# Patient Record
Sex: FEMALE | Race: WHITE | NOT HISPANIC OR LATINO
[De-identification: names, ages, dates, MRNs, and addresses within clinical notes are randomized per-mention and may not be internally consistent; named-entity substitution may affect disease eponyms.]

---

## 2017-05-10 PROBLEM — Z00.00 ENCOUNTER FOR PREVENTIVE HEALTH EXAMINATION: Status: ACTIVE | Noted: 2017-05-10

## 2017-05-17 ENCOUNTER — RECORD ABSTRACTING (OUTPATIENT)
Age: 37
End: 2017-05-17

## 2017-05-17 DIAGNOSIS — Z84.0 FAMILY HISTORY OF DISEASES OF THE SKIN AND SUBCUTANEOUS TISSUE: ICD-10-CM

## 2017-05-17 DIAGNOSIS — Z78.9 OTHER SPECIFIED HEALTH STATUS: ICD-10-CM

## 2017-05-26 ENCOUNTER — APPOINTMENT (OUTPATIENT)
Dept: PLASTIC SURGERY | Facility: CLINIC | Age: 37
End: 2017-05-26

## 2017-06-16 ENCOUNTER — APPOINTMENT (OUTPATIENT)
Dept: PLASTIC SURGERY | Facility: CLINIC | Age: 37
End: 2017-06-16

## 2017-06-16 ENCOUNTER — OUTPATIENT (OUTPATIENT)
Dept: OUTPATIENT SERVICES | Facility: HOSPITAL | Age: 37
LOS: 1 days | Discharge: HOME | End: 2017-06-16

## 2017-06-28 DIAGNOSIS — D48.5 NEOPLASM OF UNCERTAIN BEHAVIOR OF SKIN: ICD-10-CM

## 2017-06-29 ENCOUNTER — APPOINTMENT (OUTPATIENT)
Dept: PLASTIC SURGERY | Facility: CLINIC | Age: 37
End: 2017-06-29

## 2017-06-30 ENCOUNTER — APPOINTMENT (OUTPATIENT)
Dept: PLASTIC SURGERY | Facility: CLINIC | Age: 37
End: 2017-06-30

## 2018-06-05 ENCOUNTER — APPOINTMENT (OUTPATIENT)
Dept: PLASTIC SURGERY | Facility: CLINIC | Age: 38
End: 2018-06-05
Payer: COMMERCIAL

## 2018-06-05 PROCEDURE — 99212 OFFICE O/P EST SF 10 MIN: CPT

## 2018-09-16 ENCOUNTER — INPATIENT (INPATIENT)
Facility: HOSPITAL | Age: 38
LOS: 0 days | Discharge: HOME | End: 2018-09-17
Attending: INTERNAL MEDICINE | Admitting: INTERNAL MEDICINE

## 2018-09-16 VITALS
DIASTOLIC BLOOD PRESSURE: 73 MMHG | TEMPERATURE: 98 F | HEART RATE: 123 BPM | SYSTOLIC BLOOD PRESSURE: 102 MMHG | RESPIRATION RATE: 18 BRPM | OXYGEN SATURATION: 98 %

## 2018-09-16 LAB
ALBUMIN SERPL ELPH-MCNC: 4.9 G/DL — SIGNIFICANT CHANGE UP (ref 3.5–5.2)
ALP SERPL-CCNC: 105 U/L — SIGNIFICANT CHANGE UP (ref 30–115)
ALT FLD-CCNC: 24 U/L — SIGNIFICANT CHANGE UP (ref 0–41)
ANION GAP SERPL CALC-SCNC: 19 MMOL/L — HIGH (ref 7–14)
AST SERPL-CCNC: 25 U/L — SIGNIFICANT CHANGE UP (ref 0–41)
BASOPHILS # BLD AUTO: 0.04 K/UL — SIGNIFICANT CHANGE UP (ref 0–0.2)
BASOPHILS NFR BLD AUTO: 0.3 % — SIGNIFICANT CHANGE UP (ref 0–1)
BILIRUB SERPL-MCNC: 0.4 MG/DL — SIGNIFICANT CHANGE UP (ref 0.2–1.2)
BUN SERPL-MCNC: 18 MG/DL — SIGNIFICANT CHANGE UP (ref 10–20)
CALCIUM SERPL-MCNC: 10.4 MG/DL — HIGH (ref 8.5–10.1)
CHLORIDE SERPL-SCNC: 93 MMOL/L — LOW (ref 98–110)
CO2 SERPL-SCNC: 25 MMOL/L — SIGNIFICANT CHANGE UP (ref 17–32)
CREAT SERPL-MCNC: 0.8 MG/DL — SIGNIFICANT CHANGE UP (ref 0.7–1.5)
D DIMER BLD IA.RAPID-MCNC: 129 NG/ML DDU — SIGNIFICANT CHANGE UP (ref 0–230)
EOSINOPHIL # BLD AUTO: 0.08 K/UL — SIGNIFICANT CHANGE UP (ref 0–0.7)
EOSINOPHIL NFR BLD AUTO: 0.6 % — SIGNIFICANT CHANGE UP (ref 0–8)
GLUCOSE SERPL-MCNC: 122 MG/DL — HIGH (ref 70–99)
HCT VFR BLD CALC: 39.4 % — SIGNIFICANT CHANGE UP (ref 37–47)
HGB BLD-MCNC: 13.1 G/DL — SIGNIFICANT CHANGE UP (ref 12–16)
IMM GRANULOCYTES NFR BLD AUTO: 0.5 % — HIGH (ref 0.1–0.3)
LYMPHOCYTES # BLD AUTO: 1.31 K/UL — SIGNIFICANT CHANGE UP (ref 1.2–3.4)
LYMPHOCYTES # BLD AUTO: 9.6 % — LOW (ref 20.5–51.1)
MAGNESIUM SERPL-MCNC: 2.1 MG/DL — SIGNIFICANT CHANGE UP (ref 1.8–2.4)
MCHC RBC-ENTMCNC: 26.5 PG — LOW (ref 27–31)
MCHC RBC-ENTMCNC: 33.2 G/DL — SIGNIFICANT CHANGE UP (ref 32–37)
MCV RBC AUTO: 79.6 FL — LOW (ref 81–99)
MONOCYTES # BLD AUTO: 0.7 K/UL — HIGH (ref 0.1–0.6)
MONOCYTES NFR BLD AUTO: 5.1 % — SIGNIFICANT CHANGE UP (ref 1.7–9.3)
NEUTROPHILS # BLD AUTO: 11.47 K/UL — HIGH (ref 1.4–6.5)
NEUTROPHILS NFR BLD AUTO: 83.9 % — HIGH (ref 42.2–75.2)
NRBC # BLD: 0 /100 WBCS — SIGNIFICANT CHANGE UP (ref 0–0)
PLATELET # BLD AUTO: 340 K/UL — SIGNIFICANT CHANGE UP (ref 130–400)
POTASSIUM SERPL-MCNC: 4.3 MMOL/L — SIGNIFICANT CHANGE UP (ref 3.5–5)
POTASSIUM SERPL-SCNC: 4.3 MMOL/L — SIGNIFICANT CHANGE UP (ref 3.5–5)
PROT SERPL-MCNC: 8.8 G/DL — HIGH (ref 6–8)
RBC # BLD: 4.95 M/UL — SIGNIFICANT CHANGE UP (ref 4.2–5.4)
RBC # FLD: 13.4 % — SIGNIFICANT CHANGE UP (ref 11.5–14.5)
SODIUM SERPL-SCNC: 137 MMOL/L — SIGNIFICANT CHANGE UP (ref 135–146)
TROPONIN T SERPL-MCNC: <0.01 NG/ML — SIGNIFICANT CHANGE UP
WBC # BLD: 13.67 K/UL — HIGH (ref 4.8–10.8)
WBC # FLD AUTO: 13.67 K/UL — HIGH (ref 4.8–10.8)

## 2018-09-16 RX ORDER — ENOXAPARIN SODIUM 100 MG/ML
40 INJECTION SUBCUTANEOUS EVERY 24 HOURS
Qty: 0 | Refills: 0 | Status: DISCONTINUED | OUTPATIENT
Start: 2018-09-16 | End: 2018-09-17

## 2018-09-16 RX ORDER — SODIUM CHLORIDE 9 MG/ML
1000 INJECTION INTRAMUSCULAR; INTRAVENOUS; SUBCUTANEOUS ONCE
Qty: 0 | Refills: 0 | Status: COMPLETED | OUTPATIENT
Start: 2018-09-16 | End: 2018-09-16

## 2018-09-16 RX ORDER — ASPIRIN/CALCIUM CARB/MAGNESIUM 324 MG
325 TABLET ORAL ONCE
Qty: 0 | Refills: 0 | Status: COMPLETED | OUTPATIENT
Start: 2018-09-16 | End: 2018-09-16

## 2018-09-16 RX ADMIN — Medication 325 MILLIGRAM(S): at 22:29

## 2018-09-16 RX ADMIN — SODIUM CHLORIDE 2000 MILLILITER(S): 9 INJECTION INTRAMUSCULAR; INTRAVENOUS; SUBCUTANEOUS at 20:00

## 2018-09-16 NOTE — ED PROVIDER NOTE - PHYSICAL EXAMINATION
CONSTITUTIONAL: Well-developed; well-nourished; in no acute distress.   SKIN: warm, dry  HEAD: Normocephalic; atraumatic.  ENT: No nasal discharge; airway clear.  NECK: Supple; non tender.  CARD: S1, S2 normal; no murmurs, gallops, or rubs. Irregularly irregular rythm  RESP: No wheezes, rales or rhonchi.  ABD: soft ntnd  EXT: Normal ROM.  No clubbing, cyanosis or edema.   LYMPH: No acute cervical adenopathy.  NEURO: Alert, oriented, grossly unremarkable  PSYCH: Cooperative, appropriate.

## 2018-09-16 NOTE — ED PROVIDER NOTE - PROGRESS NOTE DETAILS
patient with PMH HOCM, no other medical problems. new onset afib today. NAD, mentating well. cardiologist in [place]. patient CHADSVAS 1 (female gender), does not require AC. Pt reports feeling better after cardizem drip, HR 97. Will admit

## 2018-09-16 NOTE — ED PROVIDER NOTE - ATTENDING CONTRIBUTION TO CARE
37 y.o. F with PMH of HOCM presents with one day of palpitations and shortness of breath. denies cp, + palpitations, no loc, no hemoptysis, + recent travel.     CONSTITUTIONAL: Well-developed; well-nourished; in no acute distress. Sitting up and providing appropriate history and physical examination  SKIN: skin exam is warm and dry, no acute rash.  HEAD: Normocephalic; atraumatic.  EYES: PERRL, 3 mm bilateral, no nystagmus, EOM intact; conjunctiva and sclera clear.  ENT: No nasal discharge; airway clear.  NECK: Supple; non tender. + full passive ROM in all directions. No JVD  CARD: S1, S2 normal; no murmurs, gallops, or rubs. + rapid Irregular rate and rhythm. + Symmetric Strong Pulses, + AFIB  RESP: No wheezes, rales or rhonchi. Good air movement bilaterally  ABD: soft; non-distended; non-tender. No Rebound, No Guarding, No signs of peritonitis, No CVA tenderness. No pulsatile abdominal mass. + Strong and Symmetric Pulses  EXT: Normal ROM. No clubbing, cyanosis or edema. Dp and Pt Pulses intact. Cap refill less than 3 seconds  NEURO: CN 2-12 intact, normal finger to nose, normal romberg, stable gait, no sensory or motor deficits, Alert, oriented, grossly unremarkable. No Focal deficits. GCS 15. NIH 0  PSYCH: Cooperative, appropriate.

## 2018-09-16 NOTE — ED PROVIDER NOTE - NS ED ROS FT
General: No fever, No chills  Eyes:  No visual changes, eye pain or discharge.  ENMT:  No hearing changes, pain, no sore throat or runny nose, no difficulty swallowing  Cardiac:  Irregularly irregular. + SOB or edema. No chest pain with exertion.  Respiratory:  No cough or respiratory distress. No hemoptysis. No history of asthma or RAD.  GI:  No nausea, vomiting, diarrhea or abdominal pain.  MS:  No myalgia, muscle weakness, joint pain or back pain.  Neuro:  No headache or weakness.  No LOC.  Skin:  No skin rash.   Endocrine: No history of thyroid disease or diabetes.

## 2018-09-16 NOTE — H&P ADULT - ASSESSMENT
37 Yr F PMH HCM presenting with Palpitation, dizziness and diaphoresis    #) New onset Afib  - Admit to telemetry  - Controlled on Cardizem drip. Switch to PO and titrate drip  - Hold home medication Betablocker   - Pt has hx of HCM, high risk for thromboembolic event.  - Discussed anticoagulation with pt, she refused and wants second opinion  - F/U cardiology consult  - F/U repeat trop, TSH, electrolytes and echo    DVT ppx: Lovenox 40mg Sq  DASH diet  FULL code

## 2018-09-16 NOTE — ED ADULT NURSE REASSESSMENT NOTE - NS ED NURSE REASSESS COMMENT FT1
Pt assessed, pt resting comfortably, pts HR in 70's on monitor. MD Burns notified, EKG ordered. MD Lechuga ordered cardizem PO, pts HR 66 in NSR at this time, MD Lechuga stated to continue to titrate cardizem slowly and then d/c within 2hours. Cardizem drip at 2mg/hr at this time. Will continue to monitor.

## 2018-09-16 NOTE — ED ADULT TRIAGE NOTE - ACCOMPANIED BY
Patient ID: Aliza Mario is a 33year old female. HPI  Patient presents with: Anxiety    She had anxiety for 10 years but she is really wanted to avoid meds but she feels now that she really does need it. No depression. No suicidal ideations.   She s Nephrolithiasis      per NG: passed on own- 04/2010           Past Surgical History    TONSILLECTOMY      LAPAROSCOPIC CHOLECYSTECTOMY  11/2008    Comment per NG: cholelithiasis    REMOVAL OF OVARIAN CYST(S)  2009    Comment per NG: ovarian cyst; per [facility] Spouse/Significant other

## 2018-09-16 NOTE — ED PROVIDER NOTE - OBJECTIVE STATEMENT
37 y.o. F with PMH of HOCM presents with one day of palpitations and shortness of breath. She never felt this way before, she woke up and felt this all of a sudden. She denies N/V, diarrhea. She states that she recently traveled to [place] 2 weeks ago. She denies any leg pain. She is seeing Dr. Rizvi at [facility].

## 2018-09-16 NOTE — H&P ADULT - ATTENDING COMMENTS
37 Yr F PMH HCM presenting with afib with RVR- now controlled    pt seen and examined- asymptomatic at present    Chart reviewed- agree with initial plan    rate control    tele    ACT    cardio eval    Plan as per cardio

## 2018-09-16 NOTE — H&P ADULT - NSHPLABSRESULTS_GEN_ALL_CORE
13.1   13.67 )-----------( 340      ( 16 Sep 2018 20:05 )             39.4   09-16    137  |  93<L>  |  18  ----------------------------<  122<H>  4.3   |  25  |  0.8    Ca    10.4<H>      16 Sep 2018 20:05  Mg     2.1     09-16    TPro  8.8<H>  /  Alb  4.9  /  TBili  0.4  /  DBili  x   /  AST  25  /  ALT  24  /  AlkPhos  105  09-16  Troponin T, Serum (09.16.18 @ 20:05)    Troponin T, Serum: <0.01 ng/mL

## 2018-09-16 NOTE — ED ADULT NURSE NOTE - NSIMPLEMENTINTERV_GEN_ALL_ED
Implemented All Universal Safety Interventions:  [place] to call system. Call bell, personal items and telephone within reach. Instruct patient to call for assistance. Room bathroom lighting operational. Non-slip footwear when patient is off stretcher. Physically safe environment: no spills, clutter or unnecessary equipment. Stretcher in lowest position, wheels locked, appropriate side rails in place.

## 2018-09-16 NOTE — H&P ADULT - HISTORY OF PRESENT ILLNESS
37 yr F pmh include HCM presenting by EMS with 1 day hx of dizziness, palpitation and diaphoresis. Pt reports symptoms were acute onset, started at 3pm. Denies any previous similar symptoms, fever, chills, chest pain, abd pain, recent illness, nausea, vomiting, or diarrhea. Pt sees cardiologist in [place] for HCM that was diagnosed on routine Echo.  In the ED  /73 T 97.8. EKG shows AFIB with RVR @ 158. WBC 13, Trop neg x1, Mg 2.1.   Pt received 2x Cardizem push and Cardizem drip. Pt is now controlled and in sinus HR 75

## 2018-09-16 NOTE — ED PROVIDER NOTE - MEDICAL DECISION MAKING DETAILS
I personally evaluated the patient. I reviewed the Resident’s or Physician Assistant’s note (as assigned above), and agree with the findings and plan except as documented in my note. Patient rate controlled, admitted, on cardizem, Nelidvasc advises asa, no anticogulation

## 2018-09-17 ENCOUNTER — TRANSCRIPTION ENCOUNTER (OUTPATIENT)
Age: 38
End: 2018-09-17

## 2018-09-17 VITALS
OXYGEN SATURATION: 96 % | DIASTOLIC BLOOD PRESSURE: 53 MMHG | TEMPERATURE: 96 F | HEART RATE: 71 BPM | RESPIRATION RATE: 18 BRPM | SYSTOLIC BLOOD PRESSURE: 117 MMHG

## 2018-09-17 LAB
ANION GAP SERPL CALC-SCNC: 13 MMOL/L — SIGNIFICANT CHANGE UP (ref 7–14)
BUN SERPL-MCNC: 12 MG/DL — SIGNIFICANT CHANGE UP (ref 10–20)
CALCIUM SERPL-MCNC: 9.7 MG/DL — SIGNIFICANT CHANGE UP (ref 8.5–10.1)
CHLORIDE SERPL-SCNC: 103 MMOL/L — SIGNIFICANT CHANGE UP (ref 98–110)
CO2 SERPL-SCNC: 27 MMOL/L — SIGNIFICANT CHANGE UP (ref 17–32)
CREAT SERPL-MCNC: 0.8 MG/DL — SIGNIFICANT CHANGE UP (ref 0.7–1.5)
GLUCOSE SERPL-MCNC: 90 MG/DL — SIGNIFICANT CHANGE UP (ref 70–99)
HCT VFR BLD CALC: 37 % — SIGNIFICANT CHANGE UP (ref 37–47)
HGB BLD-MCNC: 12.4 G/DL — SIGNIFICANT CHANGE UP (ref 12–16)
MAGNESIUM SERPL-MCNC: 2.1 MG/DL — SIGNIFICANT CHANGE UP (ref 1.8–2.4)
MCHC RBC-ENTMCNC: 26.8 PG — LOW (ref 27–31)
MCHC RBC-ENTMCNC: 33.5 G/DL — SIGNIFICANT CHANGE UP (ref 32–37)
MCV RBC AUTO: 79.9 FL — LOW (ref 81–99)
NRBC # BLD: 0 /100 WBCS — SIGNIFICANT CHANGE UP (ref 0–0)
PLATELET # BLD AUTO: 286 K/UL — SIGNIFICANT CHANGE UP (ref 130–400)
POTASSIUM SERPL-MCNC: 4.2 MMOL/L — SIGNIFICANT CHANGE UP (ref 3.5–5)
POTASSIUM SERPL-SCNC: 4.2 MMOL/L — SIGNIFICANT CHANGE UP (ref 3.5–5)
RBC # BLD: 4.63 M/UL — SIGNIFICANT CHANGE UP (ref 4.2–5.4)
RBC # FLD: 13.3 % — SIGNIFICANT CHANGE UP (ref 11.5–14.5)
SODIUM SERPL-SCNC: 143 MMOL/L — SIGNIFICANT CHANGE UP (ref 135–146)
TROPONIN T SERPL-MCNC: 0.01 NG/ML — SIGNIFICANT CHANGE UP
WBC # BLD: 10.82 K/UL — HIGH (ref 4.8–10.8)
WBC # FLD AUTO: 10.82 K/UL — HIGH (ref 4.8–10.8)

## 2018-09-17 RX ORDER — METOPROLOL TARTRATE 50 MG
100 TABLET ORAL DAILY
Qty: 0 | Refills: 0 | Status: DISCONTINUED | OUTPATIENT
Start: 2018-09-17 | End: 2018-09-17

## 2018-09-17 RX ORDER — METOPROLOL TARTRATE 50 MG
3 TABLET ORAL
Qty: 0 | Refills: 0 | COMMUNITY

## 2018-09-17 RX ORDER — APIXABAN 2.5 MG/1
1 TABLET, FILM COATED ORAL
Qty: 60 | Refills: 0
Start: 2018-09-17 | End: 2018-10-16

## 2018-09-17 RX ORDER — RIVAROXABAN 15 MG-20MG
1 KIT ORAL
Qty: 30 | Refills: 0 | OUTPATIENT
Start: 2018-09-17 | End: 2018-10-16

## 2018-09-17 RX ADMIN — Medication 100 MILLIGRAM(S): at 14:35

## 2018-09-17 RX ADMIN — ENOXAPARIN SODIUM 40 MILLIGRAM(S): 100 INJECTION SUBCUTANEOUS at 12:59

## 2018-09-17 NOTE — ED ADULT NURSE REASSESSMENT NOTE - NS ED NURSE REASSESS COMMENT FT1
Pt reassessed with bed sign to  [facility] remain safety VS stable 1:1 sit at bed site denies no anxiety denies pain or Sob with bed sigh  to [facility] ambulance arranged waiting for tramdport.

## 2018-09-17 NOTE — DISCHARGE NOTE ADULT - CARE PLAN
Principal Discharge DX:	Afib  Goal:	control, prevent complications  Assessment and plan of treatment:	Take all medications as prescribed. Follow up with Cardiologist and primary care doctor. If any signs of uncontrollable bleeding, call 911 or come to nearest emergency room.  Secondary Diagnosis:	Hypertrophic cardiomyopathy  Goal:	control  Assessment and plan of treatment:	Take all medications as prescribed. Follow up with Cardiologist and primary care doctor.

## 2018-09-17 NOTE — CONSULT NOTE ADULT - ASSESSMENT
36 yo female patient with PMHx of HCM on toprol, presented because of acute onset of palpitations, dizziness and diaphoresis, found to have new onset AF with RVR    #) Paroxysmal AF  [id number] = 1 (female)  Resume beta-blockers for both HCM and AF, can titrate up as needed to target HR 60-70bpm  Patient needs short term anticoagulation    #) Moderate eccentric mitral regurgitation  under-estimated by Transthoracic echo  will require MINDI to better assess MR    #) HCM  continue with beta-blockers    Follow up with private cardiologist in [place] upon discharge 38 yo female patient with PMHx of HCM on toprol, presented because of acute onset of palpitations, dizziness and diaphoresis, found to have new onset AF with RVR    #) Paroxysmal AF  [id number] = 1 (female) + has structural heart disease (HCM) that increase risk of strokes  Resume beta-blockers for both HCM and AF, can titrate up as needed to target HR 60-70bpm  Patient needs short term anticoagulation (can give eliquis 5mg bid)    #) Moderate eccentric mitral regurgitation  under-estimated by Transthoracic echo  might require MINDI to better assess MR as outpatient    #) HCM  continue with beta-blockers    Follow up with private cardiologist in [place] upon discharge 36 yo female patient with PMHx of HCM on toprol, presented because of acute onset of palpitations, dizziness and diaphoresis, found to have new onset AF with RVR    #) Paroxysmal AF  [id number] = 1 (female) + has structural heart disease (HCM) that increase risk of stroke  Resume beta-blockers for both HCM and AF, can titrate up as needed to target HR 60-70bpm  Start oral anticoagulation (can give eliquis 5mg bid)    #) Moderate eccentric mitral regurgitation  may be underestimated by transthoracic echo  might require MINDI to better assess MR as outpatient    #) HCM  continue with beta-blockers    Follow up with private cardiologist in [place] upon discharge

## 2018-09-17 NOTE — CONSULT NOTE ADULT - ASSESSMENT
38 y/o female with known hypertrophic cardiomyopathy admitted with shortness of breath palpitations found to have afib- now in sinus rhythm  Also with daytime fatigue unrefreshed sleep, snoring    R/o abida  Paroxysmal Afib  Sob resolved    -no clinically significant pulmonary edema on exam  -rate control  -tele monitoring  -f/u 2 d echo  -cardiology follow up- antiplatelet vs antithrombotics  -serial ce   -outpt follow up for polysomnography  -dvt px  -d/w family at bedside

## 2018-09-17 NOTE — DISCHARGE NOTE ADULT - HOSPITAL COURSE
38 yo F with PMH of HCM presented to ED complaining of dizziness, palpitations, and SOB. In ED found to be in A-fib w/ RVR. Was given IV Cardizem. A-fib broke, rate was controlled. Echo was done showing severe asymmetric septal LV hypertrophy. Was evaluated by Cardiology. Recommended anticoagulation and outpatient follow up with primary Cardiologist.

## 2018-09-17 NOTE — PROGRESS NOTE ADULT - ASSESSMENT
36 yo F with PMH of HCM presented to ED complaining of dizziness, palpitations, and SOB. In ED found to be in A-fib w/ RVR.    #) A-fib, new onset  - [id number] =1  - rate currently controlled  - CE -ve x2  - refused AC previously, would like Cardiologist opinion  - Cardio eval - pending    #) HCM  - on Toprol 50mg TID at home, will resume at home dose  - Echo (9/17): 65-70%, G1DD, severe asymmetric septal LV hypertrophy, small PFO  - Cardio eval - pending    DVT: Lovenox subQ  Diet: DASH  Activity: AAT  Code status: FULL  Dispo: from home 38 yo F with PMH of HCM presented to ED complaining of dizziness, palpitations, and SOB. In ED found to be in A-fib w/ RVR.    #) A-fib, new onset  - [id number] =1  - rate currently controlled  - CE -ve x2  - refused AC previously, would like Cardiologist opinion  - Cardio eval - pending  - Pulm following - Rx outpatient sleep study for r/o LUZMA    #) HCM  - on Toprol 50mg TID at home, will resume at home dose  - Echo (9/17): 65-70%, G1DD, severe asymmetric septal LV hypertrophy, small PFO  - Cardio eval - pending    DVT: Lovenox subQ  Diet: DASH  Activity: AAT  Code status: FULL  Dispo: from home 38 yo F with PMH of HCM presented to ED complaining of dizziness, palpitations, and SOB. In ED found to be in A-fib w/ RVR.    #) A-fib, new onset  - [id number] =1  - rate currently controlled  - CE -ve x2  - refused AC previously, would like Cardiologist opinion  - Cardio eval - pending  - Pulm following - Rx outpatient sleep study for r/o LUZMA    #) HCM  - on Toprol 50mg TID at home. Will resume 100 XL for now  - Echo (9/17): 65-70%, G1DD, severe asymmetric septal LV hypertrophy, small PFO  - Cardio eval - pending    DVT: Lovenox subQ  Diet: DASH  Activity: AAT  Code status: FULL  Dispo: from home

## 2018-09-17 NOTE — DISCHARGE NOTE ADULT - CARE PROVIDER_API CALL
Primary care doctor,   known to patient  Phone: (   )    -  Fax: (   )    -    Cardiologist,   known to patient  Phone: (   )    -  Fax: (   )    -

## 2018-09-17 NOTE — ED ADULT NURSE REASSESSMENT NOTE - NS ED NURSE REASSESS COMMENT FT1
As per MD Ankush fournier d/c'd, pt on monitor HR 62, Cardizem PO administered, will continue to monitor.

## 2018-09-17 NOTE — ED ADULT NURSE REASSESSMENT NOTE - NS ED NURSE REASSESS COMMENT FT1
Pt alert and oriented, pt offers no complaints at this time. Pt admitted pending tele bed. Cardizem drip d/c'd pts HR 66 on monitor at this time. Will continue to monitor.

## 2018-09-17 NOTE — CONSULT NOTE ADULT - ATTENDING COMMENTS
Start Eliquis 5 mg q12  Continue Toprol  daily  Follow up already scheduled with Dr. Rizvi at [facility] in 2 weeks

## 2018-09-17 NOTE — DISCHARGE NOTE ADULT - MEDICATION SUMMARY - MEDICATIONS TO TAKE
I will START or STAY ON the medications listed below when I get home from the hospital:    apixaban 5 mg oral tablet  -- 1 tab(s) by mouth 2 times a day   -- Check with your doctor before becoming pregnant.  It is very important that you take or use this exactly as directed.  Do not skip doses or discontinue unless directed by your doctor.  Obtain medical advice before taking any non-prescription drugs as some may affect the action of this medication.    -- Indication: For A-fib    Metoprolol Succinate ER 50 mg oral tablet, extended release  -- 3 tab(s) by mouth once a day  -- Indication: For Hypertrophic cardiomyopathy

## 2018-09-17 NOTE — PROGRESS NOTE ADULT - SUBJECTIVE AND OBJECTIVE BOX
SUBJECTIVE:    Patient is a 37y old Female who presents with a chief complaint of Palpitation and dizziness (17 Sep 2018 08:45)    Doing well, no complaints. No more palpitations. Reports she has not seen Cardiologist in >1 year despite Cardio asking her to follow up every 3 months.    PAST MEDICAL & SURGICAL HISTORY  Hypertrophic cardiomyopathy  No significant past surgical history    SOCIAL HISTORY:  Negative for smoking/alcohol/drug use.     ALLERGIES:  No Known Allergies    MEDICATIONS:  STANDING MEDICATIONS  diltiazem    Tablet 30 milliGRAM(s) Oral four times a day  enoxaparin Injectable 40 milliGRAM(s) SubCutaneous every 24 hours    PRN MEDICATIONS    VITALS:   T(F): 97.2  HR: 73  BP: 109/52  RR: 18  SpO2: 98%    LABS:                        12.4   10.82 )-----------( 286      ( 17 Sep 2018 07:54 )             37.0     09-17    143  |  103  |  12  ----------------------------<  90  4.2   |  27  |  0.8    Ca    9.7      17 Sep 2018 07:54  Mg     2.1     09-17    TPro  8.8<H>  /  Alb  4.9  /  TBili  0.4  /  DBili  x   /  AST  25  /  ALT  24  /  AlkPhos  105  09-16    Troponin T, Serum: 0.01 ng/mL (09-17-18 @ 07:54)  Troponin T, Serum: <0.01 ng/mL (09-16-18 @ 20:05)    CARDIAC MARKERS ( 17 Sep 2018 07:54 )  x     / 0.01 ng/mL / x     / x     / x      CARDIAC MARKERS ( 16 Sep 2018 20:05 )  x     / <0.01 ng/mL / x     / x     / x        PHYSICAL EXAM:  GEN: NAD, comfortable  LUNGS: CTAB, no w/r/r  HEART: RRR, no m/r/g  ABD: soft, NT/ND, +BS  EXT: no edema, PP b/l  NEURO: AAOx3

## 2018-09-17 NOTE — DISCHARGE NOTE ADULT - PLAN OF CARE
control, prevent complications Take all medications as prescribed. Follow up with Cardiologist and primary care doctor. If any signs of uncontrollable bleeding, call 911 or come to nearest emergency room. control Take all medications as prescribed. Follow up with Cardiologist and primary care doctor.

## 2018-09-17 NOTE — DISCHARGE NOTE ADULT - MEDICATION SUMMARY - MEDICATIONS TO CHANGE
I will SWITCH the dose or number of times a day I take the medications listed below when I get home from the hospital:    Metoprolol Succinate ER 50 mg oral tablet, extended release  -- 3 tab(s) by mouth once a day

## 2018-09-17 NOTE — DISCHARGE NOTE ADULT - PROVIDER TOKENS
FREE:[LAST:[Primary care doctor],PHONE:[(   )    -],FAX:[(   )    -],ADDRESS:[known to patient]],FREE:[LAST:[Cardiologist],PHONE:[(   )    -],FAX:[(   )    -],ADDRESS:[known to patient]]

## 2018-09-17 NOTE — CONSULT NOTE ADULT - SUBJECTIVE AND OBJECTIVE BOX
HPI:  36 yo female patient with PMH of HCM on toprol 150mg daily, presented because of 1 day hx of acute onset of dizziness, palpitation and diaphoresis that started after dinner last night. She denies any previous similar symptoms, fever, chills, chest pain, abd pain, recent illness, nausea, vomiting, or diarrhea.  In ED patient was found to have HR of 160 bpm likely AF, received cardizem IV push, converted later on to sinus at 70 bpm.  Today patient is asymptomatic, has no complaints. feels better.    ROS:  All other systems reviewed and are negative    PAST MEDICAL & SURGICAL HISTORY  Hypertrophic cardiomyopathy    FAMILY HISTORY:  No pertinent family history in first degree relatives    SOCIAL HISTORY:  []smoker  []Alcohol  []Drug    ALLERGIES:  No Known Allergies    MEDICATIONS:  diltiazem    Tablet 30 milliGRAM(s) Oral four times a day  enoxaparin Injectable 40 milliGRAM(s) SubCutaneous every 24 hours    HOME MEDICATIONS:  Metoprolol Succinate ER 50 mg oral tablet, extended release: 3 tab(s) orally once a day (16 Sep 2018 23:38)    VITALS:   T(F): 97.2 (09-17 @ 08:45), Max: 97.8 (09-16 @ 19:11)  HR: 73 (09-17 @ 13:08) (66 - 160)  BP: 109/52 (09-17 @ 13:08) (99/51 - 140/80)  RR: 18 (09-17 @ 13:08) (18 - 20)  SpO2: 98% (09-17 @ 13:08) (97% - 100%)    PHYSICAL EXAM:  GEN: Alert and oriented X 3, Well nourished, No acute distress  NECK: Supple, no JVD  LUNGS: Clear to auscultation bilaterally  CARDIOVASCULAR: Regular S1 S2, systolic aortic and mitral murmur 3/6  ABD: Soft, non-tender  EXT: No Lower extremity edema/cyanosis  NEURO: Non focal  SKIN: Intact    LABS:                        12.4   10.82 )-----------( 286      ( 17 Sep 2018 07:54 )             37.0     143  |  103  |  12  ----------------------------<  90  4.2   |  27  |  0.8    Ca    9.7      17 Sep 2018 07:54  Mg     2.1     09-17    TPro  8.8<H>  /  Alb  4.9  /  TBili  0.4  /  DBili  x   /  AST  25  /  ALT  24  /  AlkPhos  105  09-16    Troponin T, Serum: 0.01 ng/mL (09-17-18 @ 07:54)  Troponin T, Serum: <0.01 ng/mL (09-16-18 @ 20:05)    CARDIAC MARKERS ( 17 Sep 2018 07:54 )  x     / 0.01 ng/mL / x     / x     / x      CARDIAC MARKERS ( 16 Sep 2018 20:05 )  x     / <0.01 ng/mL / x     / x     / x        RADIOLOGY:  -CXR: Mild interstitial edema, cardiomegaly  -TTE: EF 65-70%, GIDD, severe asymmetric septal LVH with systolic obliteration of the apical cavity. Moderate eccentric MR possibly underestimated, PFO    ECG:  Upon presentation: AF vs MAT with RVR. HR around 160 bpm, RBBB  Repeat: Sinus rhythm at 75 bpm, RBBB, left atrial enlargement HPI:  36 yo female patient with PMH of HCM on toprol 150mg daily, presented because of 1 day hx of acute onset of dizziness, palpitation and diaphoresis that started after dinner last night. She denies any previous similar symptoms, fever, chills, chest pain, abd pain, recent illness, nausea, vomiting, or diarrhea.  In ED patient was found to have HR of 160 bpm likely AF, received cardizem IV push, converted later on to sinus at 70 bpm.  Today patient is asymptomatic, has no complaints. feels better.    ROS:  All other systems reviewed and are negative    PAST MEDICAL & SURGICAL HISTORY  Hypertrophic cardiomyopathy    FAMILY HISTORY:  No pertinent family history in first degree relatives    SOCIAL HISTORY:  []smoker  []Alcohol  []Drug    ALLERGIES:  No Known Allergies    MEDICATIONS:  diltiazem    Tablet 30 milliGRAM(s) Oral four times a day  enoxaparin Injectable 40 milliGRAM(s) SubCutaneous every 24 hours    HOME MEDICATIONS:  Metoprolol Succinate ER 50 mg oral tablet, extended release: 3 tab(s) orally once a day (16 Sep 2018 23:38)    VITALS:   T(F): 97.2 (09-17 @ 08:45), Max: 97.8 (09-16 @ 19:11)  HR: 73 (09-17 @ 13:08) (66 - 160)  BP: 109/52 (09-17 @ 13:08) (99/51 - 140/80)  RR: 18 (09-17 @ 13:08) (18 - 20)  SpO2: 98% (09-17 @ 13:08) (97% - 100%)    PHYSICAL EXAM:  GEN: Alert and oriented X 3, Well nourished, No acute distress  NECK: Supple, no JVD  LUNGS: Clear to auscultation bilaterally  CARDIOVASCULAR: Regular S1 S2, systolic aortic and mitral murmur 3/6  ABD: Soft, non-tender  EXT: No Lower extremity edema/cyanosis  NEURO: Non focal  SKIN: Intact    LABS:                        12.4   10.82 )-----------( 286      ( 17 Sep 2018 07:54 )             37.0     143  |  103  |  12  ----------------------------<  90  4.2   |  27  |  0.8    Ca    9.7      17 Sep 2018 07:54  Mg     2.1     09-17    TPro  8.8<H>  /  Alb  4.9  /  TBili  0.4  /  DBili  x   /  AST  25  /  ALT  24  /  AlkPhos  105  09-16    Troponin T, Serum: 0.01 ng/mL (09-17-18 @ 07:54)  Troponin T, Serum: <0.01 ng/mL (09-16-18 @ 20:05)    CARDIAC MARKERS ( 17 Sep 2018 07:54 )  x     / 0.01 ng/mL / x     / x     / x      CARDIAC MARKERS ( 16 Sep 2018 20:05 )  x     / <0.01 ng/mL / x     / x     / x        RADIOLOGY:  -CXR: Mild interstitial edema, cardiomegaly  -TTE: EF 65-70%, GIDD, severe asymmetric septal LVH with systolic obliteration of the apical cavity. Moderate eccentric MR possibly underestimated, PFO    ECG:  Upon presentation: AF with RVR. HR around 160 bpm, RBBB  Repeat: Sinus rhythm at 75 bpm, RBBB, left atrial enlargement HPI:  38 yo female patient with PMH of HCM on toprol 150mg daily, presented because of 1 day hx of acute onset of dizziness, palpitation and diaphoresis that started after dinner last night. She denies any previous similar symptoms, fever, chills, chest pain, abd pain, recent illness, nausea, vomiting, or diarrhea.    In ED patient was found to have HR of 160 bpm likely AF, received cardizem IV push, converted later on to sinus at 70 bpm.    Today patient is asymptomatic, no complaints, feels better.      PAST MEDICAL & SURGICAL HISTORY  Hypertrophic cardiomyopathy    FAMILY HISTORY:  No pertinent family history in first degree relatives    SOCIAL HISTORY:  []smoker  []Alcohol  []Drug    ALLERGIES:  No Known Allergies    MEDICATIONS:  diltiazem    Tablet 30 milliGRAM(s) Oral four times a day  enoxaparin Injectable 40 milliGRAM(s) SubCutaneous every 24 hours    HOME MEDICATIONS:  Metoprolol Succinate ER 50 mg oral tablet, extended release: 3 tab(s) orally once a day (16 Sep 2018 23:38)        VITALS:   T(F): 97.2 (09-17 @ 08:45), Max: 97.8 (09-16 @ 19:11)  HR: 73 (09-17 @ 13:08) (66 - 160)  BP: 109/52 (09-17 @ 13:08) (99/51 - 140/80)  RR: 18 (09-17 @ 13:08) (18 - 20)  SpO2: 98% (09-17 @ 13:08) (97% - 100%)    PHYSICAL EXAM:  GEN: Alert and oriented X 3, Well nourished, No acute distress  NECK: Supple, no JVD  LUNGS: Clear to auscultation bilaterally  CARDIOVASCULAR: Regular S1 S2, systolic aortic and mitral murmur 3/6  ABD: Soft, non-tender  EXT: No Lower extremity edema/cyanosis  NEURO: Non focal  SKIN: Intact    LABS:                        12.4   10.82 )-----------( 286      ( 17 Sep 2018 07:54 )             37.0     143  |  103  |  12  ----------------------------<  90  4.2   |  27  |  0.8    Ca    9.7      17 Sep 2018 07:54  Mg     2.1     09-17    TPro  8.8<H>  /  Alb  4.9  /  TBili  0.4  /  DBili  x   /  AST  25  /  ALT  24  /  AlkPhos  105  09-16    Troponin T, Serum: 0.01 ng/mL (09-17-18 @ 07:54)  Troponin T, Serum: <0.01 ng/mL (09-16-18 @ 20:05)    CARDIAC MARKERS ( 17 Sep 2018 07:54 )  x     / 0.01 ng/mL / x     / x     / x      CARDIAC MARKERS ( 16 Sep 2018 20:05 )  x     / <0.01 ng/mL / x     / x     / x        RADIOLOGY:  -CXR: Mild interstitial edema, cardiomegaly  -TTE: EF 65-70%, GIDD, severe asymmetric septal LVH with systolic obliteration of the apical cavity. Moderate eccentric MR possibly underestimated, PFO    ECG:  Upon presentation: AF with RVR. HR around 160 bpm, RBBB  Repeat: Sinus rhythm at 75 bpm, RBBB, left atrial enlargement HPI:  36 yo female patient with PMH of HCM on toprol 150mg daily, presented because of 1 day hx of acute onset of dizziness, palpitation and diaphoresis that started after dinner last night. She denies any previous similar symptoms, fever, chills, chest pain, abd pain, recent illness, nausea, vomiting, or diarrhea.    In ED patient was found to have HR of 160 bpm likely AF, received cardizem IV push, converted later on to sinus at 70 bpm.    Today patient is asymptomatic, no complaints, feels better.      PAST MEDICAL & SURGICAL HISTORY  Hypertrophic cardiomyopathy    FAMILY HISTORY:  No pertinent family history of premature CAD in first degree relatives    SOCIAL HISTORY:  [-]smoker  [-]Alcohol  [-]Drug    ALLERGIES:  No Known Allergies    MEDICATIONS:  diltiazem    Tablet 30 milliGRAM(s) Oral four times a day  enoxaparin Injectable 40 milliGRAM(s) SubCutaneous every 24 hours    HOME MEDICATIONS:  Metoprolol Succinate ER 50 mg oral tablet, extended release: 3 tab(s) orally once a day (16 Sep 2018 23:38)      REVIEW OF SYSTEMS:  CONSTITUTIONAL: No fever, weight loss, fatigue  NECK: No pain or stiffness  RESPIRATORY: No cough, wheezing, shortness of breath  CARDIOVASCULAR: See HPI  GASTROINTESTINAL: No abdominal/epigastric pain, nausea, vomiting, hematemesis, diarrhea, constipation, melena or hematochezia  GENITOURINARY: No dysuria, frequency, hematuria, incontinence  NEUROLOGICAL: No headaches, memory loss, loss of strength, numbness, tremors  SKIN: No itching, burning, rashes, lesions   ENDOCRINE: No heat/cold intolerance or hair loss  MUSCULOSKELETAL: No joint pain or swelling  HEME/LYMPH: No easy bruising or bleeding gums    VITALS:   T(F): 97.2 (09-17 @ 08:45), Max: 97.8 (09-16 @ 19:11)  HR: 73 (09-17 @ 13:08) (66 - 160)  BP: 109/52 (09-17 @ 13:08) (99/51 - 140/80)  RR: 18 (09-17 @ 13:08) (18 - 20)  SpO2: 98% (09-17 @ 13:08) (97% - 100%)    General: NAD, AAOx3  HEENT: NCAT, EOMI, PERRLA  Neck: supple, no JVD  CV: RRR, S1S2 nl, 3/6 systolic, no edema  Respiratory: CTA bilaterally, no wheeze, rales or rhonchi	  Abdomen: soft, NT/ND, +BS  Extremities: ROM nl, 2+ PP bilaterally  Neuro: Nonfocal    LABS:                        12.4   10.82 )-----------( 286      ( 17 Sep 2018 07:54 )             37.0     143  |  103  |  12  ----------------------------<  90  4.2   |  27  |  0.8    Ca    9.7      17 Sep 2018 07:54  Mg     2.1     09-17    TPro  8.8<H>  /  Alb  4.9  /  TBili  0.4  /  DBili  x   /  AST  25  /  ALT  24  /  AlkPhos  105  09-16      Troponin T, Serum: 0.01 ng/mL (09-17-18 @ 07:54)  Troponin T, Serum: <0.01 ng/mL (09-16-18 @ 20:05)      RADIOLOGY:  -CXR: Mild interstitial edema, cardiomegaly  -TTE: EF 65-70%, GIDD, severe asymmetric septal LVH with systolic obliteration of the apical cavity. Moderate eccentric MR possibly underestimated, PFO    ECG:  Upon presentation: AF with RVR. HR around 160 bpm, RBBB  Repeat: Sinus rhythm at 75 bpm, RBBB, left atrial enlargement

## 2018-09-17 NOTE — CONSULT NOTE ADULT - SUBJECTIVE AND OBJECTIVE BOX
MALIHA THOMPSON  MRN-[id number]    HISTORY OF PRESENT ILLNESS:    37 yr F pmh include HCM presenting by EMS with 1 day hx of dizziness, palpitation and diaphoresis and shortness of breath. Pt reports symptoms were acute onset, started at 3pm. Denies any previous similar symptoms, fever, chills, chest pain, abd pain, recent illness, nausea, vomiting, or diarrhea. Pt sees cardiologist in [employer] for HCM that was diagnosed on routine Echo.  In the ED  /73 T 97.8. EKG shows AFIB with RVR @ 158. WBC 13, Trop neg x1, Mg 2.1.   Pt received 2x Cardizem push and Cardizem drip. Pt is now controlled and in sinus rhythm  Admits to daytime fatigue unrefreshed sleep, insomina  All other 12 point ros negative      PMH/PSH:  PAST MEDICAL & SURGICAL HISTORY:  Hypertrophic cardiomyopathy  No significant past surgical history    ALLERGIES:  Allergies    No Known Allergies    Intolerances      SOCIAL HABITS:  negative x 3    FAMILY HISTORY:   FAMILY HISTORY:  No pertinent family history in first degree relatives      REVIEW OF SYSTEM:  Elements of review of systems are negative or non-applicable except as noted above in HPI section.       HOME MEDICATIONS:  Metoprolol Succinate ER 50 mg oral tablet, extended release    MEDICATIONS:  MEDICATIONS  (STANDING):  diltiazem    Tablet 30 milliGRAM(s) Oral four times a day  enoxaparin Injectable 40 milliGRAM(s) SubCutaneous every 24 hours    MEDICATIONS  (PRN):        VITALS:   Vital Signs Last 24 Hrs  T(C): 36.2 (17 Sep 2018 08:45), Max: 36.6 (16 Sep 2018 19:11)  T(F): 97.2 (17 Sep 2018 08:45), Max: 97.8 (16 Sep 2018 19:11)  HR: 82 (17 Sep 2018 08:45) (66 - 160)  BP: 110/51 (17 Sep 2018 08:45) (99/51 - 140/80)  BP(mean): --  RR: 18 (17 Sep 2018 08:45) (18 - 20)  SpO2: 98% (17 Sep 2018 08:45) (97% - 100%)  154.94  81.6  34      PHYSICAL EXAM:    GENERAL: NAD, well-developed  HEAD:  Atraumatic, Normocephalic  NECK: Supple, No JVD  CHEST/LUNG: Clear to auscultation bilaterally  HEART: Regular rate and rhythm; No murmurs, rubs, or gallops  ABDOMEN: Soft, Nontender, Nondistended; Bowel sounds present  EXTREMITIES:  Good peripheral Pulses, No clubbing, cyanosis, or edema      LABS:                        13.1   13.67 )-----------( 340      ( 16 Sep 2018 20:05 )             39.4     09-16    137  |  93<L>  |  18  ----------------------------<  122<H>  4.3   |  25  |  0.8    Ca    10.4<H>      16 Sep 2018 20:05  Mg     2.1     09-16    TPro  8.8<H>  /  Alb  4.9  /  TBili  0.4  /  DBili  x   /  AST  25  /  ALT  24  /  AlkPhos  105  09-16    LIVER FUNCTIONS - ( 16 Sep 2018 20:05 )  Alb: 4.9 g/dL / Pro: 8.8 g/dL / ALK PHOS: 105 U/L / ALT: 24 U/L / AST: 25 U/L / GGT: x           CARDIAC MARKERS ( 16 Sep 2018 20:05 )  x     / <0.01 ng/mL / x     / x     / x                    ABG & VENT SETTINGS (when applicable)        DIAGNOSTIC STUDIES:

## 2018-09-17 NOTE — DISCHARGE NOTE ADULT - PATIENT PORTAL LINK FT
You can access the [facility] Patient Portal, offered by [facility], by registering with the following website: [email or web address]

## 2018-09-19 DIAGNOSIS — I48.0 PAROXYSMAL ATRIAL FIBRILLATION: ICD-10-CM

## 2018-09-19 DIAGNOSIS — I34.0 NONRHEUMATIC MITRAL (VALVE) INSUFFICIENCY: ICD-10-CM

## 2018-09-19 DIAGNOSIS — R00.2 PALPITATIONS: ICD-10-CM

## 2018-09-19 DIAGNOSIS — I42.2 OTHER HYPERTROPHIC CARDIOMYOPATHY: ICD-10-CM

## 2018-09-21 ENCOUNTER — APPOINTMENT (OUTPATIENT)
Dept: PLASTIC SURGERY | Facility: CLINIC | Age: 38
End: 2018-09-21

## 2019-01-20 ENCOUNTER — EMERGENCY (EMERGENCY)
Facility: HOSPITAL | Age: 39
LOS: 0 days | Discharge: HOME | End: 2019-01-20
Attending: EMERGENCY MEDICINE | Admitting: EMERGENCY MEDICINE

## 2019-01-20 VITALS
RESPIRATION RATE: 18 BRPM | OXYGEN SATURATION: 99 % | DIASTOLIC BLOOD PRESSURE: 82 MMHG | WEIGHT: 169.98 LBS | SYSTOLIC BLOOD PRESSURE: 139 MMHG | HEIGHT: 61 IN | HEART RATE: 84 BPM | TEMPERATURE: 99 F
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
## 2019-01-20 DIAGNOSIS — S93.402A SPRAIN OF UNSPECIFIED LIGAMENT OF LEFT ANKLE, INITIAL ENCOUNTER: ICD-10-CM
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
## 2019-01-20 DIAGNOSIS — Z79.899 OTHER LONG TERM (CURRENT) DRUG THERAPY: ICD-10-CM
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
## 2019-01-20 PROBLEM — I42.2 OTHER HYPERTROPHIC CARDIOMYOPATHY: Chronic | Status: ACTIVE | Noted: 2018-09-16

## 2019-01-20 RX ORDER — IBUPROFEN 200 MG
400 TABLET ORAL ONCE
Qty: 0 | Refills: 0 | Status: COMPLETED | OUTPATIENT
Start: 2019-01-20 | End: 2019-01-20

## 2019-01-20 RX ADMIN — Medication 400 MILLIGRAM(S): at 18:04

## 2019-01-20 NOTE — ED PROVIDER NOTE - NSFOLLOWUPINSTRUCTIONS_ED_ALL_ED_FT
-Keep splint on until you see orthopedics  -Schedule appt with Dr. Marquez in 1-3 days  -Take 400-800 mg of Ibuprofen every 6-8 hours as needed for pain with food; food first, then medicine  -Rest, Ice, Compression, Elevation  -Return to ED for worsening symptoms or concerns.      Sprain    A sprain is a stretch or tear in one of the tough, fiber-like tissues (ligaments) in your body. This is caused by an injury to the area such as a twisting mechanism. Symptoms include pain, swelling, or bruising. Rest that area over the next several days and slowly resume activity when tolerated. Ice can help with swelling and pain.     SEEK IMMEDIATE MEDICAL CARE IF YOU HAVE ANY OF THE FOLLOWING SYMPTOMS: worsening pain, inability to move that body part, numbness or tingling.

## 2019-01-20 NOTE — ED PROVIDER NOTE - OBJECTIVE STATEMENT
38 year old female hx HCM presenting after fall. Patient states she slipped in the mud when she was rushing out of her house. Denies chest pain, dizziness, syncope, LOC, head trauma, blood thinner use, abdominal pain before/after fall. Patient states she twisted her ankle and is having pain there, worse with ambulation, better with no weight bearing. no hx of sprains/surgeries/injury to this ankle. Denies any other injury,

## 2019-01-20 NOTE — ED PROCEDURE NOTE - ATTENDING CONTRIBUTION TO CARE
I was physically present for and directly supervised this procedure.  Procedure was done as documented without any complications.

## 2019-01-20 NOTE — ED PROVIDER NOTE - CARE PROVIDER_API CALL
Marino Marquez (MD), Orthopaedic Surgery  [facility] [place] [address]  Phone: [phone]  Fax: [phone]

## 2019-01-20 NOTE — ED PROVIDER NOTE - PHYSICAL EXAMINATION
Physical Exam    Vital Signs: I have reviewed the initial vital signs  Constitutional: well-nourished, appears stated age, no acute distress  Musculoskeletal: supple nontender neck, no midline tenderness, no joint pain       Left Ankle: No obvious deformity, ecchymosis, erythema. Lateral malleolar swelling. DP 2+, sensation intact throughout lower leg. No TTP at base of 5th metatarsal. No medial malleolar tenderness. TTP at lateral aspect of malleolus. No knee pain, ligaments intact, - Jimbo's. Dorsiflexion/plantarflexion 5/5. No laxity with ankle inverison/eversion.   (-) Deniz  Integumentary: warm, dry, no rash  Psychiatric: appropriate mood, appropriate affect

## 2019-01-20 NOTE — ED PROVIDER NOTE - MEDICAL DECISION MAKING DETAILS
+ symptomatic ankle sprain. X-rays unremarkable. n/v intact.  Splint placed. Will DC with NSAID prn and Orthopedics follow up.  Splint care discussed.    Full DC instructions discussed and patient knows when to seek immediate medical attention.  Patient has proper follow up.  All results discussed and patient aware they may require further work up.  Proper follow up ensured. Limitations of ED work up discussed.  Medications administered and prescribed/OTC home meds discussed.  All questions and concerns from patient or family addressed. Understanding of instructions verbalized.

## 2019-01-20 NOTE — ED PROVIDER NOTE - PROGRESS NOTE DETAILS
I was directly involved in the care of this patient. PA Fellow marce note/plan reviewed and agreed.Splinted follow up with ortho

## 2019-01-20 NOTE — ED PROVIDER NOTE - ATTENDING CONTRIBUTION TO CARE
I personally evaluated patient. I agree with the findings and plan with all documentation on chart except as documented  in my note.    + symptomatic ankle sprain. X-rays unremarkable. n/v intact.  Splint placed. Will DC with NSAID prn and Orthopedics follow up.  Splint care discussed.    Full DC instructions discussed and patient knows when to seek immediate medical attention.  Patient has proper follow up.  All results discussed and patient aware they may require further work up.  Proper follow up ensured. Limitations of ED work up discussed.  Medications administered and prescribed/OTC home meds discussed.  All questions and concerns from patient or family addressed. Understanding of instructions verbalized.

## 2019-01-20 NOTE — ED PROVIDER NOTE - NS ED ROS FT
Review of Systems         Constitutional: (-) fever (-) chills (-) weakness       Head: (-) trauma       Cardiovascular: (-) chest pain       Respiratory:  (-) shortness of breath       Gastrointestinal: (-) abdominal pain (-) vomiting (-) diarrhea (-) nausea       Musculoskeletal: (-) neck pain (-) back pain (+) joint pain       Integumentary: (-) rash (+) edema       Neurological: (-) paresthesias       Psych: (-) psych history

## 2019-07-30 ENCOUNTER — LABORATORY RESULT (OUTPATIENT)
Age: 39
End: 2019-07-30

## 2019-07-30 ENCOUNTER — APPOINTMENT (OUTPATIENT)
Dept: PLASTIC SURGERY | Facility: CLINIC | Age: 39
End: 2019-07-30
Payer: COMMERCIAL

## 2019-07-30 ENCOUNTER — OUTPATIENT (OUTPATIENT)
Dept: OUTPATIENT SERVICES | Facility: HOSPITAL | Age: 39
LOS: 1 days | Discharge: HOME | End: 2019-07-30

## 2019-07-30 VITALS — HEIGHT: 61 IN | WEIGHT: 200 LBS | BODY MASS INDEX: 37.76 KG/M2

## 2019-07-30 PROCEDURE — 11103 TANGNTL BX SKIN EA SEP/ADDL: CPT

## 2019-07-30 PROCEDURE — 99212 OFFICE O/P EST SF 10 MIN: CPT | Mod: 25

## 2019-07-30 PROCEDURE — 11102 TANGNTL BX SKIN SINGLE LES: CPT

## 2019-07-31 DIAGNOSIS — D48.5 NEOPLASM OF UNCERTAIN BEHAVIOR OF SKIN: ICD-10-CM

## 2019-07-31 NOTE — PROCEDURE
[FreeTextEntry6] : Patient is a 38 year old female with a right breast lesion measuring approximately 0.2 mm and a right shoulder vascular lesion measuring approximately 0.2 mm.\par \par The areas were prepped and draped in the usual fashion.  Local anesthetic was administered to each site using 1% lidocaine with epinephrine.\par \par Shave biopsy was performed on the right breast and right shoulder.  Both areas were irrigated copiously. Monsels solution applied for hemostasis.\par \par Sterile dressing applied to each site.  \par \par Patient tolerated procedure well and understands post-op instructions.\par \par Sutures Used: none\par \par Assistant:  GERMAINE Licona \par \par \par \par \par

## 2019-10-15 ENCOUNTER — APPOINTMENT (OUTPATIENT)
Dept: PLASTIC SURGERY | Facility: CLINIC | Age: 39
End: 2019-10-15

## 2020-10-13 ENCOUNTER — APPOINTMENT (OUTPATIENT)
Dept: PLASTIC SURGERY | Facility: CLINIC | Age: 40
End: 2020-10-13
Payer: COMMERCIAL

## 2020-10-13 PROCEDURE — 99212 OFFICE O/P EST SF 10 MIN: CPT

## 2020-10-13 NOTE — ASSESSMENT
[FreeTextEntry1] : 35 yo known to me\par has right posterior calf lesion present for about one year\par \par on exam lesions c/w virus--raised, minimally inflamed, <1cm in size \par \par suggest office excision\par \par Regarding the procedure, we discussed scarring, poor wound healing, bleeding, infection, need for additional surgery, and dissatisfaction with the outcome.\par \par \par as above\par since last visit she has developed/become concerned w three cutaneous lesions--two on RLE and one left medial knee\par \par all c/w verruca\par \par suggest shave of all three lesions\par \par offered for myself or PA to do it\par based on scheduling she elects to have Lyndsey do it\par plan for shave only\par \par Regarding the procedure, we discussed scarring, poor wound healing, bleeding, infection, need for additional surgery, and dissatisfaction with the outcome.  Also discussed possibility of keloid and/or hypertrophic scar formation as well as recurrence.  All questions were answered and risks understood.\par

## 2020-11-19 ENCOUNTER — LABORATORY RESULT (OUTPATIENT)
Age: 40
End: 2020-11-19

## 2020-11-19 ENCOUNTER — APPOINTMENT (OUTPATIENT)
Dept: PLASTIC SURGERY | Facility: CLINIC | Age: 40
End: 2020-11-19
Payer: COMMERCIAL

## 2020-11-19 DIAGNOSIS — D48.5 NEOPLASM OF UNCERTAIN BEHAVIOR OF SKIN: ICD-10-CM

## 2020-11-19 PROCEDURE — 11300 SHAVE SKIN LESION 0.5 CM/<: CPT

## 2020-11-19 PROCEDURE — 99072 ADDL SUPL MATRL&STAF TM PHE: CPT

## 2020-11-20 NOTE — ASSESSMENT
[FreeTextEntry1] : 38 yo F with right knee skin lesion r/o verruca, excised today. \par \par - wound care instructions discussed\par - compound W to other cutaneous lesions \par - f/u few weeks for excision if not improved

## 2020-11-20 NOTE — PROCEDURE
[___ ml Inj] : Anesthesia: [unfilled] ~Uml [1%] : 1% [With Epi] : with epinephrine [Betadine] : using betadine [FreeTextEntry1] : Right knee skin lesion r/o verruca 4-5 mm  [FreeTextEntry2] : Shave biopsy  [FreeTextEntry3] : local [FreeTextEntry4] : none [FreeTextEntry5] : none [FreeTextEntry6] : After administration of local anesthetic, the area was prepped in the usual fashion. The lesion was sharply excised with a scalpel. Monsel solution was used for hemostasis. Bacitracin and sterile dressing applied. \par \par Patient tolerated the procedure well and did not have any complications.  [___ mm Shave] : a [unfilled] ~Umm shave biopsy of the lesion was taken [FreeTextEntry8] : right knee

## 2020-11-25 ENCOUNTER — APPOINTMENT (OUTPATIENT)
Dept: PLASTIC SURGERY | Facility: CLINIC | Age: 40
End: 2020-11-25

## 2022-02-11 ENCOUNTER — INPATIENT (INPATIENT)
Facility: HOSPITAL | Age: 42
LOS: 1 days | Discharge: HOME | End: 2022-02-13
Attending: HOSPITALIST | Admitting: HOSPITALIST
Payer: COMMERCIAL

## 2022-02-11 VITALS
SYSTOLIC BLOOD PRESSURE: 146 MMHG | TEMPERATURE: 97 F | DIASTOLIC BLOOD PRESSURE: 84 MMHG | HEIGHT: 61 IN | RESPIRATION RATE: 18 BRPM | OXYGEN SATURATION: 100 % | WEIGHT: 190.04 LBS | HEART RATE: 180 BPM

## 2022-02-11 LAB
ALBUMIN SERPL ELPH-MCNC: 4.3 G/DL — SIGNIFICANT CHANGE UP (ref 3.5–5.2)
ALP SERPL-CCNC: 88 U/L — SIGNIFICANT CHANGE UP (ref 30–115)
ALT FLD-CCNC: 30 U/L — SIGNIFICANT CHANGE UP (ref 0–41)
ANION GAP SERPL CALC-SCNC: 15 MMOL/L — HIGH (ref 7–14)
APTT BLD: 39.5 SEC — HIGH (ref 27–39.2)
AST SERPL-CCNC: 30 U/L — SIGNIFICANT CHANGE UP (ref 0–41)
BASOPHILS # BLD AUTO: 0.04 K/UL — SIGNIFICANT CHANGE UP (ref 0–0.2)
BASOPHILS NFR BLD AUTO: 0.3 % — SIGNIFICANT CHANGE UP (ref 0–1)
BILIRUB SERPL-MCNC: 0.5 MG/DL — SIGNIFICANT CHANGE UP (ref 0.2–1.2)
BUN SERPL-MCNC: 16 MG/DL — SIGNIFICANT CHANGE UP (ref 10–20)
CALCIUM SERPL-MCNC: 9.8 MG/DL — SIGNIFICANT CHANGE UP (ref 8.5–10.1)
CHLORIDE SERPL-SCNC: 99 MMOL/L — SIGNIFICANT CHANGE UP (ref 98–110)
CO2 SERPL-SCNC: 22 MMOL/L — SIGNIFICANT CHANGE UP (ref 17–32)
CREAT SERPL-MCNC: 0.8 MG/DL — SIGNIFICANT CHANGE UP (ref 0.7–1.5)
EOSINOPHIL # BLD AUTO: 0.07 K/UL — SIGNIFICANT CHANGE UP (ref 0–0.7)
EOSINOPHIL NFR BLD AUTO: 0.6 % — SIGNIFICANT CHANGE UP (ref 0–8)
GLUCOSE SERPL-MCNC: 125 MG/DL — HIGH (ref 70–99)
HCG SERPL QL: NEGATIVE — SIGNIFICANT CHANGE UP
HCT VFR BLD CALC: 41.4 % — SIGNIFICANT CHANGE UP (ref 37–47)
HGB BLD-MCNC: 13.7 G/DL — SIGNIFICANT CHANGE UP (ref 12–16)
IMM GRANULOCYTES NFR BLD AUTO: 0.4 % — HIGH (ref 0.1–0.3)
INR BLD: 1.44 RATIO — HIGH (ref 0.65–1.3)
LYMPHOCYTES # BLD AUTO: 1.31 K/UL — SIGNIFICANT CHANGE UP (ref 1.2–3.4)
LYMPHOCYTES # BLD AUTO: 11 % — LOW (ref 20.5–51.1)
MAGNESIUM SERPL-MCNC: 1.9 MG/DL — SIGNIFICANT CHANGE UP (ref 1.8–2.4)
MCHC RBC-ENTMCNC: 26.6 PG — LOW (ref 27–31)
MCHC RBC-ENTMCNC: 33.1 G/DL — SIGNIFICANT CHANGE UP (ref 32–37)
MCV RBC AUTO: 80.4 FL — LOW (ref 81–99)
MONOCYTES # BLD AUTO: 0.68 K/UL — HIGH (ref 0.1–0.6)
MONOCYTES NFR BLD AUTO: 5.7 % — SIGNIFICANT CHANGE UP (ref 1.7–9.3)
NEUTROPHILS # BLD AUTO: 9.76 K/UL — HIGH (ref 1.4–6.5)
NEUTROPHILS NFR BLD AUTO: 82 % — HIGH (ref 42.2–75.2)
NRBC # BLD: 0 /100 WBCS — SIGNIFICANT CHANGE UP (ref 0–0)
NT-PROBNP SERPL-SCNC: 3022 PG/ML — HIGH (ref 0–300)
PLATELET # BLD AUTO: 352 K/UL — SIGNIFICANT CHANGE UP (ref 130–400)
POTASSIUM SERPL-MCNC: 4.6 MMOL/L — SIGNIFICANT CHANGE UP (ref 3.5–5)
POTASSIUM SERPL-SCNC: 4.6 MMOL/L — SIGNIFICANT CHANGE UP (ref 3.5–5)
PROT SERPL-MCNC: 7.5 G/DL — SIGNIFICANT CHANGE UP (ref 6–8)
PROTHROM AB SERPL-ACNC: 16.5 SEC — HIGH (ref 9.95–12.87)
RBC # BLD: 5.15 M/UL — SIGNIFICANT CHANGE UP (ref 4.2–5.4)
RBC # FLD: 13.7 % — SIGNIFICANT CHANGE UP (ref 11.5–14.5)
SARS-COV-2 RNA SPEC QL NAA+PROBE: SIGNIFICANT CHANGE UP
SODIUM SERPL-SCNC: 136 MMOL/L — SIGNIFICANT CHANGE UP (ref 135–146)
TROPONIN T SERPL-MCNC: 0.04 NG/ML — CRITICAL HIGH
TROPONIN T SERPL-MCNC: 0.07 NG/ML — CRITICAL HIGH
WBC # BLD: 11.91 K/UL — HIGH (ref 4.8–10.8)
WBC # FLD AUTO: 11.91 K/UL — HIGH (ref 4.8–10.8)

## 2022-02-11 PROCEDURE — 93010 ELECTROCARDIOGRAM REPORT: CPT | Mod: 76

## 2022-02-11 PROCEDURE — 71045 X-RAY EXAM CHEST 1 VIEW: CPT | Mod: 26

## 2022-02-11 PROCEDURE — 99222 1ST HOSP IP/OBS MODERATE 55: CPT

## 2022-02-11 PROCEDURE — 99291 CRITICAL CARE FIRST HOUR: CPT

## 2022-02-11 PROCEDURE — 99254 IP/OBS CNSLTJ NEW/EST MOD 60: CPT

## 2022-02-11 RX ORDER — PANTOPRAZOLE SODIUM 20 MG/1
40 TABLET, DELAYED RELEASE ORAL
Refills: 0 | Status: DISCONTINUED | OUTPATIENT
Start: 2022-02-11 | End: 2022-02-13

## 2022-02-11 RX ORDER — DILTIAZEM HCL 120 MG
5 CAPSULE, EXT RELEASE 24 HR ORAL
Qty: 125 | Refills: 0 | Status: DISCONTINUED | OUTPATIENT
Start: 2022-02-11 | End: 2022-02-11

## 2022-02-11 RX ORDER — DILTIAZEM HCL 120 MG
20 CAPSULE, EXT RELEASE 24 HR ORAL ONCE
Refills: 0 | Status: COMPLETED | OUTPATIENT
Start: 2022-02-11 | End: 2022-02-11

## 2022-02-11 RX ORDER — SODIUM CHLORIDE 9 MG/ML
3 INJECTION INTRAMUSCULAR; INTRAVENOUS; SUBCUTANEOUS EVERY 8 HOURS
Refills: 0 | Status: DISCONTINUED | OUTPATIENT
Start: 2022-02-11 | End: 2022-02-13

## 2022-02-11 RX ORDER — DILTIAZEM HCL 120 MG
5 CAPSULE, EXT RELEASE 24 HR ORAL
Qty: 125 | Refills: 0 | Status: DISCONTINUED | OUTPATIENT
Start: 2022-02-11 | End: 2022-02-12

## 2022-02-11 RX ORDER — APIXABAN 2.5 MG/1
5 TABLET, FILM COATED ORAL EVERY 12 HOURS
Refills: 0 | Status: DISCONTINUED | OUTPATIENT
Start: 2022-02-11 | End: 2022-02-13

## 2022-02-11 RX ORDER — CHLORHEXIDINE GLUCONATE 213 G/1000ML
1 SOLUTION TOPICAL
Refills: 0 | Status: DISCONTINUED | OUTPATIENT
Start: 2022-02-11 | End: 2022-02-13

## 2022-02-11 RX ORDER — ONDANSETRON 8 MG/1
4 TABLET, FILM COATED ORAL EVERY 8 HOURS
Refills: 0 | Status: DISCONTINUED | OUTPATIENT
Start: 2022-02-11 | End: 2022-02-13

## 2022-02-11 RX ORDER — ACETAMINOPHEN 500 MG
650 TABLET ORAL EVERY 6 HOURS
Refills: 0 | Status: DISCONTINUED | OUTPATIENT
Start: 2022-02-11 | End: 2022-02-13

## 2022-02-11 RX ADMIN — SODIUM CHLORIDE 3 MILLILITER(S): 9 INJECTION INTRAMUSCULAR; INTRAVENOUS; SUBCUTANEOUS at 21:04

## 2022-02-11 RX ADMIN — APIXABAN 5 MILLIGRAM(S): 2.5 TABLET, FILM COATED ORAL at 22:04

## 2022-02-11 RX ADMIN — Medication 5 MG/HR: at 16:16

## 2022-02-11 RX ADMIN — Medication 20 MILLIGRAM(S): at 16:16

## 2022-02-11 NOTE — ED PROVIDER NOTE - CPE EDP NEURO NORM
Alert and oriented to person, place and time, memory intact, behavior appropriate to situation, PERRL. normal...

## 2022-02-11 NOTE — ED PROVIDER NOTE - CLINICAL SUMMARY MEDICAL DECISION MAKING FREE TEXT BOX
Patient presents to the emergency department in atrial fibrillation with extreme tachycardia.  She denies chest pain or shortness of breath.  Patient's vital signs stabilized with ED treatment specifically IV diltiazem.  Note nonnegative troponin.  CXR reviewed: mild CHF. Patient will be admitted to a monitored setting.  She presented to the emergency department critically ill.

## 2022-02-11 NOTE — H&P ADULT - NSHPPHYSICALEXAM_GEN_ALL_CORE
Vital Signs Last 24 Hrs  T(C): 36.2 (11 Feb 2022 16:04), Max: 36.2 (11 Feb 2022 16:04)  T(F): 97.2 (11 Feb 2022 16:04), Max: 97.2 (11 Feb 2022 16:04)  HR: 86 (11 Feb 2022 16:50) (86 - 180)  BP: 118/61 (11 Feb 2022 16:50) (118/61 - 146/84)  RR: 16 (11 Feb 2022 16:50) (16 - 18)  SpO2: 100% (11 Feb 2022 16:50) (100% - 100%) Vital Signs Last 24 Hrs  T(C): 36.2 (11 Feb 2022 16:04), Max: 36.2 (11 Feb 2022 16:04)  T(F): 97.2 (11 Feb 2022 16:04), Max: 97.2 (11 Feb 2022 16:04)  HR: 86 (11 Feb 2022 16:50) (86 - 180)  BP: 118/61 (11 Feb 2022 16:50) (118/61 - 146/84)  RR: 16 (11 Feb 2022 16:50) (16 - 18)  SpO2: 100% (11 Feb 2022 16:50) (100% - 100%)      GENERAL:  42y/o Female NAD, resting comfortably.  HEAD:  Atraumatic, Normocephalic  EYES: EOMI, conjunctiva and sclera clear  NECK: Supple, No JVD, no cervical lymphadenopathy, non-tender  CHEST/LUNG: Clear to auscultation bilaterally; No wheeze, rhonchi, or rales  HEART: Regular rate and rhythm; S1&S2  ABDOMEN: Soft, Nontender, Nondistended x 4 quadrants; Bowel sounds present  EXTREMITIES:   Peripheral Pulses Present, No clubbing, no cyanosis, or no edema, no calf tenderness  PSYCH: AAOx3, cooperative, appropriate  NEUROLOGY: WNL  SKIN: WNL

## 2022-02-11 NOTE — H&P ADULT - NSHPLABSRESULTS_GEN_ALL_CORE
02-11    136  |  99  |  16  ----------------------------<  125<H>  4.6   |  22  |  0.8    Ca    9.8      11 Feb 2022 16:11  Mg     1.9     02-11    TPro  7.5  /  Alb  4.3  /  TBili  0.5  /  DBili  x   /  AST  30  /  ALT  30  /  AlkPhos  88  02-11                            13.7   11.91 )-----------( 352      ( 11 Feb 2022 16:11 )             41.4         CARDIAC MARKERS ( 11 Feb 2022 16:11 )  x     / 0.04 ng/mL / x     / x     / x    < from: Xray Chest 1 View- PORTABLE-Urgent (02.11.22 @ 16:17) >      Incidental note is made of an azygos fissure.    Slight prominence of the interstitial markings and mild prominence of the   upper lobe pulmonary vasculature.    Impression:    Mild pulmonary vascular congestion.    < end of copied text >    · EKG Date/Time: 11-Feb-2022 15:47  · Rate: 188   · Interpretation: a fib with ischemic changes   · Axis: Right Deviation   · EKG Date/Time: 11-Feb-2022 17:16  · Rate: 85   · Interpretation: normal sinus rhythm   · Axis: Right Deviation

## 2022-02-11 NOTE — CONSULT NOTE ADULT - ASSESSMENT
40 yo F with history of Hypertrophic Cardiomyopathy and Afib on PO Metoprolol which she is non-compliant with, was admitted for evaluation of palpitations. Was found to be in Afib with RVR in the ED and placed on Cardizem gtt      Impression  #Palpitations: Afib with RVR      Plan:  -Patient with Afib RVR upto...180's sustained.   -Patient appears rate controlled now. Wean off Cardizem gtt as tolerated  -CHADS2-VASc score: 2--C/w Eliquis.    -Has h/o Afib and is on Toprol 50mg daily at home. Change to Metoprolol 25mg PO q12 for now for better rate control. Resume Toprol when stable  -2D echo to evaluate function and to r/o thrombus. Previous Transthoracic Echocardiogram (09.17.18) >EF 65 to 70%. Severe asymmetric septal left ventricular hypertrophy with systolic   obliteration of the apical LV cavity. Mild-to-moderate mitral regurgitation  -Trop 0.04__Trend x2 more. Denies CP. Likely elevated 2/2 to myocardial oxygen demand/supply mismatch in the setting of rapid afib. Check CKMB, CK  -Reinforced Pt education on medication compliance   -Monitor lytes and replete as needed.  -Check thyroid panel   -Repeat ECG in am  -Admit to Medicine service on telemetry. House cardiology will follow    Discussed with Dr Metcalf

## 2022-02-11 NOTE — CONSULT NOTE ADULT - ATTENDING COMMENTS
Patient known hypertrophic cardiomyopathy. She is suppose be on beta and apixaban. She stopped beta . Apixaban once a day . She had rapid afib. Now NSR. Resume beta . Patient aware need take meds. Consider switch apixaban to xarelto. It is once a day. Can do as out patient. Ambulate. Out patient f/u

## 2022-02-11 NOTE — PATIENT PROFILE ADULT - FALL HARM RISK - HARM RISK INTERVENTIONS

## 2022-02-11 NOTE — ED PROVIDER NOTE - CARE PLAN
1 Principal Discharge DX:	Atrial fibrillation  Secondary Diagnosis:	Tachycardia  Secondary Diagnosis:	Elevated troponin

## 2022-02-11 NOTE — ED ADULT NURSE NOTE - OBJECTIVE STATEMENT
Pt in AFIB,  has not taken her metoprolol and eliquis in over 2-3 months.  has similar episode last week which resolved on its own. Today pt c/o palpitations followed by nausea and dizziness with onset of symptoms.

## 2022-02-11 NOTE — H&P ADULT - HISTORY OF PRESENT ILLNESS
40 yo F with history of Afib on PO metoprolol which she is non-compliant with, presents c/o palpitations. No associated CP or SOB.       in ER: EKG with Afib and rapid ventricular responses in excess of 180. IV Cardizem started with conversion to NSR. 40 yo F with history of Afib on PO metoprolol which she is non-compliant with, presents c/o palpitations. Pt known to have A.fib x about 2 years and Hypertrophic cardiomyopathy , follows with cards in [facility]. Pt stopped her metoprolol for 7 months and only takes eliquis once a day.  No associated CP, fever/ chills,  SOB, leg pain / bleeding     PMD : Dr. Vaughn , last seen over a year ago       in ER: EKG with Afib and rapid ventricular responses in excess of 180. IV Cardizem started with conversion to NSR.  now feeling much better

## 2022-02-11 NOTE — PATIENT PROFILE ADULT - REASON FOR REFUSAL (REFER PATIENT TO HEALTHCARE PROVIDER FOR FOLLOW-UP):
Assessment/Plan:    Patient Instructions   Hypertension stable on current medication    Hyperlipidemia LDL at goal on statin    Impaired fasting glucose he is in the prediabetic category discussed dietary modifications including cutting back on starches increasing exercise    Melanoma to abdomen sentinel nodes were biopsy results are pending    Weekly positive hep C antibodies check viral load and genotype    GERD discussed anti GERD diet including no caffeine prior to bed, it not lying down within 2 hours of eating  I recommend Prilosec over-the-counter daily for the next 2 weeks if not improving contact us    Health maintenance up-to-date    Follow back 6 months sooner if needed         Diagnoses and all orders for this visit:    Positive hepatitis C antibody test  -     Hepatitis C Ab W/Refl To HCV RNA, Qn, PCR; Future  -     Hepatitis C genotype; Future    GERD without esophagitis    Impaired fasting glucose  -     CBC and differential; Future  -     Comprehensive metabolic panel; Future  -     Hemoglobin A1C; Future    Benign essential hypertension    Other hyperlipidemia  -     Lipid panel; Future  -     TSH, 3rd generation; Future    Encounter for immunization  -     PREFERRED: influenza vaccine, [id number], high-dose, PF 0 5 mL, for patients 65 yr+ (FLUZONE HIGH-DOSE)    [facility]         Subjective:      Patient ID: Javier Enriquez is a 76 y o  male    Patient is here for routine follow-up  Recently has suspicious spot on his abdomen that was biopsy found to have melanoma just had a wide excision with sentinel node with Dr Ramila Holt biopsy results are still pending for this  He has been having a lot of heartburn he does admit that he eats chocolate and will have a cup of coffee in the evening  There are no alarm symptoms    He has tried Tums which has been somewhat helpful  Active but no formal exercise          Current Outpatient Prescriptions:     aspirin 81 MG tablet, Take by mouth, Disp: , Rfl:     atenolol (TENORMIN) 50 mg tablet, TAKE 1 TABLET DAILY, Disp: 30 tablet, Rfl: 4    hydrochlorothiazide (HYDRODIURIL) 25 mg tablet, TAKE 1 TABLET DAILY  , Disp: 30 tablet, Rfl: 4    lovastatin (MEVACOR) 40 MG tablet, TAKE 1 TABLET AT BEDTIME, Disp: 30 tablet, Rfl: 3    multivitamin (THERAGRAN) TABS, Take by mouth, Disp: , Rfl:     sildenafil (VIAGRA) 100 mg tablet, Take by mouth, Disp: , Rfl:     Recent Results (from the past 1008 hour(s))   LAB PATH REFERRAL    Collection Time: 11/21/18 12:00 AM   Result Value Ref Range    Case Report       Surgical Pathology Report                         Case: [id number]                                   Authorizing Provider:  Oscar Barakat MD           Collected:           11/21/2018                   Pathologist:           Ragena Bloch, MD                Received:            11/21/2018 0816              Specimen:    Skin, Other, Abdomen Biopsy (10 slides [id number] [facility],collected on                  10/16/2018)                                                                                Final Diagnosis       A  Skin, Abdomen, excisional Biopsy (10 slides [id number] [facility],collected on 10/16/2018): - Melanoma, approximately 1 1 mm   - Inked margins not involved  Note:  Slide on block A2 are not received  The diagnosis is  Based on the slides received  MELANOMA OF SKIN TUMOR STAGING SUMMARY  1  Specimen identification:     - Procedure:excisional biopsy     - Laterality:none   2  Tumor     - Tumor Site: abdomen     - Tumor Size (excision, required only if tumor grossly present):not known     - Macroscopic satellite nodule(s):not known     - Histologic type:superficial spreading     - Maximum tumor (Breslow) thickness (pT2):approximately 1 1 mm     - Anatomic (Mendel) level:IV     - Ulceration:not seen   3  Margins:     - Peripheral margins:neg     - Deep margins:neg   4   Mitotic Rate (specify number / mm2):1   5  Microsatellite(s):not seen   6  Lymphovascular invasion:not seen   7  Neurotropism:not seen   8  Tumor-infiltrating lymphocytes:non-brisk   9  Tumor regression (excision, if present < or more than 75%):  10  Growth Phase:vertical  11  Regional Lymph Nodes (pNx):  12: Ancillary Studies:         -- Best representative tumor block:A3  A4  13  Additional pathologic findings:  14  AJCC 8th Ed  Pathologic Stage Classification:  at least Stage IB - pT2a, pNx      Interpretation performed at [facility], [address] [place] [address], [address]              Additional Information       All controls performed with the immunohistochemical stains reported above reacted appropriately  These tests were developed and their performance characteristics determined by [facility] or [facility]  They may not be cleared or approved by the [place]  Food and Drug Administration  The FDA has determined that such clearance or approval is not necessary  These tests are used for clinical purposes  They should not be regarded as investigational or for research  This laboratory has been approved by [place] [id number], designated as a high-complexity laboratory and is qualified to perform these tests  Gross Description       Received for consult from [facility], MA[place] Lott [address] ([phone]) 10 slides labeled [id number] and the corresponding pathology report on patient Noah Rizo         Comprehensive metabolic panel    Collection Time: 11/30/18 11:41 AM   Result Value Ref Range    Sodium 143 136 - 145 mmol/L    Potassium 3 9 3 5 - 5 3 mmol/L    Chloride 105 100 - 108 mmol/L    CO2 30 21 - 32 mmol/L    ANION GAP 8 4 - 13 mmol/L    BUN 24 5 - 25 mg/dL    Creatinine 1 09 0 60 - 1 30 mg/dL    Glucose 88 65 - 140 mg/dL    Calcium 9 3 8 3 - 10 1 mg/dL    AST 17 5 - 45 U/L    ALT 31 12 - 78 U/L    Alkaline Phosphatase 86 46 - 116 U/L    Total Protein 7 4 6 4 - 8 2 g/dL    Albumin 3 9 3 5 - 5 0 g/dL    Total Bilirubin 0 50 0 20 - 1 00 mg/dL    eGFR 70 ml/min/1 73sq m   CBC and differential    Collection Time: 11/30/18 11:41 AM   Result Value Ref Range    WBC 7 22 4 31 - 10 16 Thousand/uL    RBC 5 39 3 88 - 5 62 Million/uL    Hemoglobin 16 3 12 0 - 17 0 g/dL    Hematocrit 48 2 36 5 - 49 3 %    MCV 89 82 - 98 fL    MCH 30 2 26 8 - 34 3 pg    MCHC 33 8 31 4 - 37 4 g/dL    RDW 12 7 11 6 - 15 1 %    MPV 10 8 8 9 - 12 7 fL    Platelets 062 792 - 318 Thousands/uL    nRBC 0 /100 WBCs    Neutrophils Relative 63 43 - 75 %    Immat GRANS % 0 0 - 2 %    Lymphocytes Relative 26 14 - 44 %    Monocytes Relative 9 4 - 12 %    Eosinophils Relative 1 0 - 6 %    Basophils Relative 1 0 - 1 %    Neutrophils Absolute 4 49 1 85 - 7 62 Thousands/µL    Immature Grans Absolute 0 03 0 00 - 0 20 Thousand/uL    Lymphocytes Absolute 1 90 0 60 - 4 47 Thousands/µL    Monocytes Absolute 0 66 0 17 - 1 22 Thousand/µL    Eosinophils Absolute 0 08 0 00 - 0 61 Thousand/µL    Basophils Absolute 0 06 0 00 - 0 10 Thousands/µL   APTT    Collection Time: 11/30/18 11:41 AM   Result Value Ref Range    PTT 30 26 - 38 seconds   Protime-INR    Collection Time: 11/30/18 11:41 AM   Result Value Ref Range    Protime 13 1 11 8 - 14 2 seconds    INR 1 00 0 86 - 1 17   HEMOGLOBIN A1C W/ EAG ESTIMATION    Collection Time: 11/30/18 11:41 AM   Result Value Ref Range    Hemoglobin A1C 5 6 4 2 - 6 3 %     mg/dl   Tissue Exam    Collection Time: 12/14/18  1:06 PM   Result Value Ref Range    Case Report       Surgical Pathology Report                         Case: [id number]                                   Authorizing Provider:  Hugh Feng MD           Collected:           12/14/2018 1306              Ordering Location:     [facility]        Received:            12/14/2018 [address] [facility] Operating Room                                                      Pathologist:           Brooks Gonzalez MD Specimens:   A) - Lymph Node, [place], left axillary lymph node #1                                              B) - Lymph Node, [place], left axillary lymph node #2                                              C) - Lymph Node, [place], right axillary lymph node #1                                             D) - Lymph Node, [place], right axillary lymph node #2                                             E) - Skin, Other, wide excision melanoma abdomen, stitch 12 oclock                         Final Diagnosis       A  [place], left axillary lymph node #1, biopsy:  - No tumor seen in one lymph node (0/1); see note  Note:  Immunostains for S100, HMB45 and Melan A do not reveal metastatic melanoma  B  [place], left axillary lymph node #2, biopsy:  - No tumor seen in one lymph node (0/1); see note  Note:  Immunostains for S100, HMB45 and Melan A do not reveal metastatic melanoma  C  [place], right axillary lymph node #1, biopsy:  - No tumor seen in one lymph node (0/1); see note  Note:  Immunostains for S100, HMB45 and Melan A do not reveal metastatic melanoma  D  [place], right axillary lymph node #2, biopsy:  - Metastatic melanoma in one of one lymph node (1/1, seen as single and small cluster of tumor cells in subcapsular mya parenchyma, two foci, larger involved area approximately 2 mm), no extranodal extension seen  Note:  The tumor cells are positive for S100 and HMB45, faintly stained for Mart-1     E  Skin, wide excision melanoma abdomen:  - Prior procedure site reaction, no residual melanoma seen  - Inked margins with no tumor seen  In conjunction with [id number], the tumor is [id number]  Interpretation performed at [facility], [address]            Additional Information       All controls performed with the immunohistochemical stains reported above reacted appropriately    These tests were developed and their performance characteristics determined by [facility] or [facility]  They may not be cleared or approved by the [place]  Food and Drug Administration  The FDA has determined that such clearance or approval is not necessary  These tests are used for clinical purposes  They should not be regarded as investigational or for research  This laboratory has been approved by [facility], designated as a high-complexity laboratory and is qualified to perform these tests  Gross Description       A  The specimen is received in formalin, labeled with the patient's name and hospital number, and is designated "left axillary sentinel lymph node # 1  The specimen consists of  1 tan pink dense nodule consistent with lymph node which measures 0 8 cm in greatest dimension  Entirely submitted  One cassette  B  The specimen is received in formalin, labeled with the patient's name and hospital number, and is designated "left axillary sentinel lymph node # 2  The specimen consists of 1 tan pink dense nodule consistent with lymph node, which measures the 0 5 cm in greatest dimension  Entirely submitted  One cassette  C  The specimen is received in formalin, labeled with the patient's name and hospital number, and is designated "right axillary sentinel lymph node # 1  The specimen consists of 1 tan pink dense nodule consistent with lymph node which measures 1 0 x 0 8 x 0 7 cm  The lymph node is bisected revealing tan-pink dense to partially fat replaced cut surfaces  Entirely submitted  One cassette  D  The specimen is received in formalin, labeled with the patient's name and hospital number, and is designated "right axillary sentinel lymph node # 2  The specimen consists of 1 tan-pink dense nodule consistent with lymph node which measures 1 7 x 1 3 x 0 7 cm  The lymph node is bisected revealing tan-pink dense to partially fat replaced cut surfaces  Entirely submitted  One cassette  E  The specimen is received in formalin, labeled with the patient's name and hospital number, and is designated "wide excision melanoma abdomen  The specimen consists previously surgically pigmented blue, non hair-bearing ellipse of skin, which measures 5 0 x 1 8 cm with attached underlying soft tissue to a depth of 1 8 cm  The surface exhibits a linear tan-pink scar which measures 1 9 cm in length and comes within 0 7 cm of nearest 11 o'clock peripheral margin  The specimen exhibits a suture which per the op notes marks the 12 o'clock margin  The specimen is inked as follows:  12-3:00 yellow, 3-6:00 green, 6-9:00 orange, 9-12:00 blue, and the 3 o'clock and 9 o'clock surface tips are inked red  The specimen is sectioned revealing white tan fibrous to tan-yellow fatty and focally hemorrhagic cut surfaces and 1 orange tan rubbery focus, possible satellite lesion measuring 0 4 cm in greatest dimension  Entirely submitted  Twelve cassettes  1:3 o'clock and 9 o'clock shaved ends of resection, en face, between sponges  2-12:  Central portion sequentially submitted in a 3 o'clock to 9 o'clock progression, with 1 piece in each cassette as follows:      6-9:  Scar      8, 9:  Orange tan focus, possible satellite lesion    Note: The estimated total formalin fixation time based upon information provided by the submitting clinician and the standard processing schedule is approximately 72 hours    [place]      Hepatitis C antibody    Collection Time: 12/20/18  8:49 AM   Result Value Ref Range    Hepatitis C Ab Low Reactive (A) Non-reactive   CBC and differential    Collection Time: 12/20/18  8:49 AM   Result Value Ref Range    WBC 8 16 4 31 - 10 16 Thousand/uL    RBC 5 54 3 88 - 5 62 Million/uL    Hemoglobin 17 0 12 0 - 17 0 g/dL    Hematocrit 50 2 (H) 36 5 - 49 3 %    MCV 91 82 - 98 fL    MCH 30 7 26 8 - 34 3 pg    MCHC 33 9 31 4 - 37 4 g/dL    RDW 12 6 11 6 - 15 1 %    MPV 10 7 8 9 - 12 7 fL    Platelets 420 827 - 137 Thousands/uL    nRBC 0 /100 WBCs    Neutrophils Relative 65 43 - 75 %    Immat GRANS % 0 0 - 2 %    Lymphocytes Relative 23 14 - 44 %    Monocytes Relative 9 4 - 12 %    Eosinophils Relative 2 0 - 6 %    Basophils Relative 1 0 - 1 %    Neutrophils Absolute 5 31 1 85 - 7 62 Thousands/µL    Immature Grans Absolute 0 03 0 00 - 0 20 Thousand/uL    Lymphocytes Absolute 1 90 0 60 - 4 47 Thousands/µL    Monocytes Absolute 0 75 0 17 - 1 22 Thousand/µL    Eosinophils Absolute 0 12 0 00 - 0 61 Thousand/µL    Basophils Absolute 0 05 0 00 - 0 10 Thousands/µL   Comprehensive metabolic panel    Collection Time: 12/20/18  8:49 AM   Result Value Ref Range    Sodium 138 136 - 145 mmol/L    Potassium 4 2 3 5 - 5 3 mmol/L    Chloride 100 100 - 108 mmol/L    CO2 32 21 - 32 mmol/L    ANION GAP 6 4 - 13 mmol/L    BUN 22 5 - 25 mg/dL    Creatinine 1 22 0 60 - 1 30 mg/dL    Glucose, Fasting 104 (H) 65 - 99 mg/dL    Calcium 9 1 8 3 - 10 1 mg/dL    AST 12 5 - 45 U/L    ALT 24 12 - 78 U/L    Alkaline Phosphatase 69 46 - 116 U/L    Total Protein 7 2 6 4 - 8 2 g/dL    Albumin 3 7 3 5 - 5 0 g/dL    Total Bilirubin 0 77 0 20 - 1 00 mg/dL    eGFR 61 ml/min/1 73sq m   Lipid panel    Collection Time: 12/20/18  8:49 AM   Result Value Ref Range    Cholesterol 156 50 - 200 mg/dL    Triglycerides 127 <=150 mg/dL    HDL, Direct 37 (L) 40 - 60 mg/dL    LDL Calculated 94 0 - 100 mg/dL    Non-HDL-Chol (CHOL-HDL) 119 mg/dl   Hemoglobin A1C    Collection Time: 12/20/18  8:49 AM   Result Value Ref Range    Hemoglobin A1C 5 9 4 2 - 6 3 %     mg/dl   TSH, 3rd generation    Collection Time: 12/20/18  8:49 AM   Result Value Ref Range    TSH 3RD GENERATON 1 210 0 358 - 3 740 uIU/mL   PSA, Total Screen    Collection Time: 12/20/18  8:49 AM   Result Value Ref Range    PSA 0 4 0 0 - 4 0 ng/mL       The following portions of the patient's history were reviewed and updated as appropriate: allergies, current medications, past family history, past medical history, past social history, past surgical history and problem list      Review of Systems   Constitutional: Negative for appetite change, chills, diaphoresis, fatigue, fever and unexpected weight change  HENT: Negative for postnasal drip and sneezing  Eyes: Negative for visual disturbance  Respiratory: Negative for chest tightness and shortness of breath  Cardiovascular: Negative for chest pain, palpitations and leg swelling  Gastrointestinal: Negative for abdominal pain and blood in stool  Endocrine: Negative for cold intolerance, heat intolerance, polydipsia, polyphagia and polyuria  Genitourinary: Negative for difficulty urinating, dysuria, frequency and urgency  Musculoskeletal: Negative for arthralgias and myalgias  Skin: Negative for rash and wound  Neurological: Negative for dizziness, weakness, light-headedness and headaches  Hematological: Negative for adenopathy  Psychiatric/Behavioral: Negative for confusion, dysphoric mood and sleep disturbance  The patient is not nervous/anxious  Objective:      /80 (BP Location: Left arm, Patient Position: Sitting, Cuff Size: Standard)   Pulse 64   Resp 18   Ht 5' 11" (1 803 m)   Wt 96 5 kg (212 lb 12 8 oz)   BMI 29 68 kg/m²        Physical Exam   Constitutional: He is oriented to person, place, and time  He appears well-developed  No distress  HENT:   Head: Normocephalic and atraumatic  Nose: Nose normal    Mouth/Throat: Oropharynx is clear and moist    Eyes: Pupils are equal, round, and reactive to light  Conjunctivae and EOM are normal    Neck: Normal range of motion  Neck supple  No JVD present  No tracheal deviation present  No thyromegaly present  Cardiovascular: Normal rate, regular rhythm, normal heart sounds and intact distal pulses  Exam reveals no gallop and no friction rub  No murmur heard    Pulmonary/Chest: Effort normal and breath sounds normal  No respiratory distress  He has no wheezes  He has no rales  Abdominal: Soft  Bowel sounds are normal  He exhibits no distension  There is no tenderness  Musculoskeletal: Normal range of motion  He exhibits no edema  Lymphadenopathy:     He has no cervical adenopathy  Neurological: He is alert and oriented to person, place, and time  No cranial nerve deficit  Skin: Skin is warm and dry  No rash noted  He is not diaphoretic  Psychiatric: He has a normal mood and affect   His behavior is normal  Judgment and thought content normal  doesn't want flu vac

## 2022-02-11 NOTE — CONSULT NOTE ADULT - SUBJECTIVE AND OBJECTIVE BOX
HPI:  42 yo F with history of Afib on PO metoprolol which she is non-compliant with, presents c/o palpitations. Pt known to have A.fib x about 2 years and Hypertrophic cardiomyopathy , follows with cards in [facility]. Pt stopped her metoprolol for 7 months and only takes eliquis once a day.  No associated CP, fever/ chills,  SOB, leg pain / bleeding     PMD : Dr. Vaughn , last seen over a year ago       in ER: EKG with Afib and rapid ventricular responses in excess of 180. IV Cardizem started with conversion to NSR.  now feeling much better  (11 Feb 2022 17:19)        HPI-Cardiology   Pt evaluated at bedside. Currently admitted for evaluation of palpitations. Radiology tests and hospital records, were reviewed, as well as previous notes on this patient. Pt states that this morning she started having palpitations and felt her heart "racing very fast". Endorses similar episodes in the past but have been intermittent and gone away on its own per Pt. Per Pt this time the palpitations were constant and didn't improve till Pt arrived in the ED and was started on Cardizem gtt. Pt states that she stopped taking her Metoprolol 7 month ago, and occasionally will take her Eliquis.  Pt states that she f/u with Dr Lr at [facility] for all her cardiac workup. Currently denies any palpitations, CP or leg swelling.       PAST MEDICAL & SURGICAL HISTORY  Hypertrophic cardiomyopathy    Atrial fibrillation    No significant past surgical history        FAMILY HISTORY:  FAMILY HISTORY:  No pertinent family history in first degree relatives        SOCIAL HISTORY:  []smoker-denies  []Alcohol-denies  []Drug-denies    ALLERGIES:  No Known Allergies      MEDICATIONS:  MEDICATIONS  (STANDING):  apixaban 5 milliGRAM(s) Oral every 12 hours  chlorhexidine 4% Liquid 1 Application(s) Topical <User Schedule>  diltiazem Infusion 5 mG/Hr (5 mL/Hr) IV Continuous <Continuous>  pantoprazole    Tablet 40 milliGRAM(s) Oral before breakfast  sodium chloride 0.9% lock flush 3 milliLiter(s) IV Push every 8 hours    MEDICATIONS  (PRN):  acetaminophen     Tablet .. 650 milliGRAM(s) Oral every 6 hours PRN Mild Pain (1 - 3)  ondansetron Injectable 4 milliGRAM(s) IV Push every 8 hours PRN Nausea and/or Vomiting      HOME MEDICATIONS:  Home Medications:  Metoprolol Succinate ER 50 mg oral tablet, extended release: 3 tab(s) orally once a day (17 Sep 2018 14:33)      VITALS:   T(F): 97.2 (02-11 @ 16:04), Max: 97.2 (02-11 @ 16:04)  HR: 73 (02-11 @ 18:15) (72 - 180)  BP: 108/63 (02-11 @ 18:15) (107/53 - 146/84)  BP(mean): --  RR: 16 (02-11 @ 18:15) (16 - 18)  SpO2: 99% (02-11 @ 18:15) (99% - 100%)    I&O's Summary      REVIEW OF SYSTEMS:  CONSTITUTIONAL: No weakness, fevers or chills  EYES: No visual changes  ENT: No vertigo or throat pain   NECK: No pain or stiffness  RESPIRATORY: No cough, wheezing, hemoptysis; No shortness of breath  CARDIOVASCULAR: C/o palpitations  GASTROINTESTINAL: No abdominal or epigastric pain. No nausea, vomiting, or hematemesis; No diarrhea or constipation. No melena or hematochezia.  GENITOURINARY: No dysuria, frequency or hematuria  NEUROLOGICAL: No numbness or weakness  SKIN: No itching, no rashes  MSK: no    PHYSICAL EXAM:  NEURO: patient is awake , alert and oriented  GEN: Not in acute distress  NECK: no thyroid enlargement, no JVD  LUNGS: Mild fine rales noted B/L at bases  CARDIOVASCULAR: S1/S2 present, Regularly irregular , no murmurs or rubs, no carotid bruits,  + PP bilaterally  ABD: Soft, non-tender, non-distended, +BS  EXT: No CHASE  SKIN: Intact    LABS:                        13.7   11.91 )-----------( 352      ( 11 Feb 2022 16:11 )             41.4     02-11    136  |  99  |  16  ----------------------------<  125<H>  4.6   |  22  |  0.8    Ca    9.8      11 Feb 2022 16:11  Mg     1.9     02-11    TPro  7.5  /  Alb  4.3  /  TBili  0.5  /  DBili  x   /  AST  30  /  ALT  30  /  AlkPhos  88  02-11    PT/INR - ( 11 Feb 2022 16:11 )   PT: 16.50 sec;   INR: 1.44 ratio         PTT - ( 11 Feb 2022 16:11 )  PTT:39.5 sec  Troponin T, Serum: 0.04 ng/mL *HH* (02-11-22 @ 16:11)    CARDIAC MARKERS ( 11 Feb 2022 16:11 )  x     / 0.04 ng/mL / x     / x     / x            Troponin trend:    Serum Pro-Brain Natriuretic Peptide: 3022 pg/mL (02-11-22 @ 16:11)          RADIOLOGY:  -CXR:  < from: Xray Chest 1 View- PORTABLE-Urgent (02.11.22 @ 16:17) >  Impression:    Mild pulmonary vascular congestion.    --- End of Report ---    < end of copied text >         HPI:  40 yo F with history of Afib on PO metoprolol which she is non-compliant with, presents c/o palpitations. Pt known to have A.fib x about 2 years and Hypertrophic cardiomyopathy , follows with cards in [facility]. Pt stopped her metoprolol for 7 months and only takes eliquis once a day.  No associated CP, fever/ chills,  SOB, leg pain / bleeding     PMD : Dr. Vaughn , last seen over a year ago       in ER: EKG with Afib and rapid ventricular responses in excess of 180. IV Cardizem started with conversion to NSR.  now feeling much better  (11 Feb 2022 17:19)        HPI-Cardiology   Pt evaluated at bedside. Currently admitted for evaluation of palpitations. Radiology tests and hospital records, were reviewed, as well as previous notes on this patient. Pt states that this morning she started having palpitations and felt her heart "racing very fast". Endorses similar episodes in the past but have been intermittent and gone away on its own. Per Pt this time the palpitations were constant and didn't improve till Pt arrived in the ED and was started on Cardizem gtt. Pt states that she stopped taking her Metoprolol 7 month ago, and occasionally will take her Eliquis.  Pt states that she f/u with Dr Lr at [facility] for all her cardiac workup. Currently denies any palpitations, CP, SOB or leg swelling.       PAST MEDICAL & SURGICAL HISTORY  Hypertrophic cardiomyopathy    Atrial fibrillation    No significant past surgical history        FAMILY HISTORY:  FAMILY HISTORY:  No pertinent family history in first degree relatives        SOCIAL HISTORY:  []smoker-denies  []Alcohol-denies  []Drug-denies    ALLERGIES:  No Known Allergies      MEDICATIONS:  MEDICATIONS  (STANDING):  apixaban 5 milliGRAM(s) Oral every 12 hours  chlorhexidine 4% Liquid 1 Application(s) Topical <User Schedule>  diltiazem Infusion 5 mG/Hr (5 mL/Hr) IV Continuous <Continuous>  pantoprazole    Tablet 40 milliGRAM(s) Oral before breakfast  sodium chloride 0.9% lock flush 3 milliLiter(s) IV Push every 8 hours    MEDICATIONS  (PRN):  acetaminophen     Tablet .. 650 milliGRAM(s) Oral every 6 hours PRN Mild Pain (1 - 3)  ondansetron Injectable 4 milliGRAM(s) IV Push every 8 hours PRN Nausea and/or Vomiting      HOME MEDICATIONS:  Home Medications:  Metoprolol Succinate ER 50 mg oral tablet, extended release: 3 tab(s) orally once a day (17 Sep 2018 14:33)      VITALS:   T(F): 97.2 (02-11 @ 16:04), Max: 97.2 (02-11 @ 16:04)  HR: 73 (02-11 @ 18:15) (72 - 180)  BP: 108/63 (02-11 @ 18:15) (107/53 - 146/84)  BP(mean): --  RR: 16 (02-11 @ 18:15) (16 - 18)  SpO2: 99% (02-11 @ 18:15) (99% - 100%)    I&O's Summary      REVIEW OF SYSTEMS:  CONSTITUTIONAL: No weakness, fevers or chills  EYES: No visual changes  ENT: No vertigo or throat pain   NECK: No pain or stiffness  RESPIRATORY: No cough, wheezing, hemoptysis; No shortness of breath  CARDIOVASCULAR: C/o palpitations  GASTROINTESTINAL: No abdominal or epigastric pain. No nausea, vomiting, or hematemesis; No diarrhea or constipation. No melena or hematochezia.  GENITOURINARY: No dysuria, frequency or hematuria  NEUROLOGICAL: No numbness or weakness  SKIN: No itching, no rashes  MSK: no    PHYSICAL EXAM:  NEURO: patient is awake , alert and oriented  GEN: Not in acute distress  NECK: no thyroid enlargement, no JVD  LUNGS: Mild fine rales noted B/L at bases  CARDIOVASCULAR: S1/S2 present, Regularly irregular , no murmurs or rubs, no carotid bruits,  + PP bilaterally  ABD: Soft, non-tender, non-distended, +BS  EXT: No CHASE  SKIN: Intact    LABS:                        13.7   11.91 )-----------( 352      ( 11 Feb 2022 16:11 )             41.4     02-11    136  |  99  |  16  ----------------------------<  125<H>  4.6   |  22  |  0.8    Ca    9.8      11 Feb 2022 16:11  Mg     1.9     02-11    TPro  7.5  /  Alb  4.3  /  TBili  0.5  /  DBili  x   /  AST  30  /  ALT  30  /  AlkPhos  88  02-11    PT/INR - ( 11 Feb 2022 16:11 )   PT: 16.50 sec;   INR: 1.44 ratio         PTT - ( 11 Feb 2022 16:11 )  PTT:39.5 sec  Troponin T, Serum: 0.04 ng/mL *HH* (02-11-22 @ 16:11)    CARDIAC MARKERS ( 11 Feb 2022 16:11 )  x     / 0.04 ng/mL / x     / x     / x            Troponin trend:    Serum Pro-Brain Natriuretic Peptide: 3022 pg/mL (02-11-22 @ 16:11)          RADIOLOGY:  -CXR:  < from: Xray Chest 1 View- PORTABLE-Urgent (02.11.22 @ 16:17) >  Impression:    Mild pulmonary vascular congestion.    --- End of Report ---    < end of copied text >

## 2022-02-11 NOTE — ED PROVIDER NOTE - PROGRESS NOTE DETAILS
ATTENDING NOTE: Pt has a history of Afib on PO metoprolol which she is non-compliant with, presents c/o palpitations. No associated CP or SOB. Vital signs noted. Chest clear, heart very tachy. ABD soft NTND. Equal pulses b/l. EKG with Afib and rapid ventricular responses in excess of 180. IV Cardizem started with conversion to NSR. Pt has a non-negative Troponin. May require admission for trending in view of ST abnormalities on original EKG. Patient looks much better, HR 85, EKG now NSR, ischemic changes better, Dr Martinez, intensivists , patient cleared for non CCU tele, Dr Hannah accepts ATTENDING NOTE: Pt has a history of Afib on PO metoprolol which she is non-compliant with, presents c/o palpitations. No associated CP or SOB. Vital signs noted. Chest clear, heart very tachy. ABD soft NTND. Equal pulses b/l. EKG with Afib and rapid ventricular responses in excess of 180. IV Cardizem started with conversion to NSR. Pt has a non-negative Troponin. Will require admission for trending in view of ST abnormalities on original EKG.

## 2022-02-11 NOTE — ED PROVIDER NOTE - OBJECTIVE STATEMENT
Patient presents in ED for feeling lightheaded, sweating, since this am no chest pain, no SOB, H/o afib, non compliant with her metoprolol. patient currently on eliquis

## 2022-02-11 NOTE — ED PROVIDER NOTE - DATE/TIME 1
11-Feb-2022 16:57 Enbrel Counseling:  I discussed with the patient the risks of etanercept including but not limited to myelosuppression, immunosuppression, autoimmune hepatitis, demyelinating diseases, lymphoma, and infections.  The patient understands that monitoring is required including a PPD at baseline and must alert us or the primary physician if symptoms of infection or other concerning signs are noted.

## 2022-02-11 NOTE — H&P ADULT - ASSESSMENT
42 yo F with history of Afib on PO metoprolol which she is non-compliant with, presents c/o palpitations.     #A.fib in RVR , now in NSR  # r/o ACS  # Hypertrophic Cardiomyopathy   - cardiology consult  - echo   - LRT   - IV cardizem , poss PO BB in am   - Eliquis BID  - monitor vitals ekg in am   - poss d/c in 24 hours     # Leukocytosis with no fever or source   - Blood cx x 2   - urine cx  - labs in am     Gi ppx: PPI     D/w Dr. Hannah

## 2022-02-12 LAB
ANION GAP SERPL CALC-SCNC: 11 MMOL/L — SIGNIFICANT CHANGE UP (ref 7–14)
BUN SERPL-MCNC: 15 MG/DL — SIGNIFICANT CHANGE UP (ref 10–20)
CALCIUM SERPL-MCNC: 9.1 MG/DL — SIGNIFICANT CHANGE UP (ref 8.5–10.1)
CHLORIDE SERPL-SCNC: 105 MMOL/L — SIGNIFICANT CHANGE UP (ref 98–110)
CO2 SERPL-SCNC: 25 MMOL/L — SIGNIFICANT CHANGE UP (ref 17–32)
CREAT SERPL-MCNC: 0.7 MG/DL — SIGNIFICANT CHANGE UP (ref 0.7–1.5)
GLUCOSE SERPL-MCNC: 95 MG/DL — SIGNIFICANT CHANGE UP (ref 70–99)
HCT VFR BLD CALC: 36.5 % — LOW (ref 37–47)
HGB BLD-MCNC: 12.2 G/DL — SIGNIFICANT CHANGE UP (ref 12–16)
MCHC RBC-ENTMCNC: 27 PG — SIGNIFICANT CHANGE UP (ref 27–31)
MCHC RBC-ENTMCNC: 33.4 G/DL — SIGNIFICANT CHANGE UP (ref 32–37)
MCV RBC AUTO: 80.8 FL — LOW (ref 81–99)
NRBC # BLD: 0 /100 WBCS — SIGNIFICANT CHANGE UP (ref 0–0)
PLATELET # BLD AUTO: 271 K/UL — SIGNIFICANT CHANGE UP (ref 130–400)
POTASSIUM SERPL-MCNC: 4.4 MMOL/L — SIGNIFICANT CHANGE UP (ref 3.5–5)
POTASSIUM SERPL-SCNC: 4.4 MMOL/L — SIGNIFICANT CHANGE UP (ref 3.5–5)
RBC # BLD: 4.52 M/UL — SIGNIFICANT CHANGE UP (ref 4.2–5.4)
RBC # FLD: 13.7 % — SIGNIFICANT CHANGE UP (ref 11.5–14.5)
SODIUM SERPL-SCNC: 141 MMOL/L — SIGNIFICANT CHANGE UP (ref 135–146)
T3 SERPL-MCNC: 92 NG/DL — SIGNIFICANT CHANGE UP (ref 80–200)
T4 AB SER-ACNC: 7.1 UG/DL — SIGNIFICANT CHANGE UP (ref 4.6–12)
TROPONIN T SERPL-MCNC: 0.04 NG/ML — CRITICAL HIGH
TROPONIN T SERPL-MCNC: 0.07 NG/ML — CRITICAL HIGH
TSH SERPL-MCNC: 1.35 UIU/ML — SIGNIFICANT CHANGE UP (ref 0.27–4.2)
WBC # BLD: 7.12 K/UL — SIGNIFICANT CHANGE UP (ref 4.8–10.8)
WBC # FLD AUTO: 7.12 K/UL — SIGNIFICANT CHANGE UP (ref 4.8–10.8)

## 2022-02-12 PROCEDURE — 93010 ELECTROCARDIOGRAM REPORT: CPT

## 2022-02-12 PROCEDURE — 99232 SBSQ HOSP IP/OBS MODERATE 35: CPT

## 2022-02-12 RX ORDER — METOPROLOL TARTRATE 50 MG
25 TABLET ORAL
Refills: 0 | Status: DISCONTINUED | OUTPATIENT
Start: 2022-02-13 | End: 2022-02-13

## 2022-02-12 RX ORDER — METOPROLOL TARTRATE 50 MG
50 TABLET ORAL DAILY
Refills: 0 | Status: DISCONTINUED | OUTPATIENT
Start: 2022-02-12 | End: 2022-02-12

## 2022-02-12 RX ADMIN — PANTOPRAZOLE SODIUM 40 MILLIGRAM(S): 20 TABLET, DELAYED RELEASE ORAL at 06:44

## 2022-02-12 RX ADMIN — SODIUM CHLORIDE 3 MILLILITER(S): 9 INJECTION INTRAMUSCULAR; INTRAVENOUS; SUBCUTANEOUS at 05:53

## 2022-02-12 RX ADMIN — Medication 50 MILLIGRAM(S): at 10:10

## 2022-02-12 RX ADMIN — SODIUM CHLORIDE 3 MILLILITER(S): 9 INJECTION INTRAMUSCULAR; INTRAVENOUS; SUBCUTANEOUS at 16:45

## 2022-02-12 RX ADMIN — APIXABAN 5 MILLIGRAM(S): 2.5 TABLET, FILM COATED ORAL at 06:44

## 2022-02-12 RX ADMIN — SODIUM CHLORIDE 3 MILLILITER(S): 9 INJECTION INTRAMUSCULAR; INTRAVENOUS; SUBCUTANEOUS at 22:08

## 2022-02-12 RX ADMIN — APIXABAN 5 MILLIGRAM(S): 2.5 TABLET, FILM COATED ORAL at 17:34

## 2022-02-12 NOTE — PROGRESS NOTE ADULT - SUBJECTIVE AND OBJECTIVE BOX
Patient is a 41y old  Female who presents with a chief complaint of palpitations (11 Feb 2022 18:06)      SUBJECTIVE / OVERNIGHT EVENTS: No current palpitations; no cp; currently rate controlled. Trop trending up due to ischemic demand.   ADDITIONAL REVIEW OF SYSTEMS:  as above  no sob      MEDICATIONS  (STANDING):  apixaban 5 milliGRAM(s) Oral every 12 hours  chlorhexidine 4% Liquid 1 Application(s) Topical <User Schedule>  diltiazem Infusion 5 mG/Hr (5 mL/Hr) IV Continuous <Continuous>  pantoprazole    Tablet 40 milliGRAM(s) Oral before breakfast  sodium chloride 0.9% lock flush 3 milliLiter(s) IV Push every 8 hours    MEDICATIONS  (PRN):  acetaminophen     Tablet .. 650 milliGRAM(s) Oral every 6 hours PRN Mild Pain (1 - 3)  ondansetron Injectable 4 milliGRAM(s) IV Push every 8 hours PRN Nausea and/or Vomiting      CAPILLARY BLOOD GLUCOSE        I&O's Summary      PHYSICAL EXAM:  Vital Signs Last 24 Hrs  T(C): 36.8 (12 Feb 2022 05:00), Max: 36.8 (12 Feb 2022 05:00)  T(F): 98.2 (12 Feb 2022 05:00), Max: 98.2 (12 Feb 2022 05:00)  HR: 76 (12 Feb 2022 05:00) (71 - 180)  BP: 98/59 (12 Feb 2022 05:00) (98/59 - 146/84)  BP(mean): --  RR: 16 (12 Feb 2022 05:00) (16 - 18)  SpO2: 99% (11 Feb 2022 21:05) (99% - 100%)  CONSTITUTIONAL: NAD, well-developed, well-groomed  NECK: Supple, no palpable masses; no thyromegaly  RESPIRATORY: Normal respiratory effort; lungs are clear to auscultation bilaterally  CARDIOVASCULAR: irregular rhythm; controlled rate  ABDOMEN: Nontender to palpation, normoactive bowel sounds, no rebound/guarding; No hepatosplenomegaly  MUSCULOSKELETAL:  Normal gait; no clubbing or cyanosis of digits; no joint swelling or tenderness to palpation  PSYCH: A+O to person, place, and time; affect appropriate    LABS:                        13.7   11.91 )-----------( 352      ( 11 Feb 2022 16:11 )             41.4     02-11    136  |  99  |  16  ----------------------------<  125<H>  4.6   |  22  |  0.8    Ca    9.8      11 Feb 2022 16:11  Mg     1.9     02-11    TPro  7.5  /  Alb  4.3  /  TBili  0.5  /  DBili  x   /  AST  30  /  ALT  30  /  AlkPhos  88  02-11    PT/INR - ( 11 Feb 2022 16:11 )   PT: 16.50 sec;   INR: 1.44 ratio         PTT - ( 11 Feb 2022 16:11 )  PTT:39.5 sec  CARDIAC MARKERS ( 11 Feb 2022 22:00 )  x     / 0.07 ng/mL / x     / x     / x      CARDIAC MARKERS ( 11 Feb 2022 16:11 )  x     / 0.04 ng/mL / x     / x     / x                RADIOLOGY & ADDITIONAL TESTS:  Results Reviewed:   Imaging Personally Reviewed:  Electrocardiogram Personally Reviewed:    COORDINATION OF CARE:  Care Discussed with Consultants/Other Providers [Y/N]:  Prior or Outpatient Records Reviewed [Y/N]:       Patient is a 41y old  Female who presents with a chief complaint of palpitations (11 Feb 2022 18:06)      SUBJECTIVE / OVERNIGHT EVENTS: No current palpitations; no cp; currently rate controlled. Trop trending up due to ischemic demand. Went over barriers for medication non-compliance. Pending TTE pt can be discharged on Toprol and eliquis at home doses.  ADDITIONAL REVIEW OF SYSTEMS:  as above  no sob      MEDICATIONS  (STANDING):  apixaban 5 milliGRAM(s) Oral every 12 hours  chlorhexidine 4% Liquid 1 Application(s) Topical <User Schedule>  diltiazem Infusion 5 mG/Hr (5 mL/Hr) IV Continuous <Continuous>  pantoprazole    Tablet 40 milliGRAM(s) Oral before breakfast  sodium chloride 0.9% lock flush 3 milliLiter(s) IV Push every 8 hours    MEDICATIONS  (PRN):  acetaminophen     Tablet .. 650 milliGRAM(s) Oral every 6 hours PRN Mild Pain (1 - 3)  ondansetron Injectable 4 milliGRAM(s) IV Push every 8 hours PRN Nausea and/or Vomiting      CAPILLARY BLOOD GLUCOSE        I&O's Summary      PHYSICAL EXAM:  Vital Signs Last 24 Hrs  T(C): 36.8 (12 Feb 2022 05:00), Max: 36.8 (12 Feb 2022 05:00)  T(F): 98.2 (12 Feb 2022 05:00), Max: 98.2 (12 Feb 2022 05:00)  HR: 76 (12 Feb 2022 05:00) (71 - 180)  BP: 98/59 (12 Feb 2022 05:00) (98/59 - 146/84)  BP(mean): --  RR: 16 (12 Feb 2022 05:00) (16 - 18)  SpO2: 99% (11 Feb 2022 21:05) (99% - 100%)  CONSTITUTIONAL: NAD, well-developed, well-groomed  NECK: Supple, no palpable masses; no thyromegaly  RESPIRATORY: Normal respiratory effort; lungs are clear to auscultation bilaterally  CARDIOVASCULAR: irregular rhythm; controlled rate  ABDOMEN: Nontender to palpation, normoactive bowel sounds, no rebound/guarding; No hepatosplenomegaly  MUSCULOSKELETAL:  Normal gait; no clubbing or cyanosis of digits; no joint swelling or tenderness to palpation  PSYCH: A+O to person, place, and time; affect appropriate    LABS:                        13.7   11.91 )-----------( 352      ( 11 Feb 2022 16:11 )             41.4     02-11    136  |  99  |  16  ----------------------------<  125<H>  4.6   |  22  |  0.8    Ca    9.8      11 Feb 2022 16:11  Mg     1.9     02-11    TPro  7.5  /  Alb  4.3  /  TBili  0.5  /  DBili  x   /  AST  30  /  ALT  30  /  AlkPhos  88  02-11    PT/INR - ( 11 Feb 2022 16:11 )   PT: 16.50 sec;   INR: 1.44 ratio         PTT - ( 11 Feb 2022 16:11 )  PTT:39.5 sec  CARDIAC MARKERS ( 11 Feb 2022 22:00 )  x     / 0.07 ng/mL / x     / x     / x      CARDIAC MARKERS ( 11 Feb 2022 16:11 )  x     / 0.04 ng/mL / x     / x     / x                RADIOLOGY & ADDITIONAL TESTS:  Results Reviewed:   Imaging Personally Reviewed:  Electrocardiogram Personally Reviewed:    COORDINATION OF CARE:  Care Discussed with Consultants/Other Providers [Y/N]:  Prior or Outpatient Records Reviewed [Y/N]:

## 2022-02-12 NOTE — PROGRESS NOTE ADULT - ASSESSMENT
40 yo F with history of Afib on PO metoprolol which she is non-compliant with, presents c/o palpitations.     #A.fib in RVR , now in NSR  # troponin leak due to ischemic demand   # Hypertrophic Cardiomyopathy   - cardiology recs noted  - echo   - LRT   - cardizem drip  - Eliquis BID      # Leukocytosis with no fever or source   - Blood cx x 2   - urine cx      Gi ppx: PPI   vte: eliquis   42 yo F with history of Afib on PO metoprolol which she is non-compliant with, presents c/o palpitations.     #A.fib in RVR , now rate controlled  # troponin leak due to ischemic demand   # Hypertrophic Cardiomyopathy   - cardiology recs noted  - TTE  - stopped cardizem drip  - already received toprol today so started on lopressor for 2/13  - Eliquis BID  - counseled on medication compliance      # Leukocytosis, resolved  - no abx indicated  - cultures sent on admission; no indication to follow    Gi ppx: PPI   vte: eliquis

## 2022-02-13 ENCOUNTER — TRANSCRIPTION ENCOUNTER (OUTPATIENT)
Age: 42
End: 2022-02-13

## 2022-02-13 VITALS
RESPIRATION RATE: 16 BRPM | HEART RATE: 80 BPM | TEMPERATURE: 98 F | SYSTOLIC BLOOD PRESSURE: 124 MMHG | DIASTOLIC BLOOD PRESSURE: 82 MMHG

## 2022-02-13 PROCEDURE — 99233 SBSQ HOSP IP/OBS HIGH 50: CPT

## 2022-02-13 PROCEDURE — 93010 ELECTROCARDIOGRAM REPORT: CPT

## 2022-02-13 RX ORDER — METOPROLOL TARTRATE 50 MG
3 TABLET ORAL
Qty: 0 | Refills: 0 | DISCHARGE

## 2022-02-13 RX ORDER — METOPROLOL TARTRATE 50 MG
1 TABLET ORAL
Qty: 60 | Refills: 0
Start: 2022-02-13

## 2022-02-13 RX ADMIN — PANTOPRAZOLE SODIUM 40 MILLIGRAM(S): 20 TABLET, DELAYED RELEASE ORAL at 05:31

## 2022-02-13 RX ADMIN — Medication 25 MILLIGRAM(S): at 05:31

## 2022-02-13 RX ADMIN — CHLORHEXIDINE GLUCONATE 1 APPLICATION(S): 213 SOLUTION TOPICAL at 05:28

## 2022-02-13 RX ADMIN — SODIUM CHLORIDE 3 MILLILITER(S): 9 INJECTION INTRAMUSCULAR; INTRAVENOUS; SUBCUTANEOUS at 13:50

## 2022-02-13 RX ADMIN — APIXABAN 5 MILLIGRAM(S): 2.5 TABLET, FILM COATED ORAL at 05:31

## 2022-02-13 RX ADMIN — SODIUM CHLORIDE 3 MILLILITER(S): 9 INJECTION INTRAMUSCULAR; INTRAVENOUS; SUBCUTANEOUS at 05:29

## 2022-02-13 NOTE — DISCHARGE NOTE PROVIDER - CARE PROVIDER_API CALL
Elliot Metcalf)  Cardiovascular Disease; Internal Medicine  [address] [place] [address]  Phone: [phone]  Fax: [phone]  Follow Up Time: 2 weeks    Padmini Vaughn  Internal Medicine  [address] [place] [address]  Phone: [phone]  Fax: ()-  Follow Up Time: Routine

## 2022-02-13 NOTE — DISCHARGE NOTE NURSING/CASE MANAGEMENT/SOCIAL WORK - PATIENT PORTAL LINK FT
You can access the FollowMyHealth Patient Portal offered by [facility] by registering at the following website: [email or web address]. By joining [employer]’s FollowMyHealth portal, you will also be able to view your health information using other applications (apps) compatible with our system.

## 2022-02-13 NOTE — DISCHARGE NOTE NURSING/CASE MANAGEMENT/SOCIAL WORK - NSDCPEFALRISK_GEN_ALL_CORE
For information on Fall & Injury Prevention, visit: [email or web address] OR  [email or web address] OR  https://www.cdc.gov/steadi/patient.html

## 2022-02-13 NOTE — PROGRESS NOTE ADULT - SUBJECTIVE AND OBJECTIVE BOX
Patient is a 41y old  Female who presents with a chief complaint of palpitations (12 Feb 2022 08:19)      T(F): 96.9 (02-13-22 @ 05:11), Max: 98.6 (02-12-22 @ 13:58)  HR: 65 (02-13-22 @ 05:11)  BP: 113/85 (02-13-22 @ 05:11)  RR: 16 (02-13-22 @ 05:11)  SpO2: --    PHYSICAL EXAM:  GENERAL: NAD, well-groomed, well-developed  HEAD:  Atraumatic, Normocephalic  EYES: EOMI, PERRLA, conjunctiva and sclera clear  ENMT: No tonsillar erythema, exudates, or enlargement; Moist mucous membranes, Good dentition, No lesions  NECK: Supple, No JVD, Normal thyroid  NERVOUS SYSTEM:  Alert & Oriented X3,  Motor Strength 5/5 B/L upper and lower extremities  CHEST/LUNG: Clear to percussion bilaterally; No rales, rhonchi, wheezing, or rubs  HEART: Regular rate and rhythm; No murmurs, rubs, or gallops  ABDOMEN: Soft, Nontender, Nondistended; Bowel sounds present  EXTREMITIES:   No clubbing, cyanosis, or edema  LYMPH: No lymphadenopathy noted  SKIN: No rashes or lesions    labs  02-12    141  |  105  |  15  ----------------------------<  95  4.4   |  25  |  0.7    Ca    9.1      12 Feb 2022 07:24  Mg     1.9     02-11    TPro  7.5  /  Alb  4.3  /  TBili  0.5  /  DBili  x   /  AST  30  /  ALT  30  /  AlkPhos  88  02-11                          12.2   7.12  )-----------( 271      ( 12 Feb 2022 07:24 )             36.5       PT/INR - ( 11 Feb 2022 16:11 )   PT: 16.50 sec;   INR: 1.44 ratio         PTT - ( 11 Feb 2022 16:11 )  PTT:39.5 sec    Troponin T, Serum: 0.04 ng/mL *HH* (02-12-22 @ 11:32)      acetaminophen     Tablet .. 650 milliGRAM(s) Oral every 6 hours PRN  apixaban 5 milliGRAM(s) Oral every 12 hours  chlorhexidine 4% Liquid 1 Application(s) Topical <User Schedule>  metoprolol tartrate 25 milliGRAM(s) Oral two times a day  ondansetron Injectable 4 milliGRAM(s) IV Push every 8 hours PRN  pantoprazole    Tablet 40 milliGRAM(s) Oral before breakfast  sodium chloride 0.9% lock flush 3 milliLiter(s) IV Push every 8 hours

## 2022-02-13 NOTE — PROGRESS NOTE ADULT - ASSESSMENT
Patient with no complaints. NSR. No further afib. Patient aware need take meds. Ambulate . Can at home resume metoprolol er 50 mg once a day. If indicated can get out patient echo . Followed by cardiology out patient . She has known hypertrophic cardiomyopathy

## 2022-02-13 NOTE — DISCHARGE NOTE PROVIDER - NSDCCPCAREPLAN_GEN_ALL_CORE_FT
PRINCIPAL DISCHARGE DIAGNOSIS  Diagnosis: Atrial fibrillation with controlled ventricular rate  Assessment and Plan of Treatment: - continue Eliquis, Resume metoprolol for HR control, follow up with Dr Metcalf as outpatient      SECONDARY DISCHARGE DIAGNOSES  Diagnosis: Elevated troponin  Assessment and Plan of Treatment: felt to be Troponin leak due to demand Ischemia

## 2022-02-13 NOTE — DISCHARGE NOTE PROVIDER - NSDCMRMEDTOKEN_GEN_ALL_CORE_FT
apixaban 5 mg oral tablet: 1 tab(s) orally 2 times a day   metoprolol tartrate 25 mg oral tablet: 1 tab(s) orally 2 times a day

## 2022-02-13 NOTE — DISCHARGE NOTE PROVIDER - PROVIDER TOKENS
PROVIDER:[TOKEN:[[id number]],FOLLOWUP:[2 weeks]],PROVIDER:[TOKEN:[[id number]],FOLLOWUP:[Routine]]

## 2022-02-13 NOTE — DISCHARGE NOTE PROVIDER - HOSPITAL COURSE
[age] yo F with history of Afib on PO metoprolol which she is non-compliant with, presents c/o palpitations.     #A.fib in RVR , now rate controlled  # troponin leak due to ischemic demand   # Hypertrophic Cardiomyopathy   - cardiology recommendations noted: will follow up with Cardiologist: Dr Metcalf as outpatient.  - stopped Cardizem drip when HR controlled  - restarted on lopressor for 2/13: 25mg PO Q12H  - Eliquis BID  - counseled on medication compliance      # Leukocytosis, resolved  - no abx indicated  - cultures sent on admission; no indication to follow

## 2022-02-14 ENCOUNTER — TRANSCRIPTION ENCOUNTER (OUTPATIENT)
Age: 42
End: 2022-02-14

## 2022-02-14 PROBLEM — I48.91 UNSPECIFIED ATRIAL FIBRILLATION: Chronic | Status: ACTIVE | Noted: 2022-02-11

## 2022-02-16 DIAGNOSIS — Z91.14 PATIENT'S OTHER NONCOMPLIANCE WITH MEDICATION REGIMEN: ICD-10-CM

## 2022-02-16 DIAGNOSIS — I42.2 OTHER HYPERTROPHIC CARDIOMYOPATHY: ICD-10-CM

## 2022-02-16 DIAGNOSIS — I48.91 UNSPECIFIED ATRIAL FIBRILLATION: ICD-10-CM

## 2022-02-16 DIAGNOSIS — I24.8 OTHER FORMS OF ACUTE ISCHEMIC HEART DISEASE: ICD-10-CM

## 2022-02-16 DIAGNOSIS — D72.829 ELEVATED WHITE BLOOD CELL COUNT, UNSPECIFIED: ICD-10-CM

## 2022-02-16 DIAGNOSIS — Z79.01 LONG TERM (CURRENT) USE OF ANTICOAGULANTS: ICD-10-CM

## 2022-02-16 DIAGNOSIS — R00.2 PALPITATIONS: ICD-10-CM

## 2022-02-16 DIAGNOSIS — R00.0 TACHYCARDIA, UNSPECIFIED: ICD-10-CM

## 2022-02-16 DIAGNOSIS — Z79.899 OTHER LONG TERM (CURRENT) DRUG THERAPY: ICD-10-CM

## 2022-02-17 LAB
CULTURE RESULTS: SIGNIFICANT CHANGE UP
CULTURE RESULTS: SIGNIFICANT CHANGE UP
SPECIMEN SOURCE: SIGNIFICANT CHANGE UP
SPECIMEN SOURCE: SIGNIFICANT CHANGE UP

## 2022-03-18 ENCOUNTER — APPOINTMENT (OUTPATIENT)
Dept: CARDIOLOGY | Facility: CLINIC | Age: 42
End: 2022-03-18
Payer: COMMERCIAL

## 2022-03-18 ENCOUNTER — NON-APPOINTMENT (OUTPATIENT)
Age: 42
End: 2022-03-18

## 2022-03-18 VITALS
BODY MASS INDEX: 36.44 KG/M2 | WEIGHT: 193 LBS | HEIGHT: 61 IN | SYSTOLIC BLOOD PRESSURE: 120 MMHG | DIASTOLIC BLOOD PRESSURE: 70 MMHG

## 2022-03-18 DIAGNOSIS — L98.9 DISORDER OF THE SKIN AND SUBCUTANEOUS TISSUE, UNSPECIFIED: ICD-10-CM

## 2022-03-18 PROCEDURE — 99214 OFFICE O/P EST MOD 30 MIN: CPT

## 2022-03-18 NOTE — REASON FOR VISIT
[Symptom and Test Evaluation] : symptom and test evaluation [Arrhythmia/ECG Abnorrmalities] : arrhythmia/ECG abnormalities [Structural Heart and Valve Disease] : structural heart and valve disease

## 2022-03-18 NOTE — HISTORY OF PRESENT ILLNESS
[FreeTextEntry1] : 40 yo female paf hypertrophic cardiomyopathy. In 2/22 in hosp rapid afib. She had stopped beta. Now NSR. She denies chest pain. No palpitations on beta . No sob . GOULD 2 flight steps mild. She had covid 12/21

## 2022-03-18 NOTE — PHYSICAL EXAM
[Well Developed] : well developed [Well Nourished] : well nourished [No Acute Distress] : no acute distress [Normal Conjunctiva] : normal conjunctiva [Normal Venous Pressure] : normal venous pressure [No Carotid Bruit] : no carotid bruit [Clear Lung Fields] : clear lung fields [Soft] : abdomen soft [Non Tender] : non-tender [Normal Gait] : normal gait [No Edema] : no edema [No Rash] : no rash [Moves all extremities] : moves all extremities [Alert and Oriented] : alert and oriented

## 2022-03-18 NOTE — DISCUSSION/SUMMARY
[FreeTextEntry1] : 42 yo female paf hypertrophic cardiomyopathy. In 2/22 in hosp rapid afib. She had stopped beta. Now NSR. She denies chest pain. No palpitations on beta . No sob . GOULD 2 flight steps mild. She had covid 12/21. Tolld take beta and ac. To get echo. Aware need weight loss. No sudden death in family. Aware no heavy exertion. Return sooner if symptoms. She was on more beta in past.

## 2022-03-18 NOTE — REVIEW OF SYSTEMS
[Fever] : no fever [Chills] : no chills [Blurry Vision] : no blurred vision [Hearing Loss] : no hearing loss [SOB] : no shortness of breath [Dyspnea on exertion] : dyspnea during exertion [Cough] : no cough [Abdominal Pain] : no abdominal pain [Dysuria] : no dysuria [Joint Pain] : no joint pain [Rash] : no rash [Dizziness] : no dizziness [Confusion] : no confusion was observed [Easy Bleeding] : no tendency for easy bleeding

## 2022-03-29 ENCOUNTER — OUTPATIENT (OUTPATIENT)
Dept: OUTPATIENT SERVICES | Facility: HOSPITAL | Age: 42
LOS: 1 days | Discharge: HOME | End: 2022-03-29
Payer: COMMERCIAL

## 2022-03-29 DIAGNOSIS — L98.9 DISORDER OF THE SKIN AND SUBCUTANEOUS TISSUE, UNSPECIFIED: ICD-10-CM

## 2022-03-29 PROCEDURE — 93306 TTE W/DOPPLER COMPLETE: CPT | Mod: 26

## 2022-06-26 ENCOUNTER — INPATIENT (INPATIENT)
Facility: HOSPITAL | Age: 42
LOS: 0 days | Discharge: HOME | End: 2022-06-26
Attending: HOSPITALIST | Admitting: HOSPITALIST
Payer: COMMERCIAL

## 2022-06-26 VITALS — HEIGHT: 61 IN | WEIGHT: 195.11 LBS | RESPIRATION RATE: 20 BRPM | HEART RATE: 142 BPM | OXYGEN SATURATION: 99 %

## 2022-06-26 VITALS
RESPIRATION RATE: 18 BRPM | OXYGEN SATURATION: 100 % | DIASTOLIC BLOOD PRESSURE: 66 MMHG | HEART RATE: 97 BPM | SYSTOLIC BLOOD PRESSURE: 107 MMHG

## 2022-06-26 LAB
ALBUMIN SERPL ELPH-MCNC: 4.7 G/DL — SIGNIFICANT CHANGE UP (ref 3.5–5.2)
ALP SERPL-CCNC: 86 U/L — SIGNIFICANT CHANGE UP (ref 30–115)
ALT FLD-CCNC: 22 U/L — SIGNIFICANT CHANGE UP (ref 0–41)
ANION GAP SERPL CALC-SCNC: 13 MMOL/L — SIGNIFICANT CHANGE UP (ref 7–14)
APTT BLD: 37.3 SEC — SIGNIFICANT CHANGE UP (ref 27–39.2)
AST SERPL-CCNC: 22 U/L — SIGNIFICANT CHANGE UP (ref 0–41)
BASOPHILS # BLD AUTO: 0.04 K/UL — SIGNIFICANT CHANGE UP (ref 0–0.2)
BASOPHILS NFR BLD AUTO: 0.3 % — SIGNIFICANT CHANGE UP (ref 0–1)
BILIRUB SERPL-MCNC: 0.4 MG/DL — SIGNIFICANT CHANGE UP (ref 0.2–1.2)
BUN SERPL-MCNC: 20 MG/DL — SIGNIFICANT CHANGE UP (ref 10–20)
CALCIUM SERPL-MCNC: 10.2 MG/DL — HIGH (ref 8.5–10.1)
CHLORIDE SERPL-SCNC: 101 MMOL/L — SIGNIFICANT CHANGE UP (ref 98–110)
CO2 SERPL-SCNC: 22 MMOL/L — SIGNIFICANT CHANGE UP (ref 17–32)
CREAT SERPL-MCNC: 0.8 MG/DL — SIGNIFICANT CHANGE UP (ref 0.7–1.5)
EGFR: 94 ML/MIN/1.73M2 — SIGNIFICANT CHANGE UP
EOSINOPHIL # BLD AUTO: 0.04 K/UL — SIGNIFICANT CHANGE UP (ref 0–0.7)
EOSINOPHIL NFR BLD AUTO: 0.3 % — SIGNIFICANT CHANGE UP (ref 0–8)
GLUCOSE SERPL-MCNC: 125 MG/DL — HIGH (ref 70–99)
HCT VFR BLD CALC: 41.1 % — SIGNIFICANT CHANGE UP (ref 37–47)
HGB BLD-MCNC: 13.5 G/DL — SIGNIFICANT CHANGE UP (ref 12–16)
IMM GRANULOCYTES NFR BLD AUTO: 0.4 % — HIGH (ref 0.1–0.3)
INR BLD: 1.56 RATIO — HIGH (ref 0.65–1.3)
LYMPHOCYTES # BLD AUTO: 1.3 K/UL — SIGNIFICANT CHANGE UP (ref 1.2–3.4)
LYMPHOCYTES # BLD AUTO: 9.3 % — LOW (ref 20.5–51.1)
MAGNESIUM SERPL-MCNC: 1.9 MG/DL — SIGNIFICANT CHANGE UP (ref 1.8–2.4)
MCHC RBC-ENTMCNC: 26.2 PG — LOW (ref 27–31)
MCHC RBC-ENTMCNC: 32.8 G/DL — SIGNIFICANT CHANGE UP (ref 32–37)
MCV RBC AUTO: 79.7 FL — LOW (ref 81–99)
MONOCYTES # BLD AUTO: 0.7 K/UL — HIGH (ref 0.1–0.6)
MONOCYTES NFR BLD AUTO: 5 % — SIGNIFICANT CHANGE UP (ref 1.7–9.3)
NEUTROPHILS # BLD AUTO: 11.83 K/UL — HIGH (ref 1.4–6.5)
NEUTROPHILS NFR BLD AUTO: 84.7 % — HIGH (ref 42.2–75.2)
NRBC # BLD: 0 /100 WBCS — SIGNIFICANT CHANGE UP (ref 0–0)
NT-PROBNP SERPL-SCNC: 5033 PG/ML — HIGH (ref 0–300)
PLATELET # BLD AUTO: 389 K/UL — SIGNIFICANT CHANGE UP (ref 130–400)
POTASSIUM SERPL-MCNC: 4.3 MMOL/L — SIGNIFICANT CHANGE UP (ref 3.5–5)
POTASSIUM SERPL-SCNC: 4.3 MMOL/L — SIGNIFICANT CHANGE UP (ref 3.5–5)
PROT SERPL-MCNC: 7.7 G/DL — SIGNIFICANT CHANGE UP (ref 6–8)
PROTHROM AB SERPL-ACNC: 17.9 SEC — HIGH (ref 9.95–12.87)
RBC # BLD: 5.16 M/UL — SIGNIFICANT CHANGE UP (ref 4.2–5.4)
RBC # FLD: 13.6 % — SIGNIFICANT CHANGE UP (ref 11.5–14.5)
SARS-COV-2 RNA SPEC QL NAA+PROBE: SIGNIFICANT CHANGE UP
SODIUM SERPL-SCNC: 136 MMOL/L — SIGNIFICANT CHANGE UP (ref 135–146)
TROPONIN T SERPL-MCNC: <0.01 NG/ML — SIGNIFICANT CHANGE UP
WBC # BLD: 13.97 K/UL — HIGH (ref 4.8–10.8)
WBC # FLD AUTO: 13.97 K/UL — HIGH (ref 4.8–10.8)

## 2022-06-26 PROCEDURE — 99285 EMERGENCY DEPT VISIT HI MDM: CPT

## 2022-06-26 PROCEDURE — 93010 ELECTROCARDIOGRAM REPORT: CPT | Mod: 76

## 2022-06-26 PROCEDURE — 71045 X-RAY EXAM CHEST 1 VIEW: CPT | Mod: 26

## 2022-06-26 RX ORDER — METOPROLOL TARTRATE 50 MG
5 TABLET ORAL ONCE
Refills: 0 | Status: COMPLETED | OUTPATIENT
Start: 2022-06-26 | End: 2022-06-26

## 2022-06-26 RX ORDER — SODIUM CHLORIDE 9 MG/ML
2000 INJECTION, SOLUTION INTRAVENOUS ONCE
Refills: 0 | Status: COMPLETED | OUTPATIENT
Start: 2022-06-26 | End: 2022-06-26

## 2022-06-26 RX ORDER — SODIUM CHLORIDE 9 MG/ML
1000 INJECTION, SOLUTION INTRAVENOUS ONCE
Refills: 0 | Status: COMPLETED | OUTPATIENT
Start: 2022-06-26 | End: 2022-06-26

## 2022-06-26 RX ORDER — SODIUM CHLORIDE 9 MG/ML
1000 INJECTION, SOLUTION INTRAVENOUS ONCE
Refills: 0 | Status: DISCONTINUED | OUTPATIENT
Start: 2022-06-26 | End: 2022-06-26

## 2022-06-26 RX ORDER — ONDANSETRON 8 MG/1
4 TABLET, FILM COATED ORAL ONCE
Refills: 0 | Status: COMPLETED | OUTPATIENT
Start: 2022-06-26 | End: 2022-06-26

## 2022-06-26 RX ADMIN — SODIUM CHLORIDE 2000 MILLILITER(S): 9 INJECTION, SOLUTION INTRAVENOUS at 14:21

## 2022-06-26 RX ADMIN — ONDANSETRON 4 MILLIGRAM(S): 8 TABLET, FILM COATED ORAL at 16:57

## 2022-06-26 RX ADMIN — Medication 5 MILLIGRAM(S): at 15:12

## 2022-06-26 RX ADMIN — SODIUM CHLORIDE 1000 MILLILITER(S): 9 INJECTION, SOLUTION INTRAVENOUS at 16:07

## 2022-06-26 RX ADMIN — Medication 5 MILLIGRAM(S): at 14:22

## 2022-06-26 NOTE — ED PROVIDER NOTE - CLINICAL SUMMARY MEDICAL DECISION MAKING FREE TEXT BOX
patient presents for evaluation of atrial fibrillation she has a history of a-fib and is on Eliquis patient was given fluid resuscitation here IV in addition given several doses of IV  Lopressor we obtained labs troponin is negative BNP elevated patient is made significant improvement at this time is asymptomatic no reported chest pain patient converted to normal sinus rhythm at a rate of 80 bpm with improvement here tolerating p.o. no shortness of breath no further lightheadedness no chest pain reported we offered admission however based on the patient's improvement including negative troponin I will be able to discharge at this time instructed to follow-up in 24 hours patients repeat ekg is not significantly changed from prior ekg in feb

## 2022-06-26 NOTE — ED PROVIDER NOTE - NS ED ATTENDING STATEMENT MOD
This was a shared visit with the MAC. I reviewed and verified the documentation and independently performed the documented:

## 2022-06-26 NOTE — ED PROVIDER NOTE - ATTENDING APP SHARED VISIT CONTRIBUTION OF CARE
Patient is a 41-year-old female past medical history of A. fib patient is taking Eliquis presenting for emergency department for evaluation of lightheadedness denies any shortness of breath abdominal pain nausea vomiting diarrhea she denies any syncope patient states she was [place] a water park 1 day prior in the heat  states that she has not been drinking or eating well over the past 24 to 48 hours she denies any exertional component there is no diaphoresis    On physical exam patient is normocephalic atraumatic pupils equal round reactive light accommodation extraocular muscles intact oropharynx clear chest clear to auscultation bilaterally patient has irregularly irregular rhythm consistent with A. fib better rate of between 140 and 160s.  Pulses 2+ radial pulses 2+ no signs of edema noted    Assessment plan patient presents for evaluation of atrial fibrillation she has a history of a-fib and is on Eliquis patient was given fluid resuscitation here IV in addition given several doses of IV  Lopressor we obtained labs troponin is negative BNP elevated patient is made significant improvement at this time is asymptomatic no reported chest pain patient converted to normal sinus rhythm at a rate of 80 bpm with improvement here tolerating p.o. no shortness of breath no further lightheadedness no chest pain reported we offered admission however based on the patient's improvement including negative troponin I will be able to discharge at this time instructed to follow-up in 24 hours

## 2022-06-26 NOTE — ED PROVIDER NOTE - CARE PROVIDER_API CALL
Elliot Metcalf)  Cardiovascular Disease; Internal Medicine  [address] [place] [address]  Phone: [phone]  Fax: [phone]  Follow Up Time: 1-3 days

## 2022-06-26 NOTE — ED PROVIDER NOTE - PHYSICAL EXAMINATION
Physical Exam    Vital Signs: I have reviewed the initial vital signs.  Constitutional: appears stated age, no acute distress  Eyes: Conjunctiva pink, Sclera clear,   Cardiovascular: S1 and S2, tachycardia, irregular rhythm, well-perfused extremities, radial pulses equal and 2+, pedal pulses 2+ and equal  Respiratory: unlabored respiratory effort, clear to auscultation bilaterally no wheezing, rales and rhonchi  Gastrointestinal: soft, non-tender abdomen, no pulsatile mass, normal bowl sounds  Musculoskeletal: supple neck, no lower extremity edema, no midline tenderness  Integumentary: warm, dry, no rash  Neurologic: awake, alert, nvi

## 2022-06-26 NOTE — ED PROVIDER NOTE - PROGRESS NOTE DETAILS
patient is now in nsr with rates of 80's otherwise asymptomatic I will continue to monitor if she remains asymptomatic I will discharge at this time

## 2022-06-26 NOTE — ED PROVIDER NOTE - NSFOLLOWUPINSTRUCTIONS_ED_ALL_ED_FT
Atrial fibrillation is a type of irregular or rapid heartbeat (arrhythmia). In atrial fibrillation, the heart quivers continuously in a chaotic pattern. This occurs when parts of the heart receive disorganized signals that make the heart unable to pump blood normally. This can increase the risk for stroke, heart failure, and other heart-related conditions. There are different types of atrial fibrillation, including:    Paroxysmal atrial fibrillation. This type starts suddenly, and it usually stops on its own shortly after it starts.  Persistent atrial fibrillation. This type often lasts longer than a week. It may stop on its own or with treatment.  Long-lasting persistent atrial fibrillation. This type lasts longer than 12 months.  Permanent atrial fibrillation. This type does not go away.    Talk with your health care provider to learn about the type of atrial fibrillation that you have.    CAUSES  This condition is caused by some heart-related conditions or procedures, including:    A heart attack.  Coronary artery disease.  Heart failure.  Heart valve conditions.  High blood pressure.  Inflammation of the sac that surrounds the heart (pericarditis).  Heart surgery.  Certain heart rhythm disorders, such as Hart-Parkinson-White syndrome.    Other causes include:    Pneumonia.  Obstructive sleep apnea.  Blockage of an artery in the lungs (pulmonary embolism, or PE).  Lung cancer.  Chronic lung disease.  Thyroid problems, especially if the thyroid is overactive (hyperthyroidism).  Caffeine.  Excessive alcohol use or illegal drug use.  Use of some medicines, including certain decongestants and diet pills.    Sometimes, the cause cannot be found.    RISK FACTORS  This condition is more likely to develop in:    People who are older in age.  People who smoke.  People who have diabetes mellitus.  People who are overweight (obese).  Athletes who exercise vigorously.    SYMPTOMS  Symptoms of this condition include:    A feeling that your heart is beating rapidly or irregularly.  A feeling of discomfort or pain in your chest.  Shortness of breath.  Sudden light-headedness or weakness.  Getting tired easily during exercise.    In some cases, there are no symptoms.    DIAGNOSIS  Your health care provider may be able to detect atrial fibrillation when taking your pulse. If detected, this condition may be diagnosed with:    An electrocardiogram (ECG).  A Holter monitor test that records your heartbeat patterns over a 24-hour period.  Transthoracic echocardiogram (TTE) to evaluate how blood flows through your heart.  Transesophageal echocardiogram (MINDI) to view more detailed images of your heart.  A stress test.  Imaging tests, such as a CT scan or chest X-ray.  Blood tests.    TREATMENT  The main goals of treatment are to prevent blood clots from forming and to keep your heart beating at a normal rate and rhythm. The type of treatment that you receive depends on many factors, such as your underlying medical conditions and how you feel when you are experiencing atrial fibrillation.    This condition may be treated with:    Medicine to slow down the heart rate, bring the heart's rhythm back to normal, or prevent clots from forming.  Electrical cardioversion. This is a procedure that resets your heart's rhythm by delivering a controlled, low-energy shock to the heart through your skin.  Different types of ablation, such as catheter ablation, catheter ablation with pacemaker, or surgical ablation. These procedures destroy the heart tissues that send abnormal signals. When the pacemaker is used, it is placed under your skin to help your heart beat in a regular rhythm.    HOME CARE INSTRUCTIONS  Take over-the counter and prescription medicines only as told by your health care provider.  If your health care provider prescribed a blood-thinning medicine (anticoagulant), take it exactly as told. Taking too much blood-thinning medicine can cause bleeding. If you do not take enough blood-thinning medicine, you will not have the protection that you need against stroke and other problems.  Do not use tobacco products, including cigarettes, chewing tobacco, and e-cigarettes. If you need help quitting, ask your health care provider.  If you have obstructive sleep apnea, manage your condition as told by your health care provider.  Do not drink alcohol.  Do not drink beverages that contain caffeine, such as coffee, soda, and tea.  Maintain a healthy weight. Do not use diet pills unless your health care provider approves. Diet pills may make heart problems worse.  Follow diet instructions as told by your health care provider.  Exercise regularly as told by your health care provider.  Keep all follow-up visits as told by your health care provider. This is important.    PREVENTION  Avoid drinking beverages that contain caffeine or alcohol.  Avoid certain medicines, especially medicines that are used for breathing problems.  Avoid certain herbs and herbal medicines, such as those that contain ephedra or ginseng.  Do not use illegal drugs, such as cocaine and amphetamines.  Do not smoke.  Manage your high blood pressure.    SEEK MEDICAL CARE IF:  You notice a change in the rate, rhythm, or strength of your heartbeat.  You are taking an anticoagulant and you notice increased bruising.  You tire more easily when you exercise or exert yourself.    SEEK IMMEDIATE MEDICAL CARE IF:  You have chest pain, abdominal pain, sweating, or weakness.  You feel nauseous.  You notice blood in your vomit, bowel movement, or urine.  You have shortness of breath.  You suddenly have swollen feet and ankles.  You feel dizzy.  You have sudden weakness or numbness of the face, arm, or leg, especially on one side of the body.  You have trouble speaking, trouble understanding, or both (aphasia).  Your face or your eyelid droops on one side.    These symptoms may represent a serious problem that is an emergency. Do not wait to see if the symptoms will go away. Get medical help right away. Call your local emergency services (911 in the [place] Do not drive yourself to the hospital.    ADDITIONAL NOTES AND INSTRUCTIONS    Please follow up with your Primary MD in 24-48 hr.  Seek immediate medical care for any new/worsening signs or symptoms.

## 2022-06-26 NOTE — ED PROVIDER NOTE - OBJECTIVE STATEMENT
40 yo female, pmh of afib on eliquis, presents to ed for cp and lightheadedness, mild, aching, no radiation. Denies fever, chills, sob, abd pain, nvd, dizziness, syncope.

## 2022-06-26 NOTE — ED ADULT NURSE NOTE - NSIMPLEMENTINTERV_GEN_ALL_ED
Implemented All Universal Safety Interventions:  [place] to call system. Call bell, personal items and telephone within reach. Instruct patient to call for assistance. Room bathroom lighting operational. Non-slip footwear when patient is off stretcher. Physically safe environment: no spills, clutter or unnecessary equipment. Stretcher in lowest position, wheels locked, appropriate side rails in place.

## 2022-06-26 NOTE — ED PROVIDER NOTE - PATIENT PORTAL LINK FT
You can access the FollowMyHealth Patient Portal offered by [facility] by registering at the following website: [email or web address]. By joining [employer]’s FollowMyHealth portal, you will also be able to view your health information using other applications (apps) compatible with our system.

## 2022-06-28 DIAGNOSIS — I48.91 UNSPECIFIED ATRIAL FIBRILLATION: ICD-10-CM

## 2022-06-28 DIAGNOSIS — Z79.899 OTHER LONG TERM (CURRENT) DRUG THERAPY: ICD-10-CM

## 2022-06-28 DIAGNOSIS — I42.2 OTHER HYPERTROPHIC CARDIOMYOPATHY: ICD-10-CM

## 2022-06-28 DIAGNOSIS — Z79.01 LONG TERM (CURRENT) USE OF ANTICOAGULANTS: ICD-10-CM

## 2022-06-28 DIAGNOSIS — Z20.822 CONTACT WITH AND (SUSPECTED) EXPOSURE TO COVID-19: ICD-10-CM

## 2022-07-14 NOTE — DISCHARGE NOTE PROVIDER - EXTENDED VTE YES NO FOR MLM ENOXAPARIN
, PAST MEDICAL HISTORY:  Anemia     Aortic regurgitation     Arthritis     COPD, mild     HTN (hypertension)

## 2022-07-17 ENCOUNTER — EMERGENCY (EMERGENCY)
Facility: HOSPITAL | Age: 42
LOS: 0 days | Discharge: HOME | End: 2022-07-17
Attending: EMERGENCY MEDICINE | Admitting: EMERGENCY MEDICINE

## 2022-07-17 VITALS
OXYGEN SATURATION: 98 % | SYSTOLIC BLOOD PRESSURE: 134 MMHG | HEIGHT: 61 IN | RESPIRATION RATE: 20 BRPM | WEIGHT: 190.04 LBS | HEART RATE: 81 BPM | TEMPERATURE: 98 F | DIASTOLIC BLOOD PRESSURE: 65 MMHG

## 2022-07-17 LAB
ALBUMIN SERPL ELPH-MCNC: 4.6 G/DL — SIGNIFICANT CHANGE UP (ref 3.5–5.2)
ALP SERPL-CCNC: 79 U/L — SIGNIFICANT CHANGE UP (ref 30–115)
ALT FLD-CCNC: 27 U/L — SIGNIFICANT CHANGE UP (ref 0–41)
ANION GAP SERPL CALC-SCNC: 11 MMOL/L — SIGNIFICANT CHANGE UP (ref 7–14)
AST SERPL-CCNC: 27 U/L — SIGNIFICANT CHANGE UP (ref 0–41)
BASOPHILS # BLD AUTO: 0.03 K/UL — SIGNIFICANT CHANGE UP (ref 0–0.2)
BASOPHILS NFR BLD AUTO: 0.3 % — SIGNIFICANT CHANGE UP (ref 0–1)
BILIRUB SERPL-MCNC: <0.2 MG/DL — SIGNIFICANT CHANGE UP (ref 0.2–1.2)
BUN SERPL-MCNC: 22 MG/DL — HIGH (ref 10–20)
CALCIUM SERPL-MCNC: 9.5 MG/DL — SIGNIFICANT CHANGE UP (ref 8.5–10.1)
CHLORIDE SERPL-SCNC: 102 MMOL/L — SIGNIFICANT CHANGE UP (ref 98–110)
CO2 SERPL-SCNC: 26 MMOL/L — SIGNIFICANT CHANGE UP (ref 17–32)
CREAT SERPL-MCNC: 0.7 MG/DL — SIGNIFICANT CHANGE UP (ref 0.7–1.5)
EGFR: 111 ML/MIN/1.73M2 — SIGNIFICANT CHANGE UP
EOSINOPHIL # BLD AUTO: 0.23 K/UL — SIGNIFICANT CHANGE UP (ref 0–0.7)
EOSINOPHIL NFR BLD AUTO: 2.4 % — SIGNIFICANT CHANGE UP (ref 0–8)
GLUCOSE SERPL-MCNC: 93 MG/DL — SIGNIFICANT CHANGE UP (ref 70–99)
HCT VFR BLD CALC: 37.1 % — SIGNIFICANT CHANGE UP (ref 37–47)
HGB BLD-MCNC: 12.1 G/DL — SIGNIFICANT CHANGE UP (ref 12–16)
IMM GRANULOCYTES NFR BLD AUTO: 0.3 % — SIGNIFICANT CHANGE UP (ref 0.1–0.3)
LYMPHOCYTES # BLD AUTO: 1.22 K/UL — SIGNIFICANT CHANGE UP (ref 1.2–3.4)
LYMPHOCYTES # BLD AUTO: 12.6 % — LOW (ref 20.5–51.1)
MAGNESIUM SERPL-MCNC: 2 MG/DL — SIGNIFICANT CHANGE UP (ref 1.8–2.4)
MCHC RBC-ENTMCNC: 26.1 PG — LOW (ref 27–31)
MCHC RBC-ENTMCNC: 32.6 G/DL — SIGNIFICANT CHANGE UP (ref 32–37)
MCV RBC AUTO: 80 FL — LOW (ref 81–99)
MONOCYTES # BLD AUTO: 0.83 K/UL — HIGH (ref 0.1–0.6)
MONOCYTES NFR BLD AUTO: 8.6 % — SIGNIFICANT CHANGE UP (ref 1.7–9.3)
NEUTROPHILS # BLD AUTO: 7.36 K/UL — HIGH (ref 1.4–6.5)
NEUTROPHILS NFR BLD AUTO: 75.8 % — HIGH (ref 42.2–75.2)
NRBC # BLD: 0 /100 WBCS — SIGNIFICANT CHANGE UP (ref 0–0)
NT-PROBNP SERPL-SCNC: 1257 PG/ML — HIGH (ref 0–300)
PLATELET # BLD AUTO: 257 K/UL — SIGNIFICANT CHANGE UP (ref 130–400)
POTASSIUM SERPL-MCNC: 3.9 MMOL/L — SIGNIFICANT CHANGE UP (ref 3.5–5)
POTASSIUM SERPL-SCNC: 3.9 MMOL/L — SIGNIFICANT CHANGE UP (ref 3.5–5)
PROT SERPL-MCNC: 7.3 G/DL — SIGNIFICANT CHANGE UP (ref 6–8)
RBC # BLD: 4.64 M/UL — SIGNIFICANT CHANGE UP (ref 4.2–5.4)
RBC # FLD: 14 % — SIGNIFICANT CHANGE UP (ref 11.5–14.5)
SODIUM SERPL-SCNC: 139 MMOL/L — SIGNIFICANT CHANGE UP (ref 135–146)
TROPONIN T SERPL-MCNC: <0.01 NG/ML — SIGNIFICANT CHANGE UP
WBC # BLD: 9.7 K/UL — SIGNIFICANT CHANGE UP (ref 4.8–10.8)
WBC # FLD AUTO: 9.7 K/UL — SIGNIFICANT CHANGE UP (ref 4.8–10.8)

## 2022-07-17 PROCEDURE — 71045 X-RAY EXAM CHEST 1 VIEW: CPT | Mod: 26

## 2022-07-17 PROCEDURE — 99285 EMERGENCY DEPT VISIT HI MDM: CPT

## 2022-07-17 PROCEDURE — 93010 ELECTROCARDIOGRAM REPORT: CPT

## 2022-07-17 RX ORDER — SODIUM CHLORIDE 9 MG/ML
1000 INJECTION, SOLUTION INTRAVENOUS ONCE
Refills: 0 | Status: COMPLETED | OUTPATIENT
Start: 2022-07-17 | End: 2022-07-17

## 2022-07-17 RX ADMIN — SODIUM CHLORIDE 1000 MILLILITER(S): 9 INJECTION, SOLUTION INTRAVENOUS at 22:23

## 2022-07-17 NOTE — ED PROVIDER NOTE - ATTENDING APP SHARED VISIT CONTRIBUTION OF CARE
I personally evaluated the patient. I reviewed the Resident´s or Physician Assistant´s note (as assigned above), and agree with the findings and plan except as documented in my note.  41-year-old female, history of hypertrophic cardiomyopathy, A. fib, did not drink much today and felt lightheaded.  Was found to be in rapid A. fib by EMS, given IV fluids with symptomatic improvement and conversion to sinus rhythm.  Exam shows alert patient in no distress, HEENT NCAT PERRL, neck supple, lungs clear, RR S1S2, abdomen soft NT +BS, no CCE, neuro A&OX3 GCS 15 no deficits.

## 2022-07-17 NOTE — ED PROVIDER NOTE - CLINICAL SUMMARY MEDICAL DECISION MAKING FREE TEXT BOX
Labs unremarkable.  EKG normal sinus rhythm no acute changes.  Given IVF and remained in sinus rhythm in the ED.  Will DC to follow-up with cardiology.

## 2022-07-17 NOTE — ED PROVIDER NOTE - DOMESTIC TRAVEL HIGH RISK QUESTION
"Coming in to get a medication refill    Chief Complaint   Patient presents with     Recheck Medication     refill       Initial /72   Pulse 70   Temp 97.9  F (36.6  C)   Resp 14   Wt 99.4 kg (219 lb 3.2 oz)   SpO2 96%   BMI 31.45 kg/m   Estimated body mass index is 31.45 kg/m  as calculated from the following:    Height as of 4/5/19: 1.778 m (5' 10\").    Weight as of this encounter: 99.4 kg (219 lb 3.2 oz).  Medication Reconciliation: complete    Ariana Jules LPN  "
No

## 2022-07-17 NOTE — ED PROVIDER NOTE - OBJECTIVE STATEMENT
40 yo female, pmh of afib on eliquis, presents to ed for eval. pt fel lightheaded at home, was in afib rvr, converted while in ems trspt here. now feels well, without pain or radiation. Denies fever, chills, sob, abd pain, nvd, dizziness, syncope, cp.

## 2022-07-17 NOTE — ED PROVIDER NOTE - PATIENT PORTAL LINK FT
You can access the FollowMyHealth Patient Portal offered by [facility] by registering at the following website: [email or web address]. By joining [employer]’s FollowMyHealth portal, you will also be able to view your health information using other applications (apps) compatible with our system.

## 2022-07-18 DIAGNOSIS — I48.91 UNSPECIFIED ATRIAL FIBRILLATION: ICD-10-CM

## 2022-07-18 DIAGNOSIS — Z79.01 LONG TERM (CURRENT) USE OF ANTICOAGULANTS: ICD-10-CM

## 2022-07-18 DIAGNOSIS — I42.2 OTHER HYPERTROPHIC CARDIOMYOPATHY: ICD-10-CM

## 2022-07-18 DIAGNOSIS — R42 DIZZINESS AND GIDDINESS: ICD-10-CM

## 2022-07-19 ENCOUNTER — APPOINTMENT (OUTPATIENT)
Dept: CARDIOLOGY | Facility: CLINIC | Age: 42
End: 2022-07-19

## 2022-07-19 VITALS
HEIGHT: 61 IN | DIASTOLIC BLOOD PRESSURE: 60 MMHG | BODY MASS INDEX: 35.87 KG/M2 | WEIGHT: 190 LBS | SYSTOLIC BLOOD PRESSURE: 112 MMHG

## 2022-07-19 PROCEDURE — 99214 OFFICE O/P EST MOD 30 MIN: CPT

## 2022-07-19 NOTE — DISCUSSION/SUMMARY
[FreeTextEntry1] : 42 yo female paf hypertrophic cardiomyopathy. In 2/22 in hosp rapid afib. She had stopped beta. Now NSR. She denies chest pain.  . GOULD 2 flight steps mild. She had covid 12/21. Tolld take beta and ac. Told  get echo. Aware need weight loss. No sudden death in family. Aware no heavy exertion. Return sooner if symptoms. She was on more beta in past. In heat  not drink. She got palpitations. Told stay hydrated. Explained hypertrophic cardiomyopathy. If symptoms return sooner

## 2022-07-19 NOTE — HISTORY OF PRESENT ILLNESS
[FreeTextEntry1] : 40 yo female paf hypertrophic cardiomyopathy. In 2/22 in hosp rapid afib. She had stopped beta. Now NSR. She denies chest pain. No palpitations on beta . No sob . GOULD 2 flight steps mild. She had covid 12/21.\par At [employer] hot not drink. She felt palpitations 7/22

## 2022-09-24 ENCOUNTER — LABORATORY RESULT (OUTPATIENT)
Age: 42
End: 2022-09-24

## 2022-09-27 ENCOUNTER — OUTPATIENT (OUTPATIENT)
Dept: OUTPATIENT SERVICES | Facility: HOSPITAL | Age: 42
LOS: 1 days | Discharge: HOME | End: 2022-09-27

## 2022-09-27 DIAGNOSIS — L98.9 DISORDER OF THE SKIN AND SUBCUTANEOUS TISSUE, UNSPECIFIED: ICD-10-CM

## 2022-09-27 PROCEDURE — 93306 TTE W/DOPPLER COMPLETE: CPT | Mod: 26

## 2022-11-22 ENCOUNTER — APPOINTMENT (OUTPATIENT)
Dept: CARDIOLOGY | Facility: CLINIC | Age: 42
End: 2022-11-22

## 2022-11-22 VITALS
HEIGHT: 61 IN | BODY MASS INDEX: 35.5 KG/M2 | WEIGHT: 188 LBS | DIASTOLIC BLOOD PRESSURE: 72 MMHG | SYSTOLIC BLOOD PRESSURE: 110 MMHG

## 2022-11-22 PROCEDURE — 99214 OFFICE O/P EST MOD 30 MIN: CPT

## 2022-11-22 NOTE — DISCUSSION/SUMMARY
[FreeTextEntry1] : 40 yo female paf hypertrophic cardiomyopathy. In 2/22 in hosp rapid afib. She had stopped beta. Now NSR. She denies chest pain.  . GOULD 2 flight steps mild. No change. She had covid 12/21. Tolld take beta and ac. . Aware need weight loss. No sudden death in family. Aware no heavy exertion. Return sooner if symptoms. She was on more beta in past.  Explained hypertrophic cardiomyopathy. If symptoms return sooner. Echo 9/22 severe MR .LVOT Valsalva about 70 mm HG.She was followed in [place]. Aware need weight loss.

## 2022-11-22 NOTE — HISTORY OF PRESENT ILLNESS
[FreeTextEntry1] : 42 yo female paf hypertrophic cardiomyopathy. In 2/22 in hosp rapid afib. She had stopped beta. Now NSR. She denies chest pain. No palpitations on beta . No sob . GOULD 2 flight steps mild. Stable. She had covid 12/21.\par No further palpitations.

## 2023-01-26 ENCOUNTER — RX RENEWAL (OUTPATIENT)
Age: 43
End: 2023-01-26

## 2023-02-07 ENCOUNTER — APPOINTMENT (OUTPATIENT)
Dept: ELECTROPHYSIOLOGY | Facility: CLINIC | Age: 43
End: 2023-02-07
Payer: COMMERCIAL

## 2023-02-07 VITALS
DIASTOLIC BLOOD PRESSURE: 60 MMHG | OXYGEN SATURATION: 97 % | HEIGHT: 61 IN | HEART RATE: 71 BPM | SYSTOLIC BLOOD PRESSURE: 112 MMHG | BODY MASS INDEX: 35.5 KG/M2 | WEIGHT: 188 LBS

## 2023-02-07 PROCEDURE — 93000 ELECTROCARDIOGRAM COMPLETE: CPT

## 2023-02-07 PROCEDURE — 99205 OFFICE O/P NEW HI 60 MIN: CPT | Mod: 25

## 2023-02-07 RX ORDER — APIXABAN 5 MG/1
5 TABLET, FILM COATED ORAL TWICE DAILY
Qty: 60 | Refills: 1 | Status: DISCONTINUED | COMMUNITY

## 2023-02-07 NOTE — HISTORY OF PRESENT ILLNESS
[FreeTextEntry1] : Ms. THOMPSON is a 42 year-year old female with history of HOCM with DEN, paroxysmal atrial fibrillation is here for management of AF.\par \par No Palpitations \par Was followed at HOCM clinic at [facility]- not seen for past 2 years\par Had MCOT in past\par Last known AF >1 year ago.\par \par +GOULD\par \par Denies chest pain, shortness of breath, palpitation, dizziness or LOC except noted above. \par \par EKG (2/7/23): SR 68, LAE, LVH\par Cardio: Dr. Metcalf

## 2023-02-07 NOTE — ASSESSMENT
[FreeTextEntry1] : ## paroxysmal atrial fibrillation \par ## Hypertrophic Cardiomyopathy\par \par - - On Eliquis. Compliant. No bleeding issues. Patient to contact us if there is any bleeding issues, interruption or any issues with OAC. Patient to go to ER/call 911 if any major bleeding. \par - Discussed that patient with HOCM at higher risk of thromboembolic events and should continue OAC.\par - Like to change Eliquis to Xarelto as she thinks it is affecting her menstrual cycle irregularity and cost.\par - Xarelto Rx sent\par - MCOT for 2 weeks to assess NSVT burden\par - Echo with Dr. Metcalf in 6 month to assess IVS thickness\par - Management of obstructive HOCM as per Cardio\par - ? MRI with LGE to assess HOCM, mitral valve- cardio to decide\par - Return in 3 months

## 2023-02-07 NOTE — PHYSICAL EXAM
[Normal] : normal S1, S2, no murmur, no rub, no gallop [Murmur] : murmur [de-identified] : 3/6 SM

## 2023-02-16 ENCOUNTER — NON-APPOINTMENT (OUTPATIENT)
Age: 43
End: 2023-02-16

## 2023-02-22 ENCOUNTER — APPOINTMENT (OUTPATIENT)
Dept: ELECTROPHYSIOLOGY | Facility: CLINIC | Age: 43
End: 2023-02-22

## 2023-02-28 ENCOUNTER — APPOINTMENT (OUTPATIENT)
Dept: CARDIOLOGY | Facility: CLINIC | Age: 43
End: 2023-02-28

## 2023-03-21 ENCOUNTER — APPOINTMENT (OUTPATIENT)
Dept: CARDIOLOGY | Facility: CLINIC | Age: 43
End: 2023-03-21

## 2023-03-24 ENCOUNTER — APPOINTMENT (OUTPATIENT)
Dept: CARDIOLOGY | Facility: CLINIC | Age: 43
End: 2023-03-24
Payer: COMMERCIAL

## 2023-03-24 VITALS
BODY MASS INDEX: 34.93 KG/M2 | SYSTOLIC BLOOD PRESSURE: 118 MMHG | DIASTOLIC BLOOD PRESSURE: 76 MMHG | WEIGHT: 185 LBS | HEIGHT: 61 IN | HEART RATE: 100 BPM | OXYGEN SATURATION: 98 %

## 2023-03-24 PROCEDURE — 99214 OFFICE O/P EST MOD 30 MIN: CPT | Mod: 25

## 2023-03-24 PROCEDURE — 93000 ELECTROCARDIOGRAM COMPLETE: CPT

## 2023-03-24 RX ORDER — RIVAROXABAN 20 MG/1
20 TABLET, FILM COATED ORAL
Qty: 30 | Refills: 3 | Status: DISCONTINUED | COMMUNITY
Start: 2023-02-07 | End: 2023-03-24

## 2023-03-24 NOTE — DISCUSSION/SUMMARY
[FreeTextEntry1] : 41 yo female paf hypertrophic cardiomyopathy. In 2/22 in hosp rapid afib. She had stopped beta. Went back into nsr. . She denies chest pain.  . GOULD 2 flight steps mild. No change. She had covid 12/21.  Aware need weight loss. No sudden death in family. Aware no heavy exertion. Return sooner if symptoms. She was on more beta in past.  Explained hypertrophic cardiomyopathy. IEcho 9/22 severe MR .LVOT Valsalva about 70 mm HG.She was followed in [place]. Aware need weight loss. \par She had possible noro virus stopped beta. Resumed today. Told she needs stay hydrated. Now again in afib.Told if symptoms go to ER. Told take beta. She will see np next week. If still afib send ep. Will need echo soon

## 2023-03-24 NOTE — HISTORY OF PRESENT ILLNESS
[FreeTextEntry1] : 41 yo female paf hypertrophic cardiomyopathy. In 2/22 in hosp rapid afib. She had stopped beta. Now NSR. She denies chest pain. No palpitations on beta . No sob . GOULD 2 flight steps mild. Stable. She had covid 12/21.\par No further palpitations. Recent nv diarrhea. now better. Told be careful re dehydration

## 2023-03-24 NOTE — REASON FOR VISIT
Yes [Symptom and Test Evaluation] : symptom and test evaluation [Arrhythmia/ECG Abnorrmalities] : arrhythmia/ECG abnormalities [Structural Heart and Valve Disease] : structural heart and valve disease

## 2023-03-27 PROBLEM — R00.2 PALPITATIONS: Status: ACTIVE | Noted: 2022-03-18

## 2023-03-29 ENCOUNTER — APPOINTMENT (OUTPATIENT)
Dept: CARDIOLOGY | Facility: CLINIC | Age: 43
End: 2023-03-29
Payer: COMMERCIAL

## 2023-03-29 VITALS
BODY MASS INDEX: 35.68 KG/M2 | OXYGEN SATURATION: 97 % | HEIGHT: 61 IN | WEIGHT: 189 LBS | HEART RATE: 114 BPM | DIASTOLIC BLOOD PRESSURE: 80 MMHG | SYSTOLIC BLOOD PRESSURE: 128 MMHG

## 2023-03-29 DIAGNOSIS — R00.2 PALPITATIONS: ICD-10-CM

## 2023-03-29 PROCEDURE — 93000 ELECTROCARDIOGRAM COMPLETE: CPT

## 2023-03-29 PROCEDURE — 99214 OFFICE O/P EST MOD 30 MIN: CPT | Mod: 25

## 2023-03-29 RX ORDER — METOPROLOL TARTRATE 25 MG/1
25 TABLET, FILM COATED ORAL
Qty: 180 | Refills: 3 | Status: DISCONTINUED | COMMUNITY
Start: 2023-01-26 | End: 2023-03-29

## 2023-03-29 NOTE — REVIEW OF SYSTEMS
[Dyspnea on exertion] : dyspnea during exertion [Fever] : no fever [Chills] : no chills [Blurry Vision] : no blurred vision [Hearing Loss] : no hearing loss [SOB] : no shortness of breath [Cough] : no cough [Abdominal Pain] : no abdominal pain [Dysuria] : no dysuria [Joint Pain] : no joint pain [Rash] : no rash [Dizziness] : no dizziness [Confusion] : no confusion was observed [Easy Bleeding] : no tendency for easy bleeding [FreeTextEntry5] : see HPI

## 2023-03-29 NOTE — DISCUSSION/SUMMARY
[FreeTextEntry1] : She had covid 12/21.  Aware need weight loss. No sudden death in family. Aware no heavy exertion. Return sooner if symptoms. She was on more beta in past.  Explained hypertrophic cardiomyopathy. IEcho 9/22 severe MR .LVOT Valsalva about 70 mm HG.She was followed in [place]. Aware need weight \par 43 yo female PAFon AC,,hypertrophic cardiomyopathy. Pt denies any FH of HOCM or SCD. In 2/22 in hosp rapid afib converted with IV Cardizem. Pt had stopped her beta..Recently had possible Norovirus and stopped beta again. She was in A fib last visit. Pt was to resume beta and return for rhythm check. Pt has seen EP in past. Dr WELLER has reviewed HOCM dx w pt and need for weight loss and no heavy exertion or lifting.9/22 Echo 9/22  EF 62% severe concentric LVH w septal predominance suggestive of HOCM peak gradient at rest 39mmHG and 72 mmHG w Valsalva, Grade 2 DD.  MCOT 2/16/23  AF w  peak. 2% AF burden.Last TFT normal 2/22., Pt returns 3/29 in A flutter variant block VR is 132. Pt without chest pain or syncope. Case d/w and reviewed by Dr WELLER. Will increase metoprolol to 75mg daily. Pt sleeps poorly does not dream  will refer for sleep eval. Pt  f/u 1 wk Dr WELLER will consider Norpace if still in A flutter. Also consider f/u again w EP. Re enforced need to be compliant w BB  and AC therapy Mechanism of AF discussed at length. All questions answered

## 2023-03-29 NOTE — PHYSICAL EXAM
[Well Developed] : well developed [Well Nourished] : well nourished [No Acute Distress] : no acute distress [Normal Conjunctiva] : normal conjunctiva [Normal Venous Pressure] : normal venous pressure [No Carotid Bruit] : no carotid bruit [Clear Lung Fields] : clear lung fields [Soft] : abdomen soft [Non Tender] : non-tender [Normal Gait] : normal gait [No Edema] : no edema [No Rash] : no rash [Moves all extremities] : moves all extremities [Alert and Oriented] : alert and oriented [Normal] : normal [Tachycardia] : tachycardic [Normal S1] : normal S1 [Normal S2] : normal S2 [No Pitting Edema] : no pitting edema present

## 2023-03-29 NOTE — HISTORY OF PRESENT ILLNESS
[FreeTextEntry1] : 41 yo female PAF on AC,,hypertrophic cardiomyopathy. In 2/22 in hosp rapid afib converted with IV Cardizem. Pt had stopped her beta..Recently had possible Norovirus and stopped beta again. She was in A fib last visit. Pt was to resume beta and return for rhythm check. \par Pt feels slight SOB worse GOULD needs to stop after 2-3 block. She denies chest pain palpitations syncope or near syncope. EKG today with Rapid A flutter w variant block. rate is 132 BPM.  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

## 2023-04-02 NOTE — PHYSICAL EXAM
[Well Developed] : well developed [Well Nourished] : well nourished [No Acute Distress] : no acute distress [Normal Conjunctiva] : normal conjunctiva [Normal Venous Pressure] : normal venous pressure [No Carotid Bruit] : no carotid bruit [Normal] : normal [Tachycardia] : tachycardic [Normal S1] : normal S1 [Normal S2] : normal S2 [No Pitting Edema] : no pitting edema present [Soft] : abdomen soft [Clear Lung Fields] : clear lung fields [Non Tender] : non-tender [Normal Gait] : normal gait [No Edema] : no edema [No Rash] : no rash [Alert and Oriented] : alert and oriented [Moves all extremities] : moves all extremities

## 2023-04-05 ENCOUNTER — NON-APPOINTMENT (OUTPATIENT)
Age: 43
End: 2023-04-05

## 2023-04-05 ENCOUNTER — APPOINTMENT (OUTPATIENT)
Dept: CARDIOLOGY | Facility: CLINIC | Age: 43
End: 2023-04-05
Payer: COMMERCIAL

## 2023-04-05 VITALS
HEART RATE: 124 BPM | WEIGHT: 189 LBS | BODY MASS INDEX: 35.68 KG/M2 | HEIGHT: 61 IN | SYSTOLIC BLOOD PRESSURE: 124 MMHG | DIASTOLIC BLOOD PRESSURE: 80 MMHG | OXYGEN SATURATION: 98 %

## 2023-04-05 PROCEDURE — 99214 OFFICE O/P EST MOD 30 MIN: CPT | Mod: 25

## 2023-04-05 PROCEDURE — 93000 ELECTROCARDIOGRAM COMPLETE: CPT

## 2023-04-05 NOTE — DISCUSSION/SUMMARY
[FreeTextEntry1] : She had covid 12/21.  Aware need weight loss. No sudden death in family. Aware no heavy exertion. Return sooner if symptoms. She was on more beta in past.  Explained hypertrophic cardiomyopathy. IEcho 9/22 severe MR .LVOT Valsalva about 70 mm HG.She was followed in [place]. Aware need weight \par 41 yo female PAFon AC,,hypertrophic cardiomyopathy. Pt denies any FH of HOCM or SCD. In 2/22 in hosp rapid afib converted with IV Cardizem. Pt had stopped her beta..Recently had possible Norovirus and stopped beta again. She was in A fib last visit. Pt was to resume beta and return for rhythm check. Pt has seen EP in past. Dr WELLER has reviewed HOCM dx w pt and need for weight loss and no heavy exertion or lifting.9/22 Echo 9/22  EF 62% severe concentric LVH w septal predominance suggestive of HOCM peak gradient at rest 39mmHG and 72 mmHG w Valsalva, Grade 2 DD.  MCOT 2/16/23  AF w  peak. 2% AF burden.Last TFT normal 2/22., Pt returns 3/29 in A flutter variant block VR is 132. Pt without chest pain or syncope. Case d/w and reviewed by Dr WELLER. Will increase metoprolol to 75mg daily. Pt sleeps poorly does not dream  will refer for sleep eval. Pt  f/u 1 wk Dr WELLER will consider Norpace if still in A flutter. Also consider f/u again w EP. Re enforced need to be compliant w BB  and AC therapy Mechanism of AF discussed at length. All questions answered\par Pt returned to office 4/5/23 EKG shows BONNIE 126 BPM on metoprolol 75mg daily.  Pt is asymptomatic  without palpitations. GOULD slightly improved with higher beta blocker. Arranged for her to see Dr Carpenter tomorrow for further eval. Instructed her to take Metoprolol 100mg daily until further instructions from EP. Re enforced need for weight loss and sleep eval ASAP

## 2023-04-06 ENCOUNTER — APPOINTMENT (OUTPATIENT)
Dept: ELECTROPHYSIOLOGY | Facility: CLINIC | Age: 43
End: 2023-04-06
Payer: COMMERCIAL

## 2023-04-06 VITALS
BODY MASS INDEX: 35.87 KG/M2 | HEIGHT: 61 IN | SYSTOLIC BLOOD PRESSURE: 102 MMHG | TEMPERATURE: 97.1 F | RESPIRATION RATE: 14 BRPM | HEART RATE: 116 BPM | DIASTOLIC BLOOD PRESSURE: 74 MMHG | WEIGHT: 190 LBS

## 2023-04-06 LAB
ALBUMIN SERPL ELPH-MCNC: 4.6 G/DL
ALP BLD-CCNC: 101 U/L
ALT SERPL-CCNC: 40 U/L
ANION GAP SERPL CALC-SCNC: 11 MMOL/L
AST SERPL-CCNC: 35 U/L
BASOPHILS # BLD AUTO: 0.04 K/UL
BASOPHILS NFR BLD AUTO: 0.5 %
BILIRUB SERPL-MCNC: 0.6 MG/DL
BUN SERPL-MCNC: 16 MG/DL
CALCIUM SERPL-MCNC: 10 MG/DL
CHLORIDE SERPL-SCNC: 103 MMOL/L
CO2 SERPL-SCNC: 26 MMOL/L
CREAT SERPL-MCNC: 0.7 MG/DL
EGFR: 110 ML/MIN/1.73M2
EOSINOPHIL # BLD AUTO: 0.23 K/UL
EOSINOPHIL NFR BLD AUTO: 2.6 %
GLUCOSE SERPL-MCNC: 97 MG/DL
HCT VFR BLD CALC: 41.5 %
HGB BLD-MCNC: 13.1 G/DL
IMM GRANULOCYTES NFR BLD AUTO: 0.3 %
LYMPHOCYTES # BLD AUTO: 1.32 K/UL
LYMPHOCYTES NFR BLD AUTO: 15.2 %
MAGNESIUM SERPL-MCNC: 2 MG/DL
MAN DIFF?: NORMAL
MCHC RBC-ENTMCNC: 26.7 PG
MCHC RBC-ENTMCNC: 31.6 G/DL
MCV RBC AUTO: 84.5 FL
MONOCYTES # BLD AUTO: 0.74 K/UL
MONOCYTES NFR BLD AUTO: 8.5 %
NEUTROPHILS # BLD AUTO: 6.35 K/UL
NEUTROPHILS NFR BLD AUTO: 72.9 %
PLATELET # BLD AUTO: 333 K/UL
POTASSIUM SERPL-SCNC: 4.2 MMOL/L
PROT SERPL-MCNC: 7.5 G/DL
RBC # BLD: 4.91 M/UL
RBC # FLD: 14.1 %
SODIUM SERPL-SCNC: 140 MMOL/L
TSH SERPL-ACNC: 2.21 UIU/ML
WBC # FLD AUTO: 8.71 K/UL

## 2023-04-06 PROCEDURE — 93000 ELECTROCARDIOGRAM COMPLETE: CPT

## 2023-04-06 PROCEDURE — 99215 OFFICE O/P EST HI 40 MIN: CPT | Mod: 25

## 2023-04-10 NOTE — ASSESSMENT
[FreeTextEntry1] : ## Persistent atrial fibrillation \par ## Hypertrophic Cardiomyopathy\par \par - On Eliquis. Compliant. No bleeding issues. Patient to contact us if there is any bleeding issues, interruption or any issues with OAC. Patient to go to ER/call 911 if any major bleeding. \par - Discussed that patient with HOCM at higher risk of thromboembolic events and should continue OAC.\par - Like to change Eliquis to Xarelto as she thinks it is affecting her menstrual cycle irregularity and cost.\par - Xarelto Rx sent\par - MCOT reviewed. No significant NSVT. However, AF RVR recorded. Symptomatic.\par - MINDI cardioversion to be scheduled. \par - I discussed the risks, benefits and alternatives for a cardioversion. I reported a risk of major complications at 1% and minor complications at 4%. The details of the procedure and risks associated with undergoing the procedure were discussed in detail including but not limited to, death, myocardial ischemia, stroke, esophageal or stomach perforation, injury to teeth/pharynx, bleeding, infection, deep vein thrombosis, and worsening arrhythmias. All questions were answered to satisfaction and the patient expressed verbal understanding of the procedure, the benefits, and risks, and agreed to proceed.\par - MRI to assess LGE for HOCM, mitral valve. \par - Detailed discussion about etiology and pathology related to hypertrophic cardiomyopathy and atrial fibrillation. Surprisingly, patient was not aware of all these things despite being followed at [facility]. \par - Echo with Dr. Metcalf in 6 month to assess IVS thickness\par - Management of obstructive HOCM as per Cardio\par - Return in 2 months

## 2023-04-10 NOTE — PHYSICAL EXAM
[Normal] : normal S1, S2, no murmur, no rub, no gallop [Murmur] : murmur [de-identified] : 3/6 SM

## 2023-04-10 NOTE — HISTORY OF PRESENT ILLNESS
[FreeTextEntry1] : Ms. THOMPSON is a 42 year-year old female with history of HOCM with DEN, paroxysmal atrial fibrillation is here for management of AF.\par \par No Palpitations \par Was followed at HOCM clinic at [facility]- not seen for past 2 years\par Had MCOT in past\par Last known AF >1 year ago.\par \par +GOULD\par \par 04/06/2023: Accompanied by . More GOULD. Discussed MCOT results with her. \par \par Denies chest pain, shortness of breath, palpitation, dizziness or LOC except noted above. \par \par EKG (04/06/2023): AF \par EKG (2/7/23): SR 68, LAE, LVH\par Cardio: Dr. Metcalf

## 2023-04-10 NOTE — ADDENDUM
[FreeTextEntry1] : Bree MERRILL assisted in documentation on 04/10/2023 acting as a scribe for Dr. Brennan Tyler.\par

## 2023-04-18 ENCOUNTER — OUTPATIENT (OUTPATIENT)
Dept: OUTPATIENT SERVICES | Facility: HOSPITAL | Age: 43
LOS: 1 days | End: 2023-04-18
Payer: COMMERCIAL

## 2023-04-18 VITALS
TEMPERATURE: 98 F | WEIGHT: 191.8 LBS | SYSTOLIC BLOOD PRESSURE: 130 MMHG | RESPIRATION RATE: 18 BRPM | HEIGHT: 61 IN | OXYGEN SATURATION: 100 % | DIASTOLIC BLOOD PRESSURE: 70 MMHG | HEART RATE: 100 BPM

## 2023-04-18 DIAGNOSIS — I48.19 OTHER PERSISTENT ATRIAL FIBRILLATION: ICD-10-CM

## 2023-04-18 DIAGNOSIS — Z01.818 ENCOUNTER FOR OTHER PREPROCEDURAL EXAMINATION: ICD-10-CM

## 2023-04-18 LAB
ALBUMIN SERPL ELPH-MCNC: 4.5 G/DL — SIGNIFICANT CHANGE UP (ref 3.5–5.2)
ALP SERPL-CCNC: 89 U/L — SIGNIFICANT CHANGE UP (ref 30–115)
ALT FLD-CCNC: 35 U/L — SIGNIFICANT CHANGE UP (ref 0–41)
ANION GAP SERPL CALC-SCNC: 11 MMOL/L — SIGNIFICANT CHANGE UP (ref 7–14)
APTT BLD: 32.8 SEC — SIGNIFICANT CHANGE UP (ref 27–39.2)
AST SERPL-CCNC: 47 U/L — HIGH (ref 0–41)
BASOPHILS # BLD AUTO: 0.03 K/UL — SIGNIFICANT CHANGE UP (ref 0–0.2)
BASOPHILS NFR BLD AUTO: 0.4 % — SIGNIFICANT CHANGE UP (ref 0–1)
BILIRUB SERPL-MCNC: 0.5 MG/DL — SIGNIFICANT CHANGE UP (ref 0.2–1.2)
BUN SERPL-MCNC: 17 MG/DL — SIGNIFICANT CHANGE UP (ref 10–20)
CALCIUM SERPL-MCNC: 9.6 MG/DL — SIGNIFICANT CHANGE UP (ref 8.4–10.5)
CHLORIDE SERPL-SCNC: 104 MMOL/L — SIGNIFICANT CHANGE UP (ref 98–110)
CO2 SERPL-SCNC: 27 MMOL/L — SIGNIFICANT CHANGE UP (ref 17–32)
CREAT SERPL-MCNC: 0.9 MG/DL — SIGNIFICANT CHANGE UP (ref 0.7–1.5)
EGFR: 81 ML/MIN/1.73M2 — SIGNIFICANT CHANGE UP
EOSINOPHIL # BLD AUTO: 0.24 K/UL — SIGNIFICANT CHANGE UP (ref 0–0.7)
EOSINOPHIL NFR BLD AUTO: 3 % — SIGNIFICANT CHANGE UP (ref 0–8)
GLUCOSE SERPL-MCNC: 74 MG/DL — SIGNIFICANT CHANGE UP (ref 70–99)
HCT VFR BLD CALC: 40.1 % — SIGNIFICANT CHANGE UP (ref 37–47)
HGB BLD-MCNC: 12.7 G/DL — SIGNIFICANT CHANGE UP (ref 12–16)
IMM GRANULOCYTES NFR BLD AUTO: 0.5 % — HIGH (ref 0.1–0.3)
INR BLD: 1.52 RATIO — HIGH (ref 0.65–1.3)
LYMPHOCYTES # BLD AUTO: 1.25 K/UL — SIGNIFICANT CHANGE UP (ref 1.2–3.4)
LYMPHOCYTES # BLD AUTO: 15.7 % — LOW (ref 20.5–51.1)
MCHC RBC-ENTMCNC: 26 PG — LOW (ref 27–31)
MCHC RBC-ENTMCNC: 31.7 G/DL — LOW (ref 32–37)
MCV RBC AUTO: 82 FL — SIGNIFICANT CHANGE UP (ref 81–99)
MONOCYTES # BLD AUTO: 0.67 K/UL — HIGH (ref 0.1–0.6)
MONOCYTES NFR BLD AUTO: 8.4 % — SIGNIFICANT CHANGE UP (ref 1.7–9.3)
NEUTROPHILS # BLD AUTO: 5.72 K/UL — SIGNIFICANT CHANGE UP (ref 1.4–6.5)
NEUTROPHILS NFR BLD AUTO: 72 % — SIGNIFICANT CHANGE UP (ref 42.2–75.2)
NRBC # BLD: 0 /100 WBCS — SIGNIFICANT CHANGE UP (ref 0–0)
PLATELET # BLD AUTO: 269 K/UL — SIGNIFICANT CHANGE UP (ref 130–400)
PMV BLD: 10.5 FL — HIGH (ref 7.4–10.4)
POTASSIUM SERPL-MCNC: 4.1 MMOL/L — SIGNIFICANT CHANGE UP (ref 3.5–5)
POTASSIUM SERPL-SCNC: 4.1 MMOL/L — SIGNIFICANT CHANGE UP (ref 3.5–5)
PROT SERPL-MCNC: 7.2 G/DL — SIGNIFICANT CHANGE UP (ref 6–8)
PROTHROM AB SERPL-ACNC: 17.6 SEC — HIGH (ref 9.95–12.87)
RBC # BLD: 4.89 M/UL — SIGNIFICANT CHANGE UP (ref 4.2–5.4)
RBC # FLD: 14 % — SIGNIFICANT CHANGE UP (ref 11.5–14.5)
SODIUM SERPL-SCNC: 142 MMOL/L — SIGNIFICANT CHANGE UP (ref 135–146)
WBC # BLD: 7.95 K/UL — SIGNIFICANT CHANGE UP (ref 4.8–10.8)
WBC # FLD AUTO: 7.95 K/UL — SIGNIFICANT CHANGE UP (ref 4.8–10.8)

## 2023-04-18 PROCEDURE — 80053 COMPREHEN METABOLIC PANEL: CPT

## 2023-04-18 PROCEDURE — 85730 THROMBOPLASTIN TIME PARTIAL: CPT

## 2023-04-18 PROCEDURE — 85025 COMPLETE CBC W/AUTO DIFF WBC: CPT

## 2023-04-18 PROCEDURE — 36415 COLL VENOUS BLD VENIPUNCTURE: CPT

## 2023-04-18 PROCEDURE — 85610 PROTHROMBIN TIME: CPT

## 2023-04-18 PROCEDURE — 93005 ELECTROCARDIOGRAM TRACING: CPT

## 2023-04-18 PROCEDURE — 99214 OFFICE O/P EST MOD 30 MIN: CPT | Mod: 25

## 2023-04-18 PROCEDURE — 93010 ELECTROCARDIOGRAM REPORT: CPT

## 2023-04-19 DIAGNOSIS — I48.19 OTHER PERSISTENT ATRIAL FIBRILLATION: ICD-10-CM

## 2023-04-19 DIAGNOSIS — Z01.818 ENCOUNTER FOR OTHER PREPROCEDURAL EXAMINATION: ICD-10-CM

## 2023-04-21 NOTE — ED ADULT TRIAGE NOTE - TEMPERATURE IN FAHRENHEIT (DEGREES F)
97.2
X Size Of Lesion In Cm (Optional): 0
Anatomic Location From Referring Provider: right lower lip
Detail Level: Simple

## 2023-04-24 ENCOUNTER — LABORATORY RESULT (OUTPATIENT)
Age: 43
End: 2023-04-24

## 2023-04-25 ENCOUNTER — OUTPATIENT (OUTPATIENT)
Dept: OUTPATIENT SERVICES | Facility: HOSPITAL | Age: 43
LOS: 1 days | End: 2023-04-25
Payer: COMMERCIAL

## 2023-04-25 PROCEDURE — 93325 DOPPLER ECHO COLOR FLOW MAPG: CPT

## 2023-04-25 PROCEDURE — 93320 DOPPLER ECHO COMPLETE: CPT

## 2023-04-25 PROCEDURE — 81025 URINE PREGNANCY TEST: CPT

## 2023-04-25 PROCEDURE — 93312 ECHO TRANSESOPHAGEAL: CPT

## 2023-04-26 ENCOUNTER — APPOINTMENT (OUTPATIENT)
Dept: CARDIOLOGY | Facility: CLINIC | Age: 43
End: 2023-04-26
Payer: COMMERCIAL

## 2023-04-26 VITALS
DIASTOLIC BLOOD PRESSURE: 80 MMHG | SYSTOLIC BLOOD PRESSURE: 130 MMHG | WEIGHT: 190 LBS | OXYGEN SATURATION: 98 % | HEIGHT: 61 IN | BODY MASS INDEX: 35.87 KG/M2 | HEART RATE: 116 BPM

## 2023-04-26 PROCEDURE — 99214 OFFICE O/P EST MOD 30 MIN: CPT

## 2023-04-26 NOTE — REVIEW OF SYSTEMS
[Dyspnea on exertion] : dyspnea during exertion [Fever] : no fever [Chills] : no chills [Blurry Vision] : no blurred vision [Hearing Loss] : no hearing loss [SOB] : no shortness of breath [Cough] : no cough [Abdominal Pain] : no abdominal pain [Dysuria] : no dysuria [Joint Pain] : no joint pain [Rash] : no rash [Dizziness] : no dizziness [Confusion] : no confusion was observed [Easy Bleeding] : no tendency for easy bleeding

## 2023-04-26 NOTE — HISTORY OF PRESENT ILLNESS
[FreeTextEntry1] : 41 yo female paf hypertrophic cardiomyopathy. In 2/22 in hosp rapid afib. She had stopped beta. She denies chest pain. No palpitations on beta . No sob . GOULD 2 flight steps better. . Stable. 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 Long discussion need AVR. Patient and mother agree. Aware risk benefit. Will schedule CTA. Reviewed MINDI with patient and family.

## 2023-04-26 NOTE — DISCUSSION/SUMMARY
[FreeTextEntry1] : 41 yo female paf hypertrophic cardiomyopathy. In 2/22 in hosp rapid afib. She had stopped beta. Went back into nsr. . She denies chest pain.  . GOULD 2 flight steps mild. No change. She had covid 12/21.  Aware need weight loss. No sudden death in family. Aware no heavy exertion. Return sooner if symptoms. She was on more beta in past.  Explained hypertrophic cardiomyopathy. IEcho 9/22 severe MR .LVOT Valsalva about 70 mm HG.She was followed in [place]. Aware need weight loss. \par She had possible noro virus stopped beta. Resumed today. Told she needs stay hydrated. Now again in afib.Told if symptoms go to ER. Told take beta. She will see np next week. If still afib send ep. Will need echo soon

## 2023-05-02 ENCOUNTER — APPOINTMENT (OUTPATIENT)
Dept: ELECTROPHYSIOLOGY | Facility: CLINIC | Age: 43
End: 2023-05-02
Payer: COMMERCIAL

## 2023-05-02 VITALS
HEIGHT: 61 IN | DIASTOLIC BLOOD PRESSURE: 80 MMHG | OXYGEN SATURATION: 97 % | BODY MASS INDEX: 35.87 KG/M2 | SYSTOLIC BLOOD PRESSURE: 124 MMHG | HEART RATE: 104 BPM | WEIGHT: 190 LBS

## 2023-05-02 PROCEDURE — 93228 REMOTE 30 DAY ECG REV/REPORT: CPT

## 2023-05-02 PROCEDURE — 99215 OFFICE O/P EST HI 40 MIN: CPT | Mod: 25

## 2023-05-02 PROCEDURE — 93000 ELECTROCARDIOGRAM COMPLETE: CPT | Mod: 59

## 2023-05-06 RX ORDER — AMIODARONE HYDROCHLORIDE 200 MG/1
200 TABLET ORAL
Qty: 30 | Refills: 3 | Status: DISCONTINUED | COMMUNITY
Start: 2023-04-06 | End: 2023-05-06

## 2023-05-06 NOTE — PHYSICAL EXAM
[Normal] : normal S1, S2, no murmur, no rub, no gallop [Murmur] : murmur [de-identified] : 3/6 SM

## 2023-05-06 NOTE — ADDENDUM
[FreeTextEntry1] : IPalak assisted in documentation on 05/04/2023 acting as a scribe for Dr. Brennan Tyler.\par

## 2023-05-06 NOTE — ASSESSMENT
[FreeTextEntry1] : ## paroxysmal atrial fibrillation \par ## Hypertrophic Cardiomyopathy\par \par - - On Eliquis. Compliant. No bleeding issues. Patient to contact us if there is any bleeding issues, interruption or any issues with OAC. Patient to go to ER/call 911 if any major bleeding. \par - MCOT showed  + AF, Few NSVT\par - Echo with Dr. Metcalf in 6 month to assess IVS thickness\par - Management of obstructive HOCM as per Cardio\par - MRI with LGE to assess HOCM, mitral valve- cardio to decide\par - We will hold off on MINDI cardioversion and ablation for now. Reassessment after CT surgery workup.\par - Return in 3 months

## 2023-05-06 NOTE — HISTORY OF PRESENT ILLNESS
[FreeTextEntry1] : Ms. THOMPSON is a 42 year-year old female with history of HOCM with DEN, paroxysmal atrial fibrillation is here for management of AF.\par \par No Palpitations \par Was followed at HOCM clinic at [facility]- not seen for past 2 years\par Had MCOT in past\par Last known AF >1 year ago.\par \par +GOULD\par \par 5/2/23: had it a MINDI. 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 Seeing CTS tomorrow. Plan to have possible AVR, left atrial appendage clip and possibly maze procedure.\par \par Denies chest pain, shortness of breath, palpitation, dizziness or LOC except noted above. \par \par EKG (5/2/23) Afib at 91 \par EKG (2/7/23): SR 68, LAE, LVH\par Cardio: Dr. Metcalf

## 2023-05-08 ENCOUNTER — NON-APPOINTMENT (OUTPATIENT)
Age: 43
End: 2023-05-08

## 2023-05-09 ENCOUNTER — OUTPATIENT (OUTPATIENT)
Dept: OUTPATIENT SERVICES | Facility: HOSPITAL | Age: 43
LOS: 1 days | End: 2023-05-09
Payer: COMMERCIAL

## 2023-05-09 ENCOUNTER — APPOINTMENT (OUTPATIENT)
Dept: CT IMAGING | Facility: HOSPITAL | Age: 43
End: 2023-05-09

## 2023-05-09 LAB — POCT ISTAT CREATININE: 0.8 MG/DL — SIGNIFICANT CHANGE UP (ref 0.5–1.3)

## 2023-05-09 PROCEDURE — 75574 CT ANGIO HRT W/3D IMAGE: CPT

## 2023-05-09 PROCEDURE — 75574 CT ANGIO HRT W/3D IMAGE: CPT | Mod: 26

## 2023-05-09 PROCEDURE — 82565 ASSAY OF CREATININE: CPT

## 2023-05-10 ENCOUNTER — APPOINTMENT (OUTPATIENT)
Dept: CARDIOTHORACIC SURGERY | Facility: CLINIC | Age: 43
End: 2023-05-10
Payer: COMMERCIAL

## 2023-05-10 VITALS
OXYGEN SATURATION: 98 % | HEIGHT: 61 IN | WEIGHT: 190 LBS | BODY MASS INDEX: 35.87 KG/M2 | TEMPERATURE: 98.1 F | RESPIRATION RATE: 14 BRPM | HEART RATE: 96 BPM

## 2023-05-10 VITALS — HEIGHT: 61 IN

## 2023-05-10 PROCEDURE — 99204 OFFICE O/P NEW MOD 45 MIN: CPT

## 2023-05-11 ENCOUNTER — APPOINTMENT (OUTPATIENT)
Dept: ELECTROPHYSIOLOGY | Facility: CLINIC | Age: 43
End: 2023-05-11

## 2023-05-15 NOTE — HISTORY OF PRESENT ILLNESS
[FreeTextEntry1] : Ms. MALIHA THOMPSON is a 42 year F that arrives today to our office for a consultation. PMH includes Aflutter, cardiomyopathy, DLD, HTN. Ms. THOMPSON was worked up for complaints of HOCM, and SOB. 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 Patient arrives today for a surgical consultation with Dr. Zepeda. Symptoms today are SOB on exertion. NYHA class III. Patient was examined.\par \par \par 5 M Walk\par \par Family History of CAD (who and Age)\par \par CCS Class:\par \par 1 - No limitations in physical activity; no angina symptoms\par \par 2 - Physical activity slightly limited; ambulates >2 blocks or >1 flight of stairs\par \par 3 - Physical activity markedly limited; ambulation limited to <2 blocks or <1 flight of stairs\par \par 4 - Severe physical limitations; symptoms at rest\par

## 2023-05-15 NOTE — ASSESSMENT
[FreeTextEntry1] : Ms. MALIHA THOMPSON is a 42 year F that arrives today to our office for a consultation. PMH includes Aflutter, cardiomyopathy, DLD, HTN. Ms. THOMPSON was worked up for complaints of HOCM, and SOB. 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 Patient arrives today for a surgical consultation with Dr. Zepeda. Symptoms today are SOB on exertion. NYHA class III. Patient was examined.Shared Decision making was done with the patient using the STS risk score which was calculated and discussed with the patient. Surgery was recommended and all risk and alternatives to surgery were discussed with he patient. \par \par \par Plan:\par #Aortic Valve Mass\par Will need MRI\par Will need PFT\par Plan for AVR/ MAZE, JASON clip possible HOCM ablation\par \par I Omer Kelly [facility] am acting as the scribe for Dr. Zepeda\par  \par I explained the procedure in detail, including risks, benefits and alternatives, and the patient agreed to proceed with surgery by signing informed consent forms.\par Surgery will be scheduled and will proceed as soon as presurgical testing is completed.\par \par -FMR\par \par I personally performed the services described in the documentation, reviewed the documentation recorded by the scribe in my presence and it accurately and completely records my words and actions.\par \par \par

## 2023-05-23 ENCOUNTER — NON-APPOINTMENT (OUTPATIENT)
Age: 43
End: 2023-05-23

## 2023-06-02 ENCOUNTER — OUTPATIENT (OUTPATIENT)
Dept: OUTPATIENT SERVICES | Facility: HOSPITAL | Age: 43
LOS: 1 days | End: 2023-06-02
Payer: COMMERCIAL

## 2023-06-02 ENCOUNTER — APPOINTMENT (OUTPATIENT)
Dept: PULMONOLOGY | Facility: HOSPITAL | Age: 43
End: 2023-06-02
Payer: COMMERCIAL

## 2023-06-02 ENCOUNTER — RESULT REVIEW (OUTPATIENT)
Age: 43
End: 2023-06-02

## 2023-06-02 ENCOUNTER — APPOINTMENT (OUTPATIENT)
Dept: ULTRASOUND IMAGING | Facility: HOSPITAL | Age: 43
End: 2023-06-02
Payer: COMMERCIAL

## 2023-06-02 DIAGNOSIS — D15.1 BENIGN NEOPLASM OF HEART: ICD-10-CM

## 2023-06-02 DIAGNOSIS — Z00.8 ENCOUNTER FOR OTHER GENERAL EXAMINATION: ICD-10-CM

## 2023-06-02 PROCEDURE — 94010 BREATHING CAPACITY TEST: CPT

## 2023-06-02 PROCEDURE — 93880 EXTRACRANIAL BILAT STUDY: CPT | Mod: 26

## 2023-06-02 PROCEDURE — 93880 EXTRACRANIAL BILAT STUDY: CPT

## 2023-06-03 ENCOUNTER — INPATIENT (INPATIENT)
Facility: HOSPITAL | Age: 43
LOS: 0 days | Discharge: HOME | DRG: 310 | End: 2023-06-04
Attending: INTERNAL MEDICINE | Admitting: INTERNAL MEDICINE
Payer: COMMERCIAL

## 2023-06-03 VITALS
WEIGHT: 175.05 LBS | DIASTOLIC BLOOD PRESSURE: 72 MMHG | HEIGHT: 61 IN | SYSTOLIC BLOOD PRESSURE: 173 MMHG | RESPIRATION RATE: 18 BRPM | OXYGEN SATURATION: 96 % | TEMPERATURE: 97 F | HEART RATE: 143 BPM

## 2023-06-03 DIAGNOSIS — D15.1 BENIGN NEOPLASM OF HEART: ICD-10-CM

## 2023-06-03 DIAGNOSIS — Z98.890 OTHER SPECIFIED POSTPROCEDURAL STATES: Chronic | ICD-10-CM

## 2023-06-03 DIAGNOSIS — I48.91 UNSPECIFIED ATRIAL FIBRILLATION: ICD-10-CM

## 2023-06-03 LAB
ALBUMIN SERPL ELPH-MCNC: 3.9 G/DL — SIGNIFICANT CHANGE UP (ref 3.5–5.2)
ALP SERPL-CCNC: 83 U/L — SIGNIFICANT CHANGE UP (ref 30–115)
ALT FLD-CCNC: 28 U/L — SIGNIFICANT CHANGE UP (ref 0–41)
ANION GAP SERPL CALC-SCNC: 12 MMOL/L — SIGNIFICANT CHANGE UP (ref 7–14)
AST SERPL-CCNC: 36 U/L — SIGNIFICANT CHANGE UP (ref 0–41)
BASOPHILS # BLD AUTO: 0.04 K/UL — SIGNIFICANT CHANGE UP (ref 0–0.2)
BASOPHILS NFR BLD AUTO: 0.4 % — SIGNIFICANT CHANGE UP (ref 0–1)
BILIRUB SERPL-MCNC: 1.1 MG/DL — SIGNIFICANT CHANGE UP (ref 0.2–1.2)
BUN SERPL-MCNC: 25 MG/DL — HIGH (ref 10–20)
CALCIUM SERPL-MCNC: 9.1 MG/DL — SIGNIFICANT CHANGE UP (ref 8.4–10.5)
CHLORIDE SERPL-SCNC: 105 MMOL/L — SIGNIFICANT CHANGE UP (ref 98–110)
CK MB CFR SERPL CALC: 10.9 NG/ML — HIGH (ref 0.6–6.3)
CK MB CFR SERPL CALC: 9.9 NG/ML — HIGH (ref 0.6–6.3)
CK SERPL-CCNC: 183 U/L — SIGNIFICANT CHANGE UP (ref 0–225)
CK SERPL-CCNC: 190 U/L — SIGNIFICANT CHANGE UP (ref 0–225)
CO2 SERPL-SCNC: 20 MMOL/L — SIGNIFICANT CHANGE UP (ref 17–32)
CREAT SERPL-MCNC: 0.9 MG/DL — SIGNIFICANT CHANGE UP (ref 0.7–1.5)
EGFR: 81 ML/MIN/1.73M2 — SIGNIFICANT CHANGE UP
EOSINOPHIL # BLD AUTO: 0.14 K/UL — SIGNIFICANT CHANGE UP (ref 0–0.7)
EOSINOPHIL NFR BLD AUTO: 1.3 % — SIGNIFICANT CHANGE UP (ref 0–8)
GLUCOSE SERPL-MCNC: 129 MG/DL — HIGH (ref 70–99)
HCG SERPL QL: NEGATIVE — SIGNIFICANT CHANGE UP
HCT VFR BLD CALC: 40.2 % — SIGNIFICANT CHANGE UP (ref 37–47)
HGB BLD-MCNC: 13.1 G/DL — SIGNIFICANT CHANGE UP (ref 12–16)
IMM GRANULOCYTES NFR BLD AUTO: 0.4 % — HIGH (ref 0.1–0.3)
LYMPHOCYTES # BLD AUTO: 1.17 K/UL — LOW (ref 1.2–3.4)
LYMPHOCYTES # BLD AUTO: 11.3 % — LOW (ref 20.5–51.1)
MAGNESIUM SERPL-MCNC: 1.8 MG/DL — SIGNIFICANT CHANGE UP (ref 1.8–2.4)
MCHC RBC-ENTMCNC: 26.5 PG — LOW (ref 27–31)
MCHC RBC-ENTMCNC: 32.6 G/DL — SIGNIFICANT CHANGE UP (ref 32–37)
MCV RBC AUTO: 81.4 FL — SIGNIFICANT CHANGE UP (ref 81–99)
MONOCYTES # BLD AUTO: 0.58 K/UL — SIGNIFICANT CHANGE UP (ref 0.1–0.6)
MONOCYTES NFR BLD AUTO: 5.6 % — SIGNIFICANT CHANGE UP (ref 1.7–9.3)
NEUTROPHILS # BLD AUTO: 8.41 K/UL — HIGH (ref 1.4–6.5)
NEUTROPHILS NFR BLD AUTO: 81 % — HIGH (ref 42.2–75.2)
NRBC # BLD: 0 /100 WBCS — SIGNIFICANT CHANGE UP (ref 0–0)
NT-PROBNP SERPL-SCNC: 3022 PG/ML — HIGH (ref 0–300)
PLATELET # BLD AUTO: 364 K/UL — SIGNIFICANT CHANGE UP (ref 130–400)
PMV BLD: 10.4 FL — SIGNIFICANT CHANGE UP (ref 7.4–10.4)
POTASSIUM SERPL-MCNC: 4.7 MMOL/L — SIGNIFICANT CHANGE UP (ref 3.5–5)
POTASSIUM SERPL-SCNC: 4.7 MMOL/L — SIGNIFICANT CHANGE UP (ref 3.5–5)
PROT SERPL-MCNC: 6.6 G/DL — SIGNIFICANT CHANGE UP (ref 6–8)
RBC # BLD: 4.94 M/UL — SIGNIFICANT CHANGE UP (ref 4.2–5.4)
RBC # FLD: 15.4 % — HIGH (ref 11.5–14.5)
SODIUM SERPL-SCNC: 137 MMOL/L — SIGNIFICANT CHANGE UP (ref 135–146)
TROPONIN T SERPL-MCNC: <0.01 NG/ML — SIGNIFICANT CHANGE UP
WBC # BLD: 10.38 K/UL — SIGNIFICANT CHANGE UP (ref 4.8–10.8)
WBC # FLD AUTO: 10.38 K/UL — SIGNIFICANT CHANGE UP (ref 4.8–10.8)

## 2023-06-03 PROCEDURE — 84484 ASSAY OF TROPONIN QUANT: CPT

## 2023-06-03 PROCEDURE — 99285 EMERGENCY DEPT VISIT HI MDM: CPT

## 2023-06-03 PROCEDURE — 36415 COLL VENOUS BLD VENIPUNCTURE: CPT

## 2023-06-03 PROCEDURE — 82550 ASSAY OF CK (CPK): CPT

## 2023-06-03 PROCEDURE — 84443 ASSAY THYROID STIM HORMONE: CPT

## 2023-06-03 PROCEDURE — 84439 ASSAY OF FREE THYROXINE: CPT

## 2023-06-03 PROCEDURE — 80048 BASIC METABOLIC PNL TOTAL CA: CPT

## 2023-06-03 PROCEDURE — 99223 1ST HOSP IP/OBS HIGH 75: CPT

## 2023-06-03 PROCEDURE — 82553 CREATINE MB FRACTION: CPT

## 2023-06-03 PROCEDURE — 85025 COMPLETE CBC W/AUTO DIFF WBC: CPT

## 2023-06-03 PROCEDURE — 71045 X-RAY EXAM CHEST 1 VIEW: CPT | Mod: 26

## 2023-06-03 RX ORDER — HYDROXYZINE HCL 10 MG
25 TABLET ORAL EVERY 8 HOURS
Refills: 0 | Status: DISCONTINUED | OUTPATIENT
Start: 2023-06-03 | End: 2023-06-04

## 2023-06-03 RX ORDER — FUROSEMIDE 40 MG
40 TABLET ORAL ONCE
Refills: 0 | Status: COMPLETED | OUTPATIENT
Start: 2023-06-03 | End: 2023-06-03

## 2023-06-03 RX ORDER — METOPROLOL TARTRATE 50 MG
100 TABLET ORAL DAILY
Refills: 0 | Status: DISCONTINUED | OUTPATIENT
Start: 2023-06-03 | End: 2023-06-04

## 2023-06-03 RX ORDER — AMIODARONE HYDROCHLORIDE 400 MG/1
150 TABLET ORAL ONCE
Refills: 0 | Status: COMPLETED | OUTPATIENT
Start: 2023-06-03 | End: 2023-06-03

## 2023-06-03 RX ORDER — APIXABAN 2.5 MG/1
5 TABLET, FILM COATED ORAL EVERY 12 HOURS
Refills: 0 | Status: DISCONTINUED | OUTPATIENT
Start: 2023-06-03 | End: 2023-06-04

## 2023-06-03 RX ORDER — SODIUM CHLORIDE 9 MG/ML
1000 INJECTION, SOLUTION INTRAVENOUS ONCE
Refills: 0 | Status: COMPLETED | OUTPATIENT
Start: 2023-06-03 | End: 2023-06-03

## 2023-06-03 RX ORDER — CHLORHEXIDINE GLUCONATE 213 G/1000ML
1 SOLUTION TOPICAL
Refills: 0 | Status: DISCONTINUED | OUTPATIENT
Start: 2023-06-03 | End: 2023-06-04

## 2023-06-03 RX ORDER — DILTIAZEM HCL 120 MG
30 CAPSULE, EXT RELEASE 24 HR ORAL THREE TIMES A DAY
Refills: 0 | Status: DISCONTINUED | OUTPATIENT
Start: 2023-06-03 | End: 2023-06-04

## 2023-06-03 RX ORDER — AMIODARONE HYDROCHLORIDE 400 MG/1
1 TABLET ORAL
Refills: 0 | DISCHARGE

## 2023-06-03 RX ORDER — DILTIAZEM HCL 120 MG
20 CAPSULE, EXT RELEASE 24 HR ORAL ONCE
Refills: 0 | Status: COMPLETED | OUTPATIENT
Start: 2023-06-03 | End: 2023-06-03

## 2023-06-03 RX ORDER — AMIODARONE HYDROCHLORIDE 400 MG/1
1 TABLET ORAL
Qty: 450 | Refills: 0 | Status: COMPLETED | OUTPATIENT
Start: 2023-06-03 | End: 2023-06-03

## 2023-06-03 RX ADMIN — Medication 20 MILLIGRAM(S): at 08:21

## 2023-06-03 RX ADMIN — Medication 100 MILLIGRAM(S): at 17:28

## 2023-06-03 RX ADMIN — SODIUM CHLORIDE 1000 MILLILITER(S): 9 INJECTION, SOLUTION INTRAVENOUS at 08:20

## 2023-06-03 RX ADMIN — Medication 40 MILLIGRAM(S): at 17:21

## 2023-06-03 RX ADMIN — AMIODARONE HYDROCHLORIDE 600 MILLIGRAM(S): 400 TABLET ORAL at 11:50

## 2023-06-03 RX ADMIN — AMIODARONE HYDROCHLORIDE 33.3 MG/MIN: 400 TABLET ORAL at 18:05

## 2023-06-03 RX ADMIN — Medication 30 MILLIGRAM(S): at 17:56

## 2023-06-03 RX ADMIN — APIXABAN 5 MILLIGRAM(S): 2.5 TABLET, FILM COATED ORAL at 17:55

## 2023-06-03 RX ADMIN — AMIODARONE HYDROCHLORIDE 33.3 MG/MIN: 400 TABLET ORAL at 11:57

## 2023-06-03 RX ADMIN — Medication 30 MILLIGRAM(S): at 21:30

## 2023-06-03 RX ADMIN — Medication 100 MILLIGRAM(S): at 09:27

## 2023-06-03 NOTE — ED ADULT NURSE NOTE - NS ED NOTE ABUSE SUSPICION NEGLECT YN
Follow-up and Dispositions   Return in about 1 year (around 3/29/2019).   Check-out Note: Bilateral mammograms in [place] over the next few weeks   Return in 1 year with bilateral mammograms     Patient would like to schedule this   Patient transferred to [facility] scheduled 8/26     No

## 2023-06-03 NOTE — H&P ADULT - NSICDXPASTSURGICALHX_GEN_ALL_CORE_FT
PAST SURGICAL HISTORY:  No significant past surgical history      PAST SURGICAL HISTORY:  S/P transesophageal echocardiogram (MINDI)

## 2023-06-03 NOTE — PATIENT PROFILE ADULT - FUNCTIONAL ASSESSMENT - BASIC MOBILITY 5.
Size Of Lesion In Cm: 0.6 Dressing: bandage Silver Nitrate Text: The wound bed was treated with silver nitrate after the biopsy was performed. Curettage Text: The wound bed was treated with curettage after the biopsy was performed. Render Post-Care Instructions In Note?: no Biopsy Method: Dermablade Type Of Destruction Used: Curettage Anesthesia Volume In Cc (Will Not Render If 0): 0.5 Billing Type: Third-Party Bill Post-Care Instructions: I reviewed with the patient in detail post-care instructions. Patient is to keep the biopsy site dry overnight, and then apply bacitracin twice daily until healed. Patient may apply hydrogen peroxide soaks to remove any crusting. Hemostasis: Drysol Wound Care: Petrolatum Anesthesia Type: 1% lidocaine with epinephrine Electrodesiccation Text: The wound bed was treated with electrodesiccation after the biopsy was performed. Information: Selecting Yes will display possible errors in your note based on the variables you have selected. This validation is only offered as a suggestion for you. PLEASE NOTE THAT THE VALIDATION TEXT WILL BE REMOVED WHEN YOU FINALIZE YOUR NOTE. IF YOU WANT TO FAX A PRELIMINARY NOTE YOU WILL NEED TO TOGGLE THIS TO 'NO' IF YOU DO NOT WANT IT IN YOUR FAXED NOTE. Biopsy Type: H and E Consent: Written consent was obtained and risks were reviewed including but not limited to scarring, infection, bleeding, scabbing, incomplete removal, nerve damage and allergy to anesthesia. Cryotherapy Text: The wound bed was treated with cryotherapy after the biopsy was performed. Additional Anesthesia Volume In Cc (Will Not Render If 0): 0 Electrodesiccation And Curettage Text: The wound bed was treated with electrodesiccation and curettage after the biopsy was performed. Notification Instructions: Patient will be notified of biopsy results. However, patient instructed to call the office if not contacted within 2 weeks. Detail Level: Detailed Depth Of Biopsy: dermis Was A Bandage Applied: Yes Path Notes (To The Dermatopathologist): 16 mm\\Janna 4 = No assist / stand by assistance

## 2023-06-03 NOTE — ED PROVIDER NOTE - ATTENDING APP SHARED VISIT CONTRIBUTION OF CARE
I have personally performed a history and physical exam on this patient and personally directed the management of the patient. Patient is a 42-year-old female past medical history of hokum past medical history of valvular disorder presents for evaluation of shortness of breath and cough over 1 month productive of deep cough with 1-2 episodes of intermittent vomiting over that time.  She denies any chest pain diaphoresis denies any palpitations she denies any abdominal pain back pain nausea vomiting or diarrhea here in the emergency department she has no fevers or chills patient states that she is scheduled for cardiac procedure by Dr. Cassidy later this month unsure of exactly what procedure    On physical exam patient is normocephalic atraumatic pupils equal round reactive light accommodation extraocular muscles intact oropharynx clear chest clear to auscultation bilaterally patient has irregularly irregular rhythm consistent with atrial fibrillation radial pulses 2+ pedal pulse 2+ abdomen soft nontender nondistended bowel sounds positive no guarding no rebound pedal pulse 2+ no focal deficits    Assessment plan patient's history is concerning although she has no chest pain complains of shortness of breath and cough we obtain EKG initial EKG consistent with possible STEMI STEMI code activated at 7:27 AM discussed case with cardiology upon call the STEMI cardiac fellow discussed case with attending Dr. Trinidad at 7:37 AM advised to cancel STEMI code EKG not consistent with STEMI more consistent with a flutter pattern advised to admit at [facility] I will continue to monitor patient we obtain labs including troponin chest x-ray reveals cardiomegaly per my independent evaluation patient will need admission family updated agree with plan of care I have personally performed a history and physical exam on this patient and personally directed the management of the patient. Patient is a 42-year-old female past medical history of hokum past medical history of valvular disorder presents for evaluation of shortness of breath and cough over 1 month productive of deep cough with 1-2 episodes of intermittent vomiting over that time.  She denies any chest pain diaphoresis denies any palpitations she denies any abdominal pain back pain nausea vomiting or diarrhea here in the emergency department she has no fevers or chills patient states that she is scheduled for cardiac procedure by Dr. Cassidy later this month unsure of exactly what procedure    On physical exam patient is normocephalic atraumatic pupils equal round reactive light accommodation extraocular muscles intact oropharynx clear chest clear to auscultation bilaterally patient has irregularly irregular rhythm consistent with atrial fibrillation radial pulses 2+ pedal pulse 2+ abdomen soft nontender nondistended bowel sounds positive no guarding no rebound pedal pulse 2+ no focal deficits    Assessment plan patient's history is concerning although she has no chest pain complains of shortness of breath and cough we obtain EKG initial EKG consistent with possible STEMI STEMI code activated at 7:27 AM discussed case with cardiology upon call the STEMI cardiac fellow discussed case with attending Dr. Trinidad at 7:37 AM advised to cancel STEMI code EKG not consistent with STEMI more consistent with a flutter pattern advised to admit at [facility] I will continue to monitor patient we obtain labs including troponin chest x-ray reveals cardiomegaly per my independent evaluation patient will need admission family updated agree with plan of care.

## 2023-06-03 NOTE — H&P ADULT - NSICDXPASTMEDICALHX_GEN_ALL_CORE_FT
PAST MEDICAL HISTORY:  Atrial fibrillation     Hypertrophic cardiomyopathy      PAST MEDICAL HISTORY:  Aortic valve disease     Atrial fibrillation     Hypertrophic cardiomyopathy

## 2023-06-03 NOTE — PATIENT PROFILE ADULT - FALL HARM RISK - HARM RISK INTERVENTIONS

## 2023-06-03 NOTE — H&P ADULT - PROBLEM SELECTOR PLAN 1
Admit to low risk telemetry for HR control  Amiodarone drip  continue home Beta blocker  continue Apixiban  Troponin  x 2 more sets Admit to low risk telemetry for HR control  Amiodarone drip  continue home Beta blocker  continue Apixiban  Troponin  x 2 more sets  Hydroxyzine 25mg Q8h/PRN for anxiety/nervousness/sleep

## 2023-06-03 NOTE — ED PROVIDER NOTE - IV ALTEPLASE DOOR HIDDEN
You can access the FollowMyHealth Patient Portal offered by [facility] by registering at the following website: [email or web address]. By joining [facility]’s FollowMyHealth portal, you will also be able to view your health information using other applications (apps) compatible with our system. show

## 2023-06-03 NOTE — CONSULT NOTE ADULT - NS ATTEND AMEND GEN_ALL_CORE FT
43 y/o female very well known to me,  PMH:  Atrial Fibrillation: onset about 5 weeks ago, Hypertrophic cardiomyopathy: genetically related. Patient with afib. Awaiting Ct surgery for fibroelastoma. She is anxious re this. Presented afib rate increased . Continue current meds. Will give xanax. Ambulate. MB increased troponin low. CTA 5/23 no coronary disease calcium score 0. Out patient F/U

## 2023-06-03 NOTE — ED PROVIDER NOTE - PHYSICAL EXAMINATION
VITAL SIGNS: I have reviewed nursing notes and confirm.  CONSTITUTIONAL: Well-developed; well-nourished; in no acute distress.   SKIN: skin exam is warm and dry, no acute rash.    HEAD: Normocephalic; atraumatic.  EYES:  conjunctiva and sclera clear.  ENT: No nasal discharge; airway clear.  NECK: Supple; non tender.  CARD: S1, S2 normal; no murmurs, gallops, or rubs. tachycardic irregularly irregular  RESP: No wheezes, rales or rhonchi.  ABD: Normal bowel sounds; soft; non-distended; non-tender  EXT: Normal ROM.  No clubbing, cyanosis or edema.   NEURO: Alert, oriented, grossly unremarkable

## 2023-06-03 NOTE — ED PROVIDER NOTE - PROGRESS NOTE DETAILS
Patient initially controlled with push of Cardizem but now tacky in the 120s, cardiology consulted and recommends amnio bolus and drip and will admit to low risk telemetry.  Hospitalist made aware

## 2023-06-03 NOTE — ED ADULT NURSE NOTE - NSFALLUNIVINTERV_ED_ALL_ED
Bed/Stretcher in lowest position, wheels locked, appropriate side rails in place/Call bell, personal items and telephone in reach/Instruct patient to call for assistance before getting out of bed/chair/stretcher/Non-slip footwear applied when patient is off stretcher/[place] to call system/Physically safe environment - no spills, clutter or unnecessary equipment/Purposeful proactive rounding/Room/bathroom lighting operational, light cord in reach

## 2023-06-03 NOTE — H&P ADULT - NSHPPHYSICALEXAM_GEN_ALL_CORE
Vital Signs Last 24 Hrs    T(F): 96.8 (06-03-23 @ 07:15), Max: 96.8 (06-03-23 @ 07:15)  HR: 97 (06-03-23 @ 12:05) (72 - 143)  BP: 111/62 (06-03-23 @ 12:05)  RR: 18 (06-03-23 @ 12:05) (18 - 18)  SpO2: 99% (06-03-23 @ 12:05) (96% - 99%)    PHYSICAL EXAM:      Constitutional: NAD, A&O x3    Eyes: PERRLA    Respiratory: +air entry, no rales, no rhonchi, no wheezes    Cardiovascular: +S1 and S2, **Irregular rate and rhythm, no audible murmur    Gastrointestinal: +BS, soft, non-tender, not distended    Extremities:  no edema, no calf tenderness    Vascular: +dorsal pedis and radial pulses, no extremity cyanosis    Neurological: sensation intact, ROM equal B/L, CN II-XII intact    Skin: no rashes, normal turgor    Lymph Nodes: no lymphadenopathy Vital Signs Last 24 Hrs    T(F): 96.8 (06-03-23 @ 07:15), Max: 96.8 (06-03-23 @ 07:15)  HR: 97 (06-03-23 @ 12:05) (72 - 143)  BP: 111/62 (06-03-23 @ 12:05)  RR: 18 (06-03-23 @ 12:05) (18 - 18)  SpO2: 99% (06-03-23 @ 12:05) (96% - 99%)    PHYSICAL EXAM:      Constitutional: NAD, A&O x3    Eyes: PERRLA  Neck: unable to assess JVD.   Respiratory: bibasilar rales,  no rhonchi, no wheezes    Cardiovascular: +S1 and S2, **Irregular rate and rhythm, no audible murmur    Gastrointestinal: +BS, soft, non-tender, not distended    Extremities: +1 BLE edema, no calf tenderness    Vascular: +dorsal pedis and radial pulses, no extremity cyanosis    Neurological: sensation intact, ROM equal B/L, CN II-XII intact    Skin: no rashes, normal turgor    Lymph Nodes: No lymphadenopathy

## 2023-06-03 NOTE — ED ADULT NURSE NOTE - NSHOSCREENINGQ1_ED_ALL_ED
No As certified below, I, or a nurse practitioner or physician assistant working with me, had a face-to-face encounter that meets the physician face-to-face encounter requirements.

## 2023-06-03 NOTE — CONSULT NOTE ADULT - SUBJECTIVE AND OBJECTIVE BOX
HPI:          41 y/o female PMH:  Atrial Fibrillation: onset about 5 weeks ago, Hypertrophic cardiomyopathy: genetically related.  -  patient reports not sleeping well, very anxious and nervous due to Cardiac Sx related to her Atrial Fibrillation scheduled for June 20/2023 with Dr Zepeda at the [facility].  -  states dyspnea lately while walking and also last night  -  she also reports cough x 1 week possibly related to pollen in the air.  - EKG done in ER showed Atrial Fibrillation with RVR: she was treated with Cardizem 10mg IVP for HR control then started on Amiodarone bolus  with drip to follow.    Cardiologist Dr Corbett   (03 Jun 2023 12:05)        HPI-Cardiology   Pt with the above Hx, evaluated at bedside. Pt presented for eval of dyspnea and palpitations. Pt states that she hasn't been sleeping well and has been very anxious and nervous due to upcoming cardio procedure scheduled on 6/20 with Dr Zepeda. Pt was found to be in AFib with RVR in ED and was given Cardizem IVP for rate control. Pt denies any CP, dizziness/lightheadedness, diaphoresis or nausea/vomiting. Radiology tests and hospital records, were reviewed, as well as previous notes on this patient.      PAST MEDICAL & SURGICAL HISTORY  Hypertrophic cardiomyopathy    Atrial fibrillation    No significant past surgical history        FAMILY HISTORY:  FAMILY HISTORY:  Known health problems: none (Father, Mother)          ALLERGIES:  No Known Allergies      MEDICATIONS:  MEDICATIONS  (STANDING):  aMIOdarone Infusion 1 mG/Min (33.3 mL/Hr) IV Continuous <Continuous>  apixaban 5 milliGRAM(s) Oral every 12 hours  chlorhexidine 4% Liquid 1 Application(s) Topical <User Schedule>  metoprolol succinate  milliGRAM(s) Oral daily    MEDICATIONS  (PRN):  hydrOXYzine hydrochloride 25 milliGRAM(s) Oral every 8 hours PRN Anxiety      HOME MEDICATIONS:  Home Medications:  Metoprolol Succinate  mg oral tablet, extended release: 1 orally (03 Jun 2023 11:41)      VITALS:   T(F): 96.8 (06-03 @ 07:15), Max: 96.8 (06-03 @ 07:15)  HR: 97 (06-03 @ 12:05) (72 - 143)  BP: 111/62 (06-03 @ 12:05) (108/59 - 173/72)  BP(mean): --  RR: 18 (06-03 @ 12:05) (18 - 18)  SpO2: 99% (06-03 @ 12:05) (96% - 99%)    I&O's Summary      REVIEW OF SYSTEMS:  See HPI      PHYSICAL EXAM:  NEURO: patient is awake , alert and oriented  GEN: Not in acute distress  NECK: no thyroid enlargement, no JVD  LUNGS: Clear to auscultation bilaterally   CARDIOVASCULAR: S1/S2 present, Irregular rate and rhythm , no murmurs or rubs, no carotid bruits,  + PP bilaterally  ABD: Soft, non-tender, non-distended, +BS  EXT: No CHASE  SKIN: Intact        LABS:                        13.1   10.38 )-----------( 364      ( 03 Jun 2023 07:40 )             40.2     06-03    137  |  105  |  25<H>  ----------------------------<  129<H>  4.7   |  20  |  0.9    Ca    9.1      03 Jun 2023 07:40  Mg     1.8     06-03    TPro  6.6  /  Alb  3.9  /  TBili  1.1  /  DBili  x   /  AST  36  /  ALT  28  /  AlkPhos  83  06-03      Troponin T, Serum: <0.01 ng/mL (06-03-23 @ 07:40)    CARDIAC MARKERS ( 03 Jun 2023 07:40 )  x     / <0.01 ng/mL / x     / x     / x           RADIOLOGY:  -CXR:  < from: Xray Chest 1 View- PORTABLE-Urgent (06.03.23 @ 07:58) >  Impression:    Low lung volume.    Cardiomegaly, unchanged.    Azygous fissure. No acute infiltrates.    --- End of Report ---    < end of copied text >              < from: VA Duplex Carotid, Bilat (06.02.23 @ 10:35) >  IMPRESSION: Ewsx02-68% stenosis bilateral internal carotid.    Measurement of carotid stenosis is based on velocity parameters that   correlate the residual internal carotid diameter with that of the more   distal vessel in accordance with a method such as the NorthAmerican   Symptomatic Carotid Endarterectomy Trial (NASCET).    --- End of Report ---    < end of copied text >                < from: Transesophageal Echocardiogram (04.25.23 @ 08:34) >      Summary:   1. Left ventricular ejection fraction, by visual estimation, is 50 to   55%.   2. Moderately increased LV wall thickness.   3. Accelerated LVOT flow with no definite evidence of DEN or LVOT   obstruction.   4. Mildly increased RV wall thickness.   5. Echo dense structure noted on the Aortic valve (including aortic and   ventricular aspect of the valve leaflets) measuring 1.3 x 1.5 cm.   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   6. Mild to moderate mitral annular calcification.   7. Mild thickening of the anterior and posterior mitral valve leaflets.   8. Mild mitral valve regurgitation.   9. Color flow doppler and intravenous injection of agitated saline   demonstrates the presence of an intact intra atrial septum.  10. No left atrial appendage thrombus.  11. Trivial pericardial effusion.  12. Findings discussed with referring cardiologist and CTS team.    < end of copied text >          ECG:  < from: 12 Lead ECG (06.03.23 @ 09:34) >   Atrial fibrillation  Incomplete right bundle branch block  Left anterior fascicular block  Septal infarct , age undetermined      Confirmed by ARELI CORBETT MD ([id number]) on 6/3/2023 9:53:32 AM    < end of copied text >

## 2023-06-03 NOTE — ED PROVIDER NOTE - OBJECTIVE STATEMENT
Patient is a 42-year-old female past medical history of hokum past medical history of valvular disorder presents for evaluation of shortness of breath and cough over 1 month productive of deep cough with 1-2 episodes of intermittent vomiting over that time.  She denies any chest pain diaphoresis denies any palpitations she denies any abdominal pain back pain nausea vomiting or diarrhea here in the emergency department she has no fevers or chills patient states that she is scheduled for cardiac procedure by Dr. Cassidy later this month unsure of exactly what procedure

## 2023-06-03 NOTE — H&P ADULT - ASSESSMENT
43 y/o female admitted with Atrial Fibrillation with RVR                                                                               41 y/o female admitted with Atrial Fibrillation with RVR    Case discussed with Dr Vasquez

## 2023-06-03 NOTE — PATIENT PROFILE ADULT - HISTORY OF COVID-19 VACCINATION
I will SWITCH the dose or number of times a day I take the medications listed below when I get home from the hospital:  None
Vaccine status unknown

## 2023-06-03 NOTE — H&P ADULT - HISTORY OF PRESENT ILLNESS
41 y/o female PMH:  Atrial Fibrillation: onset about 5 weeks ago, Hypertrophic cardiomyopathy: genetically related.  -  patient reports not sleeping well, very anxious and nervous due to Cardiac Sx related to her Atrial Fibrillation scheduled for June 20/2023 with Dr Zepeda at the [facility].  -  states dyspnea lately while walking and also last night  -  she also reports cough x 1 week possibly related to pollen in the air.  - EKG done in ER showed Atrial Fibrillation with RVR: she was treated with Cardizem 10mg IVP for HR control then started on Amiodarone bolus  with drip to follow.    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  -  states dyspnea lately while walking and also last night, with orthopnea.   -  she also reports cough x 1 week possibly related to pollen in the air.  For about 1 month her HR in 100s, called Dr. Tyler who suggsted to take toprol xl 150 mg daily since the last week.   - EKG done in ER showed Atrial Fibrillation with RVR: she was treated with Cardizem 10mg IVP for HR control then started on Amiodarone bolus  with drip to follow.    Cardiologist Dr Metcalf

## 2023-06-03 NOTE — H&P ADULT - NSHPLABSRESULTS_GEN_ALL_CORE
13.1   10.38 )-----------( 364      ( 03 Jun 2023 07:40 )             40.2       06-03    137  |  105  |  25<H>  ----------------------------<  129<H>  4.7   |  20  |  0.9    Ca    9.1      03 Jun 2023 07:40  Mg     1.8     06-03    TPro  6.6  /  Alb  3.9  /  TBili  1.1  /  DBili  x   /  AST  36  /  ALT  28  /  AlkPhos  83  06-03        CARDIAC MARKERS ( 03 Jun 2023 07:40 )  x     / <0.01 ng/mL      BNP  3022    VA Duplex Carotid, Bilat (06.02.23 @ 10:35) >      IMPRESSION: Ldcb97-60% stenosis bilateral internal carotid.    CT Angio Cardiac w/ IV Cont (05.09.23 @ 15:42) >      IMPRESSION:    Cardiac:  1.  The calcium score is 0 Agatston units.  2.  OM2 is suboptimally visualized, but appears non obstructive.  3.  Distal LAD not well visualized.  4.  Remaining segments appear angiographically normal.  5.  Severe left atrial dilatation.  6.  Left ventricular septal hypertrophy    20 mm.  7.  There is a 6 x 5 m  hypoattenuating mass attached to the ventricular   aspect of the left coronary cusp of the aortic valve..    Non-cardiac:  Small pericardial effusion. 13.1   10.38 )-----------( 364      ( 03 Jun 2023 07:40 )             40.2       06-03    137  |  105  |  25<H>  ----------------------------<  129<H>  4.7   |  20  |  0.9    Ca    9.1      03 Jun 2023 07:40  Mg     1.8     06-03    TPro  6.6  /  Alb  3.9  /  TBili  1.1  /  DBili  x   /  AST  36  /  ALT  28  /  AlkPhos  83  06-03        CARDIAC MARKERS ( 03 Jun 2023 07:40 )  x     / <0.01 ng/mL      BNP  3022    VA Duplex Carotid, Bilat (06.02.23 @ 10:35) >      IMPRESSION: Mawe79-84% stenosis bilateral internal carotid.    CT Angio Cardiac w/ IV Cont (05.09.23 @ 15:42) >    IMPRESSION:    Cardiac:  1.  The calcium score is 0 Agatston units.  2.  OM2 is suboptimally visualized, but appears non obstructive.  3.  Distal LAD not well visualized.  4.  Remaining segments appear angiographically normal.  5.  Severe left atrial dilatation.  6.  Left ventricular septal hypertrophy    20 mm.  7.  There is a 6 x 5 m  hypoattenuating mass attached to the ventricular   aspect of the left coronary cusp of the aortic valve..    Non-cardiac:  Small pericardial effusion.

## 2023-06-03 NOTE — CONSULT NOTE ADULT - ASSESSMENT
43 y/o female with a pmh of AFib, hypertrophic cardiomyopathy, presented for eval of SOB and palpitations. Was found to be in AFib w/RVR on workup       Impression:  #Chronic AFib: Afib w/RVR   #HCOM      Pt currently asymptomatic and denies any CP, SOB, or palpitations  ECG: AFib, Incomplete RBB  On tele appears AFib with HR sustained 120-130's  Cxr: Cardiomegaly, unchanged. Azygous fissure. 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

## 2023-06-03 NOTE — H&P ADULT - NS ATTEND AMEND GEN_ALL_CORE FT
Pls see changes made above    Chronic Atrial fibrillation with RVR.   - s/p amidarone gtt, start cardizem 30 mg TID, cont toprol XL, MINDI 4/2023 EF 55-60%, cont eliquis.   - cardiology consulted  - pending TSH   - serial troponin, trop x 2 negative  - reports for the last month HR > 100   - recent CT coronaries negative .     Fluid overload r/w Atrial fibrillation   - lasix 40 mg iv x 1   - proBNP elevated  - Chest X-Ray: pulmonary vascular congestion     Anxiety   - prn hydroxyzine     Obesity BMI 39   - wt loss counseling per PCP    Hx/o aortic valve fibroelastoma or lesion   - seen by CT surgery Dr. Jarett Zepeda  - plan for cardiac MRI at [facility] on Wednesday.     DVT/GI px  - eliquis/on a diet     Full code Pls see changes made above    Chronic Atrial fibrillation with RVR.   - s/p amidarone gtt, start cardizem 30 mg TID, cont toprol XL, MINDI 4/2023 EF 55-60%, cont eliquis.   - cardiology consulted  - pending TSH   - serial troponin, trop x 2 negative  - reports for the last month HR > 100   - recent CT coronaries negative .     Fluid overload r/w Atrial fibrillation   - lasix 40 mg iv x 1   - proBNP elevated  - Chest X-Ray: pulmonary vascular congestion     Anxiety   - prn hydroxyzine     Obesity BMI 39   - wt loss counseling per PCP     aortic valve disease   - MINDI 4/2023  fibroelastoma or lesion   - seen by CT surgery Dr. Jarett Zepeda plan for surgery  - plan for cardiac MRI at [facility] on Wednesday.     DVT/GI px  - eliquis/on a diet     Full code

## 2023-06-03 NOTE — ED PROVIDER NOTE - CLINICAL SUMMARY MEDICAL DECISION MAKING FREE TEXT BOX
patient's history is concerning although she has no chest pain complains of shortness of breath and cough we obtain EKG initial EKG consistent with possible STEMI STEMI code activated at 7:27 AM discussed case with cardiology upon call the STEMI cardiac fellow discussed case with attending Dr. Trinidad at 7:37 AM advised to cancel STEMI code EKG not consistent with STEMI more consistent with a flutter pattern advised to admit at [facility] I will continue to monitor patient we obtain labs including troponin chest x-ray reveals cardiomegaly per my independent evaluation patient will need admission family updated agree with plan of care

## 2023-06-03 NOTE — ED ADULT NURSE NOTE - HOW OFTEN DO YOU HAVE A DRINK CONTAINING ALCOHOL?
Chief Complaint   Patient presents with    Annual Exam     History of presenting illness:  Merle Barraza is a50 y.o. female who presents today for follow up on her chronic medical conditions as noted below. 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    Vitamin D deficiency 09/01/2020    IFG (impaired fasting glucose) 09/01/2020     Past Medical History:   Diagnosis Date    Hashimoto's thyroiditis       Past Surgical History:   Procedure Laterality Date    COLONOSCOPY  2012    dr Genoveva Wilson, TOTAL ABDOMINAL (CERVIX REMOVED)       Current Outpatient Medications   Medication Sig Dispense Refill    BREO ELLIPTA 200-25 MCG/ACT AEPB inhaler INHALE 2 PUFFS BY MOUTH INTO THE LUNGS DAILY 180 each 2    levothyroxine (SYNTHROID) 100 MCG tablet TAKE 1 TABLET BY MOUTH EVERY DAY IN THE MORNING 30 tablet 5    QUEtiapine (SEROQUEL) 25 MG tablet Take 1 tablet by mouth nightly as needed (sleep) 90 tablet 0    omalizumab (XOLAIR) 150 MG/ML SOSY injection Inject 300 mg into the skin every 28 days      montelukast (SINGULAIR) 10 MG tablet TAKE 1 TABLET BY MOUTH EVERY DAY 90 tablet 3    Ibuprofen-Acetaminophen (ADVIL DUAL ACTION) 125-250 MG TABS Take by mouth in the morning and at bedtime      Naltrexone HCl POWD TAKE 1 CAPSULE AT BEDTIME 90 g 3    fluticasone (VERAMYST) 27.5 MCG/SPRAY nasal spray 2 sprays by Each Nostril route daily      Vitamin D3 572303 UNIT/GM POWD, Food Color Green POWD, Cellulose POWD, Empty Capsule Size 3 Pnk/Clear CAPS TAKE 1 CAPSULE BY MOUTH ONCE WEEKLY. (Patient not taking: Reported on 3/6/2023) 4 each 3    cetirizine (ZYRTEC) 10 MG tablet Take 20 mg by mouth 2 times daily (Patient not taking: Reported on 3/6/2023)       No current facility-administered medications for this visit.      No Known Allergies  Social History     Tobacco Use    Smoking status: Never    Smokeless tobacco: Never   Substance Use Topics    Alcohol use: Never      Family History   Problem Relation Age of Onset    Thyroid Disease Mother     High Blood Pressure Father     Anxiety Disorder Father     Arthritis Father     Cancer Maternal Grandmother     Diabetes Paternal Grandmother     Cancer Paternal Grandmother        Review of Systems   Constitutional:  Negative for chills, fatigue and fever. HENT:  Negative for congestion, ear pain, nosebleeds, postnasal drip and sore throat. Respiratory:  Negative for cough, chest tightness and wheezing. Cardiovascular:  Negative for chest pain, palpitations and leg swelling. Gastrointestinal:  Negative for abdominal pain and constipation. Genitourinary:  Negative for dysuria and urgency. Musculoskeletal: Negative. Negative for arthralgias. Skin:  Negative for rash. Neurological:  Negative for dizziness and headaches. Psychiatric/Behavioral: Negative. Vitals:    03/06/23 1046   BP: 118/80   Site: Left Upper Arm   Position: Sitting   Cuff Size: Large Adult   Pulse: 76   Resp: 18   SpO2: 98%   Weight: 165 lb (74.8 kg)   Height: 5' 6\" (1.676 m)     Body mass index is 26.63 kg/m². Physical Exam  Constitutional:       Appearance: She is well-developed. HENT:      Right Ear: External ear normal.      Left Ear: External ear normal.      Mouth/Throat:      Pharynx: No oropharyngeal exudate. Eyes:      Conjunctiva/sclera: Conjunctivae normal.      Pupils: Pupils are equal, round, and reactive to light. Neck:      Thyroid: No thyromegaly. Vascular: No JVD. Cardiovascular:      Rate and Rhythm: Normal rate. Heart sounds: Normal heart sounds. No murmur heard. Pulmonary:      Effort: No respiratory distress. Breath sounds: Normal breath sounds. No wheezing or rales. Chest:      Chest wall: No tenderness. Abdominal:      General: Bowel sounds are normal.      Palpations: Abdomen is soft.    Musculoskeletal:      Cervical back: Neck supple. Lymphadenopathy:      Cervical: No cervical adenopathy. Skin:     General: Skin is warm. Findings: No rash. Neurological:      Mental Status: She is oriented to person, place, and time.    Breast exam  Bilateral breast exam- symmetric, no nodules, no lymphadenopathy, no nipple discharge          Lab Review   Orders Only on 03/01/2023   Component Date Value    T4 Free 03/01/2023 1.47     TSH 03/01/2023 2.020     Color, UA 03/01/2023 YELLOW     Clarity, UA 03/01/2023 CLOUDY (A)     Glucose, Ur 03/01/2023 Negative     Bilirubin Urine 03/01/2023 Negative     Ketones, Urine 03/01/2023 Negative     Specific Gravity, UA 03/01/2023 1.021     Blood, Urine 03/01/2023 Negative     pH, UA 03/01/2023 5.5     Protein, UA 03/01/2023 Negative     Urobilinogen, Urine 03/01/2023 0.2     Nitrite, Urine 03/01/2023 Negative     Leukocyte Esterase, Urine 03/01/2023 SMALL (A)     Cholesterol, Total 03/01/2023 204 (A)     Triglycerides 03/01/2023 107     HDL 03/01/2023 61 (A)     LDL Calculated 03/01/2023 122     WBC 03/01/2023 3.6 (A)     RBC 03/01/2023 4.30     Hemoglobin 03/01/2023 13.9     Hematocrit 03/01/2023 41.3     MCV 03/01/2023 96.0     MCH 03/01/2023 32.3 (A)     MCHC 03/01/2023 33.7     RDW 03/01/2023 12.2     Platelets 20/30/7255 247     MPV 03/01/2023 9.5     Neutrophils % 03/01/2023 52.2     Lymphocytes % 03/01/2023 38.4     Monocytes % 03/01/2023 9.4     Eosinophils % 03/01/2023 0.0     Basophils % 03/01/2023 0.0     Neutrophils Absolute 03/01/2023 1.9     Immature Granulocytes # 03/01/2023 0.0     Lymphocytes Absolute 03/01/2023 1.4     Monocytes Absolute 03/01/2023 0.30     Eosinophils Absolute 03/01/2023 0.00     Basophils Absolute 03/01/2023 0.00     Sodium 03/01/2023 143     Potassium 03/01/2023 4.4     Chloride 03/01/2023 106     CO2 03/01/2023 29     Anion Gap 03/01/2023 8     Glucose 03/01/2023 92     BUN 03/01/2023 19     Creatinine 03/01/2023 0.9     Est, Glom Filt Rate 03/01/2023 >60 Calcium 03/01/2023 9.3     Total Protein 03/01/2023 6.5 (A)     Albumin 03/01/2023 4.4     Total Bilirubin 03/01/2023 0.6     Alkaline Phosphatase 03/01/2023 76     ALT 03/01/2023 20     AST 03/01/2023 22     Urine Culture, Routine 03/01/2023 <50,000 CFU/ml mixed skin/urogenital felix. No further workup     Bacteria, UA 03/01/2023 NEGATIVE (A)     Crystals, UA 03/01/2023 NEG (A)     Hyaline Casts, UA 03/01/2023 5     WBC, UA 03/01/2023 3     RBC, UA 03/01/2023 2     Epithelial Cells, UA 03/01/2023 4            ASSESSMENT/PLAN:    Annual physical     * mammo order placed  * pap NA   (HO endometriosis  Post total hysterectomy  Developed bleeding post hysterctomy- per gyn had endometriosis implant left- since then no issues  Does not need further pap's)  Colonoscopy post due       Hyperlipidemia  *   Healthy, mostly fiber rich nonstarchy plant-based diet recommended  Recommend to decrease intake of processed foods, simple carbohydrates and animal-based products that high in saturated fats     Hypothyroidism  Post total thyroidectomy 8/2021  TFT's good  Synthroid dose 100 daily        Sleeping issues on and off  She takes seroquel for sleep     Issues with allergies she stays on Flonase no spray, Allegra and Singulair. Currently her symptoms mostly have been controlled     Has issues with knee pains  Has been to PT  Some better     Vitamin D deficiency  Level 44  Take vit d every other week summers/ every week winter     Urinary frequency  obtain ua + cx    Xolair injections for chronic hives     Past du cscope  Seen dr Aaron Sever in past  Dx with cystic colitis profunda  States will call dr Aaron Sever for appt  Cscope [place] 2012 path results  Final Diagnosis   1. Colon mass at 20 cm, biopsy:     Fragment of benign hyperplastic polyp. 2.     Colon mass at 10 cm, biopsy:            A. Benign hyperplastic mucosal changes with cystic dilatation of   subsurface glands.         B.     No adenomatous or malignant changes. See comment. 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 Clinical correlation will be required. Orders Placed This Encounter   Procedures    [facility] DIGITAL SCREEN W OR WO CAD BILATERAL    CBC with Auto Differential    Comprehensive Metabolic Panel    Lipid Panel    Hemoglobin A1C    TSH    T4, Free    Urinalysis    Vitamin D 25 Hydroxy     New Prescriptions    No medications on file         Return in about 1 year (around 3/6/2024) for Annual Physical.   There are no Patient Instructions on file for this visit. EMR Dragon/transcription disclaimer:Significant part of this  encounter note is electronic transcription/translationof spoken language to printed text. The electronic translation of spoken language may be erroneous, or at times, nonsensical words or phrases may be inadvertently transcribed.  Although I have reviewed the note for sucherrors, some may still exist. Never

## 2023-06-04 ENCOUNTER — TRANSCRIPTION ENCOUNTER (OUTPATIENT)
Age: 43
End: 2023-06-04

## 2023-06-04 VITALS
RESPIRATION RATE: 16 BRPM | TEMPERATURE: 97 F | SYSTOLIC BLOOD PRESSURE: 123 MMHG | HEART RATE: 70 BPM | DIASTOLIC BLOOD PRESSURE: 76 MMHG | OXYGEN SATURATION: 95 %

## 2023-06-04 LAB
ANION GAP SERPL CALC-SCNC: 13 MMOL/L — SIGNIFICANT CHANGE UP (ref 7–14)
BASOPHILS # BLD AUTO: 0.05 K/UL — SIGNIFICANT CHANGE UP (ref 0–0.2)
BASOPHILS NFR BLD AUTO: 0.5 % — SIGNIFICANT CHANGE UP (ref 0–1)
BUN SERPL-MCNC: 18 MG/DL — SIGNIFICANT CHANGE UP (ref 10–20)
CALCIUM SERPL-MCNC: 9 MG/DL — SIGNIFICANT CHANGE UP (ref 8.4–10.5)
CHLORIDE SERPL-SCNC: 99 MMOL/L — SIGNIFICANT CHANGE UP (ref 98–110)
CO2 SERPL-SCNC: 26 MMOL/L — SIGNIFICANT CHANGE UP (ref 17–32)
CREAT SERPL-MCNC: 0.9 MG/DL — SIGNIFICANT CHANGE UP (ref 0.7–1.5)
EGFR: 81 ML/MIN/1.73M2 — SIGNIFICANT CHANGE UP
EOSINOPHIL # BLD AUTO: 0.14 K/UL — SIGNIFICANT CHANGE UP (ref 0–0.7)
EOSINOPHIL NFR BLD AUTO: 1.4 % — SIGNIFICANT CHANGE UP (ref 0–8)
GLUCOSE SERPL-MCNC: 96 MG/DL — SIGNIFICANT CHANGE UP (ref 70–99)
HCT VFR BLD CALC: 41.7 % — SIGNIFICANT CHANGE UP (ref 37–47)
HGB BLD-MCNC: 13.2 G/DL — SIGNIFICANT CHANGE UP (ref 12–16)
IMM GRANULOCYTES NFR BLD AUTO: 0.3 % — SIGNIFICANT CHANGE UP (ref 0.1–0.3)
LYMPHOCYTES # BLD AUTO: 1.09 K/UL — LOW (ref 1.2–3.4)
LYMPHOCYTES # BLD AUTO: 10.9 % — LOW (ref 20.5–51.1)
MCHC RBC-ENTMCNC: 25.7 PG — LOW (ref 27–31)
MCHC RBC-ENTMCNC: 31.7 G/DL — LOW (ref 32–37)
MCV RBC AUTO: 81.1 FL — SIGNIFICANT CHANGE UP (ref 81–99)
MONOCYTES # BLD AUTO: 0.72 K/UL — HIGH (ref 0.1–0.6)
MONOCYTES NFR BLD AUTO: 7.2 % — SIGNIFICANT CHANGE UP (ref 1.7–9.3)
NEUTROPHILS # BLD AUTO: 7.94 K/UL — HIGH (ref 1.4–6.5)
NEUTROPHILS NFR BLD AUTO: 79.7 % — HIGH (ref 42.2–75.2)
NRBC # BLD: 0 /100 WBCS — SIGNIFICANT CHANGE UP (ref 0–0)
PLATELET # BLD AUTO: 352 K/UL — SIGNIFICANT CHANGE UP (ref 130–400)
PMV BLD: 10.6 FL — HIGH (ref 7.4–10.4)
POTASSIUM SERPL-MCNC: 4.3 MMOL/L — SIGNIFICANT CHANGE UP (ref 3.5–5)
POTASSIUM SERPL-SCNC: 4.3 MMOL/L — SIGNIFICANT CHANGE UP (ref 3.5–5)
RBC # BLD: 5.14 M/UL — SIGNIFICANT CHANGE UP (ref 4.2–5.4)
RBC # FLD: 15.4 % — HIGH (ref 11.5–14.5)
SODIUM SERPL-SCNC: 138 MMOL/L — SIGNIFICANT CHANGE UP (ref 135–146)
WBC # BLD: 9.97 K/UL — SIGNIFICANT CHANGE UP (ref 4.8–10.8)
WBC # FLD AUTO: 9.97 K/UL — SIGNIFICANT CHANGE UP (ref 4.8–10.8)

## 2023-06-04 PROCEDURE — 99223 1ST HOSP IP/OBS HIGH 75: CPT

## 2023-06-04 RX ORDER — ALBUTEROL 90 UG/1
2 AEROSOL, METERED ORAL EVERY 6 HOURS
Refills: 0 | Status: DISCONTINUED | OUTPATIENT
Start: 2023-06-04 | End: 2023-06-04

## 2023-06-04 RX ORDER — AMIODARONE HYDROCHLORIDE 400 MG/1
200 TABLET ORAL DAILY
Refills: 0 | Status: DISCONTINUED | OUTPATIENT
Start: 2023-06-04 | End: 2023-06-04

## 2023-06-04 RX ORDER — ALPRAZOLAM 0.25 MG
0.25 TABLET ORAL THREE TIMES A DAY
Refills: 0 | Status: DISCONTINUED | OUTPATIENT
Start: 2023-06-04 | End: 2023-06-04

## 2023-06-04 RX ORDER — TIOTROPIUM BROMIDE 18 UG/1
2 CAPSULE ORAL; RESPIRATORY (INHALATION) DAILY
Refills: 0 | Status: DISCONTINUED | OUTPATIENT
Start: 2023-06-04 | End: 2023-06-04

## 2023-06-04 RX ORDER — DILTIAZEM HCL 120 MG
1 CAPSULE, EXT RELEASE 24 HR ORAL
Qty: 90 | Refills: 0
Start: 2023-06-04 | End: 2023-07-03

## 2023-06-04 RX ORDER — AMIODARONE HYDROCHLORIDE 400 MG/1
1 TABLET ORAL
Qty: 30 | Refills: 0
Start: 2023-06-04 | End: 2023-07-03

## 2023-06-04 RX ADMIN — Medication 30 MILLIGRAM(S): at 14:22

## 2023-06-04 RX ADMIN — APIXABAN 5 MILLIGRAM(S): 2.5 TABLET, FILM COATED ORAL at 05:16

## 2023-06-04 RX ADMIN — Medication 30 MILLIGRAM(S): at 05:14

## 2023-06-04 RX ADMIN — ALBUTEROL 2 PUFF(S): 90 AEROSOL, METERED ORAL at 14:46

## 2023-06-04 RX ADMIN — Medication 0.25 MILLIGRAM(S): at 11:33

## 2023-06-04 RX ADMIN — Medication 100 MILLIGRAM(S): at 05:14

## 2023-06-04 RX ADMIN — AMIODARONE HYDROCHLORIDE 200 MILLIGRAM(S): 400 TABLET ORAL at 11:32

## 2023-06-04 RX ADMIN — APIXABAN 5 MILLIGRAM(S): 2.5 TABLET, FILM COATED ORAL at 17:36

## 2023-06-04 NOTE — DISCHARGE NOTE NURSING/CASE MANAGEMENT/SOCIAL WORK - PATIENT PORTAL LINK FT
You can access the FollowMyHealth Patient Portal offered by [facility] by registering at the following website: [email or web address]. By joining [facility]’s FollowMyHealth portal, you will also be able to view your health information using other applications (apps) compatible with our system.

## 2023-06-04 NOTE — DISCHARGE NOTE PROVIDER - PROVIDER TOKENS
PROVIDER:[TOKEN:[[id number]],FOLLOWUP:[1 week]] PROVIDER:[TOKEN:[[id number]],FOLLOWUP:[1 week]],PROVIDER:[TOKEN:[[id number]],FOLLOWUP:[1 week]]

## 2023-06-04 NOTE — CHART NOTE - NSCHARTNOTEFT_GEN_A_CORE
Pt resting comfortably, offers no new complaints at this time  Afib on tele hr steady 90-100s, hd stable  will continue on current Afib rate control regimen  Toprol-100/Amiodarone-200/Cardizem-30 TID  Eliquis-5  told to follow up with Dr Metcalf    Discussed w/ Medicine PA
Code STEMI was activated for this patient to which I responded immediately. Collateral hx obtained from ER physician at [facility] and charts, EKG was reviewed. 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 No chest pain, SOB or GOULD. EKG reviewed showed AFL/A Fib with RVR no ST elevations. Case discussed with interventional cardiologist on call and code STEMI cancelled.

## 2023-06-04 NOTE — DISCHARGE NOTE PROVIDER - NSDCMRMEDTOKEN_GEN_ALL_CORE_FT
apixaban 5 mg oral tablet: 1 tab(s) orally 2 times a day   Metoprolol Succinate  mg oral tablet, extended release: 1 orally   amiodarone 200 mg oral tablet: 1 tab(s) orally once a day  apixaban 5 mg oral tablet: 1 tab(s) orally 2 times a day   Cardizem 30 mg oral tablet: 1 tab(s) orally 3 times a day Hold for HR below 60  Metoprolol Succinate  mg oral tablet, extended release: 1 tablet, extended release orally once a day

## 2023-06-04 NOTE — DISCHARGE NOTE PROVIDER - NSDCCPGOAL_GEN_ALL_CORE_FT
Letter in [address] [place] [address]. What is stated in the letter is all that I will write. To get better and follow your care plan as instructed.

## 2023-06-04 NOTE — DISCHARGE NOTE NURSING/CASE MANAGEMENT/SOCIAL WORK - NSDCPEFALRISK_GEN_ALL_CORE
For information on Fall & Injury Prevention, visit: [email or web address] OR  [email or web address] OR  https://www.cdc.gov/steadi/patient.html

## 2023-06-04 NOTE — DISCHARGE NOTE PROVIDER - HOSPITAL COURSE
43 y/o female with PMH of Atrial Fibrillation, Hypertrophic cardiomyopathy, Cardiac Sx related to her Atrial Fibrillation scheduled for June 20/2023 with Dr Zepeda at the [facility] presented with Atrial Fibrillation with RVR. She was treated with Cardizem 10mg IVP for HR control then started on Amiodarone bolus  with drip to follow. Cardiology consulted and   pt was transitioned to Amio PO 200mg daily. Toprol 100mg and Eliquis continued.    Today she is doing better. She is medically stable for dc home on Amio 200 po qd and outpatient follow up with cardiology. 41 y/o female with PMH of Atrial Fibrillation, Hypertrophic cardiomyopathy, Cardiac Sx related to her Atrial Fibrillation scheduled for June 20/2023 with Dr Zepeda at the [facility] presented with Atrial Fibrillation with RVR. She was treated with Cardizem 10mg IVP for HR control then started on Amiodarone bolus  with drip to follow. Cardiology consulted and   pt was transitioned to Amio PO 200mg daily and Cardizem PO.   Toprol 100mg and Eliquis continued.    Today she is doing better. She is medically stable for dc home on Toprol-100/Amiodarone-200/Cardizem-30 TID/Eliquis 5 BID and outpatient follow up with cardiology and PCP.

## 2023-06-04 NOTE — DISCHARGE NOTE PROVIDER - NSDCFUSCHEDAPPT_GEN_ALL_CORE_FT
Doctor, Unknown  [facility] PreAdmits  Scheduled Appointment: 06/07/2023    [facility]  MRI  [address]  Scheduled Appointment: 06/07/2023    Kishore Perez  [facility] PreAdmits  Scheduled Appointment: 06/14/2023    Jarett Zepeda  [facility] PreAdmits  Scheduled Appointment: 06/20/2023    Jarett Zepeda  [facility]  CTSURG JAMES [address]  Scheduled Appointment: 06/20/2023    Elliot Metcalf  [facility]  CARDIOLOGY [address]  Scheduled Appointment: 06/23/2023    Elliot Metcalf  [facility]  CARDIOLOGY [address]  Scheduled Appointment: 06/30/2023    Unknown, Doctor  [facility] PreAdmits  Scheduled Appointment: 07/11/2023    [facility]  MRI [address]  Scheduled Appointment: 07/11/2023    Brennan Tyler  [facility]  ELECTROPH [address]  Scheduled Appointment: 08/01/2023

## 2023-06-04 NOTE — DISCHARGE NOTE PROVIDER - CARE PROVIDER_API CALL
Padmini Vaughn  Internal Medicine  [address] [place] [address]  Phone: [phone]  Fax: ()-  Follow Up Time: 1 week   Padmini Vaughn  Internal Medicine  [address] [place] [address]  Phone: [phone]  Fax: ()-  Follow Up Time: 1 week    Elliot Metcalf  Cardiovascular Disease  [address] [place] [address]  Phone: [phone]  Fax: [phone]  Follow Up Time: 1 week

## 2023-06-04 NOTE — DISCHARGE NOTE PROVIDER - NSDCCPCAREPLAN_GEN_ALL_CORE_FT
PRINCIPAL DISCHARGE DIAGNOSIS  Diagnosis: Atrial fibrillation with RVR  Assessment and Plan of Treatment: You were found to have an irregularly irregular rhythm called atrial fibrillation.   Please continue medications for rate control.   You were also on a medication for stroke prevention, called Eliquis . Continue medication for stroke prevention as advised.   Please follow up with your cardiologist within 1 week of discharge.

## 2023-06-05 LAB
T4 FREE SERPL-MCNC: 1.8 NG/DL — SIGNIFICANT CHANGE UP (ref 0.9–1.8)
TSH SERPL-MCNC: 4.94 UIU/ML — HIGH (ref 0.27–4.2)

## 2023-06-07 ENCOUNTER — OUTPATIENT (OUTPATIENT)
Dept: OUTPATIENT SERVICES | Facility: HOSPITAL | Age: 43
LOS: 1 days | End: 2023-06-07
Payer: COMMERCIAL

## 2023-06-07 ENCOUNTER — APPOINTMENT (OUTPATIENT)
Dept: MRI IMAGING | Facility: HOSPITAL | Age: 43
End: 2023-06-07

## 2023-06-07 DIAGNOSIS — Z98.890 OTHER SPECIFIED POSTPROCEDURAL STATES: Chronic | ICD-10-CM

## 2023-06-07 PROCEDURE — 75561 CARDIAC MRI FOR MORPH W/DYE: CPT

## 2023-06-07 PROCEDURE — 75561 CARDIAC MRI FOR MORPH W/DYE: CPT | Mod: 26

## 2023-06-07 PROCEDURE — A9577: CPT

## 2023-06-09 DIAGNOSIS — F41.9 ANXIETY DISORDER, UNSPECIFIED: ICD-10-CM

## 2023-06-09 DIAGNOSIS — I42.2 OTHER HYPERTROPHIC CARDIOMYOPATHY: ICD-10-CM

## 2023-06-09 DIAGNOSIS — I35.9 NONRHEUMATIC AORTIC VALVE DISORDER, UNSPECIFIED: ICD-10-CM

## 2023-06-09 DIAGNOSIS — I48.20 CHRONIC ATRIAL FIBRILLATION, UNSPECIFIED: ICD-10-CM

## 2023-06-09 DIAGNOSIS — E66.9 OBESITY, UNSPECIFIED: ICD-10-CM

## 2023-06-13 ENCOUNTER — NON-APPOINTMENT (OUTPATIENT)
Age: 43
End: 2023-06-13

## 2023-06-14 ENCOUNTER — OUTPATIENT (OUTPATIENT)
Dept: OUTPATIENT SERVICES | Facility: HOSPITAL | Age: 43
LOS: 1 days | End: 2023-06-14
Payer: COMMERCIAL

## 2023-06-14 VITALS
OXYGEN SATURATION: 97 % | SYSTOLIC BLOOD PRESSURE: 126 MMHG | HEART RATE: 115 BPM | DIASTOLIC BLOOD PRESSURE: 85 MMHG | TEMPERATURE: 98 F | RESPIRATION RATE: 18 BRPM | HEIGHT: 61 IN | WEIGHT: 190.04 LBS

## 2023-06-14 DIAGNOSIS — Z98.890 OTHER SPECIFIED POSTPROCEDURAL STATES: Chronic | ICD-10-CM

## 2023-06-14 DIAGNOSIS — I48.91 UNSPECIFIED ATRIAL FIBRILLATION: ICD-10-CM

## 2023-06-14 DIAGNOSIS — Z01.818 ENCOUNTER FOR OTHER PREPROCEDURAL EXAMINATION: ICD-10-CM

## 2023-06-14 DIAGNOSIS — C08.1 MALIGNANT NEOPLASM OF SUBLINGUAL GLAND: ICD-10-CM

## 2023-06-14 LAB
A1C WITH ESTIMATED AVERAGE GLUCOSE RESULT: 6 % — HIGH (ref 4–5.6)
ALBUMIN SERPL ELPH-MCNC: 4.3 G/DL — SIGNIFICANT CHANGE UP (ref 3.5–5.2)
ALP SERPL-CCNC: 104 U/L — SIGNIFICANT CHANGE UP (ref 30–115)
ALT FLD-CCNC: 30 U/L — SIGNIFICANT CHANGE UP (ref 0–41)
ANION GAP SERPL CALC-SCNC: 11 MMOL/L — SIGNIFICANT CHANGE UP (ref 7–14)
APPEARANCE UR: CLEAR — SIGNIFICANT CHANGE UP
APTT BLD: 31.3 SEC — SIGNIFICANT CHANGE UP (ref 27–39.2)
AST SERPL-CCNC: 33 U/L — SIGNIFICANT CHANGE UP (ref 0–41)
BACTERIA # UR AUTO: NEGATIVE — SIGNIFICANT CHANGE UP
BASOPHILS # BLD AUTO: 0.04 K/UL — SIGNIFICANT CHANGE UP (ref 0–0.2)
BASOPHILS NFR BLD AUTO: 0.5 % — SIGNIFICANT CHANGE UP (ref 0–1)
BILIRUB SERPL-MCNC: 0.6 MG/DL — SIGNIFICANT CHANGE UP (ref 0.2–1.2)
BILIRUB UR-MCNC: NEGATIVE — SIGNIFICANT CHANGE UP
BLD GP AB SCN SERPL QL: SIGNIFICANT CHANGE UP
BUN SERPL-MCNC: 20 MG/DL — SIGNIFICANT CHANGE UP (ref 10–20)
CALCIUM SERPL-MCNC: 9.4 MG/DL — SIGNIFICANT CHANGE UP (ref 8.4–10.5)
CHLORIDE SERPL-SCNC: 102 MMOL/L — SIGNIFICANT CHANGE UP (ref 98–110)
CO2 SERPL-SCNC: 24 MMOL/L — SIGNIFICANT CHANGE UP (ref 17–32)
COLOR SPEC: YELLOW — SIGNIFICANT CHANGE UP
CREAT SERPL-MCNC: 0.7 MG/DL — SIGNIFICANT CHANGE UP (ref 0.7–1.5)
DIFF PNL FLD: ABNORMAL
EGFR: 110 ML/MIN/1.73M2 — SIGNIFICANT CHANGE UP
EOSINOPHIL # BLD AUTO: 0.19 K/UL — SIGNIFICANT CHANGE UP (ref 0–0.7)
EOSINOPHIL NFR BLD AUTO: 2.6 % — SIGNIFICANT CHANGE UP (ref 0–8)
EPI CELLS # UR: 1 /HPF — SIGNIFICANT CHANGE UP (ref 0–5)
ESTIMATED AVERAGE GLUCOSE: 126 MG/DL — HIGH (ref 68–114)
GLUCOSE SERPL-MCNC: 105 MG/DL — HIGH (ref 70–99)
GLUCOSE UR QL: NEGATIVE — SIGNIFICANT CHANGE UP
HCT VFR BLD CALC: 38.2 % — SIGNIFICANT CHANGE UP (ref 37–47)
HGB BLD-MCNC: 12.3 G/DL — SIGNIFICANT CHANGE UP (ref 12–16)
HYALINE CASTS # UR AUTO: 0 /LPF — SIGNIFICANT CHANGE UP (ref 0–7)
IMM GRANULOCYTES NFR BLD AUTO: 0.5 % — HIGH (ref 0.1–0.3)
INR BLD: 1.41 RATIO — HIGH (ref 0.65–1.3)
KETONES UR-MCNC: NEGATIVE — SIGNIFICANT CHANGE UP
LEUKOCYTE ESTERASE UR-ACNC: NEGATIVE — SIGNIFICANT CHANGE UP
LYMPHOCYTES # BLD AUTO: 1.12 K/UL — LOW (ref 1.2–3.4)
LYMPHOCYTES # BLD AUTO: 15.2 % — LOW (ref 20.5–51.1)
MCHC RBC-ENTMCNC: 25.9 PG — LOW (ref 27–31)
MCHC RBC-ENTMCNC: 32.2 G/DL — SIGNIFICANT CHANGE UP (ref 32–37)
MCV RBC AUTO: 80.6 FL — LOW (ref 81–99)
MONOCYTES # BLD AUTO: 0.69 K/UL — HIGH (ref 0.1–0.6)
MONOCYTES NFR BLD AUTO: 9.4 % — HIGH (ref 1.7–9.3)
MRSA PCR RESULT.: NEGATIVE — SIGNIFICANT CHANGE UP
NEUTROPHILS # BLD AUTO: 5.28 K/UL — SIGNIFICANT CHANGE UP (ref 1.4–6.5)
NEUTROPHILS NFR BLD AUTO: 71.8 % — SIGNIFICANT CHANGE UP (ref 42.2–75.2)
NITRITE UR-MCNC: NEGATIVE — SIGNIFICANT CHANGE UP
NRBC # BLD: 0 /100 WBCS — SIGNIFICANT CHANGE UP (ref 0–0)
NT-PROBNP SERPL-SCNC: 1381 PG/ML — HIGH (ref 0–300)
PH UR: 6.5 — SIGNIFICANT CHANGE UP (ref 5–8)
PLATELET # BLD AUTO: 272 K/UL — SIGNIFICANT CHANGE UP (ref 130–400)
PMV BLD: 10.8 FL — HIGH (ref 7.4–10.4)
POTASSIUM SERPL-MCNC: 4.1 MMOL/L — SIGNIFICANT CHANGE UP (ref 3.5–5)
POTASSIUM SERPL-SCNC: 4.1 MMOL/L — SIGNIFICANT CHANGE UP (ref 3.5–5)
PROT SERPL-MCNC: 7.3 G/DL — SIGNIFICANT CHANGE UP (ref 6–8)
PROT UR-MCNC: ABNORMAL
PROTHROM AB SERPL-ACNC: 16.3 SEC — HIGH (ref 9.95–12.87)
RBC # BLD: 4.74 M/UL — SIGNIFICANT CHANGE UP (ref 4.2–5.4)
RBC # FLD: 15.6 % — HIGH (ref 11.5–14.5)
RBC CASTS # UR COMP ASSIST: 9 /HPF — HIGH (ref 0–4)
SODIUM SERPL-SCNC: 137 MMOL/L — SIGNIFICANT CHANGE UP (ref 135–146)
SP GR SPEC: 1.02 — SIGNIFICANT CHANGE UP (ref 1.01–1.03)
UROBILINOGEN FLD QL: ABNORMAL
WBC # BLD: 7.36 K/UL — SIGNIFICANT CHANGE UP (ref 4.8–10.8)
WBC # FLD AUTO: 7.36 K/UL — SIGNIFICANT CHANGE UP (ref 4.8–10.8)
WBC UR QL: 2 /HPF — SIGNIFICANT CHANGE UP (ref 0–5)

## 2023-06-14 PROCEDURE — 80053 COMPREHEN METABOLIC PANEL: CPT

## 2023-06-14 PROCEDURE — 86901 BLOOD TYPING SEROLOGIC RH(D): CPT

## 2023-06-14 PROCEDURE — 86900 BLOOD TYPING SEROLOGIC ABO: CPT

## 2023-06-14 PROCEDURE — 71046 X-RAY EXAM CHEST 2 VIEWS: CPT

## 2023-06-14 PROCEDURE — 87640 STAPH A DNA AMP PROBE: CPT

## 2023-06-14 PROCEDURE — 83036 HEMOGLOBIN GLYCOSYLATED A1C: CPT

## 2023-06-14 PROCEDURE — 36415 COLL VENOUS BLD VENIPUNCTURE: CPT

## 2023-06-14 PROCEDURE — 87641 MR-STAPH DNA AMP PROBE: CPT

## 2023-06-14 PROCEDURE — 86850 RBC ANTIBODY SCREEN: CPT

## 2023-06-14 PROCEDURE — 85025 COMPLETE CBC W/AUTO DIFF WBC: CPT

## 2023-06-14 PROCEDURE — 81001 URINALYSIS AUTO W/SCOPE: CPT

## 2023-06-14 PROCEDURE — 93005 ELECTROCARDIOGRAM TRACING: CPT

## 2023-06-14 PROCEDURE — 93010 ELECTROCARDIOGRAM REPORT: CPT

## 2023-06-14 PROCEDURE — 85730 THROMBOPLASTIN TIME PARTIAL: CPT

## 2023-06-14 PROCEDURE — 83880 ASSAY OF NATRIURETIC PEPTIDE: CPT

## 2023-06-14 PROCEDURE — 85610 PROTHROMBIN TIME: CPT

## 2023-06-14 PROCEDURE — 99214 OFFICE O/P EST MOD 30 MIN: CPT | Mod: 25

## 2023-06-14 NOTE — H&P PST ADULT - HISTORY OF PRESENT ILLNESS
44 yo female presents for PAST in preparation for   Pt complains of I am in a FIB, HAD cardiovbersion which was not successful. Currently on Metroprolol Eliquis , Placed on  Cardizem and Amiodarone this weekend while visiting  emergency room. Pt has a history of HCM ( diagnosed 10 years ago) and Afib about 6 weeks ago. ALso reports a cough which is new for her, Chest Xray done in ED last week which showed "little fluids" . CURRENTLY  denies any chest pain, difficulty breathing, SOB, palpitations, dysuria, URI, or any other infections in the last 2 weeks/1 month. Denies any recent travel, contact, or exposure to any persons with known or suspected COVID-19. Pt also denies COVID testing within the last 2 weeks. Pt advised to self quarantine until day of procedure. Exercise tolerance of 2-3 flights of stairs without dyspnea. LUZMA reviewed with patient.    Anesthesia Alert  NO--Difficult Airway, class IV  NO--History of neck surgery or radiation  NO--Limited ROM of neck  NO--History of Malignant hyperthermia  NO--Personal or family history of Pseudocholinesterase deficiency.  NO--Prior Anesthesia Complication  NO--Latex Allergy  NO--Loose teeth  NO--History of Rheumatoid Arthritis  NO--LUZMA  NO--Bleeding risk, Eliquis   NO--Other_____   written and verbal instructions with teach back on chlorhexidine shampoo provided,  pt verbalized understanding with returned demonstration  Patient verbalized understanding of instructions and was given the opportunity to ask questions and have them answered.

## 2023-06-14 NOTE — H&P PST ADULT - REASON FOR ADMISSION
Case Type: OP Block TimeSuite: OR HeartProceduralist: Jarett Zepeda  Confirmed Surgery DateTime: 06- - 0:00PAST DateTime: 06- - 8:15Procedure: AORTIC VALVE PROCEDURE - POSS REPLACEMENT ENCOMPASS ABLATION PROCEDURE, LEFT ATRIAL APPENDAGE CLOSURE  ERP?: UnavailableLaterality: N/ALength of Procedure: 360 Minutes  Anesthesia Type: General

## 2023-06-14 NOTE — H&P PST ADULT - NSICDXPASTMEDICALHX_GEN_ALL_CORE_FT
PAST MEDICAL HISTORY:  Aortic valve disease     Atrial fibrillation     Hypertrophic cardiomyopathy     Obese

## 2023-06-14 NOTE — H&P PST ADULT - NSANTHOSAYNRD_GEN_A_CORE
No. LUZMA screening performed.  STOP BANG Legend: 0-2 = LOW Risk; 3-4 = INTERMEDIATE Risk; 5-8 = HIGH Risk

## 2023-06-15 ENCOUNTER — RESULT REVIEW (OUTPATIENT)
Age: 43
End: 2023-06-15

## 2023-06-15 DIAGNOSIS — Z01.818 ENCOUNTER FOR OTHER PREPROCEDURAL EXAMINATION: ICD-10-CM

## 2023-06-15 DIAGNOSIS — C08.1 MALIGNANT NEOPLASM OF SUBLINGUAL GLAND: ICD-10-CM

## 2023-06-16 ENCOUNTER — RESULT REVIEW (OUTPATIENT)
Age: 43
End: 2023-06-16

## 2023-06-16 ENCOUNTER — OUTPATIENT (OUTPATIENT)
Dept: OUTPATIENT SERVICES | Facility: HOSPITAL | Age: 43
LOS: 1 days | End: 2023-06-16
Payer: COMMERCIAL

## 2023-06-16 DIAGNOSIS — Z98.890 OTHER SPECIFIED POSTPROCEDURAL STATES: Chronic | ICD-10-CM

## 2023-06-16 PROCEDURE — 71046 X-RAY EXAM CHEST 2 VIEWS: CPT | Mod: 26,77

## 2023-06-16 PROCEDURE — 71046 X-RAY EXAM CHEST 2 VIEWS: CPT

## 2023-06-16 PROCEDURE — 71046 X-RAY EXAM CHEST 2 VIEWS: CPT | Mod: 26

## 2023-06-23 ENCOUNTER — APPOINTMENT (OUTPATIENT)
Dept: CARDIOLOGY | Facility: CLINIC | Age: 43
End: 2023-06-23

## 2023-06-23 ENCOUNTER — NON-APPOINTMENT (OUTPATIENT)
Age: 43
End: 2023-06-23

## 2023-06-23 PROBLEM — I35.9 NONRHEUMATIC AORTIC VALVE DISORDER, UNSPECIFIED: Chronic | Status: ACTIVE | Noted: 2023-06-03

## 2023-06-23 PROBLEM — E66.9 OBESITY, UNSPECIFIED: Chronic | Status: ACTIVE | Noted: 2023-06-14

## 2023-06-25 ENCOUNTER — EMERGENCY (EMERGENCY)
Facility: HOSPITAL | Age: 43
LOS: 0 days | Discharge: HOME | End: 2023-06-25
Attending: EMERGENCY MEDICINE
Payer: COMMERCIAL

## 2023-06-25 VITALS
RESPIRATION RATE: 20 BRPM | TEMPERATURE: 97 F | HEART RATE: 94 BPM | DIASTOLIC BLOOD PRESSURE: 83 MMHG | OXYGEN SATURATION: 98 % | SYSTOLIC BLOOD PRESSURE: 131 MMHG

## 2023-06-25 VITALS
OXYGEN SATURATION: 97 % | TEMPERATURE: 97 F | HEART RATE: 122 BPM | SYSTOLIC BLOOD PRESSURE: 146 MMHG | DIASTOLIC BLOOD PRESSURE: 83 MMHG | RESPIRATION RATE: 20 BRPM | WEIGHT: 190.04 LBS | HEIGHT: 61 IN

## 2023-06-25 DIAGNOSIS — I42.2 OTHER HYPERTROPHIC CARDIOMYOPATHY: ICD-10-CM

## 2023-06-25 DIAGNOSIS — I48.91 UNSPECIFIED ATRIAL FIBRILLATION: ICD-10-CM

## 2023-06-25 DIAGNOSIS — Z98.890 OTHER SPECIFIED POSTPROCEDURAL STATES: Chronic | ICD-10-CM

## 2023-06-25 DIAGNOSIS — R00.2 PALPITATIONS: ICD-10-CM

## 2023-06-25 DIAGNOSIS — Z79.01 LONG TERM (CURRENT) USE OF ANTICOAGULANTS: ICD-10-CM

## 2023-06-25 LAB
ALBUMIN SERPL ELPH-MCNC: 4.1 G/DL — SIGNIFICANT CHANGE UP (ref 3.5–5.2)
ALP SERPL-CCNC: 89 U/L — SIGNIFICANT CHANGE UP (ref 30–115)
ALT FLD-CCNC: 23 U/L — SIGNIFICANT CHANGE UP (ref 0–41)
ANION GAP SERPL CALC-SCNC: 12 MMOL/L — SIGNIFICANT CHANGE UP (ref 7–14)
AST SERPL-CCNC: 33 U/L — SIGNIFICANT CHANGE UP (ref 0–41)
BASOPHILS # BLD AUTO: 0.04 K/UL — SIGNIFICANT CHANGE UP (ref 0–0.2)
BASOPHILS NFR BLD AUTO: 0.4 % — SIGNIFICANT CHANGE UP (ref 0–1)
BILIRUB SERPL-MCNC: 1 MG/DL — SIGNIFICANT CHANGE UP (ref 0.2–1.2)
BUN SERPL-MCNC: 24 MG/DL — HIGH (ref 10–20)
CALCIUM SERPL-MCNC: 9.2 MG/DL — SIGNIFICANT CHANGE UP (ref 8.4–10.5)
CHLORIDE SERPL-SCNC: 103 MMOL/L — SIGNIFICANT CHANGE UP (ref 98–110)
CO2 SERPL-SCNC: 21 MMOL/L — SIGNIFICANT CHANGE UP (ref 17–32)
CREAT SERPL-MCNC: 0.8 MG/DL — SIGNIFICANT CHANGE UP (ref 0.7–1.5)
EGFR: 94 ML/MIN/1.73M2 — SIGNIFICANT CHANGE UP
EOSINOPHIL # BLD AUTO: 0.21 K/UL — SIGNIFICANT CHANGE UP (ref 0–0.7)
EOSINOPHIL NFR BLD AUTO: 2.3 % — SIGNIFICANT CHANGE UP (ref 0–8)
GLUCOSE SERPL-MCNC: 109 MG/DL — HIGH (ref 70–99)
HCT VFR BLD CALC: 36.9 % — LOW (ref 37–47)
HGB BLD-MCNC: 12.1 G/DL — SIGNIFICANT CHANGE UP (ref 12–16)
IMM GRANULOCYTES NFR BLD AUTO: 0.5 % — HIGH (ref 0.1–0.3)
LYMPHOCYTES # BLD AUTO: 1.17 K/UL — LOW (ref 1.2–3.4)
LYMPHOCYTES # BLD AUTO: 12.7 % — LOW (ref 20.5–51.1)
MCHC RBC-ENTMCNC: 26.2 PG — LOW (ref 27–31)
MCHC RBC-ENTMCNC: 32.8 G/DL — SIGNIFICANT CHANGE UP (ref 32–37)
MCV RBC AUTO: 80 FL — LOW (ref 81–99)
MONOCYTES # BLD AUTO: 0.61 K/UL — HIGH (ref 0.1–0.6)
MONOCYTES NFR BLD AUTO: 6.6 % — SIGNIFICANT CHANGE UP (ref 1.7–9.3)
NEUTROPHILS # BLD AUTO: 7.1 K/UL — HIGH (ref 1.4–6.5)
NEUTROPHILS NFR BLD AUTO: 77.5 % — HIGH (ref 42.2–75.2)
NRBC # BLD: 0 /100 WBCS — SIGNIFICANT CHANGE UP (ref 0–0)
PLATELET # BLD AUTO: 301 K/UL — SIGNIFICANT CHANGE UP (ref 130–400)
PMV BLD: 10.6 FL — HIGH (ref 7.4–10.4)
POTASSIUM SERPL-MCNC: 4.6 MMOL/L — SIGNIFICANT CHANGE UP (ref 3.5–5)
POTASSIUM SERPL-SCNC: 4.6 MMOL/L — SIGNIFICANT CHANGE UP (ref 3.5–5)
PROT SERPL-MCNC: 7 G/DL — SIGNIFICANT CHANGE UP (ref 6–8)
RBC # BLD: 4.61 M/UL — SIGNIFICANT CHANGE UP (ref 4.2–5.4)
RBC # FLD: 16 % — HIGH (ref 11.5–14.5)
SODIUM SERPL-SCNC: 136 MMOL/L — SIGNIFICANT CHANGE UP (ref 135–146)
TROPONIN T SERPL-MCNC: <0.01 NG/ML — SIGNIFICANT CHANGE UP
TROPONIN T SERPL-MCNC: <0.01 NG/ML — SIGNIFICANT CHANGE UP
WBC # BLD: 9.18 K/UL — SIGNIFICANT CHANGE UP (ref 4.8–10.8)
WBC # FLD AUTO: 9.18 K/UL — SIGNIFICANT CHANGE UP (ref 4.8–10.8)

## 2023-06-25 PROCEDURE — 99285 EMERGENCY DEPT VISIT HI MDM: CPT

## 2023-06-25 PROCEDURE — 85025 COMPLETE CBC W/AUTO DIFF WBC: CPT

## 2023-06-25 PROCEDURE — 93005 ELECTROCARDIOGRAM TRACING: CPT

## 2023-06-25 PROCEDURE — 80053 COMPREHEN METABOLIC PANEL: CPT

## 2023-06-25 PROCEDURE — 71045 X-RAY EXAM CHEST 1 VIEW: CPT | Mod: 26

## 2023-06-25 PROCEDURE — 96374 THER/PROPH/DIAG INJ IV PUSH: CPT

## 2023-06-25 PROCEDURE — 71045 X-RAY EXAM CHEST 1 VIEW: CPT

## 2023-06-25 PROCEDURE — 36415 COLL VENOUS BLD VENIPUNCTURE: CPT

## 2023-06-25 PROCEDURE — 84484 ASSAY OF TROPONIN QUANT: CPT

## 2023-06-25 PROCEDURE — 99285 EMERGENCY DEPT VISIT HI MDM: CPT | Mod: 25

## 2023-06-25 PROCEDURE — 96361 HYDRATE IV INFUSION ADD-ON: CPT

## 2023-06-25 PROCEDURE — 93010 ELECTROCARDIOGRAM REPORT: CPT

## 2023-06-25 RX ORDER — ALPRAZOLAM 0.25 MG
0.25 TABLET ORAL ONCE
Refills: 0 | Status: DISCONTINUED | OUTPATIENT
Start: 2023-06-25 | End: 2023-06-25

## 2023-06-25 RX ORDER — SODIUM CHLORIDE 9 MG/ML
1000 INJECTION INTRAMUSCULAR; INTRAVENOUS; SUBCUTANEOUS ONCE
Refills: 0 | Status: COMPLETED | OUTPATIENT
Start: 2023-06-25 | End: 2023-06-25

## 2023-06-25 RX ORDER — ALPRAZOLAM 0.25 MG
0.5 TABLET ORAL ONCE
Refills: 0 | Status: DISCONTINUED | OUTPATIENT
Start: 2023-06-25 | End: 2023-06-25

## 2023-06-25 RX ORDER — DILTIAZEM HCL 120 MG
10 CAPSULE, EXT RELEASE 24 HR ORAL ONCE
Refills: 0 | Status: COMPLETED | OUTPATIENT
Start: 2023-06-25 | End: 2023-06-25

## 2023-06-25 RX ADMIN — Medication 10 MILLIGRAM(S): at 06:57

## 2023-06-25 RX ADMIN — Medication 0.25 MILLIGRAM(S): at 06:58

## 2023-06-25 RX ADMIN — SODIUM CHLORIDE 1000 MILLILITER(S): 9 INJECTION INTRAMUSCULAR; INTRAVENOUS; SUBCUTANEOUS at 06:57

## 2023-06-25 RX ADMIN — SODIUM CHLORIDE 1000 MILLILITER(S): 9 INJECTION INTRAMUSCULAR; INTRAVENOUS; SUBCUTANEOUS at 11:08

## 2023-06-25 NOTE — ED PROVIDER NOTE - CLINICAL SUMMARY MEDICAL DECISION MAKING FREE TEXT BOX
Patient signed to me reevaluate reassess.  41 y/o Atrial Fibrillation on eliquis followed by cardio Dr. Metcalf and EP Dr. Tyler, Hypertrophic cardiomyopathy, Cardiac Sx related to her Atrial Fibrillation scheduled for June 29, 2023 with Dr Zepeda at the [place] site presents to the ED for inability to sleep due to palpitations that began today.  Here initial patient A-fib with RVR given Cardizem as well  Xanax at the same time with improvement heart rate.  Patient is no longer tachycardic now calm and improved.  Troponin negative times 2 repeat heart rate now in the 90s.  Discussed with patient's cardiologist Dr. Metcalf and the patient can call tomorrow to be possible put on Xanax as well as patient also can increase the Cardizem from 30 mg 3 times a day to 4 times a day.  Patient stable for discharge outpatient follow-up.

## 2023-06-25 NOTE — ED ADULT NURSE REASSESSMENT NOTE - NS ED NURSE REASSESS COMMENT FT1
Received report from Bri Lakhani RN, opportunity was provided to answer all questions. Patient is A&Ox4, respirations east and unlabored, A-fib on cardiac monitor, NAD noted at this time. Patient aware she is waiting re-evaluation by provider.

## 2023-06-25 NOTE — ED PROVIDER NOTE - OBJECTIVE STATEMENT
41 y/o Atrial Fibrillation on eliquis followed by cardio Dr. Metcalf and EP Dr. Tyler, Hypertrophic cardiomyopathy, Cardiac Sx related to her Atrial Fibrillation scheduled for June 29, 2023 with Dr Zepeda at the [place] site presents to the ED for inability to sleep due to palpitations that began today.  Patient reports has been in A-fib for about 2 months by a cyst was found on one of her valves so for now she has only been medically managed.  Patient is on amiodarone and diltiazem.  Patient denies fever, chills, chest pain, back pain, headache, dizziness, neck pain, arm pain, jaw pain, abdominal pain, nausea, vomiting, diarrhea, recent sick contacts, leg pain, illicit drug use, cigarette smoking, excessive use of alcohol, or weakness

## 2023-06-25 NOTE — ED ADULT NURSE NOTE - NSFALLUNIVINTERV_ED_ALL_ED
English Bed/Stretcher in lowest position, wheels locked, appropriate side rails in place/Call bell, personal items and telephone in reach/Instruct patient to call for assistance before getting out of bed/chair/stretcher/Non-slip footwear applied when patient is off stretcher/[place] to call system/Physically safe environment - no spills, clutter or unnecessary equipment/Purposeful proactive rounding/Room/bathroom lighting operational, light cord in reach

## 2023-06-25 NOTE — ED ADULT NURSE REASSESSMENT NOTE - NSFALLUNIVINTERV_ED_ALL_ED
Bed/Stretcher in lowest position, wheels locked, appropriate side rails in place/Call bell, personal items and telephone in reach/Instruct patient to call for assistance before getting out of bed/chair/stretcher/Non-slip footwear applied when patient is off stretcher/[place] to call system/Physically safe environment - no spills, clutter or unnecessary equipment/Purposeful proactive rounding/Room/bathroom lighting operational, light cord in reach

## 2023-06-25 NOTE — ED PROVIDER NOTE - CONSIDERATION OF ADMISSION OBSERVATION
consider telemetry admission for monitoring of A-fib with RVR. Consideration of Admission/Observation

## 2023-06-25 NOTE — ED PROVIDER NOTE - PHYSICAL EXAMINATION
Physical Exam    Vital Signs: I have reviewed the initial vital signs.  Constitutional: well-nourished, appears stated age, no acute distress  Eyes: Conjunctiva pink, Sclera clear  Cardiovascular: (+) irregular rate, irregular rhythm, well-perfused extremities, radial pulses equal and 2+ b/l.   Respiratory: unlabored respiratory effort, clear to auscultation bilaterally no wheezing, rales and rhonchi. pt is speaking full sentences. no accessory muscle use.   Gastrointestinal: soft, non-tender, nondistended abdomen, no pulsatile mass, no rebound, no guarding  Musculoskeletal: supple neck, (+) b/l pedal edema, no calf tenderness, no midline tenderness, no palpable spinal step offs  Integumentary: warm, dry, no rash  Neurologic: awake, alert  Psychiatric: appropriate mood, appropriate affect

## 2023-06-25 NOTE — ED PROVIDER NOTE - PATIENT PORTAL LINK FT
You can access the FollowMyHealth Patient Portal offered by [facility] by registering at the following website: [email or web address]. By joining [email or web address]’s FollowMyHealth portal, you will also be able to view your health information using other applications (apps) compatible with our system.

## 2023-06-25 NOTE — ED PROVIDER NOTE - ATTENDING APP SHARED VISIT CONTRIBUTION OF CARE
42-year-old female, history of A-fib, hypertrophic cardiomyopathy, awaiting open heart surgery, here in ED for shortness of breath and inability to sleep.  Denies chest pain.  Exam shows alert patient in no distress, HEENT NCAT PERRL, neck supple, lungs clear, irregular rhythm, S1S2, abdomen soft NT +BS, no CCE, skin no rash, neuro A&OX3 GCS 15 no deficits.

## 2023-06-25 NOTE — ED ADULT TRIAGE NOTE - CHIEF COMPLAINT QUOTE
Pt c/o SOB and "I think I'm in A-fib"; HX A-fib; going for valve surgery this week d/t cyst on valve.

## 2023-06-28 ENCOUNTER — APPOINTMENT (OUTPATIENT)
Dept: CARDIOLOGY | Facility: CLINIC | Age: 43
End: 2023-06-28
Payer: COMMERCIAL

## 2023-06-28 VITALS
SYSTOLIC BLOOD PRESSURE: 134 MMHG | BODY MASS INDEX: 38.51 KG/M2 | DIASTOLIC BLOOD PRESSURE: 80 MMHG | OXYGEN SATURATION: 98 % | HEIGHT: 61 IN | WEIGHT: 204 LBS | HEART RATE: 76 BPM

## 2023-06-28 DIAGNOSIS — Z02.9 ENCOUNTER FOR ADMINISTRATIVE EXAMINATIONS, UNSPECIFIED: ICD-10-CM

## 2023-06-28 DIAGNOSIS — R60.0 LOCALIZED EDEMA: ICD-10-CM

## 2023-06-28 PROCEDURE — 99214 OFFICE O/P EST MOD 30 MIN: CPT

## 2023-06-28 NOTE — HISTORY OF PRESENT ILLNESS
[FreeTextEntry1] : 43 yo female atrial flutter  on AC,,hypertrophic cardiomyopathy,HTN,HDL,. 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 She is NYHA class III  with SOB. Dr Zepeda CT Surgery will perform AVR/MAZE JASON clip possible HOCM ablation tomorrow .3 weeks ago pt went to ER for palpitations and rapid rate and placed on cardizem and amiodarone. She required dental eval  for pre op clearance and needed root canal and AB therapy. She contacted me last week c/o nausea with the AB but completed the therapy. She went back to the ER on 6/25 with SOB . EKG showed A fib flutter rate 114, Increased right basilar opacity /effusion with normal WBC count. She returns today for evaluation. She has pedal edema and mild SOB but overall feels better. She had a cough over last few weeks she feels is improving.\par \par \par

## 2023-06-28 NOTE — PHYSICAL EXAM
[Well Developed] : well developed [Well Nourished] : well nourished [No Acute Distress] : no acute distress [Normal Conjunctiva] : normal conjunctiva [Normal Venous Pressure] : normal venous pressure [No Carotid Bruit] : no carotid bruit [Normal] : normal [Tachycardia] : tachycardic [Normal S1] : normal S1 [Normal S2] : normal S2 [No Pitting Edema] : no pitting edema present [Soft] : abdomen soft [Non Tender] : non-tender [Normal Gait] : normal gait [No Edema] : no edema [No Rash] : no rash [Moves all extremities] : moves all extremities [Alert and Oriented] : alert and oriented [Clear Lung Fields] : clear lung fields

## 2023-06-28 NOTE — PRE-ANESTHESIA EVALUATION ADULT - NSRADCARDRESULTSFT_GEN_ALL_CORE
MINDI Summary 4/2023:   1. Left ventricular ejection fraction, by visual estimation, is 50 to 55%.   2. Moderately increased LV wall thickness.   3. Accelerated LVOT flow with no definite evidence of DEN or LVOT obstruction.   4. Mildly increased RV wall thickness.   5. Echo dense structure noted on the Aortic valve (including aortic and ventricular aspect of the valve leaflets) measuring 1.3 x 1.5 cm. 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   6. Mild to moderate mitral annular calcification.   7. Mild thickening of the anterior and posterior mitral valve leaflets.   8. Mild mitral valve regurgitation.   9. Color flow doppler and intravenous injection of agitated saline demonstrates the presence of an intact intra atrial septum.  10. No left atrial appendage thrombus.  11. Trivial pericardial effusion.  12. Findings discussed with referring cardiologist and CTS team.

## 2023-06-28 NOTE — REVIEW OF SYSTEMS
[Dyspnea on exertion] : dyspnea during exertion [Fever] : no fever [Weight Gain (___ Lbs)] : [unfilled] ~Ulb weight gain [Chills] : no chills [Weight Loss (___ Lbs)] : no recent weight loss [Blurry Vision] : no blurred vision [Hearing Loss] : no hearing loss [SOB] : no shortness of breath [Cough] : no cough [Abdominal Pain] : no abdominal pain [Dysuria] : no dysuria [Joint Pain] : no joint pain [Rash] : no rash [Dizziness] : no dizziness [Confusion] : no confusion was observed [Easy Bleeding] : no tendency for easy bleeding [FreeTextEntry5] : see HPI

## 2023-06-28 NOTE — DISCUSSION/SUMMARY
[FreeTextEntry1] : She had covid 12/21.  Aware need weight loss. No sudden death in family. Aware no heavy exertion. Return sooner if symptoms. She was on more beta in past.  Explained hypertrophic cardiomyopathy. IEcho 9/22 severe MR .LVOT Valsalva about 70 mm HG.She was followed in [place]. Aware need weight \par 41 yo female PAFon AC,,hypertrophic cardiomyopathy. Pt denies any FH of HOCM or SCD. In 2/22 in hosp rapid afib converted with IV Cardizem. Pt had stopped her beta..Recently had possible Norovirus and stopped beta again. She was in A fib last visit. Pt was to resume beta and return for rhythm check. Pt has seen EP in past. Dr WELLER has reviewed HOCM dx w pt and need for weight loss and no heavy exertion or lifting.9/22 Echo 9/22  EF 62% severe concentric LVH w septal predominance suggestive of HOCM peak gradient at rest 39mmHG and 72 mmHG w Valsalva, Grade 2 DD.  MCOT 2/16/23  AF w  peak. 2% AF burden.Last TFT normal 2/22., Pt returns 3/29 in A flutter variant block VR is 132. Pt without chest pain or syncope. Case d/w and reviewed by Dr WELLER. Will increase metoprolol to 75mg daily. Pt sleeps poorly does not dream  will refer for sleep eval. Pt  f/u 1 wk Dr WELLER will consider Norpace if still in A flutter. Also consider f/u again w EP. Re enforced need to be compliant w BB  and AC therapy Mechanism of AF discussed at length. All questions answered\par Pt returned to office 4/5/23 EKG shows BONNIE 126 BPM on metoprolol 75mg daily.  Pt is asymptomatic  without palpitations. GOULD slightly improved with higher beta blocker. Arranged for her to see Dr Carpenter tomorrow for further eval. Instructed her to take Metoprolol 100mg daily until further instructions from EP. Re enforced need for weight loss and sleep eval ASAP\par 41 yo female atrial flutter  on AC,,hypertrophic cardiomyopathy,HTN,HDL,. 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 She is NYHA class III  with SOB. Dr Zepeda CT Surgery will perform AVR/MAZE JASON clip possible HOCM ablation tomorrow .3 weeks ago pt went to ER for palpitations and rapid rate and placed on cardizem and amiodarone. She required dental eval  for pre op clearance and needed root canal and AB therapy. She contacted me last week c/o nausea with the AB but completed the therapy. She went back to the ER on 6/25 with SOB . EKG showed A fib flutter rate 114, Increased right basilar opacity /effusion with normal WBC count. She returns today for evaluation. She has pedal edema and mild SOB but overall feels better. She had a cough over last few weeks she feels is improving. No clinical signs of volume overload. Pedal edema may be r/t CCB. She has gained 10 lbs in 6 wks. Pt is eating more she said.  Pt was given xanax for her pre op anxiety and was encouraged to take it. Answered all questions

## 2023-06-29 ENCOUNTER — APPOINTMENT (OUTPATIENT)
Dept: CARDIOTHORACIC SURGERY | Facility: HOSPITAL | Age: 43
End: 2023-06-29

## 2023-06-29 ENCOUNTER — TRANSCRIPTION ENCOUNTER (OUTPATIENT)
Age: 43
End: 2023-06-29

## 2023-06-29 ENCOUNTER — INPATIENT (INPATIENT)
Facility: HOSPITAL | Age: 43
LOS: 12 days | Discharge: HOME HEALTH | DRG: 220 | End: 2023-07-12
Attending: THORACIC SURGERY (CARDIOTHORACIC VASCULAR SURGERY) | Admitting: THORACIC SURGERY (CARDIOTHORACIC VASCULAR SURGERY)
Payer: COMMERCIAL

## 2023-06-29 ENCOUNTER — RESULT REVIEW (OUTPATIENT)
Age: 43
End: 2023-06-29

## 2023-06-29 VITALS — WEIGHT: 189.6 LBS

## 2023-06-29 DIAGNOSIS — Z02.9 ENCOUNTER FOR ADMINISTRATIVE EXAMINATIONS, UNSPECIFIED: ICD-10-CM

## 2023-06-29 DIAGNOSIS — I48.91 UNSPECIFIED ATRIAL FIBRILLATION: ICD-10-CM

## 2023-06-29 DIAGNOSIS — C08.1 MALIGNANT NEOPLASM OF SUBLINGUAL GLAND: ICD-10-CM

## 2023-06-29 DIAGNOSIS — Z98.890 OTHER SPECIFIED POSTPROCEDURAL STATES: Chronic | ICD-10-CM

## 2023-06-29 LAB
ABO RH CONFIRMATION: SIGNIFICANT CHANGE UP
ALBUMIN SERPL ELPH-MCNC: 2.7 G/DL — LOW (ref 3.5–5.2)
ALP SERPL-CCNC: 66 U/L — SIGNIFICANT CHANGE UP (ref 30–115)
ALT FLD-CCNC: 19 U/L — SIGNIFICANT CHANGE UP (ref 0–41)
ANION GAP SERPL CALC-SCNC: 11 MMOL/L — SIGNIFICANT CHANGE UP (ref 7–14)
APTT BLD: 26.1 SEC — LOW (ref 27–39.2)
AST SERPL-CCNC: 48 U/L — HIGH (ref 0–41)
BILIRUB SERPL-MCNC: 1.2 MG/DL — SIGNIFICANT CHANGE UP (ref 0.2–1.2)
BUN SERPL-MCNC: 16 MG/DL — SIGNIFICANT CHANGE UP (ref 10–20)
CALCIUM SERPL-MCNC: 7.9 MG/DL — LOW (ref 8.4–10.4)
CHLORIDE SERPL-SCNC: 108 MMOL/L — SIGNIFICANT CHANGE UP (ref 98–110)
CO2 SERPL-SCNC: 23 MMOL/L — SIGNIFICANT CHANGE UP (ref 17–32)
CREAT SERPL-MCNC: 0.7 MG/DL — SIGNIFICANT CHANGE UP (ref 0.7–1.5)
EGFR: 110 ML/MIN/1.73M2 — SIGNIFICANT CHANGE UP
GAS PNL BLDA: SIGNIFICANT CHANGE UP
GLUCOSE BLDC GLUCOMTR-MCNC: 120 MG/DL — HIGH (ref 70–99)
GLUCOSE BLDC GLUCOMTR-MCNC: 125 MG/DL — HIGH (ref 70–99)
GLUCOSE BLDC GLUCOMTR-MCNC: 131 MG/DL — HIGH (ref 70–99)
GLUCOSE BLDC GLUCOMTR-MCNC: 133 MG/DL — HIGH (ref 70–99)
GLUCOSE BLDC GLUCOMTR-MCNC: 93 MG/DL — SIGNIFICANT CHANGE UP (ref 70–99)
GLUCOSE SERPL-MCNC: 96 MG/DL — SIGNIFICANT CHANGE UP (ref 70–99)
HCT VFR BLD CALC: 26.9 % — LOW (ref 37–47)
HCT VFR BLD CALC: 35.6 % — LOW (ref 37–47)
HGB BLD-MCNC: 11.5 G/DL — LOW (ref 12–16)
HGB BLD-MCNC: 8.6 G/DL — LOW (ref 12–16)
INR BLD: 1.52 RATIO — HIGH (ref 0.65–1.3)
MAGNESIUM SERPL-MCNC: 2.3 MG/DL — SIGNIFICANT CHANGE UP (ref 1.8–2.4)
MCHC RBC-ENTMCNC: 26.2 PG — LOW (ref 27–31)
MCHC RBC-ENTMCNC: 26.4 PG — LOW (ref 27–31)
MCHC RBC-ENTMCNC: 32 G/DL — SIGNIFICANT CHANGE UP (ref 32–37)
MCHC RBC-ENTMCNC: 32.3 G/DL — SIGNIFICANT CHANGE UP (ref 32–37)
MCV RBC AUTO: 81.7 FL — SIGNIFICANT CHANGE UP (ref 81–99)
MCV RBC AUTO: 82 FL — SIGNIFICANT CHANGE UP (ref 81–99)
NRBC # BLD: 0 /100 WBCS — SIGNIFICANT CHANGE UP (ref 0–0)
NRBC # BLD: 0 /100 WBCS — SIGNIFICANT CHANGE UP (ref 0–0)
PLATELET # BLD AUTO: 203 K/UL — SIGNIFICANT CHANGE UP (ref 130–400)
PLATELET # BLD AUTO: 205 K/UL — SIGNIFICANT CHANGE UP (ref 130–400)
PMV BLD: 10.8 FL — HIGH (ref 7.4–10.4)
PMV BLD: 10.9 FL — HIGH (ref 7.4–10.4)
POTASSIUM SERPL-MCNC: 4 MMOL/L — SIGNIFICANT CHANGE UP (ref 3.5–5)
POTASSIUM SERPL-SCNC: 4 MMOL/L — SIGNIFICANT CHANGE UP (ref 3.5–5)
PROT SERPL-MCNC: 5 G/DL — LOW (ref 6–8)
PROTHROM AB SERPL-ACNC: 17.5 SEC — HIGH (ref 9.95–12.87)
RBC # BLD: 3.28 M/UL — LOW (ref 4.2–5.4)
RBC # BLD: 4.36 M/UL — SIGNIFICANT CHANGE UP (ref 4.2–5.4)
RBC # FLD: 16.4 % — HIGH (ref 11.5–14.5)
RBC # FLD: 16.4 % — HIGH (ref 11.5–14.5)
SODIUM SERPL-SCNC: 142 MMOL/L — SIGNIFICANT CHANGE UP (ref 135–146)
WBC # BLD: 15.37 K/UL — HIGH (ref 4.8–10.8)
WBC # BLD: 19.29 K/UL — HIGH (ref 4.8–10.8)
WBC # FLD AUTO: 15.37 K/UL — HIGH (ref 4.8–10.8)
WBC # FLD AUTO: 19.29 K/UL — HIGH (ref 4.8–10.8)

## 2023-06-29 PROCEDURE — 94760 N-INVAS EAR/PLS OXIMETRY 1: CPT

## 2023-06-29 PROCEDURE — 88305 TISSUE EXAM BY PATHOLOGIST: CPT

## 2023-06-29 PROCEDURE — 71045 X-RAY EXAM CHEST 1 VIEW: CPT

## 2023-06-29 PROCEDURE — 80048 BASIC METABOLIC PNL TOTAL CA: CPT

## 2023-06-29 PROCEDURE — 71046 X-RAY EXAM CHEST 2 VIEWS: CPT

## 2023-06-29 PROCEDURE — C1729: CPT

## 2023-06-29 PROCEDURE — 93318 ECHO TRANSESOPHAGEAL INTRAOP: CPT

## 2023-06-29 PROCEDURE — 83735 ASSAY OF MAGNESIUM: CPT

## 2023-06-29 PROCEDURE — 83605 ASSAY OF LACTIC ACID: CPT

## 2023-06-29 PROCEDURE — 85018 HEMOGLOBIN: CPT

## 2023-06-29 PROCEDURE — 85025 COMPLETE CBC W/AUTO DIFF WBC: CPT

## 2023-06-29 PROCEDURE — 36415 COLL VENOUS BLD VENIPUNCTURE: CPT

## 2023-06-29 PROCEDURE — 85730 THROMBOPLASTIN TIME PARTIAL: CPT

## 2023-06-29 PROCEDURE — 93005 ELECTROCARDIOGRAM TRACING: CPT

## 2023-06-29 PROCEDURE — 85027 COMPLETE CBC AUTOMATED: CPT

## 2023-06-29 PROCEDURE — C1751: CPT

## 2023-06-29 PROCEDURE — P9045: CPT

## 2023-06-29 PROCEDURE — 88305 TISSUE EXAM BY PATHOLOGIST: CPT | Mod: 26

## 2023-06-29 PROCEDURE — 93306 TTE W/DOPPLER COMPLETE: CPT

## 2023-06-29 PROCEDURE — 71045 X-RAY EXAM CHEST 1 VIEW: CPT | Mod: 26

## 2023-06-29 PROCEDURE — 84132 ASSAY OF SERUM POTASSIUM: CPT

## 2023-06-29 PROCEDURE — 80053 COMPREHEN METABOLIC PANEL: CPT

## 2023-06-29 PROCEDURE — 82803 BLOOD GASES ANY COMBINATION: CPT

## 2023-06-29 PROCEDURE — 33405 REPLACEMENT AORTIC VALVE OPN: CPT

## 2023-06-29 PROCEDURE — 85610 PROTHROMBIN TIME: CPT

## 2023-06-29 PROCEDURE — 84295 ASSAY OF SERUM SODIUM: CPT

## 2023-06-29 PROCEDURE — 33268 EXCL LAA OPN OTH PX ANY METH: CPT | Mod: AS

## 2023-06-29 PROCEDURE — C1889: CPT

## 2023-06-29 PROCEDURE — 82330 ASSAY OF CALCIUM: CPT

## 2023-06-29 PROCEDURE — 33405 REPLACEMENT AORTIC VALVE OPN: CPT | Mod: AS

## 2023-06-29 PROCEDURE — 86891 AUTOLOGOUS BLOOD OP SALVAGE: CPT

## 2023-06-29 PROCEDURE — 82962 GLUCOSE BLOOD TEST: CPT

## 2023-06-29 PROCEDURE — 71045 X-RAY EXAM CHEST 1 VIEW: CPT | Mod: 26,77

## 2023-06-29 PROCEDURE — 87040 BLOOD CULTURE FOR BACTERIA: CPT

## 2023-06-29 PROCEDURE — 81001 URINALYSIS AUTO W/SCOPE: CPT

## 2023-06-29 PROCEDURE — 33268 EXCL LAA OPN OTH PX ANY METH: CPT

## 2023-06-29 PROCEDURE — 86923 COMPATIBILITY TEST ELECTRIC: CPT

## 2023-06-29 PROCEDURE — 80202 ASSAY OF VANCOMYCIN: CPT

## 2023-06-29 PROCEDURE — 97110 THERAPEUTIC EXERCISES: CPT | Mod: GP

## 2023-06-29 PROCEDURE — 97116 GAIT TRAINING THERAPY: CPT | Mod: GP

## 2023-06-29 PROCEDURE — 85014 HEMATOCRIT: CPT

## 2023-06-29 PROCEDURE — 97161 PT EVAL LOW COMPLEX 20 MIN: CPT | Mod: GP

## 2023-06-29 PROCEDURE — C1769: CPT

## 2023-06-29 PROCEDURE — 97530 THERAPEUTIC ACTIVITIES: CPT | Mod: GP

## 2023-06-29 RX ORDER — CEFAZOLIN SODIUM 1 G
2000 VIAL (EA) INJECTION EVERY 8 HOURS
Refills: 0 | Status: COMPLETED | OUTPATIENT
Start: 2023-06-29 | End: 2023-06-30

## 2023-06-29 RX ORDER — DEXTROSE 50 % IN WATER 50 %
25 SYRINGE (ML) INTRAVENOUS
Refills: 0 | Status: DISCONTINUED | OUTPATIENT
Start: 2023-06-29 | End: 2023-07-06

## 2023-06-29 RX ORDER — AMIODARONE HYDROCHLORIDE 400 MG/1
150 TABLET ORAL ONCE
Refills: 0 | Status: COMPLETED | OUTPATIENT
Start: 2023-06-29 | End: 2023-06-29

## 2023-06-29 RX ORDER — VASOPRESSIN 20 [USP'U]/ML
0.04 INJECTION INTRAVENOUS
Qty: 40 | Refills: 0 | Status: DISCONTINUED | OUTPATIENT
Start: 2023-06-29 | End: 2023-06-30

## 2023-06-29 RX ORDER — NOREPINEPHRINE BITARTRATE/D5W 8 MG/250ML
0.05 PLASTIC BAG, INJECTION (ML) INTRAVENOUS
Qty: 8 | Refills: 0 | Status: DISCONTINUED | OUTPATIENT
Start: 2023-06-29 | End: 2023-06-30

## 2023-06-29 RX ORDER — AMIODARONE HYDROCHLORIDE 400 MG/1
1 TABLET ORAL
Qty: 450 | Refills: 0 | Status: DISCONTINUED | OUTPATIENT
Start: 2023-06-29 | End: 2023-06-30

## 2023-06-29 RX ORDER — POLYETHYLENE GLYCOL 3350 17 G/17G
17 POWDER, FOR SOLUTION ORAL DAILY
Refills: 0 | Status: DISCONTINUED | OUTPATIENT
Start: 2023-06-30 | End: 2023-07-12

## 2023-06-29 RX ORDER — ACETAMINOPHEN 500 MG
1000 TABLET ORAL ONCE
Refills: 0 | Status: COMPLETED | OUTPATIENT
Start: 2023-06-29 | End: 2023-06-29

## 2023-06-29 RX ORDER — DEXTROSE 50 % IN WATER 50 %
50 SYRINGE (ML) INTRAVENOUS
Refills: 0 | Status: DISCONTINUED | OUTPATIENT
Start: 2023-06-29 | End: 2023-07-06

## 2023-06-29 RX ORDER — SENNA PLUS 8.6 MG/1
2 TABLET ORAL AT BEDTIME
Refills: 0 | Status: DISCONTINUED | OUTPATIENT
Start: 2023-06-30 | End: 2023-07-05

## 2023-06-29 RX ORDER — ONDANSETRON 8 MG/1
4 TABLET, FILM COATED ORAL ONCE
Refills: 0 | Status: COMPLETED | OUTPATIENT
Start: 2023-06-29 | End: 2023-06-29

## 2023-06-29 RX ORDER — DEXMEDETOMIDINE HYDROCHLORIDE IN 0.9% SODIUM CHLORIDE 4 UG/ML
1 INJECTION INTRAVENOUS
Qty: 200 | Refills: 0 | Status: DISCONTINUED | OUTPATIENT
Start: 2023-06-29 | End: 2023-06-30

## 2023-06-29 RX ORDER — OXYCODONE HYDROCHLORIDE 5 MG/1
5 TABLET ORAL EVERY 4 HOURS
Refills: 0 | Status: DISCONTINUED | OUTPATIENT
Start: 2023-06-29 | End: 2023-07-03

## 2023-06-29 RX ORDER — FAMOTIDINE 10 MG/ML
20 INJECTION INTRAVENOUS EVERY 12 HOURS
Refills: 0 | Status: DISCONTINUED | OUTPATIENT
Start: 2023-06-29 | End: 2023-06-30

## 2023-06-29 RX ORDER — KETOROLAC TROMETHAMINE 30 MG/ML
15 SYRINGE (ML) INJECTION ONCE
Refills: 0 | Status: DISCONTINUED | OUTPATIENT
Start: 2023-06-29 | End: 2023-06-29

## 2023-06-29 RX ORDER — ACETAMINOPHEN 500 MG
650 TABLET ORAL EVERY 6 HOURS
Refills: 0 | Status: COMPLETED | OUTPATIENT
Start: 2023-06-29 | End: 2023-07-02

## 2023-06-29 RX ORDER — ALBUMIN HUMAN 25 %
500 VIAL (ML) INTRAVENOUS ONCE
Refills: 0 | Status: COMPLETED | OUTPATIENT
Start: 2023-06-29 | End: 2023-06-29

## 2023-06-29 RX ORDER — CHLORHEXIDINE GLUCONATE 213 G/1000ML
15 SOLUTION TOPICAL EVERY 12 HOURS
Refills: 0 | Status: DISCONTINUED | OUTPATIENT
Start: 2023-06-29 | End: 2023-06-30

## 2023-06-29 RX ORDER — NITROGLYCERIN 6.5 MG
15 CAPSULE, EXTENDED RELEASE ORAL
Qty: 50 | Refills: 0 | Status: DISCONTINUED | OUTPATIENT
Start: 2023-06-29 | End: 2023-07-01

## 2023-06-29 RX ORDER — PROPOFOL 10 MG/ML
15 INJECTION, EMULSION INTRAVENOUS
Qty: 1000 | Refills: 0 | Status: DISCONTINUED | OUTPATIENT
Start: 2023-06-29 | End: 2023-06-30

## 2023-06-29 RX ORDER — ACETAMINOPHEN 500 MG
650 TABLET ORAL EVERY 6 HOURS
Refills: 0 | Status: DISCONTINUED | OUTPATIENT
Start: 2023-07-02 | End: 2023-07-12

## 2023-06-29 RX ORDER — SODIUM CHLORIDE 9 MG/ML
1000 INJECTION INTRAMUSCULAR; INTRAVENOUS; SUBCUTANEOUS
Refills: 0 | Status: DISCONTINUED | OUTPATIENT
Start: 2023-06-29 | End: 2023-07-01

## 2023-06-29 RX ORDER — NICARDIPINE HYDROCHLORIDE 30 MG/1
5 CAPSULE, EXTENDED RELEASE ORAL
Qty: 40 | Refills: 0 | Status: DISCONTINUED | OUTPATIENT
Start: 2023-06-29 | End: 2023-06-30

## 2023-06-29 RX ORDER — MEPERIDINE HYDROCHLORIDE 50 MG/ML
25 INJECTION INTRAMUSCULAR; INTRAVENOUS; SUBCUTANEOUS ONCE
Refills: 0 | Status: DISCONTINUED | OUTPATIENT
Start: 2023-06-29 | End: 2023-07-06

## 2023-06-29 RX ORDER — INSULIN HUMAN 100 [IU]/ML
5 INJECTION, SOLUTION SUBCUTANEOUS
Qty: 100 | Refills: 0 | Status: DISCONTINUED | OUTPATIENT
Start: 2023-06-29 | End: 2023-06-30

## 2023-06-29 RX ORDER — ALBUMIN HUMAN 25 %
500 VIAL (ML) INTRAVENOUS ONCE
Refills: 0 | Status: DISCONTINUED | OUTPATIENT
Start: 2023-06-29 | End: 2023-07-10

## 2023-06-29 RX ORDER — OXYCODONE HYDROCHLORIDE 5 MG/1
10 TABLET ORAL EVERY 4 HOURS
Refills: 0 | Status: DISCONTINUED | OUTPATIENT
Start: 2023-06-29 | End: 2023-06-29

## 2023-06-29 RX ORDER — HYDROMORPHONE HYDROCHLORIDE 2 MG/ML
0.5 INJECTION INTRAMUSCULAR; INTRAVENOUS; SUBCUTANEOUS EVERY 6 HOURS
Refills: 0 | Status: DISCONTINUED | OUTPATIENT
Start: 2023-06-29 | End: 2023-06-29

## 2023-06-29 RX ORDER — CHLORHEXIDINE GLUCONATE 213 G/1000ML
1 SOLUTION TOPICAL DAILY
Refills: 0 | Status: DISCONTINUED | OUTPATIENT
Start: 2023-06-29 | End: 2023-07-12

## 2023-06-29 RX ORDER — CHLORHEXIDINE GLUCONATE 213 G/1000ML
5 SOLUTION TOPICAL
Refills: 0 | Status: DISCONTINUED | OUTPATIENT
Start: 2023-06-29 | End: 2023-06-30

## 2023-06-29 RX ORDER — PANTOPRAZOLE SODIUM 20 MG/1
40 TABLET, DELAYED RELEASE ORAL DAILY
Refills: 0 | Status: DISCONTINUED | OUTPATIENT
Start: 2023-06-30 | End: 2023-07-12

## 2023-06-29 RX ORDER — ASPIRIN/CALCIUM CARB/MAGNESIUM 324 MG
81 TABLET ORAL DAILY
Refills: 0 | Status: DISCONTINUED | OUTPATIENT
Start: 2023-06-29 | End: 2023-06-30

## 2023-06-29 RX ADMIN — FAMOTIDINE 20 MILLIGRAM(S): 10 INJECTION INTRAVENOUS at 17:34

## 2023-06-29 RX ADMIN — CHLORHEXIDINE GLUCONATE 15 MILLILITER(S): 213 SOLUTION TOPICAL at 17:34

## 2023-06-29 RX ADMIN — VASOPRESSIN 6 UNIT(S)/MIN: 20 INJECTION INTRAVENOUS at 20:48

## 2023-06-29 RX ADMIN — AMIODARONE HYDROCHLORIDE 600 MILLIGRAM(S): 400 TABLET ORAL at 20:05

## 2023-06-29 RX ADMIN — NICARDIPINE HYDROCHLORIDE 25 MG/HR: 30 CAPSULE, EXTENDED RELEASE ORAL at 20:47

## 2023-06-29 RX ADMIN — Medication 1000 MILLIGRAM(S): at 18:14

## 2023-06-29 RX ADMIN — SODIUM CHLORIDE 20 MILLILITER(S): 9 INJECTION INTRAMUSCULAR; INTRAVENOUS; SUBCUTANEOUS at 20:47

## 2023-06-29 RX ADMIN — Medication 250 MILLILITER(S): at 16:19

## 2023-06-29 RX ADMIN — ONDANSETRON 4 MILLIGRAM(S): 8 TABLET, FILM COATED ORAL at 18:00

## 2023-06-29 RX ADMIN — Medication 8.06 MICROGRAM(S)/KG/MIN: at 13:02

## 2023-06-29 RX ADMIN — PROPOFOL 7.74 MICROGRAM(S)/KG/MIN: 10 INJECTION, EMULSION INTRAVENOUS at 13:02

## 2023-06-29 RX ADMIN — Medication 100 MILLIGRAM(S): at 19:06

## 2023-06-29 RX ADMIN — Medication 400 MILLIGRAM(S): at 17:44

## 2023-06-29 RX ADMIN — SODIUM CHLORIDE 20 MILLILITER(S): 9 INJECTION INTRAMUSCULAR; INTRAVENOUS; SUBCUTANEOUS at 13:01

## 2023-06-29 RX ADMIN — Medication 15 MILLIGRAM(S): at 22:13

## 2023-06-29 RX ADMIN — AMIODARONE HYDROCHLORIDE 33.3 MG/MIN: 400 TABLET ORAL at 20:08

## 2023-06-29 RX ADMIN — Medication 15 MILLIGRAM(S): at 21:00

## 2023-06-29 RX ADMIN — VASOPRESSIN 6 UNIT(S)/MIN: 20 INJECTION INTRAVENOUS at 13:02

## 2023-06-29 NOTE — BRIEF OPERATIVE NOTE - NSICDXBRIEFPROCEDURE_GEN_ALL_CORE_FT
PROCEDURES:  Excision, fibroelastoma, aortic valve 29-Jun-2023 12:48:24  Jarett Zepeda  Aortic valve replacement, mechanical 29-Jun-2023 12:51:24  Jarett Zepeda  Clipping of left atrial appendage 29-Jun-2023 12:53:54  Jarett Zepeda

## 2023-06-29 NOTE — PRE-ANESTHESIA EVALUATION ADULT - NSANTHOSAYNRD_GEN_A_CORE
No. LUZMA screening performed.  STOP BANG Legend: 0-2 = LOW Risk; 3-4 = INTERMEDIATE Risk; 5-8 = HIGH Risk
No. LUZMA screening performed.  STOP BANG Legend: 0-2 = LOW Risk; 3-4 = INTERMEDIATE Risk; 5-8 = HIGH Risk

## 2023-06-29 NOTE — DISCHARGE NOTE PROVIDER - NSDCFUSCHEDAPPT_GEN_ALL_CORE_FT
Unknown, Doctor  [facility] PreAdmits  Scheduled Appointment: 07/11/2023    [facility]  MRI [address]  Scheduled Appointment: 07/11/2023    Elliot Metcalf  [facility]  CARDIOLOGY [address]  Scheduled Appointment: 07/13/2023    Brennan Tyler  [facility]  ELECTROPH [address]  Scheduled Appointment: 08/01/2023     Francisca Martinez  [facility] PreAdmits  Scheduled Appointment: 07/13/2023    Francisca Martinez  [facility] Physician [facility]  MEDMGMT SI [address]  Scheduled Appointment: 07/13/2023    Jarett Zepeda  [facility] Physician [facility]  CTSURG [address]  Scheduled Appointment: 07/19/2023    Brennan Tyler  [facility] Physician [facility]  ELECTROPH [address]  Scheduled Appointment: 08/01/2023

## 2023-06-29 NOTE — DISCHARGE NOTE PROVIDER - PROVIDER TOKENS
PROVIDER:[TOKEN:[[id number]]],PROVIDER:[TOKEN:[[id number]]],PROVIDER:[TOKEN:[[id number]]] PROVIDER:[TOKEN:[[id number]],SCHEDULEDAPPT:[07/19/2023],SCHEDULEDAPPTTIME:[02:30 PM]],PROVIDER:[TOKEN:[[id number]]],PROVIDER:[TOKEN:[[id number]]] PROVIDER:[TOKEN:[[id number]],SCHEDULEDAPPT:[07/19/2023],SCHEDULEDAPPTTIME:[02:30 PM]],PROVIDER:[TOKEN:[[id number]]],PROVIDER:[TOKEN:[[id number]]],FREE:[LAST:[[facility]],PHONE:[phone]],FAX:[(   [phone]    -],ADDRESS:[[address] [place] [address]],SCHEDULEDAPPT:[07/13/2023],SCHEDULEDAPPTTIME:[01:30 PM]]

## 2023-06-29 NOTE — DISCHARGE NOTE PROVIDER - CARE PROVIDER_API CALL
Jarett Zepeda  Thoracic and Cardiac Surgery  [address] [place] [address], [address]  [place] [address]  Phone: [phone]  Fax: [phone]  Follow Up Time:     Elliot Metcalf  Cardiovascular Disease  [address] [place] [address]  Phone: [phone]  Fax: [phone]  Follow Up Time:     Padmini Vaughn  Internal Medicine  [address] [place] [address]  Phone: [phone]  Fax: ()-  Follow Up Time:    Jarett Zepeda  Thoracic and Cardiac Surgery  [address] [facility], [address]  [place] [address]  Phone: [phone]  Fax: [phone]  Scheduled Appointment: 07/19/2023 02:30 PM    Elliot Metcalf  Cardiovascular Disease  [address] [place] [address]  Phone: [phone]  Fax: [phone]  Follow Up Time:     Padmini Vaughn  Internal Medicine  [address] [place] [address]  Phone: [phone]  Fax: ()-  Follow Up Time:    Jarett Zepeda  Thoracic and Cardiac Surgery  [address] [facility], [address]  [place] [address]  Phone: [phone]  Fax: [phone]  Scheduled Appointment: 07/19/2023 02:30 PM    Elliot Metcalf  Cardiovascular Disease  [address] [place] [address]  Phone: [phone]  Fax: [phone]  Follow Up Time:     Padmini Vaughn  Internal Medicine  [address] [place] [address]  Phone: [phone]  Fax: ()-  Follow Up Time:     [facility],   [address] [place] [address]  Phone: [phone]  Fax: (   )    -  Scheduled Appointment: 07/13/2023 01:30 PM

## 2023-06-29 NOTE — BRIEF OPERATIVE NOTE - NSICDXBRIEFPOSTOP_GEN_ALL_CORE_FT
POST-OP DIAGNOSIS:  Aortic valve mass 29-Jun-2023 12:54:55  Jarett Zepeda  Longstanding persistent atrial fibrillation 29-Jun-2023 12:55:13  Jarett Zepeda  Hypertrophic obstructive cardiomyopathy (HOCM) 29-Jun-2023 12:55:24  Jarett Zepeda

## 2023-06-29 NOTE — DISCHARGE NOTE PROVIDER - HOSPITAL COURSE
Ms. MALIHA THOMPSON is a 42 year F with PMH of Aflutter, cardiomyopathy, DLD, HTN. Ms. THOMPSON was worked up for complaints of HOCM, and SOB. 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 Symptoms today are SOB on exertion. NYHA class III. On 06/29/23, she underwent resection of aortic valve mass and left atrial appendage clipping. Post-operatively,        Ms. MALIHA THOMPSON is a 42 year F with PMH of Aflutter, cardiomyopathy, DLD, HTN. Ms. THOMPSON was worked up for complaints of HOCM, and SOB. 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 Symptoms today are SOB on exertion. NYHA class III. On 06/29/23, she underwent resection of aortic valve mass and left atrial appendage clipping. Post-operatively,       Patient has a follow-up appointment with Dr. Zepeda on Wednesday 7/19 @ 2:30pm. Ms. MALIHA THOMPSON is a 42 year F with PMH of Aflutter, cardiomyopathy, DLD, HTN. Ms. THOMPSON was worked up for complaints of HOCM, and SOB. 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 Symptoms today are SOB on exertion. NYHA class III. On 06/29/23, she underwent resection of aortic valve mass and left atrial appendage clipping. Post-operatively, the hospitalization course was prolonged due to atrial fibrillation with RVR and the patient's anticoagulation levels. The patient was started on Coumadin due to her mechanical valve and is now in the therapeutic range, INR 3.03. The patient has a history        Patient has a follow-up appointment with Dr. Zepeda on Wednesday 7/19 @ 2:30pm. Ms. MALIHA THOMPSON is a 42 year F with PMH of Aflutter, cardiomyopathy, DLD, HTN. Ms. THOMPSON was worked up for complaints of HOCM, and SOB. 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 Symptoms today are SOB on exertion. NYHA class III. On 06/29/23, she underwent resection of aortic valve mass and left atrial appendage clipping. Post-operatively, the hospitalization course was prolonged due to atrial fibrillation with RVR, patient's anticoagulation levels, and leukocytosis. The patient was started on Coumadin due to her mechanical valve and is now in the therapeutic range, INR 3.03. Electrophysiology was consulted on the patient for atrial fibrillation. Dr. Tyler saw the patient and recommended the patient continue therapeutic anticoagulation, heart rate control, and to follow-up with the office in one month. The patient was also seen by Infectious disease due to leukocytosis. The patient was afebrile with her white blood cell count trending up. Blood cultures went sent and was negative. IV antibiotics were held and patient is started on PO Keflex 500mg q8h x 7 days. Patient is medically optimized and is being discharged home today with home care services. Patient has a follow-up appointment with Dr. Zepeda on Wednesday 7/19 @ 2:30pm and coumadin clinic appointment tomorrow 7/13/23 at 1:30pm.

## 2023-06-29 NOTE — DISCHARGE NOTE PROVIDER - NSDCFUADDINST_GEN_ALL_CORE_FT
Activities/Restrictions  1.	Do not – drive, lift, pull or push anything over 10 pounds for 8 weeks.  2.	Shower every night and carefully wash wound, pat dry.  Cover wound with dry sterile dressing if some minimal drainage exists. No baths or swimming for two months.   3.	Apply support stockings /ace wraps to legs as soon as you get out of bed in the morning, remove in evening.   4.	Do progressive walking exercises every day, gradually increasing to 30 to 40 minutes/day, five days a week and incentive spirometer 10 times every hour while awake  5.	DO NOT DRIVE OR CONSUME ALCOHOL WHILE TAKING PAIN MEDICATION.  Contact your Physician promptly if:  1.	Signs of wound infection, such as increasing redness, swelling, pain or drainage from incision.  2.	Progressive shortness of breath or increasing difficulty with breathing when lying down.  3.	Excessive nausea, vomiting, diarrhea or coughing.  4.	Increase swelling of legs that does not resolve with leg elevation.  5.	Chest pain that spreads to arms, jaw or back or sudden development of numbness, weakness, difficulty speaking or facial droop – Call 911.  6.	Diabetics who are unable to keep finger stick glucose under 150 for three consecutive readings.  Instructions:  1.	 Keep a daily log for Temperature, pulse, blood pressure, and weight twice a day and Glucose if diabetic with each meal. Call office for Temp > 101, pulse greater than 110 or less than 55, BP first # greater than 160 or less than 100, 3 pound weight gain in 1 day or 5 pounds in 3 days  2.	Hold pillow to chest and grab elbows when you need to cough.  3.                A discussion was conducted with patient/family regarding importance of joining a cardiac rehabilitation program.     Follow-up with Coumadin Clinic tomorrow 7/13 @ 1:30pm.   - Please bring your discharge instructions and insurance card. Please try and arrive 15 minutes before your scheduled appointment to fill out paperwork.

## 2023-06-29 NOTE — BRIEF OPERATIVE NOTE - COMMENTS
I, Bulmaro Bedoya,  first assisted in all major parts of the operation in the absence of a qualified surgeon, fellow, or resident physician. Including chest opening and closure, cannulation and decannulation and insertion of valve.

## 2023-06-29 NOTE — DISCHARGE NOTE PROVIDER - NSDCMRMEDTOKEN_GEN_ALL_CORE_FT
amiodarone 200 mg oral tablet: 1 tab(s) orally once a day  apixaban 5 mg oral tablet: 1 tab(s) orally 2 times a day   Cardizem 30 mg oral tablet: 1 tab(s) orally 3 times a day Hold for HR below 60  Metoprolol Succinate  mg oral tablet, extended release: 1 tablet, extended release orally once a day   Cardizem 30 mg oral tablet: 1 tab(s) orally 3 times a day Hold for HR below 60  Metoprolol Succinate  mg oral tablet, extended release: 1 tablet, extended release orally once a day   acetaminophen 325 mg oral tablet: 2 tab(s) orally every 6 hours  amiodarone 200 mg oral tablet: 1 tab(s) orally once a day  cephalexin 500 mg oral tablet: 1 tab(s) orally every 8 hours  dilTIAZem 240 mg/24 hours oral capsule, extended release: 1 cap(s) orally once a day  furosemide 20 mg oral tablet: 1 tab(s) orally once a day  metoprolol tartrate 50 mg oral tablet: 1 tab(s) orally every 12 hours  potassium chloride 20 mEq oral tablet, extended release: 1 tab(s) orally once a day  warfarin 1 mg oral tablet: 1 tab(s) orally once a day (at bedtime) follow up with coumadin clinic to monitor INR levels closely and maintain a therapeutic range of 2.5-3.5

## 2023-06-29 NOTE — BRIEF OPERATIVE NOTE - NSICDXBRIEFPREOP_GEN_ALL_CORE_FT
PRE-OP DIAGNOSIS:  Aortic valve mass 29-Jun-2023 12:54:14  Jarett Zepeda  Longstanding persistent atrial fibrillation 29-Jun-2023 12:54:29  Jarett Zepeda  Hypertrophic obstructive cardiomyopathy (HOCM) 29-Jun-2023 12:54:46  Jarett Zepeda

## 2023-06-29 NOTE — DISCHARGE NOTE PROVIDER - NSDCCPTREATMENT_GEN_ALL_CORE_FT
PRINCIPAL PROCEDURE  Procedure: Resection of aortic valve mass  Findings and Treatment: Activities/Restrictions  1.	Do not – drive, lift, pull or push anything over 10 pounds for 8 weeks.  2.	Shower every night and carefully wash wound, pat dry.  Cover is wound is draining with dry sterile dressing. No baths or swimming for two months.   3.	Apply support stockings /ace wraps to legs as soon as you get out of bed in the morning, remove in evening.   4.	Do progressive walking exercises every day, gradually increasing to 30 to 40 minutes/day, five days a week and incentive spirometer 10 times every hour while awake  5.	DO NOT DRIVE OR CONSUME ALCOHOL WHILE TAKING PAIN MEDICATION.  Contact your Physician promptly if:  1.	 Signs of wound infection, such as increasing redness, swelling, pain or drainage from incision.  2.	Progressive shortness of breath or increasing difficulty with breathing when lying down.  3.	Excessive nausea, vomiting, diarrhea or coughing.  4.	Increase swelling of legs that does not resolve with leg elevation.  5.	Chest pain that spreads to arms, jaw or back or sudden development of numbness, weakness, difficulty speaking or facial droop – Call 911.  6.	Diabetics who are unable to keep finger stick glucose under 150 for three consecutive readings.  Instructions:  1.	 Keep a daily log for Temperature, pulse, blood pressure, and weight twice a day and Glucose if diabetic with each meal. Call office for Temp > 101, pulse greater than 110 or less than 55, BP first # greater than 160 or less than 100, 3 pound weight gain in 1 day or 5 pounds in 3 days  2.	Hold pillow to chest and grab elbows when you need to cough.

## 2023-06-29 NOTE — BRIEF OPERATIVE NOTE - OPERATION/FINDINGS
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 VALVE WAS NOT COMPETENT AFTER RESECTION OF ALL LESIONS AND VALVE REPLACEMENT WAS NECESSARY; #25 MM ST TYRONE MEDICAL BILEAFLET MECHANICAL PROSTHESIS UTILIZED; GOOD VALVE FUNCTION, GOOD LVOT FLOW AND MINIMAL OBSTRUCTIV RESIDUA NOTED ON MINDI.  SUCCESSFUL LAAO WITH 50-MM V-CLIP, GUIDED BY IMNDI.

## 2023-06-29 NOTE — PRE-ANESTHESIA EVALUATION ADULT - NSANTHPROCED_GEN_ALL_CORE
Arterial Catheter/Central Venous Catheter/Pulmonary Artery Catheter/Transesophageal Echocardiogram
Arterial Catheter/Central Venous Catheter/Pulmonary Artery Catheter/Transesophageal Echocardiogram

## 2023-06-30 LAB
ALBUMIN SERPL ELPH-MCNC: 3.5 G/DL — SIGNIFICANT CHANGE UP (ref 3.5–5.2)
ALP SERPL-CCNC: 57 U/L — SIGNIFICANT CHANGE UP (ref 30–115)
ALT FLD-CCNC: 27 U/L — SIGNIFICANT CHANGE UP (ref 0–41)
ANION GAP SERPL CALC-SCNC: 15 MMOL/L — HIGH (ref 7–14)
APTT BLD: 26.5 SEC — LOW (ref 27–39.2)
AST SERPL-CCNC: 86 U/L — HIGH (ref 0–41)
BASOPHILS # BLD AUTO: 0.02 K/UL — SIGNIFICANT CHANGE UP (ref 0–0.2)
BASOPHILS NFR BLD AUTO: 0.2 % — SIGNIFICANT CHANGE UP (ref 0–1)
BILIRUB SERPL-MCNC: 1 MG/DL — SIGNIFICANT CHANGE UP (ref 0.2–1.2)
BUN SERPL-MCNC: 19 MG/DL — SIGNIFICANT CHANGE UP (ref 10–20)
CALCIUM SERPL-MCNC: 8 MG/DL — LOW (ref 8.4–10.5)
CHLORIDE SERPL-SCNC: 105 MMOL/L — SIGNIFICANT CHANGE UP (ref 98–110)
CO2 SERPL-SCNC: 21 MMOL/L — SIGNIFICANT CHANGE UP (ref 17–32)
CREAT SERPL-MCNC: 0.9 MG/DL — SIGNIFICANT CHANGE UP (ref 0.7–1.5)
EGFR: 81 ML/MIN/1.73M2 — SIGNIFICANT CHANGE UP
EOSINOPHIL # BLD AUTO: 0 K/UL — SIGNIFICANT CHANGE UP (ref 0–0.7)
EOSINOPHIL NFR BLD AUTO: 0 % — SIGNIFICANT CHANGE UP (ref 0–8)
GAS PNL BLDA: SIGNIFICANT CHANGE UP
GAS PNL BLDV: SIGNIFICANT CHANGE UP
GLUCOSE BLDC GLUCOMTR-MCNC: 123 MG/DL — HIGH (ref 70–99)
GLUCOSE BLDC GLUCOMTR-MCNC: 127 MG/DL — HIGH (ref 70–99)
GLUCOSE BLDC GLUCOMTR-MCNC: 129 MG/DL — HIGH (ref 70–99)
GLUCOSE BLDC GLUCOMTR-MCNC: 133 MG/DL — HIGH (ref 70–99)
GLUCOSE BLDC GLUCOMTR-MCNC: 140 MG/DL — HIGH (ref 70–99)
GLUCOSE BLDC GLUCOMTR-MCNC: 156 MG/DL — HIGH (ref 70–99)
GLUCOSE SERPL-MCNC: 130 MG/DL — HIGH (ref 70–99)
HCT VFR BLD CALC: 34.3 % — LOW (ref 37–47)
HGB BLD-MCNC: 11.2 G/DL — LOW (ref 12–16)
IMM GRANULOCYTES NFR BLD AUTO: 0.7 % — HIGH (ref 0.1–0.3)
INR BLD: 1.65 RATIO — HIGH (ref 0.65–1.3)
LYMPHOCYTES # BLD AUTO: 0.54 K/UL — LOW (ref 1.2–3.4)
LYMPHOCYTES # BLD AUTO: 4.6 % — LOW (ref 20.5–51.1)
MAGNESIUM SERPL-MCNC: 2.1 MG/DL — SIGNIFICANT CHANGE UP (ref 1.8–2.4)
MCHC RBC-ENTMCNC: 26.5 PG — LOW (ref 27–31)
MCHC RBC-ENTMCNC: 32.7 G/DL — SIGNIFICANT CHANGE UP (ref 32–37)
MCV RBC AUTO: 81.1 FL — SIGNIFICANT CHANGE UP (ref 81–99)
MONOCYTES # BLD AUTO: 0.92 K/UL — HIGH (ref 0.1–0.6)
MONOCYTES NFR BLD AUTO: 7.9 % — SIGNIFICANT CHANGE UP (ref 1.7–9.3)
NEUTROPHILS # BLD AUTO: 10.1 K/UL — HIGH (ref 1.4–6.5)
NEUTROPHILS NFR BLD AUTO: 86.6 % — HIGH (ref 42.2–75.2)
NRBC # BLD: 0 /100 WBCS — SIGNIFICANT CHANGE UP (ref 0–0)
PLATELET # BLD AUTO: 193 K/UL — SIGNIFICANT CHANGE UP (ref 130–400)
PMV BLD: 10.9 FL — HIGH (ref 7.4–10.4)
POTASSIUM SERPL-MCNC: 4.3 MMOL/L — SIGNIFICANT CHANGE UP (ref 3.5–5)
POTASSIUM SERPL-SCNC: 4.3 MMOL/L — SIGNIFICANT CHANGE UP (ref 3.5–5)
PROT SERPL-MCNC: 5.6 G/DL — LOW (ref 6–8)
PROTHROM AB SERPL-ACNC: 19.1 SEC — HIGH (ref 9.95–12.87)
RBC # BLD: 4.23 M/UL — SIGNIFICANT CHANGE UP (ref 4.2–5.4)
RBC # FLD: 16.8 % — HIGH (ref 11.5–14.5)
SODIUM SERPL-SCNC: 141 MMOL/L — SIGNIFICANT CHANGE UP (ref 135–146)
WBC # BLD: 11.66 K/UL — HIGH (ref 4.8–10.8)
WBC # FLD AUTO: 11.66 K/UL — HIGH (ref 4.8–10.8)

## 2023-06-30 PROCEDURE — 71045 X-RAY EXAM CHEST 1 VIEW: CPT | Mod: 26

## 2023-06-30 RX ORDER — WARFARIN SODIUM 2.5 MG/1
5 TABLET ORAL ONCE
Refills: 0 | Status: COMPLETED | OUTPATIENT
Start: 2023-06-30 | End: 2023-06-30

## 2023-06-30 RX ORDER — ALBUMIN HUMAN 25 %
500 VIAL (ML) INTRAVENOUS ONCE
Refills: 0 | Status: COMPLETED | OUTPATIENT
Start: 2023-06-30 | End: 2023-06-30

## 2023-06-30 RX ORDER — GLUCAGON INJECTION, SOLUTION 0.5 MG/.1ML
1 INJECTION, SOLUTION SUBCUTANEOUS ONCE
Refills: 0 | Status: DISCONTINUED | OUTPATIENT
Start: 2023-06-30 | End: 2023-07-06

## 2023-06-30 RX ORDER — DIGOXIN 250 MCG
500 TABLET ORAL ONCE
Refills: 0 | Status: DISCONTINUED | OUTPATIENT
Start: 2023-06-30 | End: 2023-06-30

## 2023-06-30 RX ORDER — METOPROLOL TARTRATE 50 MG
12.5 TABLET ORAL EVERY 12 HOURS
Refills: 0 | Status: DISCONTINUED | OUTPATIENT
Start: 2023-06-30 | End: 2023-06-30

## 2023-06-30 RX ORDER — KETOROLAC TROMETHAMINE 30 MG/ML
15 SYRINGE (ML) INJECTION EVERY 6 HOURS
Refills: 0 | Status: DISCONTINUED | OUTPATIENT
Start: 2023-06-30 | End: 2023-07-03

## 2023-06-30 RX ORDER — DEXTROSE 50 % IN WATER 50 %
15 SYRINGE (ML) INTRAVENOUS ONCE
Refills: 0 | Status: DISCONTINUED | OUTPATIENT
Start: 2023-06-30 | End: 2023-07-06

## 2023-06-30 RX ORDER — HEPARIN SODIUM 5000 [USP'U]/ML
5000 INJECTION INTRAVENOUS; SUBCUTANEOUS EVERY 8 HOURS
Refills: 0 | Status: DISCONTINUED | OUTPATIENT
Start: 2023-06-30 | End: 2023-07-02

## 2023-06-30 RX ORDER — SODIUM CHLORIDE 9 MG/ML
1000 INJECTION, SOLUTION INTRAVENOUS
Refills: 0 | Status: DISCONTINUED | OUTPATIENT
Start: 2023-06-30 | End: 2023-07-06

## 2023-06-30 RX ORDER — DIGOXIN 250 MCG
500 TABLET ORAL ONCE
Refills: 0 | Status: COMPLETED | OUTPATIENT
Start: 2023-06-30 | End: 2023-06-30

## 2023-06-30 RX ORDER — ACETAMINOPHEN 500 MG
1000 TABLET ORAL ONCE
Refills: 0 | Status: COMPLETED | OUTPATIENT
Start: 2023-06-30 | End: 2023-06-30

## 2023-06-30 RX ORDER — INSULIN LISPRO 100/ML
VIAL (ML) SUBCUTANEOUS
Refills: 0 | Status: DISCONTINUED | OUTPATIENT
Start: 2023-06-30 | End: 2023-07-06

## 2023-06-30 RX ORDER — METOPROLOL TARTRATE 50 MG
25 TABLET ORAL
Refills: 0 | Status: DISCONTINUED | OUTPATIENT
Start: 2023-06-30 | End: 2023-07-01

## 2023-06-30 RX ORDER — KETOROLAC TROMETHAMINE 30 MG/ML
15 SYRINGE (ML) INJECTION ONCE
Refills: 0 | Status: DISCONTINUED | OUTPATIENT
Start: 2023-06-30 | End: 2023-06-30

## 2023-06-30 RX ORDER — AMIODARONE HYDROCHLORIDE 400 MG/1
0.5 TABLET ORAL
Qty: 450 | Refills: 0 | Status: DISCONTINUED | OUTPATIENT
Start: 2023-06-30 | End: 2023-07-01

## 2023-06-30 RX ADMIN — Medication 250 MILLILITER(S): at 06:31

## 2023-06-30 RX ADMIN — SENNA PLUS 2 TABLET(S): 8.6 TABLET ORAL at 22:30

## 2023-06-30 RX ADMIN — Medication 15 MILLIGRAM(S): at 03:40

## 2023-06-30 RX ADMIN — Medication 1000 MILLIGRAM(S): at 00:52

## 2023-06-30 RX ADMIN — Medication 500 MICROGRAM(S): at 14:00

## 2023-06-30 RX ADMIN — Medication 15 MILLIGRAM(S): at 18:15

## 2023-06-30 RX ADMIN — Medication 15 MILLIGRAM(S): at 18:00

## 2023-06-30 RX ADMIN — Medication 100 MILLIGRAM(S): at 11:36

## 2023-06-30 RX ADMIN — HEPARIN SODIUM 5000 UNIT(S): 5000 INJECTION INTRAVENOUS; SUBCUTANEOUS at 14:54

## 2023-06-30 RX ADMIN — Medication 100 MILLIGRAM(S): at 03:00

## 2023-06-30 RX ADMIN — Medication 1000 MILLIGRAM(S): at 06:59

## 2023-06-30 RX ADMIN — Medication 650 MILLIGRAM(S): at 23:30

## 2023-06-30 RX ADMIN — PANTOPRAZOLE SODIUM 40 MILLIGRAM(S): 20 TABLET, DELAYED RELEASE ORAL at 11:29

## 2023-06-30 RX ADMIN — WARFARIN SODIUM 5 MILLIGRAM(S): 2.5 TABLET ORAL at 22:30

## 2023-06-30 RX ADMIN — FAMOTIDINE 20 MILLIGRAM(S): 10 INJECTION INTRAVENOUS at 06:31

## 2023-06-30 RX ADMIN — Medication 650 MILLIGRAM(S): at 23:00

## 2023-06-30 RX ADMIN — Medication 650 MILLIGRAM(S): at 11:58

## 2023-06-30 RX ADMIN — Medication 15 MILLIGRAM(S): at 03:55

## 2023-06-30 RX ADMIN — HEPARIN SODIUM 5000 UNIT(S): 5000 INJECTION INTRAVENOUS; SUBCUTANEOUS at 22:30

## 2023-06-30 RX ADMIN — CHLORHEXIDINE GLUCONATE 5 MILLILITER(S): 213 SOLUTION TOPICAL at 06:30

## 2023-06-30 RX ADMIN — Medication 25 MILLIGRAM(S): at 18:05

## 2023-06-30 RX ADMIN — Medication 650 MILLIGRAM(S): at 18:05

## 2023-06-30 RX ADMIN — Medication 650 MILLIGRAM(S): at 11:28

## 2023-06-30 RX ADMIN — Medication 250 MILLILITER(S): at 00:50

## 2023-06-30 RX ADMIN — POLYETHYLENE GLYCOL 3350 17 GRAM(S): 17 POWDER, FOR SOLUTION ORAL at 11:36

## 2023-06-30 RX ADMIN — Medication 400 MILLIGRAM(S): at 00:00

## 2023-06-30 RX ADMIN — Medication 650 MILLIGRAM(S): at 18:35

## 2023-06-30 RX ADMIN — Medication 400 MILLIGRAM(S): at 06:00

## 2023-06-30 RX ADMIN — Medication 12.5 MILLIGRAM(S): at 11:29

## 2023-06-30 RX ADMIN — Medication 15 MILLIGRAM(S): at 08:30

## 2023-06-30 RX ADMIN — CHLORHEXIDINE GLUCONATE 15 MILLILITER(S): 213 SOLUTION TOPICAL at 06:30

## 2023-06-30 RX ADMIN — Medication 15 MILLIGRAM(S): at 08:45

## 2023-06-30 NOTE — PROGRESS NOTE ADULT - SUBJECTIVE AND OBJECTIVE BOX
OPERATIVE PROCEDURE(s):   avr (m) excision of fibroelastoma/ ligation of left atrial appendage             POD #1    SURGEON(s): radha    SUBJECTIVE ASSESSMENT: pt is without complaints    Vital Signs Last 24 Hrs  T(C): 37 (30 Jun 2023 08:00), Max: 37.8 (29 Jun 2023 18:00)  T(F): 98.6 (30 Jun 2023 08:00), Max: 100 (29 Jun 2023 18:00)  HR: 72 (30 Jun 2023 11:00) (62 - 79)  BP: 98/51 (30 Jun 2023 05:00) (87/56 - 109/50)  BP(mean): 73 (30 Jun 2023 05:00) (60 - 76)  RR: 27 (30 Jun 2023 11:00) (6 - 39)  SpO2: 97% (30 Jun 2023 11:00) (91% - 100%)    Parameters below as of 30 Jun 2023 11:00  Patient On (Oxygen Delivery Method): nasal cannula w/ humidification  O2 Flow (L/min): 4    06-29-23 @ 07:01  -  06-30-23 @ 07:00  --------------------------------------------------------  IN: 3038.7 mL / OUT: 1540 mL / NET: 1498.7 mL    06-30-23 @ 07:01  -  06-30-23 @ 15:45  --------------------------------------------------------  IN: 357.7 mL / OUT: 73 mL / NET: 284.7 mL    Physical Exam  General: alert and oriented x 3  Chest: cta bl  CVS: s1s2  Abd:  pos bs soft nt  GI/ :  Ext: trace edema;  incisions- dressed   sternum- intact    LABS:                        11.2   11.66 )-----------( 193      ( 30 Jun 2023 01:35 )             34.3     COUMADIN:   [x ] YES [ ] NO    PT/INR - ( 30 Jun 2023 01:35 )   PT: 19.10 sec;   INR: 1.65 ratio         PTT - ( 30 Jun 2023 01:35 )  PTT:26.5 sec  06-30    141  |  105  |  19  ----------------------------<  130<H>  4.3   |  21  |  0.9    Ca    8.0<L>      30 Jun 2023 01:35  Mg     2.1     06-30    TPro  5.6<L>  /  Alb  3.5  /  TBili  1.0  /  DBili  x   /  AST  86<H>  /  ALT  27  /  AlkPhos  57  06-30    Urinalysis Basic - ( 30 Jun 2023 01:35 )    Color: x / Appearance: x / SG: x / pH: x  Gluc: 130 mg/dL / Ketone: x  / Bili: x / Urobili: x   Blood: x / Protein: x / Nitrite: x   Leuk Esterase: x / RBC: x / WBC x   Sq Epi: x / Non Sq Epi: x / Bacteria: x    MEDICATIONS  (STANDING):  acetaminophen     Tablet .. 650 milliGRAM(s) Oral every 6 hours  albumin human  5% IVPB 500 milliLiter(s) IV Intermittent once  aMIOdarone Infusion 0.5 mG/Min (16.7 mL/Hr) IV Continuous <Continuous>  chlorhexidine 2% Cloths 1 Application(s) Topical daily  dextrose 5%. 1000 milliLiter(s) (50 mL/Hr) IV Continuous <Continuous>  dextrose 5%. 1000 milliLiter(s) (100 mL/Hr) IV Continuous <Continuous>  dextrose 50% Injectable 50 milliLiter(s) IV Push every 15 minutes  dextrose 50% Injectable 25 milliLiter(s) IV Push every 15 minutes  digoxin  IVPB 500 MICROGram(s) IV Intermittent once  glucagon  Injectable 1 milliGRAM(s) IntraMuscular once  heparin   Injectable 5000 Unit(s) SubCutaneous every 8 hours  insulin lispro (ADMELOG) corrective regimen sliding scale   SubCutaneous three times a day before meals  meperidine     Injectable 25 milliGRAM(s) IV Push once  metoprolol tartrate 12.5 milliGRAM(s) Oral every 12 hours  nitroglycerin  Infusion 15 MICROgram(s)/Min (4.5 mL/Hr) IV Continuous <Continuous>  pantoprazole    Tablet 40 milliGRAM(s) Oral daily  polyethylene glycol 3350 17 Gram(s) Oral daily  senna 2 Tablet(s) Oral at bedtime  sodium chloride 0.9%. 1000 milliLiter(s) (20 mL/Hr) IV Continuous <Continuous>  warfarin 5 milliGRAM(s) Oral once    MEDICATIONS  (PRN):  dextrose Oral Gel 15 Gram(s) Oral once PRN Blood Glucose LESS THAN 70 milliGRAM(s)/deciliter  HYDROmorphone  Injectable 0.5 milliGRAM(s) IV Push every 6 hours PRN Breakthrough Pain  oxyCODONE    IR 5 milliGRAM(s) Oral every 4 hours PRN Moderate Pain (4 - 6)  oxyCODONE    IR 10 milliGRAM(s) Oral every 4 hours PRN Severe Pain (7 - 10)      Allergies    No Known Allergies    Intolerances        Ambulation/Activity Status:  amb oob to chair       RADIOLOGY & ADDITIONAL TESTS:  Diet, DASH/TLC:   Sodium & Cholesterol Restricted  Consistent Carbohydrate Evening Snack (06-30-23 @ 09:36)    ACC: [id number] EXAM:  XR CHEST PORTABLE ROUTINE 1V   ORDERED BY: RENNY ANDERSON     PROCEDURE DATE:  06/30/2023          INTERPRETATION:  Clinical History/Reason for Exam:  Post-cardiac surgery    Technique:  Frontal view the chest.    Comparison: Chest x-ray 6/29/2023..    Findings:    Support devices:  ET tube, nasogastric tube removed. [place]-Ajit catheter   tip stable position. Stable right chest tube. Mediastinal drain    Cardiac/mediastinum/hilum: Stable cardiomegaly. aortic valve. Surgical   clip.      Lung parenchyma/ Pleura: Stable opacities. No pneumothorax. Azygos   fissure.    Skeleton/soft tissues: Stable    Impression:    Stable opacities. No pneumothorax.    --- End of Report ---    Assessment/Plan: Patient is a 43 yo female s/p avr (m)/excision of fibro-elastoma, ligation of left atrial appendage pod # 1  cont present tx as per ct surgeon  s/p avr (m)- start coumadin  hx of chronic  a. fib- cont amio drip and anticoagulation with coumadin; rate control with lopressor  dvt/gi ppx  d/c femoral a line and sump; d/c chest tube once drainage dec  encourage is   pain management      Social Service Disposition:

## 2023-06-30 NOTE — PATIENT PROFILE ADULT - FUNCTIONAL ASSESSMENT - BASIC MOBILITY 6.
4-calculated by average/Not able to assess (calculate score using [facility] averaging method)

## 2023-06-30 NOTE — PHYSICAL THERAPY INITIAL EVALUATION ADULT - GENERAL OBSERVATIONS, REHAB EVAL
Pt remains lined, including +swan bia, on bedrest at this time. PT to f/u once pt has been cleared for OOB with PT.
1300 - 1325 Pt encountered initiating amb with NSG with +tele, +pacer, +NC 2 L/m, +valladares, +chest tube, +A line, in NAD.

## 2023-06-30 NOTE — PATIENT PROFILE ADULT - FALL HARM RISK - HARM RISK INTERVENTIONS

## 2023-06-30 NOTE — PHYSICAL THERAPY INITIAL EVALUATION ADULT - GAIT TRAINING, PT EVAL
1 week: Amb 400 ft independently without AD and negotiate 12 steps with supervision using hand rail as needed.

## 2023-07-01 LAB
ALBUMIN SERPL ELPH-MCNC: 3.9 G/DL — SIGNIFICANT CHANGE UP (ref 3.5–5.2)
ALP SERPL-CCNC: 58 U/L — SIGNIFICANT CHANGE UP (ref 30–115)
ALT FLD-CCNC: 27 U/L — SIGNIFICANT CHANGE UP (ref 0–41)
ANION GAP SERPL CALC-SCNC: 10 MMOL/L — SIGNIFICANT CHANGE UP (ref 7–14)
APTT BLD: 18.2 SEC — CRITICAL LOW (ref 27–39.2)
AST SERPL-CCNC: 78 U/L — HIGH (ref 0–41)
BASOPHILS # BLD AUTO: 0.02 K/UL — SIGNIFICANT CHANGE UP (ref 0–0.2)
BASOPHILS NFR BLD AUTO: 0.1 % — SIGNIFICANT CHANGE UP (ref 0–1)
BILIRUB SERPL-MCNC: 1 MG/DL — SIGNIFICANT CHANGE UP (ref 0.2–1.2)
BUN SERPL-MCNC: 26 MG/DL — HIGH (ref 10–20)
CALCIUM SERPL-MCNC: 8.6 MG/DL — SIGNIFICANT CHANGE UP (ref 8.4–10.5)
CHLORIDE SERPL-SCNC: 103 MMOL/L — SIGNIFICANT CHANGE UP (ref 98–110)
CO2 SERPL-SCNC: 24 MMOL/L — SIGNIFICANT CHANGE UP (ref 17–32)
CREAT SERPL-MCNC: 0.9 MG/DL — SIGNIFICANT CHANGE UP (ref 0.7–1.5)
EGFR: 81 ML/MIN/1.73M2 — SIGNIFICANT CHANGE UP
EOSINOPHIL # BLD AUTO: 0.01 K/UL — SIGNIFICANT CHANGE UP (ref 0–0.7)
EOSINOPHIL NFR BLD AUTO: 0.1 % — SIGNIFICANT CHANGE UP (ref 0–8)
GLUCOSE BLDC GLUCOMTR-MCNC: 116 MG/DL — HIGH (ref 70–99)
GLUCOSE BLDC GLUCOMTR-MCNC: 120 MG/DL — HIGH (ref 70–99)
GLUCOSE BLDC GLUCOMTR-MCNC: 135 MG/DL — HIGH (ref 70–99)
GLUCOSE BLDC GLUCOMTR-MCNC: 141 MG/DL — HIGH (ref 70–99)
GLUCOSE SERPL-MCNC: 138 MG/DL — HIGH (ref 70–99)
HCT VFR BLD CALC: 31 % — LOW (ref 37–47)
HGB BLD-MCNC: 10 G/DL — LOW (ref 12–16)
IMM GRANULOCYTES NFR BLD AUTO: 0.6 % — HIGH (ref 0.1–0.3)
INR BLD: 1.7 RATIO — HIGH (ref 0.65–1.3)
INR BLD: 4.26 RATIO — HIGH (ref 0.65–1.3)
LYMPHOCYTES # BLD AUTO: 0.7 K/UL — LOW (ref 1.2–3.4)
LYMPHOCYTES # BLD AUTO: 4.5 % — LOW (ref 20.5–51.1)
MAGNESIUM SERPL-MCNC: 2.2 MG/DL — SIGNIFICANT CHANGE UP (ref 1.8–2.4)
MCHC RBC-ENTMCNC: 26.2 PG — LOW (ref 27–31)
MCHC RBC-ENTMCNC: 32.3 G/DL — SIGNIFICANT CHANGE UP (ref 32–37)
MCV RBC AUTO: 81.2 FL — SIGNIFICANT CHANGE UP (ref 81–99)
MONOCYTES # BLD AUTO: 1.72 K/UL — HIGH (ref 0.1–0.6)
MONOCYTES NFR BLD AUTO: 11 % — HIGH (ref 1.7–9.3)
NEUTROPHILS # BLD AUTO: 13.15 K/UL — HIGH (ref 1.4–6.5)
NEUTROPHILS NFR BLD AUTO: 83.7 % — HIGH (ref 42.2–75.2)
NRBC # BLD: 0 /100 WBCS — SIGNIFICANT CHANGE UP (ref 0–0)
PLATELET # BLD AUTO: 190 K/UL — SIGNIFICANT CHANGE UP (ref 130–400)
PMV BLD: 10.8 FL — HIGH (ref 7.4–10.4)
POTASSIUM SERPL-MCNC: 4.2 MMOL/L — SIGNIFICANT CHANGE UP (ref 3.5–5)
POTASSIUM SERPL-SCNC: 4.2 MMOL/L — SIGNIFICANT CHANGE UP (ref 3.5–5)
PROT SERPL-MCNC: 5.8 G/DL — LOW (ref 6–8)
PROTHROM AB SERPL-ACNC: 19.7 SEC — HIGH (ref 9.95–12.87)
PROTHROM AB SERPL-ACNC: >40 SEC — HIGH (ref 9.95–12.87)
RBC # BLD: 3.82 M/UL — LOW (ref 4.2–5.4)
RBC # FLD: 16.8 % — HIGH (ref 11.5–14.5)
SODIUM SERPL-SCNC: 137 MMOL/L — SIGNIFICANT CHANGE UP (ref 135–146)
WBC # BLD: 15.69 K/UL — HIGH (ref 4.8–10.8)
WBC # FLD AUTO: 15.69 K/UL — HIGH (ref 4.8–10.8)

## 2023-07-01 PROCEDURE — 99233 SBSQ HOSP IP/OBS HIGH 50: CPT

## 2023-07-01 PROCEDURE — 93010 ELECTROCARDIOGRAM REPORT: CPT

## 2023-07-01 PROCEDURE — 71045 X-RAY EXAM CHEST 1 VIEW: CPT | Mod: 26

## 2023-07-01 RX ORDER — AMIODARONE HYDROCHLORIDE 400 MG/1
200 TABLET ORAL DAILY
Refills: 0 | Status: DISCONTINUED | OUTPATIENT
Start: 2023-07-01 | End: 2023-07-02

## 2023-07-01 RX ORDER — DILTIAZEM HCL 120 MG
15 CAPSULE, EXT RELEASE 24 HR ORAL ONCE
Refills: 0 | Status: COMPLETED | OUTPATIENT
Start: 2023-07-01 | End: 2023-07-01

## 2023-07-01 RX ORDER — METOPROLOL TARTRATE 50 MG
5 TABLET ORAL ONCE
Refills: 0 | Status: COMPLETED | OUTPATIENT
Start: 2023-07-01 | End: 2023-07-01

## 2023-07-01 RX ORDER — FUROSEMIDE 40 MG
40 TABLET ORAL EVERY 12 HOURS
Refills: 0 | Status: DISCONTINUED | OUTPATIENT
Start: 2023-07-01 | End: 2023-07-02

## 2023-07-01 RX ORDER — ALPRAZOLAM 0.25 MG
0.25 TABLET ORAL ONCE
Refills: 0 | Status: DISCONTINUED | OUTPATIENT
Start: 2023-07-01 | End: 2023-07-01

## 2023-07-01 RX ORDER — METOPROLOL TARTRATE 50 MG
25 TABLET ORAL EVERY 8 HOURS
Refills: 0 | Status: DISCONTINUED | OUTPATIENT
Start: 2023-07-01 | End: 2023-07-01

## 2023-07-01 RX ORDER — ALPRAZOLAM 0.25 MG
0.5 TABLET ORAL AT BEDTIME
Refills: 0 | Status: DISCONTINUED | OUTPATIENT
Start: 2023-07-01 | End: 2023-07-08

## 2023-07-01 RX ORDER — LANOLIN ALCOHOL/MO/W.PET/CERES
5 CREAM (GRAM) TOPICAL AT BEDTIME
Refills: 0 | Status: DISCONTINUED | OUTPATIENT
Start: 2023-07-01 | End: 2023-07-12

## 2023-07-01 RX ORDER — DILTIAZEM HCL 120 MG
30 CAPSULE, EXT RELEASE 24 HR ORAL ONCE
Refills: 0 | Status: COMPLETED | OUTPATIENT
Start: 2023-07-01 | End: 2023-07-01

## 2023-07-01 RX ORDER — FUROSEMIDE 40 MG
40 TABLET ORAL DAILY
Refills: 0 | Status: DISCONTINUED | OUTPATIENT
Start: 2023-07-01 | End: 2023-07-01

## 2023-07-01 RX ORDER — POTASSIUM CHLORIDE 20 MEQ
20 PACKET (EA) ORAL DAILY
Refills: 0 | Status: DISCONTINUED | OUTPATIENT
Start: 2023-07-01 | End: 2023-07-02

## 2023-07-01 RX ADMIN — Medication 5 MILLIGRAM(S): at 21:56

## 2023-07-01 RX ADMIN — Medication 650 MILLIGRAM(S): at 11:54

## 2023-07-01 RX ADMIN — Medication 650 MILLIGRAM(S): at 17:43

## 2023-07-01 RX ADMIN — Medication 15 MILLIGRAM(S): at 05:07

## 2023-07-01 RX ADMIN — SENNA PLUS 2 TABLET(S): 8.6 TABLET ORAL at 21:56

## 2023-07-01 RX ADMIN — Medication 650 MILLIGRAM(S): at 18:13

## 2023-07-01 RX ADMIN — POLYETHYLENE GLYCOL 3350 17 GRAM(S): 17 POWDER, FOR SOLUTION ORAL at 11:25

## 2023-07-01 RX ADMIN — Medication 650 MILLIGRAM(S): at 05:22

## 2023-07-01 RX ADMIN — Medication 25 MILLIGRAM(S): at 05:09

## 2023-07-01 RX ADMIN — Medication 30 MILLIGRAM(S): at 21:57

## 2023-07-01 RX ADMIN — Medication 650 MILLIGRAM(S): at 05:08

## 2023-07-01 RX ADMIN — AMIODARONE HYDROCHLORIDE 200 MILLIGRAM(S): 400 TABLET ORAL at 11:24

## 2023-07-01 RX ADMIN — HEPARIN SODIUM 5000 UNIT(S): 5000 INJECTION INTRAVENOUS; SUBCUTANEOUS at 15:22

## 2023-07-01 RX ADMIN — Medication 650 MILLIGRAM(S): at 11:24

## 2023-07-01 RX ADMIN — PANTOPRAZOLE SODIUM 40 MILLIGRAM(S): 20 TABLET, DELAYED RELEASE ORAL at 11:25

## 2023-07-01 RX ADMIN — Medication 20 MILLIEQUIVALENT(S): at 05:07

## 2023-07-01 RX ADMIN — Medication 0.25 MILLIGRAM(S): at 02:55

## 2023-07-01 RX ADMIN — HEPARIN SODIUM 5000 UNIT(S): 5000 INJECTION INTRAVENOUS; SUBCUTANEOUS at 21:57

## 2023-07-01 RX ADMIN — Medication 40 MILLIGRAM(S): at 05:07

## 2023-07-01 RX ADMIN — Medication 5 MILLIGRAM(S): at 02:55

## 2023-07-01 RX ADMIN — HEPARIN SODIUM 5000 UNIT(S): 5000 INJECTION INTRAVENOUS; SUBCUTANEOUS at 05:08

## 2023-07-01 RX ADMIN — Medication 25 MILLIGRAM(S): at 16:22

## 2023-07-01 RX ADMIN — Medication 15 MILLIGRAM(S): at 11:25

## 2023-07-01 RX ADMIN — Medication 0.5 MILLIGRAM(S): at 23:31

## 2023-07-01 RX ADMIN — CHLORHEXIDINE GLUCONATE 1 APPLICATION(S): 213 SOLUTION TOPICAL at 05:09

## 2023-07-01 NOTE — PROGRESS NOTE ADULT - SUBJECTIVE AND OBJECTIVE BOX
MALIHA THOMPSON  MRN#: [id number]  Subjective:  Patient was seen and evalauted on AM rounds offerring no specific compliants at this time.    OBJECTIVE:  ICU Vital Signs Last 24 Hrs  T(C): 36.6 (2023 12:00), Max: 36.8 (2023 08:00)  T(F): 97.9 (2023 12:00), Max: 98.2 (2023 08:00)  HR: 104 (2023 12:00) (74 - 137)  BP: 116/68 (2023 11:30) (92/56 - 147/68)  BP(mean): 83 (2023 11:30) (69 - 109)  ABP: 132/122 (2023 15:30) (100/79 - 132/122)  ABP(mean): 124 (2023 15:30) (88 - 124)  RR: 28 (2023 12:00) (19 - 45)  SpO2: 97% (2023 12:00) (93% - 99%)    O2 Parameters below as of 2023 12:00  Patient On (Oxygen Delivery Method): nasal cannula w/ humidification  O2 Flow (L/min): 4           @ : @ 07:00  --------------------------------------------------------  IN:  mL / OUT: 2383 mL / NET: -381 mL     @ 07: @ 13:04  --------------------------------------------------------  IN: 610.1 mL / OUT: 655 mL / NET: -44.9 mL      CAPILLARY BLOOD GLUCOSE      POCT Blood Glucose.: 141 mg/dL (2023 12:08)      PHYSICAL EXAM:Daily     Daily Weight in k.3 (2023 06:00)  General: WN/WD NAD    HEENT:     + NCAT  + EOMI  - Conjuctival edema   - Icterus   - Thrush   - ETT  - NGT/OGT    Neck:         + FROM    - JVD     - Nodes     - Masses    + Mid-line trachea   - Tracheostomy    Chest:         - Sternal click  - Sternal drainage  + Pacing wires  + Chest tubes  - SubQ emphysema    Lungs:          + CTA   - Rhonchi    - Rales    - Wheezing     - Decreased BS   - Dullness R L    Cardiac:       + S1 + S2    + RRR   - Irregular   - S3  - S4    - Murmurs   - Rub   - Hamman’s sign     Abdomen:    + BS     + Soft    + Non-tender     - Distended    - Organomegaly  - PEG    Extremities:   - Cyanosis U/L   - Clubbing  U/L  - LE/UE Edema   + Capillary refill    + Pulses     Neuro:        + Awake   +  Alert   - Confused   - Lethargic   - Sedated   - Generalized Weakness    Skin:        - Rashes    - Erythema   + Normal incisions   + IV sites intact  - Sacral decubitus    HOSPITAL MEDICATIONS:  MEDICATIONS  (STANDING):  acetaminophen     Tablet .. 650 milliGRAM(s) Oral every 6 hours  albumin human  5% IVPB 500 milliLiter(s) IV Intermittent once  aMIOdarone    Tablet 200 milliGRAM(s) Oral daily  bisacodyl Suppository 10 milliGRAM(s) Rectal once  chlorhexidine 2% Cloths 1 Application(s) Topical daily  dextrose 5%. 1000 milliLiter(s) (50 mL/Hr) IV Continuous <Continuous>  dextrose 5%. 1000 milliLiter(s) (100 mL/Hr) IV Continuous <Continuous>  dextrose 50% Injectable 50 milliLiter(s) IV Push every 15 minutes  dextrose 50% Injectable 25 milliLiter(s) IV Push every 15 minutes  furosemide   Injectable 40 milliGRAM(s) IV Push every 12 hours  glucagon  Injectable 1 milliGRAM(s) IntraMuscular once  heparin   Injectable 5000 Unit(s) SubCutaneous every 8 hours  insulin lispro (ADMELOG) corrective regimen sliding scale   SubCutaneous three times a day before meals  melatonin 5 milliGRAM(s) Oral at bedtime  meperidine     Injectable 25 milliGRAM(s) IV Push once  metoprolol tartrate 25 milliGRAM(s) Oral every 8 hours  pantoprazole    Tablet 40 milliGRAM(s) Oral daily  polyethylene glycol 3350 17 Gram(s) Oral daily  potassium chloride    Tablet ER 20 milliEquivalent(s) Oral daily  senna 2 Tablet(s) Oral at bedtime    MEDICATIONS  (PRN):  ALPRAZolam 0.5 milliGRAM(s) Oral at bedtime PRN anxiety  dextrose Oral Gel 15 Gram(s) Oral once PRN Blood Glucose LESS THAN 70 milliGRAM(s)/deciliter  HYDROmorphone  Injectable 0.5 milliGRAM(s) IV Push every 6 hours PRN Breakthrough Pain  ketorolac   Injectable 15 milliGRAM(s) IV Push every 6 hours PRN Moderate Pain (4 - 6)  oxyCODONE    IR 5 milliGRAM(s) Oral every 4 hours PRN Moderate Pain (4 - 6)  oxyCODONE    IR 10 milliGRAM(s) Oral every 4 hours PRN Severe Pain (7 - 10)      LABS:                        10.0   15.69 )-----------( 190      ( 2023 02:31 )             31.0    07-01    137  |  103  |  26<H>  ----------------------------<  138<H>  4.2   |  24  |  0.9    Ca    8.6      2023 02:31  Mg     2.2     07-01    TPro  5.8<L>  /  Alb  3.9  /  TBili  1.0  /  DBili  x   /  AST  78<H>  /  ALT  27  /  AlkPhos  58  07-01    PT/INR - ( 2023 02:31 )   PT: 19.70 sec;   INR: 1.70 ratio         PTT - ( 2023 02:31 )  PTT:18.2 sec LIVER FUNCTIONS - ( 2023 02:31 )  Alb: 3.9 g/dL / Pro: 5.8 g/dL / ALK PHOS: 58 U/L / ALT: 27 U/L / AST: 78 U/L / GGT: x           Urinalysis Basic - ( 2023 02:31 )    Color: x / Appearance: x / SG: x / pH: x  Gluc: 138 mg/dL / Ketone: x  / Bili: x / Urobili: x   Blood: x / Protein: x / Nitrite: x   Leuk Esterase: x / RBC: x / WBC x   Sq Epi: x / Non Sq Epi: x / Bacteria: x        RADIOLOGY:  X Reviewed and interpreted by me: bilateral opacities    CARDIOPULMONARY DYSFUNCTION  - Respiratory status required supplemental oxygen & the following of continuous pulse oximetry for support & to prevent decompensation  - Continued early mobilization as tolerated  - Addressed analgesic regimen to optimize function    PREVENTION-PROPHYLAXIS  - Heparin continued for VTE prophylaxis in addition to Venodyne boots  - Protonix maintained for GI bleeding prophylaxis  - Lopressor continued   - Metabolic stability & infection prophylaxis required review and adjustment of regular Insulin sliding scale and gylcemic regimen while following serial glucose levels to help achieve & maintain euglycemia  - Reviewed & addressed surgical site infection prophylaxis regimen

## 2023-07-01 NOTE — PROGRESS NOTE ADULT - SUBJECTIVE AND OBJECTIVE BOX
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               POD # 2    SURGEON(s): radha    SUBJECTIVE ASSESSMENT: pt is complaining of feeling anxious at night and of having a difficult time sleeping    Vital Signs Last 24 Hrs  T(C): 36.6 (01 Jul 2023 12:00), Max: 36.8 (01 Jul 2023 08:00)  T(F): 97.9 (01 Jul 2023 12:00), Max: 98.2 (01 Jul 2023 08:00)  HR: 104 (01 Jul 2023 12:00) (91 - 137)  BP: 116/68 (01 Jul 2023 11:30) (92/56 - 147/68)  BP(mean): 83 (01 Jul 2023 11:30) (69 - 109)  RR: 28 (01 Jul 2023 12:00) (21 - 45)  SpO2: 97% (01 Jul 2023 12:00) (93% - 99%)    Parameters below as of 01 Jul 2023 12:00  Patient On (Oxygen Delivery Method): nasal cannula w/ humidification  O2 Flow (L/min): 4    06-30-23 @ 07:01  -  07-01-23 @ 07:00  --------------------------------------------------------  IN: 2002 mL / OUT: 2383 mL / NET: -381 mL    07-01-23 @ 07:01  -  07-01-23 @ 14:25  --------------------------------------------------------  IN: 610.1 mL / OUT: 655 mL / NET: -44.9 mL    Physical Exam  General: alert and oriented x 3  Chest: cta bl  CVS: s1s2  Abd:  pos bs soft nt  GI/ :  Ext: trace edema;  incisions- dressed   sternum- intact      LABS:                        10.0   15.69 )-----------( 190      ( 01 Jul 2023 02:31 )             31.0     COUMADIN:   [ x] YES [ ] NO    PT/INR - ( 01 Jul 2023 02:31 )   PT: 19.70 sec;   INR: 1.70 ratio         PTT - ( 01 Jul 2023 02:31 )  PTT:18.2 sec  07-01    137  |  103  |  26<H>  ----------------------------<  138<H>  4.2   |  24  |  0.9    Ca    8.6      01 Jul 2023 02:31  Mg     2.2     07-01    TPro  5.8<L>  /  Alb  3.9  /  TBili  1.0  /  DBili  x   /  AST  78<H>  /  ALT  27  /  AlkPhos  58  07-01    Urinalysis Basic - ( 01 Jul 2023 02:31 )    Color: x / Appearance: x / SG: x / pH: x  Gluc: 138 mg/dL / Ketone: x  / Bili: x / Urobili: x   Blood: x / Protein: x / Nitrite: x   Leuk Esterase: x / RBC: x / WBC x   Sq Epi: x / Non Sq Epi: x / Bacteria: x        MEDICATIONS  (STANDING):  acetaminophen     Tablet .. 650 milliGRAM(s) Oral every 6 hours  albumin human  5% IVPB 500 milliLiter(s) IV Intermittent once  aMIOdarone    Tablet 200 milliGRAM(s) Oral daily  bisacodyl Suppository 10 milliGRAM(s) Rectal once  chlorhexidine 2% Cloths 1 Application(s) Topical daily  dextrose 5%. 1000 milliLiter(s) (50 mL/Hr) IV Continuous <Continuous>  dextrose 5%. 1000 milliLiter(s) (100 mL/Hr) IV Continuous <Continuous>  dextrose 50% Injectable 50 milliLiter(s) IV Push every 15 minutes  dextrose 50% Injectable 25 milliLiter(s) IV Push every 15 minutes  furosemide   Injectable 40 milliGRAM(s) IV Push every 12 hours  glucagon  Injectable 1 milliGRAM(s) IntraMuscular once  heparin   Injectable 5000 Unit(s) SubCutaneous every 8 hours  insulin lispro (ADMELOG) corrective regimen sliding scale   SubCutaneous three times a day before meals  melatonin 5 milliGRAM(s) Oral at bedtime  meperidine     Injectable 25 milliGRAM(s) IV Push once  metoprolol tartrate 25 milliGRAM(s) Oral every 8 hours  pantoprazole    Tablet 40 milliGRAM(s) Oral daily  polyethylene glycol 3350 17 Gram(s) Oral daily  potassium chloride    Tablet ER 20 milliEquivalent(s) Oral daily  senna 2 Tablet(s) Oral at bedtime    MEDICATIONS  (PRN):  ALPRAZolam 0.5 milliGRAM(s) Oral at bedtime PRN anxiety  dextrose Oral Gel 15 Gram(s) Oral once PRN Blood Glucose LESS THAN 70 milliGRAM(s)/deciliter  HYDROmorphone  Injectable 0.5 milliGRAM(s) IV Push every 6 hours PRN Breakthrough Pain  ketorolac   Injectable 15 milliGRAM(s) IV Push every 6 hours PRN Moderate Pain (4 - 6)  oxyCODONE    IR 5 milliGRAM(s) Oral every 4 hours PRN Moderate Pain (4 - 6)  oxyCODONE    IR 10 milliGRAM(s) Oral every 4 hours PRN Severe Pain (7 - 10)      Allergies    No Known Allergies    Intolerances    Ambulation/Activity Status:  amb with pt      RADIOLOGY & ADDITIONAL TESTS:    ACC: [id number] EXAM:  XR CHEST PORTABLE ROUTINE 1V   ORDERED BY: RENNY ANDERSON     PROCEDURE DATE:  06/30/2023      INTERPRETATION:  Clinical History/Reason for Exam:  Post-cardiac surgery    Technique:  Frontal view the chest.    Comparison: Chest x-ray 6/29/2023..    Findings:    Support devices:  ET tube, nasogastric tube removed. [place]it catheter   tip stable position. Stable right chest tube. Mediastinal drain    Cardiac/mediastinum/hilum: Stable cardiomegaly. aortic valve. Surgical   clip.      Lung parenchyma/ Pleura: Stable opacities. No pneumothorax. Azygos   fissure.      Skeleton/soft tissues: Stable      Impression:    Stable opacities. No pneumothorax.    --- End of Report ---    Assessment/Plan: Patient is a 43 yo female s/p avr (m)/excision of fibro-elastoma, ligation of left atrial appendage pod # 2  cont present tx as per ct surgeon  s/p avr (m)- cont coumadin; will dose tonight's dose after the patient has inr repeated  hx of chronic  a. fib- cont amio but change to po and cont anticoagulation with coumadin; rate control with lopressor and cardizem  dvt/gi ppx  d/c chest tube once drainage dec  encourage is   pain management  xanax prn for anxiety  melatonin qhs for insomnia

## 2023-07-02 LAB
ALBUMIN SERPL ELPH-MCNC: 3.6 G/DL — SIGNIFICANT CHANGE UP (ref 3.5–5.2)
ALP SERPL-CCNC: 70 U/L — SIGNIFICANT CHANGE UP (ref 30–115)
ALT FLD-CCNC: 24 U/L — SIGNIFICANT CHANGE UP (ref 0–41)
ANION GAP SERPL CALC-SCNC: 10 MMOL/L — SIGNIFICANT CHANGE UP (ref 7–14)
AST SERPL-CCNC: 47 U/L — HIGH (ref 0–41)
BASOPHILS # BLD AUTO: 0.02 K/UL — SIGNIFICANT CHANGE UP (ref 0–0.2)
BASOPHILS NFR BLD AUTO: 0.1 % — SIGNIFICANT CHANGE UP (ref 0–1)
BILIRUB SERPL-MCNC: 0.8 MG/DL — SIGNIFICANT CHANGE UP (ref 0.2–1.2)
BUN SERPL-MCNC: 24 MG/DL — HIGH (ref 10–20)
CALCIUM SERPL-MCNC: 8.4 MG/DL — SIGNIFICANT CHANGE UP (ref 8.4–10.5)
CHLORIDE SERPL-SCNC: 101 MMOL/L — SIGNIFICANT CHANGE UP (ref 98–110)
CO2 SERPL-SCNC: 26 MMOL/L — SIGNIFICANT CHANGE UP (ref 17–32)
CREAT SERPL-MCNC: 0.8 MG/DL — SIGNIFICANT CHANGE UP (ref 0.7–1.5)
EGFR: 94 ML/MIN/1.73M2 — SIGNIFICANT CHANGE UP
EOSINOPHIL # BLD AUTO: 0.02 K/UL — SIGNIFICANT CHANGE UP (ref 0–0.7)
EOSINOPHIL NFR BLD AUTO: 0.1 % — SIGNIFICANT CHANGE UP (ref 0–8)
GAS PNL BLDA: SIGNIFICANT CHANGE UP
GLUCOSE BLDC GLUCOMTR-MCNC: 111 MG/DL — HIGH (ref 70–99)
GLUCOSE BLDC GLUCOMTR-MCNC: 118 MG/DL — HIGH (ref 70–99)
GLUCOSE BLDC GLUCOMTR-MCNC: 130 MG/DL — HIGH (ref 70–99)
GLUCOSE BLDC GLUCOMTR-MCNC: 136 MG/DL — HIGH (ref 70–99)
GLUCOSE SERPL-MCNC: 107 MG/DL — HIGH (ref 70–99)
HCT VFR BLD CALC: 34.5 % — LOW (ref 37–47)
HGB BLD-MCNC: 11.3 G/DL — LOW (ref 12–16)
IMM GRANULOCYTES NFR BLD AUTO: 0.5 % — HIGH (ref 0.1–0.3)
INR BLD: 4.69 RATIO — HIGH (ref 0.65–1.3)
INR BLD: 5.5 RATIO — CRITICAL HIGH (ref 0.65–1.3)
LYMPHOCYTES # BLD AUTO: 0.81 K/UL — LOW (ref 1.2–3.4)
LYMPHOCYTES # BLD AUTO: 5.3 % — LOW (ref 20.5–51.1)
MAGNESIUM SERPL-MCNC: 2.2 MG/DL — SIGNIFICANT CHANGE UP (ref 1.8–2.4)
MCHC RBC-ENTMCNC: 26.8 PG — LOW (ref 27–31)
MCHC RBC-ENTMCNC: 32.8 G/DL — SIGNIFICANT CHANGE UP (ref 32–37)
MCV RBC AUTO: 81.8 FL — SIGNIFICANT CHANGE UP (ref 81–99)
MONOCYTES # BLD AUTO: 1.79 K/UL — HIGH (ref 0.1–0.6)
MONOCYTES NFR BLD AUTO: 11.8 % — HIGH (ref 1.7–9.3)
NEUTROPHILS # BLD AUTO: 12.45 K/UL — HIGH (ref 1.4–6.5)
NEUTROPHILS NFR BLD AUTO: 82.2 % — HIGH (ref 42.2–75.2)
NRBC # BLD: 0 /100 WBCS — SIGNIFICANT CHANGE UP (ref 0–0)
PLATELET # BLD AUTO: 247 K/UL — SIGNIFICANT CHANGE UP (ref 130–400)
PMV BLD: 11.2 FL — HIGH (ref 7.4–10.4)
POTASSIUM SERPL-MCNC: 4.2 MMOL/L — SIGNIFICANT CHANGE UP (ref 3.5–5)
POTASSIUM SERPL-SCNC: 4.2 MMOL/L — SIGNIFICANT CHANGE UP (ref 3.5–5)
PROT SERPL-MCNC: 5.9 G/DL — LOW (ref 6–8)
PROTHROM AB SERPL-ACNC: >40 SEC — HIGH (ref 9.95–12.87)
PROTHROM AB SERPL-ACNC: >40 SEC — HIGH (ref 9.95–12.87)
RBC # BLD: 4.22 M/UL — SIGNIFICANT CHANGE UP (ref 4.2–5.4)
RBC # FLD: 16.6 % — HIGH (ref 11.5–14.5)
SODIUM SERPL-SCNC: 137 MMOL/L — SIGNIFICANT CHANGE UP (ref 135–146)
WBC # BLD: 15.17 K/UL — HIGH (ref 4.8–10.8)
WBC # FLD AUTO: 15.17 K/UL — HIGH (ref 4.8–10.8)

## 2023-07-02 PROCEDURE — 93010 ELECTROCARDIOGRAM REPORT: CPT

## 2023-07-02 PROCEDURE — 71045 X-RAY EXAM CHEST 1 VIEW: CPT | Mod: 26

## 2023-07-02 PROCEDURE — 99232 SBSQ HOSP IP/OBS MODERATE 35: CPT

## 2023-07-02 PROCEDURE — 93306 TTE W/DOPPLER COMPLETE: CPT | Mod: 26

## 2023-07-02 RX ORDER — METOPROLOL TARTRATE 50 MG
5 TABLET ORAL ONCE
Refills: 0 | Status: COMPLETED | OUTPATIENT
Start: 2023-07-02 | End: 2023-07-02

## 2023-07-02 RX ORDER — ONDANSETRON 8 MG/1
4 TABLET, FILM COATED ORAL EVERY 8 HOURS
Refills: 0 | Status: DISCONTINUED | OUTPATIENT
Start: 2023-07-02 | End: 2023-07-12

## 2023-07-02 RX ORDER — ALPRAZOLAM 0.25 MG
1 TABLET ORAL ONCE
Refills: 0 | Status: DISCONTINUED | OUTPATIENT
Start: 2023-07-02 | End: 2023-07-02

## 2023-07-02 RX ORDER — DILTIAZEM HCL 120 MG
10 CAPSULE, EXT RELEASE 24 HR ORAL ONCE
Refills: 0 | Status: COMPLETED | OUTPATIENT
Start: 2023-07-02 | End: 2023-07-02

## 2023-07-02 RX ORDER — ACETAMINOPHEN 500 MG
1000 TABLET ORAL ONCE
Refills: 0 | Status: COMPLETED | OUTPATIENT
Start: 2023-07-02 | End: 2023-07-02

## 2023-07-02 RX ORDER — DILTIAZEM HCL 120 MG
30 CAPSULE, EXT RELEASE 24 HR ORAL EVERY 6 HOURS
Refills: 0 | Status: DISCONTINUED | OUTPATIENT
Start: 2023-07-02 | End: 2023-07-02

## 2023-07-02 RX ORDER — POTASSIUM CHLORIDE 20 MEQ
20 PACKET (EA) ORAL DAILY
Refills: 0 | Status: DISCONTINUED | OUTPATIENT
Start: 2023-07-02 | End: 2023-07-12

## 2023-07-02 RX ORDER — FUROSEMIDE 40 MG
40 TABLET ORAL EVERY 24 HOURS
Refills: 0 | Status: DISCONTINUED | OUTPATIENT
Start: 2023-07-03 | End: 2023-07-03

## 2023-07-02 RX ORDER — METOPROLOL TARTRATE 50 MG
12.5 TABLET ORAL EVERY 12 HOURS
Refills: 0 | Status: DISCONTINUED | OUTPATIENT
Start: 2023-07-02 | End: 2023-07-04

## 2023-07-02 RX ORDER — DILTIAZEM HCL 120 MG
5 CAPSULE, EXT RELEASE 24 HR ORAL
Qty: 125 | Refills: 0 | Status: DISCONTINUED | OUTPATIENT
Start: 2023-07-02 | End: 2023-07-03

## 2023-07-02 RX ADMIN — Medication 1 MILLIGRAM(S): at 22:48

## 2023-07-02 RX ADMIN — SENNA PLUS 2 TABLET(S): 8.6 TABLET ORAL at 21:12

## 2023-07-02 RX ADMIN — PANTOPRAZOLE SODIUM 40 MILLIGRAM(S): 20 TABLET, DELAYED RELEASE ORAL at 11:48

## 2023-07-02 RX ADMIN — OXYCODONE HYDROCHLORIDE 5 MILLIGRAM(S): 5 TABLET ORAL at 17:00

## 2023-07-02 RX ADMIN — Medication 10 MILLIGRAM(S): at 17:50

## 2023-07-02 RX ADMIN — Medication 15 MILLIGRAM(S): at 04:00

## 2023-07-02 RX ADMIN — Medication 20 MILLIEQUIVALENT(S): at 11:48

## 2023-07-02 RX ADMIN — Medication 15 MILLIGRAM(S): at 03:45

## 2023-07-02 RX ADMIN — Medication 30 MILLIGRAM(S): at 11:48

## 2023-07-02 RX ADMIN — Medication 10 MILLIGRAM(S): at 05:35

## 2023-07-02 RX ADMIN — Medication 400 MILLIGRAM(S): at 23:30

## 2023-07-02 RX ADMIN — Medication 650 MILLIGRAM(S): at 05:16

## 2023-07-02 RX ADMIN — AMIODARONE HYDROCHLORIDE 200 MILLIGRAM(S): 400 TABLET ORAL at 05:15

## 2023-07-02 RX ADMIN — OXYCODONE HYDROCHLORIDE 5 MILLIGRAM(S): 5 TABLET ORAL at 21:15

## 2023-07-02 RX ADMIN — Medication 30 MILLIGRAM(S): at 06:04

## 2023-07-02 RX ADMIN — Medication 40 MILLIGRAM(S): at 05:16

## 2023-07-02 RX ADMIN — Medication 5 MILLIGRAM(S): at 23:28

## 2023-07-02 RX ADMIN — Medication 650 MILLIGRAM(S): at 00:01

## 2023-07-02 RX ADMIN — Medication 650 MILLIGRAM(S): at 00:28

## 2023-07-02 RX ADMIN — OXYCODONE HYDROCHLORIDE 5 MILLIGRAM(S): 5 TABLET ORAL at 21:45

## 2023-07-02 RX ADMIN — HEPARIN SODIUM 5000 UNIT(S): 5000 INJECTION INTRAVENOUS; SUBCUTANEOUS at 05:16

## 2023-07-02 RX ADMIN — Medication 12.5 MILLIGRAM(S): at 17:07

## 2023-07-02 RX ADMIN — Medication 650 MILLIGRAM(S): at 06:43

## 2023-07-02 RX ADMIN — ONDANSETRON 4 MILLIGRAM(S): 8 TABLET, FILM COATED ORAL at 09:00

## 2023-07-02 RX ADMIN — Medication 10 MILLIGRAM(S): at 14:03

## 2023-07-02 RX ADMIN — CHLORHEXIDINE GLUCONATE 1 APPLICATION(S): 213 SOLUTION TOPICAL at 05:17

## 2023-07-02 RX ADMIN — Medication 5 MILLIGRAM(S): at 21:11

## 2023-07-02 NOTE — PROGRESS NOTE ADULT - SUBJECTIVE AND OBJECTIVE BOX
Over Night Events:  POD 3, remains in afib with RVR(chronic afib)      ROS:  See HPI    PHYSICAL EXAM    ICU Vital Signs Last 24 Hrs  T(C): 36.3 (02 Jul 2023 12:00), Max: 36.7 (01 Jul 2023 16:00)  T(F): 97.3 (02 Jul 2023 12:00), Max: 98 (01 Jul 2023 16:00)  HR: 116 (02 Jul 2023 14:00) (99 - 135)  BP: 151/57 (02 Jul 2023 14:00) (52/27 - 151/57)  BP(mean): 82 (02 Jul 2023 14:00) (35 - 107)  RR: 22 (02 Jul 2023 14:00) (13 - 39)  SpO2: 92% (02 Jul 2023 14:00) (92% - 99%)    O2 Parameters below as of 02 Jul 2023 14:00  Patient On (Oxygen Delivery Method): room air            General: NARD  HEENT: CHHAYA             Lungs: Bilateral BS  Cardiovascular: irregular, tachy  Abdomen: Soft, Positive BS  Extremities: No LE edema  Skin: Warm  Neurological: Non focal       07-01-23 @ 07:01  -  07-02-23 @ 07:00  --------------------------------------------------------  IN:    Amiodarone: 50.1 mL    Nitroglycerin: 20 mL    Oral Fluid: 1320 mL    sodium chloride 0.9%: 60 mL  Total IN: 1450.1 mL    OUT:    Chest Tube (mL): 480 mL    Indwelling Catheter - Urethral (mL): 1640 mL  Total OUT: 2120 mL    Total NET: -669.9 mL      07-02-23 @ 07:01  -  07-02-23 @ 14:38  --------------------------------------------------------  IN:    Oral Fluid: 480 mL  Total IN: 480 mL    OUT:    Chest Tube (mL): 120 mL    Indwelling Catheter - Urethral (mL): 395 mL  Total OUT: 515 mL    Total NET: -35 mL          LABS:                          11.3   15.17 )-----------( 247      ( 02 Jul 2023 01:20 )             34.5                                               07-02    137  |  101  |  24<H>  ----------------------------<  107<H>  4.2   |  26  |  0.8    Ca    8.4      02 Jul 2023 01:20  Mg     2.2     07-02    TPro  5.9<L>  /  Alb  3.6  /  TBili  0.8  /  DBili  x   /  AST  47<H>  /  ALT  24  /  AlkPhos  70  07-02      PT/INR - ( 02 Jul 2023 01:20 )   PT: >40.00 sec;   INR: 5.50 ratio         PTT - ( 01 Jul 2023 02:31 )  PTT:18.2 sec                                       Urinalysis Basic - ( 02 Jul 2023 01:20 )    Color: x / Appearance: x / SG: x / pH: x  Gluc: 107 mg/dL / Ketone: x  / Bili: x / Urobili: x   Blood: x / Protein: x / Nitrite: x   Leuk Esterase: x / RBC: x / WBC x   Sq Epi: x / Non Sq Epi: x / Bacteria: x                                                  LIVER FUNCTIONS - ( 02 Jul 2023 01:20 )  Alb: 3.6 g/dL / Pro: 5.9 g/dL / ALK PHOS: 70 U/L / ALT: 24 U/L / AST: 47 U/L / GGT: x                                                                                                                                       MEDICATIONS  (STANDING):  albumin human  5% IVPB 500 milliLiter(s) IV Intermittent once  bisacodyl Suppository 10 milliGRAM(s) Rectal once  chlorhexidine 2% Cloths 1 Application(s) Topical daily  dextrose 5%. 1000 milliLiter(s) (100 mL/Hr) IV Continuous <Continuous>  dextrose 5%. 1000 milliLiter(s) (50 mL/Hr) IV Continuous <Continuous>  dextrose 50% Injectable 50 milliLiter(s) IV Push every 15 minutes  dextrose 50% Injectable 25 milliLiter(s) IV Push every 15 minutes  diltiazem    Tablet 30 milliGRAM(s) Oral every 6 hours  diltiazem Infusion 5 mG/Hr (5 mL/Hr) IV Continuous <Continuous>  diltiazem Injectable 10 milliGRAM(s) IV Push once  glucagon  Injectable 1 milliGRAM(s) IntraMuscular once  insulin lispro (ADMELOG) corrective regimen sliding scale   SubCutaneous three times a day before meals  melatonin 5 milliGRAM(s) Oral at bedtime  meperidine     Injectable 25 milliGRAM(s) IV Push once  metoprolol tartrate 12.5 milliGRAM(s) Oral every 12 hours  pantoprazole    Tablet 40 milliGRAM(s) Oral daily  polyethylene glycol 3350 17 Gram(s) Oral daily  potassium chloride    Tablet ER 20 milliEquivalent(s) Oral daily  senna 2 Tablet(s) Oral at bedtime    MEDICATIONS  (PRN):  acetaminophen     Tablet .. 650 milliGRAM(s) Oral every 6 hours PRN Mild Pain (1 - 3)  ALPRAZolam 0.5 milliGRAM(s) Oral at bedtime PRN anxiety  dextrose Oral Gel 15 Gram(s) Oral once PRN Blood Glucose LESS THAN 70 milliGRAM(s)/deciliter  ketorolac   Injectable 15 milliGRAM(s) IV Push every 6 hours PRN Moderate Pain (4 - 6)  ondansetron Injectable 4 milliGRAM(s) IV Push every 8 hours PRN Nausea and/or Vomiting  oxyCODONE    IR 5 milliGRAM(s) Oral every 4 hours PRN Moderate Pain (4 - 6)  oxyCODONE    IR 10 milliGRAM(s) Oral every 4 hours PRN Severe Pain (7 - 10)      Xrays:                                                                                     ECHO

## 2023-07-02 NOTE — PROGRESS NOTE ADULT - SUBJECTIVE AND OBJECTIVE BOX
OPERATIVE PROCEDURE(s): avr (M)/ligation of left atrial appendage               POD # 3    SURGEON(s): radha      SUBJECTIVE ASSESSMENT: pt is without complaints other than feeling anxious    Vital Signs Last 24 Hrs  T(C): 37.2 (2023 22:30), Max: 38.7 (2023 22:00)  T(F): 98.9 (2023 22:30), Max: 101.6 (2023 22:00)  HR: 125 (2023 22:30) (110 - 133)  BP: 127/60 (2023 22:30) (52/27 - 151/57)  BP(mean): 86 (:30) (35 - 107)  RR: 20 (:30) (13 - 45)  SpO2: 95% (:30) (87% - 99%)    Parameters below as of 2023 22:30  Patient On (Oxygen Delivery Method): nasal cannula  O2 Flow (L/min): 2    23 @ 07:  -  23 @ 07:00  --------------------------------------------------------  IN: 1450.1 mL / OUT: 2120 mL / NET: -669.9 mL    23 @ 07:01  -  23 @ 23:22  --------------------------------------------------------  IN: 1100 mL / OUT: 1055 mL / NET: 45 mL      Physical Exam  General: alert and oriented x 3  Chest: cta bl  CVS: s1s2  Abd:  pos bs soft nt  GI/ :  Ext: trace edema;  incisions- dressed   sternum- intact        LABS:                        11.3   15.17 )-----------( 247      ( 2023 01:20 )             34.5     COUMADIN:   [x ] YES [ ] NO  -- on hold after 1 dose of 5mg  PT/INR - ( 2023 14:29 )   PT: >40.00 sec;   INR: 4.69 ratio         PTT - ( 2023 02:31 )  PTT:18.2 sec      137  |  101  |  24<H>  ----------------------------<  107<H>  4.2   |  26  |  0.8    Ca    8.4      2023 01:20  Mg     2.2         TPro  5.9<L>  /  Alb  3.6  /  TBili  0.8  /  DBili  x   /  AST  47<H>  /  ALT  24  /  AlkPhos  70      Urinalysis Basic - ( 2023 01:20 )    Color: x / Appearance: x / SG: x / pH: x  Gluc: 107 mg/dL / Ketone: x  / Bili: x / Urobili: x   Blood: x / Protein: x / Nitrite: x   Leuk Esterase: x / RBC: x / WBC x   Sq Epi: x / Non Sq Epi: x / Bacteria: x        MEDICATIONS  (STANDING):  acetaminophen   IVPB .. 1000 milliGRAM(s) IV Intermittent once  albumin human  5% IVPB 500 milliLiter(s) IV Intermittent once  bisacodyl Suppository 10 milliGRAM(s) Rectal once  chlorhexidine 2% Cloths 1 Application(s) Topical daily  dextrose 5%. 1000 milliLiter(s) (50 mL/Hr) IV Continuous <Continuous>  dextrose 5%. 1000 milliLiter(s) (100 mL/Hr) IV Continuous <Continuous>  dextrose 50% Injectable 50 milliLiter(s) IV Push every 15 minutes  dextrose 50% Injectable 25 milliLiter(s) IV Push every 15 minutes  diltiazem Infusion 5 mG/Hr (5 mL/Hr) IV Continuous <Continuous>  glucagon  Injectable 1 milliGRAM(s) IntraMuscular once  insulin lispro (ADMELOG) corrective regimen sliding scale   SubCutaneous three times a day before meals  melatonin 5 milliGRAM(s) Oral at bedtime  meperidine     Injectable 25 milliGRAM(s) IV Push once  metoprolol tartrate 12.5 milliGRAM(s) Oral every 12 hours  metoprolol tartrate Injectable 5 milliGRAM(s) IV Push once  pantoprazole    Tablet 40 milliGRAM(s) Oral daily  polyethylene glycol 3350 17 Gram(s) Oral daily  potassium chloride    Tablet ER 20 milliEquivalent(s) Oral daily  senna 2 Tablet(s) Oral at bedtime    MEDICATIONS  (PRN):  acetaminophen     Tablet .. 650 milliGRAM(s) Oral every 6 hours PRN Mild Pain (1 - 3)  ALPRAZolam 0.5 milliGRAM(s) Oral at bedtime PRN anxiety  dextrose Oral Gel 15 Gram(s) Oral once PRN Blood Glucose LESS THAN 70 milliGRAM(s)/deciliter  ketorolac   Injectable 15 milliGRAM(s) IV Push every 6 hours PRN Moderate Pain (4 - 6)  ondansetron Injectable 4 milliGRAM(s) IV Push every 8 hours PRN Nausea and/or Vomiting  oxyCODONE    IR 5 milliGRAM(s) Oral every 4 hours PRN Moderate Pain (4 - 6)  oxyCODONE    IR 10 milliGRAM(s) Oral every 4 hours PRN Severe Pain (7 - 10)      Allergies    No Known Allergies    Intolerances        Ambulation/Activity Status:        RADIOLOGY & ADDITIONAL TESTS:  Xray Chest 1 View- PORTABLE-Routine: AM   Indication: Shortness of Breath  Transport: Portable,  w/ Monitor  Provider's Contact #: [phone] (23 @ 23:22)  Prothrombin Time and INR, Plasma: Repeat From: 2023 14:00 To: 2023 14:00, Every 1 day(s) Start Date:2023 (23 @ 10:17)      ACC: [id number] EXAM:  XR CHEST PORTABLE ROUTINE 1V   ORDERED BY: RENNY ANDERSON     PROCEDURE DATE:  2023          INTERPRETATION:  STUDY INDICATION: Post-cardiac surgery    TECHNIQUE:  Portable frontal view of the chest obtained.    COMPARISON: 2023    FINDINGS/  IMPRESSION:  Unchanged right IJ venous catheter and right chest tube.    Unchanged left basal pleural parenchymal opacity. Question of trace right   pneumothorax.    Stable cardiomediastinal silhouette.    Unchanged osseous structures.    --- End of Report ---    ACC: [id number] EXAM:  ECHO TTE WO CON COMP W DOPP                          PROCEDURE DATE:  2023          INTERPRETATION:   [place] [address]                Phone: [phone].   TRANSTHORACIC ECHOCARDIOGRAM REPORT        Patient Name:   MALIHA THOMPSON Accession #: [id number]  Medical Rec #:  [id number]       Height:      66.0 in 167.6 cm  YOB: 1980      Weight:      189.0 lb 85.73 kg  Patient Age:    42 years        BSA:         1.95 m²  Patient Gender: F               BP:          97/66 mmHg      Date of Exam:        2023 11:22:41 AM  Referring Physician: service  Sonographer:         Ryan Hernandez  Reading Physician:   Keron Vance.    Procedure:     2D Echo/Doppler/Color Doppler Complete.  Indications:   R07.9 - Chest Pain, unspecified  Diagnosis:     R07.9 - Chest pain, unspecified  Study Details: Technically limited study. Study quality was adversely   affected                 due to post-operative state and post-op   dressings/bandaging.        Summary:   1. Limited study for follow post AVR   2. Normal global left ventricular systolic function.   3. There is no evidence of pericardial effusion.   4. Mechanical aortic valve in place, no paravalvular leak noted, wtih   max peak velocity    2m/s and mean tansvalve gradient 5-10 mmHg correlate with normal   function.    PHYSICIAN INTERPRETATION:  Left Ventricle: Global LV systolic function was normal.  Pericardium: There is no evidence of pericardial effusion.  Aortic Valve: Mechanical aortic valve in place, no paravalvular leak   noted, wtih max peak velocity    2m/s and mean tansvalve gradient 5-10 mmHg correlate with normal   function.      2D AND M-MODE MEASUREMENTS (normal ranges within parentheses):  Left Ventricle:                  Normal   Aorta/Left Atrium:            Normal  IVSd (2D):              1.70 cm (0.7-1.1) Aortic Root (2D):  3.00 cm   (2.4-3.7)  LVPWd (2D):             1.40 cm (0.7-1.1)  LVIDd (2D):             3.60 cm (3.4-5.7)  LVIDs (2D):             2.10 cm  LV FS (2D):             41.7 %   (>25%)  Relative Wall Thickness  0.78    (<0.42)    SPECTRAL DOPPLER ANALYSIS:  Aortic Valve:  AoV VMax:    2.02 m/s  AoV VTI:     0.23 m  AoV Pk Grad: 16.3 mmHg  AoV Mn Grad: 7.5 mmHg    LVOT Diam: 2.10 cm    Pulmonic Valve:  PV Max Velocity: 1.11 m/s PV Max P.9 mmHg PV Mean P Keron Vance, Electronically signed on 2023 at 12:48:55 PM      Assessment/Plan: Patient is a 43 yo female s/p avr (m)/excision of fibro-elastoma, ligation of left atrial appendage pod # 3  cont present tx as per ct surgeon  s/p avr (m)-hold coumadin due to supra therapeutic INR  hx of chronic  a. fib- discont amio ;pt was loaded pre-op and hold anticoagulation with coumadin due to high INR; rate control with lopressor and cardizem  dvt/gi ppx  d/c chest tube once drainage dec and once inr dec  encourage is   pain management  xanax prn for anxiety  melatonin qhs for insomnia  echo results appreciated- neg for pericardial effusion

## 2023-07-02 NOTE — PROGRESS NOTE ADULT - ASSESSMENT
IMPRESSION:  S/p Surgical AVR(Mechanical valve), JASON clip, excision of fibroelsatoma POD#3  Elevated INR after the first dose of coumadin(5 mg) given on 6/30 22:00 pm  afib with RVR  Chronic AFIb on amio/metorpolol/cardizem at home    PLAN:    CNS: xanax prn, melatonin at night, BP control    HEENT:  Oral care    PULMONARY:  HOB @ 45 degrees, wean oxygen keep sats 94-97%.    CARDIOVASCULAR: start cardizem drip for rate control target HR <100    GI: GI prophylaxis                                          Feeding po diet    RENAL:  F/u  lytes.  Correct as needed. accurate I/O    INFECTIOUS DISEASE: monitor off abx    HEMATOLOGICAL:  DVT prophylaxis. hold HSQ, hold coumadin for the next 2 days(INR continue to trend up 5.5)    ENDOCRINE:  Follow up FS. target -180    MUSCULOSKELETAL: ambulate with assistance     CODE STATUS: FULL CODE    DISPOSITION: Pt requires continued monitoring in the CTU  case discussed with ct surgeon   IMPRESSION:  S/p Surgical AVR(Mechanical valve), JASON clip, excision of fibroelsatoma POD#3  Elevated INR after the first dose of coumadin(5 mg) given on 6/30 22:00 pm  afib with RVR  Chronic AFIb on amio/metorpolol/cardizem at home    PLAN:    CNS: xanax prn, melatonin at night, BP control    HEENT:  Oral care    PULMONARY:  HOB @ 45 degrees, wean oxygen keep sats 94-97%.  Continue to monitor CT output  CARDIOVASCULAR: start cardizem drip for rate control target HR <100    GI: GI prophylaxis                                          Feeding po diet    RENAL:  F/u  lytes.  Correct as needed. accurate I/O    INFECTIOUS DISEASE: monitor off abx    HEMATOLOGICAL:  DVT prophylaxis. hold HSQ, hold coumadin for the next 2 days(INR continue to trend up 5.5)    ENDOCRINE:  Follow up FS. target -180    MUSCULOSKELETAL: ambulate with assistance     CODE STATUS: FULL CODE    DISPOSITION: Pt requires continued monitoring in the CTU  case discussed with ct surgeon

## 2023-07-03 LAB
ALBUMIN SERPL ELPH-MCNC: 3.3 G/DL — LOW (ref 3.5–5.2)
ALP SERPL-CCNC: 75 U/L — SIGNIFICANT CHANGE UP (ref 30–115)
ALT FLD-CCNC: 21 U/L — SIGNIFICANT CHANGE UP (ref 0–41)
ANION GAP SERPL CALC-SCNC: 11 MMOL/L — SIGNIFICANT CHANGE UP (ref 7–14)
APPEARANCE UR: CLEAR — SIGNIFICANT CHANGE UP
AST SERPL-CCNC: 34 U/L — SIGNIFICANT CHANGE UP (ref 0–41)
BACTERIA # UR AUTO: NEGATIVE — SIGNIFICANT CHANGE UP
BASOPHILS # BLD AUTO: 0.03 K/UL — SIGNIFICANT CHANGE UP (ref 0–0.2)
BASOPHILS NFR BLD AUTO: 0.2 % — SIGNIFICANT CHANGE UP (ref 0–1)
BILIRUB SERPL-MCNC: 0.8 MG/DL — SIGNIFICANT CHANGE UP (ref 0.2–1.2)
BILIRUB UR-MCNC: NEGATIVE — SIGNIFICANT CHANGE UP
BUN SERPL-MCNC: 25 MG/DL — HIGH (ref 10–20)
CALCIUM SERPL-MCNC: 8.2 MG/DL — LOW (ref 8.4–10.5)
CHLORIDE SERPL-SCNC: 97 MMOL/L — LOW (ref 98–110)
CO2 SERPL-SCNC: 26 MMOL/L — SIGNIFICANT CHANGE UP (ref 17–32)
COLOR SPEC: SIGNIFICANT CHANGE UP
CREAT SERPL-MCNC: 0.8 MG/DL — SIGNIFICANT CHANGE UP (ref 0.7–1.5)
DIFF PNL FLD: ABNORMAL
EGFR: 94 ML/MIN/1.73M2 — SIGNIFICANT CHANGE UP
EOSINOPHIL # BLD AUTO: 0.06 K/UL — SIGNIFICANT CHANGE UP (ref 0–0.7)
EOSINOPHIL NFR BLD AUTO: 0.5 % — SIGNIFICANT CHANGE UP (ref 0–8)
EPI CELLS # UR: 6 /HPF — HIGH (ref 0–5)
GLUCOSE BLDC GLUCOMTR-MCNC: 125 MG/DL — HIGH (ref 70–99)
GLUCOSE BLDC GLUCOMTR-MCNC: 129 MG/DL — HIGH (ref 70–99)
GLUCOSE BLDC GLUCOMTR-MCNC: 134 MG/DL — HIGH (ref 70–99)
GLUCOSE SERPL-MCNC: 110 MG/DL — HIGH (ref 70–99)
GLUCOSE UR QL: NEGATIVE — SIGNIFICANT CHANGE UP
HCT VFR BLD CALC: 32 % — LOW (ref 37–47)
HGB BLD-MCNC: 10.3 G/DL — LOW (ref 12–16)
HYALINE CASTS # UR AUTO: 0 /LPF — SIGNIFICANT CHANGE UP (ref 0–7)
IMM GRANULOCYTES NFR BLD AUTO: 0.6 % — HIGH (ref 0.1–0.3)
INR BLD: 3.58 RATIO — HIGH (ref 0.65–1.3)
KETONES UR-MCNC: NEGATIVE — SIGNIFICANT CHANGE UP
LEUKOCYTE ESTERASE UR-ACNC: NEGATIVE — SIGNIFICANT CHANGE UP
LYMPHOCYTES # BLD AUTO: 0.88 K/UL — LOW (ref 1.2–3.4)
LYMPHOCYTES # BLD AUTO: 7.3 % — LOW (ref 20.5–51.1)
MAGNESIUM SERPL-MCNC: 2 MG/DL — SIGNIFICANT CHANGE UP (ref 1.8–2.4)
MCHC RBC-ENTMCNC: 26.7 PG — LOW (ref 27–31)
MCHC RBC-ENTMCNC: 32.2 G/DL — SIGNIFICANT CHANGE UP (ref 32–37)
MCV RBC AUTO: 82.9 FL — SIGNIFICANT CHANGE UP (ref 81–99)
MONOCYTES # BLD AUTO: 1.55 K/UL — HIGH (ref 0.1–0.6)
MONOCYTES NFR BLD AUTO: 12.9 % — HIGH (ref 1.7–9.3)
NEUTROPHILS # BLD AUTO: 9.44 K/UL — HIGH (ref 1.4–6.5)
NEUTROPHILS NFR BLD AUTO: 78.5 % — HIGH (ref 42.2–75.2)
NITRITE UR-MCNC: NEGATIVE — SIGNIFICANT CHANGE UP
NRBC # BLD: 0 /100 WBCS — SIGNIFICANT CHANGE UP (ref 0–0)
PH UR: 6 — SIGNIFICANT CHANGE UP (ref 5–8)
PLATELET # BLD AUTO: 255 K/UL — SIGNIFICANT CHANGE UP (ref 130–400)
PMV BLD: 10.8 FL — HIGH (ref 7.4–10.4)
POTASSIUM SERPL-MCNC: 4.7 MMOL/L — SIGNIFICANT CHANGE UP (ref 3.5–5)
POTASSIUM SERPL-SCNC: 4.7 MMOL/L — SIGNIFICANT CHANGE UP (ref 3.5–5)
PROT SERPL-MCNC: 5.6 G/DL — LOW (ref 6–8)
PROT UR-MCNC: SIGNIFICANT CHANGE UP
PROTHROM AB SERPL-ACNC: >40 SEC — HIGH (ref 9.95–12.87)
RBC # BLD: 3.86 M/UL — LOW (ref 4.2–5.4)
RBC # FLD: 16.8 % — HIGH (ref 11.5–14.5)
RBC CASTS # UR COMP ASSIST: 5 /HPF — HIGH (ref 0–4)
SODIUM SERPL-SCNC: 134 MMOL/L — LOW (ref 135–146)
SP GR SPEC: 1.01 — SIGNIFICANT CHANGE UP (ref 1.01–1.03)
SURGICAL PATHOLOGY STUDY: SIGNIFICANT CHANGE UP
UROBILINOGEN FLD QL: SIGNIFICANT CHANGE UP
WBC # BLD: 12.03 K/UL — HIGH (ref 4.8–10.8)
WBC # FLD AUTO: 12.03 K/UL — HIGH (ref 4.8–10.8)
WBC UR QL: 3 /HPF — SIGNIFICANT CHANGE UP (ref 0–5)

## 2023-07-03 PROCEDURE — 71045 X-RAY EXAM CHEST 1 VIEW: CPT | Mod: 26

## 2023-07-03 PROCEDURE — 71045 X-RAY EXAM CHEST 1 VIEW: CPT | Mod: 26,77

## 2023-07-03 PROCEDURE — 93010 ELECTROCARDIOGRAM REPORT: CPT

## 2023-07-03 PROCEDURE — 99233 SBSQ HOSP IP/OBS HIGH 50: CPT

## 2023-07-03 RX ORDER — DILTIAZEM HCL 120 MG
10 CAPSULE, EXT RELEASE 24 HR ORAL ONCE
Refills: 0 | Status: COMPLETED | OUTPATIENT
Start: 2023-07-03 | End: 2023-07-03

## 2023-07-03 RX ORDER — FUROSEMIDE 40 MG
40 TABLET ORAL EVERY 12 HOURS
Refills: 0 | Status: DISCONTINUED | OUTPATIENT
Start: 2023-07-03 | End: 2023-07-05

## 2023-07-03 RX ORDER — DILTIAZEM HCL 120 MG
60 CAPSULE, EXT RELEASE 24 HR ORAL EVERY 6 HOURS
Refills: 0 | Status: DISCONTINUED | OUTPATIENT
Start: 2023-07-03 | End: 2023-07-10

## 2023-07-03 RX ADMIN — Medication 60 MILLIGRAM(S): at 17:40

## 2023-07-03 RX ADMIN — Medication 5 MILLIGRAM(S): at 22:11

## 2023-07-03 RX ADMIN — Medication 20 MILLIEQUIVALENT(S): at 12:24

## 2023-07-03 RX ADMIN — OXYCODONE HYDROCHLORIDE 5 MILLIGRAM(S): 5 TABLET ORAL at 09:35

## 2023-07-03 RX ADMIN — PANTOPRAZOLE SODIUM 40 MILLIGRAM(S): 20 TABLET, DELAYED RELEASE ORAL at 12:25

## 2023-07-03 RX ADMIN — Medication 12.5 MILLIGRAM(S): at 05:15

## 2023-07-03 RX ADMIN — CHLORHEXIDINE GLUCONATE 1 APPLICATION(S): 213 SOLUTION TOPICAL at 05:15

## 2023-07-03 RX ADMIN — Medication 12.5 MILLIGRAM(S): at 17:40

## 2023-07-03 RX ADMIN — Medication 40 MILLIGRAM(S): at 17:37

## 2023-07-03 RX ADMIN — Medication 60 MILLIGRAM(S): at 23:59

## 2023-07-03 RX ADMIN — POLYETHYLENE GLYCOL 3350 17 GRAM(S): 17 POWDER, FOR SOLUTION ORAL at 12:26

## 2023-07-03 RX ADMIN — Medication 40 MILLIGRAM(S): at 05:15

## 2023-07-03 RX ADMIN — Medication 10 MILLIGRAM(S): at 17:36

## 2023-07-03 RX ADMIN — Medication 60 MILLIGRAM(S): at 12:21

## 2023-07-03 RX ADMIN — Medication 1000 MILLIGRAM(S): at 00:32

## 2023-07-03 NOTE — PROGRESS NOTE ADULT - ASSESSMENT
Assessment/Plan:  Aortic Valve Mass-s/p AVR-POD #4  1-BP control-beta-blockers  2-serum glucose control-insulin sliding scale correction regimen  3-fluid overload-diuresis  2-A-Ktv-rate control-beta-blockers, diltiazem  5-anticoagulation (AVR-M)-continue coumadin, monitor PT/INR  6-acute blood loss anemia-stable, monitor Hb/Hct  5-olksocpcfwpkxeg-dyevhijsi, monitor daily  8-anxiety-alprazolam PRN    35 minutes of critical care services provided

## 2023-07-03 NOTE — PHARMACOTHERAPY INTERVENTION NOTE - COMMENTS
Patient ordered both ketorolac 15 mg q6h prn and oxycodone 5 mg q4h prn for same pain scale of 4-6. Reached out to provider to ask if one of these orders can be modified to avoid duplicate prn indication.

## 2023-07-03 NOTE — PROGRESS NOTE ADULT - SUBJECTIVE AND OBJECTIVE BOX
OPERATIVE PROCEDURE(s):                POD #                       42yFemale  SURGEON(s): JIMBO Zepeda  SUBJECTIVE ASSESSMENT:   Vital Signs Last 24 Hrs  T(F): 98.8 (03 Jul 2023 06:00), Max: 101.6 (02 Jul 2023 22:00)  HR: 89 (03 Jul 2023 07:00) (83 - 133)  BP: 108/52 (03 Jul 2023 07:00) (52/27 - 151/57)  BP(mean): 71 (03 Jul 2023 07:00) (35 - 106)  RR: 20 (03 Jul 2023 07:00) (18 - 45)  SpO2: 98% (03 Jul 2023 07:00) (87% - 99%)    I&O's Detail    02 Jul 2023 07:01  -  03 Jul 2023 07:00  --------------------------------------------------------  IN:    Diltiazem: 200 mL    Oral Fluid: 1080 mL  Total IN: 1280 mL    OUT:    Chest Tube (mL): 400 mL    Indwelling Catheter - Urethral (mL): 395 mL    Voided (mL): 1600 mL  Total OUT: 2395 mL        Net:   I&O's Detail    01 Jul 2023 07:01  -  02 Jul 2023 07:00  --------------------------------------------------------  Total NET: -669.9 mL      02 Jul 2023 07:01  -  03 Jul 2023 07:00  --------------------------------------------------------  Total NET: -1115 mL        CAPILLARY BLOOD GLUCOSE      POCT Blood Glucose.: 125 mg/dL (03 Jul 2023 06:25)  POCT Blood Glucose.: 130 mg/dL (02 Jul 2023 23:26)  POCT Blood Glucose.: 111 mg/dL (02 Jul 2023 16:48)  POCT Blood Glucose.: 118 mg/dL (02 Jul 2023 11:45)    Physical Exam:  General: NAD; A&Ox3/Patient is intubated and sedated  Cardiac: S1/S2, RRR, no murmur, no rubs  Lungs: unlabored respirations, CTA b/l, no wheeze, no rales, no crackles  Abdomen: Soft/NT/ND; positive bowel sounds x 4  Sternum: Intact, no click, incision healing well with no drainage  Incisions: Incisions clean/dry/intact  Extremities: No edema b/l lower extremities; good capillary refill; no cyanosis; palpable 1+ pedal pulses b/l    Central Venous Catheter: Yes[]  No[] , If Yes indication:           Day #  Luis Catheter: Yes  [] , No  [] , If yes indication:                      Day #  NGT: Yes [] No [] ,    If Yes Placement:                                     Day #  EPICARDIAL WIRES:  [] YES [] NO                                              Day #  BOWEL MOVEMENT:  [] YES [] NO, If No, Timing since last BM:      Day #  CHEST TUBE(Left/Right):  [] YES [] NO, If yes -  AIR LEAKS:  [] YES [] NO        LABS:                        10.3<L>  12.03<H> )-----------( 255      ( 03 Jul 2023 01:15 )             32.0<L>                        11.3<L>  15.17<H> )-----------( 247      ( 02 Jul 2023 01:20 )             34.5<L>    07-03    134<L>  |  97<L>  |  25<H>  ----------------------------<  110<H>  4.7   |  26  |  0.8  07-02    137  |  101  |  24<H>  ----------------------------<  107<H>  4.2   |  26  |  0.8    Ca    8.2<L>      03 Jul 2023 01:15  Mg     2.0     07-03    TPro  5.6<L> [6.0 - 8.0]  /  Alb  3.3<L> [3.5 - 5.2]  /  TBili  0.8 [0.2 - 1.2]  /  DBili  x   /  AST  34 [0 - 41]  /  ALT  21 [0 - 41]  /  AlkPhos  75 [30 - 115]  07-03    PT/INR - ( 03 Jul 2023 01:15 )   PT: ;   INR: 3.58 ratio         PT/INR - ( 02 Jul 2023 14:29 )   PT: ;   INR: 4.69 ratio           Urinalysis Basic - ( 03 Jul 2023 01:15 )    Color: x / Appearance: x / SG: x / pH: x  Gluc: 110 mg/dL / Ketone: x  / Bili: x / Urobili: x   Blood: x / Protein: x / Nitrite: x   Leuk Esterase: x / RBC: x / WBC x   Sq Epi: x / Non Sq Epi: x / Bacteria: x      ABG - ( 02 Jul 2023 22:59 )  pH: 7.44  /  pCO2: 41    /  pO2: 73    / HCO3: 28    / Base Excess: 3.3   /  SaO2: 96.0  /  LA: 0.80             RADIOLOGY & ADDITIONAL TESTS:  CXR:  EKG:  MEDICATIONS  (STANDING):  albumin human  5% IVPB 500 milliLiter(s) IV Intermittent once  bisacodyl Suppository 10 milliGRAM(s) Rectal once  chlorhexidine 2% Cloths 1 Application(s) Topical daily  dextrose 5%. 1000 milliLiter(s) (100 mL/Hr) IV Continuous <Continuous>  dextrose 5%. 1000 milliLiter(s) (50 mL/Hr) IV Continuous <Continuous>  dextrose 50% Injectable 50 milliLiter(s) IV Push every 15 minutes  dextrose 50% Injectable 25 milliLiter(s) IV Push every 15 minutes  diltiazem Infusion 5 mG/Hr (5 mL/Hr) IV Continuous <Continuous>  furosemide   Injectable 40 milliGRAM(s) IV Push every 24 hours  glucagon  Injectable 1 milliGRAM(s) IntraMuscular once  insulin lispro (ADMELOG) corrective regimen sliding scale   SubCutaneous three times a day before meals  melatonin 5 milliGRAM(s) Oral at bedtime  meperidine     Injectable 25 milliGRAM(s) IV Push once  metoprolol tartrate 12.5 milliGRAM(s) Oral every 12 hours  pantoprazole    Tablet 40 milliGRAM(s) Oral daily  polyethylene glycol 3350 17 Gram(s) Oral daily  potassium chloride    Tablet ER 20 milliEquivalent(s) Oral daily  senna 2 Tablet(s) Oral at bedtime    MEDICATIONS  (PRN):  acetaminophen     Tablet .. 650 milliGRAM(s) Oral every 6 hours PRN Mild Pain (1 - 3)  ALPRAZolam 0.5 milliGRAM(s) Oral at bedtime PRN anxiety  dextrose Oral Gel 15 Gram(s) Oral once PRN Blood Glucose LESS THAN 70 milliGRAM(s)/deciliter  ketorolac   Injectable 15 milliGRAM(s) IV Push every 6 hours PRN Moderate Pain (4 - 6)  ondansetron Injectable 4 milliGRAM(s) IV Push every 8 hours PRN Nausea and/or Vomiting  oxyCODONE    IR 5 milliGRAM(s) Oral every 4 hours PRN Moderate Pain (4 - 6)  oxyCODONE    IR 10 milliGRAM(s) Oral every 4 hours PRN Severe Pain (7 - 10)    HEPARIN:  [] YES [] NO  Dose: XX UNITS/HR UNITS Q8H  LOVENOX:[] YES [] NO  Dose: XX mg Q24H  COUMADIN: []  YES [] NO  Dose: XX mg  Q24H  SCD's: YES b/l  GI Prophylaxis: Protonix [], Pepcid [], None [], (Contra-indication:.....)    Post-Op Beta-Blockers: Yes [], No[], If No, then contraindication:  Post-Op Aspirin: Yes [],  No [], If No, then contraindication:  Post-Op Statin: Yes [], No[], If No, then contraindication:  Allergies    No Known Allergies    Intolerances      Ambulation/Activity Status:    Assessment/Plan:  42y Female status-post .....  - Case and plan discussed with CTU Intensivist and CT Surgeon - Dr. Guzman/Duane/Reza  - Continue CTU supportive care    - Continue DVT/GI prophylaxis  - Incentive Spirometry 10 times an hour  - Continue to advance physical activity as tolerated and continue PT/OT as directed  1. CAD: Continue ASA, statin, BB  2. HTN:   3. A. Fib:   4. COPD/Hypoxia:   5. DM/Glucose Control:     Social Service Disposition:     OPERATIVE PROCEDURE(s):       avr (M)/ligation of left atrial appendage            POD #    4                   42yFemale  SURGEON(s): JIMBO Zepeda  SUBJECTIVE ASSESSMENT: pt seen and examined. no acute complaints at this time.   Vital Signs Last 24 Hrs  T(F): 98.8 (03 Jul 2023 06:00), Max: 101.6 (02 Jul 2023 22:00)  HR: 89 (03 Jul 2023 07:00) (83 - 133)  BP: 108/52 (03 Jul 2023 07:00) (52/27 - 151/57)  BP(mean): 71 (03 Jul 2023 07:00) (35 - 106)  RR: 20 (03 Jul 2023 07:00) (18 - 45)  SpO2: 98% (03 Jul 2023 07:00) (87% - 99%)    I&O's Detail    02 Jul 2023 07:01  -  03 Jul 2023 07:00  --------------------------------------------------------  IN:    Diltiazem: 200 mL    Oral Fluid: 1080 mL  Total IN: 1280 mL    OUT:    Chest Tube (mL): 400 mL    Indwelling Catheter - Urethral (mL): 395 mL    Voided (mL): 1600 mL  Total OUT: 2395 mL        Net:   I&O's Detail    01 Jul 2023 07:01  -  02 Jul 2023 07:00  --------------------------------------------------------  Total NET: -669.9 mL      02 Jul 2023 07:01  -  03 Jul 2023 07:00  --------------------------------------------------------  Total NET: -1115 mL        CAPILLARY BLOOD GLUCOSE      POCT Blood Glucose.: 125 mg/dL (03 Jul 2023 06:25)  POCT Blood Glucose.: 130 mg/dL (02 Jul 2023 23:26)  POCT Blood Glucose.: 111 mg/dL (02 Jul 2023 16:48)  POCT Blood Glucose.: 118 mg/dL (02 Jul 2023 11:45)    Physical Exam:  General: NAD; A&Ox3  Cardiac: S1/S2, RRR, no murmur, no rubs  Lungs: decreased bs at bases bilaterally   Abdomen: Soft/NT/ND; positive bowel sounds x 4  Sternum: Intact, no click, incision healing well with no drainage  Incisions: Incisions clean/dry/intact  Extremities: mild edema b/l lower extremities; good capillary refill; no cyanosis; palpable 1+ pedal pulses b/l    Central Venous Catheter: Yes[x]  No[] , If Yes indication:   critical         Day #4  EPICARDIAL WIRES:  [x] YES [] NO                                              Day #4  BOWEL MOVEMENT:  [] YES [x] NO, If No, Timing since last BM:      Day #  CHEST TUBE(Left/Right):  [x] YES [] NO, If yes -  AIR LEAKS:  [] YES [] NO        LABS:                        10.3<L>  12.03<H> )-----------( 255      ( 03 Jul 2023 01:15 )             32.0<L>                        11.3<L>  15.17<H> )-----------( 247      ( 02 Jul 2023 01:20 )             34.5<L>    07-03    134<L>  |  97<L>  |  25<H>  ----------------------------<  110<H>  4.7   |  26  |  0.8  07-02    137  |  101  |  24<H>  ----------------------------<  107<H>  4.2   |  26  |  0.8    Ca    8.2<L>      03 Jul 2023 01:15  Mg     2.0     07-03    TPro  5.6<L> [6.0 - 8.0]  /  Alb  3.3<L> [3.5 - 5.2]  /  TBili  0.8 [0.2 - 1.2]  /  DBili  x   /  AST  34 [0 - 41]  /  ALT  21 [0 - 41]  /  AlkPhos  75 [30 - 115]  07-03    PT/INR - ( 03 Jul 2023 01:15 )   PT: ;   INR: 3.58 ratio         PT/INR - ( 02 Jul 2023 14:29 )   PT: ;   INR: 4.69 ratio           Urinalysis Basic - ( 03 Jul 2023 01:15 )    Color: x / Appearance: x / SG: x / pH: x  Gluc: 110 mg/dL / Ketone: x  / Bili: x / Urobili: x   Blood: x / Protein: x / Nitrite: x   Leuk Esterase: x / RBC: x / WBC x   Sq Epi: x / Non Sq Epi: x / Bacteria: x      ABG - ( 02 Jul 2023 22:59 )  pH: 7.44  /  pCO2: 41    /  pO2: 73    / HCO3: 28    / Base Excess: 3.3   /  SaO2: 96.0  /  LA: 0.80             RADIOLOGY & ADDITIONAL TESTS:  CXR: < from: Xray Chest 1 View- PORTABLE-Routine (Xray Chest 1 View- PORTABLE-Routine in AM.) (07.03.23 @ 04:54) >  IMPRESSION:    No pneumothorax.    < end of copied text >    EKG: < from: 12 Lead ECG (07.02.23 @ 07:56) >    Ventricular Rate 121 BPM    Atrial Rate 110 BPM    QRS Duration 122 ms    Q-T Interval 378 ms    QTC Calculation(Bazett) 536 ms    R Axis -65 degrees    T Axis 126 degrees    Diagnosis Line Atrial fibrillation with rapid ventricular response  Left axis deviation  RSR' or QR pattern in V1 suggests right ventricular conduction delay  Marked ST abnormality, possible lateral subendocardial injury  Abnormal ECG    < end of copied text >    MEDICATIONS  (STANDING):  albumin human  5% IVPB 500 milliLiter(s) IV Intermittent once  bisacodyl Suppository 10 milliGRAM(s) Rectal once  chlorhexidine 2% Cloths 1 Application(s) Topical daily  dextrose 5%. 1000 milliLiter(s) (100 mL/Hr) IV Continuous <Continuous>  dextrose 5%. 1000 milliLiter(s) (50 mL/Hr) IV Continuous <Continuous>  dextrose 50% Injectable 50 milliLiter(s) IV Push every 15 minutes  dextrose 50% Injectable 25 milliLiter(s) IV Push every 15 minutes  diltiazem Infusion 5 mG/Hr (5 mL/Hr) IV Continuous <Continuous>  furosemide   Injectable 40 milliGRAM(s) IV Push every 24 hours  glucagon  Injectable 1 milliGRAM(s) IntraMuscular once  insulin lispro (ADMELOG) corrective regimen sliding scale   SubCutaneous three times a day before meals  melatonin 5 milliGRAM(s) Oral at bedtime  meperidine     Injectable 25 milliGRAM(s) IV Push once  metoprolol tartrate 12.5 milliGRAM(s) Oral every 12 hours  pantoprazole    Tablet 40 milliGRAM(s) Oral daily  polyethylene glycol 3350 17 Gram(s) Oral daily  potassium chloride    Tablet ER 20 milliEquivalent(s) Oral daily  senna 2 Tablet(s) Oral at bedtime    MEDICATIONS  (PRN):  acetaminophen     Tablet .. 650 milliGRAM(s) Oral every 6 hours PRN Mild Pain (1 - 3)  ALPRAZolam 0.5 milliGRAM(s) Oral at bedtime PRN anxiety  dextrose Oral Gel 15 Gram(s) Oral once PRN Blood Glucose LESS THAN 70 milliGRAM(s)/deciliter  ketorolac   Injectable 15 milliGRAM(s) IV Push every 6 hours PRN Moderate Pain (4 - 6)  ondansetron Injectable 4 milliGRAM(s) IV Push every 8 hours PRN Nausea and/or Vomiting  oxyCODONE    IR 5 milliGRAM(s) Oral every 4 hours PRN Moderate Pain (4 - 6)  oxyCODONE    IR 10 milliGRAM(s) Oral every 4 hours PRN Severe Pain (7 - 10)    OFF all avt prophylaxis at this time for elevated inr  SCD's: YES b/l  GI Prophylaxis: Protonix [x], Pepcid [], None [], (Contra-indication:.....)    Post-Op Beta-Blockers: Yes [x], No[], If No, then contraindication:  Post-Op Aspirin: Yes [],  No [x], If No, then contraindication: elevated inr  Post-Op Statin: Yes [], No[x], If No, then contraindication: not indicated   Allergies    No Known Allergies    Intolerances      Ambulation/Activity Status: ambulate     Assessment/Plan:  42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 Guzman/Duane/Reza  - Continue CTU supportive care    - Continue DVT/GI prophylaxis  - Incentive Spirometry 10 times an hour  - Continue to advance physical activity as tolerated and continue PT/OT as directed  1. hold coumadin for supra- therapeutic inr, cont to monitor, inr trending down. keep ct in place until drainage decreases and inr trends down  2. HTN: cont lopressor   3. A. Fib: hold coumadin for now, rate control with cardizem and lopressor, d/c cardizem gtt and start cardizem po 60mg q6 hours   4. COPD/Hypoxia: cont nebs, wean o2 as tolerated, encourage incentive, increase lasix to 40mg iv bid for fluid overload   5. DM/Glucose Control: d/c fingersticks and sliding scale     Social Service Disposition:  home

## 2023-07-03 NOTE — PHARMACOTHERAPY INTERVENTION NOTE - COMMENTS
42yFemale      Indication:   INR Goal:  Home Dose: patient was on eliquis at home  Bridge Therapy:      acetaminophen     Tablet .. 650 milliGRAM(s) Oral every 6 hours PRN  albumin human  5% IVPB 500 milliLiter(s) IV Intermittent once  ALPRAZolam 0.5 milliGRAM(s) Oral at bedtime PRN  bisacodyl Suppository 10 milliGRAM(s) Rectal once  chlorhexidine 2% Cloths 1 Application(s) Topical daily  dextrose 5%. 1000 milliLiter(s) IV Continuous <Continuous>  dextrose 5%. 1000 milliLiter(s) IV Continuous <Continuous>  dextrose 50% Injectable 50 milliLiter(s) IV Push every 15 minutes  dextrose 50% Injectable 25 milliLiter(s) IV Push every 15 minutes  dextrose Oral Gel 15 Gram(s) Oral once PRN  diltiazem    Tablet 60 milliGRAM(s) Oral every 6 hours  furosemide   Injectable 40 milliGRAM(s) IV Push every 12 hours  glucagon  Injectable 1 milliGRAM(s) IntraMuscular once  insulin lispro (ADMELOG) corrective regimen sliding scale   SubCutaneous three times a day before meals  ketorolac   Injectable 15 milliGRAM(s) IV Push every 6 hours PRN  melatonin 5 milliGRAM(s) Oral at bedtime  meperidine     Injectable 25 milliGRAM(s) IV Push once  metoprolol tartrate 12.5 milliGRAM(s) Oral every 12 hours  ondansetron Injectable 4 milliGRAM(s) IV Push every 8 hours PRN  oxyCODONE    IR 5 milliGRAM(s) Oral every 4 hours PRN  oxyCODONE    IR 10 milliGRAM(s) Oral every 4 hours PRN  pantoprazole    Tablet 40 milliGRAM(s) Oral daily  polyethylene glycol 3350 17 Gram(s) Oral daily  potassium chloride    Tablet ER 20 milliEquivalent(s) Oral daily  senna 2 Tablet(s) Oral at bedtime        Drug Interactions:       H/H: 10.3/32.2  PLT: 255  GFR: 94    Date---------------INR-----------------Dose    INR: 3.58 ratio (07-03-23 @ 01:15)  INR: 4.69 ratio (07-02-23 @ 14:29)  INR: 5.50 ratio (07-02-23 @ 01:20)  INR: 4.26 ratio (07-01-23 @ 15:21)  INR: 1.70 ratio (07-01-23 @ 02:31)  INR: 1.65 ratio (06-30-23 @ 01:35)  INR: 1.52 ratio (06-29-23 @ 13:40)    Recommended holding Warfarin tonight  1. Recommend Warfarin    0 mg  PO x 1   2. Obtain INR tomorrow AM

## 2023-07-03 NOTE — PROGRESS NOTE ADULT - SUBJECTIVE AND OBJECTIVE BOX
MALIHA THOMPSON  MRN#: [id number]  Subjective:  Patient was seen and evalauted on AM rounds offerring no specific compliants at this time.    OBJECTIVE:  ICU Vital Signs Last 24 Hrs  T(C): 37.1 (2023 06:00), Max: 38.7 (2023 22:00)  T(F): 98.8 (2023 06:00), Max: 101.6 (2023 22:00)  HR: 89 (2023 07:00) (83 - 133)  BP: 108/52 (2023 07:00) (52/27 - 151/57)  BP(mean): 71 (2023 07:00) (35 - 106)  ABP: --  ABP(mean): --  RR: 20 (2023 07:00) (18 - 45)  SpO2: 98% (2023 07:00) (87% - 99%)    O2 Parameters below as of 2023 07:00  Patient On (Oxygen Delivery Method): nasal cannula  O2 Flow (L/min): 2          -02 @ 07:01  -  07-03 @ 07:00  --------------------------------------------------------  IN: 1280 mL / OUT: 2395 mL / NET: -1115 mL      CAPILLARY BLOOD GLUCOSE      POCT Blood Glucose.: 125 mg/dL (2023 06:25)      PHYSICAL EXAM:Daily     Daily Weight in k.4 (2023 06:00)  General: WN/WD NAD    HEENT:     + NCAT  + EOMI  - Conjuctival edema   - Icterus   - Thrush   - ETT  - NGT/OGT    Neck:         + FROM    - JVD     - Nodes     - Masses    + Mid-line trachea   - Tracheostomy    Chest:         - Sternal click  - Sternal drainage  + Pacing wires  + Chest tubes  - SubQ emphysema    Lungs:          + CTA   - Rhonchi    - Rales    - Wheezing     - Decreased BS   - Dullness R L    Cardiac:       + S1 + S2    + RRR   - Irregular   - S3  - S4    - Murmurs   - Rub   - Hamman’s sign     Abdomen:    + BS     + Soft    + Non-tender     - Distended    - Organomegaly  - PEG    Extremities:   - Cyanosis U/L   - Clubbing  U/L  + LE Edema   + Capillary refill    + Pulses     Neuro:        + Awake   +  Alert   - Confused   - Lethargic   - Sedated   - Generalized Weakness    Skin:        - Rashes    - Erythema   + Normal incisions   + IV sites intact  - Sacral decubitus    HOSPITAL MEDICATIONS:  MEDICATIONS  (STANDING):  albumin human  5% IVPB 500 milliLiter(s) IV Intermittent once  bisacodyl Suppository 10 milliGRAM(s) Rectal once  chlorhexidine 2% Cloths 1 Application(s) Topical daily  dextrose 5%. 1000 milliLiter(s) (100 mL/Hr) IV Continuous <Continuous>  dextrose 5%. 1000 milliLiter(s) (50 mL/Hr) IV Continuous <Continuous>  dextrose 50% Injectable 50 milliLiter(s) IV Push every 15 minutes  dextrose 50% Injectable 25 milliLiter(s) IV Push every 15 minutes  diltiazem    Tablet 60 milliGRAM(s) Oral every 6 hours  furosemide   Injectable 40 milliGRAM(s) IV Push every 12 hours  glucagon  Injectable 1 milliGRAM(s) IntraMuscular once  insulin lispro (ADMELOG) corrective regimen sliding scale   SubCutaneous three times a day before meals  melatonin 5 milliGRAM(s) Oral at bedtime  meperidine     Injectable 25 milliGRAM(s) IV Push once  metoprolol tartrate 12.5 milliGRAM(s) Oral every 12 hours  pantoprazole    Tablet 40 milliGRAM(s) Oral daily  polyethylene glycol 3350 17 Gram(s) Oral daily  potassium chloride    Tablet ER 20 milliEquivalent(s) Oral daily  senna 2 Tablet(s) Oral at bedtime    MEDICATIONS  (PRN):  acetaminophen     Tablet .. 650 milliGRAM(s) Oral every 6 hours PRN Mild Pain (1 - 3)  ALPRAZolam 0.5 milliGRAM(s) Oral at bedtime PRN anxiety  dextrose Oral Gel 15 Gram(s) Oral once PRN Blood Glucose LESS THAN 70 milliGRAM(s)/deciliter  ketorolac   Injectable 15 milliGRAM(s) IV Push every 6 hours PRN Moderate Pain (4 - 6)  ondansetron Injectable 4 milliGRAM(s) IV Push every 8 hours PRN Nausea and/or Vomiting  oxyCODONE    IR 5 milliGRAM(s) Oral every 4 hours PRN Moderate Pain (4 - 6)  oxyCODONE    IR 10 milliGRAM(s) Oral every 4 hours PRN Severe Pain (7 - 10)      LABS:                        10.3   12.03 )-----------( 255      ( 2023 01:15 )             32.0    07-03    134<L>  |  97<L>  |  25<H>  ----------------------------<  110<H>  4.7   |  26  |  0.8    Ca    8.2<L>      2023 01:15  Mg     2.0     07-03    TPro  5.6<L>  /  Alb  3.3<L>  /  TBili  0.8  /  DBili  x   /  AST  34  /  ALT  21  /  AlkPhos  75  07-03    PT/INR - ( 2023 01:15 )   PT: >40.00 sec;   INR: 3.58 ratio          LIVER FUNCTIONS - ( 2023 01:15 )  Alb: 3.3 g/dL / Pro: 5.6 g/dL / ALK PHOS: 75 U/L / ALT: 21 U/L / AST: 34 U/L / GGT: x           Urinalysis Basic - ( 2023 01:15 )    Color: x / Appearance: x / SG: x / pH: x  Gluc: 110 mg/dL / Ketone: x  / Bili: x / Urobili: x   Blood: x / Protein: x / Nitrite: x   Leuk Esterase: x / RBC: x / WBC x   Sq Epi: x / Non Sq Epi: x / Bacteria: x        RADIOLOGY:  X Reviewed and interpreted by me: L-base opacity    CARDIOPULMONARY DYSFUNCTION  - Respiratory status required supplemental oxygen & the following of continuous pulse oximetry for support & to prevent decompensation  - Continued early mobilization as tolerated  - Addressed analgesic regimen to optimize function    PREVENTION-PROPHYLAXIS  - VTE prophylaxis-Venodyne boots  - Protonix maintained for GI bleeding prophylaxis  - Lopressor continued   - Metabolic stability & infection prophylaxis required review and adjustment of regular Insulin sliding scale and gylcemic regimen while following serial glucose levels to help achieve & maintain euglycemia  - Reviewed & addressed surgical site infection prophylaxis regimen

## 2023-07-04 LAB
ALBUMIN SERPL ELPH-MCNC: 3.3 G/DL — LOW (ref 3.5–5.2)
ALP SERPL-CCNC: 82 U/L — SIGNIFICANT CHANGE UP (ref 30–115)
ALT FLD-CCNC: 21 U/L — SIGNIFICANT CHANGE UP (ref 0–41)
ANION GAP SERPL CALC-SCNC: 13 MMOL/L — SIGNIFICANT CHANGE UP (ref 7–14)
AST SERPL-CCNC: 28 U/L — SIGNIFICANT CHANGE UP (ref 0–41)
BASOPHILS # BLD AUTO: 0.02 K/UL — SIGNIFICANT CHANGE UP (ref 0–0.2)
BASOPHILS NFR BLD AUTO: 0.2 % — SIGNIFICANT CHANGE UP (ref 0–1)
BILIRUB SERPL-MCNC: 0.9 MG/DL — SIGNIFICANT CHANGE UP (ref 0.2–1.2)
BUN SERPL-MCNC: 23 MG/DL — HIGH (ref 10–20)
CALCIUM SERPL-MCNC: 8.3 MG/DL — LOW (ref 8.4–10.5)
CHLORIDE SERPL-SCNC: 98 MMOL/L — SIGNIFICANT CHANGE UP (ref 98–110)
CO2 SERPL-SCNC: 25 MMOL/L — SIGNIFICANT CHANGE UP (ref 17–32)
CREAT SERPL-MCNC: 0.7 MG/DL — SIGNIFICANT CHANGE UP (ref 0.7–1.5)
EGFR: 110 ML/MIN/1.73M2 — SIGNIFICANT CHANGE UP
EOSINOPHIL # BLD AUTO: 0.14 K/UL — SIGNIFICANT CHANGE UP (ref 0–0.7)
EOSINOPHIL NFR BLD AUTO: 1.3 % — SIGNIFICANT CHANGE UP (ref 0–8)
GLUCOSE BLDC GLUCOMTR-MCNC: 105 MG/DL — HIGH (ref 70–99)
GLUCOSE BLDC GLUCOMTR-MCNC: 128 MG/DL — HIGH (ref 70–99)
GLUCOSE BLDC GLUCOMTR-MCNC: 200 MG/DL — HIGH (ref 70–99)
GLUCOSE SERPL-MCNC: 119 MG/DL — HIGH (ref 70–99)
HCT VFR BLD CALC: 31.8 % — LOW (ref 37–47)
HGB BLD-MCNC: 10.2 G/DL — LOW (ref 12–16)
IMM GRANULOCYTES NFR BLD AUTO: 1 % — HIGH (ref 0.1–0.3)
INR BLD: 2.27 RATIO — HIGH (ref 0.65–1.3)
LYMPHOCYTES # BLD AUTO: 1.29 K/UL — SIGNIFICANT CHANGE UP (ref 1.2–3.4)
LYMPHOCYTES # BLD AUTO: 11.6 % — LOW (ref 20.5–51.1)
MAGNESIUM SERPL-MCNC: 1.8 MG/DL — SIGNIFICANT CHANGE UP (ref 1.8–2.4)
MCHC RBC-ENTMCNC: 26.4 PG — LOW (ref 27–31)
MCHC RBC-ENTMCNC: 32.1 G/DL — SIGNIFICANT CHANGE UP (ref 32–37)
MCV RBC AUTO: 82.2 FL — SIGNIFICANT CHANGE UP (ref 81–99)
MONOCYTES # BLD AUTO: 1.85 K/UL — HIGH (ref 0.1–0.6)
MONOCYTES NFR BLD AUTO: 16.7 % — HIGH (ref 1.7–9.3)
NEUTROPHILS # BLD AUTO: 7.69 K/UL — HIGH (ref 1.4–6.5)
NEUTROPHILS NFR BLD AUTO: 69.2 % — SIGNIFICANT CHANGE UP (ref 42.2–75.2)
NRBC # BLD: 0 /100 WBCS — SIGNIFICANT CHANGE UP (ref 0–0)
PLATELET # BLD AUTO: 287 K/UL — SIGNIFICANT CHANGE UP (ref 130–400)
PMV BLD: 10.3 FL — SIGNIFICANT CHANGE UP (ref 7.4–10.4)
POTASSIUM SERPL-MCNC: 4.1 MMOL/L — SIGNIFICANT CHANGE UP (ref 3.5–5)
POTASSIUM SERPL-SCNC: 4.1 MMOL/L — SIGNIFICANT CHANGE UP (ref 3.5–5)
PROT SERPL-MCNC: 5.5 G/DL — LOW (ref 6–8)
PROTHROM AB SERPL-ACNC: 26.5 SEC — HIGH (ref 9.95–12.87)
RBC # BLD: 3.87 M/UL — LOW (ref 4.2–5.4)
RBC # FLD: 16.7 % — HIGH (ref 11.5–14.5)
SODIUM SERPL-SCNC: 136 MMOL/L — SIGNIFICANT CHANGE UP (ref 135–146)
WBC # BLD: 11.1 K/UL — HIGH (ref 4.8–10.8)
WBC # FLD AUTO: 11.1 K/UL — HIGH (ref 4.8–10.8)

## 2023-07-04 PROCEDURE — 71045 X-RAY EXAM CHEST 1 VIEW: CPT | Mod: 26

## 2023-07-04 PROCEDURE — 93010 ELECTROCARDIOGRAM REPORT: CPT

## 2023-07-04 PROCEDURE — 99232 SBSQ HOSP IP/OBS MODERATE 35: CPT

## 2023-07-04 RX ORDER — ACETAMINOPHEN 500 MG
650 TABLET ORAL ONCE
Refills: 0 | Status: COMPLETED | OUTPATIENT
Start: 2023-07-04 | End: 2023-07-04

## 2023-07-04 RX ORDER — DILTIAZEM HCL 120 MG
5 CAPSULE, EXT RELEASE 24 HR ORAL
Qty: 125 | Refills: 0 | Status: DISCONTINUED | OUTPATIENT
Start: 2023-07-04 | End: 2023-07-05

## 2023-07-04 RX ORDER — MAGNESIUM SULFATE 500 MG/ML
2 VIAL (ML) INJECTION ONCE
Refills: 0 | Status: COMPLETED | OUTPATIENT
Start: 2023-07-04 | End: 2023-07-04

## 2023-07-04 RX ORDER — METOPROLOL TARTRATE 50 MG
25 TABLET ORAL EVERY 12 HOURS
Refills: 0 | Status: DISCONTINUED | OUTPATIENT
Start: 2023-07-04 | End: 2023-07-06

## 2023-07-04 RX ADMIN — Medication 60 MILLIGRAM(S): at 18:05

## 2023-07-04 RX ADMIN — Medication 25 GRAM(S): at 06:33

## 2023-07-04 RX ADMIN — Medication 20 MILLIEQUIVALENT(S): at 11:47

## 2023-07-04 RX ADMIN — Medication 25 MILLIGRAM(S): at 20:01

## 2023-07-04 RX ADMIN — Medication 1: at 11:49

## 2023-07-04 RX ADMIN — Medication 60 MILLIGRAM(S): at 06:32

## 2023-07-04 RX ADMIN — Medication 12.5 MILLIGRAM(S): at 06:33

## 2023-07-04 RX ADMIN — PANTOPRAZOLE SODIUM 40 MILLIGRAM(S): 20 TABLET, DELAYED RELEASE ORAL at 11:47

## 2023-07-04 RX ADMIN — Medication 650 MILLIGRAM(S): at 00:30

## 2023-07-04 RX ADMIN — Medication 650 MILLIGRAM(S): at 22:12

## 2023-07-04 RX ADMIN — Medication 5 MG/HR: at 07:11

## 2023-07-04 RX ADMIN — Medication 0.5 MILLIGRAM(S): at 22:12

## 2023-07-04 RX ADMIN — Medication 650 MILLIGRAM(S): at 22:18

## 2023-07-04 RX ADMIN — POLYETHYLENE GLYCOL 3350 17 GRAM(S): 17 POWDER, FOR SOLUTION ORAL at 11:47

## 2023-07-04 RX ADMIN — CHLORHEXIDINE GLUCONATE 1 APPLICATION(S): 213 SOLUTION TOPICAL at 06:34

## 2023-07-04 RX ADMIN — Medication 40 MILLIGRAM(S): at 06:33

## 2023-07-04 RX ADMIN — Medication 60 MILLIGRAM(S): at 11:47

## 2023-07-04 RX ADMIN — Medication 40 MILLIGRAM(S): at 18:05

## 2023-07-04 RX ADMIN — Medication 650 MILLIGRAM(S): at 00:00

## 2023-07-04 NOTE — PROGRESS NOTE ADULT - ASSESSMENT
IMPRESSION:  S/p Surgical AVR(Mechanical valve), JASON clip, excision of fibroelsatoma POD#5  Elevated INR after the first dose of coumadin(5 mg) given on 6/30 22:00 pm  afib with RVR  Chronic AFIb on amio/metorpolol/cardizem at home  accidental removal of right CT on 7/3/23  PLAN:    CNS: xanax prn, melatonin at night, BP control    HEENT:  Oral care    PULMONARY:  HOB @ 45 degrees, wean oxygen keep sats 94-97%.    CARDIOVASCULAR: wean cardizem drip, increase metoprolol to 25q12, continue cardizem po.  maintain -ve balance  GI: GI prophylaxis                                          Feeding po diet    RENAL:  F/u  lytes.  Correct as needed. accurate I/O    INFECTIOUS DISEASE: monitor off abx    HEMATOLOGICAL:  DVT prophylaxis. hold HSQ, hold coumadin for 1 more day(INR continue to trend down 2.27) dc wires once INR<2    ENDOCRINE:  Follow up FS. target -180    MUSCULOSKELETAL: ambulate with assistance     CODE STATUS: FULL CODE    DISPOSITION: Pt requires continued monitoring in the CTU  case discussed with ct surgeon

## 2023-07-04 NOTE — PROGRESS NOTE ADULT - SUBJECTIVE AND OBJECTIVE BOX
Over Night Events:  Afib with rvr, better rate controlled.  S/p accidental removal of R chest tube yesterday. repeat CXR twice 12 hours no pneumothorax.      ROS:  See HPI    PHYSICAL EXAM    ICU Vital Signs Last 24 Hrs  T(C): 37.1 (04 Jul 2023 09:00), Max: 38.3 (04 Jul 2023 00:00)  T(F): 98.7 (04 Jul 2023 09:00), Max: 101 (04 Jul 2023 00:00)  HR: 91 (04 Jul 2023 11:00) (86 - 127)  BP: 96/71 (04 Jul 2023 11:00) (93/53 - 135/100)  BP(mean): 79 (04 Jul 2023 11:00) (66 - 114)  RR: 25 (04 Jul 2023 11:00) (22 - 36)  SpO2: 98% (04 Jul 2023 11:00) (95% - 98%)    O2 Parameters below as of 04 Jul 2023 11:00  Patient On (Oxygen Delivery Method): nasal cannula w/ humidification  O2 Flow (L/min): 2          General: NARD, anxious  HEENT: CHHAYA             Lungs: Bilateral BS  Cardiovascular: Regular   Abdomen: Soft, Positive BS  Extremities: No LE edema   Skin: Warm  Neurological: Non focal       07-03-23 @ 07:01  -  07-04-23 @ 07:00  --------------------------------------------------------  IN:    Diltiazem: 35 mL  Total IN: 35 mL    OUT:    Voided (mL): 2600 mL  Total OUT: 2600 mL    Total NET: -2565 mL      07-04-23 @ 07:01  -  07-04-23 @ 12:37  --------------------------------------------------------  IN:    Diltiazem: 20 mL    Oral Fluid: 600 mL  Total IN: 620 mL    OUT:    Voided (mL): 1400 mL  Total OUT: 1400 mL    Total NET: -780 mL          LABS:                          10.2   11.10 )-----------( 287      ( 04 Jul 2023 04:03 )             31.8                                               07-04    136  |  98  |  23<H>  ----------------------------<  119<H>  4.1   |  25  |  0.7    Ca    8.3<L>      04 Jul 2023 04:03  Mg     1.8     07-04    TPro  5.5<L>  /  Alb  3.3<L>  /  TBili  0.9  /  DBili  x   /  AST  28  /  ALT  21  /  AlkPhos  82  07-04      PT/INR - ( 04 Jul 2023 04:03 )   PT: 26.50 sec;   INR: 2.27 ratio                                                Urinalysis Basic - ( 04 Jul 2023 04:03 )    Color: x / Appearance: x / SG: x / pH: x  Gluc: 119 mg/dL / Ketone: x  / Bili: x / Urobili: x   Blood: x / Protein: x / Nitrite: x   Leuk Esterase: x / RBC: x / WBC x   Sq Epi: x / Non Sq Epi: x / Bacteria: x                                                  LIVER FUNCTIONS - ( 04 Jul 2023 04:03 )  Alb: 3.3 g/dL / Pro: 5.5 g/dL / ALK PHOS: 82 U/L / ALT: 21 U/L / AST: 28 U/L / GGT: x                                                                                                                                   ABG - ( 02 Jul 2023 22:59 )  pH, Arterial: 7.44  pH, Blood: x     /  pCO2: 41    /  pO2: 73    / HCO3: 28    / Base Excess: 3.3   /  SaO2: 96.0                MEDICATIONS  (STANDING):  albumin human  5% IVPB 500 milliLiter(s) IV Intermittent once  bisacodyl Suppository 10 milliGRAM(s) Rectal once  chlorhexidine 2% Cloths 1 Application(s) Topical daily  dextrose 5%. 1000 milliLiter(s) (100 mL/Hr) IV Continuous <Continuous>  dextrose 5%. 1000 milliLiter(s) (50 mL/Hr) IV Continuous <Continuous>  dextrose 50% Injectable 50 milliLiter(s) IV Push every 15 minutes  dextrose 50% Injectable 25 milliLiter(s) IV Push every 15 minutes  diltiazem    Tablet 60 milliGRAM(s) Oral every 6 hours  diltiazem Infusion 5 mG/Hr (5 mL/Hr) IV Continuous <Continuous>  furosemide   Injectable 40 milliGRAM(s) IV Push every 12 hours  glucagon  Injectable 1 milliGRAM(s) IntraMuscular once  insulin lispro (ADMELOG) corrective regimen sliding scale   SubCutaneous three times a day before meals  melatonin 5 milliGRAM(s) Oral at bedtime  meperidine     Injectable 25 milliGRAM(s) IV Push once  metoprolol tartrate 25 milliGRAM(s) Oral every 12 hours  pantoprazole    Tablet 40 milliGRAM(s) Oral daily  polyethylene glycol 3350 17 Gram(s) Oral daily  potassium chloride    Tablet ER 20 milliEquivalent(s) Oral daily  senna 2 Tablet(s) Oral at bedtime    MEDICATIONS  (PRN):  acetaminophen     Tablet .. 650 milliGRAM(s) Oral every 6 hours PRN Mild Pain (1 - 3)  ALPRAZolam 0.5 milliGRAM(s) Oral at bedtime PRN anxiety  dextrose Oral Gel 15 Gram(s) Oral once PRN Blood Glucose LESS THAN 70 milliGRAM(s)/deciliter  ondansetron Injectable 4 milliGRAM(s) IV Push every 8 hours PRN Nausea and/or Vomiting  oxyCODONE    IR 5 milliGRAM(s) Oral every 4 hours PRN Moderate Pain (4 - 6)  oxyCODONE    IR 10 milliGRAM(s) Oral every 4 hours PRN Severe Pain (7 - 10)      Xrays:      no pneumo, left base opacity                                                                             ECHO

## 2023-07-04 NOTE — PROGRESS NOTE ADULT - SUBJECTIVE AND OBJECTIVE BOX
OPERATIVE PROCEDURE(s):  avr/ la ligaiton              POD # 5    SURGEON(s): duane      SUBJECTIVE ASSESSMENT: pt states she is feeling much better today, but still is having difficulty sleeping at night    Vital Signs Last 24 Hrs  T(C): 37.1 (04 Jul 2023 12:00), Max: 38.3 (04 Jul 2023 00:00)  T(F): 98.8 (04 Jul 2023 12:00), Max: 101 (04 Jul 2023 00:00)  HR: 96 (04 Jul 2023 15:00) (86 - 127)  BP: 110/59 (04 Jul 2023 15:00) (93/53 - 135/100)  BP(mean): 78 (04 Jul 2023 15:00) (66 - 114)  RR: 30 (04 Jul 2023 15:00) (22 - 36)  SpO2: 97% (04 Jul 2023 15:00) (95% - 98%)    Parameters below as of 04 Jul 2023 15:00  Patient On (Oxygen Delivery Method): nasal cannula w/ humidification  O2 Flow (L/min): 2    07-03-23 @ 07:01  -  07-04-23 @ 07:00  --------------------------------------------------------  IN: 35 mL / OUT: 2600 mL / NET: -2565 mL    07-04-23 @ 07:01  -  07-04-23 @ 15:35  --------------------------------------------------------  IN: 640 mL / OUT: 1800 mL / NET: -1160 mL      Physical Exam:  General: NAD; A&Ox3  Cardiac: S1/S2, RRR, no murmur, no rubs  Lungs: decreased bs at bases bilaterally   Abdomen: Soft/NT/ND; positive bowel sounds x 4  Sternum: Intact, no click, incision healing well with no drainage  Incisions: Incisions clean/dry/intact  Extremities: mild edema b/l lower extremities; good capillary refill; no cyanosis; palpable 1+ pedal pulses b/l    LABS:                        10.2   11.10 )-----------( 287      ( 04 Jul 2023 04:03 )             31.8     COUMADIN:   [ ] YES [x ] NO  -- on hold  PT/INR - ( 04 Jul 2023 04:03 )   PT: 26.50 sec;   INR: 2.27 ratio           07-04    136  |  98  |  23<H>  ----------------------------<  119<H>  4.1   |  25  |  0.7    Ca    8.3<L>      04 Jul 2023 04:03  Mg     1.8     07-04    TPro  5.5<L>  /  Alb  3.3<L>  /  TBili  0.9  /  DBili  x   /  AST  28  /  ALT  21  /  AlkPhos  82  07-04    Urinalysis Basic - ( 04 Jul 2023 04:03 )    Color: x / Appearance: x / SG: x / pH: x  Gluc: 119 mg/dL / Ketone: x  / Bili: x / Urobili: x   Blood: x / Protein: x / Nitrite: x   Leuk Esterase: x / RBC: x / WBC x   Sq Epi: x / Non Sq Epi: x / Bacteria: x    MEDICATIONS  (STANDING):  albumin human  5% IVPB 500 milliLiter(s) IV Intermittent once  bisacodyl Suppository 10 milliGRAM(s) Rectal once  chlorhexidine 2% Cloths 1 Application(s) Topical daily  dextrose 5%. 1000 milliLiter(s) (100 mL/Hr) IV Continuous <Continuous>  dextrose 5%. 1000 milliLiter(s) (50 mL/Hr) IV Continuous <Continuous>  dextrose 50% Injectable 50 milliLiter(s) IV Push every 15 minutes  dextrose 50% Injectable 25 milliLiter(s) IV Push every 15 minutes  diltiazem    Tablet 60 milliGRAM(s) Oral every 6 hours  diltiazem Infusion 5 mG/Hr (5 mL/Hr) IV Continuous <Continuous>  furosemide   Injectable 40 milliGRAM(s) IV Push every 12 hours  glucagon  Injectable 1 milliGRAM(s) IntraMuscular once  insulin lispro (ADMELOG) corrective regimen sliding scale   SubCutaneous three times a day before meals  melatonin 5 milliGRAM(s) Oral at bedtime  meperidine     Injectable 25 milliGRAM(s) IV Push once  metoprolol tartrate 25 milliGRAM(s) Oral every 12 hours  pantoprazole    Tablet 40 milliGRAM(s) Oral daily  polyethylene glycol 3350 17 Gram(s) Oral daily  potassium chloride    Tablet ER 20 milliEquivalent(s) Oral daily  senna 2 Tablet(s) Oral at bedtime    MEDICATIONS  (PRN):  acetaminophen     Tablet .. 650 milliGRAM(s) Oral every 6 hours PRN Mild Pain (1 - 3)  ALPRAZolam 0.5 milliGRAM(s) Oral at bedtime PRN anxiety  dextrose Oral Gel 15 Gram(s) Oral once PRN Blood Glucose LESS THAN 70 milliGRAM(s)/deciliter  ondansetron Injectable 4 milliGRAM(s) IV Push every 8 hours PRN Nausea and/or Vomiting  oxyCODONE    IR 5 milliGRAM(s) Oral every 4 hours PRN Moderate Pain (4 - 6)  oxyCODONE    IR 10 milliGRAM(s) Oral every 4 hours PRN Severe Pain (7 - 10)      Allergies    No Known Allergies    Intolerances        Ambulation/Activity Status:  amb well with pt      RADIOLOGY & ADDITIONAL TESTS:  Xray Chest 1 View- PORTABLE-Routine: AM   Indication: Shortness of Breath  Transport: Portable,  w/ Monitor  Provider's Contact #: [phone] (07-04-23 @ 10:00)    ACC: [id number] EXAM:  XR CHEST PORTABLE ROUTINE 1V   ORDERED BY: ESEQUIEL CHAIREZ     PROCEDURE DATE:  07/04/2023          INTERPRETATION:  CLINICAL HISTORY / REASON FOR EXAM: Shortness of breath.    COMPARISON: Chest radiograph from July 3, 2023.    TECHNIQUE/POSITIONING: Satisfactory. Single image, AP portable chest   radiograph.    FINDINGS:    SUPPORT DEVICES: Vascular sheath overlies the right internal jugular vein.    CARDIAC/MEDIASTINUM/HILUM: Post sternotomy with aortic valve replacement.   Enlarged cardiac silhouette, unchanged.    LUNG PARENCHYMA/PLEURA: Unchanged left basilar opacity. No pneumothorax.    SKELETON/SOFT TISSUES: Unchanged.      IMPRESSION:    Unchanged left basilar opacity.    --- End of Report ---    Assessment/Plan:  42y Female status-post avr (m)/excision of fibro-elastoma, ligation of left atrial appendage pod # 5  - Case and plan discussed with CTU Intensivist and CT Surgeon - Dr. Guzman/Duane/Reza  - Continue CTU supportive care    - Continue DVT/GI prophylaxis  - Incentive Spirometry 10 times an hour  - Continue to advance physical activity as tolerated and continue PT/OT as directed  1. hold coumadin for supra- therapeutic inr, cont to monitor, inr trending down. repeat inr in am ; if it has dec then will dc pacing wires  2. HTN: cont lopressor   3. A. Fib: hold coumadin for now, rate control with cardizem and lopressor, wean cardizem gtt and cont cardizem po 60mg q6 hours   4. COPD/Hypoxia: cont nebs, wean o2 as tolerated, encourage incentive, increase lasix to 40mg iv bid for fluid overload   5. DM/Glucose Control: d/c fingersticks and sliding scale   xanax qhs prn for anxiety and melatonin qhs prn for insomnia    Social Service Disposition:  home

## 2023-07-05 LAB
ALBUMIN SERPL ELPH-MCNC: 3.5 G/DL — SIGNIFICANT CHANGE UP (ref 3.5–5.2)
ALP SERPL-CCNC: 96 U/L — SIGNIFICANT CHANGE UP (ref 30–115)
ALT FLD-CCNC: 24 U/L — SIGNIFICANT CHANGE UP (ref 0–41)
ANION GAP SERPL CALC-SCNC: 12 MMOL/L — SIGNIFICANT CHANGE UP (ref 7–14)
AST SERPL-CCNC: 29 U/L — SIGNIFICANT CHANGE UP (ref 0–41)
BASOPHILS # BLD AUTO: 0.03 K/UL — SIGNIFICANT CHANGE UP (ref 0–0.2)
BASOPHILS NFR BLD AUTO: 0.2 % — SIGNIFICANT CHANGE UP (ref 0–1)
BILIRUB SERPL-MCNC: 1 MG/DL — SIGNIFICANT CHANGE UP (ref 0.2–1.2)
BUN SERPL-MCNC: 19 MG/DL — SIGNIFICANT CHANGE UP (ref 10–20)
CALCIUM SERPL-MCNC: 9.1 MG/DL — SIGNIFICANT CHANGE UP (ref 8.4–10.5)
CHLORIDE SERPL-SCNC: 95 MMOL/L — LOW (ref 98–110)
CO2 SERPL-SCNC: 31 MMOL/L — SIGNIFICANT CHANGE UP (ref 17–32)
CREAT SERPL-MCNC: 0.8 MG/DL — SIGNIFICANT CHANGE UP (ref 0.7–1.5)
EGFR: 94 ML/MIN/1.73M2 — SIGNIFICANT CHANGE UP
EOSINOPHIL # BLD AUTO: 0.2 K/UL — SIGNIFICANT CHANGE UP (ref 0–0.7)
EOSINOPHIL NFR BLD AUTO: 1.6 % — SIGNIFICANT CHANGE UP (ref 0–8)
GLUCOSE BLDC GLUCOMTR-MCNC: 109 MG/DL — HIGH (ref 70–99)
GLUCOSE BLDC GLUCOMTR-MCNC: 117 MG/DL — HIGH (ref 70–99)
GLUCOSE BLDC GLUCOMTR-MCNC: 120 MG/DL — HIGH (ref 70–99)
GLUCOSE BLDC GLUCOMTR-MCNC: 135 MG/DL — HIGH (ref 70–99)
GLUCOSE SERPL-MCNC: 110 MG/DL — HIGH (ref 70–99)
HCT VFR BLD CALC: 34.3 % — LOW (ref 37–47)
HGB BLD-MCNC: 11 G/DL — LOW (ref 12–16)
IMM GRANULOCYTES NFR BLD AUTO: 0.9 % — HIGH (ref 0.1–0.3)
INR BLD: 1.78 RATIO — HIGH (ref 0.65–1.3)
LYMPHOCYTES # BLD AUTO: 1.41 K/UL — SIGNIFICANT CHANGE UP (ref 1.2–3.4)
LYMPHOCYTES # BLD AUTO: 11.5 % — LOW (ref 20.5–51.1)
MAGNESIUM SERPL-MCNC: 2 MG/DL — SIGNIFICANT CHANGE UP (ref 1.8–2.4)
MCHC RBC-ENTMCNC: 25.9 PG — LOW (ref 27–31)
MCHC RBC-ENTMCNC: 32.1 G/DL — SIGNIFICANT CHANGE UP (ref 32–37)
MCV RBC AUTO: 80.7 FL — LOW (ref 81–99)
MONOCYTES # BLD AUTO: 1.78 K/UL — HIGH (ref 0.1–0.6)
MONOCYTES NFR BLD AUTO: 14.5 % — HIGH (ref 1.7–9.3)
NEUTROPHILS # BLD AUTO: 8.76 K/UL — HIGH (ref 1.4–6.5)
NEUTROPHILS NFR BLD AUTO: 71.3 % — SIGNIFICANT CHANGE UP (ref 42.2–75.2)
NRBC # BLD: 0 /100 WBCS — SIGNIFICANT CHANGE UP (ref 0–0)
PLATELET # BLD AUTO: 345 K/UL — SIGNIFICANT CHANGE UP (ref 130–400)
PMV BLD: 10.6 FL — HIGH (ref 7.4–10.4)
POTASSIUM SERPL-MCNC: 4.1 MMOL/L — SIGNIFICANT CHANGE UP (ref 3.5–5)
POTASSIUM SERPL-SCNC: 4.1 MMOL/L — SIGNIFICANT CHANGE UP (ref 3.5–5)
PROT SERPL-MCNC: 6.2 G/DL — SIGNIFICANT CHANGE UP (ref 6–8)
PROTHROM AB SERPL-ACNC: 20.7 SEC — HIGH (ref 9.95–12.87)
RBC # BLD: 4.25 M/UL — SIGNIFICANT CHANGE UP (ref 4.2–5.4)
RBC # FLD: 16.5 % — HIGH (ref 11.5–14.5)
SODIUM SERPL-SCNC: 138 MMOL/L — SIGNIFICANT CHANGE UP (ref 135–146)
WBC # BLD: 12.29 K/UL — HIGH (ref 4.8–10.8)
WBC # FLD AUTO: 12.29 K/UL — HIGH (ref 4.8–10.8)

## 2023-07-05 PROCEDURE — 99233 SBSQ HOSP IP/OBS HIGH 50: CPT

## 2023-07-05 PROCEDURE — 93010 ELECTROCARDIOGRAM REPORT: CPT

## 2023-07-05 PROCEDURE — 71045 X-RAY EXAM CHEST 1 VIEW: CPT | Mod: 26

## 2023-07-05 RX ORDER — WARFARIN SODIUM 2.5 MG/1
1 TABLET ORAL ONCE
Refills: 0 | Status: COMPLETED | OUTPATIENT
Start: 2023-07-05 | End: 2023-07-05

## 2023-07-05 RX ORDER — VANCOMYCIN HCL 1 G
1250 VIAL (EA) INTRAVENOUS ONCE
Refills: 0 | Status: COMPLETED | OUTPATIENT
Start: 2023-07-05 | End: 2023-07-05

## 2023-07-05 RX ORDER — METOPROLOL TARTRATE 50 MG
5 TABLET ORAL ONCE
Refills: 0 | Status: COMPLETED | OUTPATIENT
Start: 2023-07-05 | End: 2023-07-05

## 2023-07-05 RX ORDER — CEFEPIME 1 G/1
1000 INJECTION, POWDER, FOR SOLUTION INTRAMUSCULAR; INTRAVENOUS EVERY 8 HOURS
Refills: 0 | Status: DISCONTINUED | OUTPATIENT
Start: 2023-07-05 | End: 2023-07-12

## 2023-07-05 RX ORDER — FUROSEMIDE 40 MG
20 TABLET ORAL ONCE
Refills: 0 | Status: COMPLETED | OUTPATIENT
Start: 2023-07-05 | End: 2023-07-05

## 2023-07-05 RX ADMIN — CEFEPIME 100 MILLIGRAM(S): 1 INJECTION, POWDER, FOR SOLUTION INTRAMUSCULAR; INTRAVENOUS at 13:06

## 2023-07-05 RX ADMIN — Medication 60 MILLIGRAM(S): at 23:49

## 2023-07-05 RX ADMIN — WARFARIN SODIUM 1 MILLIGRAM(S): 2.5 TABLET ORAL at 21:32

## 2023-07-05 RX ADMIN — Medication 60 MILLIGRAM(S): at 17:31

## 2023-07-05 RX ADMIN — Medication 166.67 MILLIGRAM(S): at 06:27

## 2023-07-05 RX ADMIN — POLYETHYLENE GLYCOL 3350 17 GRAM(S): 17 POWDER, FOR SOLUTION ORAL at 13:09

## 2023-07-05 RX ADMIN — Medication 25 MILLIGRAM(S): at 21:32

## 2023-07-05 RX ADMIN — Medication 60 MILLIGRAM(S): at 00:02

## 2023-07-05 RX ADMIN — Medication 25 MILLIGRAM(S): at 08:02

## 2023-07-05 RX ADMIN — CHLORHEXIDINE GLUCONATE 1 APPLICATION(S): 213 SOLUTION TOPICAL at 05:16

## 2023-07-05 RX ADMIN — Medication 5 MILLIGRAM(S): at 21:32

## 2023-07-05 RX ADMIN — CEFEPIME 100 MILLIGRAM(S): 1 INJECTION, POWDER, FOR SOLUTION INTRAMUSCULAR; INTRAVENOUS at 21:58

## 2023-07-05 RX ADMIN — Medication 20 MILLIEQUIVALENT(S): at 13:06

## 2023-07-05 RX ADMIN — Medication 20 MILLIGRAM(S): at 06:46

## 2023-07-05 RX ADMIN — Medication 60 MILLIGRAM(S): at 13:06

## 2023-07-05 RX ADMIN — PANTOPRAZOLE SODIUM 40 MILLIGRAM(S): 20 TABLET, DELAYED RELEASE ORAL at 13:06

## 2023-07-05 NOTE — PROGRESS NOTE ADULT - SUBJECTIVE AND OBJECTIVE BOX
OPERATIVE PROCEDURE(s):                POD #            6           42yFemale  SURGEON(s): JIMBO Zepeda  SUBJECTIVE ASSESSMENT:   Vital Signs Last 24 Hrs  T(F): 99.1 (05 Jul 2023 04:00), Max: 100.9 (04 Jul 2023 22:00)  HR: 100 (05 Jul 2023 05:00) (86 - 129)  BP: 123/55 (05 Jul 2023 05:00) (92/59 - 123/55)  BP(mean): 79 (05 Jul 2023 05:00) (63 - 97)  ABP: --  ABP(mean): --  RR: 30 (05 Jul 2023 05:00) (23 - 39)  SpO2: 98% (05 Jul 2023 05:00) (94% - 98%)  CVP(mm Hg): --  CVP(cm H2O): --  CO: --  CI: --  PA: --  SVR: --    I&O's Detail    04 Jul 2023 07:01  -  05 Jul 2023 07:00  --------------------------------------------------------  IN:    Diltiazem: 70 mL    Oral Fluid: 1015 mL  Total IN: 1085 mL    OUT:    Voided (mL): 2650 mL  Total OUT: 2650 mL        Net:   I&O's Detail    03 Jul 2023 07:01  -  04 Jul 2023 07:00  --------------------------------------------------------  Total NET: -2565 mL      04 Jul 2023 07:01  -  05 Jul 2023 07:00  --------------------------------------------------------  Total NET: -1565 mL        CAPILLARY BLOOD GLUCOSE      POCT Blood Glucose.: 109 mg/dL (05 Jul 2023 06:31)  POCT Blood Glucose.: 128 mg/dL (04 Jul 2023 16:52)  POCT Blood Glucose.: 200 mg/dL (04 Jul 2023 11:07)    Physical Exam:  General: NAD; A&Ox3/Patient is intubated and sedated  Cardiac: S1/S2, RRR, no murmur, no rubs  Lungs: unlabored respirations, CTA b/l, no wheeze, no rales, no crackles  Abdomen: Soft/NT/ND; positive bowel sounds x 4  Sternum: Intact, no click, incision healing well with no drainage  Incisions: Incisions clean/dry/intact  Extremities: No edema b/l lower extremities; good capillary refill; no cyanosis; palpable 1+ pedal pulses b/l    Central Venous Catheter: Yes[]  No[] , If Yes indication:           Day #  Luis Catheter: Yes  [] , No  [] , If yes indication:                      Day #  NGT: Yes [] No [] ,    If Yes Placement:                                     Day #  EPICARDIAL WIRES:  [] YES [] NO                                              Day #  BOWEL MOVEMENT:  [] YES [] NO, If No, Timing since last BM:      Day #  CHEST TUBE(Left/Right):  [] YES [] NO, If yes -  AIR LEAKS:  [] YES [] NO        LABS:                        11.0<L>  12.29<H> )-----------( 345      ( 05 Jul 2023 03:30 )             34.3<L>                        10.2<L>  11.10<H> )-----------( 287      ( 04 Jul 2023 04:03 )             31.8<L>    07-05    138  |  95<L>  |  19  ----------------------------<  110<H>  4.1   |  31  |  0.8  07-04    136  |  98  |  23<H>  ----------------------------<  119<H>  4.1   |  25  |  0.7    Ca    9.1      05 Jul 2023 03:30  Mg     2.0     07-05    TPro  6.2 [6.0 - 8.0]  /  Alb  3.5 [3.5 - 5.2]  /  TBili  1.0 [0.2 - 1.2]  /  DBili  x   /  AST  29 [0 - 41]  /  ALT  24 [0 - 41]  /  AlkPhos  96 [30 - 115]  07-05    PT/INR - ( 05 Jul 2023 03:30 )   PT: ;   INR: 1.78 ratio         PT/INR - ( 04 Jul 2023 04:03 )   PT: ;   INR: 2.27 ratio           Urinalysis Basic - ( 05 Jul 2023 03:30 )    Color: x / Appearance: x / SG: x / pH: x  Gluc: 110 mg/dL / Ketone: x  / Bili: x / Urobili: x   Blood: x / Protein: x / Nitrite: x   Leuk Esterase: x / RBC: x / WBC x   Sq Epi: x / Non Sq Epi: x / Bacteria: x        RADIOLOGY & ADDITIONAL TESTS:  CXR:  EKG:  MEDICATIONS  (STANDING):  albumin human  5% IVPB 500 milliLiter(s) IV Intermittent once  bisacodyl Suppository 10 milliGRAM(s) Rectal once  cefepime   IVPB 1000 milliGRAM(s) IV Intermittent every 8 hours  chlorhexidine 2% Cloths 1 Application(s) Topical daily  dextrose 5%. 1000 milliLiter(s) (100 mL/Hr) IV Continuous <Continuous>  dextrose 5%. 1000 milliLiter(s) (50 mL/Hr) IV Continuous <Continuous>  dextrose 50% Injectable 50 milliLiter(s) IV Push every 15 minutes  dextrose 50% Injectable 25 milliLiter(s) IV Push every 15 minutes  diltiazem    Tablet 60 milliGRAM(s) Oral every 6 hours  diltiazem Infusion 5 mG/Hr (5 mL/Hr) IV Continuous <Continuous>  glucagon  Injectable 1 milliGRAM(s) IntraMuscular once  insulin lispro (ADMELOG) corrective regimen sliding scale   SubCutaneous three times a day before meals  melatonin 5 milliGRAM(s) Oral at bedtime  meperidine     Injectable 25 milliGRAM(s) IV Push once  metoprolol tartrate 25 milliGRAM(s) Oral every 12 hours  pantoprazole    Tablet 40 milliGRAM(s) Oral daily  polyethylene glycol 3350 17 Gram(s) Oral daily  potassium chloride    Tablet ER 20 milliEquivalent(s) Oral daily  senna 2 Tablet(s) Oral at bedtime    MEDICATIONS  (PRN):  acetaminophen     Tablet .. 650 milliGRAM(s) Oral every 6 hours PRN Mild Pain (1 - 3)  ALPRAZolam 0.5 milliGRAM(s) Oral at bedtime PRN anxiety  dextrose Oral Gel 15 Gram(s) Oral once PRN Blood Glucose LESS THAN 70 milliGRAM(s)/deciliter  ondansetron Injectable 4 milliGRAM(s) IV Push every 8 hours PRN Nausea and/or Vomiting  oxyCODONE    IR 5 milliGRAM(s) Oral every 4 hours PRN Moderate Pain (4 - 6)  oxyCODONE    IR 10 milliGRAM(s) Oral every 4 hours PRN Severe Pain (7 - 10)    HEPARIN:  [] YES [] NO  Dose: XX UNITS/HR UNITS Q8H  LOVENOX:[] YES [] NO  Dose: XX mg Q24H  COUMADIN: []  YES [] NO  Dose: XX mg  Q24H  SCD's: YES b/l  GI Prophylaxis: Protonix [], Pepcid [], None [], (Contra-indication:.....)    Post-Op Beta-Blockers: Yes [], No[], If No, then contraindication:  Post-Op Aspirin: Yes [],  No [], If No, then contraindication:  Post-Op Statin: Yes [], No[], If No, then contraindication:  Allergies    No Known Allergies    Intolerances      Ambulation/Activity Status:    Assessment/Plan:  42y Female status-post .....  - Case and plan discussed with CTU Intensivist and CT Surgeon - Dr. Guzman/Duane/eRza  - Continue CTU supportive care    - Continue DVT/GI prophylaxis  - Incentive Spirometry 10 times an hour  - Continue to advance physical activity as tolerated and continue PT/OT as directed  1. CAD: Continue ASA, statin, BB  2. HTN:   3. A. Fib:   4. COPD/Hypoxia:   5. DM/Glucose Control:     Social Service Disposition:     OPERATIVE PROCEDURE(s):         avr (M)/ligation of left atrial appendage             POD #            6           42yFemale  SURGEON(s): JIMBO Zepeda  SUBJECTIVE ASSESSMENT: pt seen and examined. no acute complaints at this time  Vital Signs Last 24 Hrs  T(F): 99.1 (05 Jul 2023 04:00), Max: 100.9 (04 Jul 2023 22:00)  HR: 100 (05 Jul 2023 05:00) (86 - 129)  BP: 123/55 (05 Jul 2023 05:00) (92/59 - 123/55)  BP(mean): 79 (05 Jul 2023 05:00) (63 - 97)      I&O's Detail    04 Jul 2023 07:01  -  05 Jul 2023 07:00  --------------------------------------------------------  IN:    Diltiazem: 70 mL    Oral Fluid: 1015 mL  Total IN: 1085 mL    OUT:    Voided (mL): 2650 mL  Total OUT: 2650 mL        Net:   I&O's Detail    03 Jul 2023 07:01  -  04 Jul 2023 07:00  --------------------------------------------------------  Total NET: -2565 mL      04 Jul 2023 07:01  -  05 Jul 2023 07:00  --------------------------------------------------------  Total NET: -1565 mL        CAPILLARY BLOOD GLUCOSE      POCT Blood Glucose.: 109 mg/dL (05 Jul 2023 06:31)  POCT Blood Glucose.: 128 mg/dL (04 Jul 2023 16:52)  POCT Blood Glucose.: 200 mg/dL (04 Jul 2023 11:07)    Physical Exam:  General: NAD; A&Ox3  Cardiac: S1/S2, RRR, no murmur, no rubs  Lungs: decreased bs at bases bilaterally   Abdomen: Soft/NT/ND; positive bowel sounds x 4  Sternum: Intact, no click, incision healing well with no drainage  Incisions: Incisions clean/dry/intact  Extremities: mild edema b/l lower extremities; good capillary refill; no cyanosis; palpable 1+ pedal pulses b/l    Central Venous Catheter: Yes[x]  No[] , If Yes indication:   critical         Day #6 - d/c today  EPICARDIAL WIRES:  [x] YES [] NO                                              Day #6 -d.c today  BOWEL MOVEMENT:  [x] YES [] NO, If No, Timing since last BM:      Day #      LABS:                        11.0<L>  12.29<H> )-----------( 345      ( 05 Jul 2023 03:30 )             34.3<L>                        10.2<L>  11.10<H> )-----------( 287      ( 04 Jul 2023 04:03 )             31.8<L>    07-05    138  |  95<L>  |  19  ----------------------------<  110<H>  4.1   |  31  |  0.8  07-04    136  |  98  |  23<H>  ----------------------------<  119<H>  4.1   |  25  |  0.7    Ca    9.1      05 Jul 2023 03:30  Mg     2.0     07-05    TPro  6.2 [6.0 - 8.0]  /  Alb  3.5 [3.5 - 5.2]  /  TBili  1.0 [0.2 - 1.2]  /  DBili  x   /  AST  29 [0 - 41]  /  ALT  24 [0 - 41]  /  AlkPhos  96 [30 - 115]  07-05    PT/INR - ( 05 Jul 2023 03:30 )   PT: ;   INR: 1.78 ratio         PT/INR - ( 04 Jul 2023 04:03 )   PT: ;   INR: 2.27 ratio           Urinalysis Basic - ( 05 Jul 2023 03:30 )    Color: x / Appearance: x / SG: x / pH: x  Gluc: 110 mg/dL / Ketone: x  / Bili: x / Urobili: x   Blood: x / Protein: x / Nitrite: x   Leuk Esterase: x / RBC: x / WBC x   Sq Epi: x / Non Sq Epi: x / Bacteria: x        RADIOLOGY & ADDITIONAL TESTS:  CXR: < from: Xray Chest 1 View- PORTABLE-Routine (Xray Chest 1 View- PORTABLE-Routine in AM.) (07.05.23 @ 07:04) >  Impression:  Unchanged left basilar opacity.    < end of copied text >    EKG: < from: 12 Lead ECG (07.05.23 @ 07:05) >    Ventricular Rate 104 BPM    Atrial Rate 394 BPM    QRS Duration 110 ms    Q-T Interval 374 ms    QTC Calculation(Bazett) 491 ms    R Axis -58 degrees    T Axis 121 degrees    Diagnosis Line Atrial flutter with variable A-V block  Left axis deviation  Incomplete right bundle branch block  ST & T wave abnormality, consider lateral ischemia  Abnormal ECG    < end of copied text >    MEDICATIONS  (STANDING):  albumin human  5% IVPB 500 milliLiter(s) IV Intermittent once  bisacodyl Suppository 10 milliGRAM(s) Rectal once  cefepime   IVPB 1000 milliGRAM(s) IV Intermittent every 8 hours  chlorhexidine 2% Cloths 1 Application(s) Topical daily  dextrose 5%. 1000 milliLiter(s) (100 mL/Hr) IV Continuous <Continuous>  dextrose 5%. 1000 milliLiter(s) (50 mL/Hr) IV Continuous <Continuous>  dextrose 50% Injectable 50 milliLiter(s) IV Push every 15 minutes  dextrose 50% Injectable 25 milliLiter(s) IV Push every 15 minutes  diltiazem    Tablet 60 milliGRAM(s) Oral every 6 hours  diltiazem Infusion 5 mG/Hr (5 mL/Hr) IV Continuous <Continuous>  glucagon  Injectable 1 milliGRAM(s) IntraMuscular once  insulin lispro (ADMELOG) corrective regimen sliding scale   SubCutaneous three times a day before meals  melatonin 5 milliGRAM(s) Oral at bedtime  meperidine     Injectable 25 milliGRAM(s) IV Push once  metoprolol tartrate 25 milliGRAM(s) Oral every 12 hours  pantoprazole    Tablet 40 milliGRAM(s) Oral daily  polyethylene glycol 3350 17 Gram(s) Oral daily  potassium chloride    Tablet ER 20 milliEquivalent(s) Oral daily  senna 2 Tablet(s) Oral at bedtime    MEDICATIONS  (PRN):  acetaminophen     Tablet .. 650 milliGRAM(s) Oral every 6 hours PRN Mild Pain (1 - 3)  ALPRAZolam 0.5 milliGRAM(s) Oral at bedtime PRN anxiety  dextrose Oral Gel 15 Gram(s) Oral once PRN Blood Glucose LESS THAN 70 milliGRAM(s)/deciliter  ondansetron Injectable 4 milliGRAM(s) IV Push every 8 hours PRN Nausea and/or Vomiting  oxyCODONE    IR 5 milliGRAM(s) Oral every 4 hours PRN Moderate Pain (4 - 6)  oxyCODONE    IR 10 milliGRAM(s) Oral every 4 hours PRN Severe Pain (7 - 10)      COUMADIN: [x]  YES [] NO  Dose: 1 mg  Q24H  SCD's: YES b/l  GI Prophylaxis: Protonix [x], Pepcid [], None [], (Contra-indication:.....)    Post-Op Beta-Blockers: Yes [x], No[], If No, then contraindication:  Post-Op Aspirin: Yes [x],  No [], If No, then contraindication:   Post-Op Statin: Yes [], No[], If No, then contraindication: not indicated   Allergies    No Known Allergies    Intolerances      Ambulation/Activity Status: ambulate     Assessment/Plan:  42y Female status-post avr (m)/excision of fibro-elastoma, ligation of left atrial appendage pod # 6  - Case and plan discussed with CTU Intensivist and CT Surgeon - Dr. Perez/Duane/Reza  - Continue CTU supportive care    - Continue DVT/GI prophylaxis  - Incentive Spirometry 10 times an hour  - Continue to advance physical activity as tolerated and continue PT/OT as directed  1.INr 1.7 today, may start coumadin 1mg tonight, check inr in am   2. HTN: cont lopressor   3. A. Fib: hold coumadin for now, rate control with cardizem and lopressor   4. COPD/Hypoxia: cont nebs, wean o2 as tolerated, encourage incentive    Social Service Disposition:  home

## 2023-07-05 NOTE — PROGRESS NOTE ADULT - SUBJECTIVE AND OBJECTIVE BOX
CTU Attending Progress Daily Note     05 Jul 2023 07:55    Procedure:        AVR                                          POD#       6            Patient seen as post-op critical care follow-up    HPI:    See preop testing chart H&P    Interval event for past 24 hr:  MALIHA THOMPSON  42y had fever    Current Complains:  MALIHA THOMPSON has no new complaints    REVIEW OF SYSTEMS:  CONSTITUTIONAL:  [-] weakness, [-] fevers, [-] chills  EYES/ENT: [-] visual changes, [-] vertigo, [-] throat pain   NECK: [-] pain, [-] stiffness  RESPIRATORY: [-] cough, [-] wheezing, [-] hemoptysis, [-] shortness of breath  CARDIOVASCULAR: [-] chest pain, [-] palpitations, [-] orthopnea  GASTROINTESTINAL:    [-]abdominal pain, [-] nausea, [-] vomiting, [-] hematemesis, [-] diarrhea, [-] constipation, [-] melena, [-] hematochezia.  GENITOURINARY: [-] dysuria, [-] frequency, [-] hematuria  NEUROLOGICAL: [-] numbness, [-] weakness  SKIN: [-] itching, [-] burning, [-] rashes, [-] lesions   All other review of systems is negative unless indicated above.    [  ] Unable to assess ROS because :    OBJECTIVE:  ICU Vital Signs Last 24 Hrs  T(C): 37.3 (05 Jul 2023 04:00), Max: 38.3 (04 Jul 2023 22:00)  T(F): 99.1 (05 Jul 2023 04:00), Max: 100.9 (04 Jul 2023 22:00)  HR: 100 (05 Jul 2023 05:00) (86 - 129)  BP: 123/55 (05 Jul 2023 05:00) (92/59 - 123/55)  BP(mean): 79 (05 Jul 2023 05:00) (63 - 97)  ABP: --  ABP(mean): --  RR: 30 (05 Jul 2023 05:00) (23 - 39)  SpO2: 98% (05 Jul 2023 05:00) (94% - 98%)    O2 Parameters below as of 05 Jul 2023 05:00  Patient On (Oxygen Delivery Method): nasal cannula  O2 Flow (L/min): 2          I&O's Summary    04 Jul 2023 07:01  -  05 Jul 2023 07:00  --------------------------------------------------------  IN: 1085 mL / OUT: 2650 mL / NET: -1565 mL      PHYSICAL EXAM:  General: WN/WD NAD    HEENT:     [+] NCAT  [+] EOMI  [-] Conjuctival edema   [-] Icterus   [-] Thrush   [-] ETT  [-] NGT/OGT    Neck:         [+] FROM   [-] JVD     [-] Nodes     [-] Masses    [+] Mid-line trachea    [-] Tracheostomy    Chest:         [-] Sternal click   [-] Sternal drainage    [-] Chest tubes   [-] SubQ emphysema    Lungs:          [+] CTA   [-] Rhonchi   [-] Rales    [-] Wheezing    [-] Decreased BS    [-] Dullness R L    Cardiac:       [+] S1 [+] S2    [+] RRR   [-] Irregular   [-] S3   [-] S4    [-] Murmurs    [-] Rub    Abdomen:    [+] BS    [+] Soft    [+] Non-tender     [-] Distended    [-] Organomegaly  [-] PEG    Extremities:   [-] Cyanosis U/L   [-] Clubbing  U/L  [-] LE/UE Edema   [+] Capillary refill    [+] Pulses     Neuro:        [+] Awake   [+]  Alert   [-] Confused   [-] Lethargic   [-] Sedated   [-] Generalized Weakness    Skin:        [-] Rashes    [-] Erythema   [+] Normal incisions   [+] IV sites intact   [-] Sacral decubitus    Tubes:  LINES:    CAPILLARY BLOOD GLUCOSE      POCT Blood Glucose.: 109 mg/dL (05 Jul 2023 06:31)    CAPILLARY BLOOD GLUCOSE      POCT Blood Glucose.: 109 mg/dL (05 Jul 2023 06:31)  POCT Blood Glucose.: 128 mg/dL (04 Jul 2023 16:52)  POCT Blood Glucose.: 200 mg/dL (04 Jul 2023 11:07)      HOSPITAL MEDICATIONS:  MEDICATIONS  (STANDING):  albumin human  5% IVPB 500 milliLiter(s) IV Intermittent once  bisacodyl Suppository 10 milliGRAM(s) Rectal once  cefepime   IVPB 1000 milliGRAM(s) IV Intermittent every 8 hours  chlorhexidine 2% Cloths 1 Application(s) Topical daily  dextrose 5%. 1000 milliLiter(s) (100 mL/Hr) IV Continuous <Continuous>  dextrose 5%. 1000 milliLiter(s) (50 mL/Hr) IV Continuous <Continuous>  dextrose 50% Injectable 50 milliLiter(s) IV Push every 15 minutes  dextrose 50% Injectable 25 milliLiter(s) IV Push every 15 minutes  diltiazem    Tablet 60 milliGRAM(s) Oral every 6 hours  diltiazem Infusion 5 mG/Hr (5 mL/Hr) IV Continuous <Continuous>  glucagon  Injectable 1 milliGRAM(s) IntraMuscular once  insulin lispro (ADMELOG) corrective regimen sliding scale   SubCutaneous three times a day before meals  melatonin 5 milliGRAM(s) Oral at bedtime  meperidine     Injectable 25 milliGRAM(s) IV Push once  metoprolol tartrate 25 milliGRAM(s) Oral every 12 hours  pantoprazole    Tablet 40 milliGRAM(s) Oral daily  polyethylene glycol 3350 17 Gram(s) Oral daily  potassium chloride    Tablet ER 20 milliEquivalent(s) Oral daily  senna 2 Tablet(s) Oral at bedtime    MEDICATIONS  (PRN):  acetaminophen     Tablet .. 650 milliGRAM(s) Oral every 6 hours PRN Mild Pain (1 - 3)  ALPRAZolam 0.5 milliGRAM(s) Oral at bedtime PRN anxiety  dextrose Oral Gel 15 Gram(s) Oral once PRN Blood Glucose LESS THAN 70 milliGRAM(s)/deciliter  ondansetron Injectable 4 milliGRAM(s) IV Push every 8 hours PRN Nausea and/or Vomiting  oxyCODONE    IR 5 milliGRAM(s) Oral every 4 hours PRN Moderate Pain (4 - 6)  oxyCODONE    IR 10 milliGRAM(s) Oral every 4 hours PRN Severe Pain (7 - 10)      LABS:                          11.0   12.29 )-----------( 345      ( 05 Jul 2023 03:30 )             34.3     07-05    138  |  95<L>  |  19  ----------------------------<  110<H>  4.1   |  31  |  0.8    Ca    9.1      05 Jul 2023 03:30  Mg     2.0     07-05    TPro  6.2  /  Alb  3.5  /  TBili  1.0  /  DBili  x   /  AST  29  /  ALT  24  /  AlkPhos  96  07-05    PT/INR - ( 05 Jul 2023 03:30 )   PT: 20.70 sec;   INR: 1.78 ratio           Urinalysis Basic - ( 05 Jul 2023 03:30 )    Color: x / Appearance: x / SG: x / pH: x  Gluc: 110 mg/dL / Ketone: x  / Bili: x / Urobili: x   Blood: x / Protein: x / Nitrite: x   Leuk Esterase: x / RBC: x / WBC x   Sq Epi: x / Non Sq Epi: x / Bacteria: x          RADIOLOGY:    < from: Xray Chest 1 View- PORTABLE-Routine (Xray Chest 1 View- PORTABLE-Routine in AM.) (07.04.23 @ 06:10) >  FINDINGS:    SUPPORT DEVICES: Vascular sheath overlies the right internal jugular vein.    CARDIAC/MEDIASTINUM/HILUM: Post sternotomy with aortic valve replacement.   Enlarged cardiac silhouette, unchanged.    LUNG PARENCHYMA/PLEURA: Unchanged left basilar opacity. No pneumothorax.    SKELETON/SOFT TISSUES: Unchanged.      IMPRESSION:    Unchanged left basilar opacity.    --- End of Report ---    < end of copied text >      Assessment:  Sp AVR  Fever - cultures sent, started on IV ABX    PAST MEDICAL & SURGICAL HISTORY:  Hypertrophic cardiomyopathy      Atrial fibrillation      Aortic valve disease      Obese      S/P transesophageal echocardiogram ([facility])          PLAN:  Neuro: Pain control  Pulm: Encourage coughing, deep breathing and use of incentive spirometry. Wean off supplemental oxygen as able. Daily CXR.   Cardio: Monitor telemetry/alarms. Continue cardiac meds  GI: Tolerating diet. Continue stool softeners. Continue GI prophylaxis  Renal: monitor urine output, supplement electrolytes as needed  Vasc: Heparin SC/SCDs for DVT prophylaxis  Heme: Monitor H/H.   ID: IV ABX FU CX  Endocrine: Monitor finger stick blood sugar and control hyperglycemia with insulin  Physical Therapy: OOB/ambulate        Discussed with Cardiothoracic Team at AM rounds.

## 2023-07-06 LAB
ALBUMIN SERPL ELPH-MCNC: 3.4 G/DL — LOW (ref 3.5–5.2)
ALP SERPL-CCNC: 101 U/L — SIGNIFICANT CHANGE UP (ref 30–115)
ALT FLD-CCNC: 26 U/L — SIGNIFICANT CHANGE UP (ref 0–41)
ANION GAP SERPL CALC-SCNC: 9 MMOL/L — SIGNIFICANT CHANGE UP (ref 7–14)
AST SERPL-CCNC: 33 U/L — SIGNIFICANT CHANGE UP (ref 0–41)
BASOPHILS # BLD AUTO: 0.05 K/UL — SIGNIFICANT CHANGE UP (ref 0–0.2)
BASOPHILS NFR BLD AUTO: 0.4 % — SIGNIFICANT CHANGE UP (ref 0–1)
BILIRUB SERPL-MCNC: 1.1 MG/DL — SIGNIFICANT CHANGE UP (ref 0.2–1.2)
BUN SERPL-MCNC: 18 MG/DL — SIGNIFICANT CHANGE UP (ref 10–20)
CALCIUM SERPL-MCNC: 9 MG/DL — SIGNIFICANT CHANGE UP (ref 8.4–10.5)
CHLORIDE SERPL-SCNC: 96 MMOL/L — LOW (ref 98–110)
CO2 SERPL-SCNC: 28 MMOL/L — SIGNIFICANT CHANGE UP (ref 17–32)
CREAT SERPL-MCNC: 0.8 MG/DL — SIGNIFICANT CHANGE UP (ref 0.7–1.5)
EGFR: 94 ML/MIN/1.73M2 — SIGNIFICANT CHANGE UP
EOSINOPHIL # BLD AUTO: 0.15 K/UL — SIGNIFICANT CHANGE UP (ref 0–0.7)
EOSINOPHIL NFR BLD AUTO: 1.1 % — SIGNIFICANT CHANGE UP (ref 0–8)
GLUCOSE BLDC GLUCOMTR-MCNC: 149 MG/DL — HIGH (ref 70–99)
GLUCOSE SERPL-MCNC: 139 MG/DL — HIGH (ref 70–99)
HCT VFR BLD CALC: 33.7 % — LOW (ref 37–47)
HGB BLD-MCNC: 10.9 G/DL — LOW (ref 12–16)
IMM GRANULOCYTES NFR BLD AUTO: 1.2 % — HIGH (ref 0.1–0.3)
INR BLD: 1.49 RATIO — HIGH (ref 0.65–1.3)
LYMPHOCYTES # BLD AUTO: 1.53 K/UL — SIGNIFICANT CHANGE UP (ref 1.2–3.4)
LYMPHOCYTES # BLD AUTO: 11.4 % — LOW (ref 20.5–51.1)
MAGNESIUM SERPL-MCNC: 1.8 MG/DL — SIGNIFICANT CHANGE UP (ref 1.8–2.4)
MCHC RBC-ENTMCNC: 26 PG — LOW (ref 27–31)
MCHC RBC-ENTMCNC: 32.3 G/DL — SIGNIFICANT CHANGE UP (ref 32–37)
MCV RBC AUTO: 80.4 FL — LOW (ref 81–99)
MONOCYTES # BLD AUTO: 1.47 K/UL — HIGH (ref 0.1–0.6)
MONOCYTES NFR BLD AUTO: 11 % — HIGH (ref 1.7–9.3)
NEUTROPHILS # BLD AUTO: 10.03 K/UL — HIGH (ref 1.4–6.5)
NEUTROPHILS NFR BLD AUTO: 74.9 % — SIGNIFICANT CHANGE UP (ref 42.2–75.2)
NRBC # BLD: 0 /100 WBCS — SIGNIFICANT CHANGE UP (ref 0–0)
PLATELET # BLD AUTO: 360 K/UL — SIGNIFICANT CHANGE UP (ref 130–400)
PMV BLD: 10.1 FL — SIGNIFICANT CHANGE UP (ref 7.4–10.4)
POTASSIUM SERPL-MCNC: 4.1 MMOL/L — SIGNIFICANT CHANGE UP (ref 3.5–5)
POTASSIUM SERPL-SCNC: 4.1 MMOL/L — SIGNIFICANT CHANGE UP (ref 3.5–5)
PROT SERPL-MCNC: 5.9 G/DL — LOW (ref 6–8)
PROTHROM AB SERPL-ACNC: 17.2 SEC — HIGH (ref 9.95–12.87)
RBC # BLD: 4.19 M/UL — LOW (ref 4.2–5.4)
RBC # FLD: 16.5 % — HIGH (ref 11.5–14.5)
SODIUM SERPL-SCNC: 133 MMOL/L — LOW (ref 135–146)
WBC # BLD: 13.39 K/UL — HIGH (ref 4.8–10.8)
WBC # FLD AUTO: 13.39 K/UL — HIGH (ref 4.8–10.8)

## 2023-07-06 PROCEDURE — 71045 X-RAY EXAM CHEST 1 VIEW: CPT | Mod: 26

## 2023-07-06 PROCEDURE — 99233 SBSQ HOSP IP/OBS HIGH 50: CPT

## 2023-07-06 PROCEDURE — 93010 ELECTROCARDIOGRAM REPORT: CPT

## 2023-07-06 PROCEDURE — 99223 1ST HOSP IP/OBS HIGH 75: CPT

## 2023-07-06 RX ORDER — VANCOMYCIN HCL 1 G
1000 VIAL (EA) INTRAVENOUS EVERY 12 HOURS
Refills: 0 | Status: DISCONTINUED | OUTPATIENT
Start: 2023-07-06 | End: 2023-07-12

## 2023-07-06 RX ORDER — MAGNESIUM SULFATE 500 MG/ML
1 VIAL (ML) INJECTION ONCE
Refills: 0 | Status: COMPLETED | OUTPATIENT
Start: 2023-07-06 | End: 2023-07-06

## 2023-07-06 RX ORDER — VANCOMYCIN HCL 1 G
1000 VIAL (EA) INTRAVENOUS ONCE
Refills: 0 | Status: COMPLETED | OUTPATIENT
Start: 2023-07-06 | End: 2023-07-06

## 2023-07-06 RX ORDER — PROCHLORPERAZINE MALEATE 5 MG
5 TABLET ORAL ONCE
Refills: 0 | Status: COMPLETED | OUTPATIENT
Start: 2023-07-06 | End: 2023-07-06

## 2023-07-06 RX ORDER — METOPROLOL TARTRATE 50 MG
25 TABLET ORAL EVERY 6 HOURS
Refills: 0 | Status: DISCONTINUED | OUTPATIENT
Start: 2023-07-06 | End: 2023-07-10

## 2023-07-06 RX ORDER — METOPROLOL TARTRATE 50 MG
12.5 TABLET ORAL EVERY 6 HOURS
Refills: 0 | Status: DISCONTINUED | OUTPATIENT
Start: 2023-07-06 | End: 2023-07-06

## 2023-07-06 RX ORDER — VANCOMYCIN HCL 1 G
VIAL (EA) INTRAVENOUS
Refills: 0 | Status: DISCONTINUED | OUTPATIENT
Start: 2023-07-06 | End: 2023-07-12

## 2023-07-06 RX ORDER — WARFARIN SODIUM 2.5 MG/1
1 TABLET ORAL ONCE
Refills: 0 | Status: COMPLETED | OUTPATIENT
Start: 2023-07-06 | End: 2023-07-06

## 2023-07-06 RX ADMIN — Medication 100 GRAM(S): at 06:27

## 2023-07-06 RX ADMIN — WARFARIN SODIUM 1 MILLIGRAM(S): 2.5 TABLET ORAL at 22:33

## 2023-07-06 RX ADMIN — Medication 12.5 MILLIGRAM(S): at 12:36

## 2023-07-06 RX ADMIN — Medication 0.5 MILLIGRAM(S): at 22:36

## 2023-07-06 RX ADMIN — POLYETHYLENE GLYCOL 3350 17 GRAM(S): 17 POWDER, FOR SOLUTION ORAL at 12:36

## 2023-07-06 RX ADMIN — Medication 60 MILLIGRAM(S): at 18:16

## 2023-07-06 RX ADMIN — CEFEPIME 100 MILLIGRAM(S): 1 INJECTION, POWDER, FOR SOLUTION INTRAMUSCULAR; INTRAVENOUS at 16:13

## 2023-07-06 RX ADMIN — CHLORHEXIDINE GLUCONATE 1 APPLICATION(S): 213 SOLUTION TOPICAL at 05:32

## 2023-07-06 RX ADMIN — Medication 25 MILLIGRAM(S): at 18:16

## 2023-07-06 RX ADMIN — Medication 5 MILLIGRAM(S): at 22:33

## 2023-07-06 RX ADMIN — Medication 250 MILLIGRAM(S): at 18:13

## 2023-07-06 RX ADMIN — Medication 60 MILLIGRAM(S): at 12:36

## 2023-07-06 RX ADMIN — CEFEPIME 100 MILLIGRAM(S): 1 INJECTION, POWDER, FOR SOLUTION INTRAMUSCULAR; INTRAVENOUS at 22:33

## 2023-07-06 RX ADMIN — CEFEPIME 100 MILLIGRAM(S): 1 INJECTION, POWDER, FOR SOLUTION INTRAMUSCULAR; INTRAVENOUS at 05:30

## 2023-07-06 RX ADMIN — Medication 60 MILLIGRAM(S): at 05:30

## 2023-07-06 RX ADMIN — Medication 250 MILLIGRAM(S): at 12:35

## 2023-07-06 RX ADMIN — Medication 5 MILLIGRAM(S): at 00:00

## 2023-07-06 RX ADMIN — Medication 20 MILLIEQUIVALENT(S): at 12:35

## 2023-07-06 RX ADMIN — PANTOPRAZOLE SODIUM 40 MILLIGRAM(S): 20 TABLET, DELAYED RELEASE ORAL at 12:36

## 2023-07-06 NOTE — PROGRESS NOTE ADULT - SUBJECTIVE AND OBJECTIVE BOX
CTU Attending Progress Daily Note     06 Jul 2023 07:48    Procedure:                    AVR                              POD#        7           Patient seen as post-op critical care follow-up    HPI:    See preop testing chart H&P    Interval event for past 24 hr:  MALIHA THOMPSON  42y had Afib RVR    Current Complains:  MALIHA THOMPSON has no new complaints    REVIEW OF SYSTEMS:  CONSTITUTIONAL:  [-] weakness, [-] fevers, [-] chills  EYES/ENT: [-] visual changes, [-] vertigo, [-] throat pain   NECK: [-] pain, [-] stiffness  RESPIRATORY: [-] cough, [-] wheezing, [-] hemoptysis, [-] shortness of breath  CARDIOVASCULAR: [-] chest pain, [-] palpitations, [-] orthopnea  GASTROINTESTINAL:    [-]abdominal pain, [-] nausea, [-] vomiting, [-] hematemesis, [-] diarrhea, [-] constipation, [-] melena, [-] hematochezia.  GENITOURINARY: [-] dysuria, [-] frequency, [-] hematuria  NEUROLOGICAL: [-] numbness, [-] weakness  SKIN: [-] itching, [-] burning, [-] rashes, [-] lesions   All other review of systems is negative unless indicated above.    [  ] Unable to assess ROS because :    OBJECTIVE:  ICU Vital Signs Last 24 Hrs  T(C): 36.8 (06 Jul 2023 04:00), Max: 37.2 (05 Jul 2023 20:00)  T(F): 98.2 (06 Jul 2023 04:00), Max: 98.9 (05 Jul 2023 20:00)  HR: 82 (06 Jul 2023 07:13) (82 - 133)  BP: 91/57 (06 Jul 2023 07:13) (85/51 - 144/61)  BP(mean): 69 (06 Jul 2023 07:13) (63 - 90)  ABP: --  ABP(mean): --  RR: 20 (06 Jul 2023 07:13) (16 - 38)  SpO2: 97% (06 Jul 2023 07:13) (96% - 99%)    O2 Parameters below as of 06 Jul 2023 07:00  Patient On (Oxygen Delivery Method): nasal cannula  O2 Flow (L/min): 2          I&O's Summary    05 Jul 2023 07:01  -  06 Jul 2023 07:00  --------------------------------------------------------  IN: 1150 mL / OUT: 2300 mL / NET: -1150 mL      PHYSICAL EXAM:  General: WN/WD NAD    HEENT:     [+] NCAT  [+] EOMI  [-] Conjuctival edema   [-] Icterus   [-] Thrush   [-] ETT  [-] NGT/OGT    Neck:         [+] FROM   [-] JVD     [-] Nodes     [-] Masses    [+] Mid-line trachea    [-] Tracheostomy    Lungs:          [+] CTA   [-] Rhonchi   [-] Rales    [-] Wheezing    [-] Decreased BS    [-] Dullness R L    Cardiac:       [+] S1 [+] S2    [+] RRR   [-] Irregular   [-] S3   [-] S4    [-] Murmurs    [-] Rub    Abdomen:    [+] BS    [+] Soft    [+] Non-tender     [-] Distended    [-] Organomegaly  [-] PEG    Extremities:   [-] Cyanosis U/L   [-] Clubbing  U/L  [-] LE/UE Edema   [+] Capillary refill    [+] Pulses     Neuro:        [+] Awake   [+]  Alert   [-] Confused   [-] Lethargic   [-] Sedated   [-] Generalized Weakness    Skin:        [-] Rashes    [-] Erythema   [+] Normal incisions   [+] IV sites intact   [-] Sacral decubitus      CAPILLARY BLOOD GLUCOSE    POCT Blood Glucose.: 149 mg/dL (06 Jul 2023 06:43)  POCT Blood Glucose.: 120 mg/dL (05 Jul 2023 21:44)  POCT Blood Glucose.: 117 mg/dL (05 Jul 2023 16:38)  POCT Blood Glucose.: 135 mg/dL (05 Jul 2023 12:00)      HOSPITAL MEDICATIONS:  MEDICATIONS  (STANDING):  albumin human  5% IVPB 500 milliLiter(s) IV Intermittent once  cefepime   IVPB 1000 milliGRAM(s) IV Intermittent every 8 hours  chlorhexidine 2% Cloths 1 Application(s) Topical daily  dextrose 5%. 1000 milliLiter(s) (100 mL/Hr) IV Continuous <Continuous>  dextrose 5%. 1000 milliLiter(s) (50 mL/Hr) IV Continuous <Continuous>  dextrose 50% Injectable 50 milliLiter(s) IV Push every 15 minutes  dextrose 50% Injectable 25 milliLiter(s) IV Push every 15 minutes  diltiazem    Tablet 60 milliGRAM(s) Oral every 6 hours  glucagon  Injectable 1 milliGRAM(s) IntraMuscular once  insulin lispro (ADMELOG) corrective regimen sliding scale   SubCutaneous three times a day before meals  melatonin 5 milliGRAM(s) Oral at bedtime  meperidine     Injectable 25 milliGRAM(s) IV Push once  metoprolol tartrate 25 milliGRAM(s) Oral every 12 hours  pantoprazole    Tablet 40 milliGRAM(s) Oral daily  polyethylene glycol 3350 17 Gram(s) Oral daily  potassium chloride    Tablet ER 20 milliEquivalent(s) Oral daily    MEDICATIONS  (PRN):  acetaminophen     Tablet .. 650 milliGRAM(s) Oral every 6 hours PRN Mild Pain (1 - 3)  ALPRAZolam 0.5 milliGRAM(s) Oral at bedtime PRN anxiety  dextrose Oral Gel 15 Gram(s) Oral once PRN Blood Glucose LESS THAN 70 milliGRAM(s)/deciliter  ondansetron Injectable 4 milliGRAM(s) IV Push every 8 hours PRN Nausea and/or Vomiting  oxyCODONE    IR 5 milliGRAM(s) Oral every 4 hours PRN Moderate Pain (4 - 6)  oxyCODONE    IR 10 milliGRAM(s) Oral every 4 hours PRN Severe Pain (7 - 10)      LABS:                          10.9   13.39 )-----------( 360      ( 06 Jul 2023 01:00 )             33.7     07-06    133<L>  |  96<L>  |  18  ----------------------------<  139<H>  4.1   |  28  |  0.8    Ca    9.0      06 Jul 2023 01:00  Mg     1.8     07-06    TPro  5.9<L>  /  Alb  3.4<L>  /  TBili  1.1  /  DBili  x   /  AST  33  /  ALT  26  /  AlkPhos  101  07-06    PT/INR - ( 06 Jul 2023 01:00 )   PT: 17.20 sec;   INR: 1.49 ratio           Urinalysis Basic - ( 06 Jul 2023 01:00 )    Color: x / Appearance: x / SG: x / pH: x  Gluc: 139 mg/dL / Ketone: x  / Bili: x / Urobili: x   Blood: x / Protein: x / Nitrite: x   Leuk Esterase: x / RBC: x / WBC x   Sq Epi: x / Non Sq Epi: x / Bacteria: x          RADIOLOGY:  Reviewed and interpreted by me  CXR from 07-06-23 shows [+] mild congestion, [-] pneumothorax, [-] R/L effusion, [-] cardiomegaly,       ECG:  Reviewed and interpreted by me: Afib RVR  QTC:    Assessment:  SP AVR  Afib RVR  Fever, leukocytosis on ABX    PAST MEDICAL & SURGICAL HISTORY:  Hypertrophic cardiomyopathy      Atrial fibrillation      Aortic valve disease      Obese      S/P transesophageal echocardiogram ([facility])          PLAN:  Neuro: Pain control  Pulm: Encourage coughing, deep breathing and use of incentive spirometry. Wean off supplemental oxygen as able. Daily CXR.   Cardio: Monitor telemetry/alarms. Cardizem and lopressor for HR control, EP consult  GI: Tolerating diet. Continue stool softeners. Continue GI prophylaxis  Renal: monitor urine output, supplement electrolytes as needed  Vasc: Heparin SC/SCDs for DVT prophylaxis, coumadin 1 mg, daily INR  Heme: Monitor H/H.   ID: IV antibiotics. FU CX. vanco trough  Endocrine: Monitor finger stick blood sugar and control hyperglycemia with insulin  Physical Therapy: OOB/ambulate        Discussed with Cardiothoracic Team at AM rounds.

## 2023-07-06 NOTE — DIETITIAN INITIAL EVALUATION ADULT - PERTINENT MEDS FT
MEDICATIONS  (STANDING):  albumin human  5% IVPB 500 milliLiter(s) IV Intermittent once  cefepime   IVPB 1000 milliGRAM(s) IV Intermittent every 8 hours  chlorhexidine 2% Cloths 1 Application(s) Topical daily  dextrose 5%. 1000 milliLiter(s) (100 mL/Hr) IV Continuous <Continuous>  dextrose 5%. 1000 milliLiter(s) (50 mL/Hr) IV Continuous <Continuous>  dextrose 50% Injectable 50 milliLiter(s) IV Push every 15 minutes  dextrose 50% Injectable 25 milliLiter(s) IV Push every 15 minutes  diltiazem    Tablet 60 milliGRAM(s) Oral every 6 hours  glucagon  Injectable 1 milliGRAM(s) IntraMuscular once  insulin lispro (ADMELOG) corrective regimen sliding scale   SubCutaneous three times a day before meals  melatonin 5 milliGRAM(s) Oral at bedtime  meperidine     Injectable 25 milliGRAM(s) IV Push once  metoprolol tartrate 12.5 milliGRAM(s) Oral every 6 hours  pantoprazole    Tablet 40 milliGRAM(s) Oral daily  polyethylene glycol 3350 17 Gram(s) Oral daily  potassium chloride    Tablet ER 20 milliEquivalent(s) Oral daily  vancomycin  IVPB 1000 milliGRAM(s) IV Intermittent once  vancomycin  IVPB 1000 milliGRAM(s) IV Intermittent every 12 hours  vancomycin  IVPB      warfarin 1 milliGRAM(s) Oral once    MEDICATIONS  (PRN):  acetaminophen     Tablet .. 650 milliGRAM(s) Oral every 6 hours PRN Mild Pain (1 - 3)  ALPRAZolam 0.5 milliGRAM(s) Oral at bedtime PRN anxiety  dextrose Oral Gel 15 Gram(s) Oral once PRN Blood Glucose LESS THAN 70 milliGRAM(s)/deciliter  ondansetron Injectable 4 milliGRAM(s) IV Push every 8 hours PRN Nausea and/or Vomiting  oxyCODONE    IR 5 milliGRAM(s) Oral every 4 hours PRN Moderate Pain (4 - 6)  oxyCODONE    IR 10 milliGRAM(s) Oral every 4 hours PRN Severe Pain (7 - 10)

## 2023-07-06 NOTE — DIETITIAN INITIAL EVALUATION ADULT - NSFNSGIIOFT_GEN_A_CORE
^Weight above documented as dosing weight on 6/29. Pt reports  lbs.     Daily Weights: 88.7 kg (7/6- standing), 92.7 kg (7/5- bed), 92.4 kg (7/3- standing), 97.2 kg (7/2- bed), 96.3 kg (7/1- bed) 96.7 kg (6/30- bed), 94.9 kg (6/29- bed)   > weight fluctuations likely fluid shifts. Continue to trend weights.     IBW: ~47.7 kg (105 lbs)

## 2023-07-06 NOTE — CONSULT NOTE ADULT - NS ATTEND AMEND GEN_ALL_CORE FT
Rate control for now with BB. Can switch to Esmolol drip for better rate control. If BP are soft and this doesn't work, can try Digoxin.

## 2023-07-06 NOTE — DIETITIAN INITIAL EVALUATION ADULT - ADD RECOMMEND
1. Diet Modification: continue DASH/TLC, Consistent Carbohydrate with evening snack   2. Nutrition-Related Education: Educated pt on heart healthy diet. Discussed sodium content of foods/ beverages and reviewed how to read nutrition label. Emphasized importance of adequate nutrition to promote healing with emphasis on lean protein sources. Provided with printed educational handouts from [facility] for reference. Pt verbalized understanding of teachings. All nutrition-related questions addressed at this time.     Monitor Diet order, PO intake, swallowing function, GI function, biochemical data, weight trends, NFPF, skin integrity     Pt is at moderate nutrition risk, RD to f/u in 5-7 days   RD to remain available: Makayla Campos, spectra x [phone] or TEAMS

## 2023-07-06 NOTE — DIETITIAN INITIAL EVALUATION ADULT - NS FNS DIET ORDER
Diet, DASH/TLC:   Sodium & Cholesterol Restricted  Consistent Carbohydrate {Evening Snack} (06-30-23 @ 09:36) [Active]    %PO Intake: pt reports consuming 50% farina and 1x slice of Bahamian toast for breakfast this morning

## 2023-07-06 NOTE — CONSULT NOTE ADULT - SUBJECTIVE AND OBJECTIVE BOX
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 She was initially c/o sob and worked up for HOCM. She underwent aortic valve mass resection and left atrial appendage clipping and aortic valve replacement 25 mm st Edmund mechanical valve. post operatively patient went into afib and Ep consulted for afib management and rate control recommendations. She is seen at bedside with no acute complaints at this time. denies any fever chills chest pain sob or dizziness.        PAST MEDICAL & SURGICAL HISTORY:  Hypertrophic cardiomyopathy  Atrial fibrillation  Aortic valve disease  Obese  S/P transesophageal echocardiogram ([facility])      PREVIOUS DIAGNOSTIC TESTING:      ECHO  FINDINGS:  Summary:   1. Limited study for follow post AVR   2. Normal global left ventricular systolic function.   3. There is no evidence of pericardial effusion.   4. Mechanical aortic valve in place, no paravalvular leak noted, wtih   max peak velocity    2m/s and mean tansvalve gradient 5-10 mmHg correlate with normal   function.    PHYSICIAN INTERPRETATION:  Left Ventricle: Global LV systolic function was normal.  Pericardium: There is no evidence of pericardial effusion.  Aortic Valve: Mechanical aortic valve in place, no paravalvular leak   noted, wtih max peak velocity    2m/s and mean tansvalve gradient 5-10 mmHg correlate with normal   function.        STRESS  FINDINGS:    MRA chest   FINDINGS:  IMPRESSION:    1.  Please note, arrythmia during study may affect accuracy of   measurements/volumetrics.  2.  Given image quality, prior CCTA dated 2023 reviewed. 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 Consider further evaluation   with full gated Chest CT.  3.  The left ventricle (LV) is normal in size. There is asymmetric septal   hypertrophy, with a maximum wall thickness of the mid inferoseptum of   20.4 mm. Left ventricular global systolic function is normal. The LV   ejection fraction is 57 %.  4.  Dilated left atrium.  5.  The right ventricle (RV) is normal in size. RV global systolic   function is mildly reduced. The RV ejection fraction is 45 %.  6.  On 3 chamber cine imaging, there is systolic anterior motion of the   mitral valve leaflet noted. Mitral Regurgitation noted with a Regurgitant   fraction of 21 cc. However, please note, arrythmia may affect accuracy of   quantification. Correlate with echocardiogram.  7.  Aortic valve leaflets not well visualized.  8.  Small to moderate size circumferential pericardial effusion.  9.  No significant abnormalities of the visualized portions of the great   vessels.  10.  On delayed enhancement imaging,  there is diffuse patchy enhancement   of the basal to apical septum, part of which corresponds to areas of   hypertrophy. There is also patchy enhancement of the mid to apical   inferior region. Findings are seen in hypertrophic cardiomyopathy.    Accurate quantification of enhancement is precluded by image quality   (i.e. quantification range estimates 10-20%).      ELECTROPHYSIOLOGY STUDY  FINDINGS: 23    Ventricular Rate 86 BPM    Atrial Rate 344 BPM    QRS Duration 128 ms    Q-T Interval 414 ms    QTC Calculation(Bazett) 495 ms    R Axis -53 degrees    T Axis 129 degrees    Diagnosis Line Atrial flutter with variable A-V block  Left axis deviation  Left ventricular hypertrophy with QRS widening and repolarization abnormality  Abnormal ECG    Confirmed by Gabe Hawley ([id number]) on 2023 8:34:33 AM    CAROTID ULTRASOUND:  FINDINGS    IMPRESSION: Mild 20-39% stenosis bilateral internal carotid.    Measurement of carotid stenosis is based on velocity parameters that   correlate the residual internal carotid diameter with that of the more   distal vessel in accordance with a method such as the [place] American   Symptomatic Carotid Endarterectomy Trial (NASCET).        MEDICATIONS  (STANDING):  albumin human  5% IVPB 500 milliLiter(s) IV Intermittent once  cefepime   IVPB 1000 milliGRAM(s) IV Intermittent every 8 hours  chlorhexidine 2% Cloths 1 Application(s) Topical daily  dextrose 5%. 1000 milliLiter(s) (100 mL/Hr) IV Continuous <Continuous>  dextrose 5%. 1000 milliLiter(s) (50 mL/Hr) IV Continuous <Continuous>  dextrose 50% Injectable 50 milliLiter(s) IV Push every 15 minutes  dextrose 50% Injectable 25 milliLiter(s) IV Push every 15 minutes  diltiazem    Tablet 60 milliGRAM(s) Oral every 6 hours  glucagon  Injectable 1 milliGRAM(s) IntraMuscular once  insulin lispro (ADMELOG) corrective regimen sliding scale   SubCutaneous three times a day before meals  melatonin 5 milliGRAM(s) Oral at bedtime  meperidine     Injectable 25 milliGRAM(s) IV Push once  metoprolol tartrate 12.5 milliGRAM(s) Oral every 6 hours  pantoprazole    Tablet 40 milliGRAM(s) Oral daily  polyethylene glycol 3350 17 Gram(s) Oral daily  potassium chloride    Tablet ER 20 milliEquivalent(s) Oral daily  vancomycin  IVPB 1000 milliGRAM(s) IV Intermittent every 12 hours  vancomycin  IVPB      vancomycin  IVPB 1000 milliGRAM(s) IV Intermittent once  warfarin 1 milliGRAM(s) Oral once    MEDICATIONS  (PRN):  acetaminophen     Tablet .. 650 milliGRAM(s) Oral every 6 hours PRN Mild Pain (1 - 3)  ALPRAZolam 0.5 milliGRAM(s) Oral at bedtime PRN anxiety  dextrose Oral Gel 15 Gram(s) Oral once PRN Blood Glucose LESS THAN 70 milliGRAM(s)/deciliter  ondansetron Injectable 4 milliGRAM(s) IV Push every 8 hours PRN Nausea and/or Vomiting  oxyCODONE    IR 5 milliGRAM(s) Oral every 4 hours PRN Moderate Pain (4 - 6)  oxyCODONE    IR 10 milliGRAM(s) Oral every 4 hours PRN Severe Pain (7 - 10)      FAMILY HISTORY:  Known health problems: none (Father, Mother)      CIGARETTES: denies     ALCOHOL: denies     Past Surgical History: s/p AVR mechanical valve     Allergies: NKDA    No Known Allergies      REVIEW OF SYSTEMS:    CONSTITUTIONAL: No fever, weight loss, chills, shakes, or fatigue  EYES: No eye pain, visual disturbances, or discharge  ENMT:  No difficulty hearing, tinnitus, vertigo; No sinus or throat pain  NECK: No pain or stiffness  RESPIRATORY: No cough, wheezing, hemoptysis, or shortness of breath  CARDIOVASCULAR: No chest pain, dyspnea, palpitations, dizziness, syncope, paroxysmal nocturnal dyspnea, orthopnea, or arm or leg swelling  GASTROINTESTINAL: No abdominal  or epigastric pain, nausea, vomiting, hematemesis, diarrhea, constipation, melena or bright red blood.  NEUROLOGICAL: No headaches, memory loss, slurred speech, limb weakness, loss of strength, numbness,     Vital Signs Last 24 Hrs  T(C): 36.8 (2023 08:00), Max: 37.2 (2023 20:00)  T(F): 98.2 (2023 08:00), Max: 98.9 (2023 20:00)  HR: 93 (2023 08:00) (82 - 133)  BP: 93/50 (2023 08:00) (85/51 - 144/61)  BP(mean): 66 (2023 08:00) (63 - 90)  RR: 20 (2023 08:00) (16 - 38)  SpO2: 96% (2023 08:00) (96% - 99%)    Parameters below as of 2023 08:00  Patient On (Oxygen Delivery Method): nasal cannula  O2 Flow (L/min): 2  or tremors  SKIN: No itching, burning, rashes, or lesions       PHYSICAL EXAM:    GENERAL: In no apparent distress, well nourished, and hydrated.  HEAD:  Atraumatic, Normocephalic  EYES: EOMI, PERRLA, conjunctiva and sclera clear  ENMT: No tonsillar erythema, exudates, or enlargements; ist mucous membranes, Good dentition, No lesions  NECK: Supple and normal thyroid.  No JVD or carotid bruit.  Carotid pulse is 2+ bilaterally.  HEART: irregular rate and rhythm; No rubs, or gallops.  chest: + sternal incision stable no clicks cdi no sign of infection.   PULMONARY: Clear to auscultation and perfusion.  No rales, wheezing, or rhonchi bilaterally.  ABDOMEN: Soft, Nontender, Nondistended; Bowel sounds present  EXTREMITIES:  2+ Peripheral Pulses, No clubbing, cyanosis, or edema  LYMPH: No lymphadenopathy noted  NEUROLOGICAL: Grossly nonfocal      INTERPRETATION OF TELEMETRY:    I&O's Detail    2023 07:01  -  2023 07:00  --------------------------------------------------------  IN:    IV PiggyBack: 250 mL    Oral Fluid: 900 mL  Total IN: 1150 mL    OUT:    Voided (mL): 2300 mL  Total OUT: 2300 mL    Total NET: -1150 mL      2023 07:01  -  2023 11:10  --------------------------------------------------------  IN:    Oral Fluid: 240 mL  Total IN: 240 mL    OUT:    Voided (mL): 200 mL  Total OUT: 200 mL    Total NET: 40 mL      LABS:                        10.9   13.39 )-----------( 360      ( 2023 01:00 )             33.7     07-06    133<L>  |  96<L>  |  18  ----------------------------<  139<H>  4.1   |  28  |  0.8    Ca    9.0      2023 01:00  Mg     1.8     07-06    TPro  5.9<L>  /  Alb  3.4<L>  /  TBili  1.1  /  DBili  x   /  AST  33  /  ALT  26  /  AlkPhos  101  07-06      PT/INR - ( 2023 01:00 )   PT: 17.20 sec;   INR: 1.49 ratio           Urinalysis Basic - ( 2023 01:00 )    Color: x / Appearance: x / SG: x / pH: x  Gluc: 139 mg/dL / Ketone: x  / Bili: x / Urobili: x   Blood: x / Protein: x / Nitrite: x   Leuk Esterase: x / RBC: x / WBC x   Sq Epi: x / Non Sq Epi: x / Bacteria: x      BNP  I&O's Detail    2023 07:01  -  2023 07:00  --------------------------------------------------------  IN:    IV PiggyBack: 250 mL    Oral Fluid: 900 mL  Total IN: 1150 mL    OUT:    Voided (mL): 2300 mL  Total OUT: 2300 mL    Total NET: -1150 mL      2023 07:01  -  2023 11:10  --------------------------------------------------------  IN:    Oral Fluid: 240 mL  Total IN: 240 mL    OUT:    Voided (mL): 200 mL  Total OUT: 200 mL    Total NET: 40 mL        Daily     Daily Weight in k.7 (2023 06:00)    RADIOLOGY & ADDITIONAL STUDIES: Patient is a 42y old  Female who presents with a chief complaint of aortic valve disease (2023 15:44)      HPI:  43 y/o F with pmhx of afib/ aflutter , cardiomyopathy, DLD, HTN and 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 She was initially c/o sob and worked up for HOCM. She underwent aortic valve mass resection and left atrial appendage clipping and aortic valve replacement 25 mm st Edmund mechanical valve. post operatively patient went into afib and Ep consulted for afib management and rate control recommendations. She is seen at bedside with no acute complaints at this time. denies any fever chills chest pain sob or dizziness.        PAST MEDICAL & SURGICAL HISTORY:  Hypertrophic cardiomyopathy  Atrial fibrillation  Aortic valve disease  Obese  S/P transesophageal echocardiogram ([facility])      PREVIOUS DIAGNOSTIC TESTING:      ECHO  FINDINGS:  Summary:   1. Limited study for follow post AVR   2. Normal global left ventricular systolic function.   3. There is no evidence of pericardial effusion.   4. Mechanical aortic valve in place, no paravalvular leak noted, wtih   max peak velocity    2m/s and mean tansvalve gradient 5-10 mmHg correlate with normal   function.    PHYSICIAN INTERPRETATION:  Left Ventricle: Global LV systolic function was normal.  Pericardium: There is no evidence of pericardial effusion.  Aortic Valve: Mechanical aortic valve in place, no paravalvular leak   noted, wtih max peak velocity    2m/s and mean tansvalve gradient 5-10 mmHg correlate with normal   function.    < from: TTE Echo Complete w/o Contrast w/ Doppler (22 @ 09:14) >   1. Normal global left ventricular systolic function.   2. LV Ejection Fraction by Parada's Method with a biplane EF of 62 %.   3. Normal right ventricular size and function.   4. Severe concentric left ventricular hypertrophy with septal predominance suggestive of HOCM.   5. Significant dynamic LVOT obstruction with peak gradient of 39mmHg at rest and 72 mmHg with valsalva.   6. Systolic anterior motion with resultant severe eccentric mitral valve regurgitation.   7. Severely enlarged left atrium.   8. There is a possible small mass such as a papillary fibroelastoma vs vegetation on the aortic side of the AV.   9. Grade II diastolic dysfunction.    < end of copied text >      STRESS  FINDINGS:    MRA chest   FINDINGS:  IMPRESSION:    1.  Please note, arrythmia during study may affect accuracy of   measurements/volumetrics.  2.  Given image quality, prior CCTA dated 2023 reviewed. 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 Consider further evaluation   with full gated Chest CT.  3.  The left ventricle (LV) is normal in size. There is asymmetric septal   hypertrophy, with a maximum wall thickness of the mid inferoseptum of   20.4 mm. Left ventricular global systolic function is normal. The LV   ejection fraction is 57 %.  4.  Dilated left atrium.  5.  The right ventricle (RV) is normal in size. RV global systolic   function is mildly reduced. The RV ejection fraction is 45 %.  6.  On 3 chamber cine imaging, there is systolic anterior motion of the   mitral valve leaflet noted. Mitral Regurgitation noted with a Regurgitant   fraction of 21 cc. However, please note, arrythmia may affect accuracy of   quantification. Correlate with echocardiogram.  7.  Aortic valve leaflets not well visualized.  8.  Small to moderate size circumferential pericardial effusion.  9.  No significant abnormalities of the visualized portions of the great   vessels.  10.  On delayed enhancement imaging,  there is diffuse patchy enhancement   of the basal to apical septum, part of which corresponds to areas of   hypertrophy. There is also patchy enhancement of the mid to apical   inferior region. Findings are seen in hypertrophic cardiomyopathy.    Accurate quantification of enhancement is precluded by image quality   (i.e. quantification range estimates 10-20%).      ELECTROPHYSIOLOGY STUDY  FINDINGS: 23    Ventricular Rate 86 BPM    Atrial Rate 344 BPM    QRS Duration 128 ms    Q-T Interval 414 ms    QTC Calculation(Bazett) 495 ms    R Axis -53 degrees    T Axis 129 degrees    Diagnosis Line Atrial flutter with variable A-V block  Left axis deviation  Left ventricular hypertrophy with QRS widening and repolarization abnormality  Abnormal ECG    Confirmed by Gabe Hawley ([id number]) on 2023 8:34:33 AM    CAROTID ULTRASOUND:  FINDINGS    IMPRESSION: Mild 20-39% stenosis bilateral internal carotid.    Measurement of carotid stenosis is based on velocity parameters that   correlate the residual internal carotid diameter with that of the more   distal vessel in accordance with a method such as the [place] American   Symptomatic Carotid Endarterectomy Trial (NASCET).        MEDICATIONS  (STANDING):  albumin human  5% IVPB 500 milliLiter(s) IV Intermittent once  cefepime   IVPB 1000 milliGRAM(s) IV Intermittent every 8 hours  chlorhexidine 2% Cloths 1 Application(s) Topical daily  dextrose 5%. 1000 milliLiter(s) (100 mL/Hr) IV Continuous <Continuous>  dextrose 5%. 1000 milliLiter(s) (50 mL/Hr) IV Continuous <Continuous>  dextrose 50% Injectable 50 milliLiter(s) IV Push every 15 minutes  dextrose 50% Injectable 25 milliLiter(s) IV Push every 15 minutes  diltiazem    Tablet 60 milliGRAM(s) Oral every 6 hours  glucagon  Injectable 1 milliGRAM(s) IntraMuscular once  insulin lispro (ADMELOG) corrective regimen sliding scale   SubCutaneous three times a day before meals  melatonin 5 milliGRAM(s) Oral at bedtime  meperidine     Injectable 25 milliGRAM(s) IV Push once  metoprolol tartrate 12.5 milliGRAM(s) Oral every 6 hours  pantoprazole    Tablet 40 milliGRAM(s) Oral daily  polyethylene glycol 3350 17 Gram(s) Oral daily  potassium chloride    Tablet ER 20 milliEquivalent(s) Oral daily  vancomycin  IVPB 1000 milliGRAM(s) IV Intermittent every 12 hours  vancomycin  IVPB      vancomycin  IVPB 1000 milliGRAM(s) IV Intermittent once  warfarin 1 milliGRAM(s) Oral once    MEDICATIONS  (PRN):  acetaminophen     Tablet .. 650 milliGRAM(s) Oral every 6 hours PRN Mild Pain (1 - 3)  ALPRAZolam 0.5 milliGRAM(s) Oral at bedtime PRN anxiety  dextrose Oral Gel 15 Gram(s) Oral once PRN Blood Glucose LESS THAN 70 milliGRAM(s)/deciliter  ondansetron Injectable 4 milliGRAM(s) IV Push every 8 hours PRN Nausea and/or Vomiting  oxyCODONE    IR 5 milliGRAM(s) Oral every 4 hours PRN Moderate Pain (4 - 6)  oxyCODONE    IR 10 milliGRAM(s) Oral every 4 hours PRN Severe Pain (7 - 10)      FAMILY HISTORY:  Known health problems: none (Father, Mother)      CIGARETTES: denies     ALCOHOL: denies     Past Surgical History: s/p AVR mechanical valve     Allergies: NKDA    No Known Allergies      REVIEW OF SYSTEMS:    CONSTITUTIONAL: No fever, weight loss, chills, shakes, or fatigue  EYES: No eye pain, visual disturbances, or discharge  ENMT:  No difficulty hearing, tinnitus, vertigo; No sinus or throat pain  NECK: No pain or stiffness  RESPIRATORY: No cough, wheezing, hemoptysis, or shortness of breath  CARDIOVASCULAR: No chest pain, dyspnea, palpitations, dizziness, syncope, paroxysmal nocturnal dyspnea, orthopnea, or arm or leg swelling  GASTROINTESTINAL: No abdominal  or epigastric pain, nausea, vomiting, hematemesis, diarrhea, constipation, melena or bright red blood.  NEUROLOGICAL: No headaches, memory loss, slurred speech, limb weakness, loss of strength, numbness,     Vital Signs Last 24 Hrs  T(C): 36.8 (2023 08:00), Max: 37.2 (2023 20:00)  T(F): 98.2 (2023 08:00), Max: 98.9 (2023 20:00)  HR: 93 (2023 08:00) (82 - 133)  BP: 93/50 (2023 08:00) (85/51 - 144/61)  BP(mean): 66 (2023 08:00) (63 - 90)  RR: 20 (2023 08:00) (16 - 38)  SpO2: 96% (2023 08:00) (96% - 99%)    Parameters below as of 2023 08:00  Patient On (Oxygen Delivery Method): nasal cannula  O2 Flow (L/min): 2  or tremors  SKIN: No itching, burning, rashes, or lesions       PHYSICAL EXAM:    GENERAL: In no apparent distress, well nourished, and hydrated.  HEAD:  Atraumatic, Normocephalic  EYES: EOMI, PERRLA, conjunctiva and sclera clear  ENMT: MMM  NECK: Supple  HEART: irregular rate and rhythm; No rubs, or gallops.  chest: + sternal incision stable no clicks cdi no sign of infection.   PULMONARY: Clear to auscultation and perfusion.  No rales, wheezing, or rhonchi bilaterally.  ABDOMEN: Soft, Nontender, Nondistended; Bowel sounds present  EXTREMITIES:  2+ Peripheral Pulses, No clubbing, cyanosis, or edema   NEUROLOGICAL: Grossly nonfocal      INTERPRETATION OF TELEMETRY: AFib    I&O's Detail    2023 07:01  -  2023 07:00  --------------------------------------------------------  IN:    IV PiggyBack: 250 mL    Oral Fluid: 900 mL  Total IN: 1150 mL    OUT:    Voided (mL): 2300 mL  Total OUT: 2300 mL    Total NET: -1150 mL      2023 07:01  -  2023 11:10  --------------------------------------------------------  IN:    Oral Fluid: 240 mL  Total IN: 240 mL    OUT:    Voided (mL): 200 mL  Total OUT: 200 mL    Total NET: 40 mL      LABS:                        10.9   13.39 )-----------( 360      ( 2023 01:00 )             33.7     07-06    133<L>  |  96<L>  |  18  ----------------------------<  139<H>  4.1   |  28  |  0.8    Ca    9.0      2023 01:00  Mg     1.8     07-06    TPro  5.9<L>  /  Alb  3.4<L>  /  TBili  1.1  /  DBili  x   /  AST  33  /  ALT  26  /  AlkPhos  101  07-06      PT/INR - ( 2023 01:00 )   PT: 17.20 sec;   INR: 1.49 ratio           Urinalysis Basic - ( 2023 01:00 )    Color: x / Appearance: x / SG: x / pH: x  Gluc: 139 mg/dL / Ketone: x  / Bili: x / Urobili: x   Blood: x / Protein: x / Nitrite: x   Leuk Esterase: x / RBC: x / WBC x   Sq Epi: x / Non Sq Epi: x / Bacteria: x      BNP  I&O's Detail    2023 07:01  -  2023 07:00  --------------------------------------------------------  IN:    IV PiggyBack: 250 mL    Oral Fluid: 900 mL  Total IN: 1150 mL    OUT:    Voided (mL): 2300 mL  Total OUT: 2300 mL    Total NET: -1150 mL      2023 07:01  -  2023 11:10  --------------------------------------------------------  IN:    Oral Fluid: 240 mL  Total IN: 240 mL    OUT:    Voided (mL): 200 mL  Total OUT: 200 mL    Total NET: 40 mL        Daily     Daily Weight in k.7 (2023 06:00)    RADIOLOGY & ADDITIONAL STUDIES:

## 2023-07-06 NOTE — PROGRESS NOTE ADULT - SUBJECTIVE AND OBJECTIVE BOX
OPERATIVE PROCEDURE(s): AVR               POD # 7                       SURGEON(s): JIMBO Zepeda      SUBJECTIVE ASSESSMENT:42yFemale patient seen and examined at bedside. No complaints at this     Vital Signs Last 24 Hrs  T(F): 98.2 (06 Jul 2023 04:00), Max: 98.9 (05 Jul 2023 20:00)  HR: 82 (06 Jul 2023 07:13) (82 - 133)  BP: 91/57 (06 Jul 2023 07:13) (85/51 - 144/61)  BP(mean): 69 (06 Jul 2023 07:13) (63 - 90)  RR: 20 (06 Jul 2023 07:13) (16 - 38)  SpO2: 97% (06 Jul 2023 07:13) (96% - 99%)      I&O's Detail    05 Jul 2023 07:01  -  06 Jul 2023 07:00  --------------------------------------------------------  IN:    IV PiggyBack: 250 mL    Oral Fluid: 900 mL  Total IN: 1150 mL    OUT:    Voided (mL): 2300 mL  Total OUT: 2300 mL        Net: I&O's Detail    04 Jul 2023 07:01  -  05 Jul 2023 07:00  --------------------------------------------------------  Total NET: -1565 mL      05 Jul 2023 07:01  -  06 Jul 2023 07:00  --------------------------------------------------------  Total NET: -1150 mL        CAPILLARY BLOOD GLUCOSE      POCT Blood Glucose.: 149 mg/dL (06 Jul 2023 06:43)  POCT Blood Glucose.: 120 mg/dL (05 Jul 2023 21:44)  POCT Blood Glucose.: 117 mg/dL (05 Jul 2023 16:38)  POCT Blood Glucose.: 135 mg/dL (05 Jul 2023 12:00)    A1C with Estimated Average Glucose Result: 6.0 % (06-14-23 @ 16:19)      Physical Exam:  General: NAD; A&Ox3  Cardiac: S1/S2, RRR, no murmur, no rubs  Lungs: unlabored respirations, CTA b/l, no wheeze, no rales, no crackles  Abdomen: Soft/NT/ND; positive bowel sounds x 4  Sternum: Intact, no click, incision healing well with no drainage  Incisions: Incisions clean/dry/intact  Extremities: No edema b/l lower extremities; good capillary refill; no cyanosis; palpable 1+ pedal pulses b/l    Central Venous Catheter: Yes: critical illness, intravenous access  Day #  Luis Catheter: Yes: critical illness; monitor strict i/o's                    Day #  EPICARDIAL WIRES:  YES                                                                         Day #  BOWEL MOVEMENT:  [] YES [] NO, If No, Timing since last BM Day #  CHEST TUBE(MS/Left/Right):  [] YES [] NO, If yes -  AIR LEAKS:  YES/NO        LABS:                        10.9<L>  13.39<H> )-----------( 360      ( 06 Jul 2023 01:00 )             33.7<L>                        11.0<L>  12.29<H> )-----------( 345      ( 05 Jul 2023 03:30 )             34.3<L>    07-06    133<L>  |  96<L>  |  18  ----------------------------<  139<H>  4.1   |  28  |  0.8  07-05    138  |  95<L>  |  19  ----------------------------<  110<H>  4.1   |  31  |  0.8    Ca    9.0      06 Jul 2023 01:00  Mg     1.8     07-06    TPro  5.9<L> [6.0 - 8.0]  /  Alb  3.4<L> [3.5 - 5.2]  /  TBili  1.1 [0.2 - 1.2]  /  DBili  x   /  AST  33 [0 - 41]  /  ALT  26 [0 - 41]  /  AlkPhos  101 [30 - 115]  07-06    PT/INR - ( 06 Jul 2023 01:00 )   PT: ;   INR: 1.49 ratio         PT/INR - ( 05 Jul 2023 03:30 )   PT: ;   INR: 1.78 ratio           Urinalysis Basic - ( 06 Jul 2023 01:00 )    Color: x / Appearance: x / SG: x / pH: x  Gluc: 139 mg/dL / Ketone: x  / Bili: x / Urobili: x   Blood: x / Protein: x / Nitrite: x   Leuk Esterase: x / RBC: x / WBC x   Sq Epi: x / Non Sq Epi: x / Bacteria: x        RADIOLOGY & ADDITIONAL TESTS:  CXR:   EKG:    Allergies    No Known Allergies    Intolerances      MEDICATIONS  (STANDING):  albumin human  5% IVPB 500 milliLiter(s) IV Intermittent once  cefepime   IVPB 1000 milliGRAM(s) IV Intermittent every 8 hours  chlorhexidine 2% Cloths 1 Application(s) Topical daily  dextrose 5%. 1000 milliLiter(s) (50 mL/Hr) IV Continuous <Continuous>  dextrose 5%. 1000 milliLiter(s) (100 mL/Hr) IV Continuous <Continuous>  dextrose 50% Injectable 50 milliLiter(s) IV Push every 15 minutes  dextrose 50% Injectable 25 milliLiter(s) IV Push every 15 minutes  diltiazem    Tablet 60 milliGRAM(s) Oral every 6 hours  glucagon  Injectable 1 milliGRAM(s) IntraMuscular once  insulin lispro (ADMELOG) corrective regimen sliding scale   SubCutaneous three times a day before meals  melatonin 5 milliGRAM(s) Oral at bedtime  meperidine     Injectable 25 milliGRAM(s) IV Push once  metoprolol tartrate 25 milliGRAM(s) Oral every 12 hours  pantoprazole    Tablet 40 milliGRAM(s) Oral daily  polyethylene glycol 3350 17 Gram(s) Oral daily  potassium chloride    Tablet ER 20 milliEquivalent(s) Oral daily    MEDICATIONS  (PRN):  acetaminophen     Tablet .. 650 milliGRAM(s) Oral every 6 hours PRN Mild Pain (1 - 3)  ALPRAZolam 0.5 milliGRAM(s) Oral at bedtime PRN anxiety  dextrose Oral Gel 15 Gram(s) Oral once PRN Blood Glucose LESS THAN 70 milliGRAM(s)/deciliter  ondansetron Injectable 4 milliGRAM(s) IV Push every 8 hours PRN Nausea and/or Vomiting  oxyCODONE    IR 5 milliGRAM(s) Oral every 4 hours PRN Moderate Pain (4 - 6)  oxyCODONE    IR 10 milliGRAM(s) Oral every 4 hours PRN Severe Pain (7 - 10)    Home Medications:  Metoprolol Succinate  mg oral tablet, extended release: 1 tablet, extended release orally once a day (29 Jun 2023 06:42)      Pharmacologic DVT Prophylaxis [] YES, [] NO: Contraindication:  SCD's: YES b/l    GI Prophylaxis:     Post-Op Beta-Blockers: [] Yes, [] No: contraindication:  Post-Op CCB: [] Yes, [] No: contraindication:  Post-Op Aspirin:  [] Yes, [] No: contraindication:  Post-Op Statin:  [] Yes, [] No: contraindication:    Ambulation/Activity Status:    Assessment/Plan:  42y Female status-post AVR POD 7  - Case and plan discussed with CTU Intensivist and CT Surgeon - Dr. Perez/Duane/Reza/Meliza  - Continue CTU supportive care and ongoing plan of care as per continuing CTU rounds.   - Continue DVT/GI prophylaxis  - Incentive Spirometry 10 times an hour  - Continue to advance physical activity as tolerated and continue PT/OT as directed  1. CAD s/p CABG: Continue ASA, statin, BB  2. HTN:   3. Post-op A. Fib ppx:   4. COPD/Hypoxia:   5. DM/Glucose Control:     Social Service Disposition:     OPERATIVE PROCEDURE(s): AVR               POD # 7                       SURGEON(s): JIMBO Zepeda      SUBJECTIVE ASSESSMENT:42yFemale patient seen and examined at bedside. No complaints at this     Vital Signs Last 24 Hrs  T(F): 98.2 (06 Jul 2023 04:00), Max: 98.9 (05 Jul 2023 20:00)  HR: 82 (06 Jul 2023 07:13) (82 - 133)  BP: 91/57 (06 Jul 2023 07:13) (85/51 - 144/61)  BP(mean): 69 (06 Jul 2023 07:13) (63 - 90)  RR: 20 (06 Jul 2023 07:13) (16 - 38)  SpO2: 97% (06 Jul 2023 07:13) (96% - 99%)      I&O's Detail    05 Jul 2023 07:01  -  06 Jul 2023 07:00  --------------------------------------------------------  IN:    IV PiggyBack: 250 mL    Oral Fluid: 900 mL  Total IN: 1150 mL    OUT:    Voided (mL): 2300 mL  Total OUT: 2300 mL        Net: I&O's Detail    04 Jul 2023 07:01  -  05 Jul 2023 07:00  --------------------------------------------------------  Total NET: -1565 mL      05 Jul 2023 07:01  -  06 Jul 2023 07:00  --------------------------------------------------------  Total NET: -1150 mL        CAPILLARY BLOOD GLUCOSE      POCT Blood Glucose.: 149 mg/dL (06 Jul 2023 06:43)  POCT Blood Glucose.: 120 mg/dL (05 Jul 2023 21:44)  POCT Blood Glucose.: 117 mg/dL (05 Jul 2023 16:38)  POCT Blood Glucose.: 135 mg/dL (05 Jul 2023 12:00)    A1C with Estimated Average Glucose Result: 6.0 % (06-14-23 @ 16:19)      Physical Exam:  General: NAD; A&Ox3  Cardiac: S1/S2, RRR, no murmur, no rubs  Lungs: unlabored respirations, CTA b/l, no wheeze, no rales, no crackles  Abdomen: Soft/NT/ND; positive bowel sounds x 4  Sternum: Intact, no click, incision healing well with no drainage  Incisions: Incisions clean/dry/intact  Extremities: No edema b/l lower extremities; good capillary refill; no cyanosis; palpable 1+ pedal pulses b/l        BOWEL MOVEMENT:  [X] YES [] NO, If No, Timing since last BM Day #1        LABS:                        10.9<L>  13.39<H> )-----------( 360      ( 06 Jul 2023 01:00 )             33.7<L>                        11.0<L>  12.29<H> )-----------( 345      ( 05 Jul 2023 03:30 )             34.3<L>    07-06    133<L>  |  96<L>  |  18  ----------------------------<  139<H>  4.1   |  28  |  0.8  07-05    138  |  95<L>  |  19  ----------------------------<  110<H>  4.1   |  31  |  0.8    Ca    9.0      06 Jul 2023 01:00  Mg     1.8     07-06    TPro  5.9<L> [6.0 - 8.0]  /  Alb  3.4<L> [3.5 - 5.2]  /  TBili  1.1 [0.2 - 1.2]  /  DBili  x   /  AST  33 [0 - 41]  /  ALT  26 [0 - 41]  /  AlkPhos  101 [30 - 115]  07-06    PT/INR - ( 06 Jul 2023 01:00 )   PT: ;   INR: 1.49 ratio         PT/INR - ( 05 Jul 2023 03:30 )   PT: ;   INR: 1.78 ratio           Urinalysis Basic - ( 06 Jul 2023 01:00 )    Color: x / Appearance: x / SG: x / pH: x  Gluc: 139 mg/dL / Ketone: x  / Bili: x / Urobili: x   Blood: x / Protein: x / Nitrite: x   Leuk Esterase: x / RBC: x / WBC x   Sq Epi: x / Non Sq Epi: x / Bacteria: x        RADIOLOGY & ADDITIONAL TESTS:  CXR:       EKG:    Allergies    No Known Allergies    Intolerances      MEDICATIONS  (STANDING):  albumin human  5% IVPB 500 milliLiter(s) IV Intermittent once  cefepime   IVPB 1000 milliGRAM(s) IV Intermittent every 8 hours  chlorhexidine 2% Cloths 1 Application(s) Topical daily  dextrose 5%. 1000 milliLiter(s) (50 mL/Hr) IV Continuous <Continuous>  dextrose 5%. 1000 milliLiter(s) (100 mL/Hr) IV Continuous <Continuous>  dextrose 50% Injectable 50 milliLiter(s) IV Push every 15 minutes  dextrose 50% Injectable 25 milliLiter(s) IV Push every 15 minutes  diltiazem    Tablet 60 milliGRAM(s) Oral every 6 hours  glucagon  Injectable 1 milliGRAM(s) IntraMuscular once  insulin lispro (ADMELOG) corrective regimen sliding scale   SubCutaneous three times a day before meals  melatonin 5 milliGRAM(s) Oral at bedtime  metoprolol tartrate 25 milliGRAM(s) Oral every 12 hours  pantoprazole    Tablet 40 milliGRAM(s) Oral daily  polyethylene glycol 3350 17 Gram(s) Oral daily  potassium chloride    Tablet ER 20 milliEquivalent(s) Oral daily    MEDICATIONS  (PRN):  acetaminophen     Tablet .. 650 milliGRAM(s) Oral every 6 hours PRN Mild Pain (1 - 3)  ALPRAZolam 0.5 milliGRAM(s) Oral at bedtime PRN anxiety  dextrose Oral Gel 15 Gram(s) Oral once PRN Blood Glucose LESS THAN 70 milliGRAM(s)/deciliter  ondansetron Injectable 4 milliGRAM(s) IV Push every 8 hours PRN Nausea and/or Vomiting  oxyCODONE    IR 5 milliGRAM(s) Oral every 4 hours PRN Moderate Pain (4 - 6)  oxyCODONE    IR 10 milliGRAM(s) Oral every 4 hours PRN Severe Pain (7 - 10)    Home Medications:  Metoprolol Succinate  mg oral tablet, extended release: 1 tablet, extended release orally once a day (29 Jun 2023 06:42)      Pharmacologic DVT Prophylaxis [] YES, [] NO: Contraindication:  SCD's: YES b/l    GI Prophylaxis:     Post-Op Beta-Blockers: [] Yes, [] No: contraindication:  Post-Op CCB: [] Yes, [] No: contraindication:  Post-Op Aspirin:  [] Yes, [] No: contraindication:  Post-Op Statin:  [] Yes, [] No: contraindication:    Ambulation/Activity Status:    Assessment/Plan:  42y Female status-post AVR POD 7  - Case and plan discussed with CTU Intensivist and CT Surgeon - Dr. Perez/Duane/Reza/Meliza  - Continue CTU supportive care and ongoing plan of care as per continuing CTU rounds.   - Continue DVT/GI prophylaxis  - Incentive Spirometry 10 times an hour  - Continue to advance physical activity as tolerated and continue PT/OT as directed  1. CAD s/p CABG: Continue ASA, statin, BB  2. HTN:   3. Post-op A. Fib ppx:   4. COPD/Hypoxia:   5. DM/Glucose Control:     Social Service Disposition:     OPERATIVE PROCEDURE(s): AVR               POD # 7                       SURGEON(s): JIMBO Zepeda      SUBJECTIVE ASSESSMENT:42yFemale patient seen and examined at bedside. No complaints at this time.     Vital Signs Last 24 Hrs  T(F): 98.2 (06 Jul 2023 04:00), Max: 98.9 (05 Jul 2023 20:00)  HR: 82 (06 Jul 2023 07:13) (82 - 133)  BP: 91/57 (06 Jul 2023 07:13) (85/51 - 144/61)  BP(mean): 69 (06 Jul 2023 07:13) (63 - 90)  RR: 20 (06 Jul 2023 07:13) (16 - 38)  SpO2: 97% (06 Jul 2023 07:13) (96% - 99%)      I&O's Detail    05 Jul 2023 07:01  -  06 Jul 2023 07:00  --------------------------------------------------------  IN:    IV PiggyBack: 250 mL    Oral Fluid: 900 mL  Total IN: 1150 mL    OUT:    Voided (mL): 2300 mL  Total OUT: 2300 mL        Net: I&O's Detail    04 Jul 2023 07:01  -  05 Jul 2023 07:00  --------------------------------------------------------  Total NET: -1565 mL      05 Jul 2023 07:01  -  06 Jul 2023 07:00  --------------------------------------------------------  Total NET: -1150 mL        CAPILLARY BLOOD GLUCOSE      POCT Blood Glucose.: 149 mg/dL (06 Jul 2023 06:43)  POCT Blood Glucose.: 120 mg/dL (05 Jul 2023 21:44)  POCT Blood Glucose.: 117 mg/dL (05 Jul 2023 16:38)  POCT Blood Glucose.: 135 mg/dL (05 Jul 2023 12:00)    A1C with Estimated Average Glucose Result: 6.0 % (06-14-23 @ 16:19)      Physical Exam:  General: NAD; A&Ox3  Cardiac: S1/S2, RRR, no murmur, no rubs  Lungs: unlabored respirations, CTA b/l, no wheeze, no rales, no crackles  Abdomen: Soft/NT/ND; positive bowel sounds x 4  Sternum: Intact, no click, incision healing well with no drainage  Incisions: Incisions clean/dry/intact  Extremities: No edema b/l lower extremities; good capillary refill; no cyanosis; palpable 1+ pedal pulses b/l        BOWEL MOVEMENT:  [X] YES [] NO, If No, Timing since last BM Day #1        LABS:                        10.9<L>  13.39<H> )-----------( 360      ( 06 Jul 2023 01:00 )             33.7<L>                        11.0<L>  12.29<H> )-----------( 345      ( 05 Jul 2023 03:30 )             34.3<L>    07-06    133<L>  |  96<L>  |  18  ----------------------------<  139<H>  4.1   |  28  |  0.8  07-05    138  |  95<L>  |  19  ----------------------------<  110<H>  4.1   |  31  |  0.8    Ca    9.0      06 Jul 2023 01:00  Mg     1.8     07-06    TPro  5.9<L> [6.0 - 8.0]  /  Alb  3.4<L> [3.5 - 5.2]  /  TBili  1.1 [0.2 - 1.2]  /  DBili  x   /  AST  33 [0 - 41]  /  ALT  26 [0 - 41]  /  AlkPhos  101 [30 - 115]  07-06    PT/INR - ( 06 Jul 2023 01:00 )   PT: ;   INR: 1.49 ratio         PT/INR - ( 05 Jul 2023 03:30 )   PT: ;   INR: 1.78 ratio           Urinalysis Basic - ( 06 Jul 2023 01:00 )    Color: x / Appearance: x / SG: x / pH: x  Gluc: 139 mg/dL / Ketone: x  / Bili: x / Urobili: x   Blood: x / Protein: x / Nitrite: x   Leuk Esterase: x / RBC: x / WBC x   Sq Epi: x / Non Sq Epi: x / Bacteria: x        RADIOLOGY & ADDITIONAL TESTS:  CXR:   < from: Xray Chest 1 View- PORTABLE-Routine (Xray Chest 1 View- PORTABLE-Routine in AM.) (07.06.23 @ 06:34) >    INTERPRETATION:  Clinical History/Reason for Exam:  s/p avr    Technique:  Frontal view the chest.    Comparison: Chest x-ray 7/5/2023.    Findings:    Support devices:  none    Cardiac/mediastinum/hilum: Stable cardiac silhouette. Post aortic valve   replacement and postsurgical clip    Lung parenchyma/ Pleura: Stable left basilar opacity. Minimal blunting   right costophrenic angle. No pneumothorax. Azygos fissure      Skeleton/soft tissues: Stable      Impression:    Stable left basilar opacity.     Minimal blunting right costophrenic angle.     No pneumothorax.      EKG:  < from: 12 Lead ECG (07.06.23 @ 07:06) >  Ventricular Rate 86 BPM    Atrial Rate 344 BPM    QRS Duration 128 ms    Q-T Interval 414 ms    QTC Calculation(Bazett) 495 ms    R Axis -53 degrees    T Axis 129 degrees    Diagnosis Line Atrial flutter with variable A-V block  Left axis deviation  Left ventricular hypertrophy with QRS widening and repolarization abnormality  Abnormal ECG      Allergies    No Known Allergies    Intolerances      MEDICATIONS  (STANDING):  albumin human  5% IVPB 500 milliLiter(s) IV Intermittent once  cefepime   IVPB 1000 milliGRAM(s) IV Intermittent every 8 hours  chlorhexidine 2% Cloths 1 Application(s) Topical daily  dextrose 5%. 1000 milliLiter(s) (50 mL/Hr) IV Continuous <Continuous>  dextrose 5%. 1000 milliLiter(s) (100 mL/Hr) IV Continuous <Continuous>  dextrose 50% Injectable 50 milliLiter(s) IV Push every 15 minutes  dextrose 50% Injectable 25 milliLiter(s) IV Push every 15 minutes  diltiazem    Tablet 60 milliGRAM(s) Oral every 6 hours  glucagon  Injectable 1 milliGRAM(s) IntraMuscular once  insulin lispro (ADMELOG) corrective regimen sliding scale   SubCutaneous three times a day before meals  melatonin 5 milliGRAM(s) Oral at bedtime  metoprolol tartrate 25 milliGRAM(s) Oral every 12 hours  pantoprazole    Tablet 40 milliGRAM(s) Oral daily  polyethylene glycol 3350 17 Gram(s) Oral daily  potassium chloride    Tablet ER 20 milliEquivalent(s) Oral daily    MEDICATIONS  (PRN):  acetaminophen     Tablet .. 650 milliGRAM(s) Oral every 6 hours PRN Mild Pain (1 - 3)  ALPRAZolam 0.5 milliGRAM(s) Oral at bedtime PRN anxiety  dextrose Oral Gel 15 Gram(s) Oral once PRN Blood Glucose LESS THAN 70 milliGRAM(s)/deciliter  ondansetron Injectable 4 milliGRAM(s) IV Push every 8 hours PRN Nausea and/or Vomiting  oxyCODONE    IR 5 milliGRAM(s) Oral every 4 hours PRN Moderate Pain (4 - 6)  oxyCODONE    IR 10 milliGRAM(s) Oral every 4 hours PRN Severe Pain (7 - 10)    Home Medications:  Metoprolol Succinate  mg oral tablet, extended release: 1 tablet, extended release orally once a day (29 Jun 2023 06:42)      Pharmacologic DVT Prophylaxis [] YES, [] NO: Contraindication:  SCD's: YES b/l    GI Prophylaxis: protonix 40mg PO    Post-Op Beta-Blockers: [X] Yes, [] No: contraindication:  Post-Op Aspirin:  [] Yes, [X] No: contraindication:  Post-Op Statin:  [] Yes, [X] No: contraindication:    Ambulation/Activity Status: ambulate as tolerated    Assessment/Plan:  42y Female status-post AVR POD 7  - Case and plan discussed with CTU Intensivist and CT Surgeon - Dr. Perez/Duane/Reza/Meliza  - Continue CTU supportive care and ongoing plan of care as per continuing CTU rounds.   - Continue DVT/GI prophylaxis  - Incentive Spirometry 10 times an hour  - Continue to advance physical activity as tolerated and continue PT/OT as directed  1. Aortic mass s/p AVR & mass excision:  INR 1.4 today, may start coumadin 1mg tonight, check inr in am   2. HTN: cont lopressor   3. A. Fib: cont coumadin & cardizem, BB 12.5 q6h, EP following -> increase BB if patient can tolerate it, consider adding digoxin if patient unable to tolerate BB  4. COPD/Hypoxia: cont nebs, wean o2 as tolerated, encourage incentive  5. Leukocytosis: afebrile WBCs trending up, blood cx drawn, on Cefepime, IV Vanco 1gm q12h ordered, cont to monitor WBCs.   - PA/Lat ordered for AM.     Social Service Disposition: TBD   OPERATIVE PROCEDURE(s): AVR               POD # 7                       SURGEON(s): JIMBO Zepeda      SUBJECTIVE ASSESSMENT:42yFemale patient seen and examined at bedside. No complaints at this time.     Vital Signs Last 24 Hrs  T(F): 98.2 (06 Jul 2023 04:00), Max: 98.9 (05 Jul 2023 20:00)  HR: 82 (06 Jul 2023 07:13) (82 - 133)  BP: 91/57 (06 Jul 2023 07:13) (85/51 - 144/61)  BP(mean): 69 (06 Jul 2023 07:13) (63 - 90)  RR: 20 (06 Jul 2023 07:13) (16 - 38)  SpO2: 97% (06 Jul 2023 07:13) (96% - 99%)      I&O's Detail    05 Jul 2023 07:01  -  06 Jul 2023 07:00  --------------------------------------------------------  IN:    IV PiggyBack: 250 mL    Oral Fluid: 900 mL  Total IN: 1150 mL    OUT:    Voided (mL): 2300 mL  Total OUT: 2300 mL        Net: I&O's Detail    04 Jul 2023 07:01  -  05 Jul 2023 07:00  --------------------------------------------------------  Total NET: -1565 mL      05 Jul 2023 07:01  -  06 Jul 2023 07:00  --------------------------------------------------------  Total NET: -1150 mL        CAPILLARY BLOOD GLUCOSE      POCT Blood Glucose.: 149 mg/dL (06 Jul 2023 06:43)  POCT Blood Glucose.: 120 mg/dL (05 Jul 2023 21:44)  POCT Blood Glucose.: 117 mg/dL (05 Jul 2023 16:38)  POCT Blood Glucose.: 135 mg/dL (05 Jul 2023 12:00)    A1C with Estimated Average Glucose Result: 6.0 % (06-14-23 @ 16:19)      Physical Exam:  General: NAD; A&Ox3  Cardiac: S1/S2, RRR, no murmur, no rubs  Lungs: unlabored respirations, CTA b/l, no wheeze, no rales, no crackles  Abdomen: Soft/NT/ND; positive bowel sounds x 4  Sternum: Intact, no click, incision healing well with no drainage  Incisions: Incisions clean/dry/intact  Extremities: No edema b/l lower extremities; good capillary refill; no cyanosis; palpable 1+ pedal pulses b/l        BOWEL MOVEMENT:  [X] YES [] NO, If No, Timing since last BM Day #1        LABS:                        10.9<L>  13.39<H> )-----------( 360      ( 06 Jul 2023 01:00 )             33.7<L>                        11.0<L>  12.29<H> )-----------( 345      ( 05 Jul 2023 03:30 )             34.3<L>    07-06    133<L>  |  96<L>  |  18  ----------------------------<  139<H>  4.1   |  28  |  0.8  07-05    138  |  95<L>  |  19  ----------------------------<  110<H>  4.1   |  31  |  0.8    Ca    9.0      06 Jul 2023 01:00  Mg     1.8     07-06    TPro  5.9<L> [6.0 - 8.0]  /  Alb  3.4<L> [3.5 - 5.2]  /  TBili  1.1 [0.2 - 1.2]  /  DBili  x   /  AST  33 [0 - 41]  /  ALT  26 [0 - 41]  /  AlkPhos  101 [30 - 115]  07-06    PT/INR - ( 06 Jul 2023 01:00 )   PT: ;   INR: 1.49 ratio         PT/INR - ( 05 Jul 2023 03:30 )   PT: ;   INR: 1.78 ratio           Urinalysis Basic - ( 06 Jul 2023 01:00 )    Color: x / Appearance: x / SG: x / pH: x  Gluc: 139 mg/dL / Ketone: x  / Bili: x / Urobili: x   Blood: x / Protein: x / Nitrite: x   Leuk Esterase: x / RBC: x / WBC x   Sq Epi: x / Non Sq Epi: x / Bacteria: x        RADIOLOGY & ADDITIONAL TESTS:  CXR:   < from: Xray Chest 1 View- PORTABLE-Routine (Xray Chest 1 View- PORTABLE-Routine in AM.) (07.06.23 @ 06:34) >    INTERPRETATION:  Clinical History/Reason for Exam:  s/p avr    Technique:  Frontal view the chest.    Comparison: Chest x-ray 7/5/2023.    Findings:    Support devices:  none    Cardiac/mediastinum/hilum: Stable cardiac silhouette. Post aortic valve   replacement and postsurgical clip    Lung parenchyma/ Pleura: Stable left basilar opacity. Minimal blunting   right costophrenic angle. No pneumothorax. Azygos fissure      Skeleton/soft tissues: Stable      Impression:    Stable left basilar opacity.     Minimal blunting right costophrenic angle.     No pneumothorax.      EKG:  < from: 12 Lead ECG (07.06.23 @ 07:06) >  Ventricular Rate 86 BPM    Atrial Rate 344 BPM    QRS Duration 128 ms    Q-T Interval 414 ms    QTC Calculation(Bazett) 495 ms    R Axis -53 degrees    T Axis 129 degrees    Diagnosis Line Atrial flutter with variable A-V block  Left axis deviation  Left ventricular hypertrophy with QRS widening and repolarization abnormality  Abnormal ECG      Allergies    No Known Allergies    Intolerances      MEDICATIONS  (STANDING):  albumin human  5% IVPB 500 milliLiter(s) IV Intermittent once  cefepime   IVPB 1000 milliGRAM(s) IV Intermittent every 8 hours  chlorhexidine 2% Cloths 1 Application(s) Topical daily  dextrose 5%. 1000 milliLiter(s) (50 mL/Hr) IV Continuous <Continuous>  dextrose 5%. 1000 milliLiter(s) (100 mL/Hr) IV Continuous <Continuous>  dextrose 50% Injectable 50 milliLiter(s) IV Push every 15 minutes  dextrose 50% Injectable 25 milliLiter(s) IV Push every 15 minutes  diltiazem    Tablet 60 milliGRAM(s) Oral every 6 hours  glucagon  Injectable 1 milliGRAM(s) IntraMuscular once  insulin lispro (ADMELOG) corrective regimen sliding scale   SubCutaneous three times a day before meals  melatonin 5 milliGRAM(s) Oral at bedtime  metoprolol tartrate 25 milliGRAM(s) Oral every 12 hours  pantoprazole    Tablet 40 milliGRAM(s) Oral daily  polyethylene glycol 3350 17 Gram(s) Oral daily  potassium chloride    Tablet ER 20 milliEquivalent(s) Oral daily    MEDICATIONS  (PRN):  acetaminophen     Tablet .. 650 milliGRAM(s) Oral every 6 hours PRN Mild Pain (1 - 3)  ALPRAZolam 0.5 milliGRAM(s) Oral at bedtime PRN anxiety  dextrose Oral Gel 15 Gram(s) Oral once PRN Blood Glucose LESS THAN 70 milliGRAM(s)/deciliter  ondansetron Injectable 4 milliGRAM(s) IV Push every 8 hours PRN Nausea and/or Vomiting  oxyCODONE    IR 5 milliGRAM(s) Oral every 4 hours PRN Moderate Pain (4 - 6)  oxyCODONE    IR 10 milliGRAM(s) Oral every 4 hours PRN Severe Pain (7 - 10)    Home Medications:  Metoprolol Succinate  mg oral tablet, extended release: 1 tablet, extended release orally once a day (29 Jun 2023 06:42)      Pharmacologic DVT Prophylaxis [] YES, [X] NO: Contraindication: INR levels high, now low, will reassess in AM  SCD's: YES b/l    GI Prophylaxis: protonix 40mg PO    Post-Op Beta-Blockers: [X] Yes, [] No: contraindication:  Post-Op Statin:  [] Yes, [X] No: contraindication: AVR w/ no hx of CAD    Ambulation/Activity Status: ambulate as tolerated    Assessment/Plan:  42y Female status-post AVR POD 7  - Case and plan discussed with CTU Intensivist and CT Surgeon - Dr. Perez/Duane/Reza/Meliza  - Continue CTU supportive care and ongoing plan of care as per continuing CTU rounds.   - Continue DVT/GI prophylaxis  - Incentive Spirometry 10 times an hour  - Continue to advance physical activity as tolerated and continue PT/OT as directed  1. Aortic mass s/p AVR & mass excision:  INR 1.4 today, may start coumadin 1mg tonight, check inr in am   2. HTN: cont lopressor   3. A. Fib: cont coumadin & cardizem, BB 12.5 q6h, EP following -> increase BB if patient can tolerate it, consider adding digoxin if patient unable to tolerate BB  4. COPD/Hypoxia: cont nebs, wean o2 as tolerated, encourage incentive  5. Leukocytosis: afebrile WBCs trending up, blood cx drawn, on Cefepime, IV Vanco 1gm q12h ordered, cont to monitor WBCs.   - PA/Lat ordered for AM.     Social Service Disposition: TBD   OPERATIVE PROCEDURE(s): AVR               POD # 7                       SURGEON(s): JIMBO Zepeda      SUBJECTIVE ASSESSMENT:42yFemale patient seen and examined at bedside. No complaints at this time.     Vital Signs Last 24 Hrs  T(F): 98.2 (06 Jul 2023 04:00), Max: 98.9 (05 Jul 2023 20:00)  HR: 82 (06 Jul 2023 07:13) (82 - 133)  BP: 91/57 (06 Jul 2023 07:13) (85/51 - 144/61)  BP(mean): 69 (06 Jul 2023 07:13) (63 - 90)  RR: 20 (06 Jul 2023 07:13) (16 - 38)  SpO2: 97% (06 Jul 2023 07:13) (96% - 99%)      I&O's Detail    05 Jul 2023 07:01  -  06 Jul 2023 07:00  --------------------------------------------------------  IN:    IV PiggyBack: 250 mL    Oral Fluid: 900 mL  Total IN: 1150 mL    OUT:    Voided (mL): 2300 mL  Total OUT: 2300 mL        Net: I&O's Detail    04 Jul 2023 07:01  -  05 Jul 2023 07:00  --------------------------------------------------------  Total NET: -1565 mL      05 Jul 2023 07:01  -  06 Jul 2023 07:00  --------------------------------------------------------  Total NET: -1150 mL        CAPILLARY BLOOD GLUCOSE      POCT Blood Glucose.: 149 mg/dL (06 Jul 2023 06:43)  POCT Blood Glucose.: 120 mg/dL (05 Jul 2023 21:44)  POCT Blood Glucose.: 117 mg/dL (05 Jul 2023 16:38)  POCT Blood Glucose.: 135 mg/dL (05 Jul 2023 12:00)    A1C with Estimated Average Glucose Result: 6.0 % (06-14-23 @ 16:19)      Physical Exam:  General: NAD; A&Ox3  Cardiac: S1/S2, RRR, no murmur, no rubs  Lungs: unlabored respirations, CTA b/l, no wheeze, no rales, no crackles  Abdomen: Soft/NT/ND; positive bowel sounds x 4  Sternum: Intact, no click, incision healing well with no drainage  Incisions: Incisions clean/dry/intact  Extremities: No edema b/l lower extremities; good capillary refill; no cyanosis; palpable 1+ pedal pulses b/l        BOWEL MOVEMENT:  [X] YES [] NO, If No, Timing since last BM Day #1        LABS:                        10.9<L>  13.39<H> )-----------( 360      ( 06 Jul 2023 01:00 )             33.7<L>                        11.0<L>  12.29<H> )-----------( 345      ( 05 Jul 2023 03:30 )             34.3<L>    07-06    133<L>  |  96<L>  |  18  ----------------------------<  139<H>  4.1   |  28  |  0.8  07-05    138  |  95<L>  |  19  ----------------------------<  110<H>  4.1   |  31  |  0.8    Ca    9.0      06 Jul 2023 01:00  Mg     1.8     07-06    TPro  5.9<L> [6.0 - 8.0]  /  Alb  3.4<L> [3.5 - 5.2]  /  TBili  1.1 [0.2 - 1.2]  /  DBili  x   /  AST  33 [0 - 41]  /  ALT  26 [0 - 41]  /  AlkPhos  101 [30 - 115]  07-06    PT/INR - ( 06 Jul 2023 01:00 )   PT: ;   INR: 1.49 ratio         PT/INR - ( 05 Jul 2023 03:30 )   PT: ;   INR: 1.78 ratio           Urinalysis Basic - ( 06 Jul 2023 01:00 )    Color: x / Appearance: x / SG: x / pH: x  Gluc: 139 mg/dL / Ketone: x  / Bili: x / Urobili: x   Blood: x / Protein: x / Nitrite: x   Leuk Esterase: x / RBC: x / WBC x   Sq Epi: x / Non Sq Epi: x / Bacteria: x        RADIOLOGY & ADDITIONAL TESTS:  CXR:   < from: Xray Chest 1 View- PORTABLE-Routine (Xray Chest 1 View- PORTABLE-Routine in AM.) (07.06.23 @ 06:34) >    INTERPRETATION:  Clinical History/Reason for Exam:  s/p avr    Technique:  Frontal view the chest.    Comparison: Chest x-ray 7/5/2023.    Findings:    Support devices:  none    Cardiac/mediastinum/hilum: Stable cardiac silhouette. Post aortic valve   replacement and postsurgical clip    Lung parenchyma/ Pleura: Stable left basilar opacity. Minimal blunting   right costophrenic angle. No pneumothorax. Azygos fissure      Skeleton/soft tissues: Stable      Impression:    Stable left basilar opacity.     Minimal blunting right costophrenic angle.     No pneumothorax.      EKG:  < from: 12 Lead ECG (07.06.23 @ 07:06) >  Ventricular Rate 86 BPM    Atrial Rate 344 BPM    QRS Duration 128 ms    Q-T Interval 414 ms    QTC Calculation(Bazett) 495 ms    R Axis -53 degrees    T Axis 129 degrees    Diagnosis Line Atrial flutter with variable A-V block  Left axis deviation  Left ventricular hypertrophy with QRS widening and repolarization abnormality  Abnormal ECG      Allergies    No Known Allergies    Intolerances      MEDICATIONS  (STANDING):  albumin human  5% IVPB 500 milliLiter(s) IV Intermittent once  cefepime   IVPB 1000 milliGRAM(s) IV Intermittent every 8 hours  chlorhexidine 2% Cloths 1 Application(s) Topical daily  dextrose 5%. 1000 milliLiter(s) (50 mL/Hr) IV Continuous <Continuous>  dextrose 5%. 1000 milliLiter(s) (100 mL/Hr) IV Continuous <Continuous>  dextrose 50% Injectable 50 milliLiter(s) IV Push every 15 minutes  dextrose 50% Injectable 25 milliLiter(s) IV Push every 15 minutes  diltiazem    Tablet 60 milliGRAM(s) Oral every 6 hours  glucagon  Injectable 1 milliGRAM(s) IntraMuscular once  insulin lispro (ADMELOG) corrective regimen sliding scale   SubCutaneous three times a day before meals  melatonin 5 milliGRAM(s) Oral at bedtime  metoprolol tartrate 25 milliGRAM(s) Oral every 12 hours  pantoprazole    Tablet 40 milliGRAM(s) Oral daily  polyethylene glycol 3350 17 Gram(s) Oral daily  potassium chloride    Tablet ER 20 milliEquivalent(s) Oral daily    MEDICATIONS  (PRN):  acetaminophen     Tablet .. 650 milliGRAM(s) Oral every 6 hours PRN Mild Pain (1 - 3)  ALPRAZolam 0.5 milliGRAM(s) Oral at bedtime PRN anxiety  dextrose Oral Gel 15 Gram(s) Oral once PRN Blood Glucose LESS THAN 70 milliGRAM(s)/deciliter  ondansetron Injectable 4 milliGRAM(s) IV Push every 8 hours PRN Nausea and/or Vomiting  oxyCODONE    IR 5 milliGRAM(s) Oral every 4 hours PRN Moderate Pain (4 - 6)  oxyCODONE    IR 10 milliGRAM(s) Oral every 4 hours PRN Severe Pain (7 - 10)    Home Medications:  Metoprolol Succinate  mg oral tablet, extended release: 1 tablet, extended release orally once a day (29 Jun 2023 06:42)      Pharmacologic DVT Prophylaxis [] YES, [X] NO: Contraindication: No indication, patient on coumadin  SCD's: YES b/l    GI Prophylaxis: protonix 40mg PO    Post-Op Beta-Blockers: [X] Yes, [] No: contraindication:  Post-Op Statin:  [] Yes, [X] No: contraindication: AVR w/ no hx of CAD    Ambulation/Activity Status: ambulate as tolerated    Assessment/Plan:  42y Female status-post AVR POD 7  - Case and plan discussed with CTU Intensivist and CT Surgeon - Dr. Perez/Duane/Reza/Meliza  - Continue CTU supportive care and ongoing plan of care as per continuing CTU rounds.   - Continue DVT/GI prophylaxis  - Incentive Spirometry 10 times an hour  - Continue to advance physical activity as tolerated and continue PT/OT as directed  1. Aortic mass s/p AVR & mass excision:  INR 1.4 today, may start coumadin 1mg tonight, check inr in am   2. HTN: cont lopressor   3. A. Fib: cont coumadin & cardizem, BB 12.5 q6h, EP following -> increase BB if patient can tolerate it, consider adding digoxin if patient unable to tolerate BB  4. COPD/Hypoxia: cont nebs, wean o2 as tolerated, encourage incentive  5. Leukocytosis: afebrile WBCs trending up, blood cx drawn, on Cefepime, IV Vanco 1gm q12h ordered, cont to monitor WBCs.   - PA/Lat ordered for AM.     Social Service Disposition: TBD

## 2023-07-06 NOTE — DIETITIAN INITIAL EVALUATION ADULT - ORAL INTAKE PTA/DIET HISTORY
Pt reports consuming generally regular diet PTA with usual good appetite. Denies restrictions in diet, food allergies or intolerances. Denies difficulty chewing or swallowing.

## 2023-07-06 NOTE — DIETITIAN INITIAL EVALUATION ADULT - OTHER INFO
Pertinent Medical Information: 43 y/o F with pmhx of afib/ aflutter, cardiomyopathy, DLD, HTN and Aortic valve fibroelastoma. POD#7 s/p aortic valve mass resection and left atrial appendage clipping and aortic valve replacement on 6/29/30.

## 2023-07-06 NOTE — DIETITIAN INITIAL EVALUATION ADULT - PERTINENT LABORATORY DATA
07-06    133<L>  |  96<L>  |  18  ----------------------------<  139<H>  4.1   |  28  |  0.8    Ca    9.0      06 Jul 2023 01:00  Mg     1.8     07-06    TPro  5.9<L>  /  Alb  3.4<L>  /  TBili  1.1  /  DBili  x   /  AST  33  /  ALT  26  /  AlkPhos  101  07-06  POCT Blood Glucose.: 149 mg/dL (07-06-23 @ 06:43)  A1C with Estimated Average Glucose Result: 6.0 % (06-14-23 @ 16:19)

## 2023-07-06 NOTE — CONSULT NOTE ADULT - ASSESSMENT
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 She underwent aortic valve mass resection and left atrial appendage clipping and aortic valve replacement. post operatively patient went into afib and Ep consulted for afib management and rate control recommendations.     afib  tele reviewed with Dr tai   patient currently on diltiazem 60 mg  every 6 hrs rec to continue.   metoprolol 12.5 every 6hrs rec to titrate bb as patient vitals/ bp parameters allow. if unable to tolerate bb titration  rec to start digoxin for optimal rate control.   on coumadin   cont to trend INR to goal levels   cont to monitor and  replete electrolytes.   cont to correct any other other afib causes.   patient seen examined and discussed with Dr Tai Ep attending.     AVR mechanical valve st walker   POD # 7   cont care per cvicu team    Continue to advance physical activity as tolerated and continue PT/OT as directed    plan discussed with patient cardiac team and dr Tai   Ep team will continue to follow.    EP: Dr. 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 She underwent aortic valve mass resection and left atrial appendage clipping and aortic valve replacement. post operatively patient went into afib and Ep consulted for afib management and rate control recommendations.     # AFib  - patient currently on diltiazem 60 mg PO every 6 hrs rec and metoprolol 12.5 every 6hrs   - Rec titrate bb as patient vitals/ bp parameters allow. if unable to tolerate bb titration  rec to start digoxin for optimal rate control.   - Cont coumadin   - cont to trend INR to goal levels   - cont to monitor and  replete electrolytes.   - cont to correct any other exacerbating causes of AF with RVR    # AVR mechanical valve st walker POD # 7     plan discussed with patient cardiac team and dr Tai   Ep team will continue to follow.

## 2023-07-06 NOTE — DIETITIAN INITIAL EVALUATION ADULT - OTHER CALCULATIONS
Estimated Energy Needs: 5573-8825 kcal/day (MSJ REE 1476 x 1.0-1.2 AF)   Estimated Protein Needs:  gm/day (1.0-1.2 gm/kg)   Estimated Fluid Needs: 3425-0098 mL/day (1mL/kcal) or as restricted per MD   Needs based on ABW 88.7 kg with consideration for age, weight, BMI, s/p surgery

## 2023-07-07 LAB
ALBUMIN SERPL ELPH-MCNC: 3.6 G/DL — SIGNIFICANT CHANGE UP (ref 3.5–5.2)
ALP SERPL-CCNC: 110 U/L — SIGNIFICANT CHANGE UP (ref 30–115)
ALT FLD-CCNC: 30 U/L — SIGNIFICANT CHANGE UP (ref 0–41)
ANION GAP SERPL CALC-SCNC: 13 MMOL/L — SIGNIFICANT CHANGE UP (ref 7–14)
AST SERPL-CCNC: 38 U/L — SIGNIFICANT CHANGE UP (ref 0–41)
BASOPHILS # BLD AUTO: 0.07 K/UL — SIGNIFICANT CHANGE UP (ref 0–0.2)
BASOPHILS NFR BLD AUTO: 0.5 % — SIGNIFICANT CHANGE UP (ref 0–1)
BILIRUB SERPL-MCNC: 1.1 MG/DL — SIGNIFICANT CHANGE UP (ref 0.2–1.2)
BUN SERPL-MCNC: 15 MG/DL — SIGNIFICANT CHANGE UP (ref 10–20)
CALCIUM SERPL-MCNC: 9 MG/DL — SIGNIFICANT CHANGE UP (ref 8.4–10.5)
CHLORIDE SERPL-SCNC: 96 MMOL/L — LOW (ref 98–110)
CO2 SERPL-SCNC: 26 MMOL/L — SIGNIFICANT CHANGE UP (ref 17–32)
CREAT SERPL-MCNC: 0.7 MG/DL — SIGNIFICANT CHANGE UP (ref 0.7–1.5)
EGFR: 110 ML/MIN/1.73M2 — SIGNIFICANT CHANGE UP
EOSINOPHIL # BLD AUTO: 0.21 K/UL — SIGNIFICANT CHANGE UP (ref 0–0.7)
EOSINOPHIL NFR BLD AUTO: 1.4 % — SIGNIFICANT CHANGE UP (ref 0–8)
GLUCOSE SERPL-MCNC: 98 MG/DL — SIGNIFICANT CHANGE UP (ref 70–99)
HCT VFR BLD CALC: 35.9 % — LOW (ref 37–47)
HGB BLD-MCNC: 11.5 G/DL — LOW (ref 12–16)
IMM GRANULOCYTES NFR BLD AUTO: 1.4 % — HIGH (ref 0.1–0.3)
INR BLD: 1.43 RATIO — HIGH (ref 0.65–1.3)
LYMPHOCYTES # BLD AUTO: 1.41 K/UL — SIGNIFICANT CHANGE UP (ref 1.2–3.4)
LYMPHOCYTES # BLD AUTO: 9.1 % — LOW (ref 20.5–51.1)
MAGNESIUM SERPL-MCNC: 1.9 MG/DL — SIGNIFICANT CHANGE UP (ref 1.8–2.4)
MCHC RBC-ENTMCNC: 26 PG — LOW (ref 27–31)
MCHC RBC-ENTMCNC: 32 G/DL — SIGNIFICANT CHANGE UP (ref 32–37)
MCV RBC AUTO: 81 FL — SIGNIFICANT CHANGE UP (ref 81–99)
MONOCYTES # BLD AUTO: 1.31 K/UL — HIGH (ref 0.1–0.6)
MONOCYTES NFR BLD AUTO: 8.4 % — SIGNIFICANT CHANGE UP (ref 1.7–9.3)
NEUTROPHILS # BLD AUTO: 12.3 K/UL — HIGH (ref 1.4–6.5)
NEUTROPHILS NFR BLD AUTO: 79.2 % — HIGH (ref 42.2–75.2)
NRBC # BLD: 0 /100 WBCS — SIGNIFICANT CHANGE UP (ref 0–0)
PLATELET # BLD AUTO: 389 K/UL — SIGNIFICANT CHANGE UP (ref 130–400)
PMV BLD: 10.5 FL — HIGH (ref 7.4–10.4)
POTASSIUM SERPL-MCNC: 4.5 MMOL/L — SIGNIFICANT CHANGE UP (ref 3.5–5)
POTASSIUM SERPL-SCNC: 4.5 MMOL/L — SIGNIFICANT CHANGE UP (ref 3.5–5)
PROT SERPL-MCNC: 6.5 G/DL — SIGNIFICANT CHANGE UP (ref 6–8)
PROTHROM AB SERPL-ACNC: 16.5 SEC — HIGH (ref 9.95–12.87)
RBC # BLD: 4.43 M/UL — SIGNIFICANT CHANGE UP (ref 4.2–5.4)
RBC # FLD: 16.4 % — HIGH (ref 11.5–14.5)
SODIUM SERPL-SCNC: 135 MMOL/L — SIGNIFICANT CHANGE UP (ref 135–146)
WBC # BLD: 15.51 K/UL — HIGH (ref 4.8–10.8)
WBC # FLD AUTO: 15.51 K/UL — HIGH (ref 4.8–10.8)

## 2023-07-07 PROCEDURE — 99233 SBSQ HOSP IP/OBS HIGH 50: CPT

## 2023-07-07 PROCEDURE — 71046 X-RAY EXAM CHEST 2 VIEWS: CPT | Mod: 26

## 2023-07-07 PROCEDURE — 93010 ELECTROCARDIOGRAM REPORT: CPT

## 2023-07-07 RX ORDER — ASPIRIN/CALCIUM CARB/MAGNESIUM 324 MG
325 TABLET ORAL DAILY
Refills: 0 | Status: DISCONTINUED | OUTPATIENT
Start: 2023-07-07 | End: 2023-07-07

## 2023-07-07 RX ORDER — AMIODARONE HYDROCHLORIDE 400 MG/1
200 TABLET ORAL DAILY
Refills: 0 | Status: DISCONTINUED | OUTPATIENT
Start: 2023-07-07 | End: 2023-07-12

## 2023-07-07 RX ORDER — HEPARIN SODIUM 5000 [USP'U]/ML
500 INJECTION INTRAVENOUS; SUBCUTANEOUS
Qty: 25000 | Refills: 0 | Status: DISCONTINUED | OUTPATIENT
Start: 2023-07-07 | End: 2023-07-08

## 2023-07-07 RX ORDER — ENOXAPARIN SODIUM 100 MG/ML
40 INJECTION SUBCUTANEOUS ONCE
Refills: 0 | Status: DISCONTINUED | OUTPATIENT
Start: 2023-07-07 | End: 2023-07-07

## 2023-07-07 RX ORDER — AMIODARONE HYDROCHLORIDE 400 MG/1
150 TABLET ORAL ONCE
Refills: 0 | Status: COMPLETED | OUTPATIENT
Start: 2023-07-07 | End: 2023-07-07

## 2023-07-07 RX ORDER — MAGNESIUM SULFATE 500 MG/ML
1 VIAL (ML) INJECTION ONCE
Refills: 0 | Status: COMPLETED | OUTPATIENT
Start: 2023-07-07 | End: 2023-07-07

## 2023-07-07 RX ORDER — METOPROLOL TARTRATE 50 MG
5 TABLET ORAL ONCE
Refills: 0 | Status: COMPLETED | OUTPATIENT
Start: 2023-07-07 | End: 2023-07-07

## 2023-07-07 RX ORDER — WARFARIN SODIUM 2.5 MG/1
3 TABLET ORAL ONCE
Refills: 0 | Status: COMPLETED | OUTPATIENT
Start: 2023-07-07 | End: 2023-07-07

## 2023-07-07 RX ADMIN — Medication 250 MILLIGRAM(S): at 17:46

## 2023-07-07 RX ADMIN — CHLORHEXIDINE GLUCONATE 1 APPLICATION(S): 213 SOLUTION TOPICAL at 05:43

## 2023-07-07 RX ADMIN — PANTOPRAZOLE SODIUM 40 MILLIGRAM(S): 20 TABLET, DELAYED RELEASE ORAL at 13:16

## 2023-07-07 RX ADMIN — Medication 5 MILLIGRAM(S): at 08:00

## 2023-07-07 RX ADMIN — Medication 60 MILLIGRAM(S): at 00:00

## 2023-07-07 RX ADMIN — Medication 25 MILLIGRAM(S): at 13:16

## 2023-07-07 RX ADMIN — Medication 25 MILLIGRAM(S): at 00:45

## 2023-07-07 RX ADMIN — Medication 250 MILLIGRAM(S): at 06:04

## 2023-07-07 RX ADMIN — Medication 0.5 MILLIGRAM(S): at 22:08

## 2023-07-07 RX ADMIN — AMIODARONE HYDROCHLORIDE 600 MILLIGRAM(S): 400 TABLET ORAL at 19:52

## 2023-07-07 RX ADMIN — Medication 60 MILLIGRAM(S): at 23:37

## 2023-07-07 RX ADMIN — AMIODARONE HYDROCHLORIDE 200 MILLIGRAM(S): 400 TABLET ORAL at 17:45

## 2023-07-07 RX ADMIN — Medication 25 MILLIGRAM(S): at 06:05

## 2023-07-07 RX ADMIN — Medication 20 MILLIEQUIVALENT(S): at 13:16

## 2023-07-07 RX ADMIN — Medication 25 MILLIGRAM(S): at 23:37

## 2023-07-07 RX ADMIN — Medication 60 MILLIGRAM(S): at 17:45

## 2023-07-07 RX ADMIN — Medication 60 MILLIGRAM(S): at 06:05

## 2023-07-07 RX ADMIN — Medication 5 MILLIGRAM(S): at 22:07

## 2023-07-07 RX ADMIN — CEFEPIME 100 MILLIGRAM(S): 1 INJECTION, POWDER, FOR SOLUTION INTRAMUSCULAR; INTRAVENOUS at 14:05

## 2023-07-07 RX ADMIN — Medication 100 GRAM(S): at 06:30

## 2023-07-07 RX ADMIN — Medication 60 MILLIGRAM(S): at 13:16

## 2023-07-07 RX ADMIN — CEFEPIME 100 MILLIGRAM(S): 1 INJECTION, POWDER, FOR SOLUTION INTRAMUSCULAR; INTRAVENOUS at 22:08

## 2023-07-07 RX ADMIN — CEFEPIME 100 MILLIGRAM(S): 1 INJECTION, POWDER, FOR SOLUTION INTRAMUSCULAR; INTRAVENOUS at 06:05

## 2023-07-07 RX ADMIN — WARFARIN SODIUM 3 MILLIGRAM(S): 2.5 TABLET ORAL at 22:07

## 2023-07-07 RX ADMIN — HEPARIN SODIUM 5 UNIT(S)/HR: 5000 INJECTION INTRAVENOUS; SUBCUTANEOUS at 19:53

## 2023-07-07 RX ADMIN — HEPARIN SODIUM 5 UNIT(S)/HR: 5000 INJECTION INTRAVENOUS; SUBCUTANEOUS at 23:38

## 2023-07-07 RX ADMIN — Medication 25 MILLIGRAM(S): at 17:46

## 2023-07-07 NOTE — PROGRESS NOTE ADULT - NS ATTEND AMEND GEN_ALL_CORE FT
Restart Amiodarone.   Continue BB as tolerated  Warfarin. Daily INR. Will need lower dose of Warfarin considering simultaneous use of Amiodarone  OP f-up

## 2023-07-07 NOTE — PROGRESS NOTE ADULT - SUBJECTIVE AND OBJECTIVE BOX
CTU Attending Progress Daily Note     07 Jul 2023 12:03  POD# - 8  still afib flutter  He has history of Hypertrophic cardiomyopathy    Atrial fibrillation    Aortic valve disease    Obese      Interval event for past 24 hr:  MALIHA THOMPSON  42y had no event.   Current Complains:  MALIHA THOMPSON has no new complains  HPI:    OBJECTIVE:  ICU Vital Signs Last 24 Hrs  T(C): 36.7 (07 Jul 2023 04:00), Max: 36.8 (06 Jul 2023 16:00)  T(F): 98 (07 Jul 2023 04:00), Max: 98.2 (06 Jul 2023 16:00)  HR: 128 (07 Jul 2023 07:00) (103 - 133)  BP: 108/54 (07 Jul 2023 07:00) (92/68 - 114/69)  BP(mean): 71 (07 Jul 2023 07:00) (60 - 92)  ABP: --  ABP(mean): --  RR: 36 (07 Jul 2023 07:00) (16 - 42)  SpO2: 95% (07 Jul 2023 09:12) (94% - 99%)    O2 Parameters below as of 07 Jul 2023 09:12  Patient On (Oxygen Delivery Method): room air          I&O's Summary    06 Jul 2023 07:01  -  07 Jul 2023 07:00  --------------------------------------------------------  IN: 1500 mL / OUT: 1500 mL / NET: 0 mL    07 Jul 2023 07:01  -  07 Jul 2023 12:03  --------------------------------------------------------  IN: 0 mL / OUT: 700 mL / NET: -700 mL      I&O's Detail    06 Jul 2023 07:01  -  07 Jul 2023 07:00  --------------------------------------------------------  IN:    IV PiggyBack: 700 mL    Oral Fluid: 800 mL  Total IN: 1500 mL    OUT:    Voided (mL): 1500 mL  Total OUT: 1500 mL    Total NET: 0 mL      07 Jul 2023 07:01  -  07 Jul 2023 12:03  --------------------------------------------------------  IN:  Total IN: 0 mL    OUT:    Voided (mL): 700 mL  Total OUT: 700 mL    Total NET: -700 mL        Adult Advanced Hemodynamics Last 24 Hrs  CVP(mm Hg): --  CVP(cm H2O): --  CO: --  CI: --  PA: --  PA(mean): --  PCWP: --  SVR: --  SVRI: --  PVR: --  PVRI: --    CAPILLARY BLOOD GLUCOSE        LABS:                          11.5   15.51 )-----------( 389      ( 07 Jul 2023 03:20 )             35.9     07-07    135  |  96<L>  |  15  ----------------------------<  98  4.5   |  26  |  0.7    Ca    9.0      07 Jul 2023 03:20  Mg     1.9     07-07    TPro  6.5  /  Alb  3.6  /  TBili  1.1  /  DBili  x   /  AST  38  /  ALT  30  /  AlkPhos  110  07-07    PT/INR - ( 07 Jul 2023 03:20 )   PT: 16.50 sec;   INR: 1.43 ratio           Urinalysis Basic - ( 07 Jul 2023 03:20 )    Color: x / Appearance: x / SG: x / pH: x  Gluc: 98 mg/dL / Ketone: x  / Bili: x / Urobili: x   Blood: x / Protein: x / Nitrite: x   Leuk Esterase: x / RBC: x / WBC x   Sq Epi: x / Non Sq Epi: x / Bacteria: x        Home Medications:  Metoprolol Succinate  mg oral tablet, extended release: 1 tablet, extended release orally once a day (29 Jun 2023 06:42)    HOSPITAL MEDICATIONS:  MEDICATIONS  (STANDING):  albumin human  5% IVPB 500 milliLiter(s) IV Intermittent once  aspirin enteric coated 325 milliGRAM(s) Oral daily  cefepime   IVPB 1000 milliGRAM(s) IV Intermittent every 8 hours  chlorhexidine 2% Cloths 1 Application(s) Topical daily  diltiazem    Tablet 60 milliGRAM(s) Oral every 6 hours  melatonin 5 milliGRAM(s) Oral at bedtime  metoprolol tartrate 25 milliGRAM(s) Oral every 6 hours  pantoprazole    Tablet 40 milliGRAM(s) Oral daily  polyethylene glycol 3350 17 Gram(s) Oral daily  potassium chloride    Tablet ER 20 milliEquivalent(s) Oral daily  vancomycin  IVPB 1000 milliGRAM(s) IV Intermittent every 12 hours  vancomycin  IVPB      warfarin 3 milliGRAM(s) Oral once    MEDICATIONS  (PRN):  acetaminophen     Tablet .. 650 milliGRAM(s) Oral every 6 hours PRN Mild Pain (1 - 3)  ALPRAZolam 0.5 milliGRAM(s) Oral at bedtime PRN anxiety  ondansetron Injectable 4 milliGRAM(s) IV Push every 8 hours PRN Nausea and/or Vomiting      REVIEW OF SYSTEMS:  CONSTITUTIONAL: [X] all negative; [ ] weakness, [ ] fevers, [ ] chills  EYES/ENT: [X] all negative; [ ] visual changes, [ ] vertigo, [ ] throat pain   NECK: [X] all negative; [ ] pain, [ ] stiffness  RESPIRATORY: [] all negative, [ ] cough, [ ] wheezing, [ ] hemoptysis, [ ] shortness of breath  CARDIOVASCULAR: [] all negative; [ ] chest pain, [ ] palpitations, [ ] orthopnea  GASTROINTESTINAL: [X] all negative; [ ]abdominal pain, [ ] nausea, [ ] vomiting, [ ] hematemesis, [ ] diarrhea, [ ] constipation, [ ] melena, [ ] hematochezia.  GENITOURINARY: [X] all negative; [ ] dysuria, [ ] frequency, [ ] hematuria  NEUROLOGICAL: [X] all negative; [ ] numbness, [ ] weakness  SKIN: [X] all negative; [ ] itching, [ ] burning, [ ] rashes, [ ] lesions   All other review of systems is negative unless indicated above.    [  ] Unable to assess ROS because     PHYSICAL EXAM:          CONSTITUTIONAL: Well-developed; well-nourished; in no acute distress.   	SKIN: warm, dry  	HEAD: Normocephalic; atraumatic.  	EYES: PERRL, EOM, no conj injection, sclera clear  	ENT: No nasal discharge; airway clear.  	NECK: Supple; non tender.  No midline ttp ctls  	CARD: S1, S2 normal; no murmurs, gallops, or rubs. Regular rate and rhythm. 2+ RPs and DPs bilat, no carotid bruits, no pedal   edema, no calf pain b/l  	RESP: CTA  bilat good air movement No wheezes, rales or rhonchi.  	ABD: Soft, not tender, not distended, no CVA ttp no rebound or guarding, bowel sounds present  	EXT: Normal ROM.  No clubbing, cyanosis or edema.   	  	NEURO: Alert, awake, motor 5/5 R, 5/5 L        RADIOLOGY:  xray  < from: Xray Chest 2 Views PA/Lat (07.07.23 @ 08:09) >  Impression:    1. Unchanged small bibasilar opacities/pleural effusions.    < end of copied text >    I spent 45 minutes of critical care time ; more than 50% of visit was spent counseling and/or examining patient, reviewing vitals, labs, medications, imaging and discussing with the team goals of care to prevent life-threatening in this patient who is at high risk for deterioration or death due to:

## 2023-07-07 NOTE — PROGRESS NOTE ADULT - SUBJECTIVE AND OBJECTIVE BOX
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 She was initially c/o sob and worked up for HOCM. She underwent aortic valve mass resection and left atrial appendage clipping and aortic valve replacement 25 mm st Edmund mechanical valve. post operatively patient went into afib and Ep consulted for afib management and rate control recommendations. She is seen at bedside with no acute complaints at this time. denies any fever chills chest pain sob or dizziness.      PAST MEDICAL & SURGICAL HISTORY:  Hypertrophic cardiomyopathy  Atrial fibrillation  Aortic valve disease  Obese  S/P transesophageal echocardiogram ([facility])    PREVIOUS DIAGNOSTIC TESTING:    ECHO  FINDINGS:  Summary:   1. Limited study for follow post AVR   2. Normal global left ventricular systolic function.   3. There is no evidence of pericardial effusion.   4. Mechanical aortic valve in place, no paravalvular leak noted, wtih   max peak velocity    2m/s and mean tansvalve gradient 5-10 mmHg correlate with normal   function.    PHYSICIAN INTERPRETATION:  Left Ventricle: Global LV systolic function was normal.  Pericardium: There is no evidence of pericardial effusion.  Aortic Valve: Mechanical aortic valve in place, no paravalvular leak   noted, wtih max peak velocity    2m/s and mean tansvalve gradient 5-10 mmHg correlate with normal   function.    < from: TTE Echo Complete w/o Contrast w/ Doppler (22 @ 09:14) >   1. Normal global left ventricular systolic function.   2. LV Ejection Fraction by Parada's Method with a biplane EF of 62 %.   3. Normal right ventricular size and function.   4. Severe concentric left ventricular hypertrophy with septal predominance suggestive of HOCM.   5. Significant dynamic LVOT obstruction with peak gradient of 39mmHg at rest and 72 mmHg with valsalva.   6. Systolic anterior motion with resultant severe eccentric mitral valve regurgitation.   7. Severely enlarged left atrium.   8. There is a possible small mass such as a papillary fibroelastoma vs vegetation on the aortic side of the AV.   9. Grade II diastolic dysfunction.    < end of copied text >  MEDICATIONS  (STANDING):  albumin human  5% IVPB 500 milliLiter(s) IV Intermittent once  aMIOdarone    Tablet 200 milliGRAM(s) Oral daily  cefepime   IVPB 1000 milliGRAM(s) IV Intermittent every 8 hours  chlorhexidine 2% Cloths 1 Application(s) Topical daily  diltiazem    Tablet 60 milliGRAM(s) Oral every 6 hours  heparin  Infusion 500 Unit(s)/Hr (5 mL/Hr) IV Continuous <Continuous>  melatonin 5 milliGRAM(s) Oral at bedtime  metoprolol tartrate 25 milliGRAM(s) Oral every 6 hours  pantoprazole    Tablet 40 milliGRAM(s) Oral daily  polyethylene glycol 3350 17 Gram(s) Oral daily  potassium chloride    Tablet ER 20 milliEquivalent(s) Oral daily  vancomycin  IVPB 1000 milliGRAM(s) IV Intermittent every 12 hours  vancomycin  IVPB      warfarin 3 milliGRAM(s) Oral once    MEDICATIONS  (PRN):  acetaminophen     Tablet .. 650 milliGRAM(s) Oral every 6 hours PRN Mild Pain (1 - 3)  ALPRAZolam 0.5 milliGRAM(s) Oral at bedtime PRN anxiety  ondansetron Injectable 4 milliGRAM(s) IV Push every 8 hours PRN Nausea and/or Vomiting      FAMILY HISTORY:  Known health problems: none (Father, Mother)    CIGARETTES: No    ALCOHOL: No    Past Surgical History: As above    Allergies: No Known Allergies      REVIEW OF SYSTEMS:  CONSTITUTIONAL: No fever, weight loss, chills, shakes, or fatigue  EYES: No eye pain, visual disturbances, or discharge  ENMT:  No difficulty hearing, tinnitus, vertigo; No sinus or throat pain  NECK: No pain or stiffness  BREASTS: No pain, masses, or nipple discharge  RESPIRATORY: No cough, wheezing, hemoptysis, or shortness of breath  CARDIOVASCULAR: No chest pain, dyspnea, palpitations, dizziness, syncope, paroxysmal nocturnal dyspnea, orthopnea, or arm or leg swelling  GASTROINTESTINAL: No abdominal  or epigastric pain, nausea, vomiting, hematemesis, diarrhea, constipation, melena or bright red blood.  GENITOURINARY: No dysuria, nocturia, hematuria, or urinary incontinence  NEUROLOGICAL: No headaches, memory loss, slurred speech, limb weakness, loss of strength, numbness, or tremors  SKIN: No itching, burning, rashes, or lesions   LYMPH NODES: No enlarged glands  ENDOCRINE: No heat or cold intolerance, or hair loss  MUSCULOSKELETAL: No joint pain or swelling, muscle, back, or extremity pain  PSYCHIATRIC: No depression, anxiety, or difficulty sleeping  HEME/LYMPH: No easy bruising or bleeding gums  ALLERY AND IMMUNOLOGIC: No hives or rash.      Vital Signs Last 24 Hrs  T(C): 36.4 (2023 16:00), Max: 36.7 (2023 20:00)  T(F): 97.6 (2023 16:00), Max: 98 (2023 20:00)  HR: 100 (2023 16:00) (98 - 133)  BP: 116/76 (2023 16:00) (92/68 - 125/58)  BP(mean): 91 (2023 16:00) (60 - 92)  RR: 36 (2023 16:00) (16 - 55)  SpO2: 95% (2023 16:00) (94% - 99%)    Parameters below as of 2023 16:00  Patient On (Oxygen Delivery Method): room air        PHYSICAL EXAM:  GENERAL: In no apparent distress, well nourished, and hydrated.  HEAD:  Atraumatic, Normocephalic  EYES: EOMI, PERRLA, conjunctiva and sclera clear  ENMT: No tonsillar erythema, exudates, or enlargements; ist mucous membranes, Good dentition, No lesions  NECK: Supple and normal thyroid.  No JVD or carotid bruit.  Carotid pulse is 2+ bilaterally.  HEART: Regular rate and rhythm; (+) click  PULMONARY: Clear to auscultation and perfusion.  No rales, wheezing, or rhonchi bilaterally.  ABDOMEN: Soft, Nontender, Nondistended; Bowel sounds present  EXTREMITIES:  2+ Peripheral Pulses, No clubbing, cyanosis, or edema  LYMPH: No lymphadenopathy noted  NEUROLOGICAL: Grossly nonfocal    ECG: Ventricular Rate 102 BPM    Atrial Rate 337 BPM    QRS Duration 124 ms    Q-T Interval 398 ms    QTC Calculation(Bazett) 518 ms    R Axis 253 degrees    T Axis 250 degrees    Diagnosis Line Atrial flutter with variable A-V block  Right bundle branch block  Abnormal ECG    Confirmed by royal levine ([id number]) on 2023 11:11:43 AM      I&O's Detail    2023 07:01  -  2023 07:00  --------------------------------------------------------  IN:    IV PiggyBack: 700 mL    Oral Fluid: 800 mL  Total IN: 1500 mL    OUT:    Voided (mL): 1500 mL  Total OUT: 1500 mL    Total NET: 0 mL      2023 07:01  -  2023 16:41  --------------------------------------------------------  IN:    Oral Fluid: 480 mL  Total IN: 480 mL    OUT:    Voided (mL): 1150 mL  Total OUT: 1150 mL    Total NET: -670 mL          LABS:                        11.5   15.51 )-----------( 389      ( 2023 03:20 )             35.9     07-07    135  |  96<L>  |  15  ----------------------------<  98  4.5   |  26  |  0.7    Ca    9.0      2023 03:20  Mg     1.9     07-07    TPro  6.5  /  Alb  3.6  /  TBili  1.1  /  DBili  x   /  AST  38  /  ALT  30  /  AlkPhos  110  07-07        PT/INR - ( 2023 03:20 )   PT: 16.50 sec;   INR: 1.43 ratio           Urinalysis Basic - ( 2023 03:20 )    Color: x / Appearance: x / SG: x / pH: x  Gluc: 98 mg/dL / Ketone: x  / Bili: x / Urobili: x   Blood: x / Protein: x / Nitrite: x   Leuk Esterase: x / RBC: x / WBC x   Sq Epi: x / Non Sq Epi: x / Bacteria: x      BNP  I&O's Detail    2023 07:01  -  2023 07:00  --------------------------------------------------------  IN:    IV PiggyBack: 700 mL    Oral Fluid: 800 mL  Total IN: 1500 mL    OUT:    Voided (mL): 1500 mL  Total OUT: 1500 mL    Total NET: 0 mL      2023 07:01  -  2023 16:41  --------------------------------------------------------  IN:    Oral Fluid: 480 mL  Total IN: 480 mL    OUT:    Voided (mL): 1150 mL  Total OUT: 1150 mL    Total NET: -670 mL        Daily     Daily Weight in k.4 (2023 06:00)    RADIOLOGY & ADDITIONAL STUDIES: HPI:  43 y/o F with pmhx of afib/ aflutter , cardiomyopathy, DLD, HTN and 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 She was initially c/o sob and worked up for HOCM. She underwent aortic valve mass resection and left atrial appendage clipping and aortic valve replacement 25 mm st Edmund mechanical valve. post operatively patient went into afib and Ep consulted for afib management and rate control recommendations. She is seen at bedside with no acute complaints at this time. denies any fever chills chest pain sob or dizziness.      PAST MEDICAL & SURGICAL HISTORY:  Hypertrophic cardiomyopathy  Atrial fibrillation  Aortic valve disease  Obese  S/P transesophageal echocardiogram ([facility])    PREVIOUS DIAGNOSTIC TESTING:    ECHO  FINDINGS:  Summary:   1. Limited study for follow post AVR   2. Normal global left ventricular systolic function.   3. There is no evidence of pericardial effusion.   4. Mechanical aortic valve in place, no paravalvular leak noted, wtih   max peak velocity    2m/s and mean tansvalve gradient 5-10 mmHg correlate with normal   function.    PHYSICIAN INTERPRETATION:  Left Ventricle: Global LV systolic function was normal.  Pericardium: There is no evidence of pericardial effusion.  Aortic Valve: Mechanical aortic valve in place, no paravalvular leak   noted, wtih max peak velocity    2m/s and mean tansvalve gradient 5-10 mmHg correlate with normal   function.    < from: TTE Echo Complete w/o Contrast w/ Doppler (22 @ 09:14) >   1. Normal global left ventricular systolic function.   2. LV Ejection Fraction by Parada's Method with a biplane EF of 62 %.   3. Normal right ventricular size and function.   4. Severe concentric left ventricular hypertrophy with septal predominance suggestive of HOCM.   5. Significant dynamic LVOT obstruction with peak gradient of 39mmHg at rest and 72 mmHg with valsalva.   6. Systolic anterior motion with resultant severe eccentric mitral valve regurgitation.   7. Severely enlarged left atrium.   8. There is a possible small mass such as a papillary fibroelastoma vs vegetation on the aortic side of the AV.   9. Grade II diastolic dysfunction.    < end of copied text >  MEDICATIONS  (STANDING):  albumin human  5% IVPB 500 milliLiter(s) IV Intermittent once  aMIOdarone    Tablet 200 milliGRAM(s) Oral daily  cefepime   IVPB 1000 milliGRAM(s) IV Intermittent every 8 hours  chlorhexidine 2% Cloths 1 Application(s) Topical daily  diltiazem    Tablet 60 milliGRAM(s) Oral every 6 hours  heparin  Infusion 500 Unit(s)/Hr (5 mL/Hr) IV Continuous <Continuous>  melatonin 5 milliGRAM(s) Oral at bedtime  metoprolol tartrate 25 milliGRAM(s) Oral every 6 hours  pantoprazole    Tablet 40 milliGRAM(s) Oral daily  polyethylene glycol 3350 17 Gram(s) Oral daily  potassium chloride    Tablet ER 20 milliEquivalent(s) Oral daily  vancomycin  IVPB 1000 milliGRAM(s) IV Intermittent every 12 hours  vancomycin  IVPB      warfarin 3 milliGRAM(s) Oral once    MEDICATIONS  (PRN):  acetaminophen     Tablet .. 650 milliGRAM(s) Oral every 6 hours PRN Mild Pain (1 - 3)  ALPRAZolam 0.5 milliGRAM(s) Oral at bedtime PRN anxiety  ondansetron Injectable 4 milliGRAM(s) IV Push every 8 hours PRN Nausea and/or Vomiting      FAMILY HISTORY:  Known health problems: none (Father, Mother)    CIGARETTES: No    ALCOHOL: No    Past Surgical History: As above    Allergies: No Known Allergies      REVIEW OF SYSTEMS:  CONSTITUTIONAL: No fever, weight loss, chills, shakes, or fatigue  EYES: No eye pain, visual disturbances, or discharge  ENMT:  No difficulty hearing, tinnitus, vertigo; No sinus or throat pain  NECK: No pain or stiffness  BREASTS: No pain, masses, or nipple discharge  RESPIRATORY: No cough, wheezing, hemoptysis, or shortness of breath  CARDIOVASCULAR: No chest pain, dyspnea, palpitations, dizziness, syncope, paroxysmal nocturnal dyspnea, orthopnea, or arm or leg swelling  GASTROINTESTINAL: No abdominal  or epigastric pain, nausea, vomiting, hematemesis, diarrhea, constipation, melena or bright red blood.  GENITOURINARY: No dysuria, nocturia, hematuria, or urinary incontinence  NEUROLOGICAL: No headaches, memory loss, slurred speech, limb weakness, loss of strength, numbness, or tremors  SKIN: No itching, burning, rashes, or lesions   LYMPH NODES: No enlarged glands  ENDOCRINE: No heat or cold intolerance, or hair loss  MUSCULOSKELETAL: No joint pain or swelling, muscle, back, or extremity pain  PSYCHIATRIC: No depression, anxiety, or difficulty sleeping  HEME/LYMPH: No easy bruising or bleeding gums  ALLERY AND IMMUNOLOGIC: No hives or rash.      Vital Signs Last 24 Hrs  T(C): 36.4 (2023 16:00), Max: 36.7 (2023 20:00)  T(F): 97.6 (2023 16:00), Max: 98 (2023 20:00)  HR: 100 (2023 16:00) (98 - 133)  BP: 116/76 (2023 16:00) (92/68 - 125/58)  BP(mean): 91 (2023 16:00) (60 - 92)  RR: 36 (2023 16:00) (16 - 55)  SpO2: 95% (2023 16:00) (94% - 99%)    Parameters below as of 2023 16:00  Patient On (Oxygen Delivery Method): room air        PHYSICAL EXAM:  GENERAL: In no apparent distress, well nourished, and hydrated.  HEAD:  Atraumatic, Normocephalic  EYES: EOMI, PERRLA, conjunctiva and sclera clear  ENMT: No tonsillar erythema, exudates, or enlargements; ist mucous membranes, Good dentition, No lesions  NECK: Supple and normal thyroid.  No JVD or carotid bruit.  Carotid pulse is 2+ bilaterally.  HEART: Regular rate and rhythm; (+) click  PULMONARY: Clear to auscultation and perfusion.  No rales, wheezing, or rhonchi bilaterally.  ABDOMEN: Soft, Nontender, Nondistended; Bowel sounds present  EXTREMITIES:  2+ Peripheral Pulses, No clubbing, cyanosis, or edema  LYMPH: No lymphadenopathy noted  NEUROLOGICAL: Grossly nonfocal    ECG: Ventricular Rate 102 BPM    Atrial Rate 337 BPM    QRS Duration 124 ms    Q-T Interval 398 ms    QTC Calculation(Bazett) 518 ms    R Axis 253 degrees    T Axis 250 degrees    Diagnosis Line Atrial flutter with variable A-V block  Right bundle branch block  Abnormal ECG    Confirmed by royal levine ([id number]) on 2023 11:11:43 AM      I&O's Detail    2023 07:01  -  2023 07:00  --------------------------------------------------------  IN:    IV PiggyBack: 700 mL    Oral Fluid: 800 mL  Total IN: 1500 mL    OUT:    Voided (mL): 1500 mL  Total OUT: 1500 mL    Total NET: 0 mL      2023 07:01  -  2023 16:41  --------------------------------------------------------  IN:    Oral Fluid: 480 mL  Total IN: 480 mL    OUT:    Voided (mL): 1150 mL  Total OUT: 1150 mL    Total NET: -670 mL          LABS:                        11.5   15.51 )-----------( 389      ( 2023 03:20 )             35.9     07-07    135  |  96<L>  |  15  ----------------------------<  98  4.5   |  26  |  0.7    Ca    9.0      2023 03:20  Mg     1.9     -    TPro  6.5  /  Alb  3.6  /  TBili  1.1  /  DBili  x   /  AST  38  /  ALT  30  /  AlkPhos  110  07-07        PT/INR - ( 2023 03:20 )   PT: 16.50 sec;   INR: 1.43 ratio           Urinalysis Basic - ( 2023 03:20 )    Color: x / Appearance: x / SG: x / pH: x  Gluc: 98 mg/dL / Ketone: x  / Bili: x / Urobili: x   Blood: x / Protein: x / Nitrite: x   Leuk Esterase: x / RBC: x / WBC x   Sq Epi: x / Non Sq Epi: x / Bacteria: x      BNP  I&O's Detail    2023 07:01  -  2023 07:00  --------------------------------------------------------  IN:    IV PiggyBack: 700 mL    Oral Fluid: 800 mL  Total IN: 1500 mL    OUT:    Voided (mL): 1500 mL  Total OUT: 1500 mL    Total NET: 0 mL      2023 07:01  -  2023 16:41  --------------------------------------------------------  IN:    Oral Fluid: 480 mL  Total IN: 480 mL    OUT:    Voided (mL): 1150 mL  Total OUT: 1150 mL    Total NET: -670 mL        Daily     Daily Weight in k.4 (2023 06:00)    RADIOLOGY & ADDITIONAL STUDIES:

## 2023-07-07 NOTE — PROGRESS NOTE ADULT - SUBJECTIVE AND OBJECTIVE BOX
OPERATIVE PROCEDURE(s): AVR mass exicision                POD # 7                      SURGEON(s): JIMBO Zepeda      SUBJECTIVE ASSESSMENT:42yFemale patient seen and examined at bedside. No complaints at bedside    Vital Signs Last 24 Hrs  T(F): 98 (07 Jul 2023 04:00), Max: 98.2 (06 Jul 2023 08:00)  HR: 103 (07 Jul 2023 04:00) (93 - 133)  BP: 107/61 (07 Jul 2023 04:00) (92/68 - 121/80)  BP(mean): 76 (07 Jul 2023 04:00) (60 - 93)  RR: 20 (07 Jul 2023 04:00) (16 - 42)  SpO2: 98% (07 Jul 2023 04:00) (94% - 98%)    I&O's Detail    06 Jul 2023 07:01  -  07 Jul 2023 07:00  --------------------------------------------------------  IN:    IV PiggyBack: 300 mL    Oral Fluid: 800 mL  Total IN: 1100 mL    OUT:    Voided (mL): 1300 mL  Total OUT: 1300 mL        Net: I&O's Detail    05 Jul 2023 07:01  -  06 Jul 2023 07:00  --------------------------------------------------------  Total NET: -1150 mL      06 Jul 2023 07:01  -  07 Jul 2023 07:00  --------------------------------------------------------  Total NET: -200 mL        CAPILLARY BLOOD GLUCOSE        A1C with Estimated Average Glucose Result: 6.0 % (06-14-23 @ 16:19)      Physical Exam:  General: NAD; A&Ox3  Cardiac: S1/S2, RRR, no murmur, no rubs  Lungs: unlabored respirations, CTA b/l, no wheeze, no rales, no crackles  Abdomen: Soft/NT/ND; positive bowel sounds x 4  Sternum: Intact, no click, incision healing well with no drainage  Incisions: Incisions clean/dry/intact  Extremities: No edema b/l lower extremities; good capillary refill; no cyanosis; palpable 1+ pedal pulses b/l      BOWEL MOVEMENT:  [] YES [] NO, If No, Timing since last BM Day #        LABS:                        11.5<L>  15.51<H> )-----------( 389      ( 07 Jul 2023 03:20 )             35.9<L>                        10.9<L>  13.39<H> )-----------( 360      ( 06 Jul 2023 01:00 )             33.7<L>    07-07    135  |  96<L>  |  15  ----------------------------<  98  4.5   |  26  |  0.7  07-06    133<L>  |  96<L>  |  18  ----------------------------<  139<H>  4.1   |  28  |  0.8    Ca    9.0      07 Jul 2023 03:20  Mg     1.9     07-07    TPro  6.5 [6.0 - 8.0]  /  Alb  3.6 [3.5 - 5.2]  /  TBili  1.1 [0.2 - 1.2]  /  DBili  x   /  AST  38 [0 - 41]  /  ALT  30 [0 - 41]  /  AlkPhos  110 [30 - 115]  07-07    PT/INR - ( 07 Jul 2023 03:20 )   PT: ;   INR: 1.43 ratio         PT/INR - ( 06 Jul 2023 01:00 )   PT: ;   INR: 1.49 ratio           Urinalysis Basic - ( 07 Jul 2023 03:20 )    Color: x / Appearance: x / SG: x / pH: x  Gluc: 98 mg/dL / Ketone: x  / Bili: x / Urobili: x   Blood: x / Protein: x / Nitrite: x   Leuk Esterase: x / RBC: x / WBC x   Sq Epi: x / Non Sq Epi: x / Bacteria: x        RADIOLOGY & ADDITIONAL TESTS:  CXR:       EKG:    Allergies    No Known Allergies    Intolerances      MEDICATIONS  (STANDING):  albumin human  5% IVPB 500 milliLiter(s) IV Intermittent once  cefepime   IVPB 1000 milliGRAM(s) IV Intermittent every 8 hours  chlorhexidine 2% Cloths 1 Application(s) Topical daily  diltiazem    Tablet 60 milliGRAM(s) Oral every 6 hours  magnesium sulfate  IVPB 1 Gram(s) IV Intermittent once  melatonin 5 milliGRAM(s) Oral at bedtime  metoprolol tartrate 25 milliGRAM(s) Oral every 6 hours  pantoprazole    Tablet 40 milliGRAM(s) Oral daily  polyethylene glycol 3350 17 Gram(s) Oral daily  potassium chloride    Tablet ER 20 milliEquivalent(s) Oral daily  vancomycin  IVPB 1000 milliGRAM(s) IV Intermittent every 12 hours  vancomycin  IVPB        MEDICATIONS  (PRN):  acetaminophen     Tablet .. 650 milliGRAM(s) Oral every 6 hours PRN Mild Pain (1 - 3)  ALPRAZolam 0.5 milliGRAM(s) Oral at bedtime PRN anxiety  ondansetron Injectable 4 milliGRAM(s) IV Push every 8 hours PRN Nausea and/or Vomiting    Home Medications:  Metoprolol Succinate  mg oral tablet, extended release: 1 tablet, extended release orally once a day (29 Jun 2023 06:42)      Pharmacologic DVT Prophylaxis [] YES, [] NO: Contraindication:  SCD's: YES b/l    GI Prophylaxis: protonix    Post-Op Beta-Blockers: [X] Yes, [] No: contraindication:  Post-Op Aspirin:  [X] Yes, [] No: contraindication:  Post-Op Statin:  [X] Yes, [] No: contraindication:    Ambulation/Activity Status: ambulate as tolerated    Assessment/Plan:  42y Female status-post AVR, mass exicision  - Case and plan discussed with CTU Intensivist and CT Surgeon - Dr. Perez/Duane/Reza/Meliza  - Continue CTU supportive care and ongoing plan of care as per continuing CTU rounds.   - Continue DVT/GI prophylaxis  - Incentive Spirometry 10 times an hour  - Continue to advance physical activity as tolerated and continue PT/OT as directed  1. CAD s/p CABG: Continue ASA, statin, BB  2. HTN:   3. Post-op A. Fib ppx:   4. COPD/Hypoxia:   5. DM/Glucose Control:     Social Service Disposition:     OPERATIVE PROCEDURE(s): AVR mass exicision                POD # 7                      SURGEON(s): JIMBO Zepeda      SUBJECTIVE ASSESSMENT:42yFemale patient seen and examined at bedside. No complaints at bedside    Vital Signs Last 24 Hrs  T(F): 98 (07 Jul 2023 04:00), Max: 98.2 (06 Jul 2023 08:00)  HR: 103 (07 Jul 2023 04:00) (93 - 133)  BP: 107/61 (07 Jul 2023 04:00) (92/68 - 121/80)  BP(mean): 76 (07 Jul 2023 04:00) (60 - 93)  RR: 20 (07 Jul 2023 04:00) (16 - 42)  SpO2: 98% (07 Jul 2023 04:00) (94% - 98%)    I&O's Detail    06 Jul 2023 07:01  -  07 Jul 2023 07:00  --------------------------------------------------------  IN:    IV PiggyBack: 300 mL    Oral Fluid: 800 mL  Total IN: 1100 mL    OUT:    Voided (mL): 1300 mL  Total OUT: 1300 mL    Net: I&O's Detail    05 Jul 2023 07:01  -  06 Jul 2023 07:00  --------------------------------------------------------  Total NET: -1150 mL    06 Jul 2023 07:01  -  07 Jul 2023 07:00  --------------------------------------------------------  Total NET: -200 mL    CAPILLARY BLOOD GLUCOSE    A1C with Estimated Average Glucose Result: 6.0 % (06-14-23 @ 16:19)    Physical Exam:  General: NAD; A&Ox3, gets very anxious when examining patient  Cardiac: S1/S2, RRR, ? murmur, no rubs  Lungs: unlabored shallow respirations, bs decreased at bases  Abdomen: Soft/NT/ND;   Sternum: Intact, no click, incision healing well with no drainage  Extremities: mil- mod edema b/l lower extremities      BOWEL MOVEMENT:  [] YES    LABS:                        11.5<L>  15.51<H> )-----------( 389      ( 07 Jul 2023 03:20 )             35.9<L>                        10.9<L>  13.39<H> )-----------( 360      ( 06 Jul 2023 01:00 )             33.7<L>    07-07    135  |  96<L>  |  15  ----------------------------<  98  4.5   |  26  |  0.7  07-06    133<L>  |  96<L>  |  18  ----------------------------<  139<H>  4.1   |  28  |  0.8    Ca    9.0      07 Jul 2023 03:20  Mg     1.9     07-07    TPro  6.5 [6.0 - 8.0]  /  Alb  3.6 [3.5 - 5.2]  /  TBili  1.1 [0.2 - 1.2]  /  DBili  x   /  AST  38 [0 - 41]  /  ALT  30 [0 - 41]  /  AlkPhos  110 [30 - 115]  07-07    PT/INR - ( 07 Jul 2023 03:20 )   PT: ;   INR: 1.43 ratio       PT/INR - ( 06 Jul 2023 01:00 )   PT: ;   INR: 1.49 ratio        Urinalysis Basic - ( 07 Jul 2023 03:20 )    Color: x / Appearance: x / SG: x / pH: x  Gluc: 98 mg/dL / Ketone: x  / Bili: x / Urobili: x   Blood: x / Protein: x / Nitrite: x   Leuk Esterase: x / RBC: x / WBC x   Sq Epi: x / Non Sq Epi: x / Bacteria: x      RADIOLOGY & ADDITIONAL TESTS:  CXR: < from: Xray Chest 2 Views PA/Lat (07.07.23 @ 08:09) >    Impression:    1. Unchanged small bibasilar opacities/pleural effusions.    Allergies    No Known Allergies    Intolerances      MEDICATIONS  (STANDING):  albumin human  5% IVPB 500 milliLiter(s) IV Intermittent once  cefepime   IVPB 1000 milliGRAM(s) IV Intermittent every 8 hours  chlorhexidine 2% Cloths 1 Application(s) Topical daily  diltiazem    Tablet 60 milliGRAM(s) Oral every 6 hours  magnesium sulfate  IVPB 1 Gram(s) IV Intermittent once  melatonin 5 milliGRAM(s) Oral at bedtime  metoprolol tartrate 25 milliGRAM(s) Oral every 6 hours  pantoprazole    Tablet 40 milliGRAM(s) Oral daily  polyethylene glycol 3350 17 Gram(s) Oral daily  potassium chloride    Tablet ER 20 milliEquivalent(s) Oral daily  vancomycin  IVPB 1000 milliGRAM(s) IV Intermittent every 12 hours  vancomycin  IVPB        MEDICATIONS  (PRN):  acetaminophen     Tablet .. 650 milliGRAM(s) Oral every 6 hours PRN Mild Pain (1 - 3)  ALPRAZolam 0.5 milliGRAM(s) Oral at bedtime PRN anxiety  ondansetron Injectable 4 milliGRAM(s) IV Push every 8 hours PRN Nausea and/or Vomiting    Home Medications:  Metoprolol Succinate  mg oral tablet, extended release: 1 tablet, extended release orally once a day (29 Jun 2023 06:42)    Pharmacologic DVT Prophylaxis [] YES, [] NO: Contraindication:  SCD's: YES b/l    GI Prophylaxis: protonix    Post-Op Beta-Blockers: [X] Yes,   Ambulation/Activity Status: ambulate as tolerated    42y Female status-post AVR, excision of fibroelastoma and JASON clipping day 8  - Case and plan discussed with CTU Intensivist and CT Surgeon - Dr. Perez/Duane/Reza/Meliza  - Continue CTU supportive care and ongoing plan of care as per continuing CTU rounds.   - Continue DVT/GI prophylaxis  - Incentive Spirometry 10 times an hour  - Continue to advance physical activity as tolerated and continue PT/OT as directed  1. Aortic mass s/p AVR & mass excision:  INR 1.4 today, give 3mg tonight, check inr in am   2. HTN: cont lopressor   3. A. Fib with RVR: cont coumadin & cardizem, BB 25 q6h, EP following awaiting Dr. Carpenter input    4. COPD/Hypoxia: cont nebs, wean o2 as tolerated, encourage incentive  5. Leukocytosis: afebrile WBCs trending up, blood cx drawn results pending, on Cefepime, IV Vanco 1gm q12h ordered, cont to monitor WBCs.   6. PA/Lat completed     Social Service Disposition: anticipate discharge early next week      OPERATIVE PROCEDURE(s): AVR mass exicision                POD # 7                      SURGEON(s): JIMBO Zepeda      SUBJECTIVE ASSESSMENT:42yFemale patient seen and examined at bedside. No complaints at bedside    Vital Signs Last 24 Hrs  T(F): 98 (07 Jul 2023 04:00), Max: 98.2 (06 Jul 2023 08:00)  HR: 103 (07 Jul 2023 04:00) (93 - 133)  BP: 107/61 (07 Jul 2023 04:00) (92/68 - 121/80)  BP(mean): 76 (07 Jul 2023 04:00) (60 - 93)  RR: 20 (07 Jul 2023 04:00) (16 - 42)  SpO2: 98% (07 Jul 2023 04:00) (94% - 98%)    I&O's Detail    06 Jul 2023 07:01  -  07 Jul 2023 07:00  --------------------------------------------------------  IN:    IV PiggyBack: 300 mL    Oral Fluid: 800 mL  Total IN: 1100 mL    OUT:    Voided (mL): 1300 mL  Total OUT: 1300 mL    Net: I&O's Detail    05 Jul 2023 07:01  -  06 Jul 2023 07:00  --------------------------------------------------------  Total NET: -1150 mL    06 Jul 2023 07:01  -  07 Jul 2023 07:00  --------------------------------------------------------  Total NET: -200 mL    CAPILLARY BLOOD GLUCOSE    A1C with Estimated Average Glucose Result: 6.0 % (06-14-23 @ 16:19)    Physical Exam:  General: NAD; A&Ox3, gets very anxious when examining patient  Cardiac: S1/S2, RRR, ? murmur, no rubs  Lungs: unlabored shallow respirations, bs decreased at bases  Abdomen: Soft/NT/ND;   Sternum: Intact, no click, incision healing well with no drainage  Extremities: mil- mod edema b/l lower extremities      BOWEL MOVEMENT:  [] YES    LABS:                        11.5<L>  15.51<H> )-----------( 389      ( 07 Jul 2023 03:20 )             35.9<L>                        10.9<L>  13.39<H> )-----------( 360      ( 06 Jul 2023 01:00 )             33.7<L>    07-07    135  |  96<L>  |  15  ----------------------------<  98  4.5   |  26  |  0.7  07-06    133<L>  |  96<L>  |  18  ----------------------------<  139<H>  4.1   |  28  |  0.8    Ca    9.0      07 Jul 2023 03:20  Mg     1.9     07-07    TPro  6.5 [6.0 - 8.0]  /  Alb  3.6 [3.5 - 5.2]  /  TBili  1.1 [0.2 - 1.2]  /  DBili  x   /  AST  38 [0 - 41]  /  ALT  30 [0 - 41]  /  AlkPhos  110 [30 - 115]  07-07    PT/INR - ( 07 Jul 2023 03:20 )   PT: ;   INR: 1.43 ratio       PT/INR - ( 06 Jul 2023 01:00 )   PT: ;   INR: 1.49 ratio        Urinalysis Basic - ( 07 Jul 2023 03:20 )    Color: x / Appearance: x / SG: x / pH: x  Gluc: 98 mg/dL / Ketone: x  / Bili: x / Urobili: x   Blood: x / Protein: x / Nitrite: x   Leuk Esterase: x / RBC: x / WBC x   Sq Epi: x / Non Sq Epi: x / Bacteria: x      RADIOLOGY & ADDITIONAL TESTS:  CXR: < from: Xray Chest 2 Views PA/Lat (07.07.23 @ 08:09) >    Impression:    1. Unchanged small bibasilar opacities/pleural effusions.    Allergies    No Known Allergies    Intolerances      MEDICATIONS  (STANDING):  albumin human  5% IVPB 500 milliLiter(s) IV Intermittent once  cefepime   IVPB 1000 milliGRAM(s) IV Intermittent every 8 hours  chlorhexidine 2% Cloths 1 Application(s) Topical daily  diltiazem    Tablet 60 milliGRAM(s) Oral every 6 hours  magnesium sulfate  IVPB 1 Gram(s) IV Intermittent once  melatonin 5 milliGRAM(s) Oral at bedtime  metoprolol tartrate 25 milliGRAM(s) Oral every 6 hours  pantoprazole    Tablet 40 milliGRAM(s) Oral daily  polyethylene glycol 3350 17 Gram(s) Oral daily  potassium chloride    Tablet ER 20 milliEquivalent(s) Oral daily  vancomycin  IVPB 1000 milliGRAM(s) IV Intermittent every 12 hours  vancomycin  IVPB        MEDICATIONS  (PRN):  acetaminophen     Tablet .. 650 milliGRAM(s) Oral every 6 hours PRN Mild Pain (1 - 3)  ALPRAZolam 0.5 milliGRAM(s) Oral at bedtime PRN anxiety  ondansetron Injectable 4 milliGRAM(s) IV Push every 8 hours PRN Nausea and/or Vomiting    Home Medications:  Metoprolol Succinate  mg oral tablet, extended release: 1 tablet, extended release orally once a day (29 Jun 2023 06:42)    Pharmacologic DVT Prophylaxis [] YES, [] NO: Contraindication:  SCD's: YES b/l    GI Prophylaxis: protonix    Post-Op Beta-Blockers: [X] Yes,   Ambulation/Activity Status: ambulate as tolerated    42y Female status-post AVR, excision of fibroelastoma and JASON clipping day 8  - Case and plan discussed with CTU Intensivist and CT Surgeon - Dr. Perez/Duane/Reza/Meliza  - Continue CTU supportive care and ongoing plan of care as per continuing CTU rounds.   - Continue DVT/GI prophylaxis  - Incentive Spirometry 10 times an hour  - Continue to advance physical activity as tolerated and continue PT/OT as directed  1. Aortic mass s/p AVR & mass excision:  INR 1.4 today, give 3mg tonight, check inr in am,            Start  till Coumadin therapeutic   2. HTN: cont lopressor   3. A. Fib with RVR: cont coumadin & cardizem, BB 25 q6h, EP following awaiting Dr. Carpenter input    4. COPD/Hypoxia: cont nebs, wean o2 as tolerated, encourage incentive  5. Leukocytosis: afebrile WBCs trending up, blood cx drawn results pending, on Cefepime, IV Vanco 1gm q12h ordered, cont to monitor WBCs.   6. PA/Lat completed     Social Service Disposition: anticipate discharge early next week      OPERATIVE PROCEDURE(s): AVR mass exicision                POD # 7                      SURGEON(s): JIMBO Zepeda      SUBJECTIVE ASSESSMENT:42yFemale patient seen and examined at bedside. No complaints at bedside    Vital Signs Last 24 Hrs  T(F): 98 (07 Jul 2023 04:00), Max: 98.2 (06 Jul 2023 08:00)  HR: 103 (07 Jul 2023 04:00) (93 - 133)  BP: 107/61 (07 Jul 2023 04:00) (92/68 - 121/80)  BP(mean): 76 (07 Jul 2023 04:00) (60 - 93)  RR: 20 (07 Jul 2023 04:00) (16 - 42)  SpO2: 98% (07 Jul 2023 04:00) (94% - 98%)    I&O's Detail    06 Jul 2023 07:01  -  07 Jul 2023 07:00  --------------------------------------------------------  IN:    IV PiggyBack: 300 mL    Oral Fluid: 800 mL  Total IN: 1100 mL    OUT:    Voided (mL): 1300 mL  Total OUT: 1300 mL    Net: I&O's Detail    05 Jul 2023 07:01  -  06 Jul 2023 07:00  --------------------------------------------------------  Total NET: -1150 mL    06 Jul 2023 07:01  -  07 Jul 2023 07:00  --------------------------------------------------------  Total NET: -200 mL    CAPILLARY BLOOD GLUCOSE    A1C with Estimated Average Glucose Result: 6.0 % (06-14-23 @ 16:19)    Physical Exam:  General: NAD; A&Ox3, gets very anxious when examining patient  Cardiac: S1/S2, RRR, ? murmur, no rubs  Lungs: unlabored shallow respirations, bs decreased at bases  Abdomen: Soft/NT/ND;   Sternum: Intact, no click, incision healing well with no drainage  Extremities: mil- mod edema b/l lower extremities      BOWEL MOVEMENT:  [] YES    LABS:                        11.5<L>  15.51<H> )-----------( 389      ( 07 Jul 2023 03:20 )             35.9<L>                        10.9<L>  13.39<H> )-----------( 360      ( 06 Jul 2023 01:00 )             33.7<L>    07-07    135  |  96<L>  |  15  ----------------------------<  98  4.5   |  26  |  0.7  07-06    133<L>  |  96<L>  |  18  ----------------------------<  139<H>  4.1   |  28  |  0.8    Ca    9.0      07 Jul 2023 03:20  Mg     1.9     07-07    TPro  6.5 [6.0 - 8.0]  /  Alb  3.6 [3.5 - 5.2]  /  TBili  1.1 [0.2 - 1.2]  /  DBili  x   /  AST  38 [0 - 41]  /  ALT  30 [0 - 41]  /  AlkPhos  110 [30 - 115]  07-07    PT/INR - ( 07 Jul 2023 03:20 )   PT: ;   INR: 1.43 ratio       PT/INR - ( 06 Jul 2023 01:00 )   PT: ;   INR: 1.49 ratio        Urinalysis Basic - ( 07 Jul 2023 03:20 )    Color: x / Appearance: x / SG: x / pH: x  Gluc: 98 mg/dL / Ketone: x  / Bili: x / Urobili: x   Blood: x / Protein: x / Nitrite: x   Leuk Esterase: x / RBC: x / WBC x   Sq Epi: x / Non Sq Epi: x / Bacteria: x      RADIOLOGY & ADDITIONAL TESTS:  CXR: < from: Xray Chest 2 Views PA/Lat (07.07.23 @ 08:09) >    Impression:    1. Unchanged small bibasilar opacities/pleural effusions.    Allergies    No Known Allergies    Intolerances      MEDICATIONS  (STANDING):  albumin human  5% IVPB 500 milliLiter(s) IV Intermittent once  cefepime   IVPB 1000 milliGRAM(s) IV Intermittent every 8 hours  chlorhexidine 2% Cloths 1 Application(s) Topical daily  diltiazem    Tablet 60 milliGRAM(s) Oral every 6 hours  magnesium sulfate  IVPB 1 Gram(s) IV Intermittent once  melatonin 5 milliGRAM(s) Oral at bedtime  metoprolol tartrate 25 milliGRAM(s) Oral every 6 hours  pantoprazole    Tablet 40 milliGRAM(s) Oral daily  polyethylene glycol 3350 17 Gram(s) Oral daily  potassium chloride    Tablet ER 20 milliEquivalent(s) Oral daily  vancomycin  IVPB 1000 milliGRAM(s) IV Intermittent every 12 hours  vancomycin  IVPB        MEDICATIONS  (PRN):  acetaminophen     Tablet .. 650 milliGRAM(s) Oral every 6 hours PRN Mild Pain (1 - 3)  ALPRAZolam 0.5 milliGRAM(s) Oral at bedtime PRN anxiety  ondansetron Injectable 4 milliGRAM(s) IV Push every 8 hours PRN Nausea and/or Vomiting    Home Medications:  Metoprolol Succinate  mg oral tablet, extended release: 1 tablet, extended release orally once a day (29 Jun 2023 06:42)    Pharmacologic DVT Prophylaxis [X] YES, [] NO: Contraindication: Heparin gtt @ 500  SCD's: YES b/l    GI Prophylaxis: protonix    Post-Op Beta-Blockers: [X] Yes,   Ambulation/Activity Status: ambulate as tolerated    42y Female status-post AVR, excision of fibroelastoma and JASON clipping day 8  - Case and plan discussed with CTU Intensivist and CT Surgeon - Dr. Perez/Duane/Reza/Meliza  - Continue CTU supportive care and ongoing plan of care as per continuing CTU rounds.   - Continue DVT/GI prophylaxis  - Incentive Spirometry 10 times an hour  - Continue to advance physical activity as tolerated and continue PT/OT as directed  1. Aortic mass s/p AVR & mass excision:  INR 1.4 today, give 3mg tonight, check inr in am,            Start Heparin gtt @ 500u/hr till Coumadin therapeutic   2. HTN: cont lopressor   3. A. Fib with RVR: cont coumadin & cardizem, BB 25 q6h, EP following awaiting Dr. Carpenter input    4. COPD/Hypoxia: cont nebs, wean o2 as tolerated, encourage incentive  5. Leukocytosis: afebrile WBCs trending up, blood cx drawn results pending, on Cefepime, IV Vanco 1gm q12h ordered, cont to monitor WBCs.   6. PA/Lat completed     Social Service Disposition: anticipate discharge early next week      OPERATIVE PROCEDURE(s): AVR mass exicision                POD # 7                      SURGEON(s): JIMBO Zepeda      SUBJECTIVE ASSESSMENT:42yFemale patient seen and examined at bedside. No complaints at bedside    Vital Signs Last 24 Hrs  T(F): 98 (07 Jul 2023 04:00), Max: 98.2 (06 Jul 2023 08:00)  HR: 103 (07 Jul 2023 04:00) (93 - 133)  BP: 107/61 (07 Jul 2023 04:00) (92/68 - 121/80)  BP(mean): 76 (07 Jul 2023 04:00) (60 - 93)  RR: 20 (07 Jul 2023 04:00) (16 - 42)  SpO2: 98% (07 Jul 2023 04:00) (94% - 98%)    I&O's Detail    06 Jul 2023 07:01  -  07 Jul 2023 07:00  --------------------------------------------------------  IN:    IV PiggyBack: 300 mL    Oral Fluid: 800 mL  Total IN: 1100 mL    OUT:    Voided (mL): 1300 mL  Total OUT: 1300 mL    Net: I&O's Detail    05 Jul 2023 07:01  -  06 Jul 2023 07:00  --------------------------------------------------------  Total NET: -1150 mL    06 Jul 2023 07:01  -  07 Jul 2023 07:00  --------------------------------------------------------  Total NET: -200 mL    CAPILLARY BLOOD GLUCOSE    A1C with Estimated Average Glucose Result: 6.0 % (06-14-23 @ 16:19)    Physical Exam:  General: NAD; A&Ox3, gets very anxious when examining patient  Cardiac: S1/S2, RRR, ? murmur, no rubs  Lungs: unlabored shallow respirations, bs decreased at bases  Abdomen: Soft/NT/ND;   Sternum: Intact, no click, incision healing well with no drainage  Extremities: mil- mod edema b/l lower extremities      BOWEL MOVEMENT:  [] YES    LABS:                        11.5<L>  15.51<H> )-----------( 389      ( 07 Jul 2023 03:20 )             35.9<L>                        10.9<L>  13.39<H> )-----------( 360      ( 06 Jul 2023 01:00 )             33.7<L>    07-07    135  |  96<L>  |  15  ----------------------------<  98  4.5   |  26  |  0.7  07-06    133<L>  |  96<L>  |  18  ----------------------------<  139<H>  4.1   |  28  |  0.8    Ca    9.0      07 Jul 2023 03:20  Mg     1.9     07-07    TPro  6.5 [6.0 - 8.0]  /  Alb  3.6 [3.5 - 5.2]  /  TBili  1.1 [0.2 - 1.2]  /  DBili  x   /  AST  38 [0 - 41]  /  ALT  30 [0 - 41]  /  AlkPhos  110 [30 - 115]  07-07    PT/INR - ( 07 Jul 2023 03:20 )   PT: ;   INR: 1.43 ratio       PT/INR - ( 06 Jul 2023 01:00 )   PT: ;   INR: 1.49 ratio        Urinalysis Basic - ( 07 Jul 2023 03:20 )    Color: x / Appearance: x / SG: x / pH: x  Gluc: 98 mg/dL / Ketone: x  / Bili: x / Urobili: x   Blood: x / Protein: x / Nitrite: x   Leuk Esterase: x / RBC: x / WBC x   Sq Epi: x / Non Sq Epi: x / Bacteria: x      RADIOLOGY & ADDITIONAL TESTS:  CXR: < from: Xray Chest 2 Views PA/Lat (07.07.23 @ 08:09) >    Impression:    1. Unchanged small bibasilar opacities/pleural effusions.    Allergies    No Known Allergies    Intolerances      MEDICATIONS  (STANDING):  albumin human  5% IVPB 500 milliLiter(s) IV Intermittent once  cefepime   IVPB 1000 milliGRAM(s) IV Intermittent every 8 hours  chlorhexidine 2% Cloths 1 Application(s) Topical daily  diltiazem    Tablet 60 milliGRAM(s) Oral every 6 hours  magnesium sulfate  IVPB 1 Gram(s) IV Intermittent once  melatonin 5 milliGRAM(s) Oral at bedtime  metoprolol tartrate 25 milliGRAM(s) Oral every 6 hours  pantoprazole    Tablet 40 milliGRAM(s) Oral daily  polyethylene glycol 3350 17 Gram(s) Oral daily  potassium chloride    Tablet ER 20 milliEquivalent(s) Oral daily  vancomycin  IVPB 1000 milliGRAM(s) IV Intermittent every 12 hours  vancomycin  IVPB        MEDICATIONS  (PRN):  acetaminophen     Tablet .. 650 milliGRAM(s) Oral every 6 hours PRN Mild Pain (1 - 3)  ALPRAZolam 0.5 milliGRAM(s) Oral at bedtime PRN anxiety  ondansetron Injectable 4 milliGRAM(s) IV Push every 8 hours PRN Nausea and/or Vomiting    Home Medications:  Metoprolol Succinate  mg oral tablet, extended release: 1 tablet, extended release orally once a day (29 Jun 2023 06:42)    Pharmacologic DVT Prophylaxis [X] YES, [] NO: Contraindication: Heparin gtt @ 500  SCD's: YES b/l    GI Prophylaxis: protonix    Post-Op Beta-Blockers: [X] Yes,   Ambulation/Activity Status: ambulate as tolerated    42y Female status-post AVR, excision of fibroelastoma and JASON clipping day 8  - Case and plan discussed with CTU Intensivist and CT Surgeon - Dr. Perez/Duane/Reza/Meilza  - Continue CTU supportive care and ongoing plan of care as per continuing CTU rounds.   - Continue DVT/GI prophylaxis  - Incentive Spirometry 10 times an hour  - Continue to advance physical activity as tolerated and continue PT/OT as directed  1. Aortic mass s/p AVR & mass excision:  INR 1.4 today, give 3mg tonight, check inr in am,            Start Heparin gtt @ 500u/hr till Coumadin therapeutic   2. HTN: cont lopressor   3. A. Fib with RVR: cont coumadin & cardizem, BB 25 q6h, EP following, start amio  daily as per MD Tyler   4. COPD/Hypoxia: cont nebs, wean o2 as tolerated, encourage incentive  5. Leukocytosis: afebrile WBCs trending up, blood cx drawn results pending, on Cefepime, IV Vanco 1gm q12h ordered, cont to monitor WBCs.   6. PA/Lat completed   7. Anxiety: psychiatry consulted, recc appreciated    Social Service Disposition: anticipate discharge early next week

## 2023-07-07 NOTE — PROGRESS NOTE ADULT - ASSESSMENT
Assessment:  SP AVR   Afib RVR  rate  On car  Fever, leukocytosis on ABX    PAST MEDICAL & SURGICAL HISTORY:  Hypertrophic cardiomyopathy      Atrial fibrillation      Aortic valve disease      Obese      S/P transesophageal echocardiogram ([facility])          PLAN:  Neuro: Pain control   Pulm: Encourage coughing, deep breathing and use of incentive spirometry. Wean off supplemental oxygen as able. Daily CXR.   Cardio: Monitor telemetry/alarms. Cardizem and lopressor for HR control, EP consult  GI: Tolerating diet. Continue stool softeners. Continue GI prophylaxis  Renal: monitor urine output, supplement electrolytes as needed  Vasc: Heparin SC/SCDs for DVT prophylaxis, coumadin 3 mg, daily INR statrt heparin 500 units no bolous   Heme: Monitor H/H.   ID: IV antibiotics. FU CX. vanco trough  Endocrine: Monitor finger stick blood sugar and control hyperglycemia with insulin  Physical Therapy: OOB/ambulate

## 2023-07-07 NOTE — PROGRESS NOTE ADULT - ASSESSMENT
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 She underwent aortic valve mass resection and left atrial appendage clipping and aortic valve replacement. post operatively patient went into afib and Ep consulted for afib management and rate control recommendations.     Plan  - Continue amiodarone therapy  - Trend INR  - Optimize AVN blocking agenst as tolerated

## 2023-07-08 LAB
ALBUMIN SERPL ELPH-MCNC: 3.5 G/DL — SIGNIFICANT CHANGE UP (ref 3.5–5.2)
ALP SERPL-CCNC: 110 U/L — SIGNIFICANT CHANGE UP (ref 30–115)
ALT FLD-CCNC: 28 U/L — SIGNIFICANT CHANGE UP (ref 0–41)
ANION GAP SERPL CALC-SCNC: 13 MMOL/L — SIGNIFICANT CHANGE UP (ref 7–14)
APTT BLD: 29.2 SEC — SIGNIFICANT CHANGE UP (ref 27–39.2)
APTT BLD: 36.8 SEC — SIGNIFICANT CHANGE UP (ref 27–39.2)
AST SERPL-CCNC: 37 U/L — SIGNIFICANT CHANGE UP (ref 0–41)
BASOPHILS # BLD AUTO: 0.08 K/UL — SIGNIFICANT CHANGE UP (ref 0–0.2)
BASOPHILS NFR BLD AUTO: 0.5 % — SIGNIFICANT CHANGE UP (ref 0–1)
BILIRUB SERPL-MCNC: 1.2 MG/DL — SIGNIFICANT CHANGE UP (ref 0.2–1.2)
BUN SERPL-MCNC: 16 MG/DL — SIGNIFICANT CHANGE UP (ref 10–20)
CALCIUM SERPL-MCNC: 9 MG/DL — SIGNIFICANT CHANGE UP (ref 8.4–10.4)
CHLORIDE SERPL-SCNC: 100 MMOL/L — SIGNIFICANT CHANGE UP (ref 98–110)
CO2 SERPL-SCNC: 23 MMOL/L — SIGNIFICANT CHANGE UP (ref 17–32)
CREAT SERPL-MCNC: 0.7 MG/DL — SIGNIFICANT CHANGE UP (ref 0.7–1.5)
EGFR: 110 ML/MIN/1.73M2 — SIGNIFICANT CHANGE UP
EOSINOPHIL # BLD AUTO: 0.23 K/UL — SIGNIFICANT CHANGE UP (ref 0–0.7)
EOSINOPHIL NFR BLD AUTO: 1.4 % — SIGNIFICANT CHANGE UP (ref 0–8)
GLUCOSE SERPL-MCNC: 101 MG/DL — HIGH (ref 70–99)
HCT VFR BLD CALC: 35.7 % — LOW (ref 37–47)
HGB BLD-MCNC: 11.5 G/DL — LOW (ref 12–16)
IMM GRANULOCYTES NFR BLD AUTO: 1.3 % — HIGH (ref 0.1–0.3)
INR BLD: 1.39 RATIO — HIGH (ref 0.65–1.3)
LYMPHOCYTES # BLD AUTO: 1.55 K/UL — SIGNIFICANT CHANGE UP (ref 1.2–3.4)
LYMPHOCYTES # BLD AUTO: 9.4 % — LOW (ref 20.5–51.1)
MAGNESIUM SERPL-MCNC: 1.9 MG/DL — SIGNIFICANT CHANGE UP (ref 1.8–2.4)
MCHC RBC-ENTMCNC: 26.3 PG — LOW (ref 27–31)
MCHC RBC-ENTMCNC: 32.2 G/DL — SIGNIFICANT CHANGE UP (ref 32–37)
MCV RBC AUTO: 81.7 FL — SIGNIFICANT CHANGE UP (ref 81–99)
MONOCYTES # BLD AUTO: 1.28 K/UL — HIGH (ref 0.1–0.6)
MONOCYTES NFR BLD AUTO: 7.8 % — SIGNIFICANT CHANGE UP (ref 1.7–9.3)
NEUTROPHILS # BLD AUTO: 13.06 K/UL — HIGH (ref 1.4–6.5)
NEUTROPHILS NFR BLD AUTO: 79.6 % — HIGH (ref 42.2–75.2)
NRBC # BLD: 0 /100 WBCS — SIGNIFICANT CHANGE UP (ref 0–0)
PLATELET # BLD AUTO: 338 K/UL — SIGNIFICANT CHANGE UP (ref 130–400)
PMV BLD: 9.8 FL — SIGNIFICANT CHANGE UP (ref 7.4–10.4)
POTASSIUM SERPL-MCNC: 4.6 MMOL/L — SIGNIFICANT CHANGE UP (ref 3.5–5)
POTASSIUM SERPL-SCNC: 4.6 MMOL/L — SIGNIFICANT CHANGE UP (ref 3.5–5)
PROT SERPL-MCNC: 6.4 G/DL — SIGNIFICANT CHANGE UP (ref 6–8)
PROTHROM AB SERPL-ACNC: 16 SEC — HIGH (ref 9.95–12.87)
RBC # BLD: 4.37 M/UL — SIGNIFICANT CHANGE UP (ref 4.2–5.4)
RBC # FLD: 16.5 % — HIGH (ref 11.5–14.5)
SODIUM SERPL-SCNC: 136 MMOL/L — SIGNIFICANT CHANGE UP (ref 135–146)
VANCOMYCIN TROUGH SERPL-MCNC: 8 UG/ML — SIGNIFICANT CHANGE UP (ref 5–10)
WBC # BLD: 16.42 K/UL — HIGH (ref 4.8–10.8)
WBC # FLD AUTO: 16.42 K/UL — HIGH (ref 4.8–10.8)

## 2023-07-08 PROCEDURE — 99233 SBSQ HOSP IP/OBS HIGH 50: CPT

## 2023-07-08 PROCEDURE — 99222 1ST HOSP IP/OBS MODERATE 55: CPT

## 2023-07-08 PROCEDURE — 71045 X-RAY EXAM CHEST 1 VIEW: CPT | Mod: 26

## 2023-07-08 PROCEDURE — 93010 ELECTROCARDIOGRAM REPORT: CPT

## 2023-07-08 RX ORDER — HEPARIN SODIUM 5000 [USP'U]/ML
900 INJECTION INTRAVENOUS; SUBCUTANEOUS
Qty: 25000 | Refills: 0 | Status: DISCONTINUED | OUTPATIENT
Start: 2023-07-08 | End: 2023-07-08

## 2023-07-08 RX ORDER — WARFARIN SODIUM 2.5 MG/1
3 TABLET ORAL ONCE
Refills: 0 | Status: COMPLETED | OUTPATIENT
Start: 2023-07-08 | End: 2023-07-08

## 2023-07-08 RX ORDER — AMIODARONE HYDROCHLORIDE 400 MG/1
150 TABLET ORAL ONCE
Refills: 0 | Status: COMPLETED | OUTPATIENT
Start: 2023-07-08 | End: 2023-07-08

## 2023-07-08 RX ORDER — FUROSEMIDE 40 MG
20 TABLET ORAL DAILY
Refills: 0 | Status: DISCONTINUED | OUTPATIENT
Start: 2023-07-08 | End: 2023-07-12

## 2023-07-08 RX ORDER — HEPARIN SODIUM 5000 [USP'U]/ML
1200 INJECTION INTRAVENOUS; SUBCUTANEOUS
Qty: 25000 | Refills: 0 | Status: DISCONTINUED | OUTPATIENT
Start: 2023-07-08 | End: 2023-07-08

## 2023-07-08 RX ORDER — HEPARIN SODIUM 5000 [USP'U]/ML
1400 INJECTION INTRAVENOUS; SUBCUTANEOUS
Qty: 25000 | Refills: 0 | Status: DISCONTINUED | OUTPATIENT
Start: 2023-07-08 | End: 2023-07-09

## 2023-07-08 RX ADMIN — Medication 60 MILLIGRAM(S): at 11:48

## 2023-07-08 RX ADMIN — Medication 25 MILLIGRAM(S): at 05:57

## 2023-07-08 RX ADMIN — PANTOPRAZOLE SODIUM 40 MILLIGRAM(S): 20 TABLET, DELAYED RELEASE ORAL at 11:49

## 2023-07-08 RX ADMIN — WARFARIN SODIUM 3 MILLIGRAM(S): 2.5 TABLET ORAL at 21:57

## 2023-07-08 RX ADMIN — Medication 60 MILLIGRAM(S): at 17:57

## 2023-07-08 RX ADMIN — Medication 250 MILLIGRAM(S): at 17:00

## 2023-07-08 RX ADMIN — CEFEPIME 100 MILLIGRAM(S): 1 INJECTION, POWDER, FOR SOLUTION INTRAMUSCULAR; INTRAVENOUS at 05:56

## 2023-07-08 RX ADMIN — Medication 5 MILLIGRAM(S): at 21:57

## 2023-07-08 RX ADMIN — CHLORHEXIDINE GLUCONATE 1 APPLICATION(S): 213 SOLUTION TOPICAL at 05:57

## 2023-07-08 RX ADMIN — Medication 25 MILLIGRAM(S): at 11:49

## 2023-07-08 RX ADMIN — AMIODARONE HYDROCHLORIDE 200 MILLIGRAM(S): 400 TABLET ORAL at 05:57

## 2023-07-08 RX ADMIN — CEFEPIME 100 MILLIGRAM(S): 1 INJECTION, POWDER, FOR SOLUTION INTRAMUSCULAR; INTRAVENOUS at 21:57

## 2023-07-08 RX ADMIN — Medication 20 MILLIGRAM(S): at 07:42

## 2023-07-08 RX ADMIN — Medication 250 MILLIGRAM(S): at 05:56

## 2023-07-08 RX ADMIN — Medication 60 MILLIGRAM(S): at 23:38

## 2023-07-08 RX ADMIN — Medication 60 MILLIGRAM(S): at 06:37

## 2023-07-08 RX ADMIN — ONDANSETRON 4 MILLIGRAM(S): 8 TABLET, FILM COATED ORAL at 07:42

## 2023-07-08 RX ADMIN — Medication 20 MILLIEQUIVALENT(S): at 11:49

## 2023-07-08 RX ADMIN — AMIODARONE HYDROCHLORIDE 600 MILLIGRAM(S): 400 TABLET ORAL at 06:47

## 2023-07-08 RX ADMIN — HEPARIN SODIUM 9 UNIT(S)/HR: 5000 INJECTION INTRAVENOUS; SUBCUTANEOUS at 06:47

## 2023-07-08 RX ADMIN — CEFEPIME 100 MILLIGRAM(S): 1 INJECTION, POWDER, FOR SOLUTION INTRAMUSCULAR; INTRAVENOUS at 14:36

## 2023-07-08 RX ADMIN — Medication 25 MILLIGRAM(S): at 17:57

## 2023-07-08 NOTE — BH CONSULTATION LIAISON ASSESSMENT NOTE - HPI (INCLUDE ILLNESS QUALITY, SEVERITY, DURATION, TIMING, CONTEXT, MODIFYING FACTORS, ASSOCIATED SIGNS AND SYMPTOMS)
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 Psychiatry was consulted for anxiety and medication management.           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 Psychiatry was consulted for anxiety and medication management.    Upon approach, patient was notably nervous but pleasant and polite and fully cooperative with interview. Patient endorsed having a chronic history of anxiety since she was a child, with her anxiety getting worse with social stressors, particularly with her older son's concerning behavior. Patient did endorse that recently there would be some triggering event that bothers her almost daily. Once she was admitted, she recalled becoming more anxious. She denied racing thoughts but reported that being in an unfamiliar area like this hospital confused her and made her uncomfortable. This environment also made it more difficult for her to sleep with her reporting getting 1-2 hours of sleep over the last week. Patient had been receiving PRN Xanax to aid with sleep. Patient reported that last night after taking Xanax to sleep, patient woke up confused and was removing her clothing. 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 Psychiatry was consulted for anxiety and medication management.    Upon approach, patient was notably nervous but pleasant and polite and fully cooperative with interview. Patient endorsed having a chronic history of anxiety since she was a child, with her anxiety getting worse with social stressors, particularly with her older son's concerning behavior. Patient did endorse that recently there would be some triggering event that bothers her almost daily. Once she was admitted, she recalled becoming more anxious. She denied racing thoughts but reported that being in an unfamiliar area like this hospital confused her and made her uncomfortable. This environment also made it more difficult for her to sleep with her reporting getting 1-2 hours of sleep over the last week. Patient had been receiving PRN Xanax to aid with sleep. Patient reported that last night after taking Xanax to sleep, patient woke up confused and was removing her clothing. She reported that nursing talked to her and reoriented her and she was able to return to baseline. Patient is currently alert and oriented x4. Patient denied any history of panic attacks, acute depression, psychosis, or suicidal ideation/action and currently denies any SI/HI/AVH. Patient reports that she has no current interest in standing psych medications and is interested in outpatient therapy.

## 2023-07-08 NOTE — BH CONSULTATION LIAISON ASSESSMENT NOTE - NSBHCONSULTMEDSLEEP_PSY_A_CORE FT
Melatonin PO 3 mg qhs Melatonin PO 3 mg qhs for sleep/wake cycle regulation Melatonin PO 3 mg qhs PRN for sleep/wake cycle regulation

## 2023-07-08 NOTE — BH CONSULTATION LIAISON ASSESSMENT NOTE - VIOLENCE PROTECTIVE FACTORS:
Residential stability/Relationship stability/Employment stability/Sobriety/Affective Stability/Insight into violence risk and need for management/treatment/Good treatment response/compliance

## 2023-07-08 NOTE — BH CONSULTATION LIAISON ASSESSMENT NOTE - NSBHCONSULTRECOMMENDOTHER_PSY_A_CORE FT
1. Recommend behavioral interventions, and environmental modifications to help patient's orientation and help patient feel safe.  2. Recommend holding any benzodiazapine medications as patient had delirious response to them recently  3. Recommend ordering Hydroxyzine PO 25 mg q6h PRN for anxiety and insomnia, which can be discontinued on discharge  4. Recommend Melatonin PO 3 mg qhs PRN for sleep/wake cycle regulation  5. We recommend that the patient be referred for outpatient psychiatric and psychotherapy services, to [facility] ([facility]), [address] [place] [address], phone number : [phone]. Please ensure that this referral information is included in discharge document  6. Given high risk of delirium given patient's recent delirious episode:  - Continue with treatment of reversible causes of delirium.   - Minimize polypharmacy and avoid benzodiazepines, anticholinergic medications, antihistamines, SSRI, SNRIs, opiates - which may contribute to confusion/agitation  - Provide strong environmental cues to maintain normal sleep/wake cycle; Daytime - frequent verbal engagement and reorientation, full light in room, encourage family visits, make sure patient has seeing eyeglasses/hearing aids; Nightime - reduce light noise, avoid non-essential procedures between midnight and 6AM.

## 2023-07-08 NOTE — PROGRESS NOTE ADULT - SUBJECTIVE AND OBJECTIVE BOX
CTU Attending Progress Daily Note     08 Jul 2023 08:22    Procedure:                                                  POD#                   Patient seen as post-op critical care follow-up    HPI:    See preop testing chart H&P    Interval event for past 24 hr:  MALIHA THOMPSON  42y had no event.     Current Complains:  MALIHA THOMPSON has no new complaints    REVIEW OF SYSTEMS:  CONSTITUTIONAL:  [-] weakness, [-] fevers, [-] chills  EYES/ENT: [-] visual changes, [-] vertigo, [-] throat pain   NECK: [-] pain, [-] stiffness  RESPIRATORY: [-] cough, [-] wheezing, [-] hemoptysis, [-] shortness of breath  CARDIOVASCULAR: [-] chest pain, [-] palpitations, [-] orthopnea  GASTROINTESTINAL:    [-]abdominal pain, [-] nausea, [-] vomiting, [-] hematemesis, [-] diarrhea, [-] constipation, [-] melena, [-] hematochezia.  GENITOURINARY: [-] dysuria, [-] frequency, [-] hematuria  NEUROLOGICAL: [-] numbness, [-] weakness  SKIN: [-] itching, [-] burning, [-] rashes, [-] lesions   All other review of systems is negative unless indicated above.    [  ] Unable to assess ROS because :    OBJECTIVE:  ICU Vital Signs Last 24 Hrs  T(C): 36.6 (08 Jul 2023 04:00), Max: 36.7 (08 Jul 2023 00:00)  T(F): 97.9 (08 Jul 2023 04:00), Max: 98 (08 Jul 2023 00:00)  HR: 97 (08 Jul 2023 07:15) (97 - 126)  BP: 95/54 (08 Jul 2023 07:15) (87/52 - 141/69)  BP(mean): 70 (08 Jul 2023 07:15) (63 - 100)  ABP: --  ABP(mean): --  RR: 32 (08 Jul 2023 07:15) (25 - 55)  SpO2: 96% (08 Jul 2023 07:15) (94% - 98%)    O2 Parameters below as of 08 Jul 2023 07:00  Patient On (Oxygen Delivery Method): room air            I&O's Summary    07 Jul 2023 07:01  -  08 Jul 2023 07:00  --------------------------------------------------------  IN: 1338 mL / OUT: 1350 mL / NET: -12 mL      Adult Advanced Hemodynamics Last 24 Hrs  CVP(mm Hg): --  CVP(cm H2O): --  CO: --  CI: --  PA: --  PA(mean): --  PCWP: --  SVR: --  SVRI: --  PVR: --  PVRI: --      PHYSICAL EXAM:  General: WN/WD NAD    HEENT:     [+] NCAT  [+] EOMI  [-] Conjuctival edema   [-] Icterus   [-] Thrush   [-] ETT  [-] NGT/OGT    Neck:         [+] FROM   [-] JVD     [-] Nodes     [-] Masses    [+] Mid-line trachea    [-] Tracheostomy    Chest:         [-] Sternal click   [-] Sternal drainage   [+] Pacing wires   [+] Chest tubes   [-] SubQ emphysema    Lungs:          [+] CTA   [-] Rhonchi   [-] Rales    [-] Wheezing    [-] Decreased BS    [-] Dullness R L    Cardiac:       [+] S1 [+] S2    [+] RRR   [-] Irregular   [-] S3   [-] S4    [-] Murmurs    [-] Rub    Abdomen:    [+] BS    [+] Soft    [+] Non-tender     [-] Distended    [-] Organomegaly  [-] PEG    Extremities:   [-] Cyanosis U/L   [-] Clubbing  U/L  [-] LE/UE Edema   [+] Capillary refill    [+] Pulses     Neuro:        [+] Awake   [+]  Alert   [-] Confused   [-] Lethargic   [-] Sedated   [-] Generalized Weakness    Skin:        [-] Rashes    [-] Erythema   [+] Normal incisions   [+] IV sites intact   [-] Sacral decubitus    Tubes:  LINES:    CAPILLARY BLOOD GLUCOSE        CAPILLARY BLOOD GLUCOSE          HOSPITAL MEDICATIONS:  MEDICATIONS  (STANDING):  albumin human  5% IVPB 500 milliLiter(s) IV Intermittent once  aMIOdarone    Tablet 200 milliGRAM(s) Oral daily  cefepime   IVPB 1000 milliGRAM(s) IV Intermittent every 8 hours  chlorhexidine 2% Cloths 1 Application(s) Topical daily  diltiazem    Tablet 60 milliGRAM(s) Oral every 6 hours  furosemide   Injectable 20 milliGRAM(s) IV Push daily  heparin  Infusion 900 Unit(s)/Hr (9 mL/Hr) IV Continuous <Continuous>  melatonin 5 milliGRAM(s) Oral at bedtime  metoprolol tartrate 25 milliGRAM(s) Oral every 6 hours  pantoprazole    Tablet 40 milliGRAM(s) Oral daily  polyethylene glycol 3350 17 Gram(s) Oral daily  potassium chloride    Tablet ER 20 milliEquivalent(s) Oral daily  vancomycin  IVPB 1000 milliGRAM(s) IV Intermittent every 12 hours  vancomycin  IVPB        MEDICATIONS  (PRN):  acetaminophen     Tablet .. 650 milliGRAM(s) Oral every 6 hours PRN Mild Pain (1 - 3)  ALPRAZolam 0.5 milliGRAM(s) Oral at bedtime PRN anxiety  ondansetron Injectable 4 milliGRAM(s) IV Push every 8 hours PRN Nausea and/or Vomiting      LABS:                          11.5   16.42 )-----------( 338      ( 08 Jul 2023 06:21 )             35.7     07-08    136  |  100  |  16  ----------------------------<  101<H>  4.6   |  23  |  0.7    Ca    9.0      08 Jul 2023 05:02  Mg     1.9     07-08    TPro  6.4  /  Alb  3.5  /  TBili  1.2  /  DBili  x   /  AST  37  /  ALT  28  /  AlkPhos  110  07-08    PT/INR - ( 08 Jul 2023 06:21 )   PT: 16.00 sec;   INR: 1.39 ratio           Urinalysis Basic - ( 08 Jul 2023 05:02 )    Color: x / Appearance: x / SG: x / pH: x  Gluc: 101 mg/dL / Ketone: x  / Bili: x / Urobili: x   Blood: x / Protein: x / Nitrite: x   Leuk Esterase: x / RBC: x / WBC x   Sq Epi: x / Non Sq Epi: x / Bacteria: x        Culture - Blood (collected 06 Jul 2023 10:32)  Source: .Blood Blood-Peripheral  Preliminary Report (07 Jul 2023 18:02):    No growth at 24 hours    Culture - Blood (collected 06 Jul 2023 10:32)  Source: .Blood Blood-Peripheral  Preliminary Report (07 Jul 2023 18:02):    No growth at 24 hours        RADIOLOGY:  Reviewed and interpreted by me  CXR from 07-08-23 shows [+] mild congestion, [-] pneumothorax, [-] R/L effusion, [-] cardiomegaly,   NGT in place, S-G Catheter in place, R/L TLC in place, R/L Chest Tubes in place    ECG:  Reviewed and interpreted by me:   QTC:    Assessment:      PAST MEDICAL & SURGICAL HISTORY:  Hypertrophic cardiomyopathy      Atrial fibrillation      Aortic valve disease      Obese      S/P transesophageal echocardiogram ([facility])          PLAN:  Neuro: Pain control  Pulm: Encourage coughing, deep breathing and use of incentive spirometry. Wean off supplemental oxygen as able. Daily CXR.   Cardio: Monitor telemetry/alarms. Continue cardiac meds  GI: Tolerating diet. Continue stool softeners. Continue GI prophylaxis  Renal: monitor urine output, supplement electrolytes as needed  Vasc: Heparin SC/SCDs for DVT prophylaxis  Heme: Monitor H/H.   ID: Off antibiotics. Stable.  Endocrine: Monitor finger stick blood sugar and control hyperglycemia with insulin  Physical Therapy: OOB/ambulate  Tubes: Monitor Chest tube output      Discussed with Cardiothoracic Team at AM rounds.    45 minutes of critical care time spent providing medical care for patient's acute illness/conditions that impairs at least one vital organ system and/or poses a high risk of imminent or life threatening deterioration in the patient's condition. It includes time spent evaluating and treating the patient's acute illness as well as time spent reviewing labs, radiology, discussing goals of care with patient and/or patient's family, and discussing the case with a multidisciplinary team in an effort to prevent further life threatening deterioration or end organ damage. This time is independent of any procedures performed. CTU Attending Progress Daily Note     08 Jul 2023 08:22    Procedure:             AVR                                     POD#         9          Patient seen as post-op critical care follow-up    HPI:    See preop testing chart H&P    Interval event for past 24 hr:  MALIHA THOMPSON  42y had Afib RVR    Current Complains:  MALIHA THOMPSON has no new complaints    REVIEW OF SYSTEMS:  CONSTITUTIONAL:  [-] weakness, [-] fevers, [-] chills  EYES/ENT: [-] visual changes, [-] vertigo, [-] throat pain   NECK: [-] pain, [-] stiffness  RESPIRATORY: [-] cough, [-] wheezing, [-] hemoptysis, [-] shortness of breath  CARDIOVASCULAR: [-] chest pain, [-] palpitations, [-] orthopnea  GASTROINTESTINAL:    [-]abdominal pain, [-] nausea, [-] vomiting, [-] hematemesis, [-] diarrhea, [-] constipation, [-] melena, [-] hematochezia.  GENITOURINARY: [-] dysuria, [-] frequency, [-] hematuria  NEUROLOGICAL: [-] numbness, [-] weakness  SKIN: [-] itching, [-] burning, [-] rashes, [-] lesions   All other review of systems is negative unless indicated above.    [  ] Unable to assess ROS because :    OBJECTIVE:  ICU Vital Signs Last 24 Hrs  T(C): 36.6 (08 Jul 2023 04:00), Max: 36.7 (08 Jul 2023 00:00)  T(F): 97.9 (08 Jul 2023 04:00), Max: 98 (08 Jul 2023 00:00)  HR: 97 (08 Jul 2023 07:15) (97 - 126)  BP: 95/54 (08 Jul 2023 07:15) (87/52 - 141/69)  BP(mean): 70 (08 Jul 2023 07:15) (63 - 100)  ABP: --  ABP(mean): --  RR: 32 (08 Jul 2023 07:15) (25 - 55)  SpO2: 96% (08 Jul 2023 07:15) (94% - 98%)    O2 Parameters below as of 08 Jul 2023 07:00  Patient On (Oxygen Delivery Method): room air            I&O's Summary    07 Jul 2023 07:01  -  08 Jul 2023 07:00  --------------------------------------------------------  IN: 1338 mL / OUT: 1350 mL / NET: -12 mL    PHYSICAL EXAM:  General: WN/WD NAD    HEENT:     [+] NCAT  [+] EOMI  [-] Conjuctival edema   [-] Icterus   [-] Thrush   [-] ETT  [-] NGT/OGT    Neck:         [+] FROM   [-] JVD     [-] Nodes     [-] Masses    [+] Mid-line trachea    [-] Tracheostomy    Chest:         [-] Sternal click   [-] Sternal drainage    [-] SubQ emphysema    Lungs:          [+] CTA   [-] Rhonchi   [-] Rales    [-] Wheezing    [-] Decreased BS    [-] Dullness R L    Cardiac:       [+] S1 [+] S2    [+] RRR   [-] Irregular   [-] S3   [-] S4    [-] Murmurs    [-] Rub    Abdomen:    [+] BS    [+] Soft    [+] Non-tender     [-] Distended    [-] Organomegaly  [-] PEG    Extremities:   [-] Cyanosis U/L   [-] Clubbing  U/L  [-] LE/UE Edema   [+] Capillary refill    [+] Pulses     Neuro:        [+] Awake   [+]  Alert   [-] Confused   [-] Lethargic   [-] Sedated   [-] Generalized Weakness    Skin:        [-] Rashes    [-] Erythema   [+] Normal incisions   [+] IV sites intact   [-] Sacral decubitus    HOSPITAL MEDICATIONS:  MEDICATIONS  (STANDING):  albumin human  5% IVPB 500 milliLiter(s) IV Intermittent once  aMIOdarone    Tablet 200 milliGRAM(s) Oral daily  cefepime   IVPB 1000 milliGRAM(s) IV Intermittent every 8 hours  chlorhexidine 2% Cloths 1 Application(s) Topical daily  diltiazem    Tablet 60 milliGRAM(s) Oral every 6 hours  furosemide   Injectable 20 milliGRAM(s) IV Push daily  heparin  Infusion 900 Unit(s)/Hr (9 mL/Hr) IV Continuous <Continuous>  melatonin 5 milliGRAM(s) Oral at bedtime  metoprolol tartrate 25 milliGRAM(s) Oral every 6 hours  pantoprazole    Tablet 40 milliGRAM(s) Oral daily  polyethylene glycol 3350 17 Gram(s) Oral daily  potassium chloride    Tablet ER 20 milliEquivalent(s) Oral daily  vancomycin  IVPB 1000 milliGRAM(s) IV Intermittent every 12 hours  vancomycin  IVPB        MEDICATIONS  (PRN):  acetaminophen     Tablet .. 650 milliGRAM(s) Oral every 6 hours PRN Mild Pain (1 - 3)  ALPRAZolam 0.5 milliGRAM(s) Oral at bedtime PRN anxiety  ondansetron Injectable 4 milliGRAM(s) IV Push every 8 hours PRN Nausea and/or Vomiting      LABS:                          11.5   16.42 )-----------( 338      ( 08 Jul 2023 06:21 )             35.7     07-08    136  |  100  |  16  ----------------------------<  101<H>  4.6   |  23  |  0.7    Ca    9.0      08 Jul 2023 05:02  Mg     1.9     07-08    TPro  6.4  /  Alb  3.5  /  TBili  1.2  /  DBili  x   /  AST  37  /  ALT  28  /  AlkPhos  110  07-08    PT/INR - ( 08 Jul 2023 06:21 )   PT: 16.00 sec;   INR: 1.39 ratio           Urinalysis Basic - ( 08 Jul 2023 05:02 )    Color: x / Appearance: x / SG: x / pH: x  Gluc: 101 mg/dL / Ketone: x  / Bili: x / Urobili: x   Blood: x / Protein: x / Nitrite: x   Leuk Esterase: x / RBC: x / WBC x   Sq Epi: x / Non Sq Epi: x / Bacteria: x        Culture - Blood (collected 06 Jul 2023 10:32)  Source: .Blood Blood-Peripheral  Preliminary Report (07 Jul 2023 18:02):    No growth at 24 hours    Culture - Blood (collected 06 Jul 2023 10:32)  Source: .Blood Blood-Peripheral  Preliminary Report (07 Jul 2023 18:02):    No growth at 24 hours        RADIOLOGY:    < from: Xray Chest 2 Views PA/Lat (07.07.23 @ 08:09) >  Findings:    Support devices: Overlying EKG leads.    Cardiac/mediastinum/hilum: Stable.    Lung parenchyma/Pleura: Unchanged small bibasilar opacities/pleural   effusions. No pneumothorax. Incidental note is made of an azygos lobe,   which is a normal variant.    Skeleton/soft tissues: Stable.    Impression:    1. Unchanged small bibasilar opacities/pleural effusions.    < end of copied text >      ECG:  Reviewed and interpreted by me: JOSEY flutter 94  QTC: 527    Assessment:  AVR  A-flutter/ fib    PAST MEDICAL & SURGICAL HISTORY:  Hypertrophic cardiomyopathy      Atrial fibrillation      Aortic valve disease      Obese      S/P transesophageal echocardiogram ([facility])          PLAN:  Neuro: Pain control  Pulm: Encourage coughing, deep breathing and use of incentive spirometry. Daily CXR.   Cardio: Monitor telemetry/alarms. Continue lopressor and cardizem for HR control  GI: Tolerating diet. Continue stool softeners. Continue GI prophylaxis  Renal: monitor urine output, supplement electrolytes as needed  Vasc: Heparin now in IV, coumadin 3, FU PTT INR  Heme: Monitor H/H.   ID: On cefepime  Endocrine: Monitor finger stick blood sugar and control hyperglycemia with insulin  Physical Therapy: OOB/ambulate        Discussed with Cardiothoracic Team at AM rounds.

## 2023-07-08 NOTE — BH CONSULTATION LIAISON ASSESSMENT NOTE - RISK ASSESSMENT
Risk Factors (Modifiable): Current acute medical condition, no established outpatient psychiatry    Risk Factors (Non-Modifiable): Chronic medical condition    Protective Factors: Residential stability, supportive family, sobriety, good judgement, fair insight

## 2023-07-08 NOTE — BH CONSULTATION LIAISON ASSESSMENT NOTE - DESCRIPTION
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 Patient's parents  when she was a child, saw therapist then.

## 2023-07-08 NOTE — BH CONSULTATION LIAISON ASSESSMENT NOTE - SUMMARY
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 Psychiatry was consulted for anxiety and medication management. 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 Psychiatry was consulted for anxiety and medication management.    Patient presents with reported chronic history of anxiety when triggered by psychosocial stressors with anxiety that has been worse since she was admitted to the hospital with her getting significantly less sleep over the last week. 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 Psychiatry was consulted for anxiety and medication management.    Patient presents with reported chronic history of anxiety when triggered by psychosocial stressors with anxiety that has been worse since she was admitted to the hospital with her getting significantly less sleep over the last week. Patient denies any panic attacks. Patient endorses that her anxiety also escalates after a specific trigger, which now includes the hospital setting, indicates more of a current adjustment disorder. However her reported increasing frequency of anxiety may indicate possible underlying anxiety disorder like MEGAN, but this should further evaluated in an outpatient setting. Of concern, patient reported that she become delirious when waking up in the middle of the night after using PRN Xanax for sleep.  Psychiatry recommends discontinuing the use of benzodiazepines in this patient due to the risk of delirium and it not being the best medication for these anxiety symptoms. Patient can be given Hydroxyzine PO 25 mg q6h PRN for anxiety and insomnia as well as Melatonin PO 3 mg qhs PRN for sleep/wake cycle regulation to further improve sleep. Delirium precautions should also be taken given her recent history. Patient has no acute signs of anurdaha, psychosis, depression, or SI/HI. There are no acute safety concerns at this moment; no psychiatric contraindications to discharge. We recommend that the patient be referred for outpatient psychiatric and psychotherapy services, to [facility] ([facility]).

## 2023-07-08 NOTE — BH CONSULTATION LIAISON ASSESSMENT NOTE - NSBHCHARTREVIEWVS_PSY_A_CORE FT
Vital Signs Last 24 Hrs  T(C): 36.6 (08 Jul 2023 04:00), Max: 36.7 (08 Jul 2023 00:00)  T(F): 97.9 (08 Jul 2023 04:00), Max: 98 (08 Jul 2023 00:00)  HR: 97 (08 Jul 2023 07:15) (97 - 126)  BP: 95/54 (08 Jul 2023 07:15) (87/52 - 141/69)  BP(mean): 70 (08 Jul 2023 07:15) (63 - 100)  RR: 32 (08 Jul 2023 07:15) (25 - 42)  SpO2: 96% (08 Jul 2023 07:15) (94% - 98%)    Parameters below as of 08 Jul 2023 07:00  Patient On (Oxygen Delivery Method): room air

## 2023-07-08 NOTE — BH CONSULTATION LIAISON ASSESSMENT NOTE - NSBHCONSULTFOLLOWAFTERCARE_PSY_A_CORE FT
We recommend that the patient be referred for outpatient psychiatric and psychotherapy services, to [facility]ry [facility] ([facility]), [address] [place] [address], phone number : [phone]. Please ensure that this referral information is included in discharge document.

## 2023-07-08 NOTE — BH CONSULTATION LIAISON ASSESSMENT NOTE - CURRENT MEDICATION
MEDICATIONS  (STANDING):  albumin human  5% IVPB 500 milliLiter(s) IV Intermittent once  aMIOdarone    Tablet 200 milliGRAM(s) Oral daily  cefepime   IVPB 1000 milliGRAM(s) IV Intermittent every 8 hours  chlorhexidine 2% Cloths 1 Application(s) Topical daily  diltiazem    Tablet 60 milliGRAM(s) Oral every 6 hours  furosemide   Injectable 20 milliGRAM(s) IV Push daily  heparin  Infusion 900 Unit(s)/Hr (9 mL/Hr) IV Continuous <Continuous>  melatonin 5 milliGRAM(s) Oral at bedtime  metoprolol tartrate 25 milliGRAM(s) Oral every 6 hours  pantoprazole    Tablet 40 milliGRAM(s) Oral daily  polyethylene glycol 3350 17 Gram(s) Oral daily  potassium chloride    Tablet ER 20 milliEquivalent(s) Oral daily  vancomycin  IVPB 1000 milliGRAM(s) IV Intermittent every 12 hours  vancomycin  IVPB      warfarin 3 milliGRAM(s) Oral once    MEDICATIONS  (PRN):  acetaminophen     Tablet .. 650 milliGRAM(s) Oral every 6 hours PRN Mild Pain (1 - 3)  ALPRAZolam 0.5 milliGRAM(s) Oral at bedtime PRN anxiety  ondansetron Injectable 4 milliGRAM(s) IV Push every 8 hours PRN Nausea and/or Vomiting

## 2023-07-08 NOTE — PROGRESS NOTE ADULT - SUBJECTIVE AND OBJECTIVE BOX
OPERATIVE PROCEDURE(s):                POD #                       42yFemale  SURGEON(s): JIMBO Zepeda  SUBJECTIVE ASSESSMENT:  Patient has no complaints at this time.    Vital Signs Last 24 Hrs  T(F): 97.9 (08 Jul 2023 04:00), Max: 98 (08 Jul 2023 00:00)  HR: 97 (08 Jul 2023 07:15) (97 - 126)  BP: 95/54 (08 Jul 2023 07:15) (87/52 - 141/69)  BP(mean): 70 (08 Jul 2023 07:15) (63 - 100)  RR: 32 (08 Jul 2023 07:15) (25 - 42)  SpO2: 96% (08 Jul 2023 07:15) (94% - 98%) RA    I&O's Detail    07 Jul 2023 07:01  -  08 Jul 2023 07:00  --------------------------------------------------------  IN:    Heparin: 18 mL    Heparin: 50 mL    IV PiggyBack: 550 mL    Oral Fluid: 720 mL  Total IN: 1338 mL    OUT:    Voided (mL): 1350 mL  Total OUT: 1350 mL        Net:   I&O's Detail    06 Jul 2023 07:01  -  07 Jul 2023 07:00  --------------------------------------------------------  Total NET: 0 mL      07 Jul 2023 07:01  -  08 Jul 2023 07:00  --------------------------------------------------------  Total NET: -12 mL        CAPILLARY BLOOD GLUCOSE        Physical Exam:  General: NAD; A&Ox3/Patient is intubated and sedated  Cardiac: S1/S2, RRR, no murmur, no rubs  Lungs: unlabored respirations, CTA b/l, no wheeze, no rales, no crackles  Abdomen: Soft/NT/ND; positive bowel sounds x 4  Sternum: Intact, no click, incision healing well with no drainage  Incisions: Incisions clean/dry/intact  Extremities: No edema b/l lower extremities; good capillary refill; no cyanosis; palpable 1+ pedal pulses b/l    Central Venous Catheter: Yes[]  No[] , If Yes indication:           Day #  Luis Catheter: Yes  [] , No  [] , If yes indication:                      Day #  NGT: Yes [] No [] ,    If Yes Placement:                                     Day #  EPICARDIAL WIRES:  [] YES [] NO                                              Day #  BOWEL MOVEMENT:  [] YES [] NO, If No, Timing since last BM:      Day #  CHEST TUBE(Left/Right):  [] YES [] NO, If yes -  AIR LEAKS:  [] YES [] NO        LABS:                        11.5<L>  16.42<H> )-----------( 338      ( 08 Jul 2023 06:21 )             35.7<L>                        11.5<L>  15.51<H> )-----------( 389      ( 07 Jul 2023 03:20 )             35.9<L>    07-08    136  |  100  |  16  ----------------------------<  101<H>  4.6   |  23  |  0.7  07-07    135  |  96<L>  |  15  ----------------------------<  98  4.5   |  26  |  0.7    Ca    9.0      08 Jul 2023 05:02  Mg     1.9     07-08    TPro  6.4 [6.0 - 8.0]  /  Alb  3.5 [3.5 - 5.2]  /  TBili  1.2 [0.2 - 1.2]  /  DBili  x   /  AST  37 [0 - 41]  /  ALT  28 [0 - 41]  /  AlkPhos  110 [30 - 115]  07-08    PT/INR - ( 08 Jul 2023 06:21 )   PT: ;   INR: 1.39 ratio         PT/INR - ( 07 Jul 2023 03:20 )   PT: ;   INR: 1.43 ratio           Urinalysis Basic - ( 08 Jul 2023 05:02 )    Color: x / Appearance: x / SG: x / pH: x  Gluc: 101 mg/dL / Ketone: x  / Bili: x / Urobili: x   Blood: x / Protein: x / Nitrite: x   Leuk Esterase: x / RBC: x / WBC x   Sq Epi: x / Non Sq Epi: x / Bacteria: x        RADIOLOGY & ADDITIONAL TESTS:  CXR:   EKG:  MEDICATIONS  (STANDING):  albumin human  5% IVPB 500 milliLiter(s) IV Intermittent once  aMIOdarone    Tablet 200 milliGRAM(s) Oral daily  cefepime   IVPB 1000 milliGRAM(s) IV Intermittent every 8 hours  chlorhexidine 2% Cloths 1 Application(s) Topical daily  diltiazem    Tablet 60 milliGRAM(s) Oral every 6 hours  furosemide   Injectable 20 milliGRAM(s) IV Push daily  heparin  Infusion 900 Unit(s)/Hr (9 mL/Hr) IV Continuous <Continuous>  melatonin 5 milliGRAM(s) Oral at bedtime  metoprolol tartrate 25 milliGRAM(s) Oral every 6 hours  pantoprazole    Tablet 40 milliGRAM(s) Oral daily  polyethylene glycol 3350 17 Gram(s) Oral daily  potassium chloride    Tablet ER 20 milliEquivalent(s) Oral daily  vancomycin  IVPB 1000 milliGRAM(s) IV Intermittent every 12 hours  vancomycin  IVPB        MEDICATIONS  (PRN):  acetaminophen     Tablet .. 650 milliGRAM(s) Oral every 6 hours PRN Mild Pain (1 - 3)  ALPRAZolam 0.5 milliGRAM(s) Oral at bedtime PRN anxiety  ondansetron Injectable 4 milliGRAM(s) IV Push every 8 hours PRN Nausea and/or Vomiting    HEPARIN:  [] YES [] NO  Dose: XX UNITS/HR UNITS Q8H  LOVENOX:[] YES [] NO  Dose: XX mg Q24H  COUMADIN: []  YES [] NO  Dose: XX mg  Q24H  SCD's: YES b/l  GI Prophylaxis: Protonix [], Pepcid [], None [], (Contra-indication:.....)    Post-Op Aspirin: Yes [],  No [], If No, then contra-indication:  Post-Op Statin: Yes [], No[], If No, then contra-indication:  Post-Op Beta-Blockers: Yes [], No [], If No, then contra-indication:    Allergies:  No Known Allergies      Ambulation/Activity Status: Ambulates several times daily with assistance.    Assessment/Plan:  42y Female status-post .....  - Case and plan discussed with CTU Intensivist and CT Surgeon - Dr. Perez/Duane/Reza  - Continue CTU supportive care    - Continue DVT/GI prophylaxis  - Incentive Spirometry 10 times an hour  - Continue to advance physical activity as tolerated and continue PT/OT as directed  1. CAD: Continue ASA, statin, BB  2. HTN:   3. A. Fib:   4. COPD/Hypoxia:   5. DM/Glucose Control:                              OPERATIVE PROCEDURE(s):   AVR mass exicision                POD # 8            42yFemale  SURGEON(s): JIMBO Zepeda  SUBJECTIVE ASSESSMENT:  Patient has no complaints at this time.    Vital Signs Last 24 Hrs  T(F): 97.9 (08 Jul 2023 04:00), Max: 98 (08 Jul 2023 00:00)  HR: 97 (08 Jul 2023 07:15) (97 - 126)  BP: 95/54 (08 Jul 2023 07:15) (87/52 - 141/69)  BP(mean): 70 (08 Jul 2023 07:15) (63 - 100)  RR: 32 (08 Jul 2023 07:15) (25 - 42)  SpO2: 96% (08 Jul 2023 07:15) (94% - 98%) RA    I&O's Detail    07 Jul 2023 07:01  -  08 Jul 2023 07:00  --------------------------------------------------------  IN:    Heparin: 18 mL    Heparin: 50 mL    IV PiggyBack: 550 mL    Oral Fluid: 720 mL  Total IN: 1338 mL    OUT:    Voided (mL): 1350 mL  Total OUT: 1350 mL        Net:   I&O's Detail    06 Jul 2023 07:01  -  07 Jul 2023 07:00  --------------------------------------------------------  Total NET: 0 mL      07 Jul 2023 07:01  -  08 Jul 2023 07:00  --------------------------------------------------------  Total NET: -12 mL    Physical Exam:  General: NAD; A&Ox3, gets very anxious when examining patient  Cardiac: S1/S2, RRR, ? murmur, no rubs  Lungs: unlabored shallow respirations, bs decreased at bases  Abdomen: Soft/NT/ND;   Sternum: Intact, no click, incision healing well with no drainage  Extremities: mil- mod edema b/l lower extremities      LABS:                        11.5<L>  16.42<H> )-----------( 338      ( 08 Jul 2023 06:21 )             35.7<L>                        11.5<L>  15.51<H> )-----------( 389      ( 07 Jul 2023 03:20 )             35.9<L>    07-08    136  |  100  |  16  ----------------------------<  101<H>  4.6   |  23  |  0.7  07-07    135  |  96<L>  |  15  ----------------------------<  98  4.5   |  26  |  0.7    Ca    9.0      08 Jul 2023 05:02  Mg     1.9     07-08    TPro  6.4 [6.0 - 8.0]  /  Alb  3.5 [3.5 - 5.2]  /  TBili  1.2 [0.2 - 1.2]  /  DBili  x   /  AST  37 [0 - 41]  /  ALT  28 [0 - 41]  /  AlkPhos  110 [30 - 115]  07-08    PT/INR - ( 08 Jul 2023 06:21 )   PT: ;   INR: 1.39 ratio         PT/INR - ( 07 Jul 2023 03:20 )   PT: ;   INR: 1.43 ratio           Urinalysis Basic - ( 08 Jul 2023 05:02 )    Color: x / Appearance: x / SG: x / pH: x  Gluc: 101 mg/dL / Ketone: x  / Bili: x / Urobili: x   Blood: x / Protein: x / Nitrite: x   Leuk Esterase: x / RBC: x / WBC x   Sq Epi: x / Non Sq Epi: x / Bacteria: x     MEDICATIONS  (STANDING):  albumin human  5% IVPB 500 milliLiter(s) IV Intermittent once  aMIOdarone    Tablet 200 milliGRAM(s) Oral daily  cefepime   IVPB 1000 milliGRAM(s) IV Intermittent every 8 hours  chlorhexidine 2% Cloths 1 Application(s) Topical daily  diltiazem    Tablet 60 milliGRAM(s) Oral every 6 hours  furosemide   Injectable 20 milliGRAM(s) IV Push daily  heparin  Infusion 900 Unit(s)/Hr (9 mL/Hr) IV Continuous <Continuous>  melatonin 5 milliGRAM(s) Oral at bedtime  metoprolol tartrate 25 milliGRAM(s) Oral every 6 hours  pantoprazole    Tablet 40 milliGRAM(s) Oral daily  polyethylene glycol 3350 17 Gram(s) Oral daily  potassium chloride    Tablet ER 20 milliEquivalent(s) Oral daily  vancomycin  IVPB 1000 milliGRAM(s) IV Intermittent every 12 hours  vancomycin  IVPB        MEDICATIONS  (PRN):  acetaminophen     Tablet .. 650 milliGRAM(s) Oral every 6 hours PRN Mild Pain (1 - 3)  ALPRAZolam 0.5 milliGRAM(s) Oral at bedtime PRN anxiety  ondansetron Injectable 4 milliGRAM(s) IV Push every 8 hours PRN Nausea and/or Vomiting    Pharmacologic DVT Prophylaxis [X] YES, [] NO: Contraindication: Heparin gtt @ 500  SCD's: YES b/l    GI Prophylaxis: protonix    Post-Op Beta-Blockers: [X] Yes,     Allergies:  No Known Allergies      Ambulation/Activity Status: Ambulates several times daily with assistance.    Assessment/Plan:  42y Female status-post  AVR mass exicision                POD # 8  - Case and plan discussed with CTU Intensivist and CT Surgeon - Dr. Perez/Duane/Reza  - Continue CTU supportive care    - Continue DVT/GI prophylaxis  - Incentive Spirometry 10 times an hour  - Continue to advance physical activity as tolerated and continue PT/OT as directed  Neuro: Pain control  Pulm: Encourage coughing, deep breathing and use of incentive spirometry. Daily CXR.   Cardio: Monitor telemetry/alarms. Continue lopressor and cardizem for HR control  GI: Tolerating diet. Continue stool softeners. Continue GI prophylaxis  Renal: monitor urine output, supplement electrolytes as needed  Vasc: Heparin now in IV, coumadin 3, FU PTT INR  Heme: Monitor H/H.   ID: On cefepime  Endocrine: Monitor finger stick blood sugar and control hyperglycemia with insulin  Physical Therapy: OOB/ambulate

## 2023-07-09 ENCOUNTER — TRANSCRIPTION ENCOUNTER (OUTPATIENT)
Age: 43
End: 2023-07-09

## 2023-07-09 LAB
ALBUMIN SERPL ELPH-MCNC: 3.9 G/DL — SIGNIFICANT CHANGE UP (ref 3.5–5.2)
ALP SERPL-CCNC: 121 U/L — HIGH (ref 30–115)
ALT FLD-CCNC: 33 U/L — SIGNIFICANT CHANGE UP (ref 0–41)
ANION GAP SERPL CALC-SCNC: 12 MMOL/L — SIGNIFICANT CHANGE UP (ref 7–14)
APTT BLD: 47.4 SEC — HIGH (ref 27–39.2)
APTT BLD: 73.1 SEC — CRITICAL HIGH (ref 27–39.2)
AST SERPL-CCNC: 35 U/L — SIGNIFICANT CHANGE UP (ref 0–41)
BILIRUB SERPL-MCNC: 1.1 MG/DL — SIGNIFICANT CHANGE UP (ref 0.2–1.2)
BUN SERPL-MCNC: 16 MG/DL — SIGNIFICANT CHANGE UP (ref 10–20)
CALCIUM SERPL-MCNC: 9.2 MG/DL — SIGNIFICANT CHANGE UP (ref 8.4–10.4)
CHLORIDE SERPL-SCNC: 96 MMOL/L — LOW (ref 98–110)
CO2 SERPL-SCNC: 26 MMOL/L — SIGNIFICANT CHANGE UP (ref 17–32)
CREAT SERPL-MCNC: 0.7 MG/DL — SIGNIFICANT CHANGE UP (ref 0.7–1.5)
EGFR: 110 ML/MIN/1.73M2 — SIGNIFICANT CHANGE UP
GLUCOSE SERPL-MCNC: 113 MG/DL — HIGH (ref 70–99)
HCT VFR BLD CALC: 33.8 % — LOW (ref 37–47)
HGB BLD-MCNC: 10.6 G/DL — LOW (ref 12–16)
INR BLD: 1.88 RATIO — HIGH (ref 0.65–1.3)
INR BLD: 2.02 RATIO — HIGH (ref 0.65–1.3)
MAGNESIUM SERPL-MCNC: 1.9 MG/DL — SIGNIFICANT CHANGE UP (ref 1.8–2.4)
MCHC RBC-ENTMCNC: 25.5 PG — LOW (ref 27–31)
MCHC RBC-ENTMCNC: 31.4 G/DL — LOW (ref 32–37)
MCV RBC AUTO: 81.4 FL — SIGNIFICANT CHANGE UP (ref 81–99)
NRBC # BLD: 0 /100 WBCS — SIGNIFICANT CHANGE UP (ref 0–0)
PLATELET # BLD AUTO: 281 K/UL — SIGNIFICANT CHANGE UP (ref 130–400)
PMV BLD: 10.1 FL — SIGNIFICANT CHANGE UP (ref 7.4–10.4)
POTASSIUM SERPL-MCNC: 4.8 MMOL/L — SIGNIFICANT CHANGE UP (ref 3.5–5)
POTASSIUM SERPL-SCNC: 4.8 MMOL/L — SIGNIFICANT CHANGE UP (ref 3.5–5)
PROT SERPL-MCNC: 6.7 G/DL — SIGNIFICANT CHANGE UP (ref 6–8)
PROTHROM AB SERPL-ACNC: 21.9 SEC — HIGH (ref 9.95–12.87)
PROTHROM AB SERPL-ACNC: 23.5 SEC — HIGH (ref 9.95–12.87)
RBC # BLD: 4.15 M/UL — LOW (ref 4.2–5.4)
RBC # FLD: 16.6 % — HIGH (ref 11.5–14.5)
SODIUM SERPL-SCNC: 134 MMOL/L — LOW (ref 135–146)
WBC # BLD: 15.82 K/UL — HIGH (ref 4.8–10.8)
WBC # FLD AUTO: 15.82 K/UL — HIGH (ref 4.8–10.8)

## 2023-07-09 PROCEDURE — 71045 X-RAY EXAM CHEST 1 VIEW: CPT | Mod: 26

## 2023-07-09 PROCEDURE — 93010 ELECTROCARDIOGRAM REPORT: CPT

## 2023-07-09 PROCEDURE — 99233 SBSQ HOSP IP/OBS HIGH 50: CPT

## 2023-07-09 RX ORDER — WARFARIN SODIUM 2.5 MG/1
2 TABLET ORAL ONCE
Refills: 0 | Status: COMPLETED | OUTPATIENT
Start: 2023-07-09 | End: 2023-07-09

## 2023-07-09 RX ORDER — HEPARIN SODIUM 5000 [USP'U]/ML
1300 INJECTION INTRAVENOUS; SUBCUTANEOUS
Qty: 25000 | Refills: 0 | Status: DISCONTINUED | OUTPATIENT
Start: 2023-07-09 | End: 2023-07-10

## 2023-07-09 RX ORDER — MAGNESIUM SULFATE 500 MG/ML
2 VIAL (ML) INJECTION ONCE
Refills: 0 | Status: COMPLETED | OUTPATIENT
Start: 2023-07-09 | End: 2023-07-09

## 2023-07-09 RX ADMIN — CEFEPIME 100 MILLIGRAM(S): 1 INJECTION, POWDER, FOR SOLUTION INTRAMUSCULAR; INTRAVENOUS at 21:11

## 2023-07-09 RX ADMIN — Medication 5 MILLIGRAM(S): at 21:11

## 2023-07-09 RX ADMIN — CHLORHEXIDINE GLUCONATE 1 APPLICATION(S): 213 SOLUTION TOPICAL at 05:14

## 2023-07-09 RX ADMIN — Medication 25 GRAM(S): at 11:55

## 2023-07-09 RX ADMIN — Medication 25 MILLIGRAM(S): at 14:33

## 2023-07-09 RX ADMIN — Medication 20 MILLIGRAM(S): at 12:01

## 2023-07-09 RX ADMIN — Medication 60 MILLIGRAM(S): at 17:15

## 2023-07-09 RX ADMIN — HEPARIN SODIUM 13 UNIT(S)/HR: 5000 INJECTION INTRAVENOUS; SUBCUTANEOUS at 06:00

## 2023-07-09 RX ADMIN — Medication 25 MILLIGRAM(S): at 06:05

## 2023-07-09 RX ADMIN — POLYETHYLENE GLYCOL 3350 17 GRAM(S): 17 POWDER, FOR SOLUTION ORAL at 12:01

## 2023-07-09 RX ADMIN — CEFEPIME 100 MILLIGRAM(S): 1 INJECTION, POWDER, FOR SOLUTION INTRAMUSCULAR; INTRAVENOUS at 14:30

## 2023-07-09 RX ADMIN — Medication 250 MILLIGRAM(S): at 17:15

## 2023-07-09 RX ADMIN — Medication 25 MILLIGRAM(S): at 17:15

## 2023-07-09 RX ADMIN — HEPARIN SODIUM 14 UNIT(S)/HR: 5000 INJECTION INTRAVENOUS; SUBCUTANEOUS at 00:00

## 2023-07-09 RX ADMIN — CEFEPIME 100 MILLIGRAM(S): 1 INJECTION, POWDER, FOR SOLUTION INTRAMUSCULAR; INTRAVENOUS at 05:14

## 2023-07-09 RX ADMIN — Medication 60 MILLIGRAM(S): at 12:00

## 2023-07-09 RX ADMIN — Medication 20 MILLIEQUIVALENT(S): at 12:01

## 2023-07-09 RX ADMIN — Medication 60 MILLIGRAM(S): at 06:51

## 2023-07-09 RX ADMIN — Medication 250 MILLIGRAM(S): at 06:05

## 2023-07-09 RX ADMIN — AMIODARONE HYDROCHLORIDE 200 MILLIGRAM(S): 400 TABLET ORAL at 05:13

## 2023-07-09 RX ADMIN — HEPARIN SODIUM 13 UNIT(S)/HR: 5000 INJECTION INTRAVENOUS; SUBCUTANEOUS at 21:11

## 2023-07-09 RX ADMIN — PANTOPRAZOLE SODIUM 40 MILLIGRAM(S): 20 TABLET, DELAYED RELEASE ORAL at 11:58

## 2023-07-09 RX ADMIN — WARFARIN SODIUM 2 MILLIGRAM(S): 2.5 TABLET ORAL at 21:11

## 2023-07-09 NOTE — PROGRESS NOTE ADULT - ASSESSMENT
Assessment/Plan:  Aortic Valve Mass-s/p AVR-POD #10  1-BP control-beta-blockers, diltiazem  2-serum glucose control-insulin sliding scale correction regimen  3-fluid overload-diuresis  4-D-Ifl-rate control-beta-blockers, diltiazem, amiodarone  5-anticoagulation (AVR-M)-continue coumadin, heparin infusion, monitor PT/INR, PTT  6-acute blood loss anemia-stable, monitor Hb/Hct  5-buvnaaphoomaonk-bvksorxx, monitor daily    35 minutes of critical care services provided

## 2023-07-09 NOTE — DISCHARGE NOTE NURSING/CASE MANAGEMENT/SOCIAL WORK - NSDCPEFALRISK_GEN_ALL_CORE
For information on Fall & Injury Prevention, visit: [email or web address] OR  [email or web address] OR  https://www.cdc.gov/steadi/patient.html

## 2023-07-09 NOTE — PROGRESS NOTE ADULT - SUBJECTIVE AND OBJECTIVE BOX
MALIHA THOMPSON  MRN#: [id number]  Subjective:  Patient was seen and evalauted on AM rounds offerring no specific compliants at this time.    OBJECTIVE:  ICU Vital Signs Last 24 Hrs  T(C): 35.6 (2023 04:00), Max: 36.6 (2023 00:00)  T(F): 96.1 (2023 04:00), Max: 97.9 (2023 00:00)  HR: 112 (2023 07:00) (90 - 131)  BP: 103/85 (2023 07:00) (88/58 - 130/62)  BP(mean): 91 (2023 07:00) (67 - 97)  ABP: --  ABP(mean): --  RR: 29 (2023 07:00) (21 - 31)  SpO2: 94% (2023 07:00) (92% - 97%)    O2 Parameters below as of 2023 07:00  Patient On (Oxygen Delivery Method): room air            -08 @ 07:01  -  07-09 @ 07:00  --------------------------------------------------------  IN: 2017 mL / OUT: 2275 mL / NET: -258 mL      CAPILLARY BLOOD GLUCOSE          PHYSICAL EXAM:Daily     Daily Weight in k.5 (2023 06:00)  General: WN/WD NAD    HEENT:     + NCAT  + EOMI  - Conjuctival edema   - Icterus   - Thrush   - ETT  - NGT/OGT    Neck:         + FROM    - JVD     - Nodes     - Masses    + Mid-line trachea   - Tracheostomy    Chest:         - Sternal click  - Sternal drainage  + Pacing wires  + Chest tubes  - SubQ emphysema    Lungs:          + CTA   - Rhonchi    - Rales    - Wheezing     - Decreased BS   - Dullness R L    Cardiac:       + S1 + S2    + RRR   - Irregular   - S3  - S4    - Murmurs   - Rub   - Hamman’s sign     Abdomen:    + BS     + Soft    + Non-tender     - Distended    - Organomegaly  - PEG    Extremities:   - Cyanosis U/L   - Clubbing  U/L  + LE Edema   + Capillary refill    + Pulses     Neuro:        + Awake   +  Alert   - Confused   - Lethargic   - Sedated   - Generalized Weakness    Skin:        - Rashes    - Erythema   + Normal incisions   + IV sites intact  - Sacral decubitus    HOSPITAL MEDICATIONS:  MEDICATIONS  (STANDING):  albumin human  5% IVPB 500 milliLiter(s) IV Intermittent once  aMIOdarone    Tablet 200 milliGRAM(s) Oral daily  cefepime   IVPB 1000 milliGRAM(s) IV Intermittent every 8 hours  chlorhexidine 2% Cloths 1 Application(s) Topical daily  diltiazem    Tablet 60 milliGRAM(s) Oral every 6 hours  furosemide   Injectable 20 milliGRAM(s) IV Push daily  heparin  Infusion 1300 Unit(s)/Hr (13 mL/Hr) IV Continuous <Continuous>  magnesium sulfate  IVPB 2 Gram(s) IV Intermittent once  melatonin 5 milliGRAM(s) Oral at bedtime  metoprolol tartrate 25 milliGRAM(s) Oral every 6 hours  pantoprazole    Tablet 40 milliGRAM(s) Oral daily  polyethylene glycol 3350 17 Gram(s) Oral daily  potassium chloride    Tablet ER 20 milliEquivalent(s) Oral daily  vancomycin  IVPB 1000 milliGRAM(s) IV Intermittent every 12 hours  vancomycin  IVPB        MEDICATIONS  (PRN):  acetaminophen     Tablet .. 650 milliGRAM(s) Oral every 6 hours PRN Mild Pain (1 - 3)  ondansetron Injectable 4 milliGRAM(s) IV Push every 8 hours PRN Nausea and/or Vomiting      LABS:                        10.6   15.82 )-----------( 281      ( 2023 04:40 )             33.8        134<L>  |  96<L>  |  16  ----------------------------<  113<H>  4.8   |  26  |  0.7    Ca    9.2      2023 04:40  Mg     1.9         TPro  6.7  /  Alb  3.9  /  TBili  1.1  /  DBili  x   /  AST  35  /  ALT  33  /  AlkPhos  121<H>      PT/INR - ( 2023 04:40 )   PT: 21.90 sec;   INR: 1.88 ratio         PTT - ( 2023 04:40 )  PTT:73.1 sec LIVER FUNCTIONS - ( 2023 04:40 )  Alb: 3.9 g/dL / Pro: 6.7 g/dL / ALK PHOS: 121 U/L / ALT: 33 U/L / AST: 35 U/L / GGT: x           Urinalysis Basic - ( 2023 04:40 )    Color: x / Appearance: x / SG: x / pH: x  Gluc: 113 mg/dL / Ketone: x  / Bili: x / Urobili: x   Blood: x / Protein: x / Nitrite: x   Leuk Esterase: x / RBC: x / WBC x   Sq Epi: x / Non Sq Epi: x / Bacteria: x        RADIOLOGY:  X Reviewed and interpreted by me: bilateral opacities    CARDIOPULMONARY DYSFUNCTION  - Respiratory status required supplemental oxygen & the following of continuous pulse oximetry for support & to prevent decompensation  - Continued early mobilization as tolerated  - Addressed analgesic regimen to optimize function    PREVENTION-PROPHYLAXIS  - Heparin continued for VTE prophylaxis in addition to Venodyne boots  - Protonix maintained for GI bleeding prophylaxis  - Lopressor continued   - Metabolic stability & infection prophylaxis required review and adjustment of regular Insulin sliding scale and gylcemic regimen while following serial glucose levels to help achieve & maintain euglycemia  - Reviewed & addressed surgical site infection prophylaxis regimen

## 2023-07-09 NOTE — DISCHARGE NOTE NURSING/CASE MANAGEMENT/SOCIAL WORK - PATIENT PORTAL LINK FT
You can access the FollowMyHealth Patient Portal offered by [facility] by registering at the following website: [email or web address]. By joining [employer]’s FollowMyHealth portal, you will also be able to view your health information using other applications (apps) compatible with our system.

## 2023-07-09 NOTE — PROGRESS NOTE ADULT - SUBJECTIVE AND OBJECTIVE BOX
OPERATIVE PROCEDURE(s):                POD #    10                   42yFemale  SURGEON(s): JIMBO Zepeda  SUBJECTIVE ASSESSMENT:   Vital Signs Last 24 Hrs  T(F): 96.1 (09 Jul 2023 04:00), Max: 97.9 (09 Jul 2023 00:00)  HR: 112 (09 Jul 2023 07:00) (90 - 131)  BP: 103/85 (09 Jul 2023 07:00) (88/58 - 130/62)  BP(mean): 91 (09 Jul 2023 07:00) (67 - 97)  ABP: --  ABP(mean): --  RR: 29 (09 Jul 2023 07:00) (20 - 31)  SpO2: 94% (09 Jul 2023 07:00) (92% - 97%)  CVP(mm Hg): --  CVP(cm H2O): --  CO: --  CI: --  PA: --  SVR: --    I&O's Detail    08 Jul 2023 07:01  -  09 Jul 2023 07:00  --------------------------------------------------------  IN:    Heparin: 63 mL    Heparin: 108 mL    Heparin: 70 mL    Heparin: 26 mL    IV PiggyBack: 650 mL    Oral Fluid: 1100 mL  Total IN: 2017 mL    OUT:    Voided (mL): 2275 mL  Total OUT: 2275 mL        Net:   I&O's Detail    07 Jul 2023 07:01  -  08 Jul 2023 07:00  --------------------------------------------------------  Total NET: -12 mL      08 Jul 2023 07:01  -  09 Jul 2023 07:00  --------------------------------------------------------  Total NET: -258 mL        CAPILLARY BLOOD GLUCOSE        Physical Exam:  General: NAD; A&Ox3/Patient is intubated and sedated  Cardiac: S1/S2, RRR, no murmur, no rubs  Lungs: unlabored respirations, CTA b/l, no wheeze, no rales, no crackles  Abdomen: Soft/NT/ND; positive bowel sounds x 4  Sternum: Intact, no click, incision healing well with no drainage  Incisions: Incisions clean/dry/intact  Extremities: No edema b/l lower extremities; good capillary refill; no cyanosis; palpable 1+ pedal pulses b/l    Central Venous Catheter: Yes[]  No[] , If Yes indication:           Day #  Luis Catheter: Yes  [] , No  [] , If yes indication:                      Day #  NGT: Yes [] No [] ,    If Yes Placement:                                     Day #  EPICARDIAL WIRES:  [] YES [] NO                                              Day #  BOWEL MOVEMENT:  [] YES [] NO, If No, Timing since last BM:      Day #  CHEST TUBE(Left/Right):  [] YES [] NO, If yes -  AIR LEAKS:  [] YES [] NO        LABS:                        10.6<L>  15.82<H> )-----------( 281      ( 09 Jul 2023 04:40 )             33.8<L>                        11.5<L>  16.42<H> )-----------( 338      ( 08 Jul 2023 06:21 )             35.7<L>    07-09    134<L>  |  96<L>  |  16  ----------------------------<  113<H>  4.8   |  26  |  0.7  07-08    136  |  100  |  16  ----------------------------<  101<H>  4.6   |  23  |  0.7    Ca    9.2      09 Jul 2023 04:40  Mg     1.9     07-09    TPro  6.7 [6.0 - 8.0]  /  Alb  3.9 [3.5 - 5.2]  /  TBili  1.1 [0.2 - 1.2]  /  DBili  x   /  AST  35 [0 - 41]  /  ALT  33 [0 - 41]  /  AlkPhos  121<H> [30 - 115]  07-09    PT/INR - ( 09 Jul 2023 04:40 )   PT: ;   INR: 1.88 ratio         PT/INR - ( 08 Jul 2023 06:21 )   PT: ;   INR: 1.39 ratio         PTT - ( 09 Jul 2023 04:40 )  PTT:73.1 sec, PTT - ( 08 Jul 2023 21:54 )  PTT:36.8 sec  Urinalysis Basic - ( 09 Jul 2023 04:40 )    Color: x / Appearance: x / SG: x / pH: x  Gluc: 113 mg/dL / Ketone: x  / Bili: x / Urobili: x   Blood: x / Protein: x / Nitrite: x   Leuk Esterase: x / RBC: x / WBC x   Sq Epi: x / Non Sq Epi: x / Bacteria: x        RADIOLOGY & ADDITIONAL TESTS:  CXR:  EKG:  MEDICATIONS  (STANDING):  albumin human  5% IVPB 500 milliLiter(s) IV Intermittent once  aMIOdarone    Tablet 200 milliGRAM(s) Oral daily  cefepime   IVPB 1000 milliGRAM(s) IV Intermittent every 8 hours  chlorhexidine 2% Cloths 1 Application(s) Topical daily  diltiazem    Tablet 60 milliGRAM(s) Oral every 6 hours  furosemide   Injectable 20 milliGRAM(s) IV Push daily  heparin  Infusion 1300 Unit(s)/Hr (13 mL/Hr) IV Continuous <Continuous>  melatonin 5 milliGRAM(s) Oral at bedtime  metoprolol tartrate 25 milliGRAM(s) Oral every 6 hours  pantoprazole    Tablet 40 milliGRAM(s) Oral daily  polyethylene glycol 3350 17 Gram(s) Oral daily  potassium chloride    Tablet ER 20 milliEquivalent(s) Oral daily  vancomycin  IVPB 1000 milliGRAM(s) IV Intermittent every 12 hours  vancomycin  IVPB        MEDICATIONS  (PRN):  acetaminophen     Tablet .. 650 milliGRAM(s) Oral every 6 hours PRN Mild Pain (1 - 3)  ondansetron Injectable 4 milliGRAM(s) IV Push every 8 hours PRN Nausea and/or Vomiting    HEPARIN:  [] YES [] NO  Dose: XX UNITS/HR UNITS Q8H  LOVENOX:[] YES [] NO  Dose: XX mg Q24H  COUMADIN: []  YES [] NO  Dose: XX mg  Q24H  SCD's: YES b/l  GI Prophylaxis: Protonix [], Pepcid [], None [], (Contra-indication:.....)    Post-Op Beta-Blockers: Yes [], No[], If No, then contraindication:  Post-Op Aspirin: Yes [],  No [], If No, then contraindication:  Post-Op Statin: Yes [], No[], If No, then contraindication:  Allergies    No Known Allergies    Intolerances      Ambulation/Activity Status:    Assessment/Plan:  42y Female status-post .....  - Case and plan discussed with CTU Intensivist and CT Surgeon - Dr. Guzman/Duane/Reza  - Continue CTU supportive care    - Continue DVT/GI prophylaxis  - Incentive Spirometry 10 times an hour  - Continue to advance physical activity as tolerated and continue PT/OT as directed  1. CAD: Continue ASA, statin, BB  2. HTN:   3. A. Fib:   4. COPD/Hypoxia:   5. 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 no acute complaints at this time.   Vital Signs Last 24 Hrs  T(F): 96.1 (09 Jul 2023 04:00), Max: 97.9 (09 Jul 2023 00:00)  HR: 112 (09 Jul 2023 07:00) (90 - 131)  BP: 103/85 (09 Jul 2023 07:00) (88/58 - 130/62)  BP(mean): 91 (09 Jul 2023 07:00) (67 - 97)  RR: 29 (09 Jul 2023 07:00) (20 - 31)  SpO2: 94% (09 Jul 2023 07:00) (92% - 97%)      I&O's Detail    08 Jul 2023 07:01  -  09 Jul 2023 07:00  --------------------------------------------------------  IN:    Heparin: 63 mL    Heparin: 108 mL    Heparin: 70 mL    Heparin: 26 mL    IV PiggyBack: 650 mL    Oral Fluid: 1100 mL  Total IN: 2017 mL    OUT:    Voided (mL): 2275 mL  Total OUT: 2275 mL        Net:   I&O's Detail    07 Jul 2023 07:01  -  08 Jul 2023 07:00  --------------------------------------------------------  Total NET: -12 mL      08 Jul 2023 07:01  -  09 Jul 2023 07:00  --------------------------------------------------------  Total NET: -258 mL        CAPILLARY BLOOD GLUCOSE        Physical Exam:  General: NAD; A&Ox3  Cardiac: S1/S2, RRR, no murmur, no rubs  Lungs: unlabored respirations, CTA b/l, no wheeze, no rales, no crackles  Abdomen: Soft/NT/ND; positive bowel sounds x 4  Sternum: Intact, no click, incision healing well with no drainage  Incisions: Incisions clean/dry/intact  Extremities: No edema b/l lower extremities; good capillary refill; no cyanosis; palpable 1+ pedal pulses b/l      LABS:                        10.6<L>  15.82<H> )-----------( 281      ( 09 Jul 2023 04:40 )             33.8<L>                        11.5<L>  16.42<H> )-----------( 338      ( 08 Jul 2023 06:21 )             35.7<L>    07-09    134<L>  |  96<L>  |  16  ----------------------------<  113<H>  4.8   |  26  |  0.7  07-08    136  |  100  |  16  ----------------------------<  101<H>  4.6   |  23  |  0.7    Ca    9.2      09 Jul 2023 04:40  Mg     1.9     07-09    TPro  6.7 [6.0 - 8.0]  /  Alb  3.9 [3.5 - 5.2]  /  TBili  1.1 [0.2 - 1.2]  /  DBili  x   /  AST  35 [0 - 41]  /  ALT  33 [0 - 41]  /  AlkPhos  121<H> [30 - 115]  07-09    PT/INR - ( 09 Jul 2023 04:40 )   PT: ;   INR: 1.88 ratio         PT/INR - ( 08 Jul 2023 06:21 )   PT: ;   INR: 1.39 ratio         PTT - ( 09 Jul 2023 04:40 )  PTT:73.1 sec, PTT - ( 08 Jul 2023 21:54 )  PTT:36.8 sec  Urinalysis Basic - ( 09 Jul 2023 04:40 )    Color: x / Appearance: x / SG: x / pH: x  Gluc: 113 mg/dL / Ketone: x  / Bili: x / Urobili: x   Blood: x / Protein: x / Nitrite: x   Leuk Esterase: x / RBC: x / WBC x   Sq Epi: x / Non Sq Epi: x / Bacteria: x        RADIOLOGY & ADDITIONAL TESTS:  CXR: < from: Xray Chest 1 View- PORTABLE-Routine (Xray Chest 1 View- PORTABLE-Routine in AM.) (07.08.23 @ 05:57) >  IMPRESSION:    Unchanged bilateral opacities.    < end of copied text >    EKG: < from: 12 Lead ECG (07.08.23 @ 07:09) >    Ventricular Rate 94 BPM    Atrial Rate 308 BPM    QRS Duration 128 ms    Q-T Interval 422 ms    QTC Calculation(Bazett) 527 ms    R Axis -64 degrees    T Axis 121 degrees    Diagnosis Line Atrial flutter with variable A-V block  Left axis deviation  Right bundle branch block  T wave abnormality, consider lateral ischemia  Abnormal ECG    < end of copied text >    MEDICATIONS  (STANDING):  albumin human  5% IVPB 500 milliLiter(s) IV Intermittent once  aMIOdarone    Tablet 200 milliGRAM(s) Oral daily  cefepime   IVPB 1000 milliGRAM(s) IV Intermittent every 8 hours  chlorhexidine 2% Cloths 1 Application(s) Topical daily  diltiazem    Tablet 60 milliGRAM(s) Oral every 6 hours  furosemide   Injectable 20 milliGRAM(s) IV Push daily  heparin  Infusion 1300 Unit(s)/Hr (13 mL/Hr) IV Continuous <Continuous>  melatonin 5 milliGRAM(s) Oral at bedtime  metoprolol tartrate 25 milliGRAM(s) Oral every 6 hours  pantoprazole    Tablet 40 milliGRAM(s) Oral daily  polyethylene glycol 3350 17 Gram(s) Oral daily  potassium chloride    Tablet ER 20 milliEquivalent(s) Oral daily  vancomycin  IVPB 1000 milliGRAM(s) IV Intermittent every 12 hours  vancomycin  IVPB        MEDICATIONS  (PRN):  acetaminophen     Tablet .. 650 milliGRAM(s) Oral every 6 hours PRN Mild Pain (1 - 3)  ondansetron Injectable 4 milliGRAM(s) IV Push every 8 hours PRN Nausea and/or Vomiting      Allergies    No Known Allergies    Intolerances      Ambulation/Activity Status: ambulate     Assessment/Plan:  42y Female status-post avr (M)/excision of fibroelastoma/ligation of left atrial appendage       POD 10  - Case and plan discussed with CTU Intensivist and CT Surgeon - Dr. Perez/Duane/Reza/Meliza  - Continue CTU supportive care and ongoing plan of care as per continuing CTU rounds.   - Continue DVT/GI prophylaxis  - Incentive Spirometry 10 times an hour  - Continue to advance physical activity as tolerated and continue PT/OT as directed  1. AVR (mechanical valve) & mass excision:  INR 1.8 today, cont hep gtt 1300, ptt 73, recheck coag at 12:00, dose coumadin accordingly   2. HTN: cont lopressor  3. A. Fib: cont coumadin & cardizem, BB 12.5 q6h, EP following -> increase BB if patient can tolerate it, consider adding digoxin if patient unable to tolerate BB  4. COPD/Hypoxia: cont nebs, wean o2 as tolerated, encourage incentive  5. Leukocytosis: afebrile WBCs trending down, cultures negative to date, cont Cefepime, IV Vanco 1gm q12h ordered, cont to monitor WBCs.     dispo: d/c home when inr therapeutic     Social Service Disposition: TBD

## 2023-07-10 LAB
ANION GAP SERPL CALC-SCNC: 8 MMOL/L — SIGNIFICANT CHANGE UP (ref 7–14)
APTT BLD: 75.1 SEC — CRITICAL HIGH (ref 27–39.2)
BUN SERPL-MCNC: 16 MG/DL — SIGNIFICANT CHANGE UP (ref 10–20)
CALCIUM SERPL-MCNC: 8.8 MG/DL — SIGNIFICANT CHANGE UP (ref 8.4–10.5)
CHLORIDE SERPL-SCNC: 96 MMOL/L — LOW (ref 98–110)
CO2 SERPL-SCNC: 28 MMOL/L — SIGNIFICANT CHANGE UP (ref 17–32)
CREAT SERPL-MCNC: 0.8 MG/DL — SIGNIFICANT CHANGE UP (ref 0.7–1.5)
EGFR: 94 ML/MIN/1.73M2 — SIGNIFICANT CHANGE UP
GLUCOSE BLDC GLUCOMTR-MCNC: 132 MG/DL — HIGH (ref 70–99)
GLUCOSE SERPL-MCNC: 118 MG/DL — HIGH (ref 70–99)
HCT VFR BLD CALC: 32.7 % — LOW (ref 37–47)
HGB BLD-MCNC: 10.4 G/DL — LOW (ref 12–16)
INR BLD: 2.47 RATIO — HIGH (ref 0.65–1.3)
MCHC RBC-ENTMCNC: 25.7 PG — LOW (ref 27–31)
MCHC RBC-ENTMCNC: 31.8 G/DL — LOW (ref 32–37)
MCV RBC AUTO: 80.9 FL — LOW (ref 81–99)
NRBC # BLD: 0 /100 WBCS — SIGNIFICANT CHANGE UP (ref 0–0)
PLATELET # BLD AUTO: 240 K/UL — SIGNIFICANT CHANGE UP (ref 130–400)
PMV BLD: 10.3 FL — SIGNIFICANT CHANGE UP (ref 7.4–10.4)
POTASSIUM SERPL-MCNC: 5.2 MMOL/L — HIGH (ref 3.5–5)
POTASSIUM SERPL-SCNC: 5.2 MMOL/L — HIGH (ref 3.5–5)
PROTHROM AB SERPL-ACNC: 28.9 SEC — HIGH (ref 9.95–12.87)
RBC # BLD: 4.04 M/UL — LOW (ref 4.2–5.4)
RBC # FLD: 16.4 % — HIGH (ref 11.5–14.5)
SODIUM SERPL-SCNC: 132 MMOL/L — LOW (ref 135–146)
WBC # BLD: 17.26 K/UL — HIGH (ref 4.8–10.8)
WBC # FLD AUTO: 17.26 K/UL — HIGH (ref 4.8–10.8)

## 2023-07-10 PROCEDURE — 99232 SBSQ HOSP IP/OBS MODERATE 35: CPT

## 2023-07-10 PROCEDURE — 71045 X-RAY EXAM CHEST 1 VIEW: CPT | Mod: 26

## 2023-07-10 RX ORDER — METOPROLOL TARTRATE 50 MG
50 TABLET ORAL EVERY 12 HOURS
Refills: 0 | Status: DISCONTINUED | OUTPATIENT
Start: 2023-07-10 | End: 2023-07-12

## 2023-07-10 RX ORDER — HEPARIN SODIUM 5000 [USP'U]/ML
5000 INJECTION INTRAVENOUS; SUBCUTANEOUS EVERY 12 HOURS
Refills: 0 | Status: DISCONTINUED | OUTPATIENT
Start: 2023-07-10 | End: 2023-07-12

## 2023-07-10 RX ORDER — WARFARIN SODIUM 2.5 MG/1
2 TABLET ORAL ONCE
Refills: 0 | Status: COMPLETED | OUTPATIENT
Start: 2023-07-10 | End: 2023-07-10

## 2023-07-10 RX ORDER — DILTIAZEM HCL 120 MG
180 CAPSULE, EXT RELEASE 24 HR ORAL DAILY
Refills: 0 | Status: DISCONTINUED | OUTPATIENT
Start: 2023-07-10 | End: 2023-07-11

## 2023-07-10 RX ORDER — ACETAMINOPHEN 500 MG
650 TABLET ORAL ONCE
Refills: 0 | Status: COMPLETED | OUTPATIENT
Start: 2023-07-10 | End: 2023-07-10

## 2023-07-10 RX ORDER — METOPROLOL TARTRATE 50 MG
5 TABLET ORAL ONCE
Refills: 0 | Status: COMPLETED | OUTPATIENT
Start: 2023-07-10 | End: 2023-07-10

## 2023-07-10 RX ADMIN — AMIODARONE HYDROCHLORIDE 200 MILLIGRAM(S): 400 TABLET ORAL at 05:01

## 2023-07-10 RX ADMIN — Medication 5 MILLIGRAM(S): at 21:47

## 2023-07-10 RX ADMIN — Medication 5 MILLIGRAM(S): at 13:35

## 2023-07-10 RX ADMIN — Medication 250 MILLIGRAM(S): at 17:15

## 2023-07-10 RX ADMIN — Medication 250 MILLIGRAM(S): at 05:01

## 2023-07-10 RX ADMIN — CEFEPIME 100 MILLIGRAM(S): 1 INJECTION, POWDER, FOR SOLUTION INTRAMUSCULAR; INTRAVENOUS at 21:47

## 2023-07-10 RX ADMIN — CEFEPIME 100 MILLIGRAM(S): 1 INJECTION, POWDER, FOR SOLUTION INTRAMUSCULAR; INTRAVENOUS at 05:02

## 2023-07-10 RX ADMIN — Medication 60 MILLIGRAM(S): at 00:00

## 2023-07-10 RX ADMIN — WARFARIN SODIUM 2 MILLIGRAM(S): 2.5 TABLET ORAL at 21:47

## 2023-07-10 RX ADMIN — CHLORHEXIDINE GLUCONATE 1 APPLICATION(S): 213 SOLUTION TOPICAL at 05:03

## 2023-07-10 RX ADMIN — Medication 25 MILLIGRAM(S): at 05:00

## 2023-07-10 RX ADMIN — Medication 60 MILLIGRAM(S): at 05:16

## 2023-07-10 RX ADMIN — Medication 20 MILLIGRAM(S): at 05:01

## 2023-07-10 RX ADMIN — Medication 650 MILLIGRAM(S): at 14:54

## 2023-07-10 RX ADMIN — Medication 180 MILLIGRAM(S): at 11:16

## 2023-07-10 RX ADMIN — Medication 50 MILLIGRAM(S): at 17:16

## 2023-07-10 RX ADMIN — ONDANSETRON 4 MILLIGRAM(S): 8 TABLET, FILM COATED ORAL at 03:53

## 2023-07-10 RX ADMIN — CEFEPIME 100 MILLIGRAM(S): 1 INJECTION, POWDER, FOR SOLUTION INTRAMUSCULAR; INTRAVENOUS at 14:06

## 2023-07-10 RX ADMIN — HEPARIN SODIUM 5000 UNIT(S): 5000 INJECTION INTRAVENOUS; SUBCUTANEOUS at 17:17

## 2023-07-10 RX ADMIN — Medication 20 MILLIEQUIVALENT(S): at 11:15

## 2023-07-10 RX ADMIN — Medication 650 MILLIGRAM(S): at 15:24

## 2023-07-10 RX ADMIN — PANTOPRAZOLE SODIUM 40 MILLIGRAM(S): 20 TABLET, DELAYED RELEASE ORAL at 11:16

## 2023-07-10 NOTE — PROGRESS NOTE ADULT - SUBJECTIVE AND OBJECTIVE BOX
CTU Attending Progress Daily Note     10 Jul 2023 19:28  POD# - 11  still in A fib rate   He has history of Hypertrophic cardiomyopathy    Atrial fibrillation    Aortic valve disease    Obese      Interval event for past 24 hr:  MALIHA THOMPSON  42y had no event.   Current Complains:  MALIHA THOMPSON has no new complains  HPI:    OBJECTIVE:  ICU Vital Signs Last 24 Hrs  T(C): 37.2 (10 Jul 2023 16:00), Max: 37.3 (10 Jul 2023 12:00)  T(F): 99 (10 Jul 2023 16:00), Max: 99.2 (10 Jul 2023 12:00)  HR: 110 (10 Jul 2023 19:00) (88 - 130)  BP: 110/80 (10 Jul 2023 19:00) (86/56 - 136/68)  BP(mean): 90 (10 Jul 2023 19:00) (65 - 106)  ABP: --  ABP(mean): --  RR: 20 (10 Jul 2023 19:00) (20 - 39)  SpO2: 97% (10 Jul 2023 19:00) (92% - 98%)    O2 Parameters below as of 10 Jul 2023 19:00  Patient On (Oxygen Delivery Method): room air          I&O's Summary    09 Jul 2023 07:01  -  10 Jul 2023 07:00  --------------------------------------------------------  IN: 1497 mL / OUT: 1000 mL / NET: 497 mL    10 Jul 2023 07:01  -  10 Jul 2023 19:28  --------------------------------------------------------  IN: 1090 mL / OUT: 800 mL / NET: 290 mL      I&O's Detail    09 Jul 2023 07:01  -  10 Jul 2023 07:00  --------------------------------------------------------  IN:    Heparin: 247 mL    IV PiggyBack: 650 mL    Oral Fluid: 600 mL  Total IN: 1497 mL    OUT:    Voided (mL): 1000 mL  Total OUT: 1000 mL    Total NET: 497 mL      10 Jul 2023 07:01  -  10 Jul 2023 19:28  --------------------------------------------------------  IN:    IV PiggyBack: 250 mL    Oral Fluid: 840 mL  Total IN: 1090 mL    OUT:    Voided (mL): 800 mL  Total OUT: 800 mL    Total NET: 290 mL        Adult Advanced Hemodynamics Last 24 Hrs  CVP(mm Hg): --  CVP(cm H2O): --  CO: --  CI: --  PA: --  PA(mean): --  PCWP: --  SVR: --  SVRI: --  PVR: --  PVRI: --    CAPILLARY BLOOD GLUCOSE        LABS:                          10.4   17.26 )-----------( 240      ( 10 Jul 2023 03:25 )             32.7     07-10    132<L>  |  96<L>  |  16  ----------------------------<  118<H>  5.2<H>   |  28  |  0.8    Ca    8.8      10 Jul 2023 03:25  Mg     1.9     07-09    TPro  6.7  /  Alb  3.9  /  TBili  1.1  /  DBili  x   /  AST  35  /  ALT  33  /  AlkPhos  121<H>  07-09    PT/INR - ( 10 Jul 2023 03:25 )   PT: 28.90 sec;   INR: 2.47 ratio         PTT - ( 10 Jul 2023 03:25 )  PTT:75.1 sec  Urinalysis Basic - ( 10 Jul 2023 03:25 )    Color: x / Appearance: x / SG: x / pH: x  Gluc: 118 mg/dL / Ketone: x  / Bili: x / Urobili: x   Blood: x / Protein: x / Nitrite: x   Leuk Esterase: x / RBC: x / WBC x   Sq Epi: x / Non Sq Epi: x / Bacteria: x        Home Medications:  Metoprolol Succinate  mg oral tablet, extended release: 1 tablet, extended release orally once a day (29 Jun 2023 06:42)    HOSPITAL MEDICATIONS:  MEDICATIONS  (STANDING):  aMIOdarone    Tablet 200 milliGRAM(s) Oral daily  cefepime   IVPB 1000 milliGRAM(s) IV Intermittent every 8 hours  chlorhexidine 2% Cloths 1 Application(s) Topical daily  diltiazem    milliGRAM(s) Oral daily  furosemide   Injectable 20 milliGRAM(s) IV Push daily  heparin   Injectable 5000 Unit(s) SubCutaneous every 12 hours  melatonin 5 milliGRAM(s) Oral at bedtime  metoprolol tartrate 50 milliGRAM(s) Oral every 12 hours  pantoprazole    Tablet 40 milliGRAM(s) Oral daily  polyethylene glycol 3350 17 Gram(s) Oral daily  potassium chloride    Tablet ER 20 milliEquivalent(s) Oral daily  vancomycin  IVPB 1000 milliGRAM(s) IV Intermittent every 12 hours  vancomycin  IVPB      warfarin 2 milliGRAM(s) Oral once    MEDICATIONS  (PRN):  acetaminophen     Tablet .. 650 milliGRAM(s) Oral every 6 hours PRN Mild Pain (1 - 3)  ondansetron Injectable 4 milliGRAM(s) IV Push every 8 hours PRN Nausea and/or Vomiting      REVIEW OF SYSTEMS:  CONSTITUTIONAL: [X] all negative; [ ] weakness, [ ] fevers, [ ] chills  EYES/ENT: [X] all negative; [ ] visual changes, [ ] vertigo, [ ] throat pain   NECK: [X] all negative; [ ] pain, [ ] stiffness  RESPIRATORY: [] all negative, [ ] cough, [ ] wheezing, [ ] hemoptysis, [ ] shortness of breath  CARDIOVASCULAR: [] all negative; [ ] chest pain, [ ] palpitations, [ ] orthopnea  GASTROINTESTINAL: [X] all negative; [ ]abdominal pain, [ ] nausea, [ ] vomiting, [ ] hematemesis, [ ] diarrhea, [ ] constipation, [ ] melena, [ ] hematochezia.  GENITOURINARY: [X] all negative; [ ] dysuria, [ ] frequency, [ ] hematuria  NEUROLOGICAL: [X] all negative; [ ] numbness, [ ] weakness  SKIN: [X] all negative; [ ] itching, [ ] burning, [ ] rashes, [ ] lesions   All other review of systems is negative unless indicated above.    [  ] Unable to assess ROS because     PHYSICAL EXAM:          CONSTITUTIONAL: Well-developed; well-nourished; in no acute distress.   	SKIN: warm, dry  	HEAD: Normocephalic; atraumatic.  	EYES: PERRL, EOM, no conj injection, sclera clear  	ENT: No nasal discharge; airway clear.  	NECK: Supple; non tender.  No midline ttp ctls  	CARD: S1, S2 normal; no murmurs, gallops, or rubs. Regular rate and rhythm. 2+ RPs and DPs bilat, no carotid bruits, no pedal   edema, no calf pain b/l  	RESP: CTA  bilat good air movement No wheezes, rales or rhonchi.  	ABD: Soft, not tender, not distended, no CVA ttp no rebound or guarding, bowel sounds present  	EXT: Normal ROM.  No clubbing, cyanosis or edema.   	  	NEURO: Alert, awake, motor 5/5 R, 5/5 L        RADIOLOGY:  xray  < from: Xray Chest 1 View- PORTABLE-Routine (Xray Chest 1 View- PORTABLE-Routine in AM.) (07.10.23 @ 05:41) >    Impression:    Stable bilateral opacities/effusions. No pneumothorax    --- End of Report ---    < end of copied text >    I spent 45 minutes of critical care time ; more than 50% of visit was spent counseling and/or examining patient, reviewing vitals, labs, medications, imaging and discussing with the team goals of care to prevent life-threatening in this patient who is at high risk for deterioration or death due to:

## 2023-07-10 NOTE — PROGRESS NOTE ADULT - SUBJECTIVE AND OBJECTIVE BOX
OPERATIVE PROCEDURE(s): AVR, excision of fibroblastoma                POD #11                       SURGEON(s): JIMBO Zepeda      SUBJECTIVE ASSESSMENT:42yFemale patient seen and examined at bedside.    Vital Signs Last 24 Hrs  T(F): 97.6 (10 Jul 2023 04:00), Max: 98 (09 Jul 2023 12:00)  HR: 115 (10 Jul 2023 06:00) (95 - 130)  BP: 112/81 (10 Jul 2023 06:00) (86/56 - 136/68)  BP(mean): 91 (10 Jul 2023 06:00) (65 - 106)  RR: 35 (10 Jul 2023 06:00) (12 - 39)  SpO2: 94% (10 Jul 2023 06:00) (92% - 96%)      I&O's Detail    09 Jul 2023 07:01  -  10 Jul 2023 07:00  --------------------------------------------------------  IN:    Heparin: 247 mL    IV PiggyBack: 650 mL    Oral Fluid: 600 mL  Total IN: 1497 mL    OUT:    Voided (mL): 1000 mL  Total OUT: 1000 mL        Net: I&O's Detail    08 Jul 2023 07:01  -  09 Jul 2023 07:00  --------------------------------------------------------  Total NET: -258 mL      09 Jul 2023 07:01  -  10 Jul 2023 07:00  --------------------------------------------------------  Total NET: 497 mL        CAPILLARY BLOOD GLUCOSE        A1C with Estimated Average Glucose Result: 6.0 % (06-14-23 @ 16:19)      Physical Exam:  General: NAD; A&Ox3  Cardiac: S1/S2, RRR, no murmur, no rubs  Lungs: unlabored respirations, CTA b/l, no wheeze, no rales, no crackles  Abdomen: Soft/NT/ND; positive bowel sounds x 4  Sternum: Intact, no click, incision healing well with no drainage  Incisions: Incisions clean/dry/intact  Extremities: No edema b/l lower extremities; good capillary refill; no cyanosis; palpable 1+ pedal pulses b/l    Central Venous Catheter: Yes: critical illness, intravenous access  Day #  Luis Catheter: Yes: critical illness; monitor strict i/o's                    Day #  EPICARDIAL WIRES:  YES                                                                         Day #  BOWEL MOVEMENT:  [] YES [] NO, If No, Timing since last BM Day #  CHEST TUBE(MS/Left/Right):  [] YES [] NO, If yes -  AIR LEAKS:  YES/NO        LABS:                        10.4<L>  17.26<H> )-----------( 240      ( 10 Jul 2023 03:25 )             32.7<L>                        10.6<L>  15.82<H> )-----------( 281      ( 09 Jul 2023 04:40 )             33.8<L>    07-10    132<L>  |  96<L>  |  16  ----------------------------<  118<H>  5.2<H>   |  28  |  0.8  07-09    134<L>  |  96<L>  |  16  ----------------------------<  113<H>  4.8   |  26  |  0.7    Ca    8.8      10 Jul 2023 03:25  Mg     1.9     07-09    TPro  6.7 [6.0 - 8.0]  /  Alb  3.9 [3.5 - 5.2]  /  TBili  1.1 [0.2 - 1.2]  /  DBili  x   /  AST  35 [0 - 41]  /  ALT  33 [0 - 41]  /  AlkPhos  121<H> [30 - 115]  07-09    PT/INR - ( 10 Jul 2023 03:25 )   PT: ;   INR: 2.47 ratio         PT/INR - ( 09 Jul 2023 13:06 )   PT: ;   INR: 2.02 ratio         PTT - ( 10 Jul 2023 03:25 )  PTT:75.1 sec, PTT - ( 09 Jul 2023 13:06 )  PTT:47.4 sec  Urinalysis Basic - ( 10 Jul 2023 03:25 )    Color: x / Appearance: x / SG: x / pH: x  Gluc: 118 mg/dL / Ketone: x  / Bili: x / Urobili: x   Blood: x / Protein: x / Nitrite: x   Leuk Esterase: x / RBC: x / WBC x   Sq Epi: x / Non Sq Epi: x / Bacteria: x        RADIOLOGY & ADDITIONAL TESTS:  CXR:   EKG:    Allergies    No Known Allergies    Intolerances      MEDICATIONS  (STANDING):  albumin human  5% IVPB 500 milliLiter(s) IV Intermittent once  aMIOdarone    Tablet 200 milliGRAM(s) Oral daily  cefepime   IVPB 1000 milliGRAM(s) IV Intermittent every 8 hours  chlorhexidine 2% Cloths 1 Application(s) Topical daily  diltiazem    Tablet 60 milliGRAM(s) Oral every 6 hours  furosemide   Injectable 20 milliGRAM(s) IV Push daily  melatonin 5 milliGRAM(s) Oral at bedtime  metoprolol tartrate 25 milliGRAM(s) Oral every 6 hours  pantoprazole    Tablet 40 milliGRAM(s) Oral daily  polyethylene glycol 3350 17 Gram(s) Oral daily  potassium chloride    Tablet ER 20 milliEquivalent(s) Oral daily  vancomycin  IVPB 1000 milliGRAM(s) IV Intermittent every 12 hours  vancomycin  IVPB        MEDICATIONS  (PRN):  acetaminophen     Tablet .. 650 milliGRAM(s) Oral every 6 hours PRN Mild Pain (1 - 3)  ondansetron Injectable 4 milliGRAM(s) IV Push every 8 hours PRN Nausea and/or Vomiting    Home Medications:  Metoprolol Succinate  mg oral tablet, extended release: 1 tablet, extended release orally once a day (29 Jun 2023 06:42)      Pharmacologic DVT Prophylaxis [] YES, [] NO: Contraindication:  SCD's: YES b/l    GI Prophylaxis:     Post-Op Beta-Blockers: [] Yes, [] No: contraindication:  Post-Op CCB: [] Yes, [] No: contraindication:  Post-Op Aspirin:  [] Yes, [] No: contraindication:  Post-Op Statin:  [] Yes, [] No: contraindication:    Ambulation/Activity Status:    Assessment/Plan:  42y Female status-post  - Case and plan discussed with CTU Intensivist and CT Surgeon - Dr. Perez/Duane/Reza/Meliza  - Continue CTU supportive care and ongoing plan of care as per continuing CTU rounds.   - Continue DVT/GI prophylaxis  - Incentive Spirometry 10 times an hour  - Continue to advance physical activity as tolerated and continue PT/OT as directed  1. CAD s/p CABG: Continue ASA, statin, BB  2. HTN:   3. Post-op A. Fib ppx:   4. COPD/Hypoxia:   5. DM/Glucose Control:     Social Service Disposition:     OPERATIVE PROCEDURE(s): AVR, excision of fibroblastoma                POD #11                       SURGEON(s): JIMBO Zepeda      SUBJECTIVE ASSESSMENT:42yFemale patient seen and examined at bedside.    Vital Signs Last 24 Hrs  T(F): 97.6 (10 Jul 2023 04:00), Max: 98 (09 Jul 2023 12:00)  HR: 115 (10 Jul 2023 06:00) (95 - 130)  BP: 112/81 (10 Jul 2023 06:00) (86/56 - 136/68)  BP(mean): 91 (10 Jul 2023 06:00) (65 - 106)  RR: 35 (10 Jul 2023 06:00) (12 - 39)  SpO2: 94% (10 Jul 2023 06:00) (92% - 96%)      I&O's Detail    09 Jul 2023 07:01  -  10 Jul 2023 07:00  --------------------------------------------------------  IN:    Heparin: 247 mL    IV PiggyBack: 650 mL    Oral Fluid: 600 mL  Total IN: 1497 mL    OUT:    Voided (mL): 1000 mL  Total OUT: 1000 mL        Net: I&O's Detail    08 Jul 2023 07:01  -  09 Jul 2023 07:00  --------------------------------------------------------  Total NET: -258 mL      09 Jul 2023 07:01  -  10 Jul 2023 07:00  --------------------------------------------------------  Total NET: 497 mL        CAPILLARY BLOOD GLUCOSE        A1C with Estimated Average Glucose Result: 6.0 % (06-14-23 @ 16:19)      Physical Exam:  General: NAD; A&Ox3  Cardiac: S1/S2, RRR, no murmur, no rubs  Lungs: unlabored respirations, CTA b/l, no wheeze, no rales, no crackles  Abdomen: Soft/NT/ND; positive bowel sounds x 4  Sternum: Intact, no click, incision healing well with no drainage  Incisions: Incisions clean/dry/intact  Extremities: No edema b/l lower extremities; good capillary refill; no cyanosis; palpable 1+ pedal pulses b/l    Central Venous Catheter: Yes: critical illness, intravenous access  Day #  Luis Catheter: Yes: critical illness; monitor strict i/o's                    Day #  EPICARDIAL WIRES:  YES                                                                         Day #  BOWEL MOVEMENT:  [] YES [] NO, If No, Timing since last BM Day #  CHEST TUBE(MS/Left/Right):  [] YES [] NO, If yes -  AIR LEAKS:  YES/NO        LABS:                        10.4<L>  17.26<H> )-----------( 240      ( 10 Jul 2023 03:25 )             32.7<L>                        10.6<L>  15.82<H> )-----------( 281      ( 09 Jul 2023 04:40 )             33.8<L>    07-10    132<L>  |  96<L>  |  16  ----------------------------<  118<H>  5.2<H>   |  28  |  0.8  07-09    134<L>  |  96<L>  |  16  ----------------------------<  113<H>  4.8   |  26  |  0.7    Ca    8.8      10 Jul 2023 03:25  Mg     1.9     07-09    TPro  6.7 [6.0 - 8.0]  /  Alb  3.9 [3.5 - 5.2]  /  TBili  1.1 [0.2 - 1.2]  /  DBili  x   /  AST  35 [0 - 41]  /  ALT  33 [0 - 41]  /  AlkPhos  121<H> [30 - 115]  07-09    PT/INR - ( 10 Jul 2023 03:25 )   PT: ;   INR: 2.47 ratio         PT/INR - ( 09 Jul 2023 13:06 )   PT: ;   INR: 2.02 ratio         PTT - ( 10 Jul 2023 03:25 )  PTT:75.1 sec, PTT - ( 09 Jul 2023 13:06 )  PTT:47.4 sec  Urinalysis Basic - ( 10 Jul 2023 03:25 )    Color: x / Appearance: x / SG: x / pH: x  Gluc: 118 mg/dL / Ketone: x  / Bili: x / Urobili: x   Blood: x / Protein: x / Nitrite: x   Leuk Esterase: x / RBC: x / WBC x   Sq Epi: x / Non Sq Epi: x / Bacteria: x        RADIOLOGY & ADDITIONAL TESTS:  CXR:   EKG:    Allergies    No Known Allergies    Intolerances      MEDICATIONS  (STANDING):  albumin human  5% IVPB 500 milliLiter(s) IV Intermittent once  aMIOdarone    Tablet 200 milliGRAM(s) Oral daily  cefepime   IVPB 1000 milliGRAM(s) IV Intermittent every 8 hours  chlorhexidine 2% Cloths 1 Application(s) Topical daily  diltiazem    Tablet 60 milliGRAM(s) Oral every 6 hours  furosemide   Injectable 20 milliGRAM(s) IV Push daily  melatonin 5 milliGRAM(s) Oral at bedtime  metoprolol tartrate 25 milliGRAM(s) Oral every 6 hours  pantoprazole    Tablet 40 milliGRAM(s) Oral daily  polyethylene glycol 3350 17 Gram(s) Oral daily  potassium chloride    Tablet ER 20 milliEquivalent(s) Oral daily  vancomycin  IVPB 1000 milliGRAM(s) IV Intermittent every 12 hours  vancomycin  IVPB        MEDICATIONS  (PRN):  acetaminophen     Tablet .. 650 milliGRAM(s) Oral every 6 hours PRN Mild Pain (1 - 3)  ondansetron Injectable 4 milliGRAM(s) IV Push every 8 hours PRN Nausea and/or Vomiting    Home Medications:  Metoprolol Succinate  mg oral tablet, extended release: 1 tablet, extended release orally once a day (29 Jun 2023 06:42)      Pharmacologic DVT Prophylaxis [] YES, [] NO: Contraindication:  SCD's: YES b/l    GI Prophylaxis:     Post-Op Beta-Blockers: [X] Yes, [] No: contraindication:  Post-Op Aspirin:  [X] Yes, [] No: contraindication:  Post-Op Statin:  [] Yes, [X] No: contraindication: Patient AVR with no history of CAD    Ambulation/Activity Status: ambulate as tolerated    Assessment/Plan:  42y Female status-post AVR, POD #11  - Case and plan discussed with CTU Intensivist and CT Surgeon - Dr. Perez/Duane/Reza/Meliza  - Continue CTU supportive care and ongoing plan of care as per continuing CTU rounds.   - Continue DVT/GI prophylaxis  - Incentive Spirometry 10 times an hour  - Continue to advance physical activity as tolerated and continue PT/OT as directed  1. AVR (mechanical valve) & mass excision:  INR 1.8 today, cont hep gtt 1300, ptt 73, recheck coag at 12:00, dose coumadin accordingly   2. HTN: cont lopressor  3. A. Fib: cont coumadin & cardizem, BB 12.5 q6h, EP following -> increase BB if patient can tolerate it, consider adding digoxin if patient unable to tolerate BB  4. COPD/Hypoxia: cont nebs, wean o2 as tolerated, encourage incentive  5. Leukocytosis: afebrile WBCs trending down, cultures negative to date, cont Cefepime, IV Vanco 1gm q12h ordered, cont to monitor WBCs.     dispo: d/c home when inr therapeutic & heart rate controlled    OPERATIVE PROCEDURE(s): AVR, excision of fibroblastoma                POD #11                       SURGEON(s): JIMBO Zepeda      SUBJECTIVE ASSESSMENT:42yFemale patient seen and examined at bedside. No complaints at this time.     Vital Signs Last 24 Hrs  T(F): 97.6 (10 Jul 2023 04:00), Max: 98 (09 Jul 2023 12:00)  HR: 115 (10 Jul 2023 06:00) (95 - 130)  BP: 112/81 (10 Jul 2023 06:00) (86/56 - 136/68)  BP(mean): 91 (10 Jul 2023 06:00) (65 - 106)  RR: 35 (10 Jul 2023 06:00) (12 - 39)  SpO2: 94% (10 Jul 2023 06:00) (92% - 96%)      I&O's Detail    09 Jul 2023 07:01  -  10 Jul 2023 07:00  --------------------------------------------------------  IN:    Heparin: 247 mL    IV PiggyBack: 650 mL    Oral Fluid: 600 mL  Total IN: 1497 mL    OUT:    Voided (mL): 1000 mL  Total OUT: 1000 mL        Net: I&O's Detail    08 Jul 2023 07:01  -  09 Jul 2023 07:00  --------------------------------------------------------  Total NET: -258 mL      09 Jul 2023 07:01  -  10 Jul 2023 07:00  --------------------------------------------------------  Total NET: 497 mL        CAPILLARY BLOOD GLUCOSE        A1C with Estimated Average Glucose Result: 6.0 % (06-14-23 @ 16:19)      Physical Exam:  General: NAD; A&Ox3  Cardiac: S1/S2, RRR, no murmur, no rubs  Lungs: unlabored respirations, CTA b/l, no wheeze, no rales, no crackles  Abdomen: Soft/NT/ND; positive bowel sounds x 4  Sternum: Intact, no click, incision healing well with no drainage  Incisions: Incisions clean/dry/intact  Extremities: No edema b/l lower extremities; good capillary refill; no cyanosis; palpable 1+ pedal pulses b/l      BOWEL MOVEMENT:  [] YES [X] NO, If No, Timing since last BM Day #1         LABS:                        10.4<L>  17.26<H> )-----------( 240      ( 10 Jul 2023 03:25 )             32.7<L>                        10.6<L>  15.82<H> )-----------( 281      ( 09 Jul 2023 04:40 )             33.8<L>    07-10    132<L>  |  96<L>  |  16  ----------------------------<  118<H>  5.2<H>   |  28  |  0.8  07-09    134<L>  |  96<L>  |  16  ----------------------------<  113<H>  4.8   |  26  |  0.7    Ca    8.8      10 Jul 2023 03:25  Mg     1.9     07-09    TPro  6.7 [6.0 - 8.0]  /  Alb  3.9 [3.5 - 5.2]  /  TBili  1.1 [0.2 - 1.2]  /  DBili  x   /  AST  35 [0 - 41]  /  ALT  33 [0 - 41]  /  AlkPhos  121<H> [30 - 115]  07-09    PT/INR - ( 10 Jul 2023 03:25 )   PT: ;   INR: 2.47 ratio         PT/INR - ( 09 Jul 2023 13:06 )   PT: ;   INR: 2.02 ratio         PTT - ( 10 Jul 2023 03:25 )  PTT:75.1 sec, PTT - ( 09 Jul 2023 13:06 )  PTT:47.4 sec  Urinalysis Basic - ( 10 Jul 2023 03:25 )    Color: x / Appearance: x / SG: x / pH: x  Gluc: 118 mg/dL / Ketone: x  / Bili: x / Urobili: x   Blood: x / Protein: x / Nitrite: x   Leuk Esterase: x / RBC: x / WBC x   Sq Epi: x / Non Sq Epi: x / Bacteria: x        RADIOLOGY & ADDITIONAL TESTS:  CXR:  < from: Xray Chest 1 View- PORTABLE-Routine (Xray Chest 1 View- PORTABLE-Routine in AM.) (07.10.23 @ 05:41) >    INTERPRETATION:  Clinical History/Reason for Exam:  Shortness of Breath    Technique:  Frontal view the chest.    Comparison: Chest x-ray 7/9/2023.    Findings:    Support devices:  none    Cardiac/mediastinum/hilum: Stable cardiac silhouette. Post   sternotomy/aortic valve replacement    Lung parenchyma/ Pleura: Stable bilateral opacities/effusions. No   pneumothorax      Skeleton/soft tissues: Stable      Impression:    Stable bilateral opacities/effusions. No pneumothorax       EKG:  < from: 12 Lead ECG (07.09.23 @ 13:54) >  Ventricular Rate 111 BPM    Atrial Rate 333 BPM    QRS Duration 134 ms    Q-T Interval 398 ms    QTC Calculation(Bazett) 541 ms    R Axis -61 degrees    T Axis 126 degrees    Diagnosis Line Atrial flutter with variable A-V block  Left axis deviation  Voltage criteria for left ventricular hypertrophy  Right bundle branch block  T wave abnormality, consider lateral ischemia  Abnormal ECG      Allergies    No Known Allergies    Intolerances      MEDICATIONS  (STANDING):  albumin human  5% IVPB 500 milliLiter(s) IV Intermittent once  aMIOdarone    Tablet 200 milliGRAM(s) Oral daily  cefepime   IVPB 1000 milliGRAM(s) IV Intermittent every 8 hours  chlorhexidine 2% Cloths 1 Application(s) Topical daily  diltiazem    Tablet 60 milliGRAM(s) Oral every 6 hours  furosemide   Injectable 20 milliGRAM(s) IV Push daily  melatonin 5 milliGRAM(s) Oral at bedtime  metoprolol tartrate 25 milliGRAM(s) Oral every 6 hours  pantoprazole    Tablet 40 milliGRAM(s) Oral daily  polyethylene glycol 3350 17 Gram(s) Oral daily  potassium chloride    Tablet ER 20 milliEquivalent(s) Oral daily  vancomycin  IVPB 1000 milliGRAM(s) IV Intermittent every 12 hours  vancomycin  IVPB        MEDICATIONS  (PRN):  acetaminophen     Tablet .. 650 milliGRAM(s) Oral every 6 hours PRN Mild Pain (1 - 3)  ondansetron Injectable 4 milliGRAM(s) IV Push every 8 hours PRN Nausea and/or Vomiting    Home Medications:  Metoprolol Succinate  mg oral tablet, extended release: 1 tablet, extended release orally once a day (29 Jun 2023 06:42)      Pharmacologic DVT Prophylaxis [] YES, [] NO: Contraindication:  SCD's: YES b/l    GI Prophylaxis:     Post-Op Beta-Blockers: [X] Yes, [] No: contraindication:  Post-Op Aspirin:  [X] Yes, [] No: contraindication:  Post-Op Statin:  [] Yes, [X] No: contraindication: Patient AVR with no history of CAD    Ambulation/Activity Status: ambulate as tolerated    Assessment/Plan:  42y Female status-post AVR, POD #11  - Case and plan discussed with CTU Intensivist and CT Surgeon - Dr. Perez/Duane/Reza/Meliza  - Continue CTU supportive care and ongoing plan of care as per continuing CTU rounds.   - Continue DVT/GI prophylaxis  - Incentive Spirometry 10 times an hour  - Continue to advance physical activity as tolerated and continue PT/OT as directed  1. AVR (mechanical valve) & mass excision:  INR 1.8 today, cont hep gtt 1300, ptt 73, recheck coag at 12:00, dose coumadin accordingly   2. HTN: cont lopressor  3. A. Fib: Coumadin 2mg tonight, INR 2.4, PO cardizem 180 daily & BB 50mg q12h. EP -> amio daily, f/u with Dr. Tyler outpatient in one month  4. COPD/Hypoxia: cont nebs, wean o2 as tolerated, encourage incentive  5. Leukocytosis: afebrile, continue to trend CBC. cultures negative to date, cont Cefepime, IV Vanco 1gm q12h ordered, cont to monitor WBCs.     dispo: d/c home when inr therapeutic & heart rate controlled    OPERATIVE PROCEDURE(s): AVR, excision of fibroblastoma                POD #11                       SURGEON(s): JIMBO Zepeda      SUBJECTIVE ASSESSMENT:42yFemale patient seen and examined at bedside. No complaints at this time.     Vital Signs Last 24 Hrs  T(F): 97.6 (10 Jul 2023 04:00), Max: 98 (09 Jul 2023 12:00)  HR: 115 (10 Jul 2023 06:00) (95 - 130)  BP: 112/81 (10 Jul 2023 06:00) (86/56 - 136/68)  BP(mean): 91 (10 Jul 2023 06:00) (65 - 106)  RR: 35 (10 Jul 2023 06:00) (12 - 39)  SpO2: 94% (10 Jul 2023 06:00) (92% - 96%)      I&O's Detail    09 Jul 2023 07:01  -  10 Jul 2023 07:00  --------------------------------------------------------  IN:    Heparin: 247 mL    IV PiggyBack: 650 mL    Oral Fluid: 600 mL  Total IN: 1497 mL    OUT:    Voided (mL): 1000 mL  Total OUT: 1000 mL        Net: I&O's Detail    08 Jul 2023 07:01  -  09 Jul 2023 07:00  --------------------------------------------------------  Total NET: -258 mL      09 Jul 2023 07:01  -  10 Jul 2023 07:00  --------------------------------------------------------  Total NET: 497 mL        CAPILLARY BLOOD GLUCOSE        A1C with Estimated Average Glucose Result: 6.0 % (06-14-23 @ 16:19)      Physical Exam:  General: NAD; A&Ox3  Cardiac: S1/S2, RRR, no murmur, no rubs  Lungs: unlabored respirations, CTA b/l, no wheeze, no rales, no crackles  Abdomen: Soft/NT/ND; positive bowel sounds x 4  Sternum: Intact, no click, incision healing well with no drainage  Incisions: Incisions clean/dry/intact  Extremities: No edema b/l lower extremities; good capillary refill; no cyanosis; palpable 1+ pedal pulses b/l      BOWEL MOVEMENT:  [] YES [X] NO, If No, Timing since last BM Day #1         LABS:                        10.4<L>  17.26<H> )-----------( 240      ( 10 Jul 2023 03:25 )             32.7<L>                        10.6<L>  15.82<H> )-----------( 281      ( 09 Jul 2023 04:40 )             33.8<L>    07-10    132<L>  |  96<L>  |  16  ----------------------------<  118<H>  5.2<H>   |  28  |  0.8  07-09    134<L>  |  96<L>  |  16  ----------------------------<  113<H>  4.8   |  26  |  0.7    Ca    8.8      10 Jul 2023 03:25  Mg     1.9     07-09    TPro  6.7 [6.0 - 8.0]  /  Alb  3.9 [3.5 - 5.2]  /  TBili  1.1 [0.2 - 1.2]  /  DBili  x   /  AST  35 [0 - 41]  /  ALT  33 [0 - 41]  /  AlkPhos  121<H> [30 - 115]  07-09    PT/INR - ( 10 Jul 2023 03:25 )   PT: ;   INR: 2.47 ratio         PT/INR - ( 09 Jul 2023 13:06 )   PT: ;   INR: 2.02 ratio         PTT - ( 10 Jul 2023 03:25 )  PTT:75.1 sec, PTT - ( 09 Jul 2023 13:06 )  PTT:47.4 sec  Urinalysis Basic - ( 10 Jul 2023 03:25 )    Color: x / Appearance: x / SG: x / pH: x  Gluc: 118 mg/dL / Ketone: x  / Bili: x / Urobili: x   Blood: x / Protein: x / Nitrite: x   Leuk Esterase: x / RBC: x / WBC x   Sq Epi: x / Non Sq Epi: x / Bacteria: x        RADIOLOGY & ADDITIONAL TESTS:  CXR:  < from: Xray Chest 1 View- PORTABLE-Routine (Xray Chest 1 View- PORTABLE-Routine in AM.) (07.10.23 @ 05:41) >    INTERPRETATION:  Clinical History/Reason for Exam:  Shortness of Breath    Technique:  Frontal view the chest.    Comparison: Chest x-ray 7/9/2023.    Findings:    Support devices:  none    Cardiac/mediastinum/hilum: Stable cardiac silhouette. Post   sternotomy/aortic valve replacement    Lung parenchyma/ Pleura: Stable bilateral opacities/effusions. No   pneumothorax      Skeleton/soft tissues: Stable      Impression:    Stable bilateral opacities/effusions. No pneumothorax       EKG:  < from: 12 Lead ECG (07.09.23 @ 13:54) >  Ventricular Rate 111 BPM    Atrial Rate 333 BPM    QRS Duration 134 ms    Q-T Interval 398 ms    QTC Calculation(Bazett) 541 ms    R Axis -61 degrees    T Axis 126 degrees    Diagnosis Line Atrial flutter with variable A-V block  Left axis deviation  Voltage criteria for left ventricular hypertrophy  Right bundle branch block  T wave abnormality, consider lateral ischemia  Abnormal ECG      Allergies    No Known Allergies    Intolerances      MEDICATIONS  (STANDING):  albumin human  5% IVPB 500 milliLiter(s) IV Intermittent once  aMIOdarone    Tablet 200 milliGRAM(s) Oral daily  cefepime   IVPB 1000 milliGRAM(s) IV Intermittent every 8 hours  chlorhexidine 2% Cloths 1 Application(s) Topical daily  diltiazem    Tablet 60 milliGRAM(s) Oral every 6 hours  furosemide   Injectable 20 milliGRAM(s) IV Push daily  melatonin 5 milliGRAM(s) Oral at bedtime  metoprolol tartrate 25 milliGRAM(s) Oral every 6 hours  pantoprazole    Tablet 40 milliGRAM(s) Oral daily  polyethylene glycol 3350 17 Gram(s) Oral daily  potassium chloride    Tablet ER 20 milliEquivalent(s) Oral daily  vancomycin  IVPB 1000 milliGRAM(s) IV Intermittent every 12 hours  vancomycin  IVPB        MEDICATIONS  (PRN):  acetaminophen     Tablet .. 650 milliGRAM(s) Oral every 6 hours PRN Mild Pain (1 - 3)  ondansetron Injectable 4 milliGRAM(s) IV Push every 8 hours PRN Nausea and/or Vomiting    Home Medications:  Metoprolol Succinate  mg oral tablet, extended release: 1 tablet, extended release orally once a day (29 Jun 2023 06:42)      Pharmacologic DVT Prophylaxis [X] YES, [] NO: Contraindication:   SCD's: YES b/l    GI Prophylaxis: protonix 40mg    Post-Op Beta-Blockers: [X] Yes, [] No: contraindication:  Post-Op Aspirin:  [X] Yes, [] No: contraindication:  Post-Op Statin:  [] Yes, [X] No: contraindication: Patient AVR with no history of CAD    Ambulation/Activity Status: ambulate as tolerated    Assessment/Plan:  42y Female status-post AVR, POD #11  - Case and plan discussed with CTU Intensivist and CT Surgeon - Dr. Perez/Duane/Reza/Meliza  - Continue CTU supportive care and ongoing plan of care as per continuing CTU rounds.   - Continue DVT/GI prophylaxis  - Incentive Spirometry 10 times an hour  - Continue to advance physical activity as tolerated and continue PT/OT as directed  1. AVR (mechanical valve) & mass excision:  INR 1.8 today, cont hep gtt 1300, ptt 73, recheck coag at 12:00, dose coumadin accordingly   2. HTN: cont lopressor  3. A. Fib: Coumadin 2mg tonight, INR 2.4, PO cardizem 180 daily & BB 50mg q12h. EP -> amio daily, f/u with Dr. Tyler outpatient in one month  4. COPD/Hypoxia: cont nebs, wean o2 as tolerated, encourage incentive  5. Leukocytosis: afebrile, continue to trend CBC. cultures negative to date, cont Cefepime, IV Vanco 1gm q12h ordered, cont to monitor WBCs.     dispo: d/c home when inr therapeutic & heart rate controlled

## 2023-07-10 NOTE — PROGRESS NOTE ADULT - ASSESSMENT
Assessment/Plan:  Aortic Valve Mass-s/p AVR-POD #11  1-BP control-beta-blockers, diltiazem   change to lopreessor 50 q12 and diltiazm 180 daily   2-serum glucose control-insulin sliding scale correction regimen  3-fluid overload-diuresis lasix   0-X-Sfa-rate control-beta-blockers, diltiazem, amiodarone 200 mg daily  5-anticoagulation (AVR-M)-continue coumadin 2 mg tonight  d/c  heparin infusion, monitor PT/INR,   6-acute blood loss anemia-stable, monitor Hb/Hct  1-vvphuvhlvgfbchn-syqkdlbu    possible discharge home in am

## 2023-07-11 LAB
ANION GAP SERPL CALC-SCNC: 11 MMOL/L — SIGNIFICANT CHANGE UP (ref 7–14)
ANION GAP SERPL CALC-SCNC: 9 MMOL/L — SIGNIFICANT CHANGE UP (ref 7–14)
APTT BLD: 29.8 SEC — SIGNIFICANT CHANGE UP (ref 27–39.2)
BUN SERPL-MCNC: 17 MG/DL — SIGNIFICANT CHANGE UP (ref 10–20)
BUN SERPL-MCNC: 17 MG/DL — SIGNIFICANT CHANGE UP (ref 10–20)
CALCIUM SERPL-MCNC: 8.6 MG/DL — SIGNIFICANT CHANGE UP (ref 8.4–10.4)
CALCIUM SERPL-MCNC: 8.7 MG/DL — SIGNIFICANT CHANGE UP (ref 8.4–10.5)
CHLORIDE SERPL-SCNC: 96 MMOL/L — LOW (ref 98–110)
CHLORIDE SERPL-SCNC: 96 MMOL/L — LOW (ref 98–110)
CO2 SERPL-SCNC: 25 MMOL/L — SIGNIFICANT CHANGE UP (ref 17–32)
CO2 SERPL-SCNC: 25 MMOL/L — SIGNIFICANT CHANGE UP (ref 17–32)
CREAT SERPL-MCNC: 0.6 MG/DL — LOW (ref 0.7–1.5)
CREAT SERPL-MCNC: 0.8 MG/DL — SIGNIFICANT CHANGE UP (ref 0.7–1.5)
CULTURE RESULTS: SIGNIFICANT CHANGE UP
CULTURE RESULTS: SIGNIFICANT CHANGE UP
EGFR: 114 ML/MIN/1.73M2 — SIGNIFICANT CHANGE UP
EGFR: 94 ML/MIN/1.73M2 — SIGNIFICANT CHANGE UP
GLUCOSE BLDC GLUCOMTR-MCNC: 149 MG/DL — HIGH (ref 70–99)
GLUCOSE SERPL-MCNC: 109 MG/DL — HIGH (ref 70–99)
GLUCOSE SERPL-MCNC: 122 MG/DL — HIGH (ref 70–99)
HCT VFR BLD CALC: 32.4 % — LOW (ref 37–47)
HGB BLD-MCNC: 10.4 G/DL — LOW (ref 12–16)
INR BLD: 2.56 RATIO — HIGH (ref 0.65–1.3)
MCHC RBC-ENTMCNC: 25.8 PG — LOW (ref 27–31)
MCHC RBC-ENTMCNC: 32.1 G/DL — SIGNIFICANT CHANGE UP (ref 32–37)
MCV RBC AUTO: 80.4 FL — LOW (ref 81–99)
NRBC # BLD: 0 /100 WBCS — SIGNIFICANT CHANGE UP (ref 0–0)
PLATELET # BLD AUTO: 234 K/UL — SIGNIFICANT CHANGE UP (ref 130–400)
PMV BLD: 9.7 FL — SIGNIFICANT CHANGE UP (ref 7.4–10.4)
POTASSIUM SERPL-MCNC: 4.7 MMOL/L — SIGNIFICANT CHANGE UP (ref 3.5–5)
POTASSIUM SERPL-MCNC: 6.5 MMOL/L — CRITICAL HIGH (ref 3.5–5)
POTASSIUM SERPL-SCNC: 4.7 MMOL/L — SIGNIFICANT CHANGE UP (ref 3.5–5)
POTASSIUM SERPL-SCNC: 6.5 MMOL/L — CRITICAL HIGH (ref 3.5–5)
PROTHROM AB SERPL-ACNC: 30 SEC — HIGH (ref 9.95–12.87)
RBC # BLD: 4.03 M/UL — LOW (ref 4.2–5.4)
RBC # FLD: 16.4 % — HIGH (ref 11.5–14.5)
SODIUM SERPL-SCNC: 130 MMOL/L — LOW (ref 135–146)
SODIUM SERPL-SCNC: 132 MMOL/L — LOW (ref 135–146)
SPECIMEN SOURCE: SIGNIFICANT CHANGE UP
SPECIMEN SOURCE: SIGNIFICANT CHANGE UP
WBC # BLD: 17.9 K/UL — HIGH (ref 4.8–10.8)
WBC # FLD AUTO: 17.9 K/UL — HIGH (ref 4.8–10.8)

## 2023-07-11 PROCEDURE — 71045 X-RAY EXAM CHEST 1 VIEW: CPT | Mod: 26

## 2023-07-11 RX ORDER — AMIODARONE HYDROCHLORIDE 400 MG/1
150 TABLET ORAL ONCE
Refills: 0 | Status: COMPLETED | OUTPATIENT
Start: 2023-07-11 | End: 2023-07-11

## 2023-07-11 RX ORDER — WARFARIN SODIUM 2.5 MG/1
2 TABLET ORAL ONCE
Refills: 0 | Status: COMPLETED | OUTPATIENT
Start: 2023-07-11 | End: 2023-07-11

## 2023-07-11 RX ORDER — ACETAMINOPHEN 500 MG
650 TABLET ORAL ONCE
Refills: 0 | Status: COMPLETED | OUTPATIENT
Start: 2023-07-11 | End: 2023-07-11

## 2023-07-11 RX ORDER — DILTIAZEM HCL 120 MG
240 CAPSULE, EXT RELEASE 24 HR ORAL DAILY
Refills: 0 | Status: DISCONTINUED | OUTPATIENT
Start: 2023-07-11 | End: 2023-07-12

## 2023-07-11 RX ORDER — METOPROLOL TARTRATE 50 MG
5 TABLET ORAL ONCE
Refills: 0 | Status: COMPLETED | OUTPATIENT
Start: 2023-07-11 | End: 2023-07-11

## 2023-07-11 RX ADMIN — CEFEPIME 100 MILLIGRAM(S): 1 INJECTION, POWDER, FOR SOLUTION INTRAMUSCULAR; INTRAVENOUS at 05:20

## 2023-07-11 RX ADMIN — Medication 5 MILLIGRAM(S): at 21:26

## 2023-07-11 RX ADMIN — PANTOPRAZOLE SODIUM 40 MILLIGRAM(S): 20 TABLET, DELAYED RELEASE ORAL at 13:06

## 2023-07-11 RX ADMIN — CHLORHEXIDINE GLUCONATE 1 APPLICATION(S): 213 SOLUTION TOPICAL at 05:12

## 2023-07-11 RX ADMIN — Medication 20 MILLIGRAM(S): at 05:13

## 2023-07-11 RX ADMIN — AMIODARONE HYDROCHLORIDE 600 MILLIGRAM(S): 400 TABLET ORAL at 06:41

## 2023-07-11 RX ADMIN — AMIODARONE HYDROCHLORIDE 200 MILLIGRAM(S): 400 TABLET ORAL at 05:12

## 2023-07-11 RX ADMIN — Medication 5 MILLIGRAM(S): at 05:51

## 2023-07-11 RX ADMIN — Medication 50 MILLIGRAM(S): at 17:43

## 2023-07-11 RX ADMIN — Medication 50 MILLIGRAM(S): at 05:13

## 2023-07-11 RX ADMIN — Medication 20 MILLIEQUIVALENT(S): at 13:06

## 2023-07-11 RX ADMIN — HEPARIN SODIUM 5000 UNIT(S): 5000 INJECTION INTRAVENOUS; SUBCUTANEOUS at 17:44

## 2023-07-11 RX ADMIN — WARFARIN SODIUM 2 MILLIGRAM(S): 2.5 TABLET ORAL at 21:26

## 2023-07-11 RX ADMIN — HEPARIN SODIUM 5000 UNIT(S): 5000 INJECTION INTRAVENOUS; SUBCUTANEOUS at 05:20

## 2023-07-11 RX ADMIN — POLYETHYLENE GLYCOL 3350 17 GRAM(S): 17 POWDER, FOR SOLUTION ORAL at 13:06

## 2023-07-11 RX ADMIN — Medication 240 MILLIGRAM(S): at 05:20

## 2023-07-11 RX ADMIN — Medication 250 MILLIGRAM(S): at 17:43

## 2023-07-11 RX ADMIN — Medication 250 MILLIGRAM(S): at 05:52

## 2023-07-11 RX ADMIN — CEFEPIME 100 MILLIGRAM(S): 1 INJECTION, POWDER, FOR SOLUTION INTRAMUSCULAR; INTRAVENOUS at 13:16

## 2023-07-11 RX ADMIN — Medication 650 MILLIGRAM(S): at 23:02

## 2023-07-11 RX ADMIN — CEFEPIME 100 MILLIGRAM(S): 1 INJECTION, POWDER, FOR SOLUTION INTRAMUSCULAR; INTRAVENOUS at 21:26

## 2023-07-11 NOTE — CONSULT NOTE ADULT - NEGATIVE MUSCULOSKELETAL SYMPTOMS
Hospitalist Progress Note      PCP: Brittani Bell    Date of Admission: 10/13/2020    Chief Complaint: Confusion and right-sided weakness    Hospital Course:   Morning patient much more awake  On nicardipine 2.5 mg/h systolic blood pressure in 130s  He denies any complaints  Reports he feels much better  No chest pain or shortness of breath  No acute events on telemetry      Medications:  Reviewed      Exam:    BP (!) 155/80   Pulse 79   Temp 98.3 °F (36.8 °C) (Temporal)   Resp 21   Ht 6' (1.829 m)   Wt 176 lb 14.4 oz (80.2 kg)   SpO2 96%   BMI 23.99 kg/m²     General appearance: Drowsy opens eyes to commands  HEENT: Pupils equal, round, and reactive to light. Conjunctivae/corneas clear. Neck: Supple, with full range of motion. No jugular venous distention. Trachea midline. Respiratory:  Normal respiratory effort. Clear to auscultation, bilaterally without RALES/WHEEZES/Rhonchi. Cardiovascular: Regular rate and rhythm with normal S1/S2 without MURMURS, rubs or gallops. Abdomen: Soft, non-tender, non-distended with normal bowel sounds. Musculoskeletal: No clubbing, cyanosis or EDEMA bilaterally. Full range of motion without deformity. Skin: Skin color, texture, turgor normal.  No rashes or lesions.   Neurologic: Unable to do visual field testing  Right hemiplegia power is 1/5 on the right side  Some dysarthria  receptive aphasia  Awake alert oriented to self only however able to have a proper conversation    Labs:   Recent Labs     10/14/20  0441 10/15/20  1227 10/16/20  0514   WBC 8.3  --  13.9*   HGB 15.1 16.3 16.3   HCT 43.6  --  46.6     --  179     Recent Labs     10/14/20  0441 10/16/20  0514    139   K 3.4* 3.7    106   CO2 24 21   BUN 14 26*   CREATININE 1.2 1.3   CALCIUM 8.9 8.9     Recent Labs     10/14/20  0441 10/16/20  0514   AST 19 13*   ALT 19 14   BILIDIR 0.3  --    BILITOT 1.6* 2.6*   ALKPHOS 89 99     Recent Labs     10/14/20  0441   INR 1.20*     Recent Labs 10/14/20  1714 10/14/20  2313 10/15/20  0529   TROPONINI 0.05* 0.03* 0.03*       Urinalysis:      Lab Results   Component Value Date    NITRU Negative 02/28/2015    WBCUA None seen 02/28/2015    BACTERIA Rare 02/28/2015    RBCUA 0-2 02/28/2015    BLOODU TRACE-LYSED 02/28/2015    SPECGRAV 1.020 02/28/2015    GLUCOSEU 250 02/28/2015       Radiology:  XR ABDOMEN (KUB) (SINGLE AP VIEW)   Final Result   1. Nonobstructive bowel gas pattern. Mild prominence of small bowel loops   may represent mild ileus in the appropriate clinical setting. 2.  Suspect splenomegaly. CT HEAD WO CONTRAST   Final Result   Aging or evolving of known left PCA vascular territory infarct. Large area   of hypodensity is now seen in this region. No hemorrhage noted. Underlying atrophy with periventricular and at additional areas of small   vessel ischemic change      Small cavernoma posterior right parietal lobe         MRI BRAIN WO CONTRAST   Final Result   Large acute left PCA vascular territory infarct. The findings were sent to the Radiology Results [address] at 7:07   pm on 10/13/2020to be communicated to a licensed caregiver. XR CHEST (2 VW)   Final Result   1. Superior mediastinal widening, likely due to prominent vasculature, given   appearance on CT angiography of the neck performed just prior to this. 2.  Hazy opacity at the right lung base with volume loss in the right   hemithorax, suggesting atelectasis. CTA HEAD NECK W CONTRAST   Final Result   Partial occlusion in the mid to distal V4 segment of the right vertebral   artery. There is up to 40% stenosis in mid V4 segment of the left vertebral   artery. Partial occlusion in the P2 segment of the left PCA. 75% stenosis at the origin of the a M2 branch of the right MCA. 70% stenosis at the origin of the right common carotid artery. 50% stenosis at the origin of the right internal carotid artery.       30% stenosis at the origin of the left internal carotid artery. 70% severe at the distal V1 segment of the right vertebral artery. Results were reported to Dr. Paige Spatz at 4:07 p.m. on October 13, 2020. CT HEAD WO CONTRAST   Final Result   Posteriorly in the periventricular region on the right there is a nodular   area of increased density measuring 7 mm. This appears somewhat tubular in   character. .  This could represent a small a cavernoma or venous angioma. However a small area of hemorrhage could also have this appearance. There is a bulbous appearance to the middle cerebral artery distally on the   right, either due to tortuous vessel or small aneurysm. Atrophy and small-vessel ischemic change      Results discussed with [facility] by Dheeraj Proctor.  Mayur Mercedes MD at 7:06 am on   10/13/2020             Assessment/Plan:    Active Hospital Problems    Diagnosis Date Noted    Acute CVA (cerebrovascular accident) [facility]) [I63.9] 10/13/2020       Acute Medical Issues Being Addressed:    79year-old gentleman admitted to the hospital with acute right hemiparesis and acute encephalopathy secondary to left sided infarct  MRI showed infarct in the posterior circulation  CT angiogram of the head and neck showed multiple intracranial stenosis  On aspirin and Plavix  A1c 6.4  Echoardiogram normal ejection fraction no wall motion abnormalities no PFO  Will need 30-day Holter monitor  Continue telemetry  CT head was stable      Nausea vomiting diarrhea  LFTs are normal  Abdominal examination is benign  We will get a KUB  We will keep n.p.o. for now      Hypertension malignant with endorgan damage  He is on labetalol 200 mg 3 times a day, losartan 50 and amlodipine 10  Will await speech evaluation if he is able to eat will give him his oral medications and wean off the nicardipine drip  Goal blood pressure between 140 and 150      Elevated troponin  EKG shows some new T inversions in V3 and V4 as compared to previous EKG  He does not have any chest pain  Troponins more or less flat  Seen by cardiology  On aspirin and Plavix    On Lipitor full dose    Coronary  artery disease  On aspirin and Plavix    Prediabetes  Start Metformin on discharge      L superior mediastinal widening on chest x-ray  His radial pulses are equal more importantly on the CT angiogram of the neck the chest x-ray report acknowledges that that area was included on the CT angiogram and was visualized and showed only prominent vasculature ends at this time will not pursue any further testing     All of the above discussed at length with the daughter who voiced understanding and is in agreement with this plan        DVT Prophylaxis: sc lovenox   Diet: DIET DYSPHAGIA SOFT AND BITE-SIZED;  No Drinking Straw  Code Status: Full Code      Dispo - once acute medical processes have resolved    Bria Antunez MD no arthralgia/no myalgia/no back pain

## 2023-07-11 NOTE — PROGRESS NOTE ADULT - SUBJECTIVE AND OBJECTIVE BOX
OPERATIVE PROCEDURE(s): avr (M)                POD # 12    SURGEON(s): radha    SUBJECTIVE ASSESSMENT: pt is without complaints     Vital Signs Last 24 Hrs  T(C): 36.6 (11 Jul 2023 15:46), Max: 37.2 (11 Jul 2023 00:00)  T(F): 97.8 (11 Jul 2023 15:46), Max: 98.9 (11 Jul 2023 00:00)  HR: 123 (11 Jul 2023 15:46) (80 - 123)  BP: 119/78 (11 Jul 2023 15:46) (71/56 - 135/74)  BP(mean): 93 (11 Jul 2023 15:46) (61 - 93)  RR: 21 (11 Jul 2023 15:46) (17 - 32)  SpO2: 96% (11 Jul 2023 15:46) (92% - 98%)    Parameters below as of 11 Jul 2023 11:00  Patient On (Oxygen Delivery Method): room air      07-10-23 @ 07:01  -  07-11-23 @ 07:00  --------------------------------------------------------  IN: 1810 mL / OUT: 2550 mL / NET: -740 mL    07-11-23 @ 07:01  -  07-11-23 @ 16:49  --------------------------------------------------------  IN: 240 mL / OUT: 351 mL / NET: -111 mL    Physical Exam:  General: NAD; A&Ox3  Cardiac: S1/S2, RRR, no murmur, no rubs  Lungs: unlabored respirations, CTA b/l, no wheeze, no rales, no crackles  Abdomen: Soft/NT/ND; positive bowel sounds x 4  Sternum: Intact, no click, incision healing well with no drainage  Incisions: Incisions clean/dry/intact  Extremities: No edema b/l lower extremities; good capillary refill; no cyanosis; palpable 1+ pedal pulses b/l    LABS:                        10.4   17.90 )-----------( 234      ( 11 Jul 2023 01:54 )             32.4     COUMADIN:   [ ] YES [x ] NO    PT/INR - ( 11 Jul 2023 01:54 )   PT: 30.00 sec;   INR: 2.56 ratio         PTT - ( 11 Jul 2023 01:54 )  PTT:29.8 sec  07-11    132<L>  |  96<L>  |  17  ----------------------------<  109<H>  4.7   |  25  |  0.6<L>    Ca    8.7      11 Jul 2023 05:05      Urinalysis Basic - ( 11 Jul 2023 05:05 )    Color: x / Appearance: x / SG: x / pH: x  Gluc: 109 mg/dL / Ketone: x  / Bili: x / Urobili: x   Blood: x / Protein: x / Nitrite: x   Leuk Esterase: x / RBC: x / WBC x   Sq Epi: x / Non Sq Epi: x / Bacteria: x        MEDICATIONS  (STANDING):  aMIOdarone    Tablet 200 milliGRAM(s) Oral daily  cefepime   IVPB 1000 milliGRAM(s) IV Intermittent every 8 hours  chlorhexidine 2% Cloths 1 Application(s) Topical daily  diltiazem    milliGRAM(s) Oral daily  furosemide   Injectable 20 milliGRAM(s) IV Push daily  heparin   Injectable 5000 Unit(s) SubCutaneous every 12 hours  melatonin 5 milliGRAM(s) Oral at bedtime  metoprolol tartrate 50 milliGRAM(s) Oral every 12 hours  pantoprazole    Tablet 40 milliGRAM(s) Oral daily  polyethylene glycol 3350 17 Gram(s) Oral daily  potassium chloride    Tablet ER 20 milliEquivalent(s) Oral daily  vancomycin  IVPB 1000 milliGRAM(s) IV Intermittent every 12 hours  vancomycin  IVPB      warfarin 2 milliGRAM(s) Oral once    MEDICATIONS  (PRN):  acetaminophen     Tablet .. 650 milliGRAM(s) Oral every 6 hours PRN Mild Pain (1 - 3)  ondansetron Injectable 4 milliGRAM(s) IV Push every 8 hours PRN Nausea and/or Vomiting      Allergies    No Known Allergies    Intolerances        Ambulation/Activity Status:  amb well with pt      RADIOLOGY & ADDITIONAL TESTS:  ACC: [id number] EXAM:  XR CHEST PORTABLE ROUTINE 1V   ORDERED BY: MARGRET RODRIGUEZ     PROCEDURE DATE:  07/11/2023      INTERPRETATION:  STUDY INDICATION: Shortness of Breath    TECHNIQUE:  Portable frontal view of the chest obtained.    COMPARISON: 7/10/2023    FINDINGS/  IMPRESSION:    Stable to slightly increased bibasal pleural-parenchymal opacities. No   pneumothorax.    Stable cardiomediastinal silhouette.    Unchanged osseous structures.    --- End of Report ---        Assessment/Plan:  42y Female status-post AVR, POD #12  - Case and plan discussed with CTU Intensivist and CT Surgeon - Dr. Perez/Radha/Reza/Meliza  - Continue CTU supportive care and ongoing plan of care as per continuing CTU rounds.   - Continue DVT/GI prophylaxis  - Incentive Spirometry 10 times an hour  - Continue to advance physical activity as tolerated and continue PT/OT as directed  1. AVR (mechanical valve) & mass excision:  dose coumadin 2mg po tonight  2. HTN: cont lopressor  3. A. Fib: Coumadin 2mg tonight,  4. COPD/Hypoxia: cont nebs, wean o2 as tolerated, encourage incentive  5. Leukocytosis: afebrile, continue to trend CBC. cultures negative to date, cont Cefepime, IV Vanco 1gm q12h ordered, cont to monitor WBCs. - ID consult appreciated will d/c abx in am - WBC count is elevated most likely secondary to SIRS    dispo: d/c home most likely tomorrow

## 2023-07-11 NOTE — CONSULT NOTE ADULT - SUBJECTIVE AND OBJECTIVE BOX
ARLIN THOMPSONSA  42y, Female  Allergy: No Known Allergies      All historical available data reviewed.    HPI:  42y Female status-post AVR.  ID called for leucocytosis    FAMILY HISTORY:  Known health problems: none (Father, Mother)      PAST MEDICAL & SURGICAL HISTORY:  Hypertrophic cardiomyopathy      Atrial fibrillation      Aortic valve disease      Obese      S/P transesophageal echocardiogram (MINDI)            VITALS:  T(F): 96.1, Max: 99 (07-10-23 @ 16:00)  HR: 101  BP: 117/65  RR: 20Vital Signs Last 24 Hrs  T(C): 35.6 (11 Jul 2023 12:00), Max: 37.2 (10 Jul 2023 16:00)  T(F): 96.1 (11 Jul 2023 12:00), Max: 99 (10 Jul 2023 16:00)  HR: 101 (11 Jul 2023 12:00) (80 - 122)  BP: 117/65 (11 Jul 2023 12:00) (71/56 - 135/74)  BP(mean): 87 (11 Jul 2023 12:00) (61 - 92)  RR: 20 (11 Jul 2023 12:00) (17 - 32)  SpO2: 95% (11 Jul 2023 12:00) (92% - 98%)    Parameters below as of 11 Jul 2023 11:00  Patient On (Oxygen Delivery Method): room air        TESTS & MEASUREMENTS:                        10.4   17.90 )-----------( 234      ( 11 Jul 2023 01:54 )             32.4     07-11    132<L>  |  96<L>  |  17  ----------------------------<  109<H>  4.7   |  25  |  0.6<L>    Ca    8.7      11 Jul 2023 05:05          Culture - Blood (collected 07-06-23 @ 10:32)  Source: .Blood Blood-Peripheral  Preliminary Report (07-10-23 @ 18:00):    No growth at 4 days    Culture - Blood (collected 07-06-23 @ 10:32)  Source: .Blood Blood-Peripheral  Preliminary Report (07-10-23 @ 18:00):    No growth at 4 days      Urinalysis Basic - ( 11 Jul 2023 05:05 )    Color: x / Appearance: x / SG: x / pH: x  Gluc: 109 mg/dL / Ketone: x  / Bili: x / Urobili: x   Blood: x / Protein: x / Nitrite: x   Leuk Esterase: x / RBC: x / WBC x   Sq Epi: x / Non Sq Epi: x / Bacteria: x          RADIOLOGY & ADDITIONAL TESTS:  Personal review of radiological diagnostics performed  Echo and EKG results noted when applicable.     MEDICATIONS:  acetaminophen     Tablet .. 650 milliGRAM(s) Oral every 6 hours PRN  aMIOdarone    Tablet 200 milliGRAM(s) Oral daily  cefepime   IVPB 1000 milliGRAM(s) IV Intermittent every 8 hours  chlorhexidine 2% Cloths 1 Application(s) Topical daily  diltiazem    milliGRAM(s) Oral daily  furosemide   Injectable 20 milliGRAM(s) IV Push daily  heparin   Injectable 5000 Unit(s) SubCutaneous every 12 hours  melatonin 5 milliGRAM(s) Oral at bedtime  metoprolol tartrate 50 milliGRAM(s) Oral every 12 hours  ondansetron Injectable 4 milliGRAM(s) IV Push every 8 hours PRN  pantoprazole    Tablet 40 milliGRAM(s) Oral daily  polyethylene glycol 3350 17 Gram(s) Oral daily  potassium chloride    Tablet ER 20 milliEquivalent(s) Oral daily  vancomycin  IVPB 1000 milliGRAM(s) IV Intermittent every 12 hours  vancomycin  IVPB      warfarin 2 milliGRAM(s) Oral once      ANTIBIOTICS:  cefepime   IVPB 1000 milliGRAM(s) IV Intermittent every 8 hours  vancomycin  IVPB 1000 milliGRAM(s) IV Intermittent every 12 hours  vancomycin  IVPB

## 2023-07-11 NOTE — CONSULT NOTE ADULT - ASSESSMENT
42y Female status-post AVR.    IMPRESSION/RECOMMENDATIONS   SIRS with leucocytosis  Clinically no bacterial PNA  CXR unchanged Left pleural  effusion  No GI/ pathology  No SSTI  No phlebitis  No CD colitis  &/6 BCx NG  WBC 17.9  On vanco/cefepime x 7/6 and 5 resp    -will recommend holding ABx in am

## 2023-07-11 NOTE — CHART NOTE - NSCHARTNOTEFT_GEN_A_CORE
Nutrition Education Note    Received nutrition consult for education on Vit K in the diet as pt on long term coumadin. Met with pt at bedside in CTU, pending downgrade to stepdown. Educated pt on Vit K interaction with coumadin. Reviewed vitamin K content of foods and provided with printed list for reference. Provided additional educational handout re: Vitamin K And Medications from [facility]. Encouraged to keep Vit K intake consistent. All nutrition-related questions addressed at this time.     RD to remain available: herbert Evans x [phone] or TEAMS

## 2023-07-12 VITALS
RESPIRATION RATE: 18 BRPM | DIASTOLIC BLOOD PRESSURE: 66 MMHG | SYSTOLIC BLOOD PRESSURE: 114 MMHG | HEART RATE: 104 BPM | TEMPERATURE: 98 F | OXYGEN SATURATION: 96 %

## 2023-07-12 LAB
HCT VFR BLD CALC: 34.3 % — LOW (ref 37–47)
HGB BLD-MCNC: 11.2 G/DL — LOW (ref 12–16)
INR BLD: 3.03 RATIO — HIGH (ref 0.65–1.3)
MCHC RBC-ENTMCNC: 26.4 PG — LOW (ref 27–31)
MCHC RBC-ENTMCNC: 32.7 G/DL — SIGNIFICANT CHANGE UP (ref 32–37)
MCV RBC AUTO: 80.7 FL — LOW (ref 81–99)
NRBC # BLD: 0 /100 WBCS — SIGNIFICANT CHANGE UP (ref 0–0)
PLATELET # BLD AUTO: 276 K/UL — SIGNIFICANT CHANGE UP (ref 130–400)
PMV BLD: 10.1 FL — SIGNIFICANT CHANGE UP (ref 7.4–10.4)
PROTHROM AB SERPL-ACNC: 35.7 SEC — HIGH (ref 9.95–12.87)
RBC # BLD: 4.25 M/UL — SIGNIFICANT CHANGE UP (ref 4.2–5.4)
RBC # FLD: 16.7 % — HIGH (ref 11.5–14.5)
WBC # BLD: 15.42 K/UL — HIGH (ref 4.8–10.8)
WBC # FLD AUTO: 15.42 K/UL — HIGH (ref 4.8–10.8)

## 2023-07-12 PROCEDURE — 71045 X-RAY EXAM CHEST 1 VIEW: CPT | Mod: 26

## 2023-07-12 RX ORDER — CEPHALEXIN 500 MG
1 CAPSULE ORAL
Qty: 21 | Refills: 0
Start: 2023-07-12 | End: 2023-07-18

## 2023-07-12 RX ORDER — DILTIAZEM HCL 120 MG
1 CAPSULE, EXT RELEASE 24 HR ORAL
Qty: 30 | Refills: 0
Start: 2023-07-12 | End: 2023-08-10

## 2023-07-12 RX ORDER — FUROSEMIDE 40 MG
1 TABLET ORAL
Qty: 7 | Refills: 0
Start: 2023-07-12 | End: 2023-07-18

## 2023-07-12 RX ORDER — AMIODARONE HYDROCHLORIDE 400 MG/1
1 TABLET ORAL
Qty: 30 | Refills: 0
Start: 2023-07-12 | End: 2023-08-10

## 2023-07-12 RX ORDER — METOPROLOL TARTRATE 50 MG
1 TABLET ORAL
Qty: 60 | Refills: 0
Start: 2023-07-12 | End: 2023-08-10

## 2023-07-12 RX ORDER — WARFARIN SODIUM 2.5 MG/1
1 TABLET ORAL
Qty: 10 | Refills: 0
Start: 2023-07-12 | End: 2023-07-21

## 2023-07-12 RX ORDER — POTASSIUM CHLORIDE 20 MEQ
1 PACKET (EA) ORAL
Qty: 7 | Refills: 0
Start: 2023-07-12 | End: 2023-07-18

## 2023-07-12 RX ORDER — METOPROLOL TARTRATE 50 MG
1 TABLET ORAL
Qty: 0 | Refills: 0 | DISCHARGE

## 2023-07-12 RX ORDER — ACETAMINOPHEN 500 MG
2 TABLET ORAL
Qty: 56 | Refills: 0
Start: 2023-07-12 | End: 2023-07-18

## 2023-07-12 RX ADMIN — CHLORHEXIDINE GLUCONATE 1 APPLICATION(S): 213 SOLUTION TOPICAL at 06:43

## 2023-07-12 RX ADMIN — AMIODARONE HYDROCHLORIDE 200 MILLIGRAM(S): 400 TABLET ORAL at 05:47

## 2023-07-12 RX ADMIN — Medication 240 MILLIGRAM(S): at 06:13

## 2023-07-12 RX ADMIN — Medication 20 MILLIGRAM(S): at 05:46

## 2023-07-12 RX ADMIN — Medication 20 MILLIEQUIVALENT(S): at 12:12

## 2023-07-12 RX ADMIN — PANTOPRAZOLE SODIUM 40 MILLIGRAM(S): 20 TABLET, DELAYED RELEASE ORAL at 12:12

## 2023-07-12 RX ADMIN — Medication 50 MILLIGRAM(S): at 05:47

## 2023-07-12 RX ADMIN — Medication 650 MILLIGRAM(S): at 00:02

## 2023-07-12 RX ADMIN — HEPARIN SODIUM 5000 UNIT(S): 5000 INJECTION INTRAVENOUS; SUBCUTANEOUS at 05:47

## 2023-07-12 NOTE — PROGRESS NOTE ADULT - ASSESSMENT
42y Female status-post AVR.    IMPRESSION/RECOMMENDATIONS   SIRS with slowly resolving leucocytosis  WBC 17.9>15.4  Clinically no bacterial PNA  CXR unchanged Left pleural  effusion  No GI/ pathology  No SSTI  No phlebitis  No CD colitis  &/6 BCx NG  WBC 17.9  On vanco/cefepime x 7/6 and 5 resp    -will recommend holding ABx for now    Please do not hesitate to recall ID if any questions arise either through [employer] or through [employer]

## 2023-07-12 NOTE — PROGRESS NOTE ADULT - SUBJECTIVE AND OBJECTIVE BOX
MALIHA THOMPSON  42y, Female    All available historical data reviewed    OVERNIGHT EVENTS:  feels well and has no new complaints  No fevers   no diarrhea  no  issues    ROS:  General: Denies rigors, nightsweats  HEENT: Denies headache, rhinorrhea, sore throat, eye pain  CV: Denies CP, palpitations  PULM: Denies wheezing, hemoptysis  GI: Denies hematemesis, hematochezia, melena  : Denies discharge, hematuria  MSK: Denies arthralgias, myalgias  SKIN: Denies rash, lesions  NEURO: Denies paresthesias, weakness  PSYCH: Denies depression, anxiety    VITALS:  T(F): 98.2, Max: 98.2 (07-12-23 @ 07:21)  HR: 104  BP: 114/66  RR: 18Vital Signs Last 24 Hrs  T(C): 36.8 (12 Jul 2023 07:21), Max: 36.8 (12 Jul 2023 07:21)  T(F): 98.2 (12 Jul 2023 07:21), Max: 98.2 (12 Jul 2023 07:21)  HR: 104 (12 Jul 2023 07:21) (80 - 124)  BP: 114/66 (12 Jul 2023 07:21) (71/56 - 123/77)  BP(mean): 86 (12 Jul 2023 07:21) (61 - 103)  RR: 18 (12 Jul 2023 07:21) (18 - 28)  SpO2: 93% (12 Jul 2023 04:38) (93% - 97%)    Parameters below as of 11 Jul 2023 20:28  Patient On (Oxygen Delivery Method): room air        TESTS & MEASUREMENTS:                        11.2   15.42 )-----------( 276      ( 12 Jul 2023 08:20 )             34.3     07-11    132<L>  |  96<L>  |  17  ----------------------------<  109<H>  4.7   |  25  |  0.6<L>    Ca    8.7      11 Jul 2023 05:05          Culture - Blood (collected 07-06-23 @ 10:32)  Source: .Blood Blood-Peripheral  Final Report (07-11-23 @ 18:01):    No growth at 5 days    Culture - Blood (collected 07-06-23 @ 10:32)  Source: .Blood Blood-Peripheral  Final Report (07-11-23 @ 18:01):    No growth at 5 days      Urinalysis Basic - ( 11 Jul 2023 05:05 )    Color: x / Appearance: x / SG: x / pH: x  Gluc: 109 mg/dL / Ketone: x  / Bili: x / Urobili: x   Blood: x / Protein: x / Nitrite: x   Leuk Esterase: x / RBC: x / WBC x   Sq Epi: x / Non Sq Epi: x / Bacteria: x          RADIOLOGY & ADDITIONAL TESTS:  Personal review of radiological diagnostics performed  Echo and EKG results noted when applicable.     MEDICATIONS:  acetaminophen     Tablet .. 650 milliGRAM(s) Oral every 6 hours PRN  aMIOdarone    Tablet 200 milliGRAM(s) Oral daily  chlorhexidine 2% Cloths 1 Application(s) Topical daily  diltiazem    milliGRAM(s) Oral daily  furosemide   Injectable 20 milliGRAM(s) IV Push daily  heparin   Injectable 5000 Unit(s) SubCutaneous every 12 hours  melatonin 5 milliGRAM(s) Oral at bedtime  metoprolol tartrate 50 milliGRAM(s) Oral every 12 hours  ondansetron Injectable 4 milliGRAM(s) IV Push every 8 hours PRN  pantoprazole    Tablet 40 milliGRAM(s) Oral daily  polyethylene glycol 3350 17 Gram(s) Oral daily  potassium chloride    Tablet ER 20 milliEquivalent(s) Oral daily      ANTIBIOTICS:

## 2023-07-12 NOTE — PROGRESS NOTE ADULT - PROVIDER SPECIALTY LIST ADULT
CT Surgery
Critical Care
Electrophysiology
CT Surgery
Critical Care
Critical Care
CT Surgery
Critical Care
Infectious Disease

## 2023-07-12 NOTE — PROGRESS NOTE ADULT - SUBJECTIVE AND OBJECTIVE BOX
OPERATIVE PROCEDURE(s): Mechanical AVR, Excision-Fibroelastoma               POD # 13                       SURGEON(s): JIMBO Zepeda      SUBJECTIVE ASSESSMENT:42yFemale patient seen and examined at bedside. No complaints at bedside.     Vital Signs Last 24 Hrs  T(F): 97.5 (12 Jul 2023 04:38), Max: 97.9 (11 Jul 2023 23:31)  HR: 109 (12 Jul 2023 04:38) (80 - 124)  BP: 122/92 (12 Jul 2023 04:38) (71/56 - 123/77)  BP(mean): 103 (12 Jul 2023 04:38) (61 - 103)  RR: 20 (12 Jul 2023 04:38) (20 - 32)  SpO2: 93% (12 Jul 2023 04:38) (93% - 97%)      I&O's Detail    11 Jul 2023 07:01  -  12 Jul 2023 07:00  --------------------------------------------------------  IN:    Oral Fluid: 400 mL  Total IN: 400 mL    OUT:    Stool (mL): 1 mL    Voided (mL): 1400 mL  Total OUT: 1401 mL        Net: I&O's Detail    10 Jul 2023 07:01  -  11 Jul 2023 07:00  --------------------------------------------------------  Total NET: -740 mL      11 Jul 2023 07:01  -  12 Jul 2023 07:00  --------------------------------------------------------  Total NET: -1001 mL        CAPILLARY BLOOD GLUCOSE        A1C with Estimated Average Glucose Result: 6.0 % (06-14-23 @ 16:19)      Physical Exam:  General: NAD; A&Ox3  Cardiac: S1/S2, RRR, no murmur, no rubs  Lungs: unlabored respirations, CTA b/l, no wheeze, no rales, no crackles  Abdomen: Soft/NT/ND; positive bowel sounds x 4  Sternum: Intact, no click, incision healing well with no drainage  Incisions: Incisions clean/dry/intact  Extremities: No edema b/l lower extremities; good capillary refill; no cyanosis; palpable 1+ pedal pulses b/l      BOWEL MOVEMENT:  [X] YES [] NO, If No, Timing since last BM Day #        LABS:                        10.4<L>  17.90<H> )-----------( 234      ( 11 Jul 2023 01:54 )             32.4<L>                        10.4<L>  17.26<H> )-----------( 240      ( 10 Jul 2023 03:25 )             32.7<L>    07-11    132<L>  |  96<L>  |  17  ----------------------------<  109<H>  4.7   |  25  |  0.6<L>  07-11    130<L>  |  96<L>  |  17  ----------------------------<  122<H>  6.5<HH>   |  25  |  0.8    Ca    8.7      11 Jul 2023 05:05      PT/INR - ( 11 Jul 2023 01:54 )   PT: ;   INR: 2.56 ratio         PTT - ( 11 Jul 2023 01:54 )  PTT:29.8 sec  Urinalysis Basic - ( 11 Jul 2023 05:05 )    Color: x / Appearance: x / SG: x / pH: x  Gluc: 109 mg/dL / Ketone: x  / Bili: x / Urobili: x   Blood: x / Protein: x / Nitrite: x   Leuk Esterase: x / RBC: x / WBC x   Sq Epi: x / Non Sq Epi: x / Bacteria: x        RADIOLOGY & ADDITIONAL TESTS:  CXR:   < from: Xray Chest 1 View- PORTABLE-Routine (Xray Chest 1 View- PORTABLE-Routine in AM.) (07.12.23 @ 05:20) >  PROCEDURE DATE:  07/12/2023          INTERPRETATION:  Clinical History / Reason for exam: Shortness of Breath    Comparison : Chest radiograph: 7/11/2023    Technique/Positioning: Frontal view of the chest    Findings:    Support devices: None.    Cardiac/mediastinum/hilum: No significant change    Skeleton/soft tissues: No significant change.    Lung parenchyma/Pleura: Stable bilateral opacities and effusions. No   definite pneumothorax.    Impression:  Stable bilateral opacities and effusions.      EKG:  < from: 12 Lead ECG (07.09.23 @ 13:54) >  Ventricular Rate 111 BPM    Atrial Rate 333 BPM    QRS Duration 134 ms    Q-T Interval 398 ms    QTC Calculation(Bazett) 541 ms    R Axis -61 degrees    T Axis 126 degrees    Diagnosis Line Atrial flutter with variable A-V block  Left axis deviation  Voltage criteria for left ventricular hypertrophy  Right bundle branch block  T wave abnormality, consider lateral ischemia  Abnormal ECG        Allergies    No Known Allergies    Intolerances      MEDICATIONS  (STANDING):  aMIOdarone    Tablet 200 milliGRAM(s) Oral daily  chlorhexidine 2% Cloths 1 Application(s) Topical daily  diltiazem    milliGRAM(s) Oral daily  furosemide   Injectable 20 milliGRAM(s) IV Push daily  heparin   Injectable 5000 Unit(s) SubCutaneous every 12 hours  melatonin 5 milliGRAM(s) Oral at bedtime  metoprolol tartrate 50 milliGRAM(s) Oral every 12 hours  pantoprazole    Tablet 40 milliGRAM(s) Oral daily  polyethylene glycol 3350 17 Gram(s) Oral daily  potassium chloride    Tablet ER 20 milliEquivalent(s) Oral daily    MEDICATIONS  (PRN):  acetaminophen     Tablet .. 650 milliGRAM(s) Oral every 6 hours PRN Mild Pain (1 - 3)  ondansetron Injectable 4 milliGRAM(s) IV Push every 8 hours PRN Nausea and/or Vomiting    Home Medications:  Metoprolol Succinate  mg oral tablet, extended release: 1 tablet, extended release orally once a day (29 Jun 2023 06:42)      Pharmacologic DVT Prophylaxis [] YES, [] NO: Contraindication:  SCD's: YES b/l    GI Prophylaxis:     Post-Op Beta-Blockers: [X] Yes, [] No: contraindication:  Post-Op Aspirin:  [] Yes, [X] No: contraindication: Patient MVR, on coumadin  Post-Op Statin:  [] Yes, [X] No: contraindication: Patient MVR, no hx of CAD    Ambulation/Activity Status: ambulate as tolerated    Assessment/Plan:  42y Female status-post Mechanical MVR and excision of fibroblastoma  - Case and plan discussed with CTU Intensivist and CT Surgeon - Dr. Perez/Duane/Reza/Meliza  - Continue CTU supportive care and ongoing plan of care as per continuing CTU rounds.   - Continue DVT/GI prophylaxis  - Incentive Spirometry 10 times an hour  - Continue to advance physical activity as tolerated and continue PT/OT as directed  1. AVR (mechanical valve) & mass excision: continue BB & on Coumadin PO, INR results pending   2. A. Fib: Coumadin 2mg tonight, INR 2.4, PO cardizem 240 daily & BB 50mg q12h. EP -> amio daily, f/u with Dr. Tyler outpatient in one month  3. COPD/Hypoxia: cont nebs, wean o2 as tolerated, encourage incentive, on room air  4. Leukocytosis: afebrile, continue to trend CBC. cultures negative to date. ID following ->likely due to SIRS, abx d/c'd      Social Service Disposition:    - anticipate d/c home with home care today and f/u with coumadin clinic

## 2023-07-13 ENCOUNTER — OUTPATIENT (OUTPATIENT)
Dept: OUTPATIENT SERVICES | Facility: HOSPITAL | Age: 43
LOS: 1 days | End: 2023-07-13
Payer: COMMERCIAL

## 2023-07-13 ENCOUNTER — APPOINTMENT (OUTPATIENT)
Dept: CARE COORDINATION | Facility: HOME HEALTH | Age: 43
End: 2023-07-13
Payer: COMMERCIAL

## 2023-07-13 ENCOUNTER — APPOINTMENT (OUTPATIENT)
Dept: MEDICATION MANAGEMENT | Facility: CLINIC | Age: 43
End: 2023-07-13

## 2023-07-13 ENCOUNTER — APPOINTMENT (OUTPATIENT)
Dept: CARDIOLOGY | Facility: CLINIC | Age: 43
End: 2023-07-13

## 2023-07-13 DIAGNOSIS — Z98.890 OTHER SPECIFIED POSTPROCEDURAL STATES: Chronic | ICD-10-CM

## 2023-07-13 DIAGNOSIS — Z79.01 LONG TERM (CURRENT) USE OF ANTICOAGULANTS: ICD-10-CM

## 2023-07-13 DIAGNOSIS — D15.1 BENIGN NEOPLASM OF HEART: ICD-10-CM

## 2023-07-13 LAB
INR PPP: 2.3 RATIO
POCT-PROTHROMBIN TIME: 28.1 SECS
QUALITY CONTROL: YES

## 2023-07-13 PROCEDURE — 85610 PROTHROMBIN TIME: CPT

## 2023-07-13 PROCEDURE — 36416 COLLJ CAPILLARY BLOOD SPEC: CPT

## 2023-07-13 PROCEDURE — 99024 POSTOP FOLLOW-UP VISIT: CPT

## 2023-07-13 PROCEDURE — 99211 OFF/OP EST MAY X REQ PHY/QHP: CPT

## 2023-07-13 RX ORDER — DILTIAZEM HYDROCHLORIDE 30 MG/1
30 TABLET, COATED ORAL 3 TIMES DAILY
Refills: 0 | Status: DISCONTINUED | COMMUNITY
End: 2023-07-13

## 2023-07-13 RX ORDER — METOPROLOL SUCCINATE 100 MG/1
100 TABLET, EXTENDED RELEASE ORAL DAILY
Qty: 90 | Refills: 3 | Status: DISCONTINUED | COMMUNITY
Start: 2023-03-29 | End: 2023-07-13

## 2023-07-13 RX ORDER — METOPROLOL TARTRATE 75 MG/1
75 TABLET, FILM COATED ORAL
Qty: 4 | Refills: 0 | Status: DISCONTINUED | COMMUNITY
Start: 2023-04-26 | End: 2023-07-13

## 2023-07-13 RX ORDER — APIXABAN 5 MG/1
5 TABLET, FILM COATED ORAL
Qty: 180 | Refills: 3 | Status: DISCONTINUED | COMMUNITY
Start: 2023-03-24 | End: 2023-07-13

## 2023-07-13 NOTE — HISTORY OF PRESENT ILLNESS
[FreeTextEntry1] : FOLLOW YOUR HEART HOME VISIT-[facility]\par Telehealth Home Visit made to  Mrs. Thompson for post discharge transitional care management and post follow up.\par Patient of Dr. GIPSON       on        Discharge on      from [facility]\par \par \par \par \par Mrs. Thompson is a 42 year F with PMH of Aflutter, cardiomyopathy, DLD, HTN. Ms. THOMPSON was worked up for complaints of HOCM, and SOB. 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 Symptoms today are SOB on exertion. NYHA class III. On 06/29/23, she underwent resection of aortic valve mass and left atrial appendage clipping. Post-operatively, the hospitalization course was prolonged due to atrial fibrillation with RVR, patient's anticoagulation levels, and leukocytosis. The patient was started on Coumadin due to her mechanical valve and is now in the therapeutic range, INR 3.03. Electrophysiology was consulted on the patient for atrial fibrillation. Dr. Tyler saw the patient and recommended the patient continue therapeutic anticoagulation, heart rate control, and to follow-up with the office in one month. The patient was also seen by Infectious disease due to leukocytosis. The patient was afebrile with her white blood cell count trending up. Blood cultures went sent and was negative. IV antibiotics were held and patient is started on PO Keflex 500mg q8h x 7 days. Patient is medically optimized and is being discharged home today with home care services. Patient has a follow-up appointment with Dr. Zepeda on Wednesday 7/19 @ 2:30pm and coumadin clinic appointment tomorrow 7/13/23 at 1:30pm.  \par \par

## 2023-07-13 NOTE — PHYSICAL EXAM
[FreeTextEntry1] : Upon visualization MT incision CDI with steri strips in place, CT incisions +suture, +2dema noted to BLE

## 2023-07-14 ENCOUNTER — NON-APPOINTMENT (OUTPATIENT)
Age: 43
End: 2023-07-14

## 2023-07-14 DIAGNOSIS — Z79.01 LONG TERM (CURRENT) USE OF ANTICOAGULANTS: ICD-10-CM

## 2023-07-15 DIAGNOSIS — D72.829 ELEVATED WHITE BLOOD CELL COUNT, UNSPECIFIED: ICD-10-CM

## 2023-07-15 DIAGNOSIS — I42.2 OTHER HYPERTROPHIC CARDIOMYOPATHY: ICD-10-CM

## 2023-07-15 DIAGNOSIS — R79.1 ABNORMAL COAGULATION PROFILE: ICD-10-CM

## 2023-07-15 DIAGNOSIS — R50.9 FEVER, UNSPECIFIED: ICD-10-CM

## 2023-07-15 DIAGNOSIS — R65.10 SYSTEMIC INFLAMMATORY RESPONSE SYNDROME (SIRS) OF NON-INFECTIOUS ORIGIN WITHOUT ACUTE ORGAN DYSFUNCTION: ICD-10-CM

## 2023-07-15 DIAGNOSIS — I42.4 ENDOCARDIAL FIBROELASTOSIS: ICD-10-CM

## 2023-07-15 DIAGNOSIS — Z79.01 LONG TERM (CURRENT) USE OF ANTICOAGULANTS: ICD-10-CM

## 2023-07-15 DIAGNOSIS — F43.20 ADJUSTMENT DISORDER, UNSPECIFIED: ICD-10-CM

## 2023-07-15 DIAGNOSIS — D62 ACUTE POSTHEMORRHAGIC ANEMIA: ICD-10-CM

## 2023-07-15 DIAGNOSIS — E87.70 FLUID OVERLOAD, UNSPECIFIED: ICD-10-CM

## 2023-07-15 DIAGNOSIS — J90 PLEURAL EFFUSION, NOT ELSEWHERE CLASSIFIED: ICD-10-CM

## 2023-07-15 DIAGNOSIS — F41.9 ANXIETY DISORDER, UNSPECIFIED: ICD-10-CM

## 2023-07-15 DIAGNOSIS — I48.11 LONGSTANDING PERSISTENT ATRIAL FIBRILLATION: ICD-10-CM

## 2023-07-15 DIAGNOSIS — E66.9 OBESITY, UNSPECIFIED: ICD-10-CM

## 2023-07-15 DIAGNOSIS — E78.5 HYPERLIPIDEMIA, UNSPECIFIED: ICD-10-CM

## 2023-07-15 DIAGNOSIS — R74.01 ELEVATION OF LEVELS OF LIVER TRANSAMINASE LEVELS: ICD-10-CM

## 2023-07-15 DIAGNOSIS — I10 ESSENTIAL (PRIMARY) HYPERTENSION: ICD-10-CM

## 2023-07-17 ENCOUNTER — APPOINTMENT (OUTPATIENT)
Dept: MEDICATION MANAGEMENT | Facility: CLINIC | Age: 43
End: 2023-07-17

## 2023-07-17 ENCOUNTER — OUTPATIENT (OUTPATIENT)
Dept: OUTPATIENT SERVICES | Facility: HOSPITAL | Age: 43
LOS: 1 days | End: 2023-07-17
Payer: COMMERCIAL

## 2023-07-17 DIAGNOSIS — Z79.01 LONG TERM (CURRENT) USE OF ANTICOAGULANTS: ICD-10-CM

## 2023-07-17 DIAGNOSIS — Z98.890 OTHER SPECIFIED POSTPROCEDURAL STATES: Chronic | ICD-10-CM

## 2023-07-17 DIAGNOSIS — I48.91 UNSPECIFIED ATRIAL FIBRILLATION: ICD-10-CM

## 2023-07-17 LAB
INR PPP: 4.7 RATIO
POCT-PROTHROMBIN TIME: 56 SECS
QUALITY CONTROL: YES

## 2023-07-17 PROCEDURE — 99211 OFF/OP EST MAY X REQ PHY/QHP: CPT

## 2023-07-17 PROCEDURE — 36416 COLLJ CAPILLARY BLOOD SPEC: CPT

## 2023-07-17 PROCEDURE — 85610 PROTHROMBIN TIME: CPT

## 2023-07-18 DIAGNOSIS — I48.91 UNSPECIFIED ATRIAL FIBRILLATION: ICD-10-CM

## 2023-07-18 DIAGNOSIS — Z79.01 LONG TERM (CURRENT) USE OF ANTICOAGULANTS: ICD-10-CM

## 2023-07-19 ENCOUNTER — OUTPATIENT (OUTPATIENT)
Dept: OUTPATIENT SERVICES | Facility: HOSPITAL | Age: 43
LOS: 1 days | End: 2023-07-19
Payer: COMMERCIAL

## 2023-07-19 ENCOUNTER — APPOINTMENT (OUTPATIENT)
Dept: CARDIOTHORACIC SURGERY | Facility: CLINIC | Age: 43
End: 2023-07-19
Payer: COMMERCIAL

## 2023-07-19 VITALS
HEIGHT: 55 IN | WEIGHT: 204 LBS | BODY MASS INDEX: 47.21 KG/M2 | DIASTOLIC BLOOD PRESSURE: 85 MMHG | SYSTOLIC BLOOD PRESSURE: 122 MMHG

## 2023-07-19 DIAGNOSIS — Z98.890 OTHER SPECIFIED POSTPROCEDURAL STATES: Chronic | ICD-10-CM

## 2023-07-19 DIAGNOSIS — R07.9 CHEST PAIN, UNSPECIFIED: ICD-10-CM

## 2023-07-19 PROCEDURE — 71046 X-RAY EXAM CHEST 2 VIEWS: CPT | Mod: 26

## 2023-07-19 PROCEDURE — 71046 X-RAY EXAM CHEST 2 VIEWS: CPT

## 2023-07-19 PROCEDURE — 99024 POSTOP FOLLOW-UP VISIT: CPT

## 2023-07-19 NOTE — ASSESSMENT
[FreeTextEntry1] : Ms. MALIHA THOMPSON is a 42 year F with PMH of Aflutter, cardiomyopathy, DLD, HTN. Ms. MEDINA was worked up for complaints of HOCM, and SOB. 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 Symptoms today are SOB on exertion. NYHA class III. On 06/29/23, she underwent resection of aortic valve \par mass and left atrial appendage clipping. Post-operatively, the hospitalization course was prolonged due to atrial fibrillation with RVR, patient's anticoagulation levels, and leukocytosis. The patient was started on Coumadin \par due to her mechanical valve and is now in the therapeutic range, INR 3.03. Electrophysiology was consulted on the patient for atrial fibrillation. Dr. Tyler saw the patient and recommended the patient continue therapeutic \par anticoagulation, heart rate control, and to follow-up with the office in one month. The patient was also seen by Infectious disease due to leukocytosis. The patient was afebrile with her white blood cell count trending up. Blood \par cultures went sent and was negative. IV antibiotics were held and patient is started on PO Keflex 500mg q8h x 7 days. Patient is medically optimized and is being discharged home today with home care services. \par \par Plan:\par Patient SOB on arrival with dry cough with conversation\par CXR now to evaluate if pleural effusion\par  report she is walking less than 10 minutes per day, does not use IS enough\par Also is side sleeping, does not want to sleep on back\par Sternal precautions and needs for IS and activity were stressed with patient and \par She said she would try to walk more and use IS\par Advised to double lasix dose to 40 mg X 5 days then go back to 20 mg\par F/U with CT Surgery in 1 week\par F/U with Cardiologist in 2 weeks\par Continue F/U with Coumadin clinic for INR management\par \par I, Lisy Hoang [facility]-BC, am acting as scribe for  \par \par I personally performed the services described in the documentation, reviewed the documentation recorded by the scribe in my presence and it accurately and completely records my words and actions.\par -FMR\par

## 2023-07-19 NOTE — PHYSICAL EXAM
[] : no respiratory distress [Heart Rate And Rhythm] : heart rate was normal and rhythm regular [Apical Impulse] : the apical impulse was normal [Heart Sounds] : normal S1 and S2 [Clean] : clean [Dry] : dry [Healing Well] : healing well [Bleeding] : no active bleeding [Foul Odor] : no foul smell [Purulent Drainage] : no purulent drainage [Serosanguinous Drainage] : no serosanguinous drainage [Erythema] : not erythematous [Warm] : not warm [Tender] : not tender [FreeTextEntry1] : Stitch Removed [FreeTextEntry3] : +2 B/L Edema

## 2023-07-19 NOTE — REASON FOR VISIT
[de-identified] : S/P Mechanical AVR, Removal of Aortic Valve Mass, LA  Appendage Clipping [de-identified] : 6/29/2023 [de-identified] : On Coumadin for Mechanical AVR.  Here for postop F/U.

## 2023-07-19 NOTE — COUNSELING
Pt vomit I let the Nurse Reilly Nunez know he came to pt bedside with meds. Pt is in bed sitting calm .      Lashawn Toro  07/01/20 [id number] [FreeTextEntry1] : Ms. MALIHA THOMPSON is a 42 year F  that is status post-surgical AVR via sternotomy approach. During the post op visit cardiac and sternal precautions were reviewed with the patient. Incision site care was reviewed. No lotions, perfumes to the incision site. On arrival patient denies fever, chills nausea, and vomiting. Denies SOB or palpitation. All questions and concerns were addressed. Patient educated on diet and salt restrictions. Medications were reviewed with the patient. Patient was instructed to follow up with their cardiologist as appropriate for long term management and any medication refills needed.\par

## 2023-07-20 ENCOUNTER — APPOINTMENT (OUTPATIENT)
Dept: MEDICATION MANAGEMENT | Facility: CLINIC | Age: 43
End: 2023-07-20

## 2023-07-20 ENCOUNTER — OUTPATIENT (OUTPATIENT)
Dept: OUTPATIENT SERVICES | Facility: HOSPITAL | Age: 43
LOS: 1 days | End: 2023-07-20
Payer: COMMERCIAL

## 2023-07-20 DIAGNOSIS — Z98.890 OTHER SPECIFIED POSTPROCEDURAL STATES: Chronic | ICD-10-CM

## 2023-07-20 DIAGNOSIS — Z79.01 LONG TERM (CURRENT) USE OF ANTICOAGULANTS: ICD-10-CM

## 2023-07-20 DIAGNOSIS — I48.91 UNSPECIFIED ATRIAL FIBRILLATION: ICD-10-CM

## 2023-07-20 DIAGNOSIS — R07.9 CHEST PAIN, UNSPECIFIED: ICD-10-CM

## 2023-07-20 LAB
INR PPP: 2.4 RATIO
POCT-PROTHROMBIN TIME: 29.1 SECS
QUALITY CONTROL: YES

## 2023-07-20 PROCEDURE — 85610 PROTHROMBIN TIME: CPT

## 2023-07-20 PROCEDURE — 99211 OFF/OP EST MAY X REQ PHY/QHP: CPT

## 2023-07-20 PROCEDURE — 36416 COLLJ CAPILLARY BLOOD SPEC: CPT

## 2023-07-21 DIAGNOSIS — I48.91 UNSPECIFIED ATRIAL FIBRILLATION: ICD-10-CM

## 2023-07-21 DIAGNOSIS — Z79.01 LONG TERM (CURRENT) USE OF ANTICOAGULANTS: ICD-10-CM

## 2023-07-24 ENCOUNTER — OUTPATIENT (OUTPATIENT)
Dept: OUTPATIENT SERVICES | Facility: HOSPITAL | Age: 43
LOS: 1 days | End: 2023-07-24
Payer: COMMERCIAL

## 2023-07-24 ENCOUNTER — APPOINTMENT (OUTPATIENT)
Dept: MEDICATION MANAGEMENT | Facility: CLINIC | Age: 43
End: 2023-07-24

## 2023-07-24 DIAGNOSIS — I48.91 UNSPECIFIED ATRIAL FIBRILLATION: ICD-10-CM

## 2023-07-24 DIAGNOSIS — Z98.890 OTHER SPECIFIED POSTPROCEDURAL STATES: Chronic | ICD-10-CM

## 2023-07-24 DIAGNOSIS — Z79.01 LONG TERM (CURRENT) USE OF ANTICOAGULANTS: ICD-10-CM

## 2023-07-24 LAB
INR PPP: 3.1 RATIO
POCT-PROTHROMBIN TIME: 36.7 SECS
QUALITY CONTROL: YES

## 2023-07-24 PROCEDURE — 85610 PROTHROMBIN TIME: CPT

## 2023-07-24 PROCEDURE — 36416 COLLJ CAPILLARY BLOOD SPEC: CPT

## 2023-07-24 PROCEDURE — 99211 OFF/OP EST MAY X REQ PHY/QHP: CPT

## 2023-07-25 ENCOUNTER — APPOINTMENT (OUTPATIENT)
Dept: CARDIOLOGY | Facility: CLINIC | Age: 43
End: 2023-07-25

## 2023-07-25 DIAGNOSIS — I48.91 UNSPECIFIED ATRIAL FIBRILLATION: ICD-10-CM

## 2023-07-25 DIAGNOSIS — Z79.01 LONG TERM (CURRENT) USE OF ANTICOAGULANTS: ICD-10-CM

## 2023-07-26 ENCOUNTER — APPOINTMENT (OUTPATIENT)
Dept: CARDIOTHORACIC SURGERY | Facility: CLINIC | Age: 43
End: 2023-07-26
Payer: COMMERCIAL

## 2023-07-26 ENCOUNTER — OUTPATIENT (OUTPATIENT)
Dept: OUTPATIENT SERVICES | Facility: HOSPITAL | Age: 43
LOS: 1 days | End: 2023-07-26
Payer: COMMERCIAL

## 2023-07-26 VITALS
OXYGEN SATURATION: 96 % | WEIGHT: 190 LBS | HEART RATE: 100 BPM | RESPIRATION RATE: 12 BRPM | HEIGHT: 61 IN | BODY MASS INDEX: 35.87 KG/M2 | TEMPERATURE: 98 F

## 2023-07-26 DIAGNOSIS — R05.9 COUGH, UNSPECIFIED: ICD-10-CM

## 2023-07-26 PROCEDURE — 71046 X-RAY EXAM CHEST 2 VIEWS: CPT

## 2023-07-26 PROCEDURE — 71046 X-RAY EXAM CHEST 2 VIEWS: CPT | Mod: 26

## 2023-07-26 PROCEDURE — 99024 POSTOP FOLLOW-UP VISIT: CPT

## 2023-07-26 RX ORDER — POTASSIUM CHLORIDE 1500 MG/1
20 TABLET, EXTENDED RELEASE ORAL DAILY
Qty: 5 | Refills: 0 | Status: DISCONTINUED | COMMUNITY
Start: 2023-07-19 | End: 2023-07-26

## 2023-07-26 RX ORDER — CEPHALEXIN 500 MG/1
500 TABLET ORAL EVERY 8 HOURS
Refills: 0 | Status: DISCONTINUED | COMMUNITY
Start: 2023-07-13 | End: 2023-07-26

## 2023-07-26 RX ORDER — FUROSEMIDE 20 MG/1
20 TABLET ORAL DAILY
Refills: 0 | Status: DISCONTINUED | COMMUNITY
Start: 2023-07-13 | End: 2023-07-26

## 2023-07-27 DIAGNOSIS — R05.9 COUGH, UNSPECIFIED: ICD-10-CM

## 2023-07-27 NOTE — REASON FOR VISIT
[de-identified] : S/P Mechanical AVR, Removal of Aortic Valve Mass, LA  Appendage Clipping [de-identified] : 6/29/2023 [de-identified] : On Coumadin for Mechanical AVR.  Here for postop F/U.

## 2023-07-27 NOTE — COUNSELING
[Hygeine (Including Daily Shower)] : hygeine (including daily shower) [Importance of Regular Medical Follow-Up] : the importance of regular medical follow-up [No Heavy Lifting] : no heavy lifting (>15-20 lb. for 1 month or 25 lb. for 3 months from date of surgery) [Blood Pressure Control] : blood pressure control [S/S of infection] : signs and symptoms of infection (and to whom it should be reported) [Progressive Ambulation/Activity] : progressive ambulation/activity [Medication/Vitamin/Herb/Food Interaction] : medication/vitamin/herb/food interaction [FreeTextEntry1] : Ms. MALIHA THOMPSON is a 42 year F  that is status post-surgical AVR via sternotomy approach. During the post op visit cardiac and sternal precautions were reviewed with the patient. Incision site care was reviewed. No lotions, perfumes to the incision site. On arrival patient denies fever, chills nausea, and vomiting. Denies SOB or palpitation. All questions and concerns were addressed. Patient educated on diet and salt restrictions. Medications were reviewed with the patient. Patient was instructed to follow up with their cardiologist as appropriate for long term management and any medication refills needed.\par

## 2023-07-27 NOTE — ASSESSMENT
[FreeTextEntry1] : Ms. MALIHA THOMPSON is a 42 year F with PMH of Aflutter, cardiomyopathy, DLD, HTN. Ms. MEDINA was worked up for complaints of HOCM, and SOB. 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 Symptoms today are SOB on exertion. NYHA class III. On 06/29/23, she underwent resection of aortic valve \par mass and left atrial appendage clipping. Post-operatively, the hospitalization course was prolonged due to atrial fibrillation with RVR, patient's anticoagulation levels, and leukocytosis. The patient was started on Coumadin \par due to her mechanical valve and is now in the therapeutic range, INR 3.03. Electrophysiology was consulted on the patient for atrial fibrillation. Dr. Tyler saw the patient and recommended the patient continue therapeutic \par anticoagulation, heart rate control, and to follow-up with the office in one month. The patient was also seen by Infectious disease due to leukocytosis. The patient was afebrile with her white blood cell count trending up. Blood \par cultures went sent and was negative. IV antibiotics were held and patient is started on PO Keflex 500mg q8h x 7 days. Patient is medically optimized and is being discharged home today with home care services. \par \par Plan:\par Patient SOB on arrival will send Tessalon\par F/U with CTS 2 weeks\par F/U with Cardio and Dr. Metcalf\par

## 2023-07-28 ENCOUNTER — APPOINTMENT (OUTPATIENT)
Dept: MEDICATION MANAGEMENT | Facility: CLINIC | Age: 43
End: 2023-07-28

## 2023-07-28 ENCOUNTER — APPOINTMENT (OUTPATIENT)
Dept: CARDIOLOGY | Facility: CLINIC | Age: 43
End: 2023-07-28
Payer: COMMERCIAL

## 2023-07-28 ENCOUNTER — OUTPATIENT (OUTPATIENT)
Dept: OUTPATIENT SERVICES | Facility: HOSPITAL | Age: 43
LOS: 1 days | End: 2023-07-28
Payer: COMMERCIAL

## 2023-07-28 VITALS
DIASTOLIC BLOOD PRESSURE: 55 MMHG | OXYGEN SATURATION: 98 % | WEIGHT: 191 LBS | HEART RATE: 82 BPM | SYSTOLIC BLOOD PRESSURE: 94 MMHG | HEIGHT: 61 IN | BODY MASS INDEX: 36.06 KG/M2

## 2023-07-28 DIAGNOSIS — Z98.890 OTHER SPECIFIED POSTPROCEDURAL STATES: Chronic | ICD-10-CM

## 2023-07-28 DIAGNOSIS — I48.91 UNSPECIFIED ATRIAL FIBRILLATION: ICD-10-CM

## 2023-07-28 DIAGNOSIS — Z79.01 LONG TERM (CURRENT) USE OF ANTICOAGULANTS: ICD-10-CM

## 2023-07-28 LAB
INR PPP: 2.9 RATIO
POCT-PROTHROMBIN TIME: 34.2 SECS
QUALITY CONTROL: YES

## 2023-07-28 PROCEDURE — 36416 COLLJ CAPILLARY BLOOD SPEC: CPT

## 2023-07-28 PROCEDURE — 99211 OFF/OP EST MAY X REQ PHY/QHP: CPT

## 2023-07-28 PROCEDURE — 99214 OFFICE O/P EST MOD 30 MIN: CPT

## 2023-07-28 PROCEDURE — 85610 PROTHROMBIN TIME: CPT

## 2023-07-28 RX ORDER — FUROSEMIDE 40 MG/1
40 TABLET ORAL
Qty: 5 | Refills: 0 | Status: DISCONTINUED | COMMUNITY
Start: 2023-07-20 | End: 2023-07-28

## 2023-07-28 RX ORDER — POTASSIUM CHLORIDE 1500 MG/1
20 TABLET, EXTENDED RELEASE ORAL DAILY
Qty: 30 | Refills: 0 | Status: DISCONTINUED | COMMUNITY
Start: 2023-07-13 | End: 2023-07-28

## 2023-07-28 NOTE — HISTORY OF PRESENT ILLNESS
[FreeTextEntry1] : 43 yo female paf hypertrophic cardiomyopathy. She had fibroelastoma. She 6/29/23 mechanical avr La appendage clipping. \par

## 2023-07-28 NOTE — DISCUSSION/SUMMARY
[FreeTextEntry1] : 41 yo female paf hypertrophic cardiomyopathy. In 2/22 in hosp rapid afib. She had stopped beta. Went back into nsr. . She denies chest pain.  . She had covid 12/21.  Aware need weight loss. No sudden death in family. Aware no heavy exertion. . She was on more beta in past.  Explained hypertrophic cardiomyopathy. [facility] 9/22 severe MR .LVOT Valsalva about 70 mm HG.She was followed in [place]. Aware need weight loss. \par Told she needs stay hydrated. Now again in afib.Told if symptoms go to ER. Told take beta. \par She had fibroelastoma. She 6/29/23 mechanical avr La appendage clipping. Tired anxious. Incisional chest pain. Reviewed post op course. Questioned answered. When able. She needs cardiac rehab. Discussed with patient.

## 2023-07-29 DIAGNOSIS — Z79.01 LONG TERM (CURRENT) USE OF ANTICOAGULANTS: ICD-10-CM

## 2023-07-29 DIAGNOSIS — I48.91 UNSPECIFIED ATRIAL FIBRILLATION: ICD-10-CM

## 2023-08-01 ENCOUNTER — APPOINTMENT (OUTPATIENT)
Dept: ELECTROPHYSIOLOGY | Facility: CLINIC | Age: 43
End: 2023-08-01
Payer: COMMERCIAL

## 2023-08-01 ENCOUNTER — INPATIENT (INPATIENT)
Facility: HOSPITAL | Age: 43
LOS: 2 days | Discharge: HOME HEALTH | DRG: 291 | End: 2023-08-04
Attending: INTERNAL MEDICINE | Admitting: STUDENT IN AN ORGANIZED HEALTH CARE EDUCATION/TRAINING PROGRAM
Payer: COMMERCIAL

## 2023-08-01 VITALS
SYSTOLIC BLOOD PRESSURE: 130 MMHG | HEART RATE: 137 BPM | RESPIRATION RATE: 24 BRPM | DIASTOLIC BLOOD PRESSURE: 73 MMHG | WEIGHT: 197.09 LBS | TEMPERATURE: 98 F | OXYGEN SATURATION: 97 % | HEIGHT: 72 IN

## 2023-08-01 VITALS
HEIGHT: 61 IN | SYSTOLIC BLOOD PRESSURE: 98 MMHG | RESPIRATION RATE: 18 BRPM | WEIGHT: 195 LBS | BODY MASS INDEX: 36.82 KG/M2 | HEART RATE: 116 BPM | OXYGEN SATURATION: 97 % | DIASTOLIC BLOOD PRESSURE: 68 MMHG

## 2023-08-01 DIAGNOSIS — Z98.890 OTHER SPECIFIED POSTPROCEDURAL STATES: Chronic | ICD-10-CM

## 2023-08-01 DIAGNOSIS — I50.9 HEART FAILURE, UNSPECIFIED: ICD-10-CM

## 2023-08-01 LAB
ALBUMIN SERPL ELPH-MCNC: 4 G/DL — SIGNIFICANT CHANGE UP (ref 3.5–5.2)
ALP SERPL-CCNC: 114 U/L — SIGNIFICANT CHANGE UP (ref 30–115)
ALT FLD-CCNC: 30 U/L — SIGNIFICANT CHANGE UP (ref 0–41)
ANION GAP SERPL CALC-SCNC: 16 MMOL/L — HIGH (ref 7–14)
APTT BLD: 41.5 SEC — HIGH (ref 27–39.2)
AST SERPL-CCNC: 34 U/L — SIGNIFICANT CHANGE UP (ref 0–41)
BASE EXCESS BLDV CALC-SCNC: 3.9 MMOL/L — HIGH (ref -2–3)
BASOPHILS # BLD AUTO: 0.05 K/UL — SIGNIFICANT CHANGE UP (ref 0–0.2)
BASOPHILS NFR BLD AUTO: 0.5 % — SIGNIFICANT CHANGE UP (ref 0–1)
BILIRUB SERPL-MCNC: 1.2 MG/DL — SIGNIFICANT CHANGE UP (ref 0.2–1.2)
BUN SERPL-MCNC: 18 MG/DL — SIGNIFICANT CHANGE UP (ref 10–20)
CA-I SERPL-SCNC: 1.17 MMOL/L — SIGNIFICANT CHANGE UP (ref 1.15–1.33)
CALCIUM SERPL-MCNC: 9 MG/DL — SIGNIFICANT CHANGE UP (ref 8.4–10.5)
CHLORIDE SERPL-SCNC: 101 MMOL/L — SIGNIFICANT CHANGE UP (ref 98–110)
CO2 SERPL-SCNC: 22 MMOL/L — SIGNIFICANT CHANGE UP (ref 17–32)
CREAT SERPL-MCNC: 0.8 MG/DL — SIGNIFICANT CHANGE UP (ref 0.7–1.5)
D DIMER BLD IA.RAPID-MCNC: 559 NG/ML DDU — HIGH
EGFR: 94 ML/MIN/1.73M2 — SIGNIFICANT CHANGE UP
EOSINOPHIL # BLD AUTO: 0.13 K/UL — SIGNIFICANT CHANGE UP (ref 0–0.7)
EOSINOPHIL NFR BLD AUTO: 1.3 % — SIGNIFICANT CHANGE UP (ref 0–8)
GAS PNL BLDV: 134 MMOL/L — LOW (ref 136–145)
GAS PNL BLDV: SIGNIFICANT CHANGE UP
GLUCOSE BLDC GLUCOMTR-MCNC: 134 MG/DL — HIGH (ref 70–99)
GLUCOSE SERPL-MCNC: 112 MG/DL — HIGH (ref 70–99)
HCO3 BLDV-SCNC: 28 MMOL/L — SIGNIFICANT CHANGE UP (ref 22–29)
HCT VFR BLD CALC: 36.5 % — LOW (ref 37–47)
HCT VFR BLDA CALC: 35 % — LOW (ref 39–51)
HGB BLD CALC-MCNC: 11.6 G/DL — LOW (ref 12.6–17.4)
HGB BLD-MCNC: 11.4 G/DL — LOW (ref 12–16)
IMM GRANULOCYTES NFR BLD AUTO: 0.7 % — HIGH (ref 0.1–0.3)
INR BLD: 3.59 RATIO — HIGH (ref 0.65–1.3)
LACTATE BLDV-MCNC: 1.7 MMOL/L — SIGNIFICANT CHANGE UP (ref 0.5–2)
LYMPHOCYTES # BLD AUTO: 1 K/UL — LOW (ref 1.2–3.4)
LYMPHOCYTES # BLD AUTO: 10.1 % — LOW (ref 20.5–51.1)
MCHC RBC-ENTMCNC: 24.9 PG — LOW (ref 27–31)
MCHC RBC-ENTMCNC: 31.2 G/DL — LOW (ref 32–37)
MCV RBC AUTO: 79.9 FL — LOW (ref 81–99)
MONOCYTES # BLD AUTO: 0.92 K/UL — HIGH (ref 0.1–0.6)
MONOCYTES NFR BLD AUTO: 9.2 % — SIGNIFICANT CHANGE UP (ref 1.7–9.3)
NEUTROPHILS # BLD AUTO: 7.78 K/UL — HIGH (ref 1.4–6.5)
NEUTROPHILS NFR BLD AUTO: 78.2 % — HIGH (ref 42.2–75.2)
NRBC # BLD: 0 /100 WBCS — SIGNIFICANT CHANGE UP (ref 0–0)
NT-PROBNP SERPL-SCNC: 3939 PG/ML — HIGH (ref 0–300)
PCO2 BLDV: 38 MMHG — LOW (ref 39–42)
PH BLDV: 7.47 — HIGH (ref 7.32–7.43)
PLATELET # BLD AUTO: 368 K/UL — SIGNIFICANT CHANGE UP (ref 130–400)
PMV BLD: 9.5 FL — SIGNIFICANT CHANGE UP (ref 7.4–10.4)
PO2 BLDV: 75 MMHG — SIGNIFICANT CHANGE UP
POTASSIUM BLDV-SCNC: 4.1 MMOL/L — SIGNIFICANT CHANGE UP (ref 3.5–5.1)
POTASSIUM SERPL-MCNC: 4.4 MMOL/L — SIGNIFICANT CHANGE UP (ref 3.5–5)
POTASSIUM SERPL-SCNC: 4.4 MMOL/L — SIGNIFICANT CHANGE UP (ref 3.5–5)
PROT SERPL-MCNC: 6.7 G/DL — SIGNIFICANT CHANGE UP (ref 6–8)
PROTHROM AB SERPL-ACNC: >40 SEC — HIGH (ref 9.95–12.87)
RBC # BLD: 4.57 M/UL — SIGNIFICANT CHANGE UP (ref 4.2–5.4)
RBC # FLD: 16.6 % — HIGH (ref 11.5–14.5)
SAO2 % BLDV: 96.8 % — SIGNIFICANT CHANGE UP
SODIUM SERPL-SCNC: 139 MMOL/L — SIGNIFICANT CHANGE UP (ref 135–146)
TROPONIN T SERPL-MCNC: 0.03 NG/ML — CRITICAL HIGH
WBC # BLD: 9.95 K/UL — SIGNIFICANT CHANGE UP (ref 4.8–10.8)
WBC # FLD AUTO: 9.95 K/UL — SIGNIFICANT CHANGE UP (ref 4.8–10.8)

## 2023-08-01 PROCEDURE — 99291 CRITICAL CARE FIRST HOUR: CPT

## 2023-08-01 PROCEDURE — 93312 ECHO TRANSESOPHAGEAL: CPT

## 2023-08-01 PROCEDURE — 93325 DOPPLER ECHO COLOR FLOW MAPG: CPT

## 2023-08-01 PROCEDURE — 85730 THROMBOPLASTIN TIME PARTIAL: CPT

## 2023-08-01 PROCEDURE — 93005 ELECTROCARDIOGRAM TRACING: CPT

## 2023-08-01 PROCEDURE — 93306 TTE W/DOPPLER COMPLETE: CPT

## 2023-08-01 PROCEDURE — 99223 1ST HOSP IP/OBS HIGH 75: CPT

## 2023-08-01 PROCEDURE — 93320 DOPPLER ECHO COMPLETE: CPT

## 2023-08-01 PROCEDURE — 36415 COLL VENOUS BLD VENIPUNCTURE: CPT

## 2023-08-01 PROCEDURE — 84443 ASSAY THYROID STIM HORMONE: CPT

## 2023-08-01 PROCEDURE — 85025 COMPLETE CBC W/AUTO DIFF WBC: CPT

## 2023-08-01 PROCEDURE — 93000 ELECTROCARDIOGRAM COMPLETE: CPT

## 2023-08-01 PROCEDURE — 84439 ASSAY OF FREE THYROXINE: CPT

## 2023-08-01 PROCEDURE — 80053 COMPREHEN METABOLIC PANEL: CPT

## 2023-08-01 PROCEDURE — 71045 X-RAY EXAM CHEST 1 VIEW: CPT | Mod: 26

## 2023-08-01 PROCEDURE — 85610 PROTHROMBIN TIME: CPT

## 2023-08-01 PROCEDURE — 71045 X-RAY EXAM CHEST 1 VIEW: CPT

## 2023-08-01 PROCEDURE — 84100 ASSAY OF PHOSPHORUS: CPT

## 2023-08-01 PROCEDURE — 82962 GLUCOSE BLOOD TEST: CPT

## 2023-08-01 PROCEDURE — 84436 ASSAY OF TOTAL THYROXINE: CPT

## 2023-08-01 PROCEDURE — 83735 ASSAY OF MAGNESIUM: CPT

## 2023-08-01 PROCEDURE — 93010 ELECTROCARDIOGRAM REPORT: CPT

## 2023-08-01 PROCEDURE — 99215 OFFICE O/P EST HI 40 MIN: CPT | Mod: 25

## 2023-08-01 RX ORDER — AMIODARONE HYDROCHLORIDE 400 MG/1
200 TABLET ORAL DAILY
Refills: 0 | Status: DISCONTINUED | OUTPATIENT
Start: 2023-08-02 | End: 2023-08-04

## 2023-08-01 RX ORDER — FUROSEMIDE 40 MG
80 TABLET ORAL DAILY
Refills: 0 | Status: DISCONTINUED | OUTPATIENT
Start: 2023-08-02 | End: 2023-08-02

## 2023-08-01 RX ORDER — FUROSEMIDE 40 MG
40 TABLET ORAL ONCE
Refills: 0 | Status: COMPLETED | OUTPATIENT
Start: 2023-08-01 | End: 2023-08-01

## 2023-08-01 RX ORDER — WARFARIN SODIUM 2.5 MG/1
1 TABLET ORAL ONCE
Refills: 0 | Status: COMPLETED | OUTPATIENT
Start: 2023-08-01 | End: 2023-08-01

## 2023-08-01 RX ORDER — DILTIAZEM HCL 120 MG
60 CAPSULE, EXT RELEASE 24 HR ORAL EVERY 6 HOURS
Refills: 0 | Status: DISCONTINUED | OUTPATIENT
Start: 2023-08-01 | End: 2023-08-03

## 2023-08-01 RX ORDER — METOPROLOL TARTRATE 50 MG
50 TABLET ORAL EVERY 12 HOURS
Refills: 0 | Status: DISCONTINUED | OUTPATIENT
Start: 2023-08-01 | End: 2023-08-01

## 2023-08-01 RX ORDER — LIDOCAINE 4 G/100G
1 CREAM TOPICAL ONCE
Refills: 0 | Status: COMPLETED | OUTPATIENT
Start: 2023-08-01 | End: 2023-08-01

## 2023-08-01 RX ORDER — METOPROLOL TARTRATE 50 MG
50 TABLET ORAL EVERY 8 HOURS
Refills: 0 | Status: DISCONTINUED | OUTPATIENT
Start: 2023-08-01 | End: 2023-08-04

## 2023-08-01 RX ORDER — LANOLIN ALCOHOL/MO/W.PET/CERES
5 CREAM (GRAM) TOPICAL AT BEDTIME
Refills: 0 | Status: DISCONTINUED | OUTPATIENT
Start: 2023-08-01 | End: 2023-08-04

## 2023-08-01 RX ORDER — PANTOPRAZOLE SODIUM 20 MG/1
40 TABLET, DELAYED RELEASE ORAL
Refills: 0 | Status: DISCONTINUED | OUTPATIENT
Start: 2023-08-01 | End: 2023-08-04

## 2023-08-01 RX ADMIN — LIDOCAINE 1 PATCH: 4 CREAM TOPICAL at 23:40

## 2023-08-01 RX ADMIN — WARFARIN SODIUM 1 MILLIGRAM(S): 2.5 TABLET ORAL at 21:03

## 2023-08-01 RX ADMIN — Medication 50 MILLIGRAM(S): at 21:04

## 2023-08-01 RX ADMIN — Medication 5 MILLIGRAM(S): at 22:23

## 2023-08-01 RX ADMIN — Medication 40 MILLIGRAM(S): at 15:37

## 2023-08-01 RX ADMIN — Medication 40 MILLIGRAM(S): at 11:10

## 2023-08-01 RX ADMIN — Medication 100 MILLIGRAM(S): at 22:25

## 2023-08-01 RX ADMIN — Medication 60 MILLIGRAM(S): at 21:29

## 2023-08-01 RX ADMIN — LIDOCAINE 1 PATCH: 4 CREAM TOPICAL at 17:12

## 2023-08-01 NOTE — PATIENT PROFILE ADULT - FALL HARM RISK - HARM RISK INTERVENTIONS

## 2023-08-01 NOTE — CONSULT NOTE ADULT - ASSESSMENT
IMPRESSION  A Fib with RVR  Acute HFpEF exacerbation  Fibroelastoma s/p recent mechanical AVR + MAIZE + JASON exclusion on Coumadin  HOCM with DEN and LVOT gradient on prior TTE however no evidence of definitive DEN or significant LVOT gradient on most recent MINDI  HTN    RECOMMENDATIONS  telemonitoring  check TSH  consider repeat TTE with valsalva  diuresis as tolerated  c/w Amiodarone PO 200mg qd  Increase lopressor to 50mg q6 for now  c/w Cardizem, hold if evidence of reduced EF  c/w coumadin, monitor INR  pt will benefit from cardioversion, would recommend MINDI to evaluate JASON clip + DCCV   IMPRESSION  A Fib with RVR  Acute HFpEF exacerbation  Fibroelastoma s/p recent mechanical AVR + MAIZE + JASON exclusion on Coumadin  HOCM with DEN and LVOT gradient on prior TTE however no evidence of definitive DEN or significant LVOT gradient on most recent MINDI  HTN    RECOMMENDATIONS  telemonitoring  check TSH  consider repeat TTE with valsalva  diuresis as tolerated  c/w Amiodarone PO 200mg qd  c.w lopressor 50 BID, can increase for rate control prn  c/w Cardizem, hold if evidence of reduced EF  c/w coumadin, monitor INR  pt will benefit from cardioversion, would recommend MINDI to evaluate JASON clip + DCCV   IMPRESSION  A Fib with RVR  Acute HFpEF exacerbation  Fibroelastoma s/p recent mechanical AVR + JASON exclusion on Coumadin  HOCM with DEN and LVOT gradient on prior TTE however no evidence of definitive DEN or significant LVOT gradient on most recent MINDI  HTN    RECOMMENDATIONS  telemonitoring  check TSH  consider repeat TTE with valsalva  diuresis as tolerated  c/w Amiodarone PO 200mg qd  c.w lopressor 50 BID, can increase for rate control prn  c/w Cardizem, hold if evidence of reduced EF  c/w coumadin, monitor INR  pt will benefit from cardioversion, would recommend MINDI to evaluate JASON clip + DCCV

## 2023-08-01 NOTE — ED ADULT NURSE NOTE - NSFALLRISKINTERV_ED_ALL_ED

## 2023-08-01 NOTE — ED PROVIDER NOTE - PROGRESS NOTE DETAILS
YVON-- Dr. Sylvester spoke with Dr. Tyler who recommends lasix, bipap, YVON-- pt sitting up satting 100%, cardio at bedside recommend stepdown

## 2023-08-01 NOTE — CONSULT NOTE ADULT - SUBJECTIVE AND OBJECTIVE BOX
HPI:  43 YO F with PMHx of Afib/flutter s/p JASON clip, HOCM, HTN, recent Mechanical AVR due to fibroelastoma with JASON clipping presented with shortness of breath and tachycardia.  She was following for HOCM in [place] and was recommended surgery. She refused surgery and started following with Dr. Metcalf in [place]. Was getting regular echocardiograms. She was later diagnosed with A.fib and started following with Dr. Tyler. 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 On 06/29/23, she underwent resection of aortic valve mass and left atrial appendage clipping by CT surgery. No intervention was performed for HOCM. Post-operatively, the hospitalization course was prolonged due to atrial fibrillation with RVR. She was then discharged on warfarin.  She was following with CT surgery as outpatient with last follow up being 1 week ago. She was told to stop her lasix.  Went for her appointment with Dr. Tyler today and was told to come to the ED. She was hypotensive at the outpatient office and had HR of 130s with Afib.    In the VS were sig for a HR of 137 bpm.  Labs were sig for a troponin of 0.03 and proBNP of 4k.       PAST MEDICAL & SURGICAL HISTORY  Hypertrophic cardiomyopathy    Atrial fibrillation    Aortic valve disease    Obese    S/P transesophageal echocardiogram (MINDI)        FAMILY HISTORY:  FAMILY HISTORY:  Known health problems: none (Father, Mother)        SOCIAL HISTORY:  Social History:      ALLERGIES:  No Known Allergies      MEDICATIONS:  metoprolol tartrate 50 milliGRAM(s) Oral every 12 hours  pantoprazole    Tablet 40 milliGRAM(s) Oral before breakfast  warfarin 1 milliGRAM(s) Oral once    PRN:      HOME MEDICATIONS:  Home Medications:      VITALS:   T(F): 97.6 (08-01 @ 10:35), Max: 97.6 (08-01 @ 10:35)  HR: 137 (08-01 @ 10:35) (137 - 137)  BP: 130/73 (08-01 @ 10:35) (130/73 - 130/73)  BP(mean): --  RR: 24 (08-01 @ 10:35) (24 - 24)  SpO2: 97% (08-01 @ 10:35) (97% - 97%)    I&O's Summary      REVIEW OF SYSTEMS:  CONSTITUTIONAL: No weakness, fevers or chills  HEENT: No visual changes, neck/ear pain  RESPIRATORY: see HPI  CARDIOVASCULAR: See HPI  GASTROINTESTINAL: No abdominal pain. No nausea, vomiting, diarrhea   GENITOURINARY: No dysuria, frequency or hematuria  NEUROLOGICAL: No new focal deficits  SKIN: No new rashes    PHYSICAL EXAM:  General: Mild distress, tachypneic   HEENT: EOMI  Cardio: regular, S1, S2, metallic click +  Pulm: B/L crackles with decreased air entry at bases  Abdomen: Soft, non-tender, non-distended. Normoactive bowel sounds  Extremities: LE edema +  Neuro: A&O x3. No focal deficits    LABS:                        11.4   9.95  )-----------( 368      ( 01 Aug 2023 11:00 )             36.5     08-01    139  |  101  |  18  ----------------------------<  112<H>  4.4   |  22  |  0.8    Ca    9.0      01 Aug 2023 11:00    TPro  6.7  /  Alb  4.0  /  TBili  1.2  /  DBili  x   /  AST  34  /  ALT  30  /  AlkPhos  114  08-01    PT/INR - ( 01 Aug 2023 11:00 )   PT: >40.00 sec;   INR: 3.59 ratio         PTT - ( 01 Aug 2023 11:00 )  PTT:41.5 sec  Troponin T, Serum: 0.03 ng/mL *HH* (08-01-23 @ 11:00)    CARDIAC MARKERS ( 01 Aug 2023 11:00 )  x     / 0.03 ng/mL / x     / x     / x            Troponin trend:            RADIOLOGY:    < from: Xray Chest 1 View- PORTABLE-Urgent (08.01.23 @ 11:56) >  Impression:    Stable bibasilar opacities/effusions.    --- End of Report ---    < end of copied text >    < from: TTE Echo Complete w/o Contrast w/ Doppler (07.02.23 @ 11:22) >    Summary:   1. Limited study for follow post AVR   2. Normal global left ventricular systolic function.   3. There is no evidence of pericardial effusion.   4. Mechanical aortic valve in place, no paravalvular leak noted, wtih   max peak velocity    2m/s and mean tansvalve gradient 5-10 mmHg correlate with normal   function.    PHYSICIAN INTERPRETATION:  Left Ventricle: Global LV systolic function was normal.  Pericardium: There is no evidence of pericardial effusion.  Aortic Valve: Mechanical aortic valve in place, no paravalvular leak   noted, wtih max peak velocity    2m/s and mean tansvalve gradient 5-10 mmHg correlate with normal   function.    < end of copied text >    -OTHER:   ECG:  A Fib with RVR    TELEMETRY EVENTS: A Fib with RVR

## 2023-08-01 NOTE — H&P ADULT - HISTORY OF PRESENT ILLNESS
43 YO F with PMHx of Afib/flutter s/p JASON clipping, HOCM, HTN, recent Mechanical AVR due to fibroelastoma with JASON clipping presented with shortness of breath and tachycardia.  She was following for HOCM in [place] and was recommended surgery. She refused surgery and started following with Dr. Metcalf in [place]. Was getting regular echocardiograms. She was later diagnosed with A.fib and started following with Dr. Tyler. 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 On 06/29/23, she underwent resection of aortic valve mass and left atrial appendage clipping by CT surgery. No intervention was performed for HOCM. Post-operatively, the hospitalization course was prolonged due to atrial fibrillation with RVR. She was then discharged on warfarin.  She was following with CT surgery as outpatient with last follow up being 1 week ago. She was told to stop her lasix.  Went for her appointment with Dr. Tyler today and was told to come to the ED. She was hypotensive at the outpatient office and had HR of 130s with Afib.    In the VS were sig for a HR of 137 bpm.  ECG afib rvr   cxr Cardiac/mediastinum/hilum: Cardiomegaly. Status post open heart surgery. Valvular prosthesis    Lung parenchyma/Pleura: Bibasilar opacities/small pleural effusions, stable. Accessory azygous fissure and lobe.    Skeleton/soft tissues: Degenerative changes    Impression:    Stable bibasilar opacities/effusions.

## 2023-08-01 NOTE — ED PROVIDER NOTE - PHYSICAL EXAMINATION
CONSTITUTIONAL: NAD  SKIN: Warm, dry  HEAD: NCAT  EYES: Clear conjunctiva   ENT: MMM  NECK: Supple  CARD: RRR, S1, S2; no M/R/G  RESP: mildly increased WOB, + coughing, diminished BS bilaterally, R>L rhonchi  ABD: Soft, nontender. No rebound, rigidity, or guarding  EXT: Pulses palpable distally  NEURO: Grossly intact. Awake, alert, moving all extremities, no facial asymmetry. CONSTITUTIONAL: NAD  SKIN: Warm, dry  HEAD: NCAT  EYES: Clear conjunctiva   ENT: MMM  NECK: Supple  CARD: Afib, tachycardic, S1, S2; no M/R/G  RESP: mildly increased WOB, + coughing, diminished BS bilaterally, R>L rhonchi  ABD: Soft, nontender. No rebound, rigidity, or guarding  EXT: Pulses palpable distally  NEURO: Grossly intact. Awake, alert, moving all extremities, no facial asymmetry.

## 2023-08-01 NOTE — ASSESSMENT
[FreeTextEntry1] : ## paroxysmal atrial fibrillation  ## Hypertrophic Cardiomyopathy ## Acute heart failure ## Encounter for Toxic Drug Monitoring  - - On Eliquis. Compliant. No bleeding issues. Patient to contact us if there is any bleeding issues, interruption or any issues with OAC. Patient to go to ER/call 911 if any major bleeding.  - In acute HF today. - Lasix PO 40 mg q12h - Recommended to go to ER in view of significant fluid overload, recent surgery, need for possible work-up and lower BP. She wants to wait for  to return from work to go. Understands the risks. - On Amio, DIlt, BB- not rate controlled. AF/CHF driven - TSH. LFT while in hospital. - Return once DC'ed from hospital

## 2023-08-01 NOTE — CONSULT NOTE ADULT - ASSESSMENT
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    HEENT: Oral care. Aspiration precautions     PULMONARY: HOB @ 45. Monitor Pulse Ox. Keep > 93%. Supplement as needed.    CARDIOVASCULAR:   - Lasix 80mg IV   - CT surgery consult  - Continue home meds (Amiodarone 200mg QD and cardizem 240mg PO QD and metoprolol 50mg PO BID)  - Monitor electrolytes. Keep Mg > 2.2 and K > 4  - Daily Weight. Strict I&Os. Keep I < O    GI: GI prophylaxis. DASH/TLC diet    RENAL: Avoid nephrotoxic agents. Monitor Cr.    INFECTIOUS DISEASE: Trend fever curve.     HEMATOLOGICAL: Continue Warfarin    ENDOCRINE: Monitor FS. HbA1C 6.0 % [06-14-23]     MUSCULOSKELETAL: Ambulate as tolerated     CODE STATUS: Full Code    DISPOSITION: 4T stepdown.    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    HEENT: Oral care. Aspiration precautions     PULMONARY: HOB @ 45. Monitor Pulse Ox. Keep > 93%. Supplement as needed.    CARDIOVASCULAR:   - Lasix 80mg IV. Daily Weight. Strict I&Os. Keep I < O  - CT surgery consult  - 2D echo  - Continue home meds (Amiodarone 200mg QD and cardizem 240mg PO QD and metoprolol 50mg PO BID)  - EP follow up for possible cardioversion   - Monitor electrolytes. Keep Mg > 2.2 and K > 4    GI: GI prophylaxis. DASH/TLC diet    RENAL: Avoid nephrotoxic agents. Monitor Cr.    INFECTIOUS DISEASE: Trend fever curve.     HEMATOLOGICAL: Continue Warfarin for mechanical AVR    ENDOCRINE: Monitor FS. 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    HEENT: Oral care. Aspiration precautions     PULMONARY: HOB @ 45. Monitor Pulse Ox. Keep > 93%. Supplement as needed.    CARDIOVASCULAR:     - Lasix 80mg IV. Daily Weight. Strict I&Os. Keep I < O  - CT surgery consult  - 2D echo  - Continue home meds (Amiodarone 200mg QD and cardizem 240mg PO QD and metoprolol 50mg PO BID), increase metoprolol for better HR control.  - EP follow up for possible cardioversion   - Monitor electrolytes. Keep Mg > 2.2 and K > 4    GI: GI prophylaxis. DASH/TLC diet    RENAL: Avoid nephrotoxic agents. Monitor Cr.    INFECTIOUS DISEASE: Trend fever curve.     HEMATOLOGICAL: Continue Warfarin for mechanical AVR    ENDOCRINE: Monitor FS. HbA1C 6.0 % [06-14-23]     MUSCULOSKELETAL: Ambulate as tolerated     CODE STATUS: Full Code    DISPOSITION: CCU stepdown for close monitoring. If clinically better, will downgrade to 4T tomorrow.

## 2023-08-01 NOTE — ED PROVIDER NOTE - CLINICAL SUMMARY MEDICAL DECISION MAKING FREE TEXT BOX
cardio at bedside evaluating pt  d/w Dr. Tyler  will admit to CCU , consult Dr. Zepeda    Due to a high probability of clinically significant, life threatening deterioration, the patient required my highest level of preparedness to intervene emergently and I personally spent this critical care time directly and personally managing the patient. This critical care time included obtaining a history; examining the patient; pulse oximetry; ordering and review of studies; arranging urgent treatment with development of a management plan; evaluation of patient's response to treatment; frequent reassessment; and, discussions with other providers and the patient/patients family. This critical care time was performed to assess and manage the high probability of imminent, life-threatening deterioration that could result in multi-organ failure.    will give IV lasix

## 2023-08-01 NOTE — ED ADULT NURSE NOTE - CAS EDN DISCHARGE ASSESSMENT
Patient states she received a message that her results were available but unable to see them. Patient was advised 1 test is still pending and doctor has not reviewed results, she was advised she will get a message or call once doctor has reviewed them. Alert and oriented to person, place and time

## 2023-08-01 NOTE — H&P ADULT - NSHPLABSRESULTS_GEN_ALL_CORE
.  LABS:                         11.4   9.95  )-----------( 368      ( 01 Aug 2023 11:00 )             36.5     08-01    139  |  101  |  18  ----------------------------<  112<H>  4.4   |  22  |  0.8    Ca    9.0      01 Aug 2023 11:00    TPro  6.7  /  Alb  4.0  /  TBili  1.2  /  DBili  x   /  AST  34  /  ALT  30  /  AlkPhos  114  08-01    PT/INR - ( 01 Aug 2023 11:00 )   PT: >40.00 sec;   INR: 3.59 ratio         PTT - ( 01 Aug 2023 11:00 )  PTT:41.5 sec  Urinalysis Basic - ( 01 Aug 2023 11:00 )    Color: x / Appearance: x / SG: x / pH: x  Gluc: 112 mg/dL / Ketone: x  / Bili: x / Urobili: x   Blood: x / Protein: x / Nitrite: x   Leuk Esterase: x / RBC: x / WBC x   Sq Epi: x / Non Sq Epi: x / Bacteria: x            RADIOLOGY, EKG & ADDITIONAL TESTS: Reviewed.

## 2023-08-01 NOTE — ED PROVIDER NOTE - ATTENDING CONTRIBUTION TO CARE
42-year-old female to ED with shortness of breath.  History of HOCM hypertension valve repair on 629 and coming in with worsening leg swelling and shortness of breath.  Stopped taking diuretics 1 week ago as per Dr. Vergara and since having worsening shortness of breath, fluid retention.    AVSS, exam as noted, bilat rales, RRR, abdomen soft NTND, (+) bowel sounds, +2 edema bilat

## 2023-08-01 NOTE — ED ADULT NURSE NOTE - NS ED NURSE TRANSPORT WITH
Cardiac Monitor/Defib/ACLS/Rescue Kit/O2/BVM/IV pump/pulse ox Bilobed Flap Text: The defect edges were debeveled with a #15 scalpel blade. Given the location of the defect and the proximity to free margins a bilobe flap was deemed most appropriate. Using a sterile surgical marker, an appropriate bilobe flap drawn around the defect. The area thus outlined was incised deep to adipose tissue with a #15 scalpel blade. The skin margins were undermined to an appropriate distance in all directions utilizing iris scissors. Following this, the designed flap was carried over into the primary defect and sutured into place.

## 2023-08-01 NOTE — H&P ADULT - ASSESSMENT
43 YO F with PMHx of Afib/flutter s/p JASON clipping, HOCM, HTN, recent Mechanical AVR due to fibroelastoma with JASON clipping sent to ed from ep office with shortness of breath and tachycardia.    #Suspected HFpEF exacerbation   #Fluid overload   #HOCM with DEN  Echo 7/'23   1. Left ventricular ejection fraction, by visual estimation, is 50 to   55%.   2. Moderately increased LV wall thickness.   3. Accelerated LVOT flow with no definite evidence of DEN or LVOT   obstruction.   4. Mildly increased RV wall thickness.   5. Echo dense structure noted on the Aortic valve (including aortic and   ventricular aspect of the valve leaflets) measuring 1.3 x 1.5 cm.   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   6. Mild to moderate mitral annular calcification.   7. Mild thickening of the anterior and posterior mitral valve leaflets.   8. Mild mitral valve regurgitation.   9. Color flow doppler and intravenous injection of agitated saline   demonstrates the presence of an intact intra atrial septum.  10. No left atrial appendage thrombus.  11. Trivial pericardial effusion.    PHYSICIAN INTERPRETATION:  Left Ventricle: Left ventricular wall thickness is moderately increased.   Left ventricular ejection fraction, by visual estimation, is 50 to 55%.   Accelerated LVOT flow with no definite evidence of DEN or LVOT   obstruction.  Right Ventricle: Normal right ventricular size and function. RV wall   thickness is mildly increased.  Left Atrium: No left atrial appendage thrombus is seen. Color flow   doppler and intravenous injection of agitated saline demonstrates the   presence of an intact intra atrial septum.  Pericardium: Trivial pericardial effusion is present.  Mitral Valve: Mild thickening of the anterior and posterior mitral valve   leaflets. There is mild to moderate mitral annular calcification. No   evidence of mitral valve stenosis. Mild mitral valve regurgitation is   seen.  Tricuspid Valve: The tricuspid valve is normal in structure. Trivial   tricuspid regurgitation is visualized.  Aortic Valve: The aortic valve is trileaflet. No evidence of aortic   stenosis. No evidence of aortic valve regurgitation is seen. Echo dense   structure noted on the Aortic valve (including aortic and ventricular   aspect of the valve leaflets) measuring 1.3 x 1.5 cm. 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  Pulmonic Valve: Structurally normal pulmonic valve, with normal leaflet   excursion.  Aorta: Simple atheroma seen in the aortic arch and descending aorta.  Shunts: Agitated saline contrast was given intravenously to evaluate for   intracardiac shunting.    -Lasix 80mtg iv stat in ed.   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   - Persistent Atrial fibrillation with RVR s/p JASON clipping  -ep consult for possible cardioversion   -continue coumadin 1mg INR 3.59 repeat INR in am   -continue amiodarone metoprolol and cardizem     #HTN  Continue ccb and bb       #Dispo admit to cardio stepdown  #Diet Dash/TLC   #Activity IAT  #DVT ppx home coumadin by inr   #GI ppx protonix 41 YO F with PMHx of Afib/flutter s/p JASON clipping, HOCM, HTN, recent Mechanical AVR due to fibroelastoma with JASON clipping sent to ed from ep office with shortness of breath and tachycardia.    #Suspected HFpEF exacerbation   #Fluid overload   #HOCM with DEN  Echo 7/'23   1. Left ventricular ejection fraction, by visual estimation, is 50 to   55%.   2. Moderately increased LV wall thickness.   3. Accelerated LVOT flow with no definite evidence of DEN or LVOT   obstruction.   4. Mildly increased RV wall thickness.   5. Echo dense structure noted on the Aortic valve (including aortic and   ventricular aspect of the valve leaflets) measuring 1.3 x 1.5 cm.   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   6. Mild to moderate mitral annular calcification.   7. Mild thickening of the anterior and posterior mitral valve leaflets.   8. Mild mitral valve regurgitation.   9. Color flow doppler and intravenous injection of agitated saline   demonstrates the presence of an intact intra atrial septum.  10. No left atrial appendage thrombus.  11. Trivial pericardial effusion.    PHYSICIAN INTERPRETATION:  Left Ventricle: Left ventricular wall thickness is moderately increased.   Left ventricular ejection fraction, by visual estimation, is 50 to 55%.   Accelerated LVOT flow with no definite evidence of DEN or LVOT   obstruction.  Right Ventricle: Normal right ventricular size and function. RV wall   thickness is mildly increased.  Left Atrium: No left atrial appendage thrombus is seen. Color flow   doppler and intravenous injection of agitated saline demonstrates the   presence of an intact intra atrial septum.  Pericardium: Trivial pericardial effusion is present.  Mitral Valve: Mild thickening of the anterior and posterior mitral valve   leaflets. There is mild to moderate mitral annular calcification. No   evidence of mitral valve stenosis. Mild mitral valve regurgitation is   seen.  Tricuspid Valve: The tricuspid valve is normal in structure. Trivial   tricuspid regurgitation is visualized.  Aortic Valve: The aortic valve is trileaflet. No evidence of aortic   stenosis. No evidence of aortic valve regurgitation is seen. Echo dense   structure noted on the Aortic valve (including aortic and ventricular   aspect of the valve leaflets) measuring 1.3 x 1.5 cm. 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  Pulmonic Valve: Structurally normal pulmonic valve, with normal leaflet   excursion.  Aorta: Simple atheroma seen in the aortic arch and descending aorta.  Shunts: Agitated saline contrast was given intravenously to evaluate for   intracardiac shunting.  -s/p Lasix 80mtg iv stat in ed.   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   - Persistent Atrial fibrillation with RVR s/p JASON clipping  -ep consult for possible cardioversion   -continue coumadin 1mg INR 3.59 repeat INR in am   -continue amiodarone metoprolol and cardizem     #HTN  Continue ccb and bb       #Dispo admit to cardio stepdown  #Diet Dash/TLC   #Activity IAT  #DVT ppx home coumadin by inr   #GI ppx protonix 41 YO F with PMHx of Afib/flutter s/p JASON clipping, HOCM, HTN, recent Mechanical AVR due to fibroelastoma with JASON clipping sent to ed from ep office with shortness of breath and tachycardia.    #Suspected HFpEF exacerbation   #Fluid overload   #HOCM with DEN  Echo 7/'23   1. Left ventricular ejection fraction, by visual estimation, is 50 to   55%.   2. Moderately increased LV wall thickness.   3. Accelerated LVOT flow with no definite evidence of DEN or LVOT   obstruction.   4. Mildly increased RV wall thickness.   5. Echo dense structure noted on the Aortic valve (including aortic and   ventricular aspect of the valve leaflets) measuring 1.3 x 1.5 cm.   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   6. Mild to moderate mitral annular calcification.   7. Mild thickening of the anterior and posterior mitral valve leaflets.   8. Mild mitral valve regurgitation.   9. Color flow doppler and intravenous injection of agitated saline   demonstrates the presence of an intact intra atrial septum.  10. No left atrial appendage thrombus.  11. Trivial pericardial effusion.    PHYSICIAN INTERPRETATION:  Left Ventricle: Left ventricular wall thickness is moderately increased.   Left ventricular ejection fraction, by visual estimation, is 50 to 55%.   Accelerated LVOT flow with no definite evidence of DEN or LVOT   obstruction.  Right Ventricle: Normal right ventricular size and function. RV wall   thickness is mildly increased.  Left Atrium: No left atrial appendage thrombus is seen. Color flow   doppler and intravenous injection of agitated saline demonstrates the   presence of an intact intra atrial septum.  Pericardium: Trivial pericardial effusion is present.  Mitral Valve: Mild thickening of the anterior and posterior mitral valve   leaflets. There is mild to moderate mitral annular calcification. No   evidence of mitral valve stenosis. Mild mitral valve regurgitation is   seen.  Tricuspid Valve: The tricuspid valve is normal in structure. Trivial   tricuspid regurgitation is visualized.  Aortic Valve: The aortic valve is trileaflet. No evidence of aortic   stenosis. No evidence of aortic valve regurgitation is seen. Echo dense   structure noted on the Aortic valve (including aortic and ventricular   aspect of the valve leaflets) measuring 1.3 x 1.5 cm. 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  Pulmonic Valve: Structurally normal pulmonic valve, with normal leaflet   excursion.  Aorta: Simple atheroma seen in the aortic arch and descending aorta.  Shunts: Agitated saline contrast was given intravenously to evaluate for   intracardiac shunting.  -s/p Lasix 80mtg iv stat in ed.   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   - Persistent Atrial fibrillation with RVR s/p JASON clipping  -ep consult for possible cardioversion   -continue coumadin 1mg INR 3.59 repeat INR in am   -continue amiodarone metoprolol and cardizem     #HTN  Continue ccb and bb       #Dispo admit to cardio stepdown  #Diet Dash/TLC   #Activity IAT  #DVT ppx home coumadin by inr   #GI ppx protonix

## 2023-08-01 NOTE — CONSULT NOTE ADULT - ATTENDING COMMENTS
Acute HF  Persistent AF/RVR    - Tele/CCU  - Diuresis  - Rate control with Amio, Dilt, BB with target HR <130-140   - TTE  - Will need MINDI/DCCV once optimally diuresed. MINDI needed despite recent JASON clip- to assess patency of JASON  - TSH, LFT

## 2023-08-01 NOTE — ED PROVIDER NOTE - CHIEF COMPLAINT
The patient is a 42y Female complaining of shortness of breath. Quality 226: Preventive Care And Screening: Tobacco Use: Screening And Cessation Intervention: Patient screened for tobacco use and is an ex/non-smoker Quality 431: Preventive Care And Screening: Unhealthy Alcohol Use - Screening: Patient screened for unhealthy alcohol use using a single question and scores less than 2 times per year Detail Level: Detailed Quality 111:Pneumonia Vaccination Status For Older Adults: Pneumococcal Vaccination Previously Received

## 2023-08-01 NOTE — HISTORY OF PRESENT ILLNESS
[FreeTextEntry1] : Ms. THOMPSON is a 42 year-year old female with history of HOCM with DEN, paroxysmal atrial fibrillation is here for management of AF.  No Palpitations  Was followed at HOCM clinic at [facility]- not seen for past 2 years Had MCOT in past Last known AF >1 year ago.  +GOULD  5/2/23: had it a MINDI. 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 Seeing CTS tomorrow. Plan to have possible AVR, left atrial appendage clip and possibly maze procedure.  8/1/23: s/p mechanical AVR + JASON clip. Now with GOULD, LE edema. Lasix was stopped last week. +Cough  Denies chest pain, shortness of breath, palpitation, dizziness or LOC except noted above.   EKG (08/1/23):  EKG (5/2/23) Afib at 91  EKG (2/7/23): SR 68, LAE, LVH Cardio: Dr. Metcalf

## 2023-08-01 NOTE — ED ADULT NURSE NOTE - OBJECTIVE STATEMENT
Pt sent in by Dr. Cassidy for admission. Pt went to see MD in office when GOULD and BL LE swelling was observed. Pt had recent heart sx.

## 2023-08-01 NOTE — ED PROVIDER NOTE - OBJECTIVE STATEMENT
42yoF PMHx HOCM, HTN, recent valve repair on 6/29, Afib, (on coumadin), presenting with SOB, cough worsening bilateral leg swelling x 1 month, referred by elecrophysiologist Dr. Tyler. Stopped taking diuretics 1 week ago at direction of Dr. Zepeda  Denies fever, chest pain, 42yoF PMHx HOCM, HTN, recent valve repair on 6/29, Afib, (on coumadin), presenting with SOB, cough worsening bilateral leg swelling x 1 month, referred by elecrophysiologist Dr. Tyler. Stopped taking diuretics 1 week ago at direction of Dr. Zepeda, was told to drink more water. Denies recent fever, chest pain, abdominal pain, nausea, vomiting

## 2023-08-01 NOTE — CONSULT NOTE ADULT - SUBJECTIVE AND OBJECTIVE BOX
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 On 06/29/23, she underwent resection of aortic valve mass and left atrial appendage clipping. Post-operatively, the hospitalization course was prolonged due to atrial fibrillation with RVR. Continues to be on warfarin due to mechanical AV.  She was following with CT surgery as outpatient was reports that she was told to stop lasix. Now reports worsening shortness of breath. Went for her appointment with Dr. Tyler today and was told to come to the ED.      In the VS were sig for a HR of 137 bpm.  Labs were sig for a troponin of 0.03 and proBNP of 4k.     PAST MEDICAL & SURGICAL HISTORY:  Hypertrophic cardiomyopathy  Atrial fibrillation  Aortic valve disease  Obese  S/P transesophageal echocardiogram (MINDI)          HEALTH ISSUES - PROBLEM Dx:      SOCIAL HISTORY:    [ ] Non-smoker  [ ] Smoker  [ ] Alcohol    Allergies    No Known Allergies    Intolerances    	    REVIEW OF SYMPTOMS:  See HPI      VITALS:  T(C): 36.4 (08-01-23 @ 10:35), Max: 36.4 (08-01-23 @ 10:35)  HR: 137 (08-01-23 @ 10:35) (137 - 137)  BP: 130/73 (08-01-23 @ 10:35) (130/73 - 130/73)  RR: 24 (08-01-23 @ 10:35) (24 - 24)  SpO2: 97% (08-01-23 @ 10:35) (97% - 97%)  Wt(kg): --  I&O's Summary    Daily Height in cm: 307.34 (01 Aug 2023 10:35)    Daily     PHYSICAL EXAM:  CONSTITUTIONAL: AAOx3  HEAD: Atraumatic, normocephalic  EYES: EOM intact  ENT: JVD +ve  PULMONARY: Crackles b/l  CARDIOVASCULAR: Tachy  GASTROINTESTINAL: Soft  MUSCULOSKELETAL: 2+ LE edema    LABS:	 	                          11.4   9.95  )-----------( 368      ( 01 Aug 2023 11:00 )             36.5     08-01    139  |  101  |  18  ----------------------------<  112<H>  4.4   |  22  |  0.8    Ca    9.0      01 Aug 2023 11:00    TPro  6.7  /  Alb  4.0  /  TBili  1.2  /  DBili  x   /  AST  34  /  ALT  30  /  AlkPhos  114  08-01    CARDIAC MARKERS ( 01 Aug 2023 11:00 )  x     / 0.03 ng/mL / x     / x     / x          PT/INR - ( 01 Aug 2023 11:00 )   PT: >40.00 sec;   INR: 3.59 ratio         PTT - ( 01 Aug 2023 11:00 )  PTT:41.5 sec    CARDIAC MARKERS:  Troponin trend: 08-01-23    TELEMETRY EVENTS:      ECG:     RADIOLOGY:   < from: Xray Chest 1 View- PORTABLE-Urgent (08.01.23 @ 11:56) >  Impression:    Stable bibasilar opacities/effusions.    < end of copied text >      OTHER:    Echocardiogram:  < from: TTE Echo Complete w/o Contrast w/ Doppler (07.02.23 @ 11:22) >   1. Limited study for follow post AVR   2. Normal global left ventricular systolic function.   3. There is no evidence of pericardial effusion.   4. Mechanical aortic valve in place, no paravalvular leak noted, wtih   max peak velocity    2m/s and mean tansvalve gradient 5-10 mmHg correlate with normal   function.    < end of copied text >    Catheterization:    Stress Test:  	  	    Home Medications:    MEDICATIONS  (STANDING):    MEDICATIONS  (PRN):         Date of Admission: 08-01-23    CHIEF COMPLAINT: Shortness of breath    CARDIAC HISTORY OF PRESENT ILLNESS:   41 YO F with PMHx of Afib/flutter s/p JASON clipping, HOCM, HTN, recent Mechanical AVR due to fibroelastoma with JASON clipping presented with shortness of breath and tachycardia.  She was following for HOCM in [place] and was recommended surgery. She refused surgery and started following with Dr. Metcalf in [place]. Was getting regular echocardiograms. She was later diagnosed with A.fib and started following with Dr. Tyler. 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 On 06/29/23, she underwent resection of aortic valve mass and left atrial appendage clipping by CT surgery. No intervention was performed for HOCM. Post-operatively, the hospitalization course was prolonged due to atrial fibrillation with RVR. She was then discharged on warfarin.  She was following with CT surgery as outpatient with last follow up being 1 week ago. She was told to stop her lasix.  Went for her appointment with Dr. Tyler today and was told to come to the ED. She was hypotensive at the outpatient office and had HR of 130s with Afib.    In the VS were sig for a HR of 137 bpm.  Labs were sig for a troponin of 0.03 and proBNP of 4k.     PAST MEDICAL & SURGICAL HISTORY:  Hypertrophic cardiomyopathy  Atrial fibrillation  Aortic valve disease  Obese  S/P transesophageal echocardiogram (MINDI)    HEALTH ISSUES - PROBLEM Dx:      SOCIAL HISTORY:    [ ] Non-smoker  [ ] Smoker  [ ] Alcohol    Allergies    No Known Allergies    Intolerances    	    REVIEW OF SYMPTOMS:  See HPI      VITALS:  T(C): 36.4 (08-01-23 @ 10:35), Max: 36.4 (08-01-23 @ 10:35)  HR: 137 (08-01-23 @ 10:35) (137 - 137)  BP: 130/73 (08-01-23 @ 10:35) (130/73 - 130/73)  RR: 24 (08-01-23 @ 10:35) (24 - 24)  SpO2: 97% (08-01-23 @ 10:35) (97% - 97%)  Wt(kg): --  I&O's Summary    Daily Height in cm: 307.34 (01 Aug 2023 10:35)    Daily     PHYSICAL EXAM:  CONSTITUTIONAL: AAOx3  HEAD: Atraumatic, normocephalic  EYES: EOM intact  ENT: JVD +ve  PULMONARY: Crackles b/l  CARDIOVASCULAR: Tachy  GASTROINTESTINAL: Soft  MUSCULOSKELETAL: 2+ LE edema    LABS:	 	                          11.4   9.95  )-----------( 368      ( 01 Aug 2023 11:00 )             36.5     08-01    139  |  101  |  18  ----------------------------<  112<H>  4.4   |  22  |  0.8    Ca    9.0      01 Aug 2023 11:00    TPro  6.7  /  Alb  4.0  /  TBili  1.2  /  DBili  x   /  AST  34  /  ALT  30  /  AlkPhos  114  08-01    CARDIAC MARKERS ( 01 Aug 2023 11:00 )  x     / 0.03 ng/mL / x     / x     / x          PT/INR - ( 01 Aug 2023 11:00 )   PT: >40.00 sec;   INR: 3.59 ratio         PTT - ( 01 Aug 2023 11:00 )  PTT:41.5 sec    CARDIAC MARKERS:  Troponin trend: 08-01-23    TELEMETRY EVENTS:      ECG:     RADIOLOGY:   < from: Xray Chest 1 View- PORTABLE-Urgent (08.01.23 @ 11:56) >  Impression:    Stable bibasilar opacities/effusions.    < end of copied text >      OTHER:    Echocardiogram:  < from: TTE Echo Complete w/o Contrast w/ Doppler (07.02.23 @ 11:22) >   1. Limited study for follow post AVR   2. Normal global left ventricular systolic function.   3. There is no evidence of pericardial effusion.   4. Mechanical aortic valve in place, no paravalvular leak noted, wtih   max peak velocity    2m/s and mean tansvalve gradient 5-10 mmHg correlate with normal   function.    < end of copied text >    Catheterization:    Stress Test:  	  	    Home Medications:    MEDICATIONS  (STANDING):    MEDICATIONS  (PRN):         Date of Admission: 08-01-23    CHIEF COMPLAINT: Shortness of breath    CARDIAC HISTORY OF PRESENT ILLNESS:     43 YO F with PMHx of Afib/flutter s/p JASON clipping, HOCM, HTN, recent Mechanical AVR due to fibroelastoma with JASON clipping presented with shortness of breath and tachycardia.  She was following for HOCM in [place] and was recommended surgery. She refused surgery and started following with Dr. Metcalf in [place]. Was getting regular echocardiograms. She was later diagnosed with A.fib and started following with Dr. Tyler. 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 On 06/29/23, she underwent resection of aortic valve mass and left atrial appendage clipping by CT surgery. No intervention was performed for HOCM. Post-operatively, the hospitalization course was prolonged due to atrial fibrillation with RVR. She was then discharged on warfarin.  She was following with CT surgery as outpatient with last follow up being 1 week ago. She was told to stop her lasix.  Went for her appointment with Dr. Tyler today and was told to come to the ED. She was hypotensive at the outpatient office and had HR of 130s with Afib.    In the VS were sig for a HR of 137 bpm.  Labs were sig for a troponin of 0.03 and proBNP of 4k.     PAST MEDICAL & SURGICAL HISTORY:  Hypertrophic cardiomyopathy  Atrial fibrillation  Aortic valve disease  Obese  S/P transesophageal echocardiogram (MINDI)    HEALTH ISSUES - PROBLEM Dx:      SOCIAL HISTORY:    [ ] Non-smoker  [ ] Smoker  [ ] Alcohol    Allergies    No Known Allergies    Intolerances    	    REVIEW OF SYMPTOMS:  See HPI      VITALS:  T(C): 36.4 (08-01-23 @ 10:35), Max: 36.4 (08-01-23 @ 10:35)  HR: 137 (08-01-23 @ 10:35) (137 - 137)  BP: 130/73 (08-01-23 @ 10:35) (130/73 - 130/73)  RR: 24 (08-01-23 @ 10:35) (24 - 24)  SpO2: 97% (08-01-23 @ 10:35) (97% - 97%)  Wt(kg): --  I&O's Summary    Daily Height in cm: 307.34 (01 Aug 2023 10:35)    Daily     PHYSICAL EXAM:  CONSTITUTIONAL: AAOx3  HEAD: Atraumatic, normocephalic  EYES: EOM intact  ENT: JVD +ve  PULMONARY: Crackles b/l  CARDIOVASCULAR: Tachy  GASTROINTESTINAL: Soft  MUSCULOSKELETAL: 2+ LE edema    LABS:	 	                          11.4   9.95  )-----------( 368      ( 01 Aug 2023 11:00 )             36.5     08-01    139  |  101  |  18  ----------------------------<  112<H>  4.4   |  22  |  0.8    Ca    9.0      01 Aug 2023 11:00    TPro  6.7  /  Alb  4.0  /  TBili  1.2  /  DBili  x   /  AST  34  /  ALT  30  /  AlkPhos  114  08-01    CARDIAC MARKERS ( 01 Aug 2023 11:00 )  x     / 0.03 ng/mL / x     / x     / x          PT/INR - ( 01 Aug 2023 11:00 )   PT: >40.00 sec;   INR: 3.59 ratio         PTT - ( 01 Aug 2023 11:00 )  PTT:41.5 sec    CARDIAC MARKERS:  Troponin trend: 08-01-23    TELEMETRY EVENTS:      ECG:     RADIOLOGY:   < from: Xray Chest 1 View- PORTABLE-Urgent (08.01.23 @ 11:56) >  Impression:    Stable bibasilar opacities/effusions.    < end of copied text >      OTHER:    Echocardiogram:  < from: TTE Echo Complete w/o Contrast w/ Doppler (07.02.23 @ 11:22) >   1. Limited study for follow post AVR   2. Normal global left ventricular systolic function.   3. There is no evidence of pericardial effusion.   4. Mechanical aortic valve in place, no paravalvular leak noted, wtih   max peak velocity    2m/s and mean tansvalve gradient 5-10 mmHg correlate with normal   function.    < end of copied text >    Catheterization:    Stress Test:  	  	    Home Medications:    MEDICATIONS  (STANDING):    MEDICATIONS  (PRN):

## 2023-08-01 NOTE — ED ADULT NURSE REASSESSMENT NOTE - NS ED NURSE REASSESS COMMENT FT1
Patient assessed bedside.  A/O x 3.  No s/s of acute pain or distress at this time.  Cardiac monitoring maintained.  Pt admitted to  awaiting transport. Pt safety and comfort maintained.  Call bell within reach, mattress alarm in place.

## 2023-08-01 NOTE — H&P ADULT - NSHPPHYSICALEXAM_GEN_ALL_CORE
LOS:     VITALS:   T(C): 36.4 (08-01-23 @ 10:35), Max: 36.4 (08-01-23 @ 10:35)  HR: 137 (08-01-23 @ 10:35) (137 - 137)  BP: 130/73 (08-01-23 @ 10:35) (130/73 - 130/73)  RR: 24 (08-01-23 @ 10:35) (24 - 24)  SpO2: 97% (08-01-23 @ 10:35) (97% - 97%)    GENERAL: NAD, lying in bed comfortably  HEAD:  Atraumatic, Normocephalic  EYES: EOMI, PERRLA, conjunctiva and sclera clear  ENT: Moist mucous membranes  NECK: Supple, No JVD  CHEST/LUNG: coarse crackels bilaterally   HEART fast, irregularly irregular.   ABDOMEN: BSx4; Soft, nontender, nondistended  EXTREMITIES:  2+ Peripheral Pulses, brisk capillary refill. No clubbing, cyanosis, 2+ lower extremity edema  NERVOUS SYSTEM:  A&Ox3, no focal deficits   SKIN: CTS incision scar is present well healing.

## 2023-08-02 ENCOUNTER — APPOINTMENT (OUTPATIENT)
Dept: CARDIOTHORACIC SURGERY | Facility: CLINIC | Age: 43
End: 2023-08-02

## 2023-08-02 LAB
ALBUMIN SERPL ELPH-MCNC: 3.7 G/DL — SIGNIFICANT CHANGE UP (ref 3.5–5.2)
ALBUMIN SERPL ELPH-MCNC: 3.8 G/DL — SIGNIFICANT CHANGE UP (ref 3.5–5.2)
ALP SERPL-CCNC: 106 U/L — SIGNIFICANT CHANGE UP (ref 30–115)
ALP SERPL-CCNC: 110 U/L — SIGNIFICANT CHANGE UP (ref 30–115)
ALT FLD-CCNC: 26 U/L — SIGNIFICANT CHANGE UP (ref 0–41)
ALT FLD-CCNC: 27 U/L — SIGNIFICANT CHANGE UP (ref 0–41)
ANION GAP SERPL CALC-SCNC: 12 MMOL/L — SIGNIFICANT CHANGE UP (ref 7–14)
ANION GAP SERPL CALC-SCNC: 13 MMOL/L — SIGNIFICANT CHANGE UP (ref 7–14)
APTT BLD: 33.7 SEC — SIGNIFICANT CHANGE UP (ref 27–39.2)
AST SERPL-CCNC: 30 U/L — SIGNIFICANT CHANGE UP (ref 0–41)
AST SERPL-CCNC: 31 U/L — SIGNIFICANT CHANGE UP (ref 0–41)
BASOPHILS # BLD AUTO: 0.04 K/UL — SIGNIFICANT CHANGE UP (ref 0–0.2)
BASOPHILS NFR BLD AUTO: 0.4 % — SIGNIFICANT CHANGE UP (ref 0–1)
BILIRUB SERPL-MCNC: 0.9 MG/DL — SIGNIFICANT CHANGE UP (ref 0.2–1.2)
BILIRUB SERPL-MCNC: 1 MG/DL — SIGNIFICANT CHANGE UP (ref 0.2–1.2)
BUN SERPL-MCNC: 18 MG/DL — SIGNIFICANT CHANGE UP (ref 10–20)
BUN SERPL-MCNC: 20 MG/DL — SIGNIFICANT CHANGE UP (ref 10–20)
CALCIUM SERPL-MCNC: 9.2 MG/DL — SIGNIFICANT CHANGE UP (ref 8.4–10.5)
CALCIUM SERPL-MCNC: 9.2 MG/DL — SIGNIFICANT CHANGE UP (ref 8.4–10.5)
CHLORIDE SERPL-SCNC: 96 MMOL/L — LOW (ref 98–110)
CHLORIDE SERPL-SCNC: 99 MMOL/L — SIGNIFICANT CHANGE UP (ref 98–110)
CO2 SERPL-SCNC: 29 MMOL/L — SIGNIFICANT CHANGE UP (ref 17–32)
CO2 SERPL-SCNC: 31 MMOL/L — SIGNIFICANT CHANGE UP (ref 17–32)
CREAT SERPL-MCNC: 0.9 MG/DL — SIGNIFICANT CHANGE UP (ref 0.7–1.5)
CREAT SERPL-MCNC: 0.9 MG/DL — SIGNIFICANT CHANGE UP (ref 0.7–1.5)
EGFR: 81 ML/MIN/1.73M2 — SIGNIFICANT CHANGE UP
EGFR: 81 ML/MIN/1.73M2 — SIGNIFICANT CHANGE UP
EOSINOPHIL # BLD AUTO: 0.26 K/UL — SIGNIFICANT CHANGE UP (ref 0–0.7)
EOSINOPHIL NFR BLD AUTO: 2.8 % — SIGNIFICANT CHANGE UP (ref 0–8)
GLUCOSE SERPL-MCNC: 106 MG/DL — HIGH (ref 70–99)
GLUCOSE SERPL-MCNC: 85 MG/DL — SIGNIFICANT CHANGE UP (ref 70–99)
HCT VFR BLD CALC: 35 % — LOW (ref 37–47)
HGB BLD-MCNC: 10.9 G/DL — LOW (ref 12–16)
IMM GRANULOCYTES NFR BLD AUTO: 0.4 % — HIGH (ref 0.1–0.3)
INR BLD: 2.75 RATIO — HIGH (ref 0.65–1.3)
LYMPHOCYTES # BLD AUTO: 1.23 K/UL — SIGNIFICANT CHANGE UP (ref 1.2–3.4)
LYMPHOCYTES # BLD AUTO: 13.1 % — LOW (ref 20.5–51.1)
MAGNESIUM SERPL-MCNC: 1.9 MG/DL — SIGNIFICANT CHANGE UP (ref 1.8–2.4)
MAGNESIUM SERPL-MCNC: 2.3 MG/DL — SIGNIFICANT CHANGE UP (ref 1.8–2.4)
MCHC RBC-ENTMCNC: 24.9 PG — LOW (ref 27–31)
MCHC RBC-ENTMCNC: 31.1 G/DL — LOW (ref 32–37)
MCV RBC AUTO: 80.1 FL — LOW (ref 81–99)
MONOCYTES # BLD AUTO: 0.99 K/UL — HIGH (ref 0.1–0.6)
MONOCYTES NFR BLD AUTO: 10.6 % — HIGH (ref 1.7–9.3)
NEUTROPHILS # BLD AUTO: 6.81 K/UL — HIGH (ref 1.4–6.5)
NEUTROPHILS NFR BLD AUTO: 72.7 % — SIGNIFICANT CHANGE UP (ref 42.2–75.2)
NRBC # BLD: 0 /100 WBCS — SIGNIFICANT CHANGE UP (ref 0–0)
PHOSPHATE SERPL-MCNC: 5 MG/DL — HIGH (ref 2.1–4.9)
PLATELET # BLD AUTO: 313 K/UL — SIGNIFICANT CHANGE UP (ref 130–400)
PMV BLD: 9.6 FL — SIGNIFICANT CHANGE UP (ref 7.4–10.4)
POTASSIUM SERPL-MCNC: 4 MMOL/L — SIGNIFICANT CHANGE UP (ref 3.5–5)
POTASSIUM SERPL-MCNC: 4.2 MMOL/L — SIGNIFICANT CHANGE UP (ref 3.5–5)
POTASSIUM SERPL-SCNC: 4 MMOL/L — SIGNIFICANT CHANGE UP (ref 3.5–5)
POTASSIUM SERPL-SCNC: 4.2 MMOL/L — SIGNIFICANT CHANGE UP (ref 3.5–5)
PROT SERPL-MCNC: 6.4 G/DL — SIGNIFICANT CHANGE UP (ref 6–8)
PROT SERPL-MCNC: 6.7 G/DL — SIGNIFICANT CHANGE UP (ref 6–8)
PROTHROM AB SERPL-ACNC: 32.3 SEC — HIGH (ref 9.95–12.87)
RAPID RVP RESULT: SIGNIFICANT CHANGE UP
RBC # BLD: 4.37 M/UL — SIGNIFICANT CHANGE UP (ref 4.2–5.4)
RBC # FLD: 16.5 % — HIGH (ref 11.5–14.5)
SARS-COV-2 RNA SPEC QL NAA+PROBE: SIGNIFICANT CHANGE UP
SODIUM SERPL-SCNC: 140 MMOL/L — SIGNIFICANT CHANGE UP (ref 135–146)
SODIUM SERPL-SCNC: 140 MMOL/L — SIGNIFICANT CHANGE UP (ref 135–146)
TSH SERPL-MCNC: 5.58 UIU/ML — HIGH (ref 0.27–4.2)
WBC # BLD: 9.37 K/UL — SIGNIFICANT CHANGE UP (ref 4.8–10.8)
WBC # FLD AUTO: 9.37 K/UL — SIGNIFICANT CHANGE UP (ref 4.8–10.8)

## 2023-08-02 PROCEDURE — 92960 CARDIOVERSION ELECTRIC EXT: CPT | Mod: GC

## 2023-08-02 PROCEDURE — 93325 DOPPLER ECHO COLOR FLOW MAPG: CPT | Mod: 26

## 2023-08-02 PROCEDURE — 93010 ELECTROCARDIOGRAM REPORT: CPT

## 2023-08-02 PROCEDURE — 93306 TTE W/DOPPLER COMPLETE: CPT | Mod: 26

## 2023-08-02 PROCEDURE — 93320 DOPPLER ECHO COMPLETE: CPT | Mod: 26

## 2023-08-02 PROCEDURE — 93010 ELECTROCARDIOGRAM REPORT: CPT | Mod: 77

## 2023-08-02 PROCEDURE — 93312 ECHO TRANSESOPHAGEAL: CPT | Mod: 26,59,XU

## 2023-08-02 RX ORDER — FUROSEMIDE 40 MG
40 TABLET ORAL
Refills: 0 | Status: DISCONTINUED | OUTPATIENT
Start: 2023-08-03 | End: 2023-08-04

## 2023-08-02 RX ORDER — MAGNESIUM SULFATE 500 MG/ML
2 VIAL (ML) INJECTION ONCE
Refills: 0 | Status: COMPLETED | OUTPATIENT
Start: 2023-08-02 | End: 2023-08-02

## 2023-08-02 RX ORDER — CHLORHEXIDINE GLUCONATE 213 G/1000ML
1 SOLUTION TOPICAL
Refills: 0 | Status: DISCONTINUED | OUTPATIENT
Start: 2023-08-02 | End: 2023-08-04

## 2023-08-02 RX ORDER — WARFARIN SODIUM 2.5 MG/1
1 TABLET ORAL ONCE
Refills: 0 | Status: COMPLETED | OUTPATIENT
Start: 2023-08-02 | End: 2023-08-02

## 2023-08-02 RX ADMIN — AMIODARONE HYDROCHLORIDE 200 MILLIGRAM(S): 400 TABLET ORAL at 05:23

## 2023-08-02 RX ADMIN — Medication 60 MILLIGRAM(S): at 21:05

## 2023-08-02 RX ADMIN — Medication 60 MILLIGRAM(S): at 17:20

## 2023-08-02 RX ADMIN — Medication 60 MILLIGRAM(S): at 10:53

## 2023-08-02 RX ADMIN — Medication 80 MILLIGRAM(S): at 05:22

## 2023-08-02 RX ADMIN — Medication 5 MILLIGRAM(S): at 21:05

## 2023-08-02 RX ADMIN — PANTOPRAZOLE SODIUM 40 MILLIGRAM(S): 20 TABLET, DELAYED RELEASE ORAL at 05:24

## 2023-08-02 RX ADMIN — Medication 25 GRAM(S): at 10:53

## 2023-08-02 RX ADMIN — LIDOCAINE 1 PATCH: 4 CREAM TOPICAL at 05:20

## 2023-08-02 RX ADMIN — Medication 60 MILLIGRAM(S): at 05:23

## 2023-08-02 RX ADMIN — Medication 50 MILLIGRAM(S): at 05:23

## 2023-08-02 RX ADMIN — Medication 100 MILLIGRAM(S): at 12:37

## 2023-08-02 RX ADMIN — WARFARIN SODIUM 1 MILLIGRAM(S): 2.5 TABLET ORAL at 21:04

## 2023-08-02 RX ADMIN — CHLORHEXIDINE GLUCONATE 1 APPLICATION(S): 213 SOLUTION TOPICAL at 07:47

## 2023-08-02 RX ADMIN — Medication 50 MILLIGRAM(S): at 21:04

## 2023-08-02 RX ADMIN — Medication 100 MILLIGRAM(S): at 21:04

## 2023-08-02 NOTE — PROGRESS NOTE ADULT - SUBJECTIVE AND OBJECTIVE BOX
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  She was following for HOCM in [place] and was recommended surgery. She refused surgery and started following with Dr. Metcalf in [place]. Was getting regular echocardiograms. She was later diagnosed with A.fib and started following with Dr. Tyler. 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 On 23, she underwent resection of aortic valve mass and left atrial appendage clipping by CT surgery. No intervention was performed for HOCM. Post-operatively, the hospitalization course was prolonged due to atrial fibrillation with RVR. She was then discharged on warfarin.  She was following with CT surgery as outpatient with last follow up being 1 week ago. She was told to stop her lasix.  Went for her appointment with Dr. Tyler today and was told to come to the ED. She was hypotensive at the outpatient office and had HR of 130s with Afib.    In the VS were sig for a HR of 137 bpm.  ECG afib rvr   cxr Cardiac/mediastinum/hilum: Cardiomegaly. Status post open heart surgery. Valvular prosthesis    Lung parenchyma/Pleura: Bibasilar opacities/small pleural effusions, stable. Accessory azygous fissure and lobe.    Skeleton/soft tissues: Degenerative changes    Impression:    Stable bibasilar opacities/effusions. (01 Aug 2023 15:52)       INTERVAL HPI/OVERNIGHT EVENTS:   No overnight events   Afebrile, hemodynamically stable     Subjective:    ICU Vital Signs Last 24 Hrs  T(C): 35.8 (02 Aug 2023 04:00), Max: 36.4 (01 Aug 2023 10:35)  T(F): 96.4 (02 Aug 2023 04:00), Max: 97.6 (01 Aug 2023 10:35)  HR: 116 (02 Aug 2023 06:00) (86 - 137)  BP: 112/67 (02 Aug 2023 06:00) (100/58 - 132/58)  BP(mean): 82 (02 Aug 2023 06:00) (69 - 89)  ABP: --  ABP(mean): --  RR: 22 (02 Aug 2023 06:00) (18 - 24)  SpO2: 98% (02 Aug 2023 06:00) (94% - 100%)    O2 Parameters below as of 02 Aug 2023 07:00  Patient On (Oxygen Delivery Method): nasal cannula  O2 Flow (L/min): 2        I&O's Summary    01 Aug 2023 07:01  -  02 Aug 2023 06:38  --------------------------------------------------------  IN: 170 mL / OUT: 1000 mL / NET: -830 mL          Daily Height in cm: 154.94 (01 Aug 2023 20:25)    Daily Weight in k.2 (02 Aug 2023 04:00)    Adult Advanced Hemodynamics Last 24 Hrs  CVP(mm Hg): --  CVP(cm H2O): --  CO: --  CI: --  PA: --  PA(mean): --  PCWP: --  SVR: --  SVRI: --  PVR: --  PVRI: --    EKG/Telemetry Events:    MEDICATIONS  (STANDING):  aMIOdarone    Tablet 200 milliGRAM(s) Oral daily  chlorhexidine 2% Cloths 1 Application(s) Topical <User Schedule>  diltiazem    Tablet 60 milliGRAM(s) Oral every 6 hours  furosemide   Injectable 80 milliGRAM(s) IV Push daily  melatonin 5 milliGRAM(s) Oral at bedtime  metoprolol tartrate 50 milliGRAM(s) Oral every 8 hours  pantoprazole    Tablet 40 milliGRAM(s) Oral before breakfast    MEDICATIONS  (PRN):  benzonatate 100 milliGRAM(s) Oral every 8 hours PRN Cough      PHYSICAL EXAM:  GENERAL: NAD, lying in bed comfortably  HEAD:  Atraumatic, Normocephalic  EYES: EOMI, PERRLA, conjunctiva and sclera clear  ENT: Moist mucous membranes  NECK: Supple, No JVD  CHEST/LUNG: coarse crackels bilaterally   HEART fast, irregularly irregular.   ABDOMEN: BSx4; Soft, nontender, nondistended  EXTREMITIES:  2+ Peripheral Pulses, brisk capillary refill. No clubbing, cyanosis, 2+ lower extremity edema  NERVOUS SYSTEM:  A&Ox3, no focal deficits   SKIN: CTS incision scar is present well healing.    LABS:                        10.9   9.37  )-----------( 313      ( 02 Aug 2023 04:40 )             35.0     08-02    140  |  99  |  20  ----------------------------<  106<H>  4.2   |  29  |  0.9    Ca    9.2      02 Aug 2023 04:40  Phos  5.0     08-02  Mg     1.9     08-02    TPro  6.4  /  Alb  3.7  /  TBili  0.9  /  DBili  x   /  AST  31  /  ALT  27  /  AlkPhos  106  08-02    LIVER FUNCTIONS - ( 02 Aug 2023 04:40 )  Alb: 3.7 g/dL / Pro: 6.4 g/dL / ALK PHOS: 106 U/L / ALT: 27 U/L / AST: 31 U/L / GGT: x           PT/INR - ( 02 Aug 2023 04:40 )   PT: 32.30 sec;   INR: 2.75 ratio         PTT - ( 02 Aug 2023 04:40 )  PTT:33.7 sec  CAPILLARY BLOOD GLUCOSE      POCT Blood Glucose.: 134 mg/dL (01 Aug 2023 20:25)      Troponin T, Serum: 0.03 ng/mL ( @ 11:00)    CARDIAC MARKERS ( 01 Aug 2023 11:00 )  x     / 0.03 ng/mL / x     / x     / x          Urinalysis Basic - ( 02 Aug 2023 04:40 )    Color: x / Appearance: x / SG: x / pH: x  Gluc: 106 mg/dL / Ketone: x  / Bili: x / Urobili: x   Blood: x / Protein: x / Nitrite: x   Leuk Esterase: x / RBC: x / WBC x   Sq Epi: x / Non Sq Epi: x / Bacteria: x          RADIOLOGY & ADDITIONAL TESTS:  CXR:      Support devices: Left atrial appendage clip    Cardiac/mediastinum/hilum: Cardiomegaly. Status post open heart surgery.   Valvular prosthesis    Lung parenchyma/Pleura: Bibasilar opacities/small pleural effusions,   stable. Accessory azygous fissure and lobe.    Skeleton/soft tissues: Degenerative changes    Impression:    Stable bibasilar opacities/effusions.        ECHO :   Echo    1. Left ventricular ejection fraction, by visual estimation, is 50 to   55%.   2. Moderately increased LV wall thickness.   3. Accelerated LVOT flow with no definite evidence of DEN or LVOT   obstruction.   4. Mildly increased RV wall thickness.   5. Echo dense structure noted on the Aortic valve (including aortic and   ventricular aspect of the valve leaflets) measuring 1.3 x 1.5 cm.   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   6. Mild to moderate mitral annular calcification.   7. Mild thickening of the anterior and posterior mitral valve leaflets.   8. Mild mitral valve regurgitation.   9. Color flow doppler and intravenous injection of agitated saline   demonstrates the presence of an intact intra atrial septum.  10. No left atrial appendage thrombus.  11. Trivial pericardial effusion.    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  She was following for HOCM in [place] and was recommended surgery. She refused surgery and started following with Dr. Metcalf in [place]. Was getting regular echocardiograms. She was later diagnosed with A.fib and started following with Dr. Tyler. 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 On 23, she underwent resection of aortic valve mass and left atrial appendage clipping by CT surgery. No intervention was performed for HOCM. Post-operatively, the hospitalization course was prolonged due to atrial fibrillation with RVR. She was then discharged on warfarin.  She was following with CT surgery as outpatient with last follow up being 1 week ago. She was told to stop her lasix.  Went for her appointment with Dr. Tyler today and was told to come to the ED. She was hypotensive at the outpatient office and had HR of 130s with Afib.    In the VS were sig for a HR of 137 bpm.  ECG afib rvr   cxr Cardiac/mediastinum/hilum: Cardiomegaly. Status post open heart surgery. Valvular prosthesis    Lung parenchyma/Pleura: Bibasilar opacities/small pleural effusions, stable. Accessory azygous fissure and lobe.    Skeleton/soft tissues: Degenerative changes    Impression:    Stable bibasilar opacities/effusions. (01 Aug 2023 15:52)       INTERVAL HPI/OVERNIGHT EVENTS:   No overnight events   Afebrile, hemodynamically stable     Subjective:    ICU Vital Signs Last 24 Hrs  T(C): 35.8 (02 Aug 2023 04:00), Max: 36.4 (01 Aug 2023 10:35)  T(F): 96.4 (02 Aug 2023 04:00), Max: 97.6 (01 Aug 2023 10:35)  HR: 116 (02 Aug 2023 06:00) (86 - 137)  BP: 112/67 (02 Aug 2023 06:00) (100/58 - 132/58)  BP(mean): 82 (02 Aug 2023 06:00) (69 - 89)  ABP: --  ABP(mean): --  RR: 22 (02 Aug 2023 06:00) (18 - 24)  SpO2: 98% (02 Aug 2023 06:00) (94% - 100%)    O2 Parameters below as of 02 Aug 2023 07:00  Patient On (Oxygen Delivery Method): nasal cannula  O2 Flow (L/min): 2        I&O's Summary    01 Aug 2023 07:01  -  02 Aug 2023 06:38  --------------------------------------------------------  IN: 170 mL / OUT: 1000 mL / NET: -830 mL          Daily Height in cm: 154.94 (01 Aug 2023 20:25)    Daily Weight in k.2 (02 Aug 2023 04:00)          MEDICATIONS  (STANDING):  aMIOdarone    Tablet 200 milliGRAM(s) Oral daily  chlorhexidine 2% Cloths 1 Application(s) Topical <User Schedule>  diltiazem    Tablet 60 milliGRAM(s) Oral every 6 hours  furosemide   Injectable 80 milliGRAM(s) IV Push daily  melatonin 5 milliGRAM(s) Oral at bedtime  metoprolol tartrate 50 milliGRAM(s) Oral every 8 hours  pantoprazole    Tablet 40 milliGRAM(s) Oral before breakfast    MEDICATIONS  (PRN):  benzonatate 100 milliGRAM(s) Oral every 8 hours PRN Cough      PHYSICAL EXAM:  GENERAL: NAD, lying in bed comfortably  HEAD:  Atraumatic, Normocephalic  EYES: EOMI, PERRLA, conjunctiva and sclera clear  ENT: Moist mucous membranes  NECK: Supple, No JVD  CHEST/LUNG: decreased breath sounds bilaterally but no added sounds  , had coarse crackles b/ yesterday   HEART fast, irregularly irregular with AVR click appreciated    ABDOMEN: BSx4; Soft, nontender, nondistended  EXTREMITIES:  2+ Peripheral Pulses, brisk capillary refill. No clubbing, cyanosis, 2+ lower extremity edema  NERVOUS SYSTEM:  A&Ox3, no focal deficits   SKIN: CTS incision scar is present well healing.    LABS:                        10.9   9.37  )-----------( 313      ( 02 Aug 2023 04:40 )             35.0     08-02    140  |  99  |  20  ----------------------------<  106<H>  4.2   |  29  |  0.9    Ca    9.2      02 Aug 2023 04:40  Phos  5.0     08-02  Mg     1.9     08-02    TPro  6.4  /  Alb  3.7  /  TBili  0.9  /  DBili  x   /  AST  31  /  ALT  27  /  AlkPhos  106  08-02    LIVER FUNCTIONS - ( 02 Aug 2023 04:40 )  Alb: 3.7 g/dL / Pro: 6.4 g/dL / ALK PHOS: 106 U/L / ALT: 27 U/L / AST: 31 U/L / GGT: x           PT/INR - ( 02 Aug 2023 04:40 )   PT: 32.30 sec;   INR: 2.75 ratio         PTT - ( 02 Aug 2023 04:40 )  PTT:33.7 sec  CAPILLARY BLOOD GLUCOSE      POCT Blood Glucose.: 134 mg/dL (01 Aug 2023 20:25)      Troponin T, Serum: 0.03 ng/mL ( @ 11:00)    CARDIAC MARKERS ( 01 Aug 2023 11:00 )  x     / 0.03 ng/mL / x     / x     / x          Urinalysis Basic - ( 02 Aug 2023 04:40 )    Color: x / Appearance: x / SG: x / pH: x  Gluc: 106 mg/dL / Ketone: x  / Bili: x / Urobili: x   Blood: x / Protein: x / Nitrite: x   Leuk Esterase: x / RBC: x / WBC x   Sq Epi: x / Non Sq Epi: x / Bacteria: x          RADIOLOGY & ADDITIONAL TESTS:  CXR:      Support devices: Left atrial appendage clip    Cardiac/mediastinum/hilum: Cardiomegaly. Status post open heart surgery.   Valvular prosthesis    Lung parenchyma/Pleura: Bibasilar opacities/small pleural effusions,   stable. Accessory azygous fissure and lobe.    Skeleton/soft tissues: Degenerative changes    Impression:    Stable bibasilar opacities/effusions.        ECHO :   Echo    1. Left ventricular ejection fraction, by visual estimation, is 50 to   55%.   2. Moderately increased LV wall thickness.   3. Accelerated LVOT flow with no definite evidence of DEN or LVOT   obstruction.   4. Mildly increased RV wall thickness.   5. Echo dense structure noted on the Aortic valve (including aortic and   ventricular aspect of the valve leaflets) measuring 1.3 x 1.5 cm.   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   6. Mild to moderate mitral annular calcification.   7. Mild thickening of the anterior and posterior mitral valve leaflets.   8. Mild mitral valve regurgitation.   9. Color flow doppler and intravenous injection of agitated saline   demonstrates the presence of an intact intra atrial septum.  10. No left atrial appendage thrombus.  11. Trivial pericardial effusion.    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  She was following for HOCM in [place] and was recommended surgery. She refused surgery and started following with Dr. Metcalf in [place]. Was getting regular echocardiograms. She was later diagnosed with A.fib and started following with Dr. Tyler. 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 On 23, she underwent resection of aortic valve mass and left atrial appendage clipping by CT surgery. No intervention was performed for HOCM. Post-operatively, the hospitalization course was prolonged due to atrial fibrillation with RVR. She was then discharged on warfarin.  She was following with CT surgery as outpatient with last follow up being 1 week ago. She was told to stop her lasix.  Went for her appointment with Dr. Tyler today and was told to come to the ED. She was hypotensive at the outpatient office and had HR of 130s with Afib.    In the VS were sig for a HR of 137 bpm.  ECG afib rvr   cxr Cardiac/mediastinum/hilum: Cardiomegaly. Status post open heart surgery. Valvular prosthesis    Lung parenchyma/Pleura: Bibasilar opacities/small pleural effusions, stable. Accessory azygous fissure and lobe.    Skeleton/soft tissues: Degenerative changes    Impression:    Stable bibasilar opacities/effusions. (01 Aug 2023 15:52)       INTERVAL HPI/OVERNIGHT EVENTS:   Patient with RVR overnight. Received additional 40mg IV lasix.    Subjective:    ICU Vital Signs Last 24 Hrs  T(C): 35.8 (02 Aug 2023 04:00), Max: 36.4 (01 Aug 2023 10:35)  T(F): 96.4 (02 Aug 2023 04:00), Max: 97.6 (01 Aug 2023 10:35)  HR: 116 (02 Aug 2023 06:00) (86 - 137)  BP: 112/67 (02 Aug 2023 06:00) (100/58 - 132/58)  BP(mean): 82 (02 Aug 2023 06:00) (69 - 89)  ABP: --  ABP(mean): --  RR: 22 (02 Aug 2023 06:00) (18 - 24)  SpO2: 98% (02 Aug 2023 06:00) (94% - 100%)    O2 Parameters below as of 02 Aug 2023 07:00  Patient On (Oxygen Delivery Method): nasal cannula  O2 Flow (L/min): 2        I&O's Summary    01 Aug 2023 07:01  -  02 Aug 2023 06:38  --------------------------------------------------------  IN: 170 mL / OUT: 1000 mL / NET: -830 mL          Daily Height in cm: 154.94 (01 Aug 2023 20:25)    Daily Weight in k.2 (02 Aug 2023 04:00)          MEDICATIONS  (STANDING):  aMIOdarone    Tablet 200 milliGRAM(s) Oral daily  chlorhexidine 2% Cloths 1 Application(s) Topical <User Schedule>  diltiazem    Tablet 60 milliGRAM(s) Oral every 6 hours  furosemide   Injectable 80 milliGRAM(s) IV Push daily  melatonin 5 milliGRAM(s) Oral at bedtime  metoprolol tartrate 50 milliGRAM(s) Oral every 8 hours  pantoprazole    Tablet 40 milliGRAM(s) Oral before breakfast    MEDICATIONS  (PRN):  benzonatate 100 milliGRAM(s) Oral every 8 hours PRN Cough      PHYSICAL EXAM:  GENERAL: NAD, lying in bed comfortably  HEAD:  Atraumatic, Normocephalic  EYES: EOMI, PERRLA, conjunctiva and sclera clear  ENT: Moist mucous membranes  NECK: Supple, No JVD  CHEST/LUNG: decreased breath sounds bilaterally but no added sounds  , had coarse crackles b/ yesterday   HEART fast, irregularly irregular with AVR click appreciated    ABDOMEN: BSx4; Soft, nontender, nondistended  EXTREMITIES:  2+ Peripheral Pulses, brisk capillary refill. No clubbing, cyanosis, 2+ lower extremity edema  NERVOUS SYSTEM:  A&Ox3, no focal deficits   SKIN: CTS incision scar is present well healing.    LABS:                        10.9   9.37  )-----------( 313      ( 02 Aug 2023 04:40 )             35.0     08-02    140  |  99  |  20  ----------------------------<  106<H>  4.2   |  29  |  0.9    Ca    9.2      02 Aug 2023 04:40  Phos  5.0     08-02  Mg     1.9     08-02    TPro  6.4  /  Alb  3.7  /  TBili  0.9  /  DBili  x   /  AST  31  /  ALT  27  /  AlkPhos  106  08-02    LIVER FUNCTIONS - ( 02 Aug 2023 04:40 )  Alb: 3.7 g/dL / Pro: 6.4 g/dL / ALK PHOS: 106 U/L / ALT: 27 U/L / AST: 31 U/L / GGT: x           PT/INR - ( 02 Aug 2023 04:40 )   PT: 32.30 sec;   INR: 2.75 ratio         PTT - ( 02 Aug 2023 04:40 )  PTT:33.7 sec  CAPILLARY BLOOD GLUCOSE      POCT Blood Glucose.: 134 mg/dL (01 Aug 2023 20:25)      Troponin T, Serum: 0.03 ng/mL ( @ 11:00)    CARDIAC MARKERS ( 01 Aug 2023 11:00 )  x     / 0.03 ng/mL / x     / x     / x          Urinalysis Basic - ( 02 Aug 2023 04:40 )    Color: x / Appearance: x / SG: x / pH: x  Gluc: 106 mg/dL / Ketone: x  / Bili: x / Urobili: x   Blood: x / Protein: x / Nitrite: x   Leuk Esterase: x / RBC: x / WBC x   Sq Epi: x / Non Sq Epi: x / Bacteria: x          RADIOLOGY & ADDITIONAL TESTS:  CXR:      Support devices: Left atrial appendage clip    Cardiac/mediastinum/hilum: Cardiomegaly. Status post open heart surgery.   Valvular prosthesis    Lung parenchyma/Pleura: Bibasilar opacities/small pleural effusions,   stable. Accessory azygous fissure and lobe.    Skeleton/soft tissues: Degenerative changes    Impression:    Stable bibasilar opacities/effusions.        ECHO :   Echo    1. Left ventricular ejection fraction, by visual estimation, is 50 to   55%.   2. Moderately increased LV wall thickness.   3. Accelerated LVOT flow with no definite evidence of DEN or LVOT   obstruction.   4. Mildly increased RV wall thickness.   5. Echo dense structure noted on the Aortic valve (including aortic and   ventricular aspect of the valve leaflets) measuring 1.3 x 1.5 cm.   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   6. Mild to moderate mitral annular calcification.   7. Mild thickening of the anterior and posterior mitral valve leaflets.   8. Mild mitral valve regurgitation.   9. Color flow doppler and intravenous injection of agitated saline   demonstrates the presence of an intact intra atrial septum.  10. No left atrial appendage thrombus.  11. Trivial pericardial effusion.    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  She was following for HOCM in [place] and was recommended surgery. She refused surgery and started following with Dr. Metcalf in [place]. Was getting regular echocardiograms. She was later diagnosed with A.fib and started following with Dr. Tyler. 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 On 23, she underwent resection of aortic valve mass and left atrial appendage clipping by CT surgery. No intervention was performed for HOCM. Post-operatively, the hospitalization course was prolonged due to atrial fibrillation with RVR. She was then discharged on warfarin.  She was following with CT surgery as outpatient with last follow up being 1 week ago. She was told to stop her lasix.  Went for her appointment with Dr. Tyler today and was told to come to the ED. She was hypotensive at the outpatient office and had HR of 130s with Afib.    In the VS were sig for a HR of 137 bpm.  ECG afib rvr   cxr Cardiac/mediastinum/hilum: Cardiomegaly. Status post open heart surgery. Valvular prosthesis    Lung parenchyma/Pleura: Bibasilar opacities/small pleural effusions, stable. Accessory azygous fissure and lobe.    Skeleton/soft tissues: Degenerative changes    Impression:    Stable bibasilar opacities/effusions. (01 Aug 2023 15:52)       INTERVAL HPI/OVERNIGHT EVENTS:   Patient with RVR overnight. Received additional 40mg IV lasix.    Subjective:    ICU Vital Signs Last 24 Hrs  T(C): 35.8 (02 Aug 2023 04:00), Max: 36.4 (01 Aug 2023 10:35)  T(F): 96.4 (02 Aug 2023 04:00), Max: 97.6 (01 Aug 2023 10:35)  HR: 116 (02 Aug 2023 06:00) (86 - 137)  BP: 112/67 (02 Aug 2023 06:00) (100/58 - 132/58)  BP(mean): 82 (02 Aug 2023 06:00) (69 - 89)  ABP: --  ABP(mean): --  RR: 22 (02 Aug 2023 06:00) (18 - 24)  SpO2: 98% (02 Aug 2023 06:00) (94% - 100%)    O2 Parameters below as of 02 Aug 2023 07:00  Patient On (Oxygen Delivery Method): nasal cannula  O2 Flow (L/min): 2        I&O's Summary    01 Aug 2023 07:01  -  02 Aug 2023 06:38  --------------------------------------------------------  IN: 170 mL / OUT: 1000 mL / NET: -830 mL          Daily Height in cm: 154.94 (01 Aug 2023 20:25)    Daily Weight in k.2 (02 Aug 2023 04:00)          MEDICATIONS  (STANDING):  aMIOdarone    Tablet 200 milliGRAM(s) Oral daily  chlorhexidine 2% Cloths 1 Application(s) Topical <User Schedule>  diltiazem    Tablet 60 milliGRAM(s) Oral every 6 hours  furosemide   Injectable 80 milliGRAM(s) IV Push daily  melatonin 5 milliGRAM(s) Oral at bedtime  metoprolol tartrate 50 milliGRAM(s) Oral every 8 hours  pantoprazole    Tablet 40 milliGRAM(s) Oral before breakfast    MEDICATIONS  (PRN):  benzonatate 100 milliGRAM(s) Oral every 8 hours PRN Cough      PHYSICAL EXAM:  GENERAL: NAD, lying in bed comfortably  HEAD:  Atraumatic, Normocephalic  EYES: EOMI, PERRLA, conjunctiva and sclera clear  ENT: Moist mucous membranes  NECK: Supple, No JVD  CHEST/LUNG: decreased breath sounds bilaterally but no added sounds  , had coarse crackles b/ yesterday   HEART fast, irregularly irregular with AVR click appreciated    ABDOMEN: BSx4; Soft, nontender, nondistended  EXTREMITIES:  2+ Peripheral Pulses, brisk capillary refill. No clubbing, cyanosis, 2+ lower extremity edema  NERVOUS SYSTEM:  A&Ox3, no focal deficits   SKIN: CTS incision scar is present well healing.    LABS:                        10.9   9.37  )-----------( 313      ( 02 Aug 2023 04:40 )             35.0     08-02    140  |  99  |  20  ----------------------------<  106<H>  4.2   |  29  |  0.9    Ca    9.2      02 Aug 2023 04:40  Phos  5.0     08-02  Mg     1.9     08-02    TPro  6.4  /  Alb  3.7  /  TBili  0.9  /  DBili  x   /  AST  31  /  ALT  27  /  AlkPhos  106  08-02    LIVER FUNCTIONS - ( 02 Aug 2023 04:40 )  Alb: 3.7 g/dL / Pro: 6.4 g/dL / ALK PHOS: 106 U/L / ALT: 27 U/L / AST: 31 U/L / GGT: x           PT/INR - ( 02 Aug 2023 04:40 )   PT: 32.30 sec;   INR: 2.75 ratio         PTT - ( 02 Aug 2023 04:40 )  PTT:33.7 sec  CAPILLARY BLOOD GLUCOSE      POCT Blood Glucose.: 134 mg/dL (01 Aug 2023 20:25)      Troponin T, Serum: 0.03 ng/mL ( @ 11:00)    CARDIAC MARKERS ( 01 Aug 2023 11:00 )  x     / 0.03 ng/mL / x     / x     / x          Urinalysis Basic - ( 02 Aug 2023 04:40 )    Color: x / Appearance: x / SG: x / pH: x  Gluc: 106 mg/dL / Ketone: x  / Bili: x / Urobili: x   Blood: x / Protein: x / Nitrite: x   Leuk Esterase: x / RBC: x / WBC x   Sq Epi: x / Non Sq Epi: x / Bacteria: x          RADIOLOGY & ADDITIONAL TESTS:  CXR:      Support devices: Left atrial appendage clip    Cardiac/mediastinum/hilum: Cardiomegaly. Status post open heart surgery.   Valvular prosthesis    Lung parenchyma/Pleura: Bibasilar opacities/small pleural effusions,   stable. Accessory azygous fissure and lobe.    Skeleton/soft tissues: Degenerative changes    Impression:    Stable bibasilar opacities/effusions.        ECHO :   Echo    1. Left ventricular ejection fraction, by visual estimation, is 50 to   55%.   2. Moderately increased LV wall thickness.   3. Accelerated LVOT flow with no definite evidence of DEN or LVOT   obstruction.   4. Mildly increased RV wall thickness.   5. Echo dense structure noted on the Aortic valve (including aortic and   ventricular aspect of the valve leaflets) measuring 1.3 x 1.5 cm.   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   6. Mild to moderate mitral annular calcification.   7. Mild thickening of the anterior and posterior mitral valve leaflets.   8. Mild mitral valve regurgitation.   9. Color flow doppler and intravenous injection of agitated saline   demonstrates the presence of an intact intra atrial septum.  10. No left atrial appendage thrombus.  11. Trivial pericardial effusion.    Care Discussed with Consultants/Other Providers [ x] YES  [ ] NO

## 2023-08-02 NOTE — PROGRESS NOTE ADULT - ASSESSMENT
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 Pain meds as needed.     HEENT: Oral care    PULMONARY:  HOB @ 45 degrees. CXR seen ;Stable bibasilar opacities/effusions.     CARDIOVASCULAR: Afib wit RVR , on Diltiazem , AMIODARONE , Metoprolol. 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   c/w Lasix for volume overload     GI: Prophylaxis by PPI     RENAL:  Follow up lytes; keep K = 4 , Mg = 2     INFECTIOUS DISEASE:   HEMATOLOGICAL:  DVT prophylaxis,     ENDOCRINE:  Follow up FS.  Insulin protocol if needed. ( Target BS = 140-180)     MUSCULOSKELETAL: OOB to chair.   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 Pain meds as needed.     HEENT: Oral care    PULMONARY:  HOB @ 45 degrees. CXR seen ;Stable bibasilar opacities/effusions.     CARDIOVASCULAR: Afib wit RVR , on Diltiazem , AMIODARONE , Metoprolol. 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 ( c/w warfarin acc to INR )   c/w Lasix for volume overload     GI: Prophylaxis by PPI     RENAL:  Follow up lytes; keep K = 4 , Mg = 2     INFECTIOUS DISEASE:   HEMATOLOGICAL:  DVT prophylaxis, on WARFARIN     ENDOCRINE:  Follow up FS.  Insulin protocol if needed. ( Target BS = 140-180)     MUSCULOSKELETAL: OOB to chair.   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    HEENT: Oral care. Aspiration precautions     PULMONARY: HOB @ 45. Monitor Pulse Ox. Keep > 93%. Supplement as needed. , no crackles on exam today     CARDIOVASCULAR:     - Lasix 80mg IV. Daily Weight. Strict I&Os. Keep I < O  - CT surgery consult  - 2D echo  - Continue home meds (Amiodarone 200mg QD and cardizem 240mg PO QD and metoprolol 50mg PO BID), increase metoprolol for better HR control. HR = low 100's today   - EP follow up for possible cardioversion   - Monitor electrolytes. Keep Mg > 2.2 and K > 4    GI: GI prophylaxis. DASH/TLC diet    RENAL: Avoid nephrotoxic agents. Monitor Cr.    INFECTIOUS DISEASE: Trend fever curve.     HEMATOLOGICAL: Continue Warfarin for mechanical AVR    ENDOCRINE: Monitor FS. HbA1C 6.0 % [06-14-23]     MUSCULOSKELETAL: Ambulate as tolerated     CODE STATUS: Full Code     IMPRESSION:  - Fibroelastoma s/p mechanical AVR on 6/29/2023  - Persistent Atrial fibrillation with RVR s/p JASON clipping  - HOCM with DEN?  - HTN      PLAN:    CNS: Avoid CNS Depressants.    HEENT: Oral care. Aspiration precautions     PULMONARY: HOB @ 45. Monitor Pulse Ox. Keep > 93%. Supplement as needed. Basal crackles appreciated    CARDIOVASCULAR:   - Lasix 80mg IV daily. Daily Weight. Strict I&Os. Keep I < O. Received additional 40 mg IV overnight on 08/02  - CT surgery consult note pending  - 2D echo pending  - Continue home meds (Amiodarone 200mg QD and cardizem 240mg PO QD and metoprolol 50mg PO BID). Metoprolol increased to 50 mg PO TID. Cardizem changed to 60mg PO Q6  - EP follow up for possible cardioversion. Patient is NPO today   - Monitor electrolytes. Keep Mg > 2.2 and K > 4    GI: GI prophylaxis. DASH/TLC diet. NPO today    RENAL: Avoid nephrotoxic agents. Monitor Cr.    INFECTIOUS DISEASE: Trend fever curve.     HEMATOLOGICAL: Continue Warfarin for mechanical AVR. Check PT/INR daily    ENDOCRINE: Monitor FS. HbA1C 6.0 % [06-14-23]     MUSCULOSKELETAL: Ambulate as tolerated     CODE STATUS: Full Code IMPRESSION:  - Acute decompensated heart failure; BNP 3939  - Fibroelastoma s/p mechanical AVR on 6/29/2023  - Persistent Atrial fibrillation with RVR s/p JASON clipping  - HOCM with DEN?  - HTN      PLAN:    CNS: Avoid CNS Depressants.    HEENT: Oral care. Aspiration precautions     PULMONARY: HOB @ 45. Monitor Pulse Ox. Keep > 93%. Supplement as needed. Basal crackles appreciated    CARDIOVASCULAR:   - Lasix 80mg IV daily. Daily Weight. Strict I&Os. Keep I < O. Received additional 40 mg IV overnight on 08/02  - CT surgery consult note pending  - 2D echo pending  - Continue home meds (Amiodarone 200mg QD and cardizem 240mg PO QD and metoprolol 50mg PO BID). Metoprolol increased to 50 mg PO TID. Cardizem changed to 60mg PO Q6  - EP follow up for possible cardioversion. Patient is NPO today   - Monitor electrolytes. Keep Mg > 2.2 and K > 4    GI: GI prophylaxis. DASH/TLC diet. NPO today    RENAL: Avoid nephrotoxic agents. Monitor Cr.    INFECTIOUS DISEASE: Trend fever curve.     HEMATOLOGICAL: Continue Warfarin for mechanical AVR. Check PT/INR daily    ENDOCRINE: Monitor FS. HbA1C 6.0 % [06-14-23]     MUSCULOSKELETAL: Ambulate as tolerated     CODE STATUS: Full Code IMPRESSION:  - Acute decompensated heart failure; BNP 3939  - Fibroelastoma s/p mechanical AVR on 6/29/2023  - Persistent Atrial fibrillation with RVR s/p JASON clipping  - HOCM with DEN?  - HTN      PLAN:    CNS: Avoid CNS Depressants.    HEENT: Oral care. Aspiration precautions     PULMONARY: HOB @ 45. Monitor Pulse Ox. Keep > 93%. Supplement as needed. Basal crackles appreciated    CARDIOVASCULAR:   - Lasix 80mg IV daily. Daily Weight. Strict I&Os. Keep I < O. Received additional 40 mg IV overnight on 08/02  - CT surgery consult   - 2D echo pending  - Continue home meds (Amiodarone 200mg QD and cardizem 240mg PO QD and metoprolol 50mg PO BID). Metoprolol increased to 50 mg PO TID. Cardizem changed to 60mg PO Q6  - EP follow up for possible cardioversion. Patient is NPO today   - Monitor electrolytes. Keep Mg > 2.2 and K > 4    GI: GI prophylaxis. DASH/TLC diet. NPO today    RENAL: Avoid nephrotoxic agents. Monitor Cr.    INFECTIOUS DISEASE: Trend fever curve.     HEMATOLOGICAL: Continue Warfarin for mechanical AVR. Check PT/INR daily    ENDOCRINE: Monitor FS. HbA1C 6.0 % [06-14-23]     MUSCULOSKELETAL: Ambulate as tolerated     CODE STATUS: Full Code IMPRESSION:  - Acute decompensated heart failure; BNP 3939  - Fibroelastoma s/p mechanical AVR on 6/29/2023  - Persistent Atrial fibrillation with RVR s/p JASON clipping  - HOCM with DEN?  - HTN      PLAN:    CNS: Avoid CNS Depressants.    HEENT: Oral care. Aspiration precautions     PULMONARY: HOB @ 45. Monitor Pulse Ox. Keep > 93%. Supplement as needed. Basal crackles appreciated    CARDIOVASCULAR:   - Lasix 80mg IV daily. Daily Weight. Strict I&Os. Keep I < O. Received additional 40 mg IV overnight on 08/02  - CT surgery consult   - 2D echo pending  - Continue home meds (Amiodarone 200mg QD and cardizem 240mg PO QD and metoprolol 50mg PO BID). Metoprolol increased to 50 mg PO TID. Cardizem changed to 60mg PO Q6  - EP follow up , cardioversion done today and patient is in sinus rhythm now   - Monitor electrolytes. Keep Mg > 2.2 and K > 4    GI: GI prophylaxis. DASH/TLC diet. NPO today    RENAL: Avoid nephrotoxic agents. Monitor Cr.    INFECTIOUS DISEASE: Trend fever curve.     HEMATOLOGICAL: Continue Warfarin for mechanical AVR. Check PT/INR daily    ENDOCRINE: Monitor FS. HbA1C 6.0 % [06-14-23]     MUSCULOSKELETAL: Ambulate as tolerated     CODE STATUS: Full Code

## 2023-08-02 NOTE — PROGRESS NOTE ADULT - SUBJECTIVE AND OBJECTIVE BOX
No indication for CT Surgery intervention at this time.  Case discussed with Dr. Perez and Dr. Cobb.  Plan of care as per Cardiology team.  Re-call CTS PRN

## 2023-08-02 NOTE — PROGRESS NOTE ADULT - SUBJECTIVE AND OBJECTIVE BOX
Patient is a 42y old  Female who presents with a chief complaint of AFIB rvr (02 Aug 2023 10:52)        HPI:  s/p MINDI/DCCV. Feels better.        PAST MEDICAL & SURGICAL HISTORY:  Hypertrophic cardiomyopathy      Atrial fibrillation      Aortic valve disease      Obese      S/P transesophageal echocardiogram (MINDI)                      PREVIOUS DIAGNOSTIC TESTING:      ECHO  FINDINGS:    STRESS  FINDINGS:    CATHETERIZATION  FINDINGS:    ELECTROPHYSIOLOGY STUDY  FINDINGS:    CAROTID ULTRASOUND:  FINDINGS    VENOUS DUPLEX SCAN:  FINDINGS:    CHEST CT PULMONARY ANGIO with IV Contrast:  FINDINGS:    MEDICATIONS  (STANDING):  aMIOdarone    Tablet 200 milliGRAM(s) Oral daily  chlorhexidine 2% Cloths 1 Application(s) Topical <User Schedule>  diltiazem    Tablet 60 milliGRAM(s) Oral every 6 hours  furosemide   Injectable 40 milliGRAM(s) IV Push two times a day  melatonin 5 milliGRAM(s) Oral at bedtime  metoprolol tartrate 50 milliGRAM(s) Oral every 8 hours  pantoprazole    Tablet 40 milliGRAM(s) Oral before breakfast    MEDICATIONS  (PRN):  benzonatate 100 milliGRAM(s) Oral every 8 hours PRN Cough      FAMILY HISTORY:  Known health problems: none (Father, Mother)        SOCIAL HISTORY:    CIGARETTES:    ALCOHOL:    Past Surgical History:    Allergies:    No Known Allergies      REVIEW OF SYSTEMS:    CONSTITUTIONAL: No fever, weight loss, chills, shakes, or fatigue  EYES: No eye pain, visual disturbances, or discharge  ENMT:  No difficulty hearing, tinnitus, vertigo; No sinus or throat pain  NECK: No pain or stiffness  BREASTS: No pain, masses, or nipple discharge  RESPIRATORY: No cough, wheezing, hemoptysis, or shortness of breath  CARDIOVASCULAR: No chest pain, dyspnea, palpitations, dizziness, syncope, paroxysmal nocturnal dyspnea, orthopnea, or arm or leg swelling  GASTROINTESTINAL: No abdominal  or epigastric pain, nausea, vomiting, hematemesis, diarrhea, constipation, melena or bright red blood.  GENITOURINARY: No dysuria, nocturia, hematuria, or urinary incontinence  NEUROLOGICAL: No headaches, memory loss, slurred speech, limb weakness, loss of strength, numbness, or tremors  SKIN: No itching, burning, rashes, or lesions   LYMPH NODES: No enlarged glands  ENDOCRINE: No heat or cold intolerance, or hair loss  MUSCULOSKELETAL: No joint pain or swelling, muscle, back, or extremity pain  PSYCHIATRIC: No depression, anxiety, or difficulty sleeping  HEME/LYMPH: No easy bruising or bleeding gums  ALLERY AND IMMUNOLOGIC: No hives or rash.      Vital Signs Last 24 Hrs  T(C): 36.3 (03 Aug 2023 00:00), Max: 36.4 (02 Aug 2023 20:00)  T(F): 97.4 (03 Aug 2023 00:00), Max: 97.5 (02 Aug 2023 20:00)  HR: 66 (03 Aug 2023 00:00) (66 - 116)  BP: 95/66 (03 Aug 2023 00:00) (85/50 - 114/57)  BP(mean): 75 (03 Aug 2023 00:00) (61 - 86)  RR: 20 (03 Aug 2023 00:00) (20 - 28)  SpO2: 96% (03 Aug 2023 00:00) (92% - 100%)    Parameters below as of 03 Aug 2023 00:00  Patient On (Oxygen Delivery Method): room air        PHYSICAL EXAM:        GENERAL: In no apparent distress, well nourished, and hydrated.  HEAD:  Atraumatic, Normocephalic  EYES: EOMI, PERRLA, conjunctiva and sclera clear  ENMT: No tonsillar erythema, exudates, or enlargements; ist mucous membranes, Good dentition, No lesions  NECK: Supple and normal thyroid.  No JVD or carotid bruit.  Carotid pulse is 2+ bilaterally.  HEART: Regular rate and rhythm; No murmurs, rubs, or gallops.  PULMONARY: Clear to auscultation and perfusion.  No rales, wheezing, or rhonchi bilaterally.  ABDOMEN: Soft, Nontender, Nondistended; Bowel sounds present  EXTREMITIES:  2+ Peripheral Pulses, No clubbing, cyanosis. 1+ BL pitting edema  NEUROLOGICAL: Grossly nonfocal      INTERPRETATION OF TELEMETRY:    ECG:    I&O's Detail    01 Aug 2023 07:01  -  02 Aug 2023 07:00  --------------------------------------------------------  IN:    Oral Fluid: 170 mL  Total IN: 170 mL    OUT:    Voided (mL): 1000 mL  Total OUT: 1000 mL    Total NET: -830 mL      02 Aug 2023 07:01  -  03 Aug 2023 00:13  --------------------------------------------------------  IN:    Oral Fluid: 570 mL  Total IN: 570 mL    OUT:    Voided (mL): 1500 mL  Total OUT: 1500 mL    Total NET: -930 mL          LABS:                        10.9   9.37  )-----------( 313      ( 02 Aug 2023 04:40 )             35.0     08-02    140  |  96<L>  |  18  ----------------------------<  85  4.0   |  31  |  0.9    Ca    9.2      02 Aug 2023 16:02  Phos  5.0     08-02  Mg     2.3     08-02    TPro  6.7  /  Alb  3.8  /  TBili  1.0  /  DBili  x   /  AST  30  /  ALT  26  /  AlkPhos  110  08-02    CARDIAC MARKERS ( 01 Aug 2023 11:00 )  x     / 0.03 ng/mL / x     / x     / x          PT/INR - ( 02 Aug 2023 04:40 )   PT: 32.30 sec;   INR: 2.75 ratio         PTT - ( 02 Aug 2023 04:40 )  PTT:33.7 sec  Urinalysis Basic - ( 02 Aug 2023 16:02 )    Color: x / Appearance: x / SG: x / pH: x  Gluc: 85 mg/dL / Ketone: x  / Bili: x / Urobili: x   Blood: x / Protein: x / Nitrite: x   Leuk Esterase: x / RBC: x / WBC x   Sq Epi: x / Non Sq Epi: x / Bacteria: x      BNP  I&O's Detail    01 Aug 2023 07:01  -  02 Aug 2023 07:00  --------------------------------------------------------  IN:    Oral Fluid: 170 mL  Total IN: 170 mL    OUT:    Voided (mL): 1000 mL  Total OUT: 1000 mL    Total NET: -830 mL      02 Aug 2023 07:01  -  03 Aug 2023 00:13  --------------------------------------------------------  IN:    Oral Fluid: 570 mL  Total IN: 570 mL    OUT:    Voided (mL): 1500 mL  Total OUT: 1500 mL    Total NET: -930 mL        Daily Height in cm: 154.94 (02 Aug 2023 08:54)    Daily Weight in k.2 (02 Aug 2023 04:00)    RADIOLOGY & ADDITIONAL STUDIES:

## 2023-08-02 NOTE — PROGRESS NOTE ADULT - ASSESSMENT
Persistent AF s/p DCCV  HOCM  s/p AVR    - Continue OAC, Amiodarone, BB  - Can DC cardizem  - Diuresis  - baseline TSH, LFT

## 2023-08-02 NOTE — PRE-ANESTHESIA EVALUATION ADULT - ANESTHESIA, PREVIOUS REACTION, PROFILE
Show Aperture Variable?: Yes Post-Care Instructions: I reviewed with the patient in detail post-care instructions. Patient is to wear sunprotection, and avoid picking at any of the treated lesions. Pt may apply Vaseline to crusted or scabbing areas. Medical Necessity Clause: This procedure was medically necessary because the lesions that were treated were: Spray Paint Text: The liquid nitrogen was applied to the skin utilizing a spray paint frosting technique. Spray Paint Technique: No Consent: The patient's consent was obtained including but not limited to risks of crusting, scabbing, blistering, scarring, darker or lighter pigmentary change, recurrence, incomplete removal and infection. Detail Level: Detailed Medical Necessity Information: It is in your best interest to select a reason for this procedure from the list below. All of these items fulfill various CMS LCD requirements except the new and changing color options. none

## 2023-08-02 NOTE — CHART NOTE - NSCHARTNOTEFT_GEN_A_CORE
PRE-OP DIAGNOSIS:  - A fib with RVR  - Mechanical aortic valve   - JASON clip     POST-OP DIAGNOSIS:  - Small JASON post clip. No thrombus   - Aortic valve well expanded with normal function and no perivalvular leak   - 200J direct cardioversion to NSR    PROCEDURE: Transesophageal echocardiogram    Primary Physician: Dr. Malik   Assistant: Dr. Aldana    ANESTHESIA TYPE:   [ x ] Conscious Sedation    CONDITION:  [  ] Good    ESTIMATED BLOOD LOSS: None    COMPLICATIONS: None    FINDINGS:    After risks and benefits of procedures were explained, informed consent was obtained and placed in chart. Refer to Anesthesia note for sedation details.  The MINDI probe was passed into the esophagus without difficulty.  Transesophageal images were obtained.  The MINDI probe was removed without difficulty and examined.  There was no evidence for bleeding.  The patient tolerated the procedure well without any immediate MINDI-related complications.      Preliminary Findings:  LA: Moderate dilatation   JASON: Small size. Post clip. Left atrial appendage was clear of clot and smoke.  LV: LVEF was estimated at 60%.   MV: Mild MR, no evidence of MS.   AV: No evidence of AI, no evidence of AS.   RA: Moderate dilatation   TV: no TR.   PV: no PI.   IAS: no PFO. No R-> L shunt by color doppler.     Aorta no well visualized due to excessive coughing.     Patient successfully converted to sinus rhythm with synchronized  200J of direct current cardioversion.    PLAN OF CARE: PRE-OP DIAGNOSIS:  - A fib with RVR  - Mechanical aortic valve   - JASON clip     POST-OP DIAGNOSIS:  - Small JASON post clip. No thrombus   - Aortic valve well expanded with normal function and no perivalvular leak   - 200J direct cardioversion to NSR    PROCEDURE: Transesophageal echocardiogram    Primary Physician: Dr. Malik   Assistant: Dr. Aldana    ANESTHESIA TYPE:   [ x ] Conscious Sedation    CONDITION:  [  ] Good    ESTIMATED BLOOD LOSS: None    COMPLICATIONS: None    FINDINGS:    After risks and benefits of procedures were explained, informed consent was obtained and placed in chart. Refer to Anesthesia note for sedation details.  The MINDI probe was passed into the esophagus without difficulty.  Transesophageal images were obtained.  The MINDI probe was removed without difficulty and examined.  There was no evidence for bleeding.  The patient tolerated the procedure well without any immediate MINDI-related complications.      Preliminary Findings:  LA: Severe dilatation   JASON: Small size. Post clip. Left atrial appendage was clear of clot and smoke.  LV: LVEF was estimated at 60%.   MV: Mild MR, no evidence of MS.   AV: No evidence of AI  RA: Moderate dilatation   TV: no TR.   PV: no PI.   IAS: no PFO. No R-> L shunt by color doppler.     Aorta no well visualized due to excessive coughing.     Patient successfully converted to sinus rhythm with synchronized  200J of direct current cardioversion.    PLAN OF CARE:

## 2023-08-03 ENCOUNTER — TRANSCRIPTION ENCOUNTER (OUTPATIENT)
Age: 43
End: 2023-08-03

## 2023-08-03 LAB
ALBUMIN SERPL ELPH-MCNC: 3.6 G/DL — SIGNIFICANT CHANGE UP (ref 3.5–5.2)
ALP SERPL-CCNC: 102 U/L — SIGNIFICANT CHANGE UP (ref 30–115)
ALT FLD-CCNC: 26 U/L — SIGNIFICANT CHANGE UP (ref 0–41)
ANION GAP SERPL CALC-SCNC: 8 MMOL/L — SIGNIFICANT CHANGE UP (ref 7–14)
AST SERPL-CCNC: 27 U/L — SIGNIFICANT CHANGE UP (ref 0–41)
BASOPHILS # BLD AUTO: 0.04 K/UL — SIGNIFICANT CHANGE UP (ref 0–0.2)
BASOPHILS NFR BLD AUTO: 0.5 % — SIGNIFICANT CHANGE UP (ref 0–1)
BILIRUB SERPL-MCNC: 0.8 MG/DL — SIGNIFICANT CHANGE UP (ref 0.2–1.2)
BUN SERPL-MCNC: 19 MG/DL — SIGNIFICANT CHANGE UP (ref 10–20)
CALCIUM SERPL-MCNC: 8.9 MG/DL — SIGNIFICANT CHANGE UP (ref 8.4–10.5)
CHLORIDE SERPL-SCNC: 98 MMOL/L — SIGNIFICANT CHANGE UP (ref 98–110)
CO2 SERPL-SCNC: 33 MMOL/L — HIGH (ref 17–32)
CREAT SERPL-MCNC: 0.9 MG/DL — SIGNIFICANT CHANGE UP (ref 0.7–1.5)
EGFR: 81 ML/MIN/1.73M2 — SIGNIFICANT CHANGE UP
EOSINOPHIL # BLD AUTO: 0.3 K/UL — SIGNIFICANT CHANGE UP (ref 0–0.7)
EOSINOPHIL NFR BLD AUTO: 3.8 % — SIGNIFICANT CHANGE UP (ref 0–8)
GLUCOSE SERPL-MCNC: 98 MG/DL — SIGNIFICANT CHANGE UP (ref 70–99)
HCT VFR BLD CALC: 33.7 % — LOW (ref 37–47)
HGB BLD-MCNC: 10.3 G/DL — LOW (ref 12–16)
IMM GRANULOCYTES NFR BLD AUTO: 0.6 % — HIGH (ref 0.1–0.3)
INR BLD: 2.66 RATIO — HIGH (ref 0.65–1.3)
LYMPHOCYTES # BLD AUTO: 0.84 K/UL — LOW (ref 1.2–3.4)
LYMPHOCYTES # BLD AUTO: 10.6 % — LOW (ref 20.5–51.1)
MAGNESIUM SERPL-MCNC: 2.1 MG/DL — SIGNIFICANT CHANGE UP (ref 1.8–2.4)
MCHC RBC-ENTMCNC: 24.6 PG — LOW (ref 27–31)
MCHC RBC-ENTMCNC: 30.6 G/DL — LOW (ref 32–37)
MCV RBC AUTO: 80.4 FL — LOW (ref 81–99)
MONOCYTES # BLD AUTO: 0.73 K/UL — HIGH (ref 0.1–0.6)
MONOCYTES NFR BLD AUTO: 9.2 % — SIGNIFICANT CHANGE UP (ref 1.7–9.3)
NEUTROPHILS # BLD AUTO: 5.95 K/UL — SIGNIFICANT CHANGE UP (ref 1.4–6.5)
NEUTROPHILS NFR BLD AUTO: 75.3 % — HIGH (ref 42.2–75.2)
NRBC # BLD: 0 /100 WBCS — SIGNIFICANT CHANGE UP (ref 0–0)
PLATELET # BLD AUTO: 299 K/UL — SIGNIFICANT CHANGE UP (ref 130–400)
PMV BLD: 9.9 FL — SIGNIFICANT CHANGE UP (ref 7.4–10.4)
POTASSIUM SERPL-MCNC: 3.9 MMOL/L — SIGNIFICANT CHANGE UP (ref 3.5–5)
POTASSIUM SERPL-SCNC: 3.9 MMOL/L — SIGNIFICANT CHANGE UP (ref 3.5–5)
PROT SERPL-MCNC: 6.1 G/DL — SIGNIFICANT CHANGE UP (ref 6–8)
PROTHROM AB SERPL-ACNC: 31.2 SEC — HIGH (ref 9.95–12.87)
RBC # BLD: 4.19 M/UL — LOW (ref 4.2–5.4)
RBC # FLD: 16.7 % — HIGH (ref 11.5–14.5)
SODIUM SERPL-SCNC: 139 MMOL/L — SIGNIFICANT CHANGE UP (ref 135–146)
T4 AB SER-ACNC: 9 UG/DL — SIGNIFICANT CHANGE UP (ref 4.6–12)
T4 FREE SERPL-MCNC: 1.7 NG/DL — SIGNIFICANT CHANGE UP (ref 0.9–1.8)
WBC # BLD: 7.91 K/UL — SIGNIFICANT CHANGE UP (ref 4.8–10.8)
WBC # FLD AUTO: 7.91 K/UL — SIGNIFICANT CHANGE UP (ref 4.8–10.8)

## 2023-08-03 PROCEDURE — 71045 X-RAY EXAM CHEST 1 VIEW: CPT | Mod: 26

## 2023-08-03 PROCEDURE — 93010 ELECTROCARDIOGRAM REPORT: CPT | Mod: 59

## 2023-08-03 PROCEDURE — 99233 SBSQ HOSP IP/OBS HIGH 50: CPT

## 2023-08-03 PROCEDURE — 99232 SBSQ HOSP IP/OBS MODERATE 35: CPT

## 2023-08-03 RX ORDER — MAGNESIUM SULFATE 500 MG/ML
2 VIAL (ML) INJECTION ONCE
Refills: 0 | Status: COMPLETED | OUTPATIENT
Start: 2023-08-03 | End: 2023-08-03

## 2023-08-03 RX ORDER — FUROSEMIDE 40 MG
1 TABLET ORAL
Qty: 30 | Refills: 0
Start: 2023-08-03 | End: 2023-09-01

## 2023-08-03 RX ORDER — FUROSEMIDE 40 MG
40 TABLET ORAL ONCE
Refills: 0 | Status: COMPLETED | OUTPATIENT
Start: 2023-08-03 | End: 2023-08-03

## 2023-08-03 RX ORDER — WARFARIN SODIUM 2.5 MG/1
1 TABLET ORAL ONCE
Refills: 0 | Status: COMPLETED | OUTPATIENT
Start: 2023-08-03 | End: 2023-08-03

## 2023-08-03 RX ADMIN — Medication 40 MILLIGRAM(S): at 07:48

## 2023-08-03 RX ADMIN — Medication 40 MILLIGRAM(S): at 18:04

## 2023-08-03 RX ADMIN — Medication 100 MILLIGRAM(S): at 12:53

## 2023-08-03 RX ADMIN — Medication 25 GRAM(S): at 08:04

## 2023-08-03 RX ADMIN — CHLORHEXIDINE GLUCONATE 1 APPLICATION(S): 213 SOLUTION TOPICAL at 05:22

## 2023-08-03 RX ADMIN — AMIODARONE HYDROCHLORIDE 200 MILLIGRAM(S): 400 TABLET ORAL at 05:21

## 2023-08-03 RX ADMIN — Medication 50 MILLIGRAM(S): at 13:40

## 2023-08-03 RX ADMIN — PANTOPRAZOLE SODIUM 40 MILLIGRAM(S): 20 TABLET, DELAYED RELEASE ORAL at 05:23

## 2023-08-03 RX ADMIN — WARFARIN SODIUM 1 MILLIGRAM(S): 2.5 TABLET ORAL at 21:09

## 2023-08-03 RX ADMIN — Medication 40 MILLIGRAM(S): at 05:22

## 2023-08-03 RX ADMIN — Medication 5 MILLIGRAM(S): at 21:09

## 2023-08-03 RX ADMIN — Medication 50 MILLIGRAM(S): at 21:09

## 2023-08-03 RX ADMIN — Medication 100 MILLIGRAM(S): at 05:23

## 2023-08-03 RX ADMIN — Medication 50 MILLIGRAM(S): at 05:22

## 2023-08-03 RX ADMIN — Medication 100 MILLIGRAM(S): at 21:09

## 2023-08-03 RX ADMIN — Medication 60 MILLIGRAM(S): at 04:30

## 2023-08-03 NOTE — DISCHARGE NOTE PROVIDER - CARE PROVIDERS DIRECT ADDRESSES
[email or web address]

## 2023-08-03 NOTE — DISCHARGE NOTE NURSING/CASE MANAGEMENT/SOCIAL WORK - NSDCPEFALRISK_GEN_ALL_CORE
For information on Fall & Injury Prevention, visit: [email or web address] OR  [email or web address] OR  https://www.cdc.gov/steadi/patient.html

## 2023-08-03 NOTE — DIETITIAN INITIAL EVALUATION ADULT - NSFNSGIIOFT_GEN_A_CORE
Dosing wt 85.8 kg (8/2) vs daily wt 85.2 kg (8/2)    Will monitor wt trends in setting of noted fluid overload.    IBW: 47.7 kg.

## 2023-08-03 NOTE — DISCHARGE NOTE PROVIDER - PROVIDER TOKENS
PROVIDER:[TOKEN:[[id number]],FOLLOWUP:[2 weeks]] PROVIDER:[TOKEN:[[id number]],FOLLOWUP:[1 week]]

## 2023-08-03 NOTE — DIETITIAN INITIAL EVALUATION ADULT - NSPROEDAREADYLEARN_GEN_A_NUR
Reviewed diet order. Encouraged adequate po intake. Discussed po supplement options. Discussed heart failure nutrition therapy concepts. Reviewed coumadin/vit K interaction.

## 2023-08-03 NOTE — DISCHARGE NOTE PROVIDER - NSDCFUSCHEDAPPT_GEN_ALL_CORE_FT
[facility]  CARDIOLOGY [address]  Scheduled Appointment: 08/31/2023

## 2023-08-03 NOTE — DISCHARGE NOTE PROVIDER - HOSPITAL COURSE
HPI and ED course :     41 YO F with PMHx of Afib/flutter s/p JASON clipping, HOCM, HTN, recent Mechanical AVR due to fibroelastoma with JASON clipping presented with shortness of breath and tachycardia.  She was following for HOCM in [place] and was recommended surgery. She refused surgery and started following with Dr. Metcalf in [place]. Was getting regular echocardiograms. She was later diagnosed with A.fib and started following with Dr. Tyler. 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 On 06/29/23, she underwent resection of aortic valve mass and left atrial appendage clipping by CT surgery. No intervention was performed for HOCM. Post-operatively, the hospitalization course was prolonged due to atrial fibrillation with RVR. She was then discharged on warfarin.  She was following with CT surgery as outpatient with last follow up being 1 week ago. She was told to stop her lasix.  Went for her appointment with Dr. Tyler today and was told to come to the ED. She was hypotensive at the outpatient office and had HR of 130s with Afib.    In the VS were sig for a HR of 137 bpm.  ECG afib rvr   cxr Cardiac/mediastinum/hilum: Cardiomegaly. Status post open heart surgery. Valvular prosthesis      CCU course :   admitted and underwent cardioversion on Tuesday august 2nd.   Now she os back to sinus rhythm with HR running 60-80's   To be continued on Amiodarone , metoprolol , and Anticoagulation.         IMPRESSION:  - Acute decompensated heart failure; BNP 3939  - Fibroelastoma s/p mechanical AVR on 6/29/2023  - Persistent Atrial fibrillation with RVR s/p JASON clipping  - HOCM with DEN?  - HTN      PLAN:    CNS: Avoid CNS Depressants.    HEENT: Oral care. Aspiration precautions     PULMONARY: HOB @ 45. Monitor Pulse Ox. Keep > 93%. Supplement as needed. Basal crackles appreciated    CARDIOVASCULAR:   - Lasix 80mg IV daily. Daily Weight. Strict I&Os. Keep I < O. Received additional 40 mg IV overnight on 08/02  - CT surgery consult   - 2D echo pending  - Continue home meds (Amiodarone 200mg QD and cardizem 240mg PO QD and metoprolol 50mg PO BID). Metoprolol increased to 50 mg PO TID. Cardizem changed to 60mg PO Q6  - EP follow up , cardioversion done today and patient is in sinus rhythm now   - Monitor electrolytes. Keep Mg > 2.2 and K > 4    GI: GI prophylaxis. DASH/TLC diet. NPO today    RENAL: Avoid nephrotoxic agents. Monitor Cr.    INFECTIOUS DISEASE: Trend fever curve.     HEMATOLOGICAL: Continue Warfarin for mechanical AVR. Check PT/INR daily    ENDOCRINE: Monitor FS. HbA1C 6.0 % [06-14-23]     MUSCULOSKELETAL: Ambulate as tolerated     CODE STATUS: Full Code

## 2023-08-03 NOTE — DISCHARGE NOTE PROVIDER - NSDCCPCAREPLAN_GEN_ALL_CORE_FT
PRINCIPAL DISCHARGE DIAGNOSIS  Diagnosis: S/P ablation of atrial fibrillation  Assessment and Plan of Treatment: Atrial fibrillation is a type of irregular heartbeat (arrhythmia) where the heart quivers continuously in a chaotic pattern that makes the heart unable to pump blood normally. This can increase the risk for stroke, heart failure, and other heart-related conditions. Atrial fibrillation can be caused by a variety of conditions and may be temporary, intermittent, or permanent. Symptoms include feeling that your heart is beating rapidly or irregularly, chest discomfort, shortness of breath, or dizziness/lightheadedness that may be worse with exertion. Treatment is varied but may involve medication or electrical shock (cardioversion).  Your afib migh have been trigerred by the recent heart surgery you underwent in June.   You underwent a procedure called cardioversion to restore your heart rhythm  and now your heart rate and rhythm are back to normal , you should continue taking the medications as prescribed and follow up with your cardiologist after 1-2 weeks.   SEEK IMMEDIATE MEDICAL CARE IF YOU HAVE ANY OF THE FOLLOWING SYMPTOMS: chest pain, shortness of breath, abdominal pain, sweating, vomiting, blood in vomit/bowel movements/urine, dizziness/lightheadedness, weakness or numbness to face/arm/leg, trouble speaking or understanding, facial droop.       PRINCIPAL DISCHARGE DIAGNOSIS  Diagnosis: S/P ablation of atrial fibrillation  Assessment and Plan of Treatment: Atrial fibrillation is a type of irregular heartbeat (arrhythmia) where the heart quivers continuously in a chaotic pattern that makes the heart unable to pump blood normally. This can increase the risk for stroke, heart failure, and other heart-related conditions. Atrial fibrillation can be caused by a variety of conditions and may be temporary, intermittent, or permanent. Symptoms include feeling that your heart is beating rapidly or irregularly, chest discomfort, shortness of breath, or dizziness/lightheadedness that may be worse with exertion. Treatment is varied but may involve medication or electrical shock (cardioversion).  Your afib migh have been trigerred by the recent heart surgery you underwent in June.   You underwent a procedure called cardioversion to restore your heart rhythm  and now your heart rate and rhythm are back to normal , you should continue taking the medications as prescribed and follow up with your cardiologist after 1-2 weeks. , you should also contiue taking warfarin ( coumadin ) and follow up your INR level as before admission,   SEEK IMMEDIATE MEDICAL CARE IF YOU HAVE ANY OF THE FOLLOWING SYMPTOMS: chest pain, shortness of breath, abdominal pain, sweating, vomiting, blood in vomit/bowel movements/urine, dizziness/lightheadedness, weakness or numbness to face/arm/leg, trouble speaking or understanding, facial droop.       PRINCIPAL DISCHARGE DIAGNOSIS  Diagnosis: S/P ablation of atrial fibrillation  Assessment and Plan of Treatment: Atrial fibrillation is a type of irregular heartbeat (arrhythmia) where the heart quivers continuously in a chaotic pattern that makes the heart unable to pump blood normally. This can increase the risk for stroke, heart failure, and other heart-related conditions. Atrial fibrillation can be caused by a variety of conditions and may be temporary, intermittent, or permanent. Symptoms include feeling that your heart is beating rapidly or irregularly, chest discomfort, shortness of breath, or dizziness/lightheadedness that may be worse with exertion. Treatment is varied but may involve medication or electrical shock (cardioversion).  Your afib migh have been trigerred by the recent heart surgery you underwent in June.   You underwent a procedure called cardioversion to restore your heart rhythm  and now your heart rate and rhythm are back to normal , you should continue taking the medications as prescribed and follow up with your cardiologist  Dr Tyler after 1 week and you should also follow up with Dr Santos in 1-2 weeks call # Phone: [phone] for appoitnement  . , you should also contiue taking warfarin ( coumadin ) and follow up your INR level as before admission,   SEEK IMMEDIATE MEDICAL CARE IF YOU HAVE ANY OF THE FOLLOWING SYMPTOMS: chest pain, shortness of breath, abdominal pain, sweating, vomiting, blood in vomit/bowel movements/urine, dizziness/lightheadedness, weakness or numbness to face/arm/leg, trouble speaking or understanding, facial droop.       PRINCIPAL DISCHARGE DIAGNOSIS  Diagnosis: Chronic atrial fibrillation  Assessment and Plan of Treatment: Atrial fibrillation is a type of irregular heartbeat (arrhythmia) where the heart quivers continuously in a chaotic pattern that makes the heart unable to pump blood normally. This can increase the risk for stroke, heart failure, and other heart-related conditions. Atrial fibrillation can be caused by a variety of conditions and may be temporary, intermittent, or permanent. Symptoms include feeling that your heart is beating rapidly or irregularly, chest discomfort, shortness of breath, or dizziness/lightheadedness that may be worse with exertion. Treatment is varied but may involve medication or electrical shock (cardioversion).  Your afib migh have been trigerred by the recent heart surgery you underwent in June.   You underwent a procedure called cardioversion to restore your heart rhythm  and now your heart rate and rhythm are back to normal , you should continue taking the medications as prescribed and follow up with your cardiologist  Dr Tyler after 1 week and you should also follow up with Dr Santos in 1-2 weeks call # Phone: [phone] for appoitnement  . , you should also contiue taking warfarin ( coumadin ) and follow up your INR level as before admission,   SEEK IMMEDIATE MEDICAL CARE IF YOU HAVE ANY OF THE FOLLOWING SYMPTOMS: chest pain, shortness of breath, abdominal pain, sweating, vomiting, blood in vomit/bowel movements/urine, dizziness/lightheadedness, weakness or numbness to face/arm/leg, trouble speaking or understanding, facial droop.       PRINCIPAL DISCHARGE DIAGNOSIS  Diagnosis: Atrial fibrillation with RVR  Assessment and Plan of Treatment: Atrial fibrillation is a type of irregular heartbeat (arrhythmia) where the heart quivers continuously in a chaotic pattern that makes the heart unable to pump blood normally. This can increase the risk for stroke, heart failure, and other heart-related conditions. Atrial fibrillation can be caused by a variety of conditions and may be temporary, intermittent, or permanent. Symptoms include feeling that your heart is beating rapidly or irregularly, chest discomfort, shortness of breath, or dizziness/lightheadedness that may be worse with exertion. Treatment is varied but may involve medication or electrical shock (cardioversion).  Your afib migh have been trigerred by the recent heart surgery you underwent in June.   You underwent a procedure called cardioversion to restore your heart rhythm  and now your heart rate and rhythm are back to normal , you should continue taking the medications as prescribed and follow up with your cardiologist  Dr Tyler after 1 week and you should also follow up with Dr Santos in 1-2 weeks call # Phone: [phone] for appoitnement  . , you should also contiue taking warfarin ( coumadin ) and follow up your INR level as before admission,   SEEK IMMEDIATE MEDICAL CARE IF YOU HAVE ANY OF THE FOLLOWING SYMPTOMS: chest pain, shortness of breath, abdominal pain, sweating, vomiting, blood in vomit/bowel movements/urine, dizziness/lightheadedness, weakness or numbness to face/arm/leg, trouble speaking or understanding, facial droop.

## 2023-08-03 NOTE — DIETITIAN INITIAL EVALUATION ADULT - NS FNS DIET ORDER
Diet, Regular:   DASH/TLC {Sodium & Cholesterol Restricted} (08-02-23 @ 22:10)    Pt reports reduced appetite in setting of weakness & SOB. Consuming 25% of meals at this time.    No chewing/swallowing difficulty reported at this time.

## 2023-08-03 NOTE — PROGRESS NOTE ADULT - SUBJECTIVE AND OBJECTIVE BOX
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  She was following for HOCM in [place] and was recommended surgery. She refused surgery and started following with Dr. Metcalf in [place]. Was getting regular echocardiograms. She was later diagnosed with A.fib and started following with Dr. Tyler. 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 On 06/29/23, she underwent resection of aortic valve mass and left atrial appendage clipping by CT surgery. No intervention was performed for HOCM. Post-operatively, the hospitalization course was prolonged due to atrial fibrillation with RVR. She was then discharged on warfarin.  She was following with CT surgery as outpatient with last follow up being 1 week ago. She was told to stop her lasix.  Went for her appointment with Dr. Tyler today and was told to come to the ED. She was hypotensive at the outpatient office and had HR of 130s with Afib.    In the VS were sig for a HR of 137 bpm.  ECG afib rvr   cxr Cardiac/mediastinum/hilum: Cardiomegaly. Status post open heart surgery. Valvular prosthesis    Lung parenchyma/Pleura: Bibasilar opacities/small pleural effusions, stable. Accessory azygous fissure and lobe.    Skeleton/soft tissues: Degenerative changes    Impression:    Stable bibasilar opacities/effusions. (01 Aug 2023 15:52)       INTERVAL HPI/OVERNIGHT EVENTS:   No overnight events   Afebrile, hemodynamically stable     Subjective:    ICU Vital Signs Last 24 Hrs  T(C): 36.3 (03 Aug 2023 04:00), Max: 36.4 (02 Aug 2023 20:00)  T(F): 97.4 (03 Aug 2023 04:00), Max: 97.5 (02 Aug 2023 20:00)  HR: 69 (03 Aug 2023 06:00) (62 - 109)  BP: 109/69 (03 Aug 2023 06:00) (85/50 - 114/57)  BP(mean): 83 (03 Aug 2023 06:00) (61 - 83)  ABP: --  ABP(mean): --  RR: 22 (03 Aug 2023 06:00) (20 - 28)  SpO2: 92% (03 Aug 2023 06:00) (91% - 99%)    O2 Parameters below as of 03 Aug 2023 06:00  Patient On (Oxygen Delivery Method): room air          I&O's Summary    02 Aug 2023 07:01  -  03 Aug 2023 07:00  --------------------------------------------------------  IN: 620 mL / OUT: 2200 mL / NET: -1580 mL          Daily Height in cm: 154.94 (02 Aug 2023 08:54)    Daily     Adult Advanced Hemodynamics Last 24 Hrs  CVP(mm Hg): --  CVP(cm H2O): --  CO: --  CI: --  PA: --  PA(mean): --  PCWP: --  SVR: --  SVRI: --  PVR: --  PVRI: --    EKG/Telemetry Events:    MEDICATIONS  (STANDING):  aMIOdarone    Tablet 200 milliGRAM(s) Oral daily  chlorhexidine 2% Cloths 1 Application(s) Topical <User Schedule>  furosemide   Injectable 40 milliGRAM(s) IV Push two times a day  magnesium sulfate  IVPB 2 Gram(s) IV Intermittent once  melatonin 5 milliGRAM(s) Oral at bedtime  metoprolol tartrate 50 milliGRAM(s) Oral every 8 hours  pantoprazole    Tablet 40 milliGRAM(s) Oral before breakfast  warfarin 1 milliGRAM(s) Oral once    MEDICATIONS  (PRN):  benzonatate 100 milliGRAM(s) Oral every 8 hours PRN Cough    PHYSICAL EXAM:  GENERAL: NAD, lying in bed comfortably  HEAD:  Atraumatic, Normocephalic  EYES: EOMI, PERRLA, conjunctiva and sclera clear  ENT: Moist mucous membranes  NECK: Supple, No JVD  CHEST/LUNG: decreased breath sounds bilaterally but no added sounds  , had coarse crackles b/ yesterday   HEART fast, irregularly irregular with AVR click appreciated    ABDOMEN: BSx4; Soft, nontender, nondistended  EXTREMITIES:  2+ Peripheral Pulses, brisk capillary refill. No clubbing, cyanosis, 2+ lower extremity edema  NERVOUS SYSTEM:  A&Ox3, no focal deficits   SKIN: CTS incision scar is present well healing.      LABS:                        10.3   7.91  )-----------( 299      ( 03 Aug 2023 04:15 )             33.7     08-03    139  |  98  |  19  ----------------------------<  98  3.9   |  33<H>  |  0.9    Ca    8.9      03 Aug 2023 04:15  Phos  5.0     08-02  Mg     2.1     08-03    TPro  6.1  /  Alb  3.6  /  TBili  0.8  /  DBili  x   /  AST  27  /  ALT  26  /  AlkPhos  102  08-03    LIVER FUNCTIONS - ( 03 Aug 2023 04:15 )  Alb: 3.6 g/dL / Pro: 6.1 g/dL / ALK PHOS: 102 U/L / ALT: 26 U/L / AST: 27 U/L / GGT: x           PT/INR - ( 03 Aug 2023 04:15 )   PT: 31.20 sec;   INR: 2.66 ratio         PTT - ( 02 Aug 2023 04:40 )  PTT:33.7 sec  CAPILLARY BLOOD GLUCOSE            CARDIAC MARKERS ( 01 Aug 2023 11:00 )  x     / 0.03 ng/mL / x     / x     / x          Urinalysis Basic - ( 03 Aug 2023 04:15 )    Color: x / Appearance: x / SG: x / pH: x  Gluc: 98 mg/dL / Ketone: x  / Bili: x / Urobili: x   Blood: x / Protein: x / Nitrite: x   Leuk Esterase: x / RBC: x / WBC x   Sq Epi: x / Non Sq Epi: x / Bacteria: x          RADIOLOGY & ADDITIONAL TESTS:  CXR:      Support devices: Left atrial appendage clip    Cardiac/mediastinum/hilum: Cardiomegaly. Status post open heart surgery.   Valvular prosthesis    Lung parenchyma/Pleura: Bibasilar opacities/small pleural effusions,   stable. Accessory azygous fissure and lobe.    Skeleton/soft tissues: Degenerative changes    Impression:    Stable bibasilar opacities/effusions.    ECHO :   Summary:   1. Hyperdynamic global left ventricular systolic function.   2. LV Ejection Fraction by Parada's Method with a biplane EF of 73 %.   3. Moderate asymmetric left ventricular hypertrophy involving the septal   wall.   4. Spectral Doppler shows pseudonormal pattern of left ventricular   myocardial filling (Grade II diastolic dysfunction).   5. Elevated mean left atrial pressure.   6. Mildly reduced RV systolic function.   7. Moderately enlarged left atrium.   8. Mildly enlarged right atrium.   9. Mild to moderate mitral valve regurgitation.  10. Mild anterior leaflet systolic anterior motion of the mitral valve   without significant LVOT obstruction.  11. Mild tricuspid regurgitation.  12. Estimated pulmonary artery systolic pressure is 42.0 mmHg assuming a   right atrial pressure of 15 mmHg, which is consistent with mild pulmonary   hypertension.  13. Bileaflet mechanical prosthesis in the aortic valve position. PG 18/9   mmHg consistent with normal prosthetic valve function.  14. Moderate pleural effusion in the right lateral region.        Care Discussed with Consultants/Other Providers [ x] YES  [ ] NO

## 2023-08-03 NOTE — DISCHARGE NOTE PROVIDER - NSDCMRMEDTOKEN_GEN_ALL_CORE_FT
amiodarone 200 mg oral tablet: 1 tab(s) orally once a day  metoprolol tartrate 50 mg oral tablet: 1 tab(s) orally every 12 hours  warfarin 1 mg oral tablet: 1 tab(s) orally once a day (at bedtime) follow up with coumadin clinic to monitor INR levels closely and maintain a therapeutic range of 2.5-3.5   amiodarone 200 mg oral tablet: 1 tab(s) orally once a day  Lasix 40 mg oral tablet: 1 tab(s) orally once a day  metoprolol tartrate 50 mg oral tablet: 1 tab(s) orally every 12 hours  warfarin 1 mg oral tablet: 1 tab(s) orally once a day (at bedtime) follow up with coumadin clinic to monitor INR levels closely and maintain a therapeutic range of 2.5-3.5   amiodarone 200 mg oral tablet: 1 tab(s) orally once a day  Lasix 40 mg oral tablet: 1 tab(s) orally once a day  Toprol- mg oral tablet, extended release: 1 tab(s) orally once a day  warfarin 1 mg oral tablet: 1 tab(s) orally once a day (at bedtime) follow up with coumadin clinic to monitor INR levels closely and maintain a therapeutic range of 2.5-3.5

## 2023-08-03 NOTE — PROGRESS NOTE ADULT - ASSESSMENT
· Assessment    IMPRESSION:  - Acute decompensated heart failure; BNP 3939  - Fibroelastoma s/p mechanical AVR on 6/29/2023  - Persistent Atrial fibrillation with RVR s/p JASON clipping  - HOCM with DEN?  - HTN      PLAN:    CNS: Avoid CNS Depressants.    HEENT: Oral care. Aspiration precautions     PULMONARY: HOB @ 45. Monitor Pulse Ox. Keep > 93%. O2 Drops at night to 80's%  Supplement as needed and diurese . Basal crackles appreciated    CARDIOVASCULAR:   - Lasix 40 MG q12 IV daily. Daily Weight. Strict I&Os. Keep I < O. Received additional 40 mg IV on morning of  08/03  - CT surgery consult ; No indication for CT Surgery intervention at this time.  - Continue home meds (Amiodarone 200mg QD and metoprolol , Metoprolol increased to 50 mg PO TID).  Cardizem was dc'ed based on EP recommendations   - EP follow up , cardioversion done yesterday and patient is in sinus rhythm now   - Monitor electrolytes. Keep Mg > 2.2 and K > 4    GI: GI prophylaxis. DASH/TLC diet.     RENAL: Avoid nephrotoxic agents. Monitor Cr.    INFECTIOUS DISEASE: Trend fever curve.     HEMATOLOGICAL: Continue Warfarin for mechanical AVR. Check PT/INR daily    ENDOCRINE: Monitor FS. HbA1C 6.0 % [06-14-23]     MUSCULOSKELETAL: Ambulate as tolerated     CODE STATUS: Full Code      Dispo : possible discharge in the afternoon , monitor O2 requirements on ambulation after diuresis  · Assessment    IMPRESSION:  - Acute decompensated heart failure; BNP 3939  - Fibroelastoma s/p mechanical AVR on 6/29/2023  - Persistent Atrial fibrillation with RVR s/p JASON clipping  - HOCM with DEN?  - HTN      PLAN:    CNS: Avoid CNS Depressants.    HEENT: Oral care. Aspiration precautions     PULMONARY: HOB @ 45. Monitor Pulse Ox. Keep > 93%. O2 Drops at night to 80's%  Supplement as needed and diurese . Basal crackles appreciated    CARDIOVASCULAR:   - Lasix 40 MG q12 IV daily. Daily Weight. Strict I&Os. Keep I < O. Received additional 40 mg IV lasix on morning of  08/03.  - CT surgery consult ; No indication for CT Surgery intervention at this time.  - Continue home meds (Amiodarone 200mg QD and metoprolol , Metoprolol increased to 50 mg PO TID).  Cardizem was dc'ed based on EP recommendations   - EP follow up , cardioversion done yesterday and patient is in sinus rhythm now   - Monitor electrolytes. Keep Mg > 2.2 and K > 4    GI: GI prophylaxis. DASH/TLC diet.     RENAL: Avoid nephrotoxic agents. Monitor Cr.    INFECTIOUS DISEASE: Trend fever curve.     HEMATOLOGICAL: Continue Warfarin for mechanical AVR. Check PT/INR daily    ENDOCRINE: Monitor FS. HbA1C 6.0 % [06-14-23]     MUSCULOSKELETAL: Ambulate as tolerated     CODE STATUS: Full Code      Dispo : possible discharge in the afternoon , monitor O2 requirements on ambulation after diuresis  · Assessment    IMPRESSION:  - Acute decompensated heart failure; BNP 3939  - Fibroelastoma s/p mechanical AVR on 6/29/2023  - Persistent Atrial fibrillation with RVR s/p JASON clipping  - HOCM with DEN?  - HTN      PLAN:    CNS: Avoid CNS Depressants.    HEENT: Oral care. Aspiration precautions     PULMONARY: HOB @ 45. Monitor Pulse Ox. Keep > 93%. O2 Drops at night to 80's%  Supplement as needed and diurese . Basal crackles appreciated    CARDIOVASCULAR:   - Lasix 40 MG q12 IV daily. Daily Weight. Strict I&Os. Keep I < O. Received additional 40 mg IV lasix on morning of  08/03. Repeat XR in PM  - CT surgery consult ; No indication for CT Surgery intervention at this time.  - Continue home meds (Amiodarone 200mg QD and metoprolol , Metoprolol increased to 50 mg PO TID).  Cardizem was dc'ed based on EP recommendations   - EP follow up , cardioversion done yesterday and patient is in sinus rhythm now   - Monitor electrolytes. Keep Mg > 2.2 and K > 4    GI: GI prophylaxis. DASH/TLC diet.     RENAL: Avoid nephrotoxic agents. Monitor Cr.    INFECTIOUS DISEASE: Trend fever curve.     HEMATOLOGICAL: Continue Warfarin for mechanical AVR. Check PT/INR daily, target 2.5-3.5    ENDOCRINE: Monitor FS. HbA1C 6.0 % [06-14-23]     MUSCULOSKELETAL: Ambulate as tolerated     CODE STATUS: Full Code      Dispo : possible discharge in the afternoon , monitor O2 requirements on ambulation after diuresis  · Assessment    IMPRESSION:  - Acute decompensated heart failure; BNP 3939  - Fibroelastoma s/p mechanical AVR on 6/29/2023  - Persistent Atrial fibrillation with RVR s/p JASON clipping  - HOCM with DEN?  - HTN      PLAN:    CNS: Avoid CNS Depressants.    HEENT: Oral care. Aspiration precautions     PULMONARY: HOB @ 45. Monitor Pulse Ox. Keep > 93%. O2 Drops at night to 80's%  Supplement as needed and diurese . Basal crackles appreciated    CARDIOVASCULAR:   - Lasix 40 MG q12 IV daily. Daily Weight. Strict I&Os. Keep I < O. Received additional 40 mg IV lasix on morning of  08/03. Repeat XR in PM  - CT surgery consult ; No indication for CT Surgery intervention at this time.  - Continue home meds (Amiodarone 200mg QD and metoprolol , Metoprolol increased to 50 mg PO TID).  Cardizem was dc'ed based on EP recommendations   - EP follow up , cardioversion done yesterday and patient is in sinus rhythm now   - Monitor electrolytes. Keep Mg > 2.2 and K > 4    GI: GI prophylaxis. DASH/TLC diet.     RENAL: Avoid nephrotoxic agents. Monitor Cr.    INFECTIOUS DISEASE: Trend fever curve.     HEMATOLOGICAL: Continue Warfarin for mechanical AVR. Check PT/INR daily, target 2.5-3.5    ENDOCRINE: Monitor FS. HbA1C 6.0 % [06-14-23]     MUSCULOSKELETAL: Ambulate as tolerated     CODE STATUS: Full Code      Dispo : possible discharge Tomorrow or transfer to T, monitor O2 requirements on ambulation

## 2023-08-03 NOTE — DISCHARGE NOTE NURSING/CASE MANAGEMENT/SOCIAL WORK - PATIENT PORTAL LINK FT
You can access the FollowMyHealth Patient Portal offered by [facility] by registering at the following website: [email or web address]. By joining [facility]’s FollowMyHealth portal, you will also be able to view your health information using other applications (apps) compatible with our system.

## 2023-08-03 NOTE — DIETITIAN INITIAL EVALUATION ADULT - OTHER INFO
Pertinent Medical Information: Presents w/ SOB and tachycardia. Per H&P, on 06/29/23, she underwent resection of aortic valve mass and left atrial appendage clipping by CT surgery; post-op hospital course was prolonged due to atrial fibrillation with RVR.    Admitted with suspected HFpEF exacerbation. Noted fluid overloaded. Acute decompensated heart failure noted.    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

## 2023-08-03 NOTE — DISCHARGE NOTE PROVIDER - CARE PROVIDER_API CALL
Elliot Metcafl  Cardiovascular Disease  [address] [place] [id number]  Phone: [phone]  Fax: [phone]  Follow Up Time: 2 weeks   Josie Owens  Adv Heart Fail [facility] Cardio  [address] [place] [id number]  Phone: [phone]  Fax: [phone]  Follow Up Time: 1 week

## 2023-08-03 NOTE — DIETITIAN INITIAL EVALUATION ADULT - ADD RECOMMEND
Recommendation:  (1) Continue DASH/TLC diet as tolerated.  (2) Order Ensure Compact twice daily (220 kcal, 9 g protein, 120 mL per serving).  (3) Provide Magic Cup once daily (290 kcal, 9 g protein.

## 2023-08-03 NOTE — DIETITIAN INITIAL EVALUATION ADULT - NUTRITIONGOAL OUTCOME1
Pt to demonstrate tolerance & adherence to diet order, with at least 50% po intake achieved & maintained over next 3-5 days.    Pt at high nutrition risk; RD to follow-up in 3-5 days.    Monitor: Skin, labs, BM, wt, nutrition focused physical findings, body composition, diet order, GI, volume status, adherence.

## 2023-08-03 NOTE — DIETITIAN INITIAL EVALUATION ADULT - PERTINENT MEDS FT
MEDICATIONS  (STANDING):  aMIOdarone    Tablet 200 milliGRAM(s) Oral daily  chlorhexidine 2% Cloths 1 Application(s) Topical <User Schedule>  furosemide   Injectable 40 milliGRAM(s) IV Push two times a day  melatonin 5 milliGRAM(s) Oral at bedtime  metoprolol tartrate 50 milliGRAM(s) Oral every 8 hours  pantoprazole    Tablet 40 milliGRAM(s) Oral before breakfast  warfarin 1 milliGRAM(s) Oral once    MEDICATIONS  (PRN):  benzonatate 100 milliGRAM(s) Oral every 8 hours PRN Cough

## 2023-08-03 NOTE — DIETITIAN INITIAL EVALUATION ADULT - PERTINENT LABORATORY DATA
08-03    139  |  98  |  19  ----------------------------<  98  3.9   |  33<H>  |  0.9    Ca    8.9      03 Aug 2023 04:15  Phos  5.0     08-02  Mg     2.1     08-03    TPro  6.1  /  Alb  3.6  /  TBili  0.8  /  DBili  x   /  AST  27  /  ALT  26  /  AlkPhos  102  08-03  A1C with Estimated Average Glucose Result: 6.0 % (06-14-23 @ 16:19)

## 2023-08-03 NOTE — DIETITIAN INITIAL EVALUATION ADULT - ORAL INTAKE PTA/DIET HISTORY
Reports good appetite & po intake PTP. Reportedly follows a heart healthy diet PTP. Consumes 3 meals/day. NKFA. No supplements reported. No food preferences reported. No dietary restrictions related to culture/Church. UBW reported to be 81.8 kg with no known h/o unintentional wt loss. No signs of significant muscle wasting/subcutaneous fat loss observed at this time.

## 2023-08-04 VITALS
DIASTOLIC BLOOD PRESSURE: 59 MMHG | RESPIRATION RATE: 18 BRPM | SYSTOLIC BLOOD PRESSURE: 105 MMHG | TEMPERATURE: 97 F | HEART RATE: 80 BPM | OXYGEN SATURATION: 97 %

## 2023-08-04 DIAGNOSIS — I48.19 OTHER PERSISTENT ATRIAL FIBRILLATION: ICD-10-CM

## 2023-08-04 LAB
ALBUMIN SERPL ELPH-MCNC: 3.6 G/DL — SIGNIFICANT CHANGE UP (ref 3.5–5.2)
ALP SERPL-CCNC: 102 U/L — SIGNIFICANT CHANGE UP (ref 30–115)
ALT FLD-CCNC: 24 U/L — SIGNIFICANT CHANGE UP (ref 0–41)
ANION GAP SERPL CALC-SCNC: 9 MMOL/L — SIGNIFICANT CHANGE UP (ref 7–14)
AST SERPL-CCNC: 27 U/L — SIGNIFICANT CHANGE UP (ref 0–41)
BASOPHILS # BLD AUTO: 0.04 K/UL — SIGNIFICANT CHANGE UP (ref 0–0.2)
BASOPHILS NFR BLD AUTO: 0.5 % — SIGNIFICANT CHANGE UP (ref 0–1)
BILIRUB SERPL-MCNC: 0.7 MG/DL — SIGNIFICANT CHANGE UP (ref 0.2–1.2)
BUN SERPL-MCNC: 19 MG/DL — SIGNIFICANT CHANGE UP (ref 10–20)
CALCIUM SERPL-MCNC: 9.1 MG/DL — SIGNIFICANT CHANGE UP (ref 8.4–10.5)
CHLORIDE SERPL-SCNC: 97 MMOL/L — LOW (ref 98–110)
CO2 SERPL-SCNC: 34 MMOL/L — HIGH (ref 17–32)
CREAT SERPL-MCNC: 0.7 MG/DL — SIGNIFICANT CHANGE UP (ref 0.7–1.5)
EGFR: 110 ML/MIN/1.73M2 — SIGNIFICANT CHANGE UP
EOSINOPHIL # BLD AUTO: 0.33 K/UL — SIGNIFICANT CHANGE UP (ref 0–0.7)
EOSINOPHIL NFR BLD AUTO: 3.8 % — SIGNIFICANT CHANGE UP (ref 0–8)
GLUCOSE SERPL-MCNC: 84 MG/DL — SIGNIFICANT CHANGE UP (ref 70–99)
HCT VFR BLD CALC: 34.5 % — LOW (ref 37–47)
HGB BLD-MCNC: 11.1 G/DL — LOW (ref 12–16)
IMM GRANULOCYTES NFR BLD AUTO: 0.5 % — HIGH (ref 0.1–0.3)
INR BLD: 2.51 RATIO — HIGH (ref 0.65–1.3)
LYMPHOCYTES # BLD AUTO: 0.84 K/UL — LOW (ref 1.2–3.4)
LYMPHOCYTES # BLD AUTO: 9.5 % — LOW (ref 20.5–51.1)
MAGNESIUM SERPL-MCNC: 1.9 MG/DL — SIGNIFICANT CHANGE UP (ref 1.8–2.4)
MCHC RBC-ENTMCNC: 25.6 PG — LOW (ref 27–31)
MCHC RBC-ENTMCNC: 32.2 G/DL — SIGNIFICANT CHANGE UP (ref 32–37)
MCV RBC AUTO: 79.7 FL — LOW (ref 81–99)
MONOCYTES # BLD AUTO: 0.95 K/UL — HIGH (ref 0.1–0.6)
MONOCYTES NFR BLD AUTO: 10.8 % — HIGH (ref 1.7–9.3)
NEUTROPHILS # BLD AUTO: 6.6 K/UL — HIGH (ref 1.4–6.5)
NEUTROPHILS NFR BLD AUTO: 74.9 % — SIGNIFICANT CHANGE UP (ref 42.2–75.2)
NRBC # BLD: 0 /100 WBCS — SIGNIFICANT CHANGE UP (ref 0–0)
PLATELET # BLD AUTO: 289 K/UL — SIGNIFICANT CHANGE UP (ref 130–400)
PMV BLD: 9.3 FL — SIGNIFICANT CHANGE UP (ref 7.4–10.4)
POTASSIUM SERPL-MCNC: 3.9 MMOL/L — SIGNIFICANT CHANGE UP (ref 3.5–5)
POTASSIUM SERPL-SCNC: 3.9 MMOL/L — SIGNIFICANT CHANGE UP (ref 3.5–5)
PROT SERPL-MCNC: 6.2 G/DL — SIGNIFICANT CHANGE UP (ref 6–8)
PROTHROM AB SERPL-ACNC: 29.4 SEC — HIGH (ref 9.95–12.87)
RBC # BLD: 4.33 M/UL — SIGNIFICANT CHANGE UP (ref 4.2–5.4)
RBC # FLD: 16.1 % — HIGH (ref 11.5–14.5)
SODIUM SERPL-SCNC: 140 MMOL/L — SIGNIFICANT CHANGE UP (ref 135–146)
WBC # BLD: 8.8 K/UL — SIGNIFICANT CHANGE UP (ref 4.8–10.8)
WBC # FLD AUTO: 8.8 K/UL — SIGNIFICANT CHANGE UP (ref 4.8–10.8)

## 2023-08-04 PROCEDURE — 93010 ELECTROCARDIOGRAM REPORT: CPT | Mod: 59

## 2023-08-04 PROCEDURE — 99239 HOSP IP/OBS DSCHRG MGMT >30: CPT

## 2023-08-04 PROCEDURE — 71045 X-RAY EXAM CHEST 1 VIEW: CPT | Mod: 26

## 2023-08-04 PROCEDURE — 99233 SBSQ HOSP IP/OBS HIGH 50: CPT

## 2023-08-04 RX ORDER — METOPROLOL TARTRATE 50 MG
1 TABLET ORAL
Qty: 30 | Refills: 0
Start: 2023-08-04 | End: 2023-09-02

## 2023-08-04 RX ORDER — WARFARIN SODIUM 2.5 MG/1
1 TABLET ORAL ONCE
Refills: 0 | Status: DISCONTINUED | OUTPATIENT
Start: 2023-08-04 | End: 2023-08-04

## 2023-08-04 RX ADMIN — Medication 40 MILLIGRAM(S): at 05:19

## 2023-08-04 RX ADMIN — PANTOPRAZOLE SODIUM 40 MILLIGRAM(S): 20 TABLET, DELAYED RELEASE ORAL at 06:26

## 2023-08-04 RX ADMIN — AMIODARONE HYDROCHLORIDE 200 MILLIGRAM(S): 400 TABLET ORAL at 05:19

## 2023-08-04 RX ADMIN — Medication 50 MILLIGRAM(S): at 05:20

## 2023-08-04 RX ADMIN — CHLORHEXIDINE GLUCONATE 1 APPLICATION(S): 213 SOLUTION TOPICAL at 05:23

## 2023-08-04 NOTE — PROGRESS NOTE ADULT - SUBJECTIVE AND OBJECTIVE BOX
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  She was following for HOCM in [place] and was recommended surgery. She refused surgery and started following with Dr. Metcalf in [place]. Was getting regular echocardiograms. She was later diagnosed with A.fib and started following with Dr. Tyler. 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 On 23, she underwent resection of aortic valve mass and left atrial appendage clipping by CT surgery. No intervention was performed for HOCM. Post-operatively, the hospitalization course was prolonged due to atrial fibrillation with RVR. She was then discharged on warfarin.  She was following with CT surgery as outpatient with last follow up being 1 week ago. She was told to stop her lasix.  Went for her appointment with Dr. Tyler and was told to come to the ED. She was hypotensive at the outpatient office and had HR of 130s with Afib.    In the VS were sig for a HR of 137 bpm.  ECG afib rvr   cxr Cardiac/mediastinum/hilum: Cardiomegaly. Status post open heart surgery. Valvular prosthesis    Lung parenchyma/Pleura: Bibasilar opacities/small pleural effusions, stable. Accessory azygous fissure and lobe.    Skeleton/soft tissues: Degenerative changes    Impression:    Stable bibasilar opacities/effusions. (01 Aug 2023 15:52)       INTERVAL HPI/OVERNIGHT EVENTS:   No overnight events   Afebrile, hemodynamically stable     Subjective:    ICU Vital Signs Last 24 Hrs  T(C): 36.4 (04 Aug 2023 04:00), Max: 36.8 (03 Aug 2023 20:00)  T(F): 97.6 (04 Aug 2023 04:00), Max: 98.2 (03 Aug 2023 20:00)  HR: 76 (04 Aug 2023 06:00) (68 - 82)  BP: 111/68 (04 Aug 2023 06:00) (81/57 - 111/68)  BP(mean): 85 (04 Aug 2023 06:00) (63 - 85)  ABP: --  ABP(mean): --  RR: 69 (04 Aug 2023 06:00) (23 - 69)  SpO2: 99% (04 Aug 2023 06:00) (93% - 99%)    O2 Parameters below as of 04 Aug 2023 06:00  Patient On (Oxygen Delivery Method): room air          I&O's Summary    03 Aug 2023 07:01  -  04 Aug 2023 07:00  --------------------------------------------------------  IN: 1130 mL / OUT: 3625 mL / NET: -2495 mL          Daily Height in cm: 154.9 (03 Aug 2023 17:38)    Daily Weight in k.7 (04 Aug 2023 06:00)        EKG : NSR     MEDICATIONS  (STANDING):  aMIOdarone    Tablet 200 milliGRAM(s) Oral daily  chlorhexidine 2% Cloths 1 Application(s) Topical <User Schedule>  furosemide   Injectable 40 milliGRAM(s) IV Push two times a day  melatonin 5 milliGRAM(s) Oral at bedtime  metoprolol tartrate 50 milliGRAM(s) Oral every 8 hours  pantoprazole    Tablet 40 milliGRAM(s) Oral before breakfast  warfarin 1 milliGRAM(s) Oral once    MEDICATIONS  (PRN):  benzonatate 100 milliGRAM(s) Oral every 8 hours PRN Cough      GENERAL: NAD, lying in bed comfortably  HEAD:  Atraumatic, Normocephalic  EYES: EOMI, PERRLA, conjunctiva and sclera clear  ENT: Moist mucous membranes  NECK: Supple, No JVD  CHEST/LUNG: decreased breath sounds at basal lung fields bilaterally but no added sounds  HEART fast, irregularly irregular with AVR click appreciated    ABDOMEN: BSx4; Soft, nontender, nondistended  EXTREMITIES:  2+ Peripheral Pulses, brisk capillary refill. No clubbing, cyanosis, 2+ lower extremity edema  NERVOUS SYSTEM:  A&Ox3, no focal deficits   SKIN: CTS incision scar is present well healing.          LABS:                        11.1   8.80  )-----------( 289      ( 04 Aug 2023 04:18 )             34.5     08-04    140  |  97<L>  |  19  ----------------------------<  84  3.9   |  34<H>  |  0.7    Ca    9.1      04 Aug 2023 04:18  Mg     1.9     08-04    TPro  6.2  /  Alb  3.6  /  TBili  0.7  /  DBili  x   /  AST  27  /  ALT  24  /  AlkPhos  102  08-04    LIVER FUNCTIONS - ( 04 Aug 2023 04:18 )  Alb: 3.6 g/dL / Pro: 6.2 g/dL / ALK PHOS: 102 U/L / ALT: 24 U/L / AST: 27 U/L / GGT: x           PT/INR - ( 04 Aug 2023 04:18 )   PT: 29.40 sec;   INR: 2.51 ratio                         Urinalysis Basic - ( 04 Aug 2023 04:18 )    Color: x / Appearance: x / SG: x / pH: x  Gluc: 84 mg/dL / Ketone: x  / Bili: x / Urobili: x   Blood: x / Protein: x / Nitrite: x   Leuk Esterase: x / RBC: x / WBC x   Sq Epi: x / Non Sq Epi: x / Bacteria: x          RADIOLOGY & ADDITIONAL TESTS:  CXR:    Findings:    Support devices: Telemetry leads    Cardiac/mediastinum/hilum: Cardiomegaly. Status post median sternotomy   and valve repair. Pacing device overlies the thorax    Lung parenchyma/Pleura: CHF    Skeleton/soft tissues: Without difference    Impression:    Cardiomegaly. CHF.        ECHO :   Summary:   1. Hyperdynamic global left ventricular systolic function.   2. LV Ejection Fraction by Parada's Method with a biplane EF of 73 %.   3. Moderate asymmetric left ventricular hypertrophy involving the septal   wall.   4. Spectral Doppler shows pseudonormal pattern of left ventricular   myocardial filling (Grade II diastolic dysfunction).   5. Elevated mean left atrial pressure.   6. Mildly reduced RV systolic function.   7. Moderately enlarged left atrium.   8. Mildly enlarged right atrium.   9. Mild to moderate mitral valve regurgitation.  10. Mild anterior leaflet systolic anterior motion of the mitral valve   without significant LVOT obstruction.  11. Mild tricuspid regurgitation.  12. Estimated pulmonary artery systolic pressure is 42.0 mmHg assuming a   right atrial pressure of 15 mmHg, which is consistent with mild pulmonary   hypertension.  13. Bileaflet mechanical prosthesis in the aortic valve position. PG 18/9   mmHg consistent with normal prosthetic valve function.  14. Moderate pleural effusion in the right lateral region.          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  She was following for HOCM in [place] and was recommended surgery. She refused surgery and started following with Dr. Metcalf in [place]. Was getting regular echocardiograms. She was later diagnosed with A.fib and started following with Dr. yTler. 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 On 23, she underwent resection of aortic valve mass and left atrial appendage clipping by CT surgery. No intervention was performed for HOCM. Post-operatively, the hospitalization course was prolonged due to atrial fibrillation with RVR. She was then discharged on warfarin.  She was following with CT surgery as outpatient with last follow up being 1 week ago. She was told to stop her lasix.  Went for her appointment with Dr. Tyler and was told to come to the ED. She was hypotensive at the outpatient office and had HR of 130s with Afib.    In the VS were sig for a HR of 137 bpm.  ECG afib rvr   cxr Cardiac/mediastinum/hilum: Cardiomegaly. Status post open heart surgery. Valvular prosthesis    Lung parenchyma/Pleura: Bibasilar opacities/small pleural effusions, stable. Accessory azygous fissure and lobe.    Skeleton/soft tissues: Degenerative changes    Impression:    Stable bibasilar opacities/effusions. (01 Aug 2023 15:52)       INTERVAL HPI/OVERNIGHT EVENTS:   No overnight events   Afebrile, hemodynamically stable     Subjective:    ICU Vital Signs Last 24 Hrs  T(C): 36.4 (04 Aug 2023 04:00), Max: 36.8 (03 Aug 2023 20:00)  T(F): 97.6 (04 Aug 2023 04:00), Max: 98.2 (03 Aug 2023 20:00)  HR: 76 (04 Aug 2023 06:00) (68 - 82)  BP: 111/68 (04 Aug 2023 06:00) (81/57 - 111/68)  BP(mean): 85 (04 Aug 2023 06:00) (63 - 85)  ABP: --  ABP(mean): --  RR: 69 (04 Aug 2023 06:00) (23 - 69)  SpO2: 99% (04 Aug 2023 06:00) (93% - 99%)    O2 Parameters below as of 04 Aug 2023 06:00  Patient On (Oxygen Delivery Method): room air          I&O's Summary    03 Aug 2023 07:01  -  04 Aug 2023 07:00  --------------------------------------------------------  IN: 1130 mL / OUT: 3625 mL / NET: -2495 mL          Daily Height in cm: 154.9 (03 Aug 2023 17:38)    Daily Weight in k.7 (04 Aug 2023 06:00)        EKG : NSR     MEDICATIONS  (STANDING):  aMIOdarone    Tablet 200 milliGRAM(s) Oral daily  chlorhexidine 2% Cloths 1 Application(s) Topical <User Schedule>  furosemide   Injectable 40 milliGRAM(s) IV Push two times a day  melatonin 5 milliGRAM(s) Oral at bedtime  metoprolol tartrate 50 milliGRAM(s) Oral every 8 hours  pantoprazole    Tablet 40 milliGRAM(s) Oral before breakfast  warfarin 1 milliGRAM(s) Oral once    MEDICATIONS  (PRN):  benzonatate 100 milliGRAM(s) Oral every 8 hours PRN Cough      GENERAL: NAD, lying in bed comfortably  HEAD:  Atraumatic, Normocephalic  EYES: EOMI, PERRLA, conjunctiva and sclera clear  ENT: Moist mucous membranes  NECK: Supple, No JVD  CHEST/LUNG: decreased breath sounds at basal lung fields bilaterally but no added sounds  HEART fast, irregularly irregular with AVR click appreciated    ABDOMEN: BSx4; Soft, nontender, nondistended  EXTREMITIES:  2+ Peripheral Pulses, brisk capillary refill. No clubbing, cyanosis, 2+ lower extremity edema  NERVOUS SYSTEM:  A&Ox3, no focal deficits   SKIN: CTS incision scar is present well healing.          LABS:                        11.1   8.80  )-----------( 289      ( 04 Aug 2023 04:18 )             34.5     08-04    140  |  97<L>  |  19  ----------------------------<  84  3.9   |  34<H>  |  0.7    Ca    9.1      04 Aug 2023 04:18  Mg     1.9     08-04    TPro  6.2  /  Alb  3.6  /  TBili  0.7  /  DBili  x   /  AST  27  /  ALT  24  /  AlkPhos  102  08-04    LIVER FUNCTIONS - ( 04 Aug 2023 04:18 )  Alb: 3.6 g/dL / Pro: 6.2 g/dL / ALK PHOS: 102 U/L / ALT: 24 U/L / AST: 27 U/L / GGT: x           PT/INR - ( 04 Aug 2023 04:18 )   PT: 29.40 sec;   INR: 2.51 ratio                         Urinalysis Basic - ( 04 Aug 2023 04:18 )    Color: x / Appearance: x / SG: x / pH: x  Gluc: 84 mg/dL / Ketone: x  / Bili: x / Urobili: x   Blood: x / Protein: x / Nitrite: x   Leuk Esterase: x / RBC: x / WBC x   Sq Epi: x / Non Sq Epi: x / Bacteria: x          RADIOLOGY & ADDITIONAL TESTS:  CXR:    Findings:    Support devices: Telemetry leads    Cardiac/mediastinum/hilum: Cardiomegaly. Status post median sternotomy   and valve repair. Pacing device overlies the thorax    Lung parenchyma/Pleura: CHF    Skeleton/soft tissues: Without difference    Impression:    Cardiomegaly. CHF.        ECHO :   Summary:   1. Hyperdynamic global left ventricular systolic function.   2. LV Ejection Fraction by Parada's Method with a biplane EF of 73 %.   3. Moderate asymmetric left ventricular hypertrophy involving the septal   wall.   4. Spectral Doppler shows pseudonormal pattern of left ventricular   myocardial filling (Grade II diastolic dysfunction).   5. Elevated mean left atrial pressure.   6. Mildly reduced RV systolic function.   7. Moderately enlarged left atrium.   8. Mildly enlarged right atrium.   9. Mild to moderate mitral valve regurgitation.  10. Mild anterior leaflet systolic anterior motion of the mitral valve   without significant LVOT obstruction.  11. Mild tricuspid regurgitation.  12. Estimated pulmonary artery systolic pressure is 42.0 mmHg assuming a   right atrial pressure of 15 mmHg, which is consistent with mild pulmonary   hypertension.  13. Bileaflet mechanical prosthesis in the aortic valve position. PG 18/9   mmHg consistent with normal prosthetic valve function.  14. Moderate pleural effusion in the right lateral region.          Care Discussed with Consultants/Other Providers [ x] YES  [ ] NO

## 2023-08-04 NOTE — PROGRESS NOTE ADULT - ATTENDING COMMENTS
s/p DCCV- now in sinus  Still fluid overloaded    Diuresis  Amiodarone, BB  OAC  Will discuss AF ablation as OP
Clinically improved  Remains in NSR  C/w current management  Coumadin dosing tonight.  F/u with cardiology, EP and CTS  D/c home today  See my prior discharge note for details.    32 min discharge planning.
· Assessment    IMPRESSION:  - Acute decompensated heart failure; BNP 3939  - Fibroelastoma s/p mechanical AVR on 6/29/2023  - Persistent Atrial fibrillation with RVR s/p JASON clipping  - HOCM with DEN?  - HTN      PLAN:    CNS: Avoid CNS Depressants.    HEENT: Oral care. Aspiration precautions     PULMONARY: HOB @ 45. Monitor Pulse Ox. Keep > 93%. O2 Drops at night to 80's%  Supplement as needed and diurese . Basal crackles appreciated    CARDIOVASCULAR:   - Lasix 40 MG q12 IV daily. Daily Weight. Strict I&Os. Keep I < O. Received additional 40 mg IV lasix on morning of  08/03. Repeat XR in PM  - CT surgery consult ; No indication for CT Surgery intervention at this time.  - Continue home meds (Amiodarone 200mg QD and metoprolol , Metoprolol increased to 50 mg PO TID).  Cardizem was dc'ed based on EP recommendations   - EP follow up , cardioversion done yesterday and patient is in sinus rhythm now   - Monitor electrolytes. Keep Mg > 2.2 and K > 4    GI: GI prophylaxis. DASH/TLC diet.     RENAL: Avoid nephrotoxic agents. Monitor Cr.    INFECTIOUS DISEASE: Trend fever curve.     HEMATOLOGICAL: Continue Warfarin for mechanical AVR. Check PT/INR daily, target 2.5-3.5    ENDOCRINE: Monitor FS. HbA1C 6.0 % [06-14-23]     MUSCULOSKELETAL: Ambulate as tolerated     CODE STATUS: Full Code      Dispo : possible discharge Tomorrow or transfer to T, monitor O2 requirements on ambulation

## 2023-08-04 NOTE — PROGRESS NOTE ADULT - SUBJECTIVE AND OBJECTIVE BOX
CHIEF COMPLAINT:  Patient is a 42y old  Female who presents with a chief complaint of AFIB rvr (04 Aug 2023 07:09)      INTERVAL HISTORY/OVERNIGHT EVENTS:  No major events. S/p successful MINDI/CV now in NSR.     ======================  MEDICATIONS:  aMIOdarone    Tablet 200 milliGRAM(s) Oral daily  chlorhexidine 2% Cloths 1 Application(s) Topical <User Schedule>  furosemide   Injectable 40 milliGRAM(s) IV Push two times a day  melatonin 5 milliGRAM(s) Oral at bedtime  metoprolol tartrate 50 milliGRAM(s) Oral every 8 hours  pantoprazole    Tablet 40 milliGRAM(s) Oral before breakfast  warfarin 1 milliGRAM(s) Oral once      ======================  PHYSICAL EXAMINATION:  GEN:  nad.   HEENT:  eomi. ncat  PULM:  b/l lung sounds   CARD: s1, s2  ABD: +bs. ntnd  EXT:  no new rashes.    NEURO:  no new focal deficits.   ======================  OBJECTIVE:    VS:  T(F): 97.6 (08-04 @ 04:00), Max: 98.2 (08-03 @ 20:00)  HR: 78 (08-04 @ 08:00) (72 - 82)  BP: 112/39 (08-04 @ 08:00) (81/57 - 112/39)  RR: 20 (08-04 @ 08:00) (20 - 69)  SpO2: 94% (08-04 @ 08:00) (94% - 99%)      I/O:      08-01 @ 07:01  -  08-02 @ 07:00  --------------------------------------------------------  IN: 170 mL / OUT: 1000 mL / NET: -830 mL    08-02 @ 07:01  -  08-03 @ 07:00  --------------------------------------------------------  IN: 620 mL / OUT: 2800 mL / NET: -2180 mL    08-03 @ 07:01 - 08-04 @ 07:00  --------------------------------------------------------  IN: 1130 mL / OUT: 3625 mL / NET: -2495 mL    08-04 @ 07:01  -  08-04 @ 11:23  --------------------------------------------------------  IN: 350 mL / OUT: 800 mL / NET: -450 mL        Weight trend:  Weight (kg): 85.8 (08-02)    ======================    LABS:                          11.1   8.80  )-----------( 289      ( 04 Aug 2023 04:18 )             34.5     08-04    140  |  97<L>  |  19  ----------------------------<  84  3.9   |  34<H>  |  0.7    Ca    9.1      04 Aug 2023 04:18  Mg     1.9     08-04    TPro  6.2  /  Alb  3.6  /  TBili  0.7  /  DBili  x   /  AST  27  /  ALT  24  /  AlkPhos  102  08-04    LIVER FUNCTIONS - ( 04 Aug 2023 04:18 )  Alb: 3.6 g/dL / Pro: 6.2 g/dL / ALK PHOS: 102 U/L / ALT: 24 U/L / AST: 27 U/L / GGT: x           PT/INR - ( 04 Aug 2023 04:18 )   PT: 29.40 sec;   INR: 2.51 ratio                   Urinalysis Basic - ( 04 Aug 2023 04:18 )    Color: x / Appearance: x / SG: x / pH: x  Gluc: 84 mg/dL / Ketone: x  / Bili: x / Urobili: x   Blood: x / Protein: x / Nitrite: x   Leuk Esterase: x / RBC: x / WBC x   Sq Epi: x / Non Sq Epi: x / Bacteria: x

## 2023-08-04 NOTE — PROGRESS NOTE ADULT - ASSESSMENT
IMPRESSION  A Fib with RVR on admission s/p successful MINDI/CV now in NSR  Acute HFpEF exacerbation improving  Fibroelastoma s/p recent mechanical AVR + JASON Clip on Coumadin  HOCM with DEN      RECOMMENDATIONS  c/w Amiodarone PO 200mg qd  c.w lopressor 50 TID, switch to Toprol 75 mg XL BID on d/c  keep off Cardizem   c/w coumadin, monitor INR  outpatient f/u for further management   IMPRESSION  A Fib with RVR on admission s/p successful MINDI/CV now in NSR  Acute HFpEF exacerbation improving  Fibroelastoma s/p recent mechanical AVR + JASON Clip on Coumadin  HOCM with DEN      RECOMMENDATIONS  c/w Amiodarone PO 200mg qd  c.w lopressor 50 TID, can consider Toprol 100 mg XL qd  keep off Cardizem   c/w coumadin, monitor INR  outpatient f/u for further management

## 2023-08-04 NOTE — PROGRESS NOTE ADULT - ASSESSMENT
IMPRESSION:  - Acute decompensated heart failure; BNP 3939  - Fibroelastoma s/p mechanical AVR on 6/29/2023  - Persistent Atrial fibrillation with RVR s/p JASON clipping  - HOCM with EDN?  - HTN      PLAN:    CNS: Avoid CNS Depressants.    HEENT: Oral care. Aspiration precautions      PULMONARY: HOB @ 45. Monitor Pulse Ox. Keep > 93%. O2 Drops at night to 80's%  Supplement as needed and diurese . Decreased breath sounds b/l at basal lung fields appreciated    CARDIOVASCULAR:   - Lasix 40 MG q12 IV daily. Daily Weight. Strict I&Os. Keep I < O. Repeated CXR this AM showed improvement in bilateral pleural effusion   - CT surgery consult ; No indication for CT Surgery intervention at this time.  - Continue home meds (Amiodarone 200mg QD and metoprolol , Metoprolol increased to 50 mg PO TID).  Cardizem was dc'ed based on EP recommendations   - EP follow up ,day 2 post cardioversion and patient is still in sinus rhythm since.  - Monitor electrolytes. Keep Mg > 2.2 and K > 4    GI: GI prophylaxis. DASH/TLC diet.     RENAL: Avoid nephrotoxic agents. Monitor Cr.    INFECTIOUS DISEASE: Trend fever curve.     HEMATOLOGICAL: Continue Warfarin for mechanical AVR. Check PT/INR daily, target 2.5-3.5    ENDOCRINE: Monitor FS. HbA1C 6.0 % [06-14-23]     MUSCULOSKELETAL: Ambulate as tolerated     CODE STATUS: Full Code      Dispo : possible discharge today    IMPRESSION:  - Acute decompensated heart failure; BNP 3939  - Fibroelastoma s/p mechanical AVR on 6/29/2023  - Persistent Atrial fibrillation with RVR s/p JASON clipping  - HOCM with DEN?  - HTN      PLAN:    CNS: Avoid CNS Depressants.    HEENT: Oral care. Aspiration precautions      PULMONARY: HOB @ 45. Monitor Pulse Ox. Keep > 93%.     CARDIOVASCULAR:   - Lasix 40 MG q12 IV daily. Daily Weight. Strict I&Os. Keep I < O. Repeated CXR this AM showed improvement in bilateral pleural effusion. discharge on lasix 40 mg PO QD  - CT surgery eval appreciated.   - Amiodarone 200mg QD and Metoprolol increased to 50 mg PO TID. Cardizem was dc'ed based on EP recommendations   - EP follow up ,day 2 post cardioversion and patient is still in sinus rhythm since.  - Monitor electrolytes. Keep Mg > 2.2 and K > 4    GI: GI prophylaxis. DASH/TLC diet.     RENAL: Avoid nephrotoxic agents. Monitor Cr.    INFECTIOUS DISEASE: Trend fever curve.     HEMATOLOGICAL: Continue Warfarin for mechanical AVR. Check PT/INR daily, target 2.5-3.5    ENDOCRINE: Monitor FS. HbA1C 6.0 % [06-14-23]     MUSCULOSKELETAL: Ambulate as tolerated     CODE STATUS: Full Code    Dispo: possible discharge today    IMPRESSION:  - Acute decompensated heart failure; BNP 3939  - Fibroelastoma s/p mechanical AVR on 6/29/2023  - Persistent Atrial fibrillation with RVR s/p JASON clipping  - HOCM with DEN?  - HTN      PLAN:    CNS: Avoid CNS Depressants.    HEENT: Oral care. Aspiration precautions      PULMONARY: HOB @ 45. Monitor Pulse Ox. Keep > 93%.     CARDIOVASCULAR:   - Lasix 40 MG q12 IV daily. Daily Weight. Strict I&Os. Keep I < O. Repeated CXR this AM showed improvement in bilateral pleural effusion. discharge on lasix 40 mg PO QD  - CT surgery eval appreciated.   - Amiodarone 200mg QD and Metoprolol increased to 50 mg PO TID. Cardizem was dc'ed based on EP recommendations   - EP follow up ,day 2 post cardioversion and patient is still in sinus rhythm since.  - Monitor electrolytes. Keep Mg > 2.2 and K > 4    GI: GI prophylaxis. DASH/TLC diet.     RENAL: Avoid nephrotoxic agents. Monitor Cr.    INFECTIOUS DISEASE: Trend fever curve.     HEMATOLOGICAL: Continue Warfarin for mechanical AVR. Check PT/INR daily, target 2.5-3.5    ENDOCRINE: Monitor FS. HbA1C 6.0 % [06-14-23]     MUSCULOSKELETAL: Ambulate as tolerated     CODE STATUS: Full Code    Dispo:  discharge today

## 2023-08-05 DIAGNOSIS — I48.19 OTHER PERSISTENT ATRIAL FIBRILLATION: ICD-10-CM

## 2023-08-05 NOTE — CHART NOTE - NSCHARTNOTEFT_GEN_A_CORE
Patient called the CCU. She stated that she has having mild nausea since the last couple of hours. She ate cereal with milk, and eggs a while after that. Since eating eggs, she has felt nauseous and had some spit up. She states that she has some mid-chest pressure that only occurs when she coughs. She denied CP, SOB or any other symptoms. She stated that a nurse came who checked her vitals and told her that they were normal. She has a pulse ox, O2 sat was 99% and HR was in the 70s, but twice went to 140-150 for a couple of seconds. I asked her to take Tums and see if her chest burning/pressure resolves. I also advised her to eat light foods and avoid spicy/fried foods for now. Further, I informed her that she should have a low threshold for returning to emergency department. She should come if her HR stays above 120 at rest for more than a minute or if she starts having any symptoms such as chest pain, shortness of breath, palpitations. She agreed with the plan.

## 2023-08-07 ENCOUNTER — TRANSCRIPTION ENCOUNTER (OUTPATIENT)
Age: 43
End: 2023-08-07

## 2023-08-08 ENCOUNTER — TRANSCRIPTION ENCOUNTER (OUTPATIENT)
Age: 43
End: 2023-08-08

## 2023-08-08 DIAGNOSIS — Z79.01 LONG TERM (CURRENT) USE OF ANTICOAGULANTS: ICD-10-CM

## 2023-08-08 DIAGNOSIS — I42.1 OBSTRUCTIVE HYPERTROPHIC CARDIOMYOPATHY: ICD-10-CM

## 2023-08-08 DIAGNOSIS — Z95.2 PRESENCE OF PROSTHETIC HEART VALVE: ICD-10-CM

## 2023-08-08 DIAGNOSIS — I48.19 OTHER PERSISTENT ATRIAL FIBRILLATION: ICD-10-CM

## 2023-08-08 DIAGNOSIS — I11.0 HYPERTENSIVE HEART DISEASE WITH HEART FAILURE: ICD-10-CM

## 2023-08-08 DIAGNOSIS — I50.31 ACUTE DIASTOLIC (CONGESTIVE) HEART FAILURE: ICD-10-CM

## 2023-08-09 ENCOUNTER — APPOINTMENT (OUTPATIENT)
Dept: MEDICATION MANAGEMENT | Facility: CLINIC | Age: 43
End: 2023-08-09
Payer: COMMERCIAL

## 2023-08-09 ENCOUNTER — APPOINTMENT (OUTPATIENT)
Dept: CARDIOTHORACIC SURGERY | Facility: CLINIC | Age: 43
End: 2023-08-09
Payer: COMMERCIAL

## 2023-08-09 ENCOUNTER — OUTPATIENT (OUTPATIENT)
Dept: OUTPATIENT SERVICES | Facility: HOSPITAL | Age: 43
LOS: 1 days | End: 2023-08-09
Payer: COMMERCIAL

## 2023-08-09 VITALS
TEMPERATURE: 97.7 F | OXYGEN SATURATION: 97 % | RESPIRATION RATE: 13 BRPM | WEIGHT: 195 LBS | DIASTOLIC BLOOD PRESSURE: 80 MMHG | BODY MASS INDEX: 36.82 KG/M2 | HEIGHT: 61 IN | HEART RATE: 113 BPM | SYSTOLIC BLOOD PRESSURE: 130 MMHG

## 2023-08-09 DIAGNOSIS — Z98.890 OTHER SPECIFIED POSTPROCEDURAL STATES: Chronic | ICD-10-CM

## 2023-08-09 DIAGNOSIS — I48.91 UNSPECIFIED ATRIAL FIBRILLATION: ICD-10-CM

## 2023-08-09 DIAGNOSIS — Z79.01 LONG TERM (CURRENT) USE OF ANTICOAGULANTS: ICD-10-CM

## 2023-08-09 LAB
INR PPP: 1.8 RATIO
POCT-PROTHROMBIN TIME: 21.7 SECS
QUALITY CONTROL: YES

## 2023-08-09 PROCEDURE — 85610 PROTHROMBIN TIME: CPT

## 2023-08-09 PROCEDURE — 99024 POSTOP FOLLOW-UP VISIT: CPT

## 2023-08-09 PROCEDURE — 99211 OFF/OP EST MAY X REQ PHY/QHP: CPT

## 2023-08-09 PROCEDURE — 36416 COLLJ CAPILLARY BLOOD SPEC: CPT

## 2023-08-09 NOTE — REASON FOR VISIT
[de-identified] : S/P Mechanical AVR, Removal of Aortic Valve Mass, LA  Appendage Clipping [de-identified] : 6/29/2023 [de-identified] : On Coumadin for Mechanical AVR.  Here for postop F/U.

## 2023-08-09 NOTE — ASSESSMENT
[FreeTextEntry1] : Ms. MALIHA THOMPSON is a 42 year F with PMH of Aflutter, cardiomyopathy, DLD, HTN. Ms. MEDINA was worked up for complaints of HOCM, and SOB. 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 Symptoms today are SOB on exertion. NYHA class III. On 06/29/23, she underwent resection of aortic valve  mass and left atrial appendage clipping. Post-operatively, the hospitalization course was prolonged due to atrial fibrillation with RVR, patient's anticoagulation levels, and leukocytosis. The patient was started on Coumadin  due to her mechanical valve and is now in the therapeutic range, INR 3.03. Electrophysiology was consulted on the patient for atrial fibrillation. Dr. Tyler saw the patient and recommended the patient continue therapeutic  anticoagulation, heart rate control, and to follow-up with the office in one month. The patient was also seen by Infectious disease due to leukocytosis. The patient was afebrile with her white blood cell count trending up. Blood  cultures went sent and was negative. IV antibiotics were held and patient is started on PO Keflex 500mg q8h x 7 days. Patient is medically optimized and is being discharged home today with home care services.   Plan: Mike Tyler Continue Lasix Continue Meds

## 2023-08-10 DIAGNOSIS — Z79.01 LONG TERM (CURRENT) USE OF ANTICOAGULANTS: ICD-10-CM

## 2023-08-10 DIAGNOSIS — I48.91 UNSPECIFIED ATRIAL FIBRILLATION: ICD-10-CM

## 2023-08-11 ENCOUNTER — TRANSCRIPTION ENCOUNTER (OUTPATIENT)
Age: 43
End: 2023-08-11

## 2023-08-11 ENCOUNTER — INPATIENT (INPATIENT)
Facility: HOSPITAL | Age: 43
LOS: 10 days | Discharge: HOME HEALTH | DRG: 273 | End: 2023-08-22
Attending: INTERNAL MEDICINE | Admitting: INTERNAL MEDICINE
Payer: COMMERCIAL

## 2023-08-11 VITALS
HEIGHT: 61 IN | DIASTOLIC BLOOD PRESSURE: 57 MMHG | TEMPERATURE: 98 F | SYSTOLIC BLOOD PRESSURE: 129 MMHG | RESPIRATION RATE: 20 BRPM | WEIGHT: 175.93 LBS | HEART RATE: 125 BPM | OXYGEN SATURATION: 97 %

## 2023-08-11 DIAGNOSIS — I50.9 HEART FAILURE, UNSPECIFIED: ICD-10-CM

## 2023-08-11 DIAGNOSIS — Z98.890 OTHER SPECIFIED POSTPROCEDURAL STATES: Chronic | ICD-10-CM

## 2023-08-11 LAB
ALBUMIN SERPL ELPH-MCNC: 4 G/DL — SIGNIFICANT CHANGE UP (ref 3.5–5.2)
ALP SERPL-CCNC: 120 U/L — HIGH (ref 30–115)
ALT FLD-CCNC: 31 U/L — SIGNIFICANT CHANGE UP (ref 0–41)
ANION GAP SERPL CALC-SCNC: 13 MMOL/L — SIGNIFICANT CHANGE UP (ref 7–14)
APTT BLD: 29.6 SEC — SIGNIFICANT CHANGE UP (ref 27–39.2)
AST SERPL-CCNC: 34 U/L — SIGNIFICANT CHANGE UP (ref 0–41)
BASE EXCESS BLDV CALC-SCNC: 7.9 MMOL/L — HIGH (ref -2–3)
BASOPHILS # BLD AUTO: 0.05 K/UL — SIGNIFICANT CHANGE UP (ref 0–0.2)
BASOPHILS NFR BLD AUTO: 0.4 % — SIGNIFICANT CHANGE UP (ref 0–1)
BILIRUB SERPL-MCNC: 1.2 MG/DL — SIGNIFICANT CHANGE UP (ref 0.2–1.2)
BUN SERPL-MCNC: 20 MG/DL — SIGNIFICANT CHANGE UP (ref 10–20)
CA-I SERPL-SCNC: 1.11 MMOL/L — LOW (ref 1.15–1.33)
CALCIUM SERPL-MCNC: 9.6 MG/DL — SIGNIFICANT CHANGE UP (ref 8.4–10.5)
CHLORIDE SERPL-SCNC: 95 MMOL/L — LOW (ref 98–110)
CO2 SERPL-SCNC: 27 MMOL/L — SIGNIFICANT CHANGE UP (ref 17–32)
CREAT SERPL-MCNC: 0.8 MG/DL — SIGNIFICANT CHANGE UP (ref 0.7–1.5)
EGFR: 94 ML/MIN/1.73M2 — SIGNIFICANT CHANGE UP
EOSINOPHIL # BLD AUTO: 0.21 K/UL — SIGNIFICANT CHANGE UP (ref 0–0.7)
EOSINOPHIL NFR BLD AUTO: 1.9 % — SIGNIFICANT CHANGE UP (ref 0–8)
GAS PNL BLDV: 131 MMOL/L — LOW (ref 136–145)
GAS PNL BLDV: SIGNIFICANT CHANGE UP
GLUCOSE SERPL-MCNC: 97 MG/DL — SIGNIFICANT CHANGE UP (ref 70–99)
HCO3 BLDV-SCNC: 31 MMOL/L — HIGH (ref 22–29)
HCT VFR BLD CALC: 35.1 % — LOW (ref 37–47)
HCT VFR BLDA CALC: 34 % — LOW (ref 39–51)
HGB BLD CALC-MCNC: 11.4 G/DL — LOW (ref 12.6–17.4)
HGB BLD-MCNC: 11.3 G/DL — LOW (ref 12–16)
IMM GRANULOCYTES NFR BLD AUTO: 0.6 % — HIGH (ref 0.1–0.3)
INR BLD: 1.89 RATIO — HIGH (ref 0.65–1.3)
LACTATE BLDV-MCNC: 1.9 MMOL/L — SIGNIFICANT CHANGE UP (ref 0.5–2)
LYMPHOCYTES # BLD AUTO: 1.6 K/UL — SIGNIFICANT CHANGE UP (ref 1.2–3.4)
LYMPHOCYTES # BLD AUTO: 14.4 % — LOW (ref 20.5–51.1)
MCHC RBC-ENTMCNC: 25.1 PG — LOW (ref 27–31)
MCHC RBC-ENTMCNC: 32.2 G/DL — SIGNIFICANT CHANGE UP (ref 32–37)
MCV RBC AUTO: 77.8 FL — LOW (ref 81–99)
MONOCYTES # BLD AUTO: 1.29 K/UL — HIGH (ref 0.1–0.6)
MONOCYTES NFR BLD AUTO: 11.6 % — HIGH (ref 1.7–9.3)
NEUTROPHILS # BLD AUTO: 7.92 K/UL — HIGH (ref 1.4–6.5)
NEUTROPHILS NFR BLD AUTO: 71.1 % — SIGNIFICANT CHANGE UP (ref 42.2–75.2)
NRBC # BLD: 0 /100 WBCS — SIGNIFICANT CHANGE UP (ref 0–0)
NT-PROBNP SERPL-SCNC: 5218 PG/ML — HIGH (ref 0–300)
PCO2 BLDV: 36 MMHG — LOW (ref 39–42)
PH BLDV: 7.54 — HIGH (ref 7.32–7.43)
PLATELET # BLD AUTO: 388 K/UL — SIGNIFICANT CHANGE UP (ref 130–400)
PMV BLD: 10.1 FL — SIGNIFICANT CHANGE UP (ref 7.4–10.4)
PO2 BLDV: 65 MMHG — SIGNIFICANT CHANGE UP
POTASSIUM BLDV-SCNC: 4 MMOL/L — SIGNIFICANT CHANGE UP (ref 3.5–5.1)
POTASSIUM SERPL-MCNC: 4.3 MMOL/L — SIGNIFICANT CHANGE UP (ref 3.5–5)
POTASSIUM SERPL-SCNC: 4.3 MMOL/L — SIGNIFICANT CHANGE UP (ref 3.5–5)
PROT SERPL-MCNC: 7.1 G/DL — SIGNIFICANT CHANGE UP (ref 6–8)
PROTHROM AB SERPL-ACNC: 22 SEC — HIGH (ref 9.95–12.87)
RBC # BLD: 4.51 M/UL — SIGNIFICANT CHANGE UP (ref 4.2–5.4)
RBC # FLD: 16.6 % — HIGH (ref 11.5–14.5)
SAO2 % BLDV: 93.9 % — SIGNIFICANT CHANGE UP
SODIUM SERPL-SCNC: 135 MMOL/L — SIGNIFICANT CHANGE UP (ref 135–146)
TROPONIN T SERPL-MCNC: 0.05 NG/ML — CRITICAL HIGH
WBC # BLD: 11.14 K/UL — HIGH (ref 4.8–10.8)
WBC # FLD AUTO: 11.14 K/UL — HIGH (ref 4.8–10.8)

## 2023-08-11 PROCEDURE — 85610 PROTHROMBIN TIME: CPT

## 2023-08-11 PROCEDURE — 82330 ASSAY OF CALCIUM: CPT

## 2023-08-11 PROCEDURE — 84295 ASSAY OF SERUM SODIUM: CPT

## 2023-08-11 PROCEDURE — C1769: CPT

## 2023-08-11 PROCEDURE — 84484 ASSAY OF TROPONIN QUANT: CPT

## 2023-08-11 PROCEDURE — 87641 MR-STAPH DNA AMP PROBE: CPT

## 2023-08-11 PROCEDURE — 36415 COLL VENOUS BLD VENIPUNCTURE: CPT

## 2023-08-11 PROCEDURE — 93656 COMPRE EP EVAL ABLTJ ATR FIB: CPT

## 2023-08-11 PROCEDURE — 84439 ASSAY OF FREE THYROXINE: CPT

## 2023-08-11 PROCEDURE — 97162 PT EVAL MOD COMPLEX 30 MIN: CPT | Mod: GP

## 2023-08-11 PROCEDURE — 85025 COMPLETE CBC W/AUTO DIFF WBC: CPT

## 2023-08-11 PROCEDURE — 0225U NFCT DS DNA&RNA 21 SARSCOV2: CPT

## 2023-08-11 PROCEDURE — 84132 ASSAY OF SERUM POTASSIUM: CPT

## 2023-08-11 PROCEDURE — 83605 ASSAY OF LACTIC ACID: CPT

## 2023-08-11 PROCEDURE — 85014 HEMATOCRIT: CPT

## 2023-08-11 PROCEDURE — 71045 X-RAY EXAM CHEST 1 VIEW: CPT | Mod: 26

## 2023-08-11 PROCEDURE — 99223 1ST HOSP IP/OBS HIGH 75: CPT

## 2023-08-11 PROCEDURE — 80053 COMPREHEN METABOLIC PANEL: CPT

## 2023-08-11 PROCEDURE — C1894: CPT

## 2023-08-11 PROCEDURE — 93306 TTE W/DOPPLER COMPLETE: CPT

## 2023-08-11 PROCEDURE — 94002 VENT MGMT INPAT INIT DAY: CPT

## 2023-08-11 PROCEDURE — 85018 HEMOGLOBIN: CPT

## 2023-08-11 PROCEDURE — 82728 ASSAY OF FERRITIN: CPT

## 2023-08-11 PROCEDURE — 80048 BASIC METABOLIC PNL TOTAL CA: CPT

## 2023-08-11 PROCEDURE — C1760: CPT

## 2023-08-11 PROCEDURE — C1766: CPT

## 2023-08-11 PROCEDURE — 87640 STAPH A DNA AMP PROBE: CPT

## 2023-08-11 PROCEDURE — 85730 THROMBOPLASTIN TIME PARTIAL: CPT

## 2023-08-11 PROCEDURE — 93657 TX L/R ATRIAL FIB ADDL: CPT

## 2023-08-11 PROCEDURE — 93318 ECHO TRANSESOPHAGEAL INTRAOP: CPT

## 2023-08-11 PROCEDURE — 94640 AIRWAY INHALATION TREATMENT: CPT

## 2023-08-11 PROCEDURE — 85027 COMPLETE CBC AUTOMATED: CPT

## 2023-08-11 PROCEDURE — 84480 ASSAY TRIIODOTHYRONINE (T3): CPT

## 2023-08-11 PROCEDURE — 83540 ASSAY OF IRON: CPT

## 2023-08-11 PROCEDURE — 84145 PROCALCITONIN (PCT): CPT

## 2023-08-11 PROCEDURE — 71045 X-RAY EXAM CHEST 1 VIEW: CPT

## 2023-08-11 PROCEDURE — 82803 BLOOD GASES ANY COMBINATION: CPT

## 2023-08-11 PROCEDURE — C9113: CPT

## 2023-08-11 PROCEDURE — 81001 URINALYSIS AUTO W/SCOPE: CPT

## 2023-08-11 PROCEDURE — 83550 IRON BINDING TEST: CPT

## 2023-08-11 PROCEDURE — 99233 SBSQ HOSP IP/OBS HIGH 50: CPT

## 2023-08-11 PROCEDURE — 94003 VENT MGMT INPAT SUBQ DAY: CPT

## 2023-08-11 PROCEDURE — 82962 GLUCOSE BLOOD TEST: CPT

## 2023-08-11 PROCEDURE — C1730: CPT

## 2023-08-11 PROCEDURE — 87186 SC STD MICRODIL/AGAR DIL: CPT

## 2023-08-11 PROCEDURE — 84443 ASSAY THYROID STIM HORMONE: CPT

## 2023-08-11 PROCEDURE — 86901 BLOOD TYPING SEROLOGIC RH(D): CPT

## 2023-08-11 PROCEDURE — 93655 ICAR CATH ABLTJ DSCRT ARRHYT: CPT

## 2023-08-11 PROCEDURE — 83880 ASSAY OF NATRIURETIC PEPTIDE: CPT

## 2023-08-11 PROCEDURE — 87086 URINE CULTURE/COLONY COUNT: CPT

## 2023-08-11 PROCEDURE — C1759: CPT

## 2023-08-11 PROCEDURE — 93005 ELECTROCARDIOGRAM TRACING: CPT

## 2023-08-11 PROCEDURE — 80061 LIPID PANEL: CPT

## 2023-08-11 PROCEDURE — 93623 PRGRMD STIMJ&PACG IV RX NFS: CPT

## 2023-08-11 PROCEDURE — 86900 BLOOD TYPING SEROLOGIC ABO: CPT

## 2023-08-11 PROCEDURE — 86850 RBC ANTIBODY SCREEN: CPT

## 2023-08-11 PROCEDURE — 99285 EMERGENCY DEPT VISIT HI MDM: CPT

## 2023-08-11 PROCEDURE — 83735 ASSAY OF MAGNESIUM: CPT

## 2023-08-11 PROCEDURE — C2630: CPT

## 2023-08-11 RX ORDER — FUROSEMIDE 40 MG
40 TABLET ORAL ONCE
Refills: 0 | Status: COMPLETED | OUTPATIENT
Start: 2023-08-11 | End: 2023-08-11

## 2023-08-11 RX ORDER — DILTIAZEM HCL 120 MG
20 CAPSULE, EXT RELEASE 24 HR ORAL ONCE
Refills: 0 | Status: COMPLETED | OUTPATIENT
Start: 2023-08-11 | End: 2023-08-11

## 2023-08-11 RX ORDER — DILTIAZEM HCL 120 MG
120 CAPSULE, EXT RELEASE 24 HR ORAL DAILY
Refills: 0 | Status: DISCONTINUED | OUTPATIENT
Start: 2023-08-11 | End: 2023-08-11

## 2023-08-11 RX ORDER — WARFARIN SODIUM 2.5 MG/1
1 TABLET ORAL ONCE
Refills: 0 | Status: COMPLETED | OUTPATIENT
Start: 2023-08-11 | End: 2023-08-11

## 2023-08-11 RX ORDER — AMIODARONE HYDROCHLORIDE 400 MG/1
200 TABLET ORAL DAILY
Refills: 0 | Status: DISCONTINUED | OUTPATIENT
Start: 2023-08-11 | End: 2023-08-13

## 2023-08-11 RX ORDER — METOPROLOL TARTRATE 50 MG
25 TABLET ORAL THREE TIMES A DAY
Refills: 0 | Status: DISCONTINUED | OUTPATIENT
Start: 2023-08-11 | End: 2023-08-12

## 2023-08-11 RX ORDER — ALPRAZOLAM 0.25 MG
0.25 TABLET ORAL DAILY
Refills: 0 | Status: DISCONTINUED | OUTPATIENT
Start: 2023-08-11 | End: 2023-08-13

## 2023-08-11 RX ADMIN — Medication 20 MILLIGRAM(S): at 14:49

## 2023-08-11 RX ADMIN — Medication 25 MILLIGRAM(S): at 17:02

## 2023-08-11 RX ADMIN — Medication 40 MILLIGRAM(S): at 20:03

## 2023-08-11 RX ADMIN — Medication 40 MILLIGRAM(S): at 19:09

## 2023-08-11 RX ADMIN — Medication 25 MILLIGRAM(S): at 21:53

## 2023-08-11 RX ADMIN — WARFARIN SODIUM 1 MILLIGRAM(S): 2.5 TABLET ORAL at 22:33

## 2023-08-11 NOTE — ED PROVIDER NOTE - OBJECTIVE STATEMENT
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  Patient spoke to her EP Dr. Tyler today who instructed her to go to ED.  Patient reports that she usually feels short of breath when she is in A-fib RVR.  Also reports increase in leg swelling.  Denies chest pain, abdominal pain, nausea/vomiting, lightheadedness, dizziness, syncope, recent fevers

## 2023-08-11 NOTE — ED PROVIDER NOTE - ATTENDING CONTRIBUTION TO CARE
42-year-old female to ED with shortness of breath.  Patient with a history of pleural effusion status post CABG. no fevers no sick contacts or travels or trauma.  Patient states she was sent in for diuresis.  Last visit to the ED she had significant fluid overload as she had stopped her diuretics.  exam as noted, CTAB, RRR, abdomen soft NTND, (+) bowel sounds,

## 2023-08-11 NOTE — H&P ADULT - HISTORY OF PRESENT ILLNESS
Patient is a 42y old  Female, patient of Dr. 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 She underwent aortic valve mass resection and left atrial appendage clipping and aortic valve replacement, post op c/b AF, persistent, despite MINDI/DCCV 8/2/23 patient remains in AF presenting with worsening dyspnea that was previously only when walking long distances is now occurring at rest. Patient states she was told to go to the by Dr. Tyler yesterday but she was experiencing a perisstent cough, however she noticed that when she became more short of breath she new she was in afib w RVR. Patient also reported that last week she called the CCU because she was experiencing  chest discomfort while eating her morning breakfast and she decided not to come to the ED because it resolved by the time her visiting nurse arrived. Admits to cough over the past few days and was given tessalon pearles as needed to help. denies changes in bowel movement, recent chest pain, or difficulty urinating     In the ED, EKG afib w rvr 110-140, given cardizem 20 IVP x1, /67. CXR showing increased bilateral opacities.

## 2023-08-11 NOTE — CONSULT NOTE ADULT - SUBJECTIVE AND OBJECTIVE BOX
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 She underwent aortic valve mass resection and left atrial appendage clipping and aortic valve replacement, post op c/b AF, persistent, despite MINDI/DCCV 23 patient remains in AF, with worsening dyspnea, now at rest  presents for admission for diuresis, medical optimization, possible AF ablation on Monday    REVIEW OF SYSTEMS    [ ] A ten-point review of systems was otherwise negative except as noted.  [ ] Due to altered mental status/intubation, subjective information were not able to be obtained from the patient. History was obtained, to the extent possible, from review of the chart and collateral sources of information.      PAST MEDICAL & SURGICAL HISTORY:  Hypertrophic cardiomyopathy      Atrial fibrillation      Aortic valve disease      Obese      S/P transesophageal echocardiogram (MINDI)          Home Medications:      Allergies:  No Known Allergies      FAMILY HISTORY:  Known health problems: none (Father, Mother)        SOCIAL HISTORY:    CIGARETTES:  ALCOHOL:  MARIJUANA:  ILLICIT DRUGS:        PREVIOUS DIAGNOSTIC TESTING:      ECHO  FINDINGS:    STRESS  FINDINGS:    CATHETERIZATION  FINDINGS:    ELECTROPHYSIOLOGY STUDY  FINDINGS:    CAROTID ULTRASOUND:  FINDINGS    VENOUS DUPLEX SCAN:  FINDINGS:    CHEST CT PULMONARY ANGIO with IV Contrast:  FINDINGS:      MEDICATIONS  (STANDING):    MEDICATIONS  (PRN):      Vital Signs Last 24 Hrs  T(C): 36.8 (11 Aug 2023 13:58), Max: 36.8 (11 Aug 2023 13:58)  T(F): 98.2 (11 Aug 2023 13:58), Max: 98.2 (11 Aug 2023 13:58)  HR: 114 (11 Aug 2023 14:56) (114 - 125)  BP: 115/68 (11 Aug 2023 14:56) (115/68 - 129/57)  BP(mean): --  RR: 18 (11 Aug 2023 14:56) (18 - 20)  SpO2: 99% (11 Aug 2023 14:56) (97% - 99%)    Parameters below as of 11 Aug 2023 14:56  Patient On (Oxygen Delivery Method): room air        PHYSICAL EXAM:    GENERAL: In no apparent distress, well nourished, and hydrated.  HEAD:  Atraumatic, Normocephalic  EYES: EOMI, PERRLA, conjunctiva and sclera clear  NECK: Supple and normal thyroid.  No JVD or carotid bruit.  Carotid pulse is 2+ bilaterally.  HEART: Regular rate and rhythm; No murmurs, rubs, or gallops.  PULMONARY: Clear to auscultation and perfusion.  No rales, wheezing, or rhonchi bilaterally.  ABDOMEN: Soft, Nontender, Nondistended; Bowel sounds present  EXTREMITIES:  2+ Peripheral Pulses, No clubbing, cyanosis, or edema  NEUROLOGICAL: Grossly nonfocal    I&O's Detail    Daily Height in cm: 154.94 (11 Aug 2023 13:58)    Daily     INTERPRETATION OF TELEMETRY:    EC Lead ECG:   Ventricular Rate 136 BPM    Atrial Rate 136 BPM    QRS Duration 108 ms    Q-T Interval 350 ms    QTC Calculation(Bazett) 526 ms    R Axis 270 degrees    T Axis 104 degrees    Diagnosis Line Atrial fibrillation with rapid ventricular response  Incomplete right bundle branch block  Anterolateral infarct , age undetermined  Abnormal ECG    Confirmed by Chaim Nobles ([id number]) on 2023 2:58:27 PM (23 @ 14:07)        LABS:                        11.3   11.14 )-----------( 388      ( 11 Aug 2023 15:41 )             35.1                   BNP            RADIOLOGY & ADDITIONAL STUDIES:       Patient is a 42y old  Female who presents with a chief complaint of       HPI:      Electrophysiology:  42y Female with h/o afib/ aflutter , cardiomyopathy, DLD, HTN and 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 She underwent aortic valve mass resection and left atrial appendage clipping and mechanical aortic valve replacement (on Coumadin), post op c/b AF, persistent, despite successful MINDI/DCCV 23, patient reverted to AF, associated with worsening dyspnea, now at rest, associated with non-productive cough with minimal talking  Denies chest pain, dizziness/lightheadedness, LOC  Patient was recommended to go to ED for admission for diuresis, medical optimization, possible AF ablation on Monday.    REVIEW OF SYSTEMS    [ x] A ten-point review of systems was otherwise negative except as noted.  [ ] Due to altered mental status/intubation, subjective information were not able to be obtained from the patient. History was obtained, to the extent possible, from review of the chart and collateral sources of information.      PAST MEDICAL & SURGICAL HISTORY:  Hypertrophic cardiomyopathy      Atrial fibrillation      Aortic valve disease      Obese      S/P transesophageal echocardiogram (MINDI)          Home Medications:  · 	Toprol- mg oral tablet, extended release: 1 tab(s) orally once a day  · 	Lasix 40 mg oral tablet: 1 tab(s) orally once a day  · 	amiodarone 200 mg oral tablet: 1 tab(s) orally once a day  · 	warfarin 1 mg oral tablet: 1 tab(s) orally once a day (at bedtime) follow up with coumadin clinic to monitor INR levels closely and maintain a therapeutic range of 2.5-3.5    Allergies:  No Known Allergies      FAMILY HISTORY:  Known health problems: none (Father, Mother)        SOCIAL HISTORY: denies tobacco / ETOH / illicit drug use     CIGARETTES:  ALCOHOL:  MARIJUANA:  ILLICIT DRUGS:        PREVIOUS DIAGNOSTIC TESTING:      ECHO  FINDINGS:    < from: TTE Echo Complete w/o Contrast w/ Doppler (23 @ 15:03) >  Summary:   1. Hyperdynamic global left ventricular systolic function.   2. LV Ejection Fraction by Parada's Method with a biplane EF of 73 %.   3. Moderate asymmetric left ventricular hypertrophy involving the septal   wall.   4. Spectral Doppler shows pseudonormal pattern of left ventricular   myocardial filling (Grade II diastolic dysfunction).   5. Elevated mean left atrial pressure.   6. Mildly reduced RV systolic function.   7. Moderately enlarged left atrium.   8. Mildly enlarged right atrium.   9. Mild to moderate mitral valve regurgitation.  10. Mild anterior leaflet systolic anterior motion of the mitral valve   without significant LVOT obstruction.  11. Mild tricuspid regurgitation.  12. Estimated pulmonary artery systolic pressure is 42.0 mmHg assuming a   right atrial pressure of 15 mmHg, which is consistent with mild pulmonary   hypertension.  13. Bileaflet mechanical prosthesis in the aortic valve position. PG 18/9   mmHg consistent with normal prosthetic valve function.  14. Moderate pleural effusion in the right lateral region.    < end of copied text >    < from: Transesophageal Echocardiogram (23 @ 09:26) >  Summary:   1. Normal global left ventricular systolic function.   2. Left ventricular ejection fraction, by visual estimation, is 50 to   55%.   3. Hypertrophic cardiomyopathy.   4. No left atrial appendage thrombus. JASON is small s/p clip.   5. Aortic valve is normally functioning mechanical prosthetic valve.   Valve is well expanded. No perivalvular leak.   6. Mild left ventricular hypertrophy.   7. Severely enlarged left atrium.   8. Moderately enlarged right atrium.   9. Mild mitral valve regurgitation.  10. Successful cardioversion to NSR with 200J of direct current.    < end of copied text >      NON-INVASIVE IMAGING  FINDINGS:  < from: MR Cardiac w/wo IV Cont (23 @ 21:13) >  IMPRESSION:    1.  Please note, arrythmia during study may affect accuracy of   measurements/volumetrics.  2.  Given image quality, prior CCTA dated 2023 reviewed. 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 Consider further evaluation   with full gated Chest CT.  3.  The left ventricle (LV) is normal in size. There is asymmetric septal   hypertrophy, with a maximum wall thickness of the mid inferoseptum of   20.4 mm. Left ventricular global systolic function is normal. The LV   ejection fraction is 57 %.  4.  Dilated left atrium.  5.  The right ventricle (RV) is normal in size. RV global systolic   function is mildly reduced. The RV ejection fraction is 45 %.  6.  On 3 chamber cine imaging, there is systolic anterior motion of the   mitral valve leaflet noted. Mitral Regurgitation noted with a Regurgitant   fraction of 21 cc. However, please note, arrythmia may affect accuracy of   quantification. Correlate with echocardiogram.  7.  Aortic valve leaflets not well visualized.  8.  Small to moderate size circumferential pericardial effusion.  9.  No significant abnormalities of the visualized portions of the great   vessels.  10.  On delayed enhancement imaging,  there is diffuse patchy enhancement   of the basal to apical septum, part of which corresponds to areas of   hypertrophy. There is also patchy enhancement of the mid to apical   inferior region. Findings are seen in hypertrophic cardiomyopathy.    Accurate quantification of enhancement is precluded by image quality   (i.e. quantification range estimates 10-20%).      < end of copied text >    < from: CT Angio Cardiac w/ IV Cont (23 @ 15:42) >  IMPRESSION:    Cardiac:  1.  The calcium score is 0 Agatston units.  2.  OM2 is suboptimally visualized, but appears non obstructive.  3.  Distal LAD not well visualized.  4.  Remaining segments appear angiographically normal.  5.  Severe left atrial dilatation.  6.  Left ventricular septal hypertrophy    20 mm.  7.  There is a 6 x 5 m  hypoattenuating mass attached to the ventricular   aspect of the left coronary cusp of the aortic valve..    Non-cardiac:  Small pericardial effusion.      < end of copied text >      CATHETERIZATION  FINDINGS:    ELECTROPHYSIOLOGY STUDY  FINDINGS:    CAROTID ULTRASOUND:  FINDINGS  < from: VA Duplex Carotid, Bilat (23 @ 10:35) >  IMPRESSION: [id number]% stenosis bilateral internal carotid.    < end of copied text >    VENOUS DUPLEX SCAN:  FINDINGS:    CHEST CT PULMONARY ANGIO with IV Contrast:  FINDINGS:      MEDICATIONS  (STANDING):    MEDICATIONS  (PRN):      Vital Signs Last 24 Hrs  T(C): 36.8 (11 Aug 2023 13:58), Max: 36.8 (11 Aug 2023 13:58)  T(F): 98.2 (11 Aug 2023 13:58), Max: 98.2 (11 Aug 2023 13:58)  HR: 114 (11 Aug 2023 14:56) (114 - 125)  BP: 115/68 (11 Aug 2023 14:56) (115/68 - 129/57)  BP(mean): --  RR: 18 (11 Aug 2023 14:56) (18 - 20)  SpO2: 99% (11 Aug 2023 14:56) (97% - 99%)    Parameters below as of 11 Aug 2023 14:56  Patient On (Oxygen Delivery Method): room air        PHYSICAL EXAM:    GENERAL: In no apparent distress, well nourished, and hydrated.  HEAD:  Atraumatic, Normocephalic  EYES: EOMI, PERRLA, conjunctiva and sclera clear  NECK: Supple and normal thyroid.  No JVD or carotid bruit.  Carotid pulse is 2+ bilaterally.  HEART: IRRegular rate and rhythm; mechanical click in AV position, No rubs, or gallops.  PULMONARY: bibasilar rales. diffuse rhonchi    ABDOMEN: Soft, Nontender, Nondistended; Bowel sounds present  EXTREMITIES:  2+ Peripheral Pulses, No clubbing, cyanosis, or 2+edema  NEUROLOGICAL: Grossly nonfocal    I&O's Detail    Daily Height in cm: 154.94 (11 Aug 2023 13:58)    Daily     INTERPRETATION OF TELEMETRY:    EC Lead ECG:   Ventricular Rate 136 BPM    Atrial Rate 136 BPM    QRS Duration 108 ms    Q-T Interval 350 ms    QTC Calculation(Bazett) 526 ms    R Axis 270 degrees    T Axis 104 degrees    Diagnosis Line Atrial fibrillation with rapid ventricular response  Incomplete right bundle branch block  Anterolateral infarct , age undetermined  Abnormal ECG    Confirmed by Chaim Nobles ([id number]) on 2023 2:58:27 PM (23 @ 14:07)        LABS:                        11.3   11.14 )-----------( 388      ( 11 Aug 2023 15:41 )             35.1                   BNP            RADIOLOGY & ADDITIONAL STUDIES:

## 2023-08-11 NOTE — ED ADULT TRIAGE NOTE - IDEAL BODY WEIGHT(KG)
Monty Hancock requesting refill for pantoprazole 40 mg as a 90 day supply vs 30 day supply sent e-scribed 07/28/2021. Please sign off on pended orders if appropriate. Of note, patient scheduled for colonoscopy with Dr. Estela Adam 09/28/2021.  Pre 48

## 2023-08-11 NOTE — H&P ADULT - ASSESSMENT
Assessment:  Patient is a 42y old  Female, patient of Dr. 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 She underwent aortic valve mass resection and left atrial appendage clipping and aortic valve replacement, post op c/b AF, persistent, despite MINDI/DCCV 8/2/23 patient remains in AF, with worsening dyspnea, now at rest presents for admission for AF w RVR i/s/o acute on chronic diastolic heart failure exacerbation with plans for possible AF ablation on Monday    Problems discussed and associated plan:  #AFIB W RVR  - cardizem 20 mg IVP given x1 in the ED   - started on lopressor 25 TID, please increase to 50 TID then 50 q6 if patient remains tachycardic   - c/w amiodarone 200 mg QD  - plan for inpatient ablation on Monday with Dr. Tyler as per EP     #Acute on chronic diastolic heart failure   - obtain new 2D echo last EF 73% in june   - Lasix 40 mg IVP BID   - BID BMP   - monitor BMP closely     #elevated troponin   - 0.0 then 0.05   - please continue to trend   - likely i/s/o Afib w RVR + CHF exacerbation     #fibroblastoma s/p macanical AVR in 6/2023  - continue coumadin 1 mg QD   - close monitoring of INR 2.5-3.5    #Cough  - elevated WBC   - on tessalon pearles prn at home   - will monitor with elevated troponin for signs of MI       #Diet Dash/TLC   #Activity IAT  #DVT ppx home coumadin by inr   #GI ppx protonix    Please contact me with any questions or concerns at [phone]. Assessment:  Patient is a 42y old  Female, patient of Dr. 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 She underwent aortic valve mass resection and left atrial appendage clipping and aortic valve replacement, post op c/b AF, persistent, despite MINDI/DCCV 8/2/23 patient remains in AF, with worsening dyspnea, now at rest presents for admission for AF w RVR i/s/o acute on chronic diastolic heart failure exacerbation with plans for possible AF ablation on Monday    Problems discussed and associated plan:  #AFIB W RVR  - cardizem 20 mg IVP given x1 in the ED   - started on lopressor 25 TID, please increase to 50 TID then 50 q6 if patient remains tachycardic   - c/w amiodarone 200 mg QD  - plan for inpatient ablation on Monday with Dr. Tyler as per EP     #Acute on chronic diastolic heart failure   - obtain new 2D echo last EF 73% in june   - Lasix 40 mg IVP BID   - BID BMP   - monitor BMP closely     #elevated troponin   - 0.0 then 0.05   - please continue to trend   - likely i/s/o Afib w RVR + CHF exacerbation     #fibroblastoma s/p mechanical AVR in 6/2023  - continue coumadin 1 mg QD   - close monitoring of INR 2.5-3.5    #Cough  - elevated WBC   - on tessalon pearles prn at home   - will monitor with elevated troponin for signs of MI       #Diet Dash/TLC   #Activity IAT  #DVT ppx home coumadin by inr   #GI ppx protonix    Please contact me with any questions or concerns at [phone].

## 2023-08-11 NOTE — H&P ADULT - NSHPLABSRESULTS_GEN_ALL_CORE
Data reviewed:  - Telemetry: Atrial Fibrillation 110-140   - ECG (date 8/11/23):   Ventricular Rate 136 BPM    Atrial Rate 136 BPM    QRS Duration 108 ms    Q-T Interval 350 ms    QTC Calculation(Bazett) 526 ms    R Axis 270 degrees    T Axis 104 degrees    Diagnosis Line Atrial fibrillation with rapid ventricular response  Incomplete right bundle branch block  Anterolateral infarct , age undetermined  Abnormal ECG    Confirmed by Chaim Nobles ([id number]) on 8/11/2023 2:58:27 PM    - TTE (date 8/2/23):   Summary:   1. Hyperdynamic global left ventricular systolic function.   2. LV Ejection Fraction by Parada's Method with a biplane EF of 73 %.   3. Moderate asymmetric left ventricular hypertrophy involving the septal wall.   4. Spectral Doppler shows pseudonormal pattern of left ventricular   myocardial filling (Grade II diastolic dysfunction).   5. Elevated mean left atrial pressure.   6. Mildly reduced RV systolic function.   7. Moderately enlarged left atrium.   8. Mildly enlarged right atrium.   9. Mild to moderate mitral valve regurgitation.  10. Mild anterior leaflet systolic anterior motion of the mitral valve without significant LVOT obstruction.  11. Mild tricuspid regurgitation.  12. Estimated pulmonary artery systolic pressure is 42.0 mmHg assuming a right atrial pressure of 15 mmHg, which is consistent with mild pulmonary hypertension.  13. Bileaflet mechanical prosthesis in the aortic valve position. PG 18/9 mmHg consistent with normal prosthetic valve function.  14. Moderate pleural effusion in the right lateral region.    - Chest x-ray (date 8/11/23):   Impression:    Small right pleural effusion.    - Cardiac MRI: 6/7/23  1.  Please note, arrythmia during study may affect accuracy of   measurements/volumetrics.  2.  Given image quality, prior CCTA dated 05/09/2023 reviewed. 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 Consider further evaluation   with full gated Chest CT.  3.  The left ventricle (LV) is normal in size. There is asymmetric septal   hypertrophy, with a maximum wall thickness of the mid inferoseptum of   20.4 mm. Left ventricular global systolic function is normal. The LV   ejection fraction is 57 %.  4.  Dilated left atrium.  5.  The right ventricle (RV) is normal in size. RV global systolic   function is mildly reduced. The RV ejection fraction is 45 %.  6.  On 3 chamber cine imaging, there is systolic anterior motion of the   mitral valve leaflet noted. Mitral Regurgitation noted with a Regurgitant   fraction of 21 cc. However, please note, arrythmia may affect accuracy of   quantification. Correlate with echocardiogram.  7.  Aortic valve leaflets not well visualized.  8.  Small to moderate size circumferential pericardial effusion.  9.  No significant abnormalities of the visualized portions of the great   vessels.  10.  On delayed enhancement imaging,  there is diffuse patchy enhancement   of the basal to apical septum, part of which corresponds to areas of   hypertrophy. There is also patchy enhancement of the mid to apical   inferior region. Findings are seen in hypertrophic cardiomyopathy.    Accurate quantification of enhancement is precluded by image quality   (i.e. quantification range estimates 10-20%).    The sequences used in this study were designed for imaging cardiac   structures and are suboptimal for imaging other structures and organs.      - Labs:                        11.3   11.14 )-----------( 388      ( 11 Aug 2023 15:41 )             35.1     Thyroid Stimulating Hormone, Serum: 5.58 uIU/mL (08-02-23 @ 04:40)

## 2023-08-11 NOTE — H&P ADULT - NSHPPHYSICALEXAM_GEN_ALL_CORE
Vitals:  T(F): 98.2, Max: 98.2 (08-11 @ 13:58)  HR: 114 (114 - 125)  BP: 115/68 (115/68 - 129/57)  RR: 18 (18 - 20)  SpO2: 99% (97% - 99%)    Ins & outs:     Weight trend:  Weight (kg): 79.8 (08-11)    Physical exam:  General: No apparent distress  HEENT: Anicteric sclera. Moist mucous membranes. JVD-  Cardiac: Regular rate and rhythm. No murmurs, rubs, or gallops.   Vascular: Symmetric radial pulses. Dorsalis pedis pulses palpable.   Respiratory: Normal effort. Bibasilar crackles.   Abdomen: Soft, nontender. Audible bowel sounds.   Extremities: Warm with +2 bilateral lower extremity edema. No cyanosis or clubbing.   Skin: Warm and dry. No rash.   Neurologic: Grossly normal motor function.   Psychiatric: Oriented to person, place, and time.

## 2023-08-11 NOTE — H&P ADULT - NS ATTEND AMEND GEN_ALL_CORE FT
Patient was seen and evaluated by me on 8/11/23.     Impression:   Afib with RVR: First diagnosed in 2018  HOCM with DEN: No gradient seen on TTE dated 8/02/23, Patient was seen and evaluated by me on 8/11/23.     Impression:   Afib with RVR, s/p JASON clipping (6/29/23): First diagnosed in 2018  HOCM with DEN: Previously followed at [facility] and recommended myomectomy, no procedural interventions have been done  Aortic valve fibroelastoma s/p mechanical AVR (6/29/23), on coumadin  HTN  HLD  Chronic cough    Patient presents with worsening dyspnea on exertion, found to be in Afib with RVR. Her HR is being managed with Lopressor, with plan for ablation on 8/14/23.     CXR with small right pleural effusion. However, given her HOCM, no plan for diuresis at this moment.

## 2023-08-11 NOTE — ED ADULT NURSE NOTE - NSFALLHARMRISKINTERV_ED_ALL_ED

## 2023-08-11 NOTE — ED PROVIDER NOTE - PHYSICAL EXAMINATION
CONSTITUTIONAL: Well-developed; well-nourished; in no acute distress.   SKIN: Warm, dry  HEAD: Normocephalic; atraumatic  EYES: PERRL, EOMI, normal sclera and conjunctiva   ENT: No nasal discharge; airway clear.  CARD:  Tachycardic. Normal S1, S2. 2+ radial pulses. 2+ bilateral LE edema.   RESP: No increased WOB. Bibasilar crackles R>L  ABD: Normoactive BS. Soft, nontender, nondistended.  EXT: Normal ROM.   NEURO: Alert, oriented, grossly unremarkable  PSYCH: Cooperative, appropriate.

## 2023-08-11 NOTE — PATIENT PROFILE ADULT - FALL HARM RISK - HARM RISK INTERVENTIONS

## 2023-08-11 NOTE — CONSULT NOTE ADULT - ASSESSMENT
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 She underwent aortic valve mass resection and left atrial appendage clipping and mechanical aortic valve replacement (on Coumadin), post op c/b AF, persistent, despite successful MINDI/DCCV 8/2/23, patient reverted to AF, associated with worsening dyspnea, now at rest  presents for admission for diuresis, medical optimization, possible AF ablation on Monday    admit to [facility]   daily weights  strict I/O  Maintain electrolytes K>4.0 Mg >2.0   count AC  will follow

## 2023-08-11 NOTE — ED ADULT TRIAGE NOTE - CHIEF COMPLAINT QUOTE
Pt here reports hx of afib and open heart surgery 6/29. Pt reports " I need to have fluid removed. and my doctor wants to do an ablation or cardioversion " EKG done.

## 2023-08-12 LAB
ALBUMIN SERPL ELPH-MCNC: 3.9 G/DL — SIGNIFICANT CHANGE UP (ref 3.5–5.2)
ALP SERPL-CCNC: 106 U/L — SIGNIFICANT CHANGE UP (ref 30–115)
ALT FLD-CCNC: 28 U/L — SIGNIFICANT CHANGE UP (ref 0–41)
ANION GAP SERPL CALC-SCNC: 13 MMOL/L — SIGNIFICANT CHANGE UP (ref 7–14)
APPEARANCE UR: ABNORMAL
APPEARANCE UR: CLEAR — SIGNIFICANT CHANGE UP
APTT BLD: 27.7 SEC — SIGNIFICANT CHANGE UP (ref 27–39.2)
AST SERPL-CCNC: 31 U/L — SIGNIFICANT CHANGE UP (ref 0–41)
BACTERIA # UR AUTO: ABNORMAL /HPF
BACTERIA # UR AUTO: ABNORMAL /HPF
BILIRUB SERPL-MCNC: 1.1 MG/DL — SIGNIFICANT CHANGE UP (ref 0.2–1.2)
BILIRUB UR-MCNC: NEGATIVE — SIGNIFICANT CHANGE UP
BILIRUB UR-MCNC: NEGATIVE — SIGNIFICANT CHANGE UP
BUN SERPL-MCNC: 21 MG/DL — HIGH (ref 10–20)
CALCIUM SERPL-MCNC: 9 MG/DL — SIGNIFICANT CHANGE UP (ref 8.4–10.5)
CAST: 16 /LPF — HIGH (ref 0–4)
CAST: 3 /LPF — SIGNIFICANT CHANGE UP (ref 0–4)
CHLORIDE SERPL-SCNC: 93 MMOL/L — LOW (ref 98–110)
CHOLEST SERPL-MCNC: 148 MG/DL — SIGNIFICANT CHANGE UP
CO2 SERPL-SCNC: 27 MMOL/L — SIGNIFICANT CHANGE UP (ref 17–32)
COLOR SPEC: SIGNIFICANT CHANGE UP
COLOR SPEC: YELLOW — SIGNIFICANT CHANGE UP
CREAT SERPL-MCNC: 0.9 MG/DL — SIGNIFICANT CHANGE UP (ref 0.7–1.5)
DIFF PNL FLD: ABNORMAL
DIFF PNL FLD: ABNORMAL
EGFR: 81 ML/MIN/1.73M2 — SIGNIFICANT CHANGE UP
FERRITIN SERPL-MCNC: 53 NG/ML — SIGNIFICANT CHANGE UP (ref 15–150)
GLUCOSE SERPL-MCNC: 111 MG/DL — HIGH (ref 70–99)
GLUCOSE UR QL: NEGATIVE MG/DL — SIGNIFICANT CHANGE UP
GLUCOSE UR QL: NEGATIVE MG/DL — SIGNIFICANT CHANGE UP
HCT VFR BLD CALC: 34.3 % — LOW (ref 37–47)
HDLC SERPL-MCNC: 21 MG/DL — LOW
HGB BLD-MCNC: 10.6 G/DL — LOW (ref 12–16)
HYALINE CASTS # UR AUTO: 16 /LPF — HIGH (ref 0–4)
HYALINE CASTS # UR AUTO: 3 /LPF — SIGNIFICANT CHANGE UP (ref 0–4)
INR BLD: 2.01 RATIO — HIGH (ref 0.65–1.3)
IRON SATN MFR SERPL: 25 UG/DL — LOW (ref 35–150)
IRON SATN MFR SERPL: 6 % — LOW (ref 15–50)
KETONES UR-MCNC: ABNORMAL MG/DL
KETONES UR-MCNC: NEGATIVE MG/DL — SIGNIFICANT CHANGE UP
LEUKOCYTE ESTERASE UR-ACNC: ABNORMAL
LEUKOCYTE ESTERASE UR-ACNC: ABNORMAL
LIPID PNL WITH DIRECT LDL SERPL: 112 MG/DL — HIGH
MAGNESIUM SERPL-MCNC: 1.9 MG/DL — SIGNIFICANT CHANGE UP (ref 1.8–2.4)
MCHC RBC-ENTMCNC: 24.1 PG — LOW (ref 27–31)
MCHC RBC-ENTMCNC: 30.9 G/DL — LOW (ref 32–37)
MCV RBC AUTO: 78 FL — LOW (ref 81–99)
NITRITE UR-MCNC: NEGATIVE — SIGNIFICANT CHANGE UP
NITRITE UR-MCNC: POSITIVE
NON HDL CHOLESTEROL: 127 MG/DL — SIGNIFICANT CHANGE UP
NRBC # BLD: 0 /100 WBCS — SIGNIFICANT CHANGE UP (ref 0–0)
PH UR: 5.5 — SIGNIFICANT CHANGE UP (ref 5–8)
PH UR: 6.5 — SIGNIFICANT CHANGE UP (ref 5–8)
PLATELET # BLD AUTO: 346 K/UL — SIGNIFICANT CHANGE UP (ref 130–400)
PMV BLD: 10.2 FL — SIGNIFICANT CHANGE UP (ref 7.4–10.4)
POTASSIUM SERPL-MCNC: 3.8 MMOL/L — SIGNIFICANT CHANGE UP (ref 3.5–5)
POTASSIUM SERPL-SCNC: 3.8 MMOL/L — SIGNIFICANT CHANGE UP (ref 3.5–5)
PROT SERPL-MCNC: 6.3 G/DL — SIGNIFICANT CHANGE UP (ref 6–8)
PROT UR-MCNC: 100 MG/DL
PROT UR-MCNC: 30 MG/DL
PROTHROM AB SERPL-ACNC: 23.4 SEC — HIGH (ref 9.95–12.87)
RBC # BLD: 4.4 M/UL — SIGNIFICANT CHANGE UP (ref 4.2–5.4)
RBC # FLD: 16.5 % — HIGH (ref 11.5–14.5)
RBC CASTS # UR COMP ASSIST: 4 /HPF — SIGNIFICANT CHANGE UP (ref 0–4)
RBC CASTS # UR COMP ASSIST: 619 /HPF — HIGH (ref 0–4)
SODIUM SERPL-SCNC: 133 MMOL/L — LOW (ref 135–146)
SP GR SPEC: 1.01 — SIGNIFICANT CHANGE UP (ref 1–1.03)
SP GR SPEC: 1.02 — SIGNIFICANT CHANGE UP (ref 1–1.03)
SQUAMOUS # UR AUTO: 1 /HPF — SIGNIFICANT CHANGE UP (ref 0–5)
SQUAMOUS # UR AUTO: 3 /HPF — SIGNIFICANT CHANGE UP (ref 0–5)
T4 FREE SERPL-MCNC: 2.1 NG/DL — HIGH (ref 0.9–1.8)
TIBC SERPL-MCNC: 400 UG/DL — SIGNIFICANT CHANGE UP (ref 220–430)
TRIGL SERPL-MCNC: 73 MG/DL — SIGNIFICANT CHANGE UP
TROPONIN T SERPL-MCNC: 0.05 NG/ML — CRITICAL HIGH
TSH SERPL-MCNC: 4.59 UIU/ML — HIGH (ref 0.27–4.2)
UIBC SERPL-MCNC: 375 UG/DL — HIGH (ref 110–370)
UROBILINOGEN FLD QL: 1 MG/DL — SIGNIFICANT CHANGE UP (ref 0.2–1)
UROBILINOGEN FLD QL: 1 MG/DL — SIGNIFICANT CHANGE UP (ref 0.2–1)
WBC # BLD: 10.38 K/UL — SIGNIFICANT CHANGE UP (ref 4.8–10.8)
WBC # FLD AUTO: 10.38 K/UL — SIGNIFICANT CHANGE UP (ref 4.8–10.8)
WBC UR QL: 12 /HPF — HIGH (ref 0–5)
WBC UR QL: 97 /HPF — HIGH (ref 0–5)

## 2023-08-12 PROCEDURE — 99233 SBSQ HOSP IP/OBS HIGH 50: CPT

## 2023-08-12 PROCEDURE — 93010 ELECTROCARDIOGRAM REPORT: CPT

## 2023-08-12 RX ORDER — MAGNESIUM OXIDE 400 MG ORAL TABLET 241.3 MG
400 TABLET ORAL ONCE
Refills: 0 | Status: COMPLETED | OUTPATIENT
Start: 2023-08-12 | End: 2023-08-12

## 2023-08-12 RX ORDER — FUROSEMIDE 40 MG
20 TABLET ORAL ONCE
Refills: 0 | Status: COMPLETED | OUTPATIENT
Start: 2023-08-12 | End: 2023-08-12

## 2023-08-12 RX ORDER — WARFARIN SODIUM 2.5 MG/1
0.5 TABLET ORAL ONCE
Refills: 0 | Status: DISCONTINUED | OUTPATIENT
Start: 2023-08-12 | End: 2023-08-12

## 2023-08-12 RX ORDER — METOPROLOL TARTRATE 50 MG
50 TABLET ORAL EVERY 8 HOURS
Refills: 0 | Status: DISCONTINUED | OUTPATIENT
Start: 2023-08-12 | End: 2023-08-15

## 2023-08-12 RX ORDER — PANTOPRAZOLE SODIUM 20 MG/1
40 TABLET, DELAYED RELEASE ORAL
Refills: 0 | Status: DISCONTINUED | OUTPATIENT
Start: 2023-08-13 | End: 2023-08-15

## 2023-08-12 RX ORDER — WARFARIN SODIUM 2.5 MG/1
1 TABLET ORAL ONCE
Refills: 0 | Status: COMPLETED | OUTPATIENT
Start: 2023-08-12 | End: 2023-08-12

## 2023-08-12 RX ORDER — FUROSEMIDE 40 MG
20 TABLET ORAL DAILY
Refills: 0 | Status: DISCONTINUED | OUTPATIENT
Start: 2023-08-12 | End: 2023-08-12

## 2023-08-12 RX ORDER — PANTOPRAZOLE SODIUM 20 MG/1
40 TABLET, DELAYED RELEASE ORAL ONCE
Refills: 0 | Status: COMPLETED | OUTPATIENT
Start: 2023-08-12 | End: 2023-08-12

## 2023-08-12 RX ORDER — DIPHENHYDRAMINE HYDROCHLORIDE AND LIDOCAINE HYDROCHLORIDE AND ALUMINUM HYDROXIDE AND MAGNESIUM HYDRO
20 KIT THREE TIMES A DAY
Refills: 0 | Status: DISCONTINUED | OUTPATIENT
Start: 2023-08-12 | End: 2023-08-22

## 2023-08-12 RX ORDER — POTASSIUM CHLORIDE 20 MEQ
20 PACKET (EA) ORAL ONCE
Refills: 0 | Status: COMPLETED | OUTPATIENT
Start: 2023-08-12 | End: 2023-08-12

## 2023-08-12 RX ORDER — BENZOCAINE AND MENTHOL 5; 1 G/100ML; G/100ML
1 LIQUID ORAL THREE TIMES A DAY
Refills: 0 | Status: DISCONTINUED | OUTPATIENT
Start: 2023-08-12 | End: 2023-08-12

## 2023-08-12 RX ADMIN — AMIODARONE HYDROCHLORIDE 200 MILLIGRAM(S): 400 TABLET ORAL at 05:33

## 2023-08-12 RX ADMIN — Medication 25 MILLIGRAM(S): at 05:32

## 2023-08-12 RX ADMIN — Medication 20 MILLIGRAM(S): at 16:03

## 2023-08-12 RX ADMIN — Medication 100 MILLIGRAM(S): at 00:39

## 2023-08-12 RX ADMIN — WARFARIN SODIUM 1 MILLIGRAM(S): 2.5 TABLET ORAL at 21:12

## 2023-08-12 RX ADMIN — MAGNESIUM OXIDE 400 MG ORAL TABLET 400 MILLIGRAM(S): 241.3 TABLET ORAL at 11:05

## 2023-08-12 RX ADMIN — Medication 25 MILLIGRAM(S): at 13:25

## 2023-08-12 RX ADMIN — PANTOPRAZOLE SODIUM 40 MILLIGRAM(S): 20 TABLET, DELAYED RELEASE ORAL at 16:03

## 2023-08-12 RX ADMIN — Medication 20 MILLIEQUIVALENT(S): at 11:05

## 2023-08-12 RX ADMIN — Medication 100 MILLIGRAM(S): at 22:20

## 2023-08-12 RX ADMIN — DIPHENHYDRAMINE HYDROCHLORIDE AND LIDOCAINE HYDROCHLORIDE AND ALUMINUM HYDROXIDE AND MAGNESIUM HYDRO 20 MILLILITER(S): KIT at 21:36

## 2023-08-12 RX ADMIN — Medication 50 MILLIGRAM(S): at 21:13

## 2023-08-12 NOTE — PROGRESS NOTE ADULT - SUBJECTIVE AND OBJECTIVE BOX
Chief complaint: Patient is a 42y old  Female who presents with a chief complaint of Shortness of Breath (11 Aug 2023 16:10)    Interval history: seen and examined. C/o cough that she's had since her surgery. Has slight difficulty taking a deep breath. O2 sat 96 on RA  - awaiting A-Fib ablation on Monday    Review of systems: A complete 10-point review of systems was obtained and is negative except as stated in the interval history.    Vitals:  T(F): 97.7, Max: 98.1 ( @ 03:19)  HR: 120 (110 - 130)  BP: 106/76 (97/56 - 115/68)  RR: 20 (17 - 20)  SpO2: 96% (96% - 99%)    Ins & outs:      @ 07:01  -   @ 07:00  --------------------------------------------------------  IN: 240 mL / OUT: 550 mL / NET: -310 mL      Weight trend:  Weight (kg): 79.8 ()    Physical exam:  General: No apparent distress  HEENT: Anicteric sclera. Moist mucous membranes.    Cardiac: Regular rate and rhythm. No murmurs, rubs, or gallops.   Vascular: Symmetric radial pulses. Dorsalis pedis pulses palpable.   Respiratory: Normal effort. Bibasilar crackles. Clear to ascultation.   Abdomen: Soft, nontender. Audible bowel sounds.   Extremities: Warm with edema. No cyanosis or clubbing.   Skin: Warm and dry. No rash.   Neurologic: Grossly normal motor function.   Psychiatric: Oriented to person, place, and time.     Data reviewed:  - Telemetry: A-Fib 120's  - ECG (23 @ 08:21) >  Diagnosis Line Atrial flutter with variable A-V block  Left axis deviation  Non-specific intra-ventricular conduction block  T wave abnormality, consider lateral ischemia  Abnormal ECG    - TTE (23):    1. Hyperdynamic global left ventricular systolic function.   2. LV Ejection Fraction by Parada's Method with a biplane EF of 73 %.   3. Moderate asymmetric left ventricular hypertrophy involving the septal   wall.   4. Spectral Doppler shows pseudonormal pattern of left ventricular   myocardial filling (Grade II diastolic dysfunction).   5. Elevated mean left atrial pressure.   6. Mildly reduced RV systolic function.   7. Moderately enlarged left atrium.   8. Mildly enlarged right atrium.   9. Mild to moderate mitral valve regurgitation.  10. Mild anterior leaflet systolic anterior motion of the mitral valve   without significant LVOT obstruction.  11. Mild tricuspid regurgitation.  12. Estimated pulmonary artery systolic pressure is 42.0 mmHg assuming a   right atrial pressure of 15 mmHg, which is consistent with mild pulmonary   hypertension.  13. Bileaflet mechanical prosthesis in the aortic valve position. PG 18/9   mmHg consistent with normal prosthetic valve function.  14. Moderate pleural effusion in the right lateral region.      - Chest x-ray (23):    Small right pleural effusion.    - CCTA:  CT Angio Cardiac w/ IV Cont (23 @ 15:42) >  IMPRESSION:    Cardiac:  1.  The calcium score is 0 Agatston units.  2.  OM2 is suboptimally visualized, but appears non obstructive.  3.  Distal LAD not well visualized.  4.  Remaining segments appear angiographically normal.  5.  Severe left atrial dilatation.  6.  Left ventricular septal hypertrophy    20 mm.  7.  There is a 6 x 5 m  hypoattenuating mass attached to the ventricular   aspect of the left coronary cusp of the aortic valve..    Non-cardiac:  Small pericardial effusion.    MR Cardiac w/wo IV Cont (23 @ 21:13) >    1.  Please note, arrythmia during study may affect accuracy of   measurements/volumetrics.  2.  Given image quality, prior CCTA dated 2023 reviewed. 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 Consider further evaluation   with full gated Chest CT.  3.  The left ventricle (LV) is normal in size. There is asymmetric septal   hypertrophy, with a maximum wall thickness of the mid inferoseptum of   20.4 mm. Left ventricular global systolic function is normal. The LV   ejection fraction is 57 %.  4.  Dilated left atrium.  5.  The right ventricle (RV) is normal in size. RV global systolic   function is mildly reduced. The RV ejection fraction is 45 %.  6.  On 3 chamber cine imaging, there is systolic anterior motion of the   mitral valve leaflet noted. Mitral Regurgitation noted with a Regurgitant   fraction of 21 cc. However, please note, arrythmia may affect accuracy of   quantification. Correlate with echocardiogram.  7.  Aortic valve leaflets not well visualized.  8.  Small to moderate size circumferential pericardial effusion.  9.  No significant abnormalities of the visualized portions of the great   vessels.  10.  On delayed enhancement imaging,  there is diffuse patchy enhancement   of the basal to apical septum, part of which corresponds to areas of   hypertrophy. There is also patchy enhancement of the mid to apical   inferior region. Findings are seen in hypertrophic cardiomyopathy.    Accurate quantification of enhancement is precluded by image quality   (i.e. quantification range estimates 10-20%).    - Labs:                        10.6   10.38 )-----------( 346      ( 12 Aug 2023 06:16 )             34.3         133<L>  |  93<L>  |  21<H>  ----------------------------<  111<H>  3.8   |  27  |  0.9    Ca    9.0      12 Aug 2023 06:16  Mg     1.9         TPro  6.3  /  Alb  3.9  /  TBili  1.1  /  DBili  x   /  AST  31  /  ALT  28  /  AlkPhos  106  12    PT/INR - ( 12 Aug 2023 06:16 )   PT: 23.40 sec;   INR: 2.01 ratio         PTT - ( 12 Aug 2023 06:16 )  PTT:27.7 sec  Troponin T, Serum: 0.05 ng/mL (23 @ 06:16)  Troponin T, Serum: 0.05 ng/mL (23 @ 15:41)        Triglycerides, Serum: 73 mg/dL (23 @ 06:16)  LDL Cholesterol Calculated: 112 mg/dL (23 @ 06:16)    Thyroid Stimulating Hormone, Serum: 4.59 uIU/mL (23 @ 06:16)  Thyroid Stimulating Hormone, Serum: 5.58 uIU/mL (23 @ 04:40)    Urinalysis Basic - ( 12 Aug 2023 07:40 )    Color: Dark Yellow / Appearance: Turbid / S.021 / pH: x  Gluc: x / Ketone: Trace mg/dL  / Bili: Negative / Urobili: 1.0 mg/dL   Blood: x / Protein: 100 mg/dL / Nitrite: Positive   Leuk Esterase: Moderate / RBC: 619 /HPF / WBC 97 /HPF   Sq Epi: x / Non Sq Epi: 3 /HPF / Bacteria: Moderate /HPF        Medications:  ALPRAZolam 0.25 milliGRAM(s) Oral daily  aMIOdarone    Tablet 200 milliGRAM(s) Oral daily  metoprolol tartrate 50 milliGRAM(s) Oral every 8 hours  warfarin 1 milliGRAM(s) Oral once    Drips:    PRN:     Allergies    No Known Allergies    Intolerances      Assessment:  Patient is a 42y old  Female, patient of Dr. 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 She underwent aortic valve mass resection and left atrial appendage clipping and aortic valve replacement, post op c/b AF, persistent, despite MINDI/DCCV 23 patient remains in AF, with worsening dyspnea, now at rest presents for admission for AF w RVR i/s/o acute on chronic diastolic heart failure exacerbation with plans for possible AF ablation on Monday    Problems discussed and associated plan:  #AFIB W RVR  - cardizem 20 mg IVP given x1 in the ED   - started on lopressor 25 TID, today increased to 50 TID then 50 q6 if patient remains tachycardic   - c/w amiodarone 200 mg QD  - plan for inpatient ablation on Monday with Dr. Tyler as per EP     #Acute on chronic diastolic heart failure   - obtain new 2D echo last EF 73% in    - Lasix 40 mg IVP BID   - BID BMP   - monitor BMP closely     #elevated troponin   - 0.0 then 0.05   - please continue to trend   - likely i/s/o Afib w RVR + CHF exacerbation     #fibroblastoma s/p mechanical AVR in 2023  - continue coumadin 1 mg QD   - close monitoring of INR 2.5-3.5  - check INR AM Monday as per EP    #Cough  - elevated WBC   - on tessalon pearles prn at home   - Lozenges prn  - ENT consult to evaluate laryngeal injury (no hoarseness difficulty swallowing)      #Diet Dash/TLC   #Activity IAT  #DVT ppx home coumadin by inr   #GI ppx protonix    Please contact me with any questions or concerns at [phone].     Chief complaint: Patient is a 42y old  Female who presents with a chief complaint of Shortness of Breath (11 Aug 2023 16:10)    Interval history: seen and examined. C/o cough that she's had since her surgery. Has slight difficulty taking a deep breath. O2 sat 96 on RA  - awaiting A-Fib ablation on Monday    Review of systems: A complete 10-point review of systems was obtained and is negative except as stated in the interval history.    Vitals:  T(F): 97.7, Max: 98.1 ( @ 03:19)  HR: 120 (110 - 130)  BP: 106/76 (97/56 - 115/68)  RR: 20 (17 - 20)  SpO2: 96% (96% - 99%)    Ins & outs:      @ 07:01  -   @ 07:00  --------------------------------------------------------  IN: 240 mL / OUT: 550 mL / NET: -310 mL      Weight trend:  Weight (kg): 79.8 ()    Physical exam:  General: No apparent distress  HEENT: Anicteric sclera. Moist mucous membranes.    Cardiac: Regular rate and rhythm. No murmurs, rubs, or gallops.   Vascular: Symmetric radial pulses. Dorsalis pedis pulses palpable.   Respiratory: Normal effort. Bibasilar crackles. Clear to ascultation.   Abdomen: Soft, nontender. Audible bowel sounds.   Extremities: Warm with edema. No cyanosis or clubbing.   Skin: Warm and dry. No rash.   Neurologic: Grossly normal motor function.   Psychiatric: Oriented to person, place, and time.     Data reviewed:  - Telemetry: A-Fib 120's  - ECG (23 @ 08:21) >  Diagnosis Line Atrial flutter with variable A-V block  Left axis deviation  Non-specific intra-ventricular conduction block  T wave abnormality, consider lateral ischemia  Abnormal ECG    - TTE (23):    1. Hyperdynamic global left ventricular systolic function.   2. LV Ejection Fraction by Parada's Method with a biplane EF of 73 %.   3. Moderate asymmetric left ventricular hypertrophy involving the septal   wall.   4. Spectral Doppler shows pseudonormal pattern of left ventricular   myocardial filling (Grade II diastolic dysfunction).   5. Elevated mean left atrial pressure.   6. Mildly reduced RV systolic function.   7. Moderately enlarged left atrium.   8. Mildly enlarged right atrium.   9. Mild to moderate mitral valve regurgitation.  10. Mild anterior leaflet systolic anterior motion of the mitral valve   without significant LVOT obstruction.  11. Mild tricuspid regurgitation.  12. Estimated pulmonary artery systolic pressure is 42.0 mmHg assuming a   right atrial pressure of 15 mmHg, which is consistent with mild pulmonary   hypertension.  13. Bileaflet mechanical prosthesis in the aortic valve position. PG 18/9   mmHg consistent with normal prosthetic valve function.  14. Moderate pleural effusion in the right lateral region.      - Chest x-ray (23):    Small right pleural effusion.    - CCTA:  CT Angio Cardiac w/ IV Cont (23 @ 15:42) >  IMPRESSION:    Cardiac:  1.  The calcium score is 0 Agatston units.  2.  OM2 is suboptimally visualized, but appears non obstructive.  3.  Distal LAD not well visualized.  4.  Remaining segments appear angiographically normal.  5.  Severe left atrial dilatation.  6.  Left ventricular septal hypertrophy    20 mm.  7.  There is a 6 x 5 m  hypoattenuating mass attached to the ventricular   aspect of the left coronary cusp of the aortic valve..    Non-cardiac:  Small pericardial effusion.    MR Cardiac w/wo IV Cont (23 @ 21:13) >    1.  Please note, arrythmia during study may affect accuracy of   measurements/volumetrics.  2.  Given image quality, prior CCTA dated 2023 reviewed. 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 Consider further evaluation   with full gated Chest CT.  3.  The left ventricle (LV) is normal in size. There is asymmetric septal   hypertrophy, with a maximum wall thickness of the mid inferoseptum of   20.4 mm. Left ventricular global systolic function is normal. The LV   ejection fraction is 57 %.  4.  Dilated left atrium.  5.  The right ventricle (RV) is normal in size. RV global systolic   function is mildly reduced. The RV ejection fraction is 45 %.  6.  On 3 chamber cine imaging, there is systolic anterior motion of the   mitral valve leaflet noted. Mitral Regurgitation noted with a Regurgitant   fraction of 21 cc. However, please note, arrythmia may affect accuracy of   quantification. Correlate with echocardiogram.  7.  Aortic valve leaflets not well visualized.  8.  Small to moderate size circumferential pericardial effusion.  9.  No significant abnormalities of the visualized portions of the great   vessels.  10.  On delayed enhancement imaging,  there is diffuse patchy enhancement   of the basal to apical septum, part of which corresponds to areas of   hypertrophy. There is also patchy enhancement of the mid to apical   inferior region. Findings are seen in hypertrophic cardiomyopathy.    Accurate quantification of enhancement is precluded by image quality   (i.e. quantification range estimates 10-20%).    - Labs:                        10.6   10.38 )-----------( 346      ( 12 Aug 2023 06:16 )             34.3         133<L>  |  93<L>  |  21<H>  ----------------------------<  111<H>  3.8   |  27  |  0.9    Ca    9.0      12 Aug 2023 06:16  Mg     1.9         TPro  6.3  /  Alb  3.9  /  TBili  1.1  /  DBili  x   /  AST  31  /  ALT  28  /  AlkPhos  106  12    PT/INR - ( 12 Aug 2023 06:16 )   PT: 23.40 sec;   INR: 2.01 ratio         PTT - ( 12 Aug 2023 06:16 )  PTT:27.7 sec  Troponin T, Serum: 0.05 ng/mL (23 @ 06:16)  Troponin T, Serum: 0.05 ng/mL (23 @ 15:41)        Triglycerides, Serum: 73 mg/dL (23 @ 06:16)  LDL Cholesterol Calculated: 112 mg/dL (23 @ 06:16)    Thyroid Stimulating Hormone, Serum: 4.59 uIU/mL (23 @ 06:16)  Thyroid Stimulating Hormone, Serum: 5.58 uIU/mL (23 @ 04:40)    Urinalysis Basic - ( 12 Aug 2023 07:40 )    Color: Dark Yellow / Appearance: Turbid / S.021 / pH: x  Gluc: x / Ketone: Trace mg/dL  / Bili: Negative / Urobili: 1.0 mg/dL   Blood: x / Protein: 100 mg/dL / Nitrite: Positive   Leuk Esterase: Moderate / RBC: 619 /HPF / WBC 97 /HPF   Sq Epi: x / Non Sq Epi: 3 /HPF / Bacteria: Moderate /HPF        Medications:  ALPRAZolam 0.25 milliGRAM(s) Oral daily  aMIOdarone    Tablet 200 milliGRAM(s) Oral daily  metoprolol tartrate 50 milliGRAM(s) Oral every 8 hours  warfarin 1 milliGRAM(s) Oral once    Drips:    PRN:     Allergies    No Known Allergies    Intolerances      Assessment:  Patient is a 42y old  Female, patient of Dr. 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 She underwent aortic valve mass resection and left atrial appendage clipping and aortic valve replacement, post op c/b AF, persistent, despite MINDI/DCCV 23 patient remains in AF, with worsening dyspnea, now at rest presents for admission for AF w RVR i/s/o acute on chronic diastolic heart failure exacerbation with plans for possible AF ablation on Monday    Problems discussed and associated plan:  #AFIB W RVR  - cardizem 20 mg IVP given x1 in the ED   - started on lopressor 25 TID, today increased to 50 TID then 50 q6 if patient remains tachycardic   - c/w amiodarone 200 mg QD  - plan for inpatient ablation on Monday with Dr. Tyler as per EP     #Acute on chronic diastolic heart failure   - obtain new 2D echo last EF 73% in    - incentive spirometry  - Lasix 20 mg IV x 1  - BID BMP   - monitor BMP closely     #elevated troponin   - 0.0 then 0.05   - please continue to trend   - likely i/s/o Afib w RVR + CHF exacerbation     #fibroblastoma s/p mechanical AVR in 2023  - continue coumadin 1 mg QD   - close monitoring of INR 2.5-3.5  - check INR AM Monday as per EP    #Cough  - elevated WBC   - on tessalon pearles prn at home   - Lozenges prn  - ENT consult to evaluate laryngeal injury (no hoarseness difficulty swallowing)      #Diet Dash/TLC   #Activity IAT  #DVT ppx home coumadin by inr   #GI ppx protonix    Please contact me with any questions or concerns at [phone].     Chief complaint: Patient is a 42y old  Female who presents with a chief complaint of Shortness of Breath (11 Aug 2023 16:10)    Interval history: seen and examined. C/o cough that she's had since her surgery. Has slight difficulty taking a deep breath. O2 sat 96 on RA  - awaiting A-Fib ablation on Monday    Review of systems: A complete 10-point review of systems was obtained and is negative except as stated in the interval history.    Vitals:  T(F): 97.7, Max: 98.1 ( @ 03:19)  HR: 120 (110 - 130)  BP: 106/76 (97/56 - 115/68)  RR: 20 (17 - 20)  SpO2: 96% (96% - 99%)    Ins & outs:      @ 07:01  -   @ 07:00  --------------------------------------------------------  IN: 240 mL / OUT: 550 mL / NET: -310 mL      Weight trend:  Weight (kg): 79.8 ()    Physical exam:  General: No apparent distress  HEENT: Anicteric sclera. Moist mucous membranes.    Cardiac: Regular rate and rhythm. No murmurs, rubs, or gallops.   Vascular: Symmetric radial pulses. Dorsalis pedis pulses palpable.   Respiratory: Normal effort. Bibasilar crackles. Clear to ascultation.   Abdomen: Soft, nontender. Audible bowel sounds.   Extremities: Warm with 1+ edema. No cyanosis or clubbing.   Skin: Warm and dry. No rash.   Neurologic: Grossly normal motor function.   Psychiatric: Oriented to person, place, and time.     Data reviewed:  - Telemetry: A-Fib 120's  - ECG (23 @ 08:21) >  Diagnosis Line Atrial flutter with variable A-V block  Left axis deviation  Non-specific intra-ventricular conduction block  T wave abnormality, consider lateral ischemia  Abnormal ECG    - TTE (23):    1. Hyperdynamic global left ventricular systolic function.   2. LV Ejection Fraction by Parada's Method with a biplane EF of 73 %.   3. Moderate asymmetric left ventricular hypertrophy involving the septal   wall.   4. Spectral Doppler shows pseudonormal pattern of left ventricular   myocardial filling (Grade II diastolic dysfunction).   5. Elevated mean left atrial pressure.   6. Mildly reduced RV systolic function.   7. Moderately enlarged left atrium.   8. Mildly enlarged right atrium.   9. Mild to moderate mitral valve regurgitation.  10. Mild anterior leaflet systolic anterior motion of the mitral valve   without significant LVOT obstruction.  11. Mild tricuspid regurgitation.  12. Estimated pulmonary artery systolic pressure is 42.0 mmHg assuming a   right atrial pressure of 15 mmHg, which is consistent with mild pulmonary   hypertension.  13. Bileaflet mechanical prosthesis in the aortic valve position. PG 18/9   mmHg consistent with normal prosthetic valve function.  14. Moderate pleural effusion in the right lateral region.      - Chest x-ray (23):    Small right pleural effusion.    - CCTA:  CT Angio Cardiac w/ IV Cont (23 @ 15:42) >  IMPRESSION:    Cardiac:  1.  The calcium score is 0 Agatston units.  2.  OM2 is suboptimally visualized, but appears non obstructive.  3.  Distal LAD not well visualized.  4.  Remaining segments appear angiographically normal.  5.  Severe left atrial dilatation.  6.  Left ventricular septal hypertrophy    20 mm.  7.  There is a 6 x 5 m  hypoattenuating mass attached to the ventricular   aspect of the left coronary cusp of the aortic valve..    Non-cardiac:  Small pericardial effusion.    MR Cardiac w/wo IV Cont (23 @ 21:13) >    1.  Please note, arrythmia during study may affect accuracy of   measurements/volumetrics.  2.  Given image quality, prior CCTA dated 2023 reviewed. 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 Consider further evaluation   with full gated Chest CT.  3.  The left ventricle (LV) is normal in size. There is asymmetric septal   hypertrophy, with a maximum wall thickness of the mid inferoseptum of   20.4 mm. Left ventricular global systolic function is normal. The LV   ejection fraction is 57 %.  4.  Dilated left atrium.  5.  The right ventricle (RV) is normal in size. RV global systolic   function is mildly reduced. The RV ejection fraction is 45 %.  6.  On 3 chamber cine imaging, there is systolic anterior motion of the   mitral valve leaflet noted. Mitral Regurgitation noted with a Regurgitant   fraction of 21 cc. However, please note, arrythmia may affect accuracy of   quantification. Correlate with echocardiogram.  7.  Aortic valve leaflets not well visualized.  8.  Small to moderate size circumferential pericardial effusion.  9.  No significant abnormalities of the visualized portions of the great   vessels.  10.  On delayed enhancement imaging,  there is diffuse patchy enhancement   of the basal to apical septum, part of which corresponds to areas of   hypertrophy. There is also patchy enhancement of the mid to apical   inferior region. Findings are seen in hypertrophic cardiomyopathy.    Accurate quantification of enhancement is precluded by image quality   (i.e. quantification range estimates 10-20%).    - Labs:                        10.6   10.38 )-----------( 346      ( 12 Aug 2023 06:16 )             34.3         133<L>  |  93<L>  |  21<H>  ----------------------------<  111<H>  3.8   |  27  |  0.9    Ca    9.0      12 Aug 2023 06:16  Mg     1.9         TPro  6.3  /  Alb  3.9  /  TBili  1.1  /  DBili  x   /  AST  31  /  ALT  28  /  AlkPhos  106  12    PT/INR - ( 12 Aug 2023 06:16 )   PT: 23.40 sec;   INR: 2.01 ratio         PTT - ( 12 Aug 2023 06:16 )  PTT:27.7 sec  Troponin T, Serum: 0.05 ng/mL (23 @ 06:16)  Troponin T, Serum: 0.05 ng/mL (23 @ 15:41)        Triglycerides, Serum: 73 mg/dL (23 @ 06:16)  LDL Cholesterol Calculated: 112 mg/dL (23 @ 06:16)    Thyroid Stimulating Hormone, Serum: 4.59 uIU/mL (23 @ 06:16)  Thyroid Stimulating Hormone, Serum: 5.58 uIU/mL (23 @ 04:40)    Urinalysis Basic - ( 12 Aug 2023 07:40 )    Color: Dark Yellow / Appearance: Turbid / S.021 / pH: x  Gluc: x / Ketone: Trace mg/dL  / Bili: Negative / Urobili: 1.0 mg/dL   Blood: x / Protein: 100 mg/dL / Nitrite: Positive   Leuk Esterase: Moderate / RBC: 619 /HPF / WBC 97 /HPF   Sq Epi: x / Non Sq Epi: 3 /HPF / Bacteria: Moderate /HPF        Medications:  ALPRAZolam 0.25 milliGRAM(s) Oral daily  aMIOdarone    Tablet 200 milliGRAM(s) Oral daily  metoprolol tartrate 50 milliGRAM(s) Oral every 8 hours  warfarin 1 milliGRAM(s) Oral once    Drips:    PRN:     Allergies    No Known Allergies    Intolerances      Assessment:  Patient is a 42y old  Female, patient of Dr. 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 She underwent aortic valve mass resection and left atrial appendage clipping and aortic valve replacement, post op c/b AF, persistent, despite MINDI/DCCV 23 patient remains in AF, with worsening dyspnea, now at rest presents for admission for AF w RVR i/s/o acute on chronic diastolic heart failure exacerbation with plans for possible AF ablation on Monday    Problems discussed and associated plan:  #AFIB W RVR  - cardizem 20 mg IVP given x1 in the ED   - started on lopressor 25 TID, today increased to 50 TID then 50 q6 if patient remains tachycardic   - c/w amiodarone 200 mg QD  - plan for inpatient ablation on Monday with Dr. Tyler as per EP     #Acute on chronic diastolic heart failure   - obtain new 2D echo last EF 73% in    - incentive spirometry  - Lasix 20 mg IV x 1  - BID BMP   - monitor BMP closely     #elevated troponin   - 0.0 then 0.05   - please continue to trend   - likely i/s/o Afib w RVR + CHF exacerbation     #fibroblastoma s/p mechanical AVR in 2023  - continue coumadin 1 mg QD   - close monitoring of INR 2.5-3.5  - check INR AM Monday as per EP    #Cough  - elevated WBC   - on tessalon pearles prn at home   - Lozenges prn  - ENT consult to evaluate laryngeal injury (no hoarseness difficulty swallowing)      #Diet Dash/TLC   #Activity IAT  #DVT ppx home coumadin by inr   #GI ppx protonix    Please contact me with any questions or concerns at [phone].

## 2023-08-12 NOTE — CONSULT NOTE ADULT - SUBJECTIVE AND OBJECTIVE BOX
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 Patent post -op c/b A. fib with worsening GOULD and dyspnea on rest admitted planning AF ablation.  - ENT c/s for to evaluate laryngeal injury due to persistent cough. Patient reports persistent cough since last procedure on 2023. Patient reports dry cough without sputum production, worsened when consuming food. She states she has recently been "regurgitating" food when she eats. Patient denies any otolaryngological history or follows an ENT. Denies any fever, chills, nausea, vomiting, decrease tolerance to own secretions/ PO solids/Liquids, dysphagia, odynophagia, vocal quality changes,       PAST MEDICAL & SURGICAL HISTORY:  Hypertrophic cardiomyopathy      Atrial fibrillation      Aortic valve disease      Obese      S/P transesophageal echocardiogram (MINDI)          MEDICATIONS  (STANDING):  ALPRAZolam 0.25 milliGRAM(s) Oral daily  aMIOdarone    Tablet 200 milliGRAM(s) Oral daily  furosemide   Injectable 20 milliGRAM(s) IV Push once  metoprolol tartrate 50 milliGRAM(s) Oral every 8 hours  pantoprazole    Tablet 40 milliGRAM(s) Oral once  warfarin 1 milliGRAM(s) Oral once    MEDICATIONS  (PRN):      Allergies    No Known Allergies    Intolerances          SOCIAL HISTORY:    FAMILY HISTORY:  Known health problems: none (Father, Mother)        REVIEW OF SYSTEMS   [x] A ten-point review of systems was otherwise negative except as noted.    Vital Signs Last 24 Hrs  T(C): 36.5 (12 Aug 2023 07:44), Max: 36.7 (12 Aug 2023 03:19)  T(F): 97.7 (12 Aug 2023 07:44), Max: 98.1 (12 Aug 2023 03:19)  HR: 120 (12 Aug 2023 07:44) (110 - 130)  BP: 106/76 (12 Aug 2023 07:44) (97/56 - 113/59)  BP(mean): 87 (12 Aug 2023 07:44) (69 - 87)  RR: 20 (12 Aug 2023 07:44) (17 - 20)  SpO2: 96% (12 Aug 2023 03:19) (96% - 98%)    Parameters below as of 11 Aug 2023 19:00  Patient On (Oxygen Delivery Method): room air        GEN: NAD, awake and alert. No drooling or pooling of secretions. No stridor or stertor. Good vocal quality, no hoarseness.   SKIN: Good color, non diaphoretic.  HEENT: NC/AT; Oral mucosa pink and moist. No erythema or edema noted to buccal mucosa, tongue, FOM, uvula or posterior oropharynx. Uvula midline.   NECK: Trachea midline, Neck supple, no TTP to B/L lateral neck, no cervical LAD.  RESP: No dyspnea, non-labored breathing. No use of accessory muscles.   CARDIO: +S1/S2  ABDO: Soft, NT.  EXT: JACKSON x 4      LABS:                        10.6   10.38 )-----------( 346      ( 12 Aug 2023 06:16 )             34.3     08-12    133<L>  |  93<L>  |  21<H>  ----------------------------<  111<H>  3.8   |  27  |  0.9    Ca    9.0      12 Aug 2023 06:16  Mg     1.9     08-12    TPro  6.3  /  Alb  3.9  /  TBili  1.1  /  DBili  x   /  AST  31  /  ALT  28  /  AlkPhos  106  08-12    PT/INR - ( 12 Aug 2023 06:16 )   PT: 23.40 sec;   INR: 2.01 ratio         PTT - ( 12 Aug 2023 06:16 )  PTT:27.7 sec  Urinalysis Basic - ( 12 Aug 2023 07:40 )    Color: Dark Yellow / Appearance: Turbid / S.021 / pH: x  Gluc: x / Ketone: Trace mg/dL  / Bili: Negative / Urobili: 1.0 mg/dL   Blood: x / Protein: 100 mg/dL / Nitrite: Positive   Leuk Esterase: Moderate / RBC: 619 /HPF / WBC 97 /HPF   Sq Epi: x / Non Sq Epi: 3 /HPF / Bacteria: Moderate /HPF        RADIOLOGY & ADDITIONAL STUDIES:

## 2023-08-12 NOTE — PROGRESS NOTE ADULT - SUBJECTIVE AND OBJECTIVE BOX
Patient is a 42y old  Female who presents with a chief complaint of Shortness of Breath (12 Aug 2023 14:03)        HPI: Still in AF. Feels better with diuresis. COugh on talking.  present.          PAST MEDICAL & SURGICAL HISTORY:  Hypertrophic cardiomyopathy      Atrial fibrillation      Aortic valve disease      Obese      S/P transesophageal echocardiogram (MINDI)                      PREVIOUS DIAGNOSTIC TESTING:      ECHO  FINDINGS:    STRESS  FINDINGS:    CATHETERIZATION  FINDINGS:    ELECTROPHYSIOLOGY STUDY  FINDINGS:    CAROTID ULTRASOUND:  FINDINGS    VENOUS DUPLEX SCAN:  FINDINGS:    CHEST CT PULMONARY ANGIO with IV Contrast:  FINDINGS:    MEDICATIONS  (STANDING):  ALPRAZolam 0.25 milliGRAM(s) Oral daily  aMIOdarone    Tablet 200 milliGRAM(s) Oral daily  furosemide   Injectable 20 milliGRAM(s) IV Push once  metoprolol tartrate 50 milliGRAM(s) Oral every 8 hours  pantoprazole    Tablet 40 milliGRAM(s) Oral once  warfarin 1 milliGRAM(s) Oral once    MEDICATIONS  (PRN):      FAMILY HISTORY:  Known health problems: none (Father, Mother)        SOCIAL HISTORY:    CIGARETTES:    ALCOHOL:    Past Surgical History:    Allergies:    No Known Allergies      REVIEW OF SYSTEMS:    As Above.    Vital Signs Last 24 Hrs  T(C): 36.5 (12 Aug 2023 07:44), Max: 36.7 (12 Aug 2023 03:19)  T(F): 97.7 (12 Aug 2023 07:44), Max: 98.1 (12 Aug 2023 03:19)  HR: 120 (12 Aug 2023 07:44) (110 - 130)  BP: 106/76 (12 Aug 2023 07:44) (97/56 - 113/59)  BP(mean): 87 (12 Aug 2023 07:44) (69 - 87)  RR: 20 (12 Aug 2023 07:44) (17 - 20)  SpO2: 96% (12 Aug 2023 03:19) (96% - 98%)    Parameters below as of 11 Aug 2023 19:00  Patient On (Oxygen Delivery Method): room air        PHYSICAL EXAM:        GENERAL: In no apparent distress, well nourished, and hydrated.  HEAD:  Atraumatic, Normocephalic  HEART: Regular rate and rhythm; No murmurs, rubs, or gallops.  PULMONARY: Clear to auscultation and perfusion.  No rales, wheezing, or rhonchi bilaterally.  EXTREMITIES:  2+ Peripheral Pulses, No clubbing, cyanosis, or edema  NEUROLOGICAL: Grossly nonfocal      INTERPRETATION OF TELEMETRY:    ECG:    I&O's Detail    11 Aug 2023 07:01  -  12 Aug 2023 07:00  --------------------------------------------------------  IN:    Oral Fluid: 240 mL  Total IN: 240 mL    OUT:    Voided (mL): 550 mL  Total OUT: 550 mL    Total NET: -310 mL          LABS:                        10.6   10.38 )-----------( 346      ( 12 Aug 2023 06:16 )             34.3     08-12    133<L>  |  93<L>  |  21<H>  ----------------------------<  111<H>  3.8   |  27  |  0.9    Ca    9.0      12 Aug 2023 06:16  Mg     1.9     0812    TPro  6.3  /  Alb  3.9  /  TBili  1.1  /  DBili  x   /  AST  31  /  ALT  28  /  AlkPhos  106  08-12    CARDIAC MARKERS ( 12 Aug 2023 06:16 )  x     / 0.05 ng/mL / x     / x     / x      CARDIAC MARKERS ( 11 Aug 2023 15:41 )  x     / 0.05 ng/mL / x     / x     / x          PT/INR - ( 12 Aug 2023 06:16 )   PT: 23.40 sec;   INR: 2.01 ratio         PTT - ( 12 Aug 2023 06:16 )  PTT:27.7 sec  Urinalysis Basic - ( 12 Aug 2023 07:40 )    Color: Dark Yellow / Appearance: Turbid / S.021 / pH: x  Gluc: x / Ketone: Trace mg/dL  / Bili: Negative / Urobili: 1.0 mg/dL   Blood: x / Protein: 100 mg/dL / Nitrite: Positive   Leuk Esterase: Moderate / RBC: 619 /HPF / WBC 97 /HPF   Sq Epi: x / Non Sq Epi: 3 /HPF / Bacteria: Moderate /HPF      BNP  I&O's Detail    11 Aug 2023 07:01  -  12 Aug 2023 07:00  --------------------------------------------------------  IN:    Oral Fluid: 240 mL  Total IN: 240 mL    OUT:    Voided (mL): 550 mL  Total OUT: 550 mL    Total NET: -310 mL        Daily Height in cm: 154.94 (11 Aug 2023 21:23)    Daily Weight in k.2 (12 Aug 2023 00:32)    RADIOLOGY & ADDITIONAL STUDIES:

## 2023-08-12 NOTE — PROGRESS NOTE ADULT - ASSESSMENT
Warfarin. INR 2.5-3.5  Rate control with BB/Amiodarone at current dose listed above  ENT/primary team for cough  Gentle diuresis  Discussed at length about AF ablation w/patient and .

## 2023-08-12 NOTE — CONSULT NOTE ADULT - ASSESSMENT
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 Patent post -op c/b A. fib with worsening GOULD and dyspnea on rest admitted planning AF ablation.  - ENT c/s for to evaluate laryngeal injury due to persistent cough.     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 Patent post -op c/b A. fib with worsening GOULD and dyspnea on rest admitted planning AF ablation.  - ENT c/s for to evaluate laryngeal injury due to persistent cough.     Plan:   - Offered FFL to pt to further evalaute patient's for persistent however patient stated that she wishes to have FFL done by ENT attending tomorrow if possible  - Consider PPI at this time   - Continue management per primary team   - Will d/w attending

## 2023-08-13 LAB
ANION GAP SERPL CALC-SCNC: 15 MMOL/L — HIGH (ref 7–14)
APTT BLD: 25.8 SEC — LOW (ref 27–39.2)
BLD GP AB SCN SERPL QL: SIGNIFICANT CHANGE UP
BUN SERPL-MCNC: 20 MG/DL — SIGNIFICANT CHANGE UP (ref 10–20)
CALCIUM SERPL-MCNC: 8.6 MG/DL — SIGNIFICANT CHANGE UP (ref 8.4–10.5)
CHLORIDE SERPL-SCNC: 94 MMOL/L — LOW (ref 98–110)
CO2 SERPL-SCNC: 25 MMOL/L — SIGNIFICANT CHANGE UP (ref 17–32)
CREAT SERPL-MCNC: 0.8 MG/DL — SIGNIFICANT CHANGE UP (ref 0.7–1.5)
EGFR: 94 ML/MIN/1.73M2 — SIGNIFICANT CHANGE UP
GLUCOSE SERPL-MCNC: 124 MG/DL — HIGH (ref 70–99)
INR BLD: 1.83 RATIO — HIGH (ref 0.65–1.3)
MAGNESIUM SERPL-MCNC: 1.9 MG/DL — SIGNIFICANT CHANGE UP (ref 1.8–2.4)
POTASSIUM SERPL-MCNC: 3.8 MMOL/L — SIGNIFICANT CHANGE UP (ref 3.5–5)
POTASSIUM SERPL-SCNC: 3.8 MMOL/L — SIGNIFICANT CHANGE UP (ref 3.5–5)
PROTHROM AB SERPL-ACNC: 21.2 SEC — HIGH (ref 9.95–12.87)
SODIUM SERPL-SCNC: 134 MMOL/L — LOW (ref 135–146)

## 2023-08-13 PROCEDURE — 93010 ELECTROCARDIOGRAM REPORT: CPT

## 2023-08-13 PROCEDURE — 93306 TTE W/DOPPLER COMPLETE: CPT | Mod: 26

## 2023-08-13 PROCEDURE — 99233 SBSQ HOSP IP/OBS HIGH 50: CPT

## 2023-08-13 PROCEDURE — 31575 DIAGNOSTIC LARYNGOSCOPY: CPT

## 2023-08-13 PROCEDURE — 93308 TTE F-UP OR LMTD: CPT | Mod: 26

## 2023-08-13 PROCEDURE — 99221 1ST HOSP IP/OBS SF/LOW 40: CPT | Mod: 25

## 2023-08-13 RX ORDER — WARFARIN SODIUM 2.5 MG/1
2 TABLET ORAL ONCE
Refills: 0 | Status: COMPLETED | OUTPATIENT
Start: 2023-08-13 | End: 2023-08-13

## 2023-08-13 RX ORDER — ALPRAZOLAM 0.25 MG
0.25 TABLET ORAL DAILY
Refills: 0 | Status: DISCONTINUED | OUTPATIENT
Start: 2023-08-13 | End: 2023-08-13

## 2023-08-13 RX ORDER — POTASSIUM CHLORIDE 20 MEQ
20 PACKET (EA) ORAL
Refills: 0 | Status: COMPLETED | OUTPATIENT
Start: 2023-08-13 | End: 2023-08-13

## 2023-08-13 RX ORDER — FUROSEMIDE 40 MG
40 TABLET ORAL ONCE
Refills: 0 | Status: COMPLETED | OUTPATIENT
Start: 2023-08-13 | End: 2023-08-13

## 2023-08-13 RX ORDER — IRON SUCROSE 20 MG/ML
200 INJECTION, SOLUTION INTRAVENOUS EVERY 24 HOURS
Refills: 0 | Status: DISCONTINUED | OUTPATIENT
Start: 2023-08-13 | End: 2023-08-20

## 2023-08-13 RX ORDER — MAGNESIUM SULFATE 500 MG/ML
1 VIAL (ML) INJECTION ONCE
Refills: 0 | Status: COMPLETED | OUTPATIENT
Start: 2023-08-13 | End: 2023-08-13

## 2023-08-13 RX ADMIN — Medication 100 GRAM(S): at 20:43

## 2023-08-13 RX ADMIN — WARFARIN SODIUM 2 MILLIGRAM(S): 2.5 TABLET ORAL at 21:10

## 2023-08-13 RX ADMIN — PANTOPRAZOLE SODIUM 40 MILLIGRAM(S): 20 TABLET, DELAYED RELEASE ORAL at 05:37

## 2023-08-13 RX ADMIN — AMIODARONE HYDROCHLORIDE 200 MILLIGRAM(S): 400 TABLET ORAL at 05:39

## 2023-08-13 RX ADMIN — Medication 20 MILLIEQUIVALENT(S): at 21:09

## 2023-08-13 RX ADMIN — Medication 50 MILLIGRAM(S): at 21:10

## 2023-08-13 RX ADMIN — DIPHENHYDRAMINE HYDROCHLORIDE AND LIDOCAINE HYDROCHLORIDE AND ALUMINUM HYDROXIDE AND MAGNESIUM HYDRO 20 MILLILITER(S): KIT at 13:27

## 2023-08-13 RX ADMIN — Medication 20 MILLIEQUIVALENT(S): at 20:51

## 2023-08-13 RX ADMIN — IRON SUCROSE 110 MILLIGRAM(S): 20 INJECTION, SOLUTION INTRAVENOUS at 14:44

## 2023-08-13 RX ADMIN — Medication 100 MILLIGRAM(S): at 14:44

## 2023-08-13 RX ADMIN — DIPHENHYDRAMINE HYDROCHLORIDE AND LIDOCAINE HYDROCHLORIDE AND ALUMINUM HYDROXIDE AND MAGNESIUM HYDRO 20 MILLILITER(S): KIT at 21:10

## 2023-08-13 RX ADMIN — Medication 50 MILLIGRAM(S): at 13:28

## 2023-08-13 RX ADMIN — Medication 50 MILLIGRAM(S): at 05:37

## 2023-08-13 RX ADMIN — DIPHENHYDRAMINE HYDROCHLORIDE AND LIDOCAINE HYDROCHLORIDE AND ALUMINUM HYDROXIDE AND MAGNESIUM HYDRO 20 MILLILITER(S): KIT at 05:46

## 2023-08-13 RX ADMIN — Medication 40 MILLIGRAM(S): at 13:27

## 2023-08-13 NOTE — PROGRESS NOTE ADULT - SUBJECTIVE AND OBJECTIVE BOX
Chief complaint: Patient is a 42y old  Female who presents with a chief complaint of Shortness of Breath (11 Aug 2023 16:10)    Interval history: seen and examined. C/o cough that she's had since her surgery. Has slight difficulty taking a deep breath. O2 sat 96 on RA  - awaiting A-Fib ablation on Monday    Review of systems: A complete 10-point review of systems was obtained and is negative except as stated in the interval history.    Vitals:  T(F): 97.7, Max: 98.1 ( @ 03:19)  HR: 120 (110 - 130)  BP: 106/76 (97/56 - 115/68)  RR: 20 (17 - 20)  SpO2: 96% (96% - 99%)    Ins & outs:      @ 07:01  -   @ 07:00  --------------------------------------------------------  IN: 240 mL / OUT: 550 mL / NET: -310 mL      Weight trend:  Weight (kg): 79.8 ()    Physical exam:  General: No apparent distress  HEENT: Anicteric sclera. Moist mucous membranes.    Cardiac: Regular rate and rhythm. No murmurs, rubs, or gallops.   Vascular: Symmetric radial pulses. Dorsalis pedis pulses palpable.   Respiratory: Normal effort. Bibasilar crackles. Clear to ascultation.   Abdomen: Soft, nontender. Audible bowel sounds.   Extremities: Warm with 1+ edema. No cyanosis or clubbing.   Skin: Warm and dry. No rash.   Neurologic: Grossly normal motor function.   Psychiatric: Oriented to person, place, and time.     Data reviewed:  - Telemetry: A-Fib 120's  - ECG (23 @ 08:21) >  Diagnosis Line Atrial flutter with variable A-V block  Left axis deviation  Non-specific intra-ventricular conduction block  T wave abnormality, consider lateral ischemia  Abnormal ECG    - TTE (23):    1. Hyperdynamic global left ventricular systolic function.   2. LV Ejection Fraction by Parada's Method with a biplane EF of 73 %.   3. Moderate asymmetric left ventricular hypertrophy involving the septal   wall.   4. Spectral Doppler shows pseudonormal pattern of left ventricular   myocardial filling (Grade II diastolic dysfunction).   5. Elevated mean left atrial pressure.   6. Mildly reduced RV systolic function.   7. Moderately enlarged left atrium.   8. Mildly enlarged right atrium.   9. Mild to moderate mitral valve regurgitation.  10. Mild anterior leaflet systolic anterior motion of the mitral valve   without significant LVOT obstruction.  11. Mild tricuspid regurgitation.  12. Estimated pulmonary artery systolic pressure is 42.0 mmHg assuming a   right atrial pressure of 15 mmHg, which is consistent with mild pulmonary   hypertension.  13. Bileaflet mechanical prosthesis in the aortic valve position. PG 18/9   mmHg consistent with normal prosthetic valve function.  14. Moderate pleural effusion in the right lateral region.      - Chest x-ray (23):    Small right pleural effusion.    - CCTA:  CT Angio Cardiac w/ IV Cont (23 @ 15:42) >  IMPRESSION:    Cardiac:  1.  The calcium score is 0 Agatston units.  2.  OM2 is suboptimally visualized, but appears non obstructive.  3.  Distal LAD not well visualized.  4.  Remaining segments appear angiographically normal.  5.  Severe left atrial dilatation.  6.  Left ventricular septal hypertrophy    20 mm.  7.  There is a 6 x 5 m  hypoattenuating mass attached to the ventricular   aspect of the left coronary cusp of the aortic valve..    Non-cardiac:  Small pericardial effusion.    MR Cardiac w/wo IV Cont (23 @ 21:13) >    1.  Please note, arrythmia during study may affect accuracy of   measurements/volumetrics.  2.  Given image quality, prior CCTA dated 2023 reviewed. 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 Consider further evaluation   with full gated Chest CT.  3.  The left ventricle (LV) is normal in size. There is asymmetric septal   hypertrophy, with a maximum wall thickness of the mid inferoseptum of   20.4 mm. Left ventricular global systolic function is normal. The LV   ejection fraction is 57 %.  4.  Dilated left atrium.  5.  The right ventricle (RV) is normal in size. RV global systolic   function is mildly reduced. The RV ejection fraction is 45 %.  6.  On 3 chamber cine imaging, there is systolic anterior motion of the   mitral valve leaflet noted. Mitral Regurgitation noted with a Regurgitant   fraction of 21 cc. However, please note, arrythmia may affect accuracy of   quantification. Correlate with echocardiogram.  7.  Aortic valve leaflets not well visualized.  8.  Small to moderate size circumferential pericardial effusion.  9.  No significant abnormalities of the visualized portions of the great   vessels.  10.  On delayed enhancement imaging,  there is diffuse patchy enhancement   of the basal to apical septum, part of which corresponds to areas of   hypertrophy. There is also patchy enhancement of the mid to apical   inferior region. Findings are seen in hypertrophic cardiomyopathy.    Accurate quantification of enhancement is precluded by image quality   (i.e. quantification range estimates 10-20%).    - Labs:                        10.6   10.38 )-----------( 346      ( 12 Aug 2023 06:16 )             34.3         133<L>  |  93<L>  |  21<H>  ----------------------------<  111<H>  3.8   |  27  |  0.9    Ca    9.0      12 Aug 2023 06:16  Mg     1.9         TPro  6.3  /  Alb  3.9  /  TBili  1.1  /  DBili  x   /  AST  31  /  ALT  28  /  AlkPhos  106  12    PT/INR - ( 12 Aug 2023 06:16 )   PT: 23.40 sec;   INR: 2.01 ratio         PTT - ( 12 Aug 2023 06:16 )  PTT:27.7 sec  Troponin T, Serum: 0.05 ng/mL (23 @ 06:16)  Troponin T, Serum: 0.05 ng/mL (23 @ 15:41)        Triglycerides, Serum: 73 mg/dL (23 @ 06:16)  LDL Cholesterol Calculated: 112 mg/dL (23 @ 06:16)    Thyroid Stimulating Hormone, Serum: 4.59 uIU/mL (23 @ 06:16)  Thyroid Stimulating Hormone, Serum: 5.58 uIU/mL (23 @ 04:40)    Urinalysis Basic - ( 12 Aug 2023 07:40 )    Color: Dark Yellow / Appearance: Turbid / S.021 / pH: x  Gluc: x / Ketone: Trace mg/dL  / Bili: Negative / Urobili: 1.0 mg/dL   Blood: x / Protein: 100 mg/dL / Nitrite: Positive   Leuk Esterase: Moderate / RBC: 619 /HPF / WBC 97 /HPF   Sq Epi: x / Non Sq Epi: 3 /HPF / Bacteria: Moderate /HPF        Medications:  ALPRAZolam 0.25 milliGRAM(s) Oral daily  aMIOdarone    Tablet 200 milliGRAM(s) Oral daily  metoprolol tartrate 50 milliGRAM(s) Oral every 8 hours  warfarin 1 milliGRAM(s) Oral once    Drips:    PRN:     Allergies    No Known Allergies    Intolerances

## 2023-08-13 NOTE — PROGRESS NOTE ADULT - ASSESSMENT
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 She underwent aortic valve mass resection and left atrial appendage clipping and mechanical aortic valve replacement (on Coumadin), post op c/b AF, persistent, despite successful MINDI/DCCV 8/2/23, patient reverted to AF, associated with worsening dyspnea, now at rest  presents for admission for diuresis, medical optimization, possible AF ablation on Monday  improving  ENT evaluated, no laryngeal problem, excessive GERD    con't tele  NPO after MN  con't gentle diureses   daily weights and strict I/O  Maintain electrolytes K>4.0 Mg >2.0   please send T&S  count AC, INR 2.5-3.0  plan for ablation tomorrow

## 2023-08-13 NOTE — PROGRESS NOTE ADULT - ASSESSMENT
Assessment:  Patient is a 42y old  Female, patient of Dr. 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 She underwent aortic valve mass resection and left atrial appendage clipping and aortic valve replacement, post op c/b AF, persistent, despite MINDI/DCCV 8/2/23 patient remains in AF, with worsening dyspnea, now at rest presents for admission for AF w RVR i/s/o acute on chronic diastolic heart failure exacerbation with plans for possible AF ablation on Monday    Problems discussed and associated plan:  #AFIB W RVR  - s/p cardizem 20 mg IVP given x1 in the ED   -s/p  on lopressor 25 TID, and s/p  50 TID then   - c/w lopressor 50 q8   -Held amiodarone 200 mg QD Tsh elevated   - plan for inpatient ablation on Monday with Dr. Tyler as per EP   -NPO today ablation in AM  - f/u T3    #Acute on chronic diastolic heart failure   - obtain new 2D echo last EF 73% in june   - incentive spirometry  - s/p Lasix 20 mg IV x 1  - lasix 40 mg IV X 1   - monitor BMP closely   - Iron 25 saturation 6% start iron sucrose 200 mg IV X 5 days     #elevated troponin   - 0.0 then 0.05--> 0.05   - discontinue  trend   - likely i/s/o Afib w RVR + CHF exacerbation     #fibroblastoma s/p mechanical AVR in 6/2023  -s/p  coumadin 1 mg in the AM and Evening yesterday, no coumadin on 8/11  - INR 1.89  - coumadin 2 mg today   - close monitoring of INR 2.5-3.5  - check INR AM Monday as per EP    #Cough  - elevated WBC   - c/w tessalon pearles prn at home   - Lozenges prn  - ENT consult :Fiberoptic Laryngoscopy: No masses or lesions noted to NP/OP/HP. Laryngeal structures intact, no edema or erythema noted. Epiglottis crisp, no edema. TVC/FVC mobile and intact, no glottic gap noted. +LPR noted on exam,   Continue PPI  (i.e Lozenges)         #Diet Dash/TLC   #Activity IAT  #DVT ppx home coumadin by inr   #GI ppx protonix    Please contact me with any questions or concerns at [phone].

## 2023-08-13 NOTE — PROGRESS NOTE ADULT - SUBJECTIVE AND OBJECTIVE BOX
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 Patent post -op c/b A. fib with worsening GOULD and dyspnea on rest admitted planning AF ablation.  - ENT c/s for to evaluate laryngeal injury due to persistent cough. Patient seen and examined at bedside with Dr. Sánchez       REVIEW OF SYSTEMS   [x] A ten-point review of systems was otherwise negative except as noted.      Allergies    No Known Allergies    Intolerances        MEDICATIONS:  ALPRAZolam 0.25 milliGRAM(s) Oral daily PRN  benzonatate 100 milliGRAM(s) Oral every 8 hours PRN  FIRST- Mouthwash  BLM 20 milliLiter(s) Swish and Spit three times a day  iron sucrose IVPB 200 milliGRAM(s) IV Intermittent every 24 hours  metoprolol tartrate 50 milliGRAM(s) Oral every 8 hours  pantoprazole    Tablet 40 milliGRAM(s) Oral before breakfast  warfarin 2 milliGRAM(s) Oral once      Vital Signs Last 24 Hrs  T(C): 36.4 (13 Aug 2023 07:34), Max: 36.8 (12 Aug 2023 15:43)  T(F): 97.6 (13 Aug 2023 07:34), Max: 98.2 (12 Aug 2023 15:43)  HR: 128 (13 Aug 2023 13:27) (105 - 138)  BP: 114/76 (13 Aug 2023 13:27) (96/66 - 122/66)  BP(mean): 89 (13 Aug 2023 13:27) (76 - 89)  RR: 18 (13 Aug 2023 07:34) (18 - 20)  SpO2: 100% (13 Aug 2023 07:34) (98% - 100%)    Parameters below as of 13 Aug 2023 07:34  Patient On (Oxygen Delivery Method): room air          08-12 @ 07:01  -  08-13 @ 07:00  --------------------------------------------------------  IN:    Oral Fluid: 600 mL  Total IN: 600 mL    OUT:    Voided (mL): 500 mL  Total OUT: 500 mL    Total NET: 100 mL      08-13 @ 07:01  -  08-13 @ 13:34  --------------------------------------------------------  IN:  Total IN: 0 mL    OUT:    Voided (mL): 100 mL  Total OUT: 100 mL    Total NET: -100 mL          PHYSICAL EXAM:    GEN: Well-developed, well-nourished. NAD, awake and alert. No drooling or pooling of secretions. No stridor or stertor. Good vocal quality, no hoarseness.   SKIN: Good color, non diaphoretic  HEENT: NC/AT; Oral mucosa pink and moist. No erythema or edema noted to buccal mucosa, tongue, FOM, uvula or posterior oropharynx. Uvula midline  NECK:  Trachea midline. Neck supple, no TTP to B/L lateral neck, no cervical LAD.  RESP: No dyspnea, non-labored breathing. No use of accessory muscles.  CARDIO: +S1/S2  ABDO: Soft, NT.  EXT: JACKSON x 4     Fiberoptic Laryngoscopy: No masses or lesions noted to NP/OP/HP. Laryngeal structures intact, no edema or erythema noted. Epiglottis crisp, no edema. TVC/FVC mobile and intact, no glottic gap noted. +LPR noted on exam,    LABS:  CBC-                        10.6   10.38 )-----------( 346      ( 12 Aug 2023 06:16 )             34.3     BMP/CMP-  12 Aug 2023 06:16    133    |  93     |  21     ----------------------------<  111    3.8     |  27     |  0.9      Ca    9.0        12 Aug 2023 06:16  Mg     1.9       12 Aug 2023 06:16    TPro  6.3    /  Alb  3.9    /  TBili  1.1    /  DBili  x      /  AST  31     /  ALT  28     /  AlkPhos  106    12 Aug 2023 06:16    Coagulation Studies-  PT/INR - ( 13 Aug 2023 05:31 )   PT: 21.20 sec;   INR: 1.83 ratio         PTT - ( 13 Aug 2023 05:31 )  PTT:25.8 sec  Endocrine Panel-              RADIOLOGY & ADDITIONAL STUDIES:

## 2023-08-13 NOTE — PROGRESS NOTE ADULT - SUBJECTIVE AND OBJECTIVE BOX
INTERVAL HPI/OVERNIGHT EVENTS:  doping well  rate ,   dyspnea improved    MEDICATIONS  (STANDING):  FIRST- Mouthwash  BLM 20 milliLiter(s) Swish and Spit three times a day  iron sucrose IVPB 200 milliGRAM(s) IV Intermittent every 24 hours  metoprolol tartrate 50 milliGRAM(s) Oral every 8 hours  pantoprazole    Tablet 40 milliGRAM(s) Oral before breakfast  warfarin 2 milliGRAM(s) Oral once    MEDICATIONS  (PRN):  ALPRAZolam 0.25 milliGRAM(s) Oral daily PRN anxiety  benzonatate 100 milliGRAM(s) Oral every 8 hours PRN Cough      Allergies    No Known Allergies    Intolerances        REVIEW OF SYSTEMS    x[ ] A ten-point review of systems was otherwise negative except as noted.  [ ] Due to altered mental status/intubation, subjective information were not able to be obtained from the patient. History was obtained, to the extent possible, from review of the chart and collateral sources of information.      Vital Signs Last 24 Hrs  T(C): 36.4 (13 Aug 2023 07:34), Max: 36.8 (12 Aug 2023 15:43)  T(F): 97.6 (13 Aug 2023 07:34), Max: 98.2 (12 Aug 2023 15:43)  HR: 112 (13 Aug 2023 07:34) (105 - 138)  BP: 111/75 (13 Aug 2023 07:34) (96/66 - 122/66)  BP(mean): 89 (13 Aug 2023 07:34) (76 - 89)  RR: 18 (13 Aug 2023 07:34) (18 - 20)  SpO2: 100% (13 Aug 2023 07:34) (98% - 100%)    Parameters below as of 13 Aug 2023 07:34  Patient On (Oxygen Delivery Method): room air        23 @ 07:  -  23 @ 07:00  --------------------------------------------------------  IN: 600 mL / OUT: 500 mL / NET: 100 mL    23 @ 07:01  -  23 @ 13:30  --------------------------------------------------------  IN: 0 mL / OUT: 100 mL / NET: -100 mL        PHYSICAL EXAM:    GENERAL: In no apparent distress, well nourished, and hydrated.  HEART: Regular rate and rhythm; No murmur; NO rubs, or gallops.  PULMONARY: Clear to auscultation and percussion.  Normal expansion/effort. No rales, wheezing, or rhonchi bilaterally.  ABDOMEN: Soft, Nontender, Nondistended; Bowel sounds present  EXTREMITIES:  Extremities warm, pink, well-perfused, 2+ Peripheral Pulses, No clubbing, cyanosis, or edema  NEUROLOGICAL: alert & oriented x 3, no focal deficits, PERRLA, EOMI    LABS:                        10.6   10.38 )-----------( 346      ( 12 Aug 2023 06:16 )             34.3     08-12    133<L>  |  93<L>  |  21<H>  ----------------------------<  111<H>  3.8   |  27  |  0.9    Ca    9.0      12 Aug 2023 06:16  Mg     1.9     08-12    TPro  6.3  /  Alb  3.9  /  TBili  1.1  /  DBili  x   /  AST  31  /  ALT  28  /  AlkPhos  106  08-12    PT/INR - ( 13 Aug 2023 05:31 )   PT: 21.20 sec;   INR: 1.83 ratio         PTT - ( 13 Aug 2023 05:31 )  PTT:25.8 sec  Urinalysis Basic - ( 12 Aug 2023 17:40 )    Color: Yellow / Appearance: Clear / S.010 / pH: x  Gluc: x / Ketone: Negative mg/dL  / Bili: Negative / Urobili: 1.0 mg/dL   Blood: x / Protein: 30 mg/dL / Nitrite: Negative   Leuk Esterase: Small / RBC: 4 /HPF / WBC 12 /HPF   Sq Epi: x / Non Sq Epi: 1 /HPF / Bacteria: Few /HPF          12 Lead ECG:   Ventricular Rate 119 BPM    Atrial Rate 133 BPM    P-R Interval 116 ms    QRS Duration 96 ms    Q-T Interval 406 ms    QTC Calculation(Bazett) 571 ms    R Axis -68 degrees    T Axis 129 degrees    Diagnosis Line Atrial fibrillation with rapid ventricular response  Left axis deviation  Incomplete right bundle branch block  Inferior infarct , age undetermined  Anteroseptal infarct , age undetermined  ST & T wave abnormality, consider lateral ischemia  Prolonged QT  Abnormal ECG    Confirmed by Gabe Hawley ([id number]) on 2023 9:11:27 AM (23 @ 07:44)  12 Lead ECG:   Ventricular Rate 117 BPM    Atrial Rate 300 BPM    QRS Duration 128 ms    Q-T Interval 384 ms    QTC Calculation(Bazett) 535 ms    R Axis -59 degrees    T Axis 114 degrees    Diagnosis Line Atrial flutter with variable A-V block  Left axis deviation  Non-specific intra-ventricular conduction block  T wave abnormality, consider lateral ischemia  Abnormal ECG    Confirmed by Gabe Hawley ([id number]) on 2023 11:44:43 AM (23 @ 08:21)  12 Lead ECG:   Ventricular Rate 136 BPM    Atrial Rate 136 BPM    QRS Duration 108 ms    Q-T Interval 350 ms    QTC Calculation(Bazett) 526 ms    R Axis 270 degrees    T Axis 104 degrees    Diagnosis Line Atrial fibrillation with rapid ventricular response  Incomplete right bundle branch block  Anterolateral infarct , age undetermined  Abnormal ECG    Confirmed by Chaim Nobles ([id number]) on 2023 2:58:27 PM (23 @ 14:07)      RADIOLOGY & ADDITIONAL TESTS:

## 2023-08-13 NOTE — PROGRESS NOTE ADULT - ASSESSMENT
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 Patent post -op c/b A. fib with worsening GOULD and dyspnea on rest admitted planning AF ablation on Monday   - ENT c/s for to evaluate laryngeal injury due to persistent cough.    Plan:   -  Fiberoptic Laryngoscopy: No masses or lesions noted to NP/OP/HP. Laryngeal structures intact, no edema or erythema noted. Epiglottis crisp, no edema. TVC/FVC mobile and intact, no glottic gap noted. +LPR noted on exam,  - Continue PPI   - Continue supportive care prn (i.e Lozenges)   - No further acute ENT intervention at this time   - Patient seen and examined at bedside with Dr. Sánchez

## 2023-08-14 ENCOUNTER — TRANSCRIPTION ENCOUNTER (OUTPATIENT)
Age: 43
End: 2023-08-14

## 2023-08-14 LAB
ALBUMIN SERPL ELPH-MCNC: 4 G/DL — SIGNIFICANT CHANGE UP (ref 3.5–5.2)
ALP SERPL-CCNC: 113 U/L — SIGNIFICANT CHANGE UP (ref 30–115)
ALT FLD-CCNC: 26 U/L — SIGNIFICANT CHANGE UP (ref 0–41)
ANION GAP SERPL CALC-SCNC: 13 MMOL/L — SIGNIFICANT CHANGE UP (ref 7–14)
APTT BLD: 28.4 SEC — SIGNIFICANT CHANGE UP (ref 27–39.2)
AST SERPL-CCNC: 28 U/L — SIGNIFICANT CHANGE UP (ref 0–41)
BASE EXCESS BLDA CALC-SCNC: 0.7 MMOL/L — SIGNIFICANT CHANGE UP (ref -2–3)
BASOPHILS # BLD AUTO: 0.05 K/UL — SIGNIFICANT CHANGE UP (ref 0–0.2)
BASOPHILS NFR BLD AUTO: 0.5 % — SIGNIFICANT CHANGE UP (ref 0–1)
BILIRUB SERPL-MCNC: 1 MG/DL — SIGNIFICANT CHANGE UP (ref 0.2–1.2)
BUN SERPL-MCNC: 22 MG/DL — HIGH (ref 10–20)
CALCIUM SERPL-MCNC: 9.3 MG/DL — SIGNIFICANT CHANGE UP (ref 8.4–10.4)
CHLORIDE SERPL-SCNC: 94 MMOL/L — LOW (ref 98–110)
CO2 SERPL-SCNC: 27 MMOL/L — SIGNIFICANT CHANGE UP (ref 17–32)
CREAT SERPL-MCNC: 1 MG/DL — SIGNIFICANT CHANGE UP (ref 0.7–1.5)
EGFR: 72 ML/MIN/1.73M2 — SIGNIFICANT CHANGE UP
EOSINOPHIL # BLD AUTO: 0.19 K/UL — SIGNIFICANT CHANGE UP (ref 0–0.7)
EOSINOPHIL NFR BLD AUTO: 1.7 % — SIGNIFICANT CHANGE UP (ref 0–8)
GLUCOSE BLDC GLUCOMTR-MCNC: 112 MG/DL — HIGH (ref 70–99)
GLUCOSE SERPL-MCNC: 103 MG/DL — HIGH (ref 70–99)
HCO3 BLDA-SCNC: 25 MMOL/L — SIGNIFICANT CHANGE UP (ref 21–28)
HCT VFR BLD CALC: 34.5 % — LOW (ref 37–47)
HGB BLD-MCNC: 10.9 G/DL — LOW (ref 12–16)
HOROWITZ INDEX BLDA+IHG-RTO: 100 — SIGNIFICANT CHANGE UP
IMM GRANULOCYTES NFR BLD AUTO: 0.6 % — HIGH (ref 0.1–0.3)
INR BLD: 1.98 RATIO — HIGH (ref 0.65–1.3)
LYMPHOCYTES # BLD AUTO: 1.15 K/UL — LOW (ref 1.2–3.4)
LYMPHOCYTES # BLD AUTO: 10.4 % — LOW (ref 20.5–51.1)
MAGNESIUM SERPL-MCNC: 2.3 MG/DL — SIGNIFICANT CHANGE UP (ref 1.8–2.4)
MCHC RBC-ENTMCNC: 24.5 PG — LOW (ref 27–31)
MCHC RBC-ENTMCNC: 31.6 G/DL — LOW (ref 32–37)
MCV RBC AUTO: 77.7 FL — LOW (ref 81–99)
MONOCYTES # BLD AUTO: 1.07 K/UL — HIGH (ref 0.1–0.6)
MONOCYTES NFR BLD AUTO: 9.7 % — HIGH (ref 1.7–9.3)
NEUTROPHILS # BLD AUTO: 8.53 K/UL — HIGH (ref 1.4–6.5)
NEUTROPHILS NFR BLD AUTO: 77.1 % — HIGH (ref 42.2–75.2)
NRBC # BLD: 0 /100 WBCS — SIGNIFICANT CHANGE UP (ref 0–0)
PCO2 BLDA: 40 MMHG — SIGNIFICANT CHANGE UP (ref 25–48)
PH BLDA: 7.41 — SIGNIFICANT CHANGE UP (ref 7.35–7.45)
PLATELET # BLD AUTO: 354 K/UL — SIGNIFICANT CHANGE UP (ref 130–400)
PMV BLD: 10 FL — SIGNIFICANT CHANGE UP (ref 7.4–10.4)
PO2 BLDA: 266 MMHG — HIGH (ref 83–108)
POTASSIUM SERPL-MCNC: 4.4 MMOL/L — SIGNIFICANT CHANGE UP (ref 3.5–5)
POTASSIUM SERPL-SCNC: 4.4 MMOL/L — SIGNIFICANT CHANGE UP (ref 3.5–5)
PROT SERPL-MCNC: 6.8 G/DL — SIGNIFICANT CHANGE UP (ref 6–8)
PROTHROM AB SERPL-ACNC: 23 SEC — HIGH (ref 9.95–12.87)
RBC # BLD: 4.44 M/UL — SIGNIFICANT CHANGE UP (ref 4.2–5.4)
RBC # FLD: 16.4 % — HIGH (ref 11.5–14.5)
SAO2 % BLDA: 100 % — HIGH (ref 94–98)
SODIUM SERPL-SCNC: 134 MMOL/L — LOW (ref 135–146)
T3 SERPL-MCNC: 57 NG/DL — LOW (ref 80–200)
WBC # BLD: 11.06 K/UL — HIGH (ref 4.8–10.8)
WBC # FLD AUTO: 11.06 K/UL — HIGH (ref 4.8–10.8)

## 2023-08-14 PROCEDURE — 93656 COMPRE EP EVAL ABLTJ ATR FIB: CPT

## 2023-08-14 PROCEDURE — 93320 DOPPLER ECHO COMPLETE: CPT | Mod: 26

## 2023-08-14 PROCEDURE — 93312 ECHO TRANSESOPHAGEAL: CPT | Mod: 26

## 2023-08-14 PROCEDURE — 71045 X-RAY EXAM CHEST 1 VIEW: CPT | Mod: 26,76

## 2023-08-14 PROCEDURE — 93623 PRGRMD STIMJ&PACG IV RX NFS: CPT | Mod: 26

## 2023-08-14 PROCEDURE — 93655 ICAR CATH ABLTJ DSCRT ARRHYT: CPT

## 2023-08-14 PROCEDURE — 93657 TX L/R ATRIAL FIB ADDL: CPT

## 2023-08-14 PROCEDURE — 93325 DOPPLER ECHO COLOR FLOW MAPG: CPT | Mod: 26

## 2023-08-14 PROCEDURE — 76937 US GUIDE VASCULAR ACCESS: CPT | Mod: 26

## 2023-08-14 RX ORDER — HEPARIN SODIUM 5000 [USP'U]/ML
INJECTION INTRAVENOUS; SUBCUTANEOUS
Qty: 25000 | Refills: 0 | Status: DISCONTINUED | OUTPATIENT
Start: 2023-08-14 | End: 2023-08-15

## 2023-08-14 RX ORDER — NOREPINEPHRINE BITARTRATE/D5W 8 MG/250ML
0.05 PLASTIC BAG, INJECTION (ML) INTRAVENOUS
Qty: 8 | Refills: 0 | Status: DISCONTINUED | OUTPATIENT
Start: 2023-08-14 | End: 2023-08-19

## 2023-08-14 RX ORDER — PANTOPRAZOLE SODIUM 20 MG/1
40 TABLET, DELAYED RELEASE ORAL
Refills: 0 | Status: DISCONTINUED | OUTPATIENT
Start: 2023-08-14 | End: 2023-08-15

## 2023-08-14 RX ORDER — PROPOFOL 10 MG/ML
5 INJECTION, EMULSION INTRAVENOUS
Qty: 1000 | Refills: 0 | Status: DISCONTINUED | OUTPATIENT
Start: 2023-08-14 | End: 2023-08-15

## 2023-08-14 RX ORDER — WARFARIN SODIUM 2.5 MG/1
2 TABLET ORAL ONCE
Refills: 0 | Status: DISCONTINUED | OUTPATIENT
Start: 2023-08-14 | End: 2023-08-14

## 2023-08-14 RX ORDER — CHLORHEXIDINE GLUCONATE 213 G/1000ML
15 SOLUTION TOPICAL EVERY 12 HOURS
Refills: 0 | Status: DISCONTINUED | OUTPATIENT
Start: 2023-08-14 | End: 2023-08-17

## 2023-08-14 RX ORDER — HEPARIN SODIUM 5000 [USP'U]/ML
3000 INJECTION INTRAVENOUS; SUBCUTANEOUS EVERY 6 HOURS
Refills: 0 | Status: DISCONTINUED | OUTPATIENT
Start: 2023-08-14 | End: 2023-08-15

## 2023-08-14 RX ORDER — WARFARIN SODIUM 2.5 MG/1
1 TABLET ORAL ONCE
Refills: 0 | Status: COMPLETED | OUTPATIENT
Start: 2023-08-14 | End: 2023-08-14

## 2023-08-14 RX ORDER — PANTOPRAZOLE SODIUM 20 MG/1
40 TABLET, DELAYED RELEASE ORAL ONCE
Refills: 0 | Status: DISCONTINUED | OUTPATIENT
Start: 2023-08-14 | End: 2023-08-15

## 2023-08-14 RX ORDER — HEPARIN SODIUM 5000 [USP'U]/ML
6500 INJECTION INTRAVENOUS; SUBCUTANEOUS EVERY 6 HOURS
Refills: 0 | Status: DISCONTINUED | OUTPATIENT
Start: 2023-08-14 | End: 2023-08-15

## 2023-08-14 RX ADMIN — PANTOPRAZOLE SODIUM 40 MILLIGRAM(S): 20 TABLET, DELAYED RELEASE ORAL at 05:25

## 2023-08-14 RX ADMIN — Medication 7.48 MICROGRAM(S)/KG/MIN: at 22:00

## 2023-08-14 RX ADMIN — WARFARIN SODIUM 1 MILLIGRAM(S): 2.5 TABLET ORAL at 23:28

## 2023-08-14 RX ADMIN — Medication 100 MILLIGRAM(S): at 06:11

## 2023-08-14 RX ADMIN — PROPOFOL 2.39 MICROGRAM(S)/KG/MIN: 10 INJECTION, EMULSION INTRAVENOUS at 22:00

## 2023-08-14 RX ADMIN — PROPOFOL 2.39 MICROGRAM(S)/KG/MIN: 10 INJECTION, EMULSION INTRAVENOUS at 23:02

## 2023-08-14 RX ADMIN — HEPARIN SODIUM 1400 UNIT(S)/HR: 5000 INJECTION INTRAVENOUS; SUBCUTANEOUS at 22:39

## 2023-08-14 RX ADMIN — Medication 50 MILLIGRAM(S): at 05:24

## 2023-08-14 RX ADMIN — DIPHENHYDRAMINE HYDROCHLORIDE AND LIDOCAINE HYDROCHLORIDE AND ALUMINUM HYDROXIDE AND MAGNESIUM HYDRO 20 MILLILITER(S): KIT at 06:04

## 2023-08-14 NOTE — CHART NOTE - NSCHARTNOTEFT_GEN_A_CORE
42y old  Female, patient of Dr. 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 Tyler as per EP   -Ablation today   - f/u T3    #Acute on chronic diastolic heart failure   - obtain new 2D echo last EF 73% in june   - incentive spirometry  - s/p Lasix 20 mg IV x 1  - s/p lasix 40 mg IV X 1 on 8/13  - monitor BMP closely   - Iron 25 saturation 6% start iron sucrose 200 mg IV X 5 days started on 8/13    #elevated troponin   - 0.0 then 0.05--> 0.05   - discontinue  trend   - likely i/s/o Afib w RVR + CHF exacerbation     #fibroblastoma s/p mechanical AVR in 6/2023  -s/p  coumadin 1 mg in the AM and Evening yesterday, no coumadin on 8/11  - INR 1.89 --> INR 1.98  - coumadin 2 mg on 8/13  - coumadin 2 mg today   - close monitoring of INR 2.5-3.5  - check INR AM Monday as per EP    #Cough  - elevated WBC   - c/w tessalon pearles prn at home   - Lozenges prn  - ENT consult :Fiberoptic Laryngoscopy: No masses or lesions noted to NP/OP/HP. Laryngeal structures intact, no edema or erythema noted. Epiglottis crisp, no edema. TVC/FVC mobile and intact, no glottic gap noted. +LPR noted on exam,   Continue PPI  (i.e Lozenges)  Evaluated by ENT, suspect GERD and cough-itch cycle 42y old  Female, patient of Dr. Metcalf, with a past medical history of Afib with RVR, s/p JASON clipping (6/29/23): First diagnosed in 2018 , HOCM with DEN:, DLD, HTN and Aortic valve fibroelastoma s/p mechanical AVR (6/29/23), on coumadin, post op c/b AF, persistent, despite MINDI/DCCV 8/2/23 patient remains in AF, chronic cough with worsening dyspnea, now at rest presents for admission for AF w RVR with plans for AF ablation on 8/11    Things to follow Up;  - Coumadin 2 mg tonight  - PT/INr in am   - AM labs  - f/u T3    Problems discussed and associated plan:  #AFIB W RVR  - s/p cardizem 20 mg IVP given x1 in the ED   - s/p  on lopressor 25 TID, and s/p  50 TID then   - c/w lopressor 50 q8   - Held amiodarone 200 mg QD Tsh FT4 are both slightly elevated  - s/p ablation today   - f/u T3    #Acute on chronic diastolic heart failure   - obtain new 2D echo last EF 73% in june   - incentive spirometry  - s/p Lasix 20 mg IV x 1  - s/p lasix 40 mg IV X 1 on 8/13  - monitor BMP closely   - Iron 25 saturation 6% start iron sucrose 200 mg IV X 5 days started on 8/13    #elevated troponin   - 0.0 then 0.05--> 0.05   - discontinue trend   - likely i/s/o Afib w RVR + CHF exacerbation     #fibroblastoma s/p mechanical AVR in 6/2023  - s/p  coumadin 1 mg in the AM and Evening yesterday, no coumadin on 8/11  - INR 1.89 --> INR 1.98  - coumadin 2 mg on 8/13  - coumadin 2 mg today   - close monitoring of INR 2.5-3.5  - check INR AM Monday as per EP    #Cough  - elevated WBC   - c/w tessalon pearles prn at home   - Lozenges prn  - ENT consult :Fiberoptic Laryngoscopy: No masses or lesions noted to NP/OP/HP. Laryngeal structures intact, no edema or erythema noted. Epiglottis crisp, no edema. TVC/FVC mobile and intact, no glottic gap noted. +LPR noted on exam,   Continue PPI  (i.e Lozenges)  Evaluated by ENT, suspect GERD and cough-itch cycle

## 2023-08-14 NOTE — DISCHARGE NOTE NURSING/CASE MANAGEMENT/SOCIAL WORK - NSDCPEFALRISK_GEN_ALL_CORE
For information on Fall & Injury Prevention, visit: [email or web address] OR  [email or web address] OR  https://www.cdc.gov/steadi/patient.html

## 2023-08-14 NOTE — DISCHARGE NOTE NURSING/CASE MANAGEMENT/SOCIAL WORK - PATIENT PORTAL LINK FT
You can access the FollowMyHealth Patient Portal offered by [facility] by registering at the following website: [email or web address]. By joining [employer]’s FollowMyHealth portal, you will also be able to view your health information using other applications (apps) compatible with our system.

## 2023-08-14 NOTE — PROGRESS NOTE ADULT - SUBJECTIVE AND OBJECTIVE BOX
Chief complaint: Patient is a 42y old  Female who presents with a chief complaint of Shortness of Breath (11 Aug 2023 16:10)    Interval history:   Patient examined this Am. Denies CP, SOb and palpitations. Patient did not sleep well, just anxious for the procedure today  - awaiting A-Fib ablation on Monday    Review of systems: A complete 10-point review of systems was obtained and is negative except as stated in the interval history.    Vitals:  Vital Signs Last 24 Hrs  T(C): 36.7 (14 Aug 2023 11:09), Max: 37 (13 Aug 2023 23:58)  T(F): 98.1 (14 Aug 2023 07:14), Max: 98.6 (13 Aug 2023 23:58)  HR: 115 (14 Aug 2023 11:09) (115 - 118)  BP: 97/73 (14 Aug 2023 11:09) (97/73 - 119/67)  BP(mean): 80 (14 Aug 2023 11:09) (80 - 91)  RR: 18 (14 Aug 2023 11:09) (18 - 18)  SpO2: 98% (14 Aug 2023 11:09) (98% - 98%)    Parameters below as of 14 Aug 2023 07:14  Patient On (Oxygen Delivery Method): room air        Ins & outs:     08-11 @ 07:01  -  08-12 @ 07:00  --------------------------------------------------------  IN: 240 mL / OUT: 550 mL / NET: -310 mL    13 Aug 2023 07:01  -  14 Aug 2023 07:00  --------------------------------------------------------  IN: 220 mL / OUT: 1320 mL / NET: -1100 mL    14 Aug 2023 07:01  -  14 Aug 2023 16:28  --------------------------------------------------------  IN: 0 mL / OUT: 200 mL / NET: -200 mL          Weight trend:  Weight (kg): 79.8 (08-11)    Physical exam:  General: No apparent distress  HEENT: Anicteric sclera. Moist mucous membranes.    Cardiac: Regular rate and rhythm. No murmurs, rubs, or gallops.   Vascular: Symmetric radial pulses. Dorsalis pedis pulses palpable.   Respiratory: Normal effort. Bibasilar crackles. Clear to ascultation.   Abdomen: Soft, nontender. Audible bowel sounds.   Extremities: Warm with b/l 2+ edema. No cyanosis or clubbing.   Skin: Warm and dry. No rash.   Neurologic: Grossly normal motor function.   Psychiatric: Oriented to person, place, and time.     Data reviewed:  - Telemetry: A-Fib 120's  - ECG (08.12.23 @ 08:21) >  Diagnosis Line Atrial flutter with variable A-V block  Left axis deviation  Non-specific intra-ventricular conduction block  T wave abnormality, consider lateral ischemia  Abnormal ECG    - TTE (8/2/23):    1. Hyperdynamic global left ventricular systolic function.   2. LV Ejection Fraction by Parada's Method with a biplane EF of 73 %.   3. Moderate asymmetric left ventricular hypertrophy involving the septal   wall.   4. Spectral Doppler shows pseudonormal pattern of left ventricular   myocardial filling (Grade II diastolic dysfunction).   5. Elevated mean left atrial pressure.   6. Mildly reduced RV systolic function.   7. Moderately enlarged left atrium.   8. Mildly enlarged right atrium.   9. Mild to moderate mitral valve regurgitation.  10. Mild anterior leaflet systolic anterior motion of the mitral valve   without significant LVOT obstruction.  11. Mild tricuspid regurgitation.  12. Estimated pulmonary artery systolic pressure is 42.0 mmHg assuming a   right atrial pressure of 15 mmHg, which is consistent with mild pulmonary   hypertension.  13. Bileaflet mechanical prosthesis in the aortic valve position. PG 18/9   mmHg consistent with normal prosthetic valve function.  14. Moderate pleural effusion in the right lateral region.      - Chest x-ray (8/11/23):    Small right pleural effusion.    - CCTA:  CT Angio Cardiac w/ IV Cont (05.09.23 @ 15:42) >  IMPRESSION:    Cardiac:  1.  The calcium score is 0 Agatston units.  2.  OM2 is suboptimally visualized, but appears non obstructive.  3.  Distal LAD not well visualized.  4.  Remaining segments appear angiographically normal.  5.  Severe left atrial dilatation.  6.  Left ventricular septal hypertrophy    20 mm.  7.  There is a 6 x 5 m  hypoattenuating mass attached to the ventricular   aspect of the left coronary cusp of the aortic valve..    Non-cardiac:  Small pericardial effusion.    MR Cardiac w/wo IV Cont (06.07.23 @ 21:13) >    1.  Please note, arrythmia during study may affect accuracy of   measurements/volumetrics.  2.  Given image quality, prior CCTA dated 05/09/2023 reviewed. 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 Consider further evaluation   with full gated Chest CT.  3.  The left ventricle (LV) is normal in size. There is asymmetric septal   hypertrophy, with a maximum wall thickness of the mid inferoseptum of   20.4 mm. Left ventricular global systolic function is normal. The LV   ejection fraction is 57 %.  4.  Dilated left atrium.  5.  The right ventricle (RV) is normal in size. RV global systolic   function is mildly reduced. The RV ejection fraction is 45 %.  6.  On 3 chamber cine imaging, there is systolic anterior motion of the   mitral valve leaflet noted. Mitral Regurgitation noted with a Regurgitant   fraction of 21 cc. However, please note, arrythmia may affect accuracy of   quantification. Correlate with echocardiogram.  7.  Aortic valve leaflets not well visualized.  8.  Small to moderate size circumferential pericardial effusion.  9.  No significant abnormalities of the visualized portions of the great   vessels.  10.  On delayed enhancement imaging,  there is diffuse patchy enhancement   of the basal to apical septum, part of which corresponds to areas of   hypertrophy. There is also patchy enhancement of the mid to apical   inferior region. Findings are seen in hypertrophic cardiomyopathy.    Accurate quantification of enhancement is precluded by image quality   (i.e. quantification range estimates 10-20%).      - Labs:                        10.9   11.06 )-----------( 354      ( 14 Aug 2023 04:41 )             34.5     08-14    134<L>  |  94<L>  |  22<H>  ----------------------------<  103<H>  4.4   |  27  |  1.0    Ca    9.3      14 Aug 2023 04:41  Mg     2.3     08-14    TPro  6.8  /  Alb  4.0  /  TBili  1.0  /  DBili  x   /  AST  28  /  ALT  26  /  AlkPhos  113  08-14    LIVER FUNCTIONS - ( 14 Aug 2023 04:41 )  Alb: 4.0 g/dL / Pro: 6.8 g/dL / ALK PHOS: 113 U/L / ALT: 26 U/L / AST: 28 U/L / GGT: x           PT/INR - ( 14 Aug 2023 04:41 )   PT: 23.00 sec;   INR: 1.98 ratio         PTT - ( 14 Aug 2023 04:41 )  PTT:28.4 sec          Lactate Trend    Urinalysis Basic - ( 14 Aug 2023 04:41 )    Color: x / Appearance: x / SG: x / pH: x  Gluc: 103 mg/dL / Ketone: x  / Bili: x / Urobili: x   Blood: x / Protein: x / Nitrite: x   Leuk Esterase: x / RBC: x / WBC x   Sq Epi: x / Non Sq Epi: x / Bacteria: x              Medications:  MEDICATIONS  (STANDING):  FIRST- Mouthwash  BLM 20 milliLiter(s) Swish and Spit three times a day  iron sucrose IVPB 200 milliGRAM(s) IV Intermittent every 24 hours  metoprolol tartrate 50 milliGRAM(s) Oral every 8 hours  pantoprazole    Tablet 40 milliGRAM(s) Oral before breakfast  warfarin 2 milliGRAM(s) Oral once    MEDICATIONS  (PRN):  ALPRAZolam 0.25 milliGRAM(s) Oral daily PRN anxiety  benzonatate 100 milliGRAM(s) Oral every 8 hours PRN Cough        Drips:    PRN:     Allergies    No Known Allergies    Intolerances       Lab: 6

## 2023-08-14 NOTE — CHART NOTE - NSCHARTNOTEFT_GEN_A_CORE
Verified home coumadin regimen    Sunday 1.5mg  Monday 1mg  Tuesday 1mg  Wednesday 1.5mg  Thursday 1mg  Friday 1mg  Saturday 1.5mg , Heparin drip started  Coumadin home dose started (see below for verified home coumadin regimen), f/u INR in AM  IVC 1.98cm in diameter with >50% collapse, 500cc NS bolus given, 250cc NS bolus #2 given    Verified home coumadin regimen:  Sunday 1.5mg  Monday 1mg  Tuesday 1mg  Wednesday 1.5mg  Thursday 1mg  Friday 1mg  Saturday 1.5mg , Heparin drip started  Coumadin home dose started (see below for verified home coumadin regimen), f/u INR in AM  IVC 1.98cm in diameter with >50% collapse, 500cc NS bolus given, 250cc NS bolus #2 given  ABG wnl, will wean off sedation for possible extubation in AM    Verified home coumadin regimen:  Sunday 1.5mg  Monday 1mg  Tuesday 1mg  Wednesday 1.5mg  Thursday 1mg  Friday 1mg  Saturday 1.5mg

## 2023-08-14 NOTE — CHART NOTE - NSCHARTNOTEFT_GEN_A_CORE
Electrophysiology Brief Post-Op Note    I have personally seen and examined the patient.  I agree with the history and physical which I have reviewed and noted any changes below. A representative from mapping system company was present throughout the case for clinical support.  08-14-23 @ 19:50    PRE-OP DIAGNOSIS: Persistent AFib    POST-OP DIAGNOSIS: Persistent AFib    PROCEDURE:   - MINDI   - AFib Ablation (RF)     VASCULAR ACCESS (with ultrasound guidance)   - Right femoral vein: 8.5 Fr   - Left femoral vein: 8 Fr, 11 Fr      Physician: Brennan Tyler MD  Assistant: None    ANESTHESIA TYPE:  [ X ] General Anesthesia  [  ] Sedation  [ X ] Local/Regional    ESTIMATED BLOOD LOSS:  20    mL    CONDITION  [  ] Critical  [  ] Serious  [  ]Fair  [ X ]Good    SPECIMENS REMOVED (IF APPLICABLE): N/A    IMPLANTS (IF APPLICABLE): N/A    FINDINGS: Persistent AF at arrival- converted to sinus with PVI +PWI +CTI. Induced right and left sided atrial flutter- ablated. Trivial unchanged pericardial effusion. All accesses closed with vascade device. Figure of 8 stitch in left groin.     COMPLICATIONS: None    PLAN OF CARE  -       Admit to CCU  -     Continue Warfarin. INR in am  - IV heparin once ACT <250. No bolus.  -      Remove valladares when bedrest is over  - Continue Amiodarone, Metoprolol  - Diuresis as per primary team  - Extubation in am if ok with primary team  - A-line can be removed once ACT < 160 s

## 2023-08-14 NOTE — PROGRESS NOTE ADULT - ASSESSMENT
Assessment:  Patient is a 42y old  Female, patient of Dr. 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 She underwent aortic valve mass resection and left atrial appendage clipping and aortic valve replacement, post op c/b AF, persistent, despite MINDI/DCCV 8/2/23 patient remains in AF, with worsening dyspnea, now at rest presents for admission for AF w RVR i/s/o acute on chronic diastolic heart failure exacerbation with plans for possible AF ablation on Monday    Problems discussed and associated plan:  #AFIB W RVR  - s/p cardizem 20 mg IVP given x1 in the ED   -s/p  on lopressor 25 TID, and s/p  50 TID then   - c/w lopressor 50 q8   -Held amiodarone 200 mg QD Tsh elevated   - plan for inpatient ablation on Monday with Dr. Tyler as per EP   -Ablation today   - f/u T3    #Acute on chronic diastolic heart failure   - obtain new 2D echo last EF 73% in june   - incentive spirometry  - s/p Lasix 20 mg IV x 1  - s/p lasix 40 mg IV X 1 on 8/13  - monitor BMP closely   - Iron 25 saturation 6% start iron sucrose 200 mg IV X 5 days started on 8/13    #elevated troponin   - 0.0 then 0.05--> 0.05   - discontinue  trend   - likely i/s/o Afib w RVR + CHF exacerbation     #fibroblastoma s/p mechanical AVR in 6/2023  -s/p  coumadin 1 mg in the AM and Evening yesterday, no coumadin on 8/11  - INR 1.89 --> INR 1.98  - coumadin 2 mg on 8/13  - coumadin 2 mg today   - close monitoring of INR 2.5-3.5  - check INR AM Monday as per EP    #Cough  - elevated WBC   - c/w tessalon pearles prn at home   - Lozenges prn  - ENT consult :Fiberoptic Laryngoscopy: No masses or lesions noted to NP/OP/HP. Laryngeal structures intact, no edema or erythema noted. Epiglottis crisp, no edema. TVC/FVC mobile and intact, no glottic gap noted. +LPR noted on exam,   Continue PPI  (i.e Lozenges)         #Diet Dash/TLC   #Activity IAT  #DVT ppx home coumadin by inr   #GI ppx protonix    Please contact me with any questions or concerns at [phone].

## 2023-08-14 NOTE — PROGRESS NOTE ADULT - SUBJECTIVE AND OBJECTIVE BOX
Patient is a 42y old  Female who presents with a chief complaint of Shortness of Breath (13 Aug 2023 18:49)        HPI:  AF. INR 1.9          PAST MEDICAL & SURGICAL HISTORY:  Hypertrophic cardiomyopathy      Atrial fibrillation      Aortic valve disease      Obese      S/P transesophageal echocardiogram ([facility]I)                      PREVIOUS DIAGNOSTIC TESTING:      ECHO  FINDINGS:    STRESS  FINDINGS:    CATHETERIZATION  FINDINGS:    ELECTROPHYSIOLOGY STUDY  FINDINGS:    CAROTID ULTRASOUND:  FINDINGS    VENOUS DUPLEX SCAN:  FINDINGS:    CHEST CT PULMONARY ANGIO with IV Contrast:  FINDINGS:    MEDICATIONS  (STANDING):  FIRST- Mouthwash  BLM 20 milliLiter(s) Swish and Spit three times a day  iron sucrose IVPB 200 milliGRAM(s) IV Intermittent every 24 hours  metoprolol tartrate 50 milliGRAM(s) Oral every 8 hours  pantoprazole    Tablet 40 milliGRAM(s) Oral before breakfast    MEDICATIONS  (PRN):  ALPRAZolam 0.25 milliGRAM(s) Oral daily PRN anxiety  benzonatate 100 milliGRAM(s) Oral every 8 hours PRN Cough      FAMILY HISTORY:  Known health problems: none (Father, Mother)        SOCIAL HISTORY:    CIGARETTES:    ALCOHOL:    Past Surgical History:    Allergies:    No Known Allergies      REVIEW OF SYSTEMS:    As Above.    Vital Signs Last 24 Hrs  T(C): 36.7 (14 Aug 2023 07:14), Max: 37 (13 Aug 2023 23:58)  T(F): 98.1 (14 Aug 2023 07:14), Max: 98.6 (13 Aug 2023 23:58)  HR: 115 (14 Aug 2023 07:14) (115 - 128)  BP: 97/73 (14 Aug 2023 07:14) (97/73 - 140/61)  BP(mean): 80 (14 Aug 2023 07:14) (79 - 91)  RR: 18 (14 Aug 2023 07:14) (18 - 19)  SpO2: 98% (13 Aug 2023 23:58) (98% - 98%)    Parameters below as of 13 Aug 2023 16:05  Patient On (Oxygen Delivery Method): room air        PHYSICAL EXAM:        GENERAL: In no apparent distress, well nourished, and hydrated.  HEAD:  Atraumatic, Normocephalic  HEART: Regular rate and rhythm; No murmurs, rubs, or gallops.  PULMONARY: Clear to auscultation and perfusion.  No rales, wheezing, or rhonchi bilaterally.  EXTREMITIES:  2+ Peripheral Pulses, No clubbing, cyanosis, or edema  NEUROLOGICAL: Grossly nonfocal      INTERPRETATION OF TELEMETRY:    ECG:    I&O's Detail    13 Aug 2023 07:01  -  14 Aug 2023 07:00  --------------------------------------------------------  IN:    IV PiggyBack: 100 mL    Oral Fluid: 120 mL  Total IN: 220 mL    OUT:    Voided (mL): 1320 mL  Total OUT: 1320 mL    Total NET: -1100 mL          LABS:                        10.9   11.06 )-----------( 354      ( 14 Aug 2023 04:41 )             34.5     08-14    134<L>  |  94<L>  |  22<H>  ----------------------------<  103<H>  4.4   |  27  |  1.0    Ca    9.3      14 Aug 2023 04:41  Mg     2.3     -    TPro  6.8  /  Alb  4.0  /  TBili  1.0  /  DBili  x   /  AST  28  /  ALT  26  /  AlkPhos  113  08-14        PT/INR - ( 14 Aug 2023 04:41 )   PT: 23.00 sec;   INR: 1.98 ratio         PTT - ( 14 Aug 2023 04:41 )  PTT:28.4 sec  Urinalysis Basic - ( 14 Aug 2023 04:41 )    Color: x / Appearance: x / SG: x / pH: x  Gluc: 103 mg/dL / Ketone: x  / Bili: x / Urobili: x   Blood: x / Protein: x / Nitrite: x   Leuk Esterase: x / RBC: x / WBC x   Sq Epi: x / Non Sq Epi: x / Bacteria: x      BNP  I&O's Detail    13 Aug 2023 07:01  -  14 Aug 2023 07:00  --------------------------------------------------------  IN:    IV PiggyBack: 100 mL    Oral Fluid: 120 mL  Total IN: 220 mL    OUT:    Voided (mL): 1320 mL  Total OUT: 1320 mL    Total NET: -1100 mL        Daily     Daily Weight in k.3 (14 Aug 2023 04:15)    RADIOLOGY & ADDITIONAL STUDIES:

## 2023-08-15 ENCOUNTER — APPOINTMENT (OUTPATIENT)
Dept: MEDICATION MANAGEMENT | Facility: CLINIC | Age: 43
End: 2023-08-15
Payer: COMMERCIAL

## 2023-08-15 ENCOUNTER — APPOINTMENT (OUTPATIENT)
Dept: CARDIOLOGY | Facility: CLINIC | Age: 43
End: 2023-08-15

## 2023-08-15 LAB
-  AMIKACIN: SIGNIFICANT CHANGE UP
-  AMOXICILLIN/CLAVULANIC ACID: SIGNIFICANT CHANGE UP
-  AMPICILLIN/SULBACTAM: SIGNIFICANT CHANGE UP
-  AMPICILLIN: SIGNIFICANT CHANGE UP
-  AZTREONAM: SIGNIFICANT CHANGE UP
-  CEFAZOLIN: SIGNIFICANT CHANGE UP
-  CEFEPIME: SIGNIFICANT CHANGE UP
-  CEFOXITIN: SIGNIFICANT CHANGE UP
-  CEFTRIAXONE: SIGNIFICANT CHANGE UP
-  CEFUROXIME: SIGNIFICANT CHANGE UP
-  CIPROFLOXACIN: SIGNIFICANT CHANGE UP
-  ERTAPENEM: SIGNIFICANT CHANGE UP
-  GENTAMICIN: SIGNIFICANT CHANGE UP
-  IMIPENEM: SIGNIFICANT CHANGE UP
-  LEVOFLOXACIN: SIGNIFICANT CHANGE UP
-  MEROPENEM: SIGNIFICANT CHANGE UP
-  NITROFURANTOIN: SIGNIFICANT CHANGE UP
-  PIPERACILLIN/TAZOBACTAM: SIGNIFICANT CHANGE UP
-  TOBRAMYCIN: SIGNIFICANT CHANGE UP
-  TRIMETHOPRIM/SULFAMETHOXAZOLE: SIGNIFICANT CHANGE UP
ALBUMIN SERPL ELPH-MCNC: 3 G/DL — LOW (ref 3.5–5.2)
ALBUMIN SERPL ELPH-MCNC: 3.1 G/DL — LOW (ref 3.5–5.2)
ALP SERPL-CCNC: 86 U/L — SIGNIFICANT CHANGE UP (ref 30–115)
ALP SERPL-CCNC: 91 U/L — SIGNIFICANT CHANGE UP (ref 30–115)
ALT FLD-CCNC: 19 U/L — SIGNIFICANT CHANGE UP (ref 0–41)
ALT FLD-CCNC: 20 U/L — SIGNIFICANT CHANGE UP (ref 0–41)
ANION GAP SERPL CALC-SCNC: 13 MMOL/L — SIGNIFICANT CHANGE UP (ref 7–14)
ANION GAP SERPL CALC-SCNC: 13 MMOL/L — SIGNIFICANT CHANGE UP (ref 7–14)
APTT BLD: >200 SEC — CRITICAL HIGH (ref 27–39.2)
AST SERPL-CCNC: 103 U/L — HIGH (ref 0–41)
AST SERPL-CCNC: 84 U/L — HIGH (ref 0–41)
BASE EXCESS BLDA CALC-SCNC: -1.8 MMOL/L — SIGNIFICANT CHANGE UP (ref -2–3)
BASOPHILS # BLD AUTO: 0.05 K/UL — SIGNIFICANT CHANGE UP (ref 0–0.2)
BASOPHILS NFR BLD AUTO: 0.3 % — SIGNIFICANT CHANGE UP (ref 0–1)
BILIRUB SERPL-MCNC: 1.1 MG/DL — SIGNIFICANT CHANGE UP (ref 0.2–1.2)
BILIRUB SERPL-MCNC: 1.2 MG/DL — SIGNIFICANT CHANGE UP (ref 0.2–1.2)
BUN SERPL-MCNC: 16 MG/DL — SIGNIFICANT CHANGE UP (ref 10–20)
BUN SERPL-MCNC: 17 MG/DL — SIGNIFICANT CHANGE UP (ref 10–20)
CALCIUM SERPL-MCNC: 7.7 MG/DL — LOW (ref 8.4–10.4)
CALCIUM SERPL-MCNC: 8.2 MG/DL — LOW (ref 8.4–10.5)
CHLORIDE SERPL-SCNC: 107 MMOL/L — SIGNIFICANT CHANGE UP (ref 98–110)
CHLORIDE SERPL-SCNC: 107 MMOL/L — SIGNIFICANT CHANGE UP (ref 98–110)
CO2 SERPL-SCNC: 23 MMOL/L — SIGNIFICANT CHANGE UP (ref 17–32)
CO2 SERPL-SCNC: 25 MMOL/L — SIGNIFICANT CHANGE UP (ref 17–32)
CREAT SERPL-MCNC: 0.8 MG/DL — SIGNIFICANT CHANGE UP (ref 0.7–1.5)
CREAT SERPL-MCNC: 1 MG/DL — SIGNIFICANT CHANGE UP (ref 0.7–1.5)
CULTURE RESULTS: SIGNIFICANT CHANGE UP
EGFR: 72 ML/MIN/1.73M2 — SIGNIFICANT CHANGE UP
EGFR: 94 ML/MIN/1.73M2 — SIGNIFICANT CHANGE UP
EOSINOPHIL # BLD AUTO: 0.02 K/UL — SIGNIFICANT CHANGE UP (ref 0–0.7)
EOSINOPHIL NFR BLD AUTO: 0.1 % — SIGNIFICANT CHANGE UP (ref 0–8)
GAS PNL BLDA: SIGNIFICANT CHANGE UP
GLUCOSE SERPL-MCNC: 117 MG/DL — HIGH (ref 70–99)
GLUCOSE SERPL-MCNC: 117 MG/DL — HIGH (ref 70–99)
HCO3 BLDA-SCNC: 23 MMOL/L — SIGNIFICANT CHANGE UP (ref 21–28)
HCT VFR BLD CALC: 30.9 % — LOW (ref 37–47)
HGB BLD-MCNC: 9.7 G/DL — LOW (ref 12–16)
HOROWITZ INDEX BLDA+IHG-RTO: 40 — SIGNIFICANT CHANGE UP
IMM GRANULOCYTES NFR BLD AUTO: 1.1 % — HIGH (ref 0.1–0.3)
INR BLD: 3.4 RATIO — HIGH (ref 0.65–1.3)
LYMPHOCYTES # BLD AUTO: 1.04 K/UL — LOW (ref 1.2–3.4)
LYMPHOCYTES # BLD AUTO: 6.9 % — LOW (ref 20.5–51.1)
MAGNESIUM SERPL-MCNC: 1.8 MG/DL — SIGNIFICANT CHANGE UP (ref 1.8–2.4)
MCHC RBC-ENTMCNC: 24.9 PG — LOW (ref 27–31)
MCHC RBC-ENTMCNC: 31.4 G/DL — LOW (ref 32–37)
MCV RBC AUTO: 79.4 FL — LOW (ref 81–99)
METHOD TYPE: SIGNIFICANT CHANGE UP
MONOCYTES # BLD AUTO: 1.74 K/UL — HIGH (ref 0.1–0.6)
MONOCYTES NFR BLD AUTO: 11.5 % — HIGH (ref 1.7–9.3)
MRSA PCR RESULT.: NEGATIVE — SIGNIFICANT CHANGE UP
NEUTROPHILS # BLD AUTO: 12.15 K/UL — HIGH (ref 1.4–6.5)
NEUTROPHILS NFR BLD AUTO: 80.1 % — HIGH (ref 42.2–75.2)
NRBC # BLD: 0 /100 WBCS — SIGNIFICANT CHANGE UP (ref 0–0)
ORGANISM # SPEC MICROSCOPIC CNT: SIGNIFICANT CHANGE UP
ORGANISM # SPEC MICROSCOPIC CNT: SIGNIFICANT CHANGE UP
PCO2 BLDA: 39 MMHG — SIGNIFICANT CHANGE UP (ref 25–48)
PH BLDA: 7.38 — SIGNIFICANT CHANGE UP (ref 7.35–7.45)
PLATELET # BLD AUTO: 304 K/UL — SIGNIFICANT CHANGE UP (ref 130–400)
PMV BLD: 9.2 FL — SIGNIFICANT CHANGE UP (ref 7.4–10.4)
PO2 BLDA: 99 MMHG — SIGNIFICANT CHANGE UP (ref 83–108)
POTASSIUM SERPL-MCNC: 3.7 MMOL/L — SIGNIFICANT CHANGE UP (ref 3.5–5)
POTASSIUM SERPL-MCNC: 3.9 MMOL/L — SIGNIFICANT CHANGE UP (ref 3.5–5)
POTASSIUM SERPL-SCNC: 3.7 MMOL/L — SIGNIFICANT CHANGE UP (ref 3.5–5)
POTASSIUM SERPL-SCNC: 3.9 MMOL/L — SIGNIFICANT CHANGE UP (ref 3.5–5)
PROT SERPL-MCNC: 5.4 G/DL — LOW (ref 6–8)
PROT SERPL-MCNC: 5.5 G/DL — LOW (ref 6–8)
PROTHROM AB SERPL-ACNC: >40 SEC — HIGH (ref 9.95–12.87)
RBC # BLD: 3.89 M/UL — LOW (ref 4.2–5.4)
RBC # FLD: 17.1 % — HIGH (ref 11.5–14.5)
SAO2 % BLDA: 99 % — HIGH (ref 94–98)
SODIUM SERPL-SCNC: 143 MMOL/L — SIGNIFICANT CHANGE UP (ref 135–146)
SODIUM SERPL-SCNC: 145 MMOL/L — SIGNIFICANT CHANGE UP (ref 135–146)
SPECIMEN SOURCE: SIGNIFICANT CHANGE UP
WBC # BLD: 15.16 K/UL — HIGH (ref 4.8–10.8)
WBC # FLD AUTO: 15.16 K/UL — HIGH (ref 4.8–10.8)

## 2023-08-15 PROCEDURE — 71045 X-RAY EXAM CHEST 1 VIEW: CPT | Mod: 26

## 2023-08-15 PROCEDURE — 99291 CRITICAL CARE FIRST HOUR: CPT

## 2023-08-15 PROCEDURE — 93010 ELECTROCARDIOGRAM REPORT: CPT

## 2023-08-15 PROCEDURE — 99233 SBSQ HOSP IP/OBS HIGH 50: CPT

## 2023-08-15 RX ORDER — DEXMEDETOMIDINE HYDROCHLORIDE IN 0.9% SODIUM CHLORIDE 4 UG/ML
0.2 INJECTION INTRAVENOUS
Qty: 200 | Refills: 0 | Status: DISCONTINUED | OUTPATIENT
Start: 2023-08-15 | End: 2023-08-15

## 2023-08-15 RX ORDER — MIDAZOLAM HYDROCHLORIDE 1 MG/ML
2 INJECTION, SOLUTION INTRAMUSCULAR; INTRAVENOUS ONCE
Refills: 0 | Status: DISCONTINUED | OUTPATIENT
Start: 2023-08-15 | End: 2023-08-15

## 2023-08-15 RX ORDER — CHLORHEXIDINE GLUCONATE 213 G/1000ML
1 SOLUTION TOPICAL
Refills: 0 | Status: DISCONTINUED | OUTPATIENT
Start: 2023-08-15 | End: 2023-08-22

## 2023-08-15 RX ORDER — AMIODARONE HYDROCHLORIDE 400 MG/1
200 TABLET ORAL DAILY
Refills: 0 | Status: DISCONTINUED | OUTPATIENT
Start: 2023-08-15 | End: 2023-08-22

## 2023-08-15 RX ORDER — SODIUM CHLORIDE 9 MG/ML
500 INJECTION, SOLUTION INTRAVENOUS ONCE
Refills: 0 | Status: COMPLETED | OUTPATIENT
Start: 2023-08-15 | End: 2023-08-15

## 2023-08-15 RX ORDER — POTASSIUM CHLORIDE 20 MEQ
20 PACKET (EA) ORAL ONCE
Refills: 0 | Status: COMPLETED | OUTPATIENT
Start: 2023-08-15 | End: 2023-08-15

## 2023-08-15 RX ORDER — WARFARIN SODIUM 2.5 MG/1
1 TABLET ORAL ONCE
Refills: 0 | Status: COMPLETED | OUTPATIENT
Start: 2023-08-15 | End: 2023-08-15

## 2023-08-15 RX ORDER — PANTOPRAZOLE SODIUM 20 MG/1
40 TABLET, DELAYED RELEASE ORAL DAILY
Refills: 0 | Status: DISCONTINUED | OUTPATIENT
Start: 2023-08-15 | End: 2023-08-22

## 2023-08-15 RX ORDER — SODIUM CHLORIDE 9 MG/ML
500 INJECTION, SOLUTION INTRAVENOUS
Refills: 0 | Status: DISCONTINUED | OUTPATIENT
Start: 2023-08-15 | End: 2023-08-15

## 2023-08-15 RX ORDER — METOPROLOL TARTRATE 50 MG
50 TABLET ORAL EVERY 8 HOURS
Refills: 0 | Status: DISCONTINUED | OUTPATIENT
Start: 2023-08-15 | End: 2023-08-22

## 2023-08-15 RX ORDER — SODIUM CHLORIDE 9 MG/ML
500 INJECTION INTRAMUSCULAR; INTRAVENOUS; SUBCUTANEOUS ONCE
Refills: 0 | Status: COMPLETED | OUTPATIENT
Start: 2023-08-15 | End: 2023-08-15

## 2023-08-15 RX ORDER — FUROSEMIDE 40 MG
40 TABLET ORAL ONCE
Refills: 0 | Status: COMPLETED | OUTPATIENT
Start: 2023-08-15 | End: 2023-08-15

## 2023-08-15 RX ADMIN — PANTOPRAZOLE SODIUM 40 MILLIGRAM(S): 20 TABLET, DELAYED RELEASE ORAL at 11:00

## 2023-08-15 RX ADMIN — CHLORHEXIDINE GLUCONATE 15 MILLILITER(S): 213 SOLUTION TOPICAL at 05:30

## 2023-08-15 RX ADMIN — CHLORHEXIDINE GLUCONATE 1 APPLICATION(S): 213 SOLUTION TOPICAL at 05:30

## 2023-08-15 RX ADMIN — WARFARIN SODIUM 1 MILLIGRAM(S): 2.5 TABLET ORAL at 22:50

## 2023-08-15 RX ADMIN — Medication 50 MILLIEQUIVALENT(S): at 15:54

## 2023-08-15 RX ADMIN — AMIODARONE HYDROCHLORIDE 200 MILLIGRAM(S): 400 TABLET ORAL at 15:54

## 2023-08-15 RX ADMIN — Medication 50 MILLIGRAM(S): at 11:25

## 2023-08-15 RX ADMIN — Medication 50 MILLIGRAM(S): at 22:50

## 2023-08-15 RX ADMIN — CHLORHEXIDINE GLUCONATE 15 MILLILITER(S): 213 SOLUTION TOPICAL at 17:48

## 2023-08-15 RX ADMIN — SODIUM CHLORIDE 1000 MILLILITER(S): 9 INJECTION, SOLUTION INTRAVENOUS at 20:40

## 2023-08-15 RX ADMIN — IRON SUCROSE 110 MILLIGRAM(S): 20 INJECTION, SOLUTION INTRAVENOUS at 14:53

## 2023-08-15 RX ADMIN — Medication 50 MILLIGRAM(S): at 17:48

## 2023-08-15 RX ADMIN — Medication 40 MILLIGRAM(S): at 10:52

## 2023-08-15 RX ADMIN — HEPARIN SODIUM 0 UNIT(S)/HR: 5000 INJECTION INTRAVENOUS; SUBCUTANEOUS at 07:23

## 2023-08-15 RX ADMIN — SODIUM CHLORIDE 1000 MILLILITER(S): 9 INJECTION INTRAMUSCULAR; INTRAVENOUS; SUBCUTANEOUS at 01:08

## 2023-08-15 NOTE — CONSULT NOTE ADULT - ASSESSMENT
Impression:     Acute hypoxemic respiratory failure   Pulmonary edema - effusion  Afib S/P ablation   AVR on AC       PLAN:    CNS: Spontaneous awakening trial.     HEENT: Oral care    PULMONARY:  HOB @ 45 degrees.  Vent changes as follows: No vent changes.     CARDIOVASCULAR: Bedside IVC is dilated; no variation; right moderate effusion on US. Overall fluid overload. Lasix 40mg IV once prior to extubation. SBT trial today. Wean off Levophed.     GI: GI prophylaxis.  Feeding NPO.     RENAL:  Follow up lytes.  Correct as needed. correct the K and Mg.     INFECTIOUS DISEASE: Follow up cultures. Procalcitonin.     HEMATOLOGICAL:  DVT prophylaxis: supratherapeutic PTT and INR.     ENDOCRINE:  Follow up FS.  Insulin protocol if needed.    MUSCULOSKELETAL: bedrest for now.     ICU care until extubated; dispo per cardiology.

## 2023-08-15 NOTE — PROGRESS NOTE ADULT - ASSESSMENT
Impression:   Afib s/p ablation   Acute on chronic Diastolic HF (EF is 73%)  Acute hypoxemic respiratory failure (intubated)  Pulmonary edema - effusion  AVR on coumadin       PLAN:    CNS: Spontaneous awakening trial. back on sedation     HEENT: Oral care    PULMONARY:  HOB @ 45 degrees. ON vent .    CARDIOVASCULAR: Bedside IVC is dilated; no variation; right moderate effusion on US. Overall fluid overload. Lasix 40mg IV once given  prior to extubation attempt  but ABGs showed resp acidosis ,so back on sedation with SBT trail agin today . Weaning off Levophed.     GI: GI prophylaxis.  Feeding NPO.     RENAL:  Follow up lytes.     INFECTIOUS DISEASE: Follow up cultures. Procalcitonin.     HEMATOLOGICAL:  DVT prophylaxis: On Coumadin ,held heparin .f/u  INR(goal is 2.5 to 3.5)    ENDOCRINE:  Follow up FS.  Insulin protocol if needed.    MUSCULOSKELETAL: bedrest for now.     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 wean off propofol, start precedex.     HEENT: Oral care    PULMONARY:  HOB @ 45 degrees. Failed SBT this AM, likely 2/2 fluid overload. Given IV lasix 40 mg.   will monitor UOP, trial of SBT this afternoon    CARDIOVASCULAR:   Bedside IVC is dilated; no variation; right moderate effusion on US. Overall fluid overload. Lasix 40mg IV once given  prior to extubation attempt  but ABGs showed resp acidosis ,so back on sedation with SBT trail agin today . Weaning off Levophed.     -cautious diuresis given preload dependent states in HOCM pts   -arterial lactate 3 this AM however pt off levophed and tolerating well. Will repeat this afternoon  -cont metoprolol 50 mg q8h, target HR < 70bpm  -in sinus rhythm, will restart amiodarone 200 mg daily for maintenance of SR. TSH elevation and fT3 noted. Cannot use sotalol due to QT prolongation, flecainide contraindicated given structural heart disease.   -INR 3.4, stop heparin and warfarin home dosed.   -repeat lytes, correct K and Mag if low.     GI: GI prophylaxis. Hold feeds given possible extubation today.     RENAL:  Follow up lytes.     INFECTIOUS DISEASE: Tmx 100.3 last night. Afebrile since. cont to monitor.     HEMATOLOGICAL:  DVT prophylaxis: On Coumadin ,held heparin .f/u  INR(goal is 2.5 to 3.5)    ENDOCRINE:  Follow up FS.  Insulin protocol if needed.    MUSCULOSKELETAL: bedrest for now.     DISPOSITION : CCU

## 2023-08-15 NOTE — PROGRESS NOTE ADULT - SUBJECTIVE AND OBJECTIVE BOX
INTERVAL HPI/OVERNIGHT EVENTS:  No acute tele events, in NSR  S/P F Ablation POD#1  Still intubated on pressors and sedation    MEDICATIONS  (STANDING):  chlorhexidine 0.12% Liquid 15 milliLiter(s) Oral Mucosa every 12 hours  chlorhexidine 2% Cloths 1 Application(s) Topical <User Schedule>  FIRST- Mouthwash  BLM 20 milliLiter(s) Swish and Spit three times a day  iron sucrose IVPB 200 milliGRAM(s) IV Intermittent every 24 hours  metoprolol tartrate 50 milliGRAM(s) Oral every 8 hours  norepinephrine Infusion 0.05 MICROgram(s)/kG/Min (7.48 mL/Hr) IV Continuous <Continuous>  pantoprazole  Injectable 40 milliGRAM(s) IV Push daily  propofol Infusion 5 MICROgram(s)/kG/Min (2.39 mL/Hr) IV Continuous <Continuous>    MEDICATIONS  (PRN):  ALPRAZolam 0.25 milliGRAM(s) Oral daily PRN anxiety  benzonatate 100 milliGRAM(s) Oral every 8 hours PRN Cough      Allergies    No Known Allergies    Intolerances        REVIEW OF SYSTEMS: Unable to assess    Vital Signs Last 24 Hrs  T(C): 37.4 (15 Aug 2023 08:00), Max: 37.9 (14 Aug 2023 20:36)  T(F): 99.3 (15 Aug 2023 08:00), Max: 100.3 (14 Aug 2023 20:36)  HR: 80 (15 Aug 2023 08:00) (73 - 115)  BP: 96/65 (15 Aug 2023 08:00) (80/55 - 105/54)  BP(mean): 75 (15 Aug 2023 08:00) (61 - 80)  RR: 21 (15 Aug 2023 08:00) (9 - 39)  SpO2: 99% (15 Aug 2023 08:00) (96% - 100%)        08-14-23 @ 07:01  -  08-15-23 @ 07:00  --------------------------------------------------------  IN: 836.4 mL / OUT: 1380 mL / NET: -543.6 mL    08-15-23 @ 07:01  -  08-15-23 @ 09:36  --------------------------------------------------------  IN: 67.8 mL / OUT: 0 mL / NET: 67.8 mL        Physical Exam  GENERAL: Intubated, sedated  EYES: EOMI, PERRLA, conjunctiva and sclera clear  NECK: Supple  HEART: Regular rate and rhythm; No murmurs, rubs, or gallops.  PULMONARY: Clear to auscultation and perfusion.  No rales, wheezing, or rhonchi bilaterally.  EXTREMITIES:  2+ Peripheral Pulses, No clubbing, cyanosis, or edema  NEUROLOGICAL: Grossly nonfocal    LABS:                        9.7    15.16 )-----------( 304      ( 15 Aug 2023 04:26 )             30.9     08-15    143  |  107  |  17  ----------------------------<  117<H>  3.7   |  23  |  1.0    Ca    7.7<L>      15 Aug 2023 04:26  Mg     1.8     08-15    TPro  5.4<L>  /  Alb  3.0<L>  /  TBili  1.1  /  DBili  x   /  AST  103<H>  /  ALT  20  /  AlkPhos  91  08-15    PT/INR - ( 15 Aug 2023 04:26 )   PT: >40.00 sec;   INR: 3.40 ratio         PTT - ( 15 Aug 2023 04:26 )  PTT:>200.0 sec  Urinalysis Basic - ( 15 Aug 2023 04:26 )    Color: x / Appearance: x / SG: x / pH: x  Gluc: 117 mg/dL / Ketone: x  / Bili: x / Urobili: x   Blood: x / Protein: x / Nitrite: x   Leuk Esterase: x / RBC: x / WBC x   Sq Epi: x / Non Sq Epi: x / Bacteria: x        RADIOLOGY & ADDITIONAL TESTS:

## 2023-08-15 NOTE — PROGRESS NOTE ADULT - ASSESSMENT
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 She underwent aortic valve mass resection and left atrial appendage clipping and aortic valve replacement, post op c/b AF, persistent, despite MINDI/DCCV 8/2/23 patient remains in AF, with worsening dyspnea, now at rest presents for admission for AF w RVR i/s/o acute on chronic diastolic heart failure exacerbation. Patient sp AF Ablation POD#1, still intubated on pressors    Impression:  AF sp Ablation  Acute hypoxemic respiratory failure   Pulmonary edema - effusion  AVR (mechanical) on Coumaidn  S/P AV Mass Resection and JASON Clip  HTN  HLD    Plan:  - Should continue Coumadin as pt has mechanical valve and INR goal 2.5-3.5  - Attempt for extubation today  - Wean off pressors  - Cont Amio and Metoprolol  - Monitor electrolytes, maintain WNL  - Cont tele monitoring  - Will follow

## 2023-08-15 NOTE — CONSULT NOTE ADULT - SUBJECTIVE AND OBJECTIVE BOX
Patient is a 42y old  Female who presents with a chief complaint of Shortness of Breath (14 Aug 2023 16:27)      HPI:    Patient is a 42y old  Female, patient of Dr. 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 She underwent aortic valve mass resection and left atrial appendage clipping and aortic valve replacement, post op c/b AF, persistent, despite MINDI/DCCV 8/2/23 patient remains in AF presenting with worsening dyspnea that was previously only when walking long distances is now occurring at rest. Patient states she was told to go to the by Dr. Tyler yesterday but she was experiencing a perisstent cough, however she noticed that when she became more short of breath she new she was in afib w RVR. Patient also reported that last week she called the CCU because she was experiencing  chest discomfort while eating her morning breakfast and she decided not to come to the ED because it resolved by the time her visiting nurse arrived. Admits to cough over the past few days and was given tessalon pearles as needed to help. denies changes in bowel movement, recent chest pain, or difficulty urinating     In the ED, EKG afib w rvr 110-140, given cardizem 20 IVP x1, /67. CXR showing increased bilateral opacities.          (11 Aug 2023 16:10)      PAST MEDICAL & SURGICAL HISTORY:  Hypertrophic cardiomyopathy      Atrial fibrillation      Aortic valve disease      Obese      S/P transesophageal echocardiogram ([facility]I)            FAMILY HISTORY:  Known health problems: none (Father, Mother)    :  No known cardiovacular family hisotry     ROS:  See HPI     Allergies    No Known Allergies    Intolerances          PHYSICAL EXAM    ICU Vital Signs Last 24 Hrs  T(C): 37.7 (15 Aug 2023 04:30), Max: 37.9 (14 Aug 2023 20:36)  T(F): 99.8 (15 Aug 2023 04:30), Max: 100.3 (14 Aug 2023 20:36)  HR: 75 (15 Aug 2023 07:00) (73 - 115)  BP: 93/58 (15 Aug 2023 07:00) (80/55 - 105/54)  BP(mean): 69 (15 Aug 2023 07:00) (61 - 80)  ABP: 107/59 (15 Aug 2023 07:00) (90/49 - 120/66)  ABP(mean): 80 (15 Aug 2023 07:00) (68 - 88)  RR: 15 (15 Aug 2023 07:00) (9 - 39)  SpO2: 99% (15 Aug 2023 07:00) (96% - 100%)        General: In NAD   HEENT:  CHHAYA              Lymphatic system: No cervical LN   Lungs: Bilateral BS, clear   Cardiovascular: Regular  Gastrointestinal: Soft, Positive BS  Musculoskeletal: No clubbing.  Moves all extremities.  Full range of motion   Skin: Warm.  Intact  Neurological: No motor or sensory deficit       08-14-23 @ 07:01  -  08-15-23 @ 07:00  --------------------------------------------------------  IN:    IV PiggyBack: 750 mL    Norepinephrine: 41 mL    Propofol: 45.4 mL  Total IN: 836.4 mL    OUT:    Ureteral Catheter (mL): 1155 mL    Voided (mL): 200 mL  Total OUT: 1355 mL    Total NET: -518.6 mL          LABS:                          9.7    15.16 )-----------( 304      ( 15 Aug 2023 04:26 )             30.9                                               08-15    143  |  107  |  17  ----------------------------<  117<H>  3.7   |  23  |  1.0    Ca    7.7<L>      15 Aug 2023 04:26  Mg     1.8     08-15    TPro  5.4<L>  /  Alb  3.0<L>  /  TBili  1.1  /  DBili  x   /  AST  103<H>  /  ALT  20  /  AlkPhos  91  08-15      PT/INR - ( 15 Aug 2023 04:26 )   PT: >40.00 sec;   INR: 3.40 ratio         PTT - ( 15 Aug 2023 04:26 )  PTT:>200.0 sec                                       Urinalysis Basic - ( 15 Aug 2023 04:26 )    Color: x / Appearance: x / SG: x / pH: x  Gluc: 117 mg/dL / Ketone: x  / Bili: x / Urobili: x   Blood: x / Protein: x / Nitrite: x   Leuk Esterase: x / RBC: x / WBC x   Sq Epi: x / Non Sq Epi: x / Bacteria: x                                                  LIVER FUNCTIONS - ( 15 Aug 2023 04:26 )  Alb: 3.0 g/dL / Pro: 5.4 g/dL / ALK PHOS: 91 U/L / ALT: 20 U/L / AST: 103 U/L / GGT: x                                                  Culture - Urine (collected 12 Aug 2023 17:40)  Source: Clean Catch Clean Catch (Midstream)  Preliminary Report (14 Aug 2023 15:56):    >100,000 CFU/ml Escherichia coli                                                   Mode: AC/ CMV (Assist Control/ Continuous Mandatory Ventilation)  RR (machine): 14  TV (machine): 400  FiO2: 40  PEEP: 8  ITime: 1  MAP: 14  PIP: 35                                      ABG - ( 15 Aug 2023 03:35 )  pH, Arterial: 7.38  pH, Blood: x     /  pCO2: 39    /  pO2: 99    / HCO3: 23    / Base Excess: -1.8  /  SaO2: 99.0                X-Rays right effusion moderate; ETT ok                                                                                     ECHO    MEDICATIONS  (STANDING):  chlorhexidine 0.12% Liquid 15 milliLiter(s) Oral Mucosa every 12 hours  chlorhexidine 2% Cloths 1 Application(s) Topical <User Schedule>  FIRST- Mouthwash  BLM 20 milliLiter(s) Swish and Spit three times a day  heparin  Infusion.  Unit(s)/Hr (14 mL/Hr) IV Continuous <Continuous>  iron sucrose IVPB 200 milliGRAM(s) IV Intermittent every 24 hours  metoprolol tartrate 50 milliGRAM(s) Oral every 8 hours  norepinephrine Infusion 0.05 MICROgram(s)/kG/Min (7.48 mL/Hr) IV Continuous <Continuous>  pantoprazole  Injectable 40 milliGRAM(s) IV Push daily  propofol Infusion 5 MICROgram(s)/kG/Min (2.39 mL/Hr) IV Continuous <Continuous>    MEDICATIONS  (PRN):  ALPRAZolam 0.25 milliGRAM(s) Oral daily PRN anxiety  benzonatate 100 milliGRAM(s) Oral every 8 hours PRN Cough  heparin   Injectable 6500 Unit(s) IV Push every 6 hours PRN For aPTT less than 40  heparin   Injectable 3000 Unit(s) IV Push every 6 hours PRN For aPTT between 40 - 57

## 2023-08-15 NOTE — PROGRESS NOTE ADULT - SUBJECTIVE AND OBJECTIVE BOX
MALIHA THOMPSON 42y Female  MRN#: [id number]     Hospital Day: 4d    Pt is currently admitted with the primary diagnosis of  Heart failure        SUBJECTIVE     Overnight events  bolus of 500 cc was given,urine output was low     Subjective complaints  Pt was evaluated this am. Patient jo-ann't talk as intubated.                                            ----------------------------------------------------------  OBJECTIVE  PAST MEDICAL & SURGICAL HISTORY  Hypertrophic cardiomyopathy    Atrial fibrillation    Aortic valve disease    Obese    S/P transesophageal echocardiogram ([facility])                                              -----------------------------------------------------------  ALLERGIES:  No Known Allergies                                            ------------------------------------------------------------    HOME MEDICATIONS  Home Medications:  Coumadin 1 mg oral tablet: 1 milligram(s) orally 4 times a week (12 Aug 2023 10:55)  Coumadin 1 mg oral tablet: 1.5 milligram(s) orally 3 times a week keep INR 2.5-3.5 (12 Aug 2023 10:55)                           MEDICATIONS:  STANDING MEDICATIONS  chlorhexidine 0.12% Liquid 15 milliLiter(s) Oral Mucosa every 12 hours  chlorhexidine 2% Cloths 1 Application(s) Topical <User Schedule>  FIRST- Mouthwash  BLM 20 milliLiter(s) Swish and Spit three times a day  furosemide   Injectable 40 milliGRAM(s) IV Push once  iron sucrose IVPB 200 milliGRAM(s) IV Intermittent every 24 hours  metoprolol tartrate 50 milliGRAM(s) Oral every 8 hours  midazolam Injectable 2 milliGRAM(s) IV Push once  norepinephrine Infusion 0.05 MICROgram(s)/kG/Min IV Continuous <Continuous>  pantoprazole  Injectable 40 milliGRAM(s) IV Push daily  propofol Infusion 5 MICROgram(s)/kG/Min IV Continuous <Continuous>    PRN MEDICATIONS  ALPRAZolam 0.25 milliGRAM(s) Oral daily PRN  benzonatate 100 milliGRAM(s) Oral every 8 hours PRN                                            ------------------------------------------------------------  VITAL SIGNS: Last 24 Hours  T(C): 37.4 (15 Aug 2023 08:00), Max: 37.9 (14 Aug 2023 20:36)  T(F): 99.3 (15 Aug 2023 08:00), Max: 100.3 (14 Aug 2023 20:36)  HR: 80 (15 Aug 2023 08:00) (73 - 115)  BP: 96/65 (15 Aug 2023 08:00) (80/55 - 105/54)  BP(mean): 75 (15 Aug 2023 08:00) (61 - 80)  RR: 21 (15 Aug 2023 08:00) (9 - 39)  SpO2: 99% (15 Aug 2023 08:00) (96% - 100%)      08-14-23 @ 07:01  -  08-15-23 @ 07:00  --------------------------------------------------------  IN: 836.4 mL / OUT: 1380 mL / NET: -543.6 mL    08-15-23 @ 07:01  -  08-15-23 @ 10:39  --------------------------------------------------------  IN: 67.8 mL / OUT: 0 mL / NET: 67.8 mL                                             --------------------------------------------------------------  LABS:                        9.7    15.16 )-----------( 304      ( 15 Aug 2023 04:26 )             30.9     08-15    143  |  107  |  17  ----------------------------<  117<H>  3.7   |  23  |  1.0    Ca    7.7<L>      15 Aug 2023 04:26  Mg     1.8     08-15    TPro  5.4<L>  /  Alb  3.0<L>  /  TBili  1.1  /  DBili  x   /  AST  103<H>  /  ALT  20  /  AlkPhos  91  08-15    PT/INR - ( 15 Aug 2023 04:26 )   PT: >40.00 sec;   INR: 3.40 ratio         PTT - ( 15 Aug 2023 04:26 )  PTT:>200.0 sec  Urinalysis Basic - ( 15 Aug 2023 04:26 )    Color: x / Appearance: x / SG: x / pH: x  Gluc: 117 mg/dL / Ketone: x  / Bili: x / Urobili: x   Blood: x / Protein: x / Nitrite: x   Leuk Esterase: x / RBC: x / WBC x   Sq Epi: x / Non Sq Epi: x / Bacteria: x      ABG - ( 15 Aug 2023 10:08 )  pH, Arterial: 7.29  pH, Blood: x     /  pCO2: 51    /  pO2: 96    / HCO3: 24    / Base Excess: -2.4  /  SaO2: 98.0                    Culture - Urine (collected 12 Aug 2023 17:40)  Source: Clean Catch Clean Catch (Midstream)  Preliminary Report (14 Aug 2023 15:56):    >100,000 CFU/ml Escherichia coli                                                    -------------------------------------------------------------  RADIOLOGY:                                            --------------------------------------------------------------    PHYSICAL EXAM:  GENERAL:intubated   HEAD:  Atraumatic, Normocephalic  EYES: EOMI, conjunctiva and sclera clear  NECK: Supple, No JVD  CHEST/LUNG: Clear to auscultation bilaterally  HEART: regular rate and rhythm now  ABDOMEN: Bowel sounds present  NERVOUS SYSTEM:  responsive and awake                                              --------------------------------------------------------------                 MALIHA THOMPSON 42y Female  MRN#: [id number]     Hospital Day: 4d    Pt is currently admitted with the primary diagnosis of  Heart failure, underwent AF ablation 8/15 for persAF, hypotensive requiring levophed, upgraded to CCU.         SUBJECTIVE   Pt s/p ablation, in sinus rhythm. Remained intubated, on levophed with requirements decreasing overnight.     IVF bolus of 500 cc was given for collapsible IVC and low urine output.    Subjective complaints  Pt was evaluated this am. Patient jo-ann't talk as intubated.  Failed SBT, IV lasix 40 mg given. Off levophed now.                                           ----------------------------------------------------------  OBJECTIVE  PAST MEDICAL & SURGICAL HISTORY  Hypertrophic cardiomyopathy    Atrial fibrillation    Aortic valve disease    Obese    S/P transesophageal echocardiogram ([facility])                                              -----------------------------------------------------------  ALLERGIES:  No Known Allergies                                            ------------------------------------------------------------    HOME MEDICATIONS  Home Medications:  Coumadin 1 mg oral tablet: 1 milligram(s) orally 4 times a week (12 Aug 2023 10:55)  Coumadin 1 mg oral tablet: 1.5 milligram(s) orally 3 times a week keep INR 2.5-3.5 (12 Aug 2023 10:55)                           MEDICATIONS:  STANDING MEDICATIONS  chlorhexidine 0.12% Liquid 15 milliLiter(s) Oral Mucosa every 12 hours  chlorhexidine 2% Cloths 1 Application(s) Topical <User Schedule>  FIRST- Mouthwash  BLM 20 milliLiter(s) Swish and Spit three times a day  furosemide   Injectable 40 milliGRAM(s) IV Push once  iron sucrose IVPB 200 milliGRAM(s) IV Intermittent every 24 hours  metoprolol tartrate 50 milliGRAM(s) Oral every 8 hours  midazolam Injectable 2 milliGRAM(s) IV Push once  norepinephrine Infusion 0.05 MICROgram(s)/kG/Min IV Continuous <Continuous>  pantoprazole  Injectable 40 milliGRAM(s) IV Push daily  propofol Infusion 5 MICROgram(s)/kG/Min IV Continuous <Continuous>    PRN MEDICATIONS  ALPRAZolam 0.25 milliGRAM(s) Oral daily PRN  benzonatate 100 milliGRAM(s) Oral every 8 hours PRN                                            ------------------------------------------------------------  VITAL SIGNS: Last 24 Hours  T(C): 37.4 (15 Aug 2023 08:00), Max: 37.9 (14 Aug 2023 20:36)  T(F): 99.3 (15 Aug 2023 08:00), Max: 100.3 (14 Aug 2023 20:36)  HR: 80 (15 Aug 2023 08:00) (73 - 115)  BP: 96/65 (15 Aug 2023 08:00) (80/55 - 105/54)  BP(mean): 75 (15 Aug 2023 08:00) (61 - 80)  RR: 21 (15 Aug 2023 08:00) (9 - 39)  SpO2: 99% (15 Aug 2023 08:00) (96% - 100%)      08-14-23 @ 07:01  -  08-15-23 @ 07:00  --------------------------------------------------------  IN: 836.4 mL / OUT: 1380 mL / NET: -543.6 mL    08-15-23 @ 07:01  -  08-15-23 @ 10:39  --------------------------------------------------------  IN: 67.8 mL / OUT: 0 mL / NET: 67.8 mL                                             --------------------------------------------------------------  LABS:                        9.7    15.16 )-----------( 304      ( 15 Aug 2023 04:26 )             30.9     08-15    143  |  107  |  17  ----------------------------<  117<H>  3.7   |  23  |  1.0    Ca    7.7<L>      15 Aug 2023 04:26  Mg     1.8     08-15    TPro  5.4<L>  /  Alb  3.0<L>  /  TBili  1.1  /  DBili  x   /  AST  103<H>  /  ALT  20  /  AlkPhos  91  08-15    PT/INR - ( 15 Aug 2023 04:26 )   PT: >40.00 sec;   INR: 3.40 ratio         PTT - ( 15 Aug 2023 04:26 )  PTT:>200.0 sec  Urinalysis Basic - ( 15 Aug 2023 04:26 )    Color: x / Appearance: x / SG: x / pH: x  Gluc: 117 mg/dL / Ketone: x  / Bili: x / Urobili: x   Blood: x / Protein: x / Nitrite: x   Leuk Esterase: x / RBC: x / WBC x   Sq Epi: x / Non Sq Epi: x / Bacteria: x      ABG - ( 15 Aug 2023 10:08 )  pH, Arterial: 7.29  pH, Blood: x     /  pCO2: 51    /  pO2: 96    / HCO3: 24    / Base Excess: -2.4  /  SaO2: 98.0                    Culture - Urine (collected 12 Aug 2023 17:40)  Source: Clean Catch Clean Catch (Midstream)  Preliminary Report (14 Aug 2023 15:56):    >100,000 CFU/ml Escherichia coli                                                    -------------------------------------------------------------  RADIOLOGY:                                            --------------------------------------------------------------    PHYSICAL EXAM:  GENERAL:intubated   HEAD:  Atraumatic, Normocephalic  EYES: EOMI, conjunctiva and sclera clear  NECK: Supple, No JVD  CHEST/LUNG: Clear to auscultation bilaterally  HEART: regular rate and rhythm now  ABDOMEN: Bowel sounds present  NERVOUS SYSTEM:  responsive and awake                                              --------------------------------------------------------------

## 2023-08-16 LAB
ALBUMIN SERPL ELPH-MCNC: 3.5 G/DL — SIGNIFICANT CHANGE UP (ref 3.5–5.2)
ALP SERPL-CCNC: 100 U/L — SIGNIFICANT CHANGE UP (ref 30–115)
ALT FLD-CCNC: 21 U/L — SIGNIFICANT CHANGE UP (ref 0–41)
ANION GAP SERPL CALC-SCNC: 13 MMOL/L — SIGNIFICANT CHANGE UP (ref 7–14)
ANION GAP SERPL CALC-SCNC: 14 MMOL/L — SIGNIFICANT CHANGE UP (ref 7–14)
APTT BLD: 27.4 SEC — SIGNIFICANT CHANGE UP (ref 27–39.2)
AST SERPL-CCNC: 72 U/L — HIGH (ref 0–41)
BILIRUB SERPL-MCNC: 1.3 MG/DL — HIGH (ref 0.2–1.2)
BUN SERPL-MCNC: 14 MG/DL — SIGNIFICANT CHANGE UP (ref 10–20)
BUN SERPL-MCNC: 15 MG/DL — SIGNIFICANT CHANGE UP (ref 10–20)
CALCIUM SERPL-MCNC: 8.4 MG/DL — SIGNIFICANT CHANGE UP (ref 8.4–10.5)
CALCIUM SERPL-MCNC: 8.6 MG/DL — SIGNIFICANT CHANGE UP (ref 8.4–10.4)
CHLORIDE SERPL-SCNC: 100 MMOL/L — SIGNIFICANT CHANGE UP (ref 98–110)
CHLORIDE SERPL-SCNC: 102 MMOL/L — SIGNIFICANT CHANGE UP (ref 98–110)
CO2 SERPL-SCNC: 23 MMOL/L — SIGNIFICANT CHANGE UP (ref 17–32)
CO2 SERPL-SCNC: 24 MMOL/L — SIGNIFICANT CHANGE UP (ref 17–32)
CREAT SERPL-MCNC: 0.8 MG/DL — SIGNIFICANT CHANGE UP (ref 0.7–1.5)
CREAT SERPL-MCNC: 0.8 MG/DL — SIGNIFICANT CHANGE UP (ref 0.7–1.5)
EGFR: 94 ML/MIN/1.73M2 — SIGNIFICANT CHANGE UP
EGFR: 94 ML/MIN/1.73M2 — SIGNIFICANT CHANGE UP
GLUCOSE SERPL-MCNC: 105 MG/DL — HIGH (ref 70–99)
GLUCOSE SERPL-MCNC: 151 MG/DL — HIGH (ref 70–99)
HCT VFR BLD CALC: 32.2 % — LOW (ref 37–47)
HGB BLD-MCNC: 9.6 G/DL — LOW (ref 12–16)
INR BLD: 3.09 RATIO — HIGH (ref 0.65–1.3)
LACTATE SERPL-SCNC: 3.1 MMOL/L — HIGH (ref 0.7–2)
MAGNESIUM SERPL-MCNC: 2 MG/DL — SIGNIFICANT CHANGE UP (ref 1.8–2.4)
MCHC RBC-ENTMCNC: 24.2 PG — LOW (ref 27–31)
MCHC RBC-ENTMCNC: 29.8 G/DL — LOW (ref 32–37)
MCV RBC AUTO: 81.3 FL — SIGNIFICANT CHANGE UP (ref 81–99)
NRBC # BLD: 0 /100 WBCS — SIGNIFICANT CHANGE UP (ref 0–0)
PLATELET # BLD AUTO: 265 K/UL — SIGNIFICANT CHANGE UP (ref 130–400)
PMV BLD: 10.3 FL — SIGNIFICANT CHANGE UP (ref 7.4–10.4)
POTASSIUM SERPL-MCNC: 3.9 MMOL/L — SIGNIFICANT CHANGE UP (ref 3.5–5)
POTASSIUM SERPL-MCNC: 3.9 MMOL/L — SIGNIFICANT CHANGE UP (ref 3.5–5)
POTASSIUM SERPL-SCNC: 3.9 MMOL/L — SIGNIFICANT CHANGE UP (ref 3.5–5)
POTASSIUM SERPL-SCNC: 3.9 MMOL/L — SIGNIFICANT CHANGE UP (ref 3.5–5)
PROCALCITONIN SERPL-MCNC: 0.2 NG/ML — HIGH (ref 0.02–0.1)
PROT SERPL-MCNC: 6.3 G/DL — SIGNIFICANT CHANGE UP (ref 6–8)
PROTHROM AB SERPL-ACNC: 36.5 SEC — HIGH (ref 9.95–12.87)
RBC # BLD: 3.96 M/UL — LOW (ref 4.2–5.4)
RBC # FLD: 17.7 % — HIGH (ref 11.5–14.5)
SODIUM SERPL-SCNC: 137 MMOL/L — SIGNIFICANT CHANGE UP (ref 135–146)
SODIUM SERPL-SCNC: 139 MMOL/L — SIGNIFICANT CHANGE UP (ref 135–146)
TROPONIN T SERPL-MCNC: 2.05 NG/ML — CRITICAL HIGH
TROPONIN T SERPL-MCNC: 2.74 NG/ML — CRITICAL HIGH
TROPONIN T SERPL-MCNC: 2.85 NG/ML — CRITICAL HIGH
WBC # BLD: 13.43 K/UL — HIGH (ref 4.8–10.8)
WBC # FLD AUTO: 13.43 K/UL — HIGH (ref 4.8–10.8)

## 2023-08-16 PROCEDURE — 99233 SBSQ HOSP IP/OBS HIGH 50: CPT

## 2023-08-16 PROCEDURE — 71045 X-RAY EXAM CHEST 1 VIEW: CPT | Mod: 26

## 2023-08-16 PROCEDURE — 71045 X-RAY EXAM CHEST 1 VIEW: CPT | Mod: 26,77

## 2023-08-16 PROCEDURE — 93010 ELECTROCARDIOGRAM REPORT: CPT

## 2023-08-16 RX ORDER — CEFTRIAXONE 500 MG/1
INJECTION, POWDER, FOR SOLUTION INTRAMUSCULAR; INTRAVENOUS
Refills: 0 | Status: COMPLETED | OUTPATIENT
Start: 2023-08-16 | End: 2023-08-20

## 2023-08-16 RX ORDER — CEFTRIAXONE 500 MG/1
1000 INJECTION, POWDER, FOR SOLUTION INTRAMUSCULAR; INTRAVENOUS ONCE
Refills: 0 | Status: COMPLETED | OUTPATIENT
Start: 2023-08-16 | End: 2023-08-16

## 2023-08-16 RX ORDER — WARFARIN SODIUM 2.5 MG/1
1.5 TABLET ORAL ONCE
Refills: 0 | Status: COMPLETED | OUTPATIENT
Start: 2023-08-16 | End: 2023-08-16

## 2023-08-16 RX ORDER — FUROSEMIDE 40 MG
40 TABLET ORAL ONCE
Refills: 0 | Status: COMPLETED | OUTPATIENT
Start: 2023-08-16 | End: 2023-08-16

## 2023-08-16 RX ORDER — FUROSEMIDE 40 MG
40 TABLET ORAL DAILY
Refills: 0 | Status: DISCONTINUED | OUTPATIENT
Start: 2023-08-16 | End: 2023-08-16

## 2023-08-16 RX ORDER — LANOLIN ALCOHOL/MO/W.PET/CERES
10 CREAM (GRAM) TOPICAL AT BEDTIME
Refills: 0 | Status: DISCONTINUED | OUTPATIENT
Start: 2023-08-16 | End: 2023-08-22

## 2023-08-16 RX ORDER — SENNA PLUS 8.6 MG/1
2 TABLET ORAL AT BEDTIME
Refills: 0 | Status: DISCONTINUED | OUTPATIENT
Start: 2023-08-16 | End: 2023-08-22

## 2023-08-16 RX ORDER — DIPHENHYDRAMINE HCL 50 MG
25 CAPSULE ORAL ONCE
Refills: 0 | Status: COMPLETED | OUTPATIENT
Start: 2023-08-16 | End: 2023-08-16

## 2023-08-16 RX ORDER — POLYETHYLENE GLYCOL 3350 17 G/17G
17 POWDER, FOR SOLUTION ORAL DAILY
Refills: 0 | Status: DISCONTINUED | OUTPATIENT
Start: 2023-08-16 | End: 2023-08-22

## 2023-08-16 RX ORDER — CEFTRIAXONE 500 MG/1
1000 INJECTION, POWDER, FOR SOLUTION INTRAMUSCULAR; INTRAVENOUS EVERY 24 HOURS
Refills: 0 | Status: COMPLETED | OUTPATIENT
Start: 2023-08-17 | End: 2023-08-19

## 2023-08-16 RX ADMIN — CHLORHEXIDINE GLUCONATE 15 MILLILITER(S): 213 SOLUTION TOPICAL at 17:21

## 2023-08-16 RX ADMIN — CEFTRIAXONE 100 MILLIGRAM(S): 500 INJECTION, POWDER, FOR SOLUTION INTRAMUSCULAR; INTRAVENOUS at 01:45

## 2023-08-16 RX ADMIN — Medication 40 MILLIGRAM(S): at 02:55

## 2023-08-16 RX ADMIN — IRON SUCROSE 110 MILLIGRAM(S): 20 INJECTION, SOLUTION INTRAVENOUS at 15:14

## 2023-08-16 RX ADMIN — PANTOPRAZOLE SODIUM 40 MILLIGRAM(S): 20 TABLET, DELAYED RELEASE ORAL at 11:00

## 2023-08-16 RX ADMIN — DIPHENHYDRAMINE HYDROCHLORIDE AND LIDOCAINE HYDROCHLORIDE AND ALUMINUM HYDROXIDE AND MAGNESIUM HYDRO 20 MILLILITER(S): KIT at 05:59

## 2023-08-16 RX ADMIN — DIPHENHYDRAMINE HYDROCHLORIDE AND LIDOCAINE HYDROCHLORIDE AND ALUMINUM HYDROXIDE AND MAGNESIUM HYDRO 20 MILLILITER(S): KIT at 00:00

## 2023-08-16 RX ADMIN — SENNA PLUS 2 TABLET(S): 8.6 TABLET ORAL at 22:07

## 2023-08-16 RX ADMIN — WARFARIN SODIUM 1.5 MILLIGRAM(S): 2.5 TABLET ORAL at 22:05

## 2023-08-16 RX ADMIN — Medication 10 MILLIGRAM(S): at 22:04

## 2023-08-16 RX ADMIN — Medication 40 MILLIGRAM(S): at 10:59

## 2023-08-16 RX ADMIN — AMIODARONE HYDROCHLORIDE 200 MILLIGRAM(S): 400 TABLET ORAL at 06:02

## 2023-08-16 RX ADMIN — DIPHENHYDRAMINE HYDROCHLORIDE AND LIDOCAINE HYDROCHLORIDE AND ALUMINUM HYDROXIDE AND MAGNESIUM HYDRO 20 MILLILITER(S): KIT at 23:23

## 2023-08-16 RX ADMIN — Medication 50 MILLIGRAM(S): at 15:14

## 2023-08-16 RX ADMIN — CHLORHEXIDINE GLUCONATE 15 MILLILITER(S): 213 SOLUTION TOPICAL at 05:59

## 2023-08-16 RX ADMIN — Medication 40 MILLIGRAM(S): at 17:28

## 2023-08-16 RX ADMIN — DIPHENHYDRAMINE HYDROCHLORIDE AND LIDOCAINE HYDROCHLORIDE AND ALUMINUM HYDROXIDE AND MAGNESIUM HYDRO 20 MILLILITER(S): KIT at 15:13

## 2023-08-16 RX ADMIN — CHLORHEXIDINE GLUCONATE 1 APPLICATION(S): 213 SOLUTION TOPICAL at 06:01

## 2023-08-16 RX ADMIN — Medication 50 MILLIGRAM(S): at 05:59

## 2023-08-16 NOTE — PROGRESS NOTE ADULT - SUBJECTIVE AND OBJECTIVE BOX
MALIHA THOMPSON 42y Female  MRN#: [id number]     Hospital Day: 5d    Pt is currently admitted with the primary diagnosis of  Heart failure        SUBJECTIVE     Overnight events  Was given 500cc of fluid with good urine out but later in night her UO was low with orthopnea and respiratory distress so lasix 40 was given.  Trop this am is 2.85 and procal is 0.20     Subjective complaints  Pt was evaluated this am. Patient denied any active chest pain or SOB .                                            ----------------------------------------------------------  OBJECTIVE  PAST MEDICAL & SURGICAL HISTORY  Hypertrophic cardiomyopathy    Atrial fibrillation    Aortic valve disease    Obese    S/P transesophageal echocardiogram ([facility])                                              -----------------------------------------------------------  ALLERGIES:  No Known Allergies                                            ------------------------------------------------------------    HOME MEDICATIONS  Home Medications:  Coumadin 1 mg oral tablet: 1 milligram(s) orally 4 times a week (12 Aug 2023 10:55)  Coumadin 1 mg oral tablet: 1.5 milligram(s) orally 3 times a week keep INR 2.5-3.5 (12 Aug 2023 10:55)                           MEDICATIONS:  STANDING MEDICATIONS  aMIOdarone    Tablet 200 milliGRAM(s) Oral daily  cefTRIAXone   IVPB      chlorhexidine 0.12% Liquid 15 milliLiter(s) Oral Mucosa every 12 hours  chlorhexidine 2% Cloths 1 Application(s) Topical <User Schedule>  FIRST- Mouthwash  BLM 20 milliLiter(s) Swish and Spit three times a day  furosemide   Injectable 40 milliGRAM(s) IV Push daily  iron sucrose IVPB 200 milliGRAM(s) IV Intermittent every 24 hours  melatonin 10 milliGRAM(s) Oral at bedtime  metoprolol tartrate 50 milliGRAM(s) Oral every 8 hours  norepinephrine Infusion 0.05 MICROgram(s)/kG/Min IV Continuous <Continuous>  pantoprazole  Injectable 40 milliGRAM(s) IV Push daily    PRN MEDICATIONS  ALPRAZolam 0.25 milliGRAM(s) Oral daily PRN  benzonatate 100 milliGRAM(s) Oral every 8 hours PRN                                            ------------------------------------------------------------  VITAL SIGNS: Last 24 Hours  T(C): 36.1 (16 Aug 2023 04:00), Max: 37.2 (15 Aug 2023 16:00)  T(F): 96.9 (16 Aug 2023 04:00), Max: 98.9 (15 Aug 2023 16:00)  HR: 67 (16 Aug 2023 06:30) (61 - 116)  BP: 104/64 (16 Aug 2023 06:30) (72/48 - 129/55)  BP(mean): 78 (16 Aug 2023 06:30) (54 - 88)  RR: 18 (16 Aug 2023 06:30) (14 - 54)  SpO2: 100% (16 Aug 2023 06:30) (94% - 100%)      08-15-23 @ 07:01  -  08-16-23 @ 07:00  --------------------------------------------------------  IN: 1497.8 mL / OUT: 3195 mL / NET: -1697.2 mL                                             --------------------------------------------------------------  LABS:                        9.6    13.43 )-----------( 265      ( 16 Aug 2023 04:44 )             32.2     08-16    137  |  100  |  15  ----------------------------<  105<H>  3.9   |  24  |  0.8    Ca    8.6      16 Aug 2023 04:44  Mg     2.0     08-16    TPro  6.3  /  Alb  3.5  /  TBili  1.3<H>  /  DBili  x   /  AST  72<H>  /  ALT  21  /  AlkPhos  100  08-16    PT/INR - ( 16 Aug 2023 04:44 )   PT: 36.50 sec;   INR: 3.09 ratio         PTT - ( 16 Aug 2023 04:44 )  PTT:27.4 sec  Urinalysis Basic - ( 16 Aug 2023 04:44 )    Color: x / Appearance: x / SG: x / pH: x  Gluc: 105 mg/dL / Ketone: x  / Bili: x / Urobili: x   Blood: x / Protein: x / Nitrite: x   Leuk Esterase: x / RBC: x / WBC x   Sq Epi: x / Non Sq Epi: x / Bacteria: x      ABG - ( 15 Aug 2023 15:21 )  pH, Arterial: 7.45  pH, Blood: x     /  pCO2: 40    /  pO2: 113   / HCO3: 28    / Base Excess: 3.5   /  SaO2: 98.8              Troponin T, Serum: 2.85 ng/mL *HH* (08-16-23 @ 04:44)          CARDIAC MARKERS ( 16 Aug 2023 04:44 )  x     / 2.85 ng/mL / x     / x     / x                                                  -------------------------------------------------------------  RADIOLOGY:                                            --------------------------------------------------------------    PHYSICAL EXAM:  GENERAL: in sitting position on bed ,comfortable   HEAD:  Atraumatic  NECK: Supple, No JVD  CHEST/LUNG:clear in upper lobes and mild crackles on lower lobes of lungs b/l .  HEART: regular rate and rhythm  ABDOMEN: soft   EXTREMITIES: pitting edema b/l LE   NERVOUS SYSTEM:  Alert & Oriented X3. No focal deficits                                            --------------------------------------------------------------

## 2023-08-16 NOTE — PROGRESS NOTE ADULT - SUBJECTIVE AND OBJECTIVE BOX
Patient is a 42y old  Female who presents with a chief complaint of Shortness of Breath (16 Aug 2023 08:33)        Over Night Events:        ROS:  See HPI    PHYSICAL EXAM    ICU Vital Signs Last 24 Hrs  T(C): 35.8 (16 Aug 2023 08:00), Max: 37.2 (15 Aug 2023 16:00)  T(F): 96.4 (16 Aug 2023 08:00), Max: 98.9 (15 Aug 2023 16:00)  HR: 66 (16 Aug 2023 08:00) (61 - 116)  BP: 86/53 (16 Aug 2023 08:00) (72/48 - 129/55)  BP(mean): 63 (16 Aug 2023 08:00) (54 - 88)  ABP: 82/77 (15 Aug 2023 21:00) (82/77 - 171/95)  ABP(mean): 79 (15 Aug 2023 21:00) (61 - 124)  RR: 34 (16 Aug 2023 08:00) (14 - 54)  SpO2: 95% (16 Aug 2023 08:00) (94% - 100%)    O2 Parameters below as of 16 Aug 2023 08:00  Patient On (Oxygen Delivery Method): nasal cannula  O2 Flow (L/min): 2          General:  HEENT: CHHAYA             Lymphatic system: No cervical LN   Lungs: Bilateral BS  Cardiovascular: Regular   Gastrointestinal: Soft, Positive BS  Extremities: No clubbing.  Moves extremities.  Full Range of motion   Skin: Warm, intact  Neurological: No motor or sensory deficit       08-15-23 @ 07:01  -  08-16-23 @ 07:00  --------------------------------------------------------  IN:    Dexmedetomidine: 60 mL    Enteral Tube Flush: 60 mL    Heparin Infusion: 22 mL    IV PiggyBack: 150 mL    Lactated Ringers Bolus: 500 mL    Norepinephrine: 95.3 mL    Oral Fluid: 560 mL    Propofol: 50.5 mL  Total IN: 1497.8 mL    OUT:    Ureteral Catheter (mL): 3195 mL  Total OUT: 3195 mL    Total NET: -1697.2 mL      08-16-23 @ 07:01  -  08-16-23 @ 09:02  --------------------------------------------------------  IN:    Oral Fluid: 120 mL  Total IN: 120 mL    OUT:    Norepinephrine: 0 mL  Total OUT: 0 mL    Total NET: 120 mL          LABS:                            9.6    13.43 )-----------( 265      ( 16 Aug 2023 04:44 )             32.2                                               08-16    137  |  100  |  15  ----------------------------<  105<H>  3.9   |  24  |  0.8    Ca    8.6      16 Aug 2023 04:44  Mg     2.0     08-16    TPro  6.3  /  Alb  3.5  /  TBili  1.3<H>  /  DBili  x   /  AST  72<H>  /  ALT  21  /  AlkPhos  100  08-16      PT/INR - ( 16 Aug 2023 04:44 )   PT: 36.50 sec;   INR: 3.09 ratio         PTT - ( 16 Aug 2023 04:44 )  PTT:27.4 sec                                       Urinalysis Basic - ( 16 Aug 2023 04:44 )    Color: x / Appearance: x / SG: x / pH: x  Gluc: 105 mg/dL / Ketone: x  / Bili: x / Urobili: x   Blood: x / Protein: x / Nitrite: x   Leuk Esterase: x / RBC: x / WBC x   Sq Epi: x / Non Sq Epi: x / Bacteria: x        CARDIAC MARKERS ( 16 Aug 2023 04:44 )  x     / 2.85 ng/mL / x     / x     / x                                                LIVER FUNCTIONS - ( 16 Aug 2023 04:44 )  Alb: 3.5 g/dL / Pro: 6.3 g/dL / ALK PHOS: 100 U/L / ALT: 21 U/L / AST: 72 U/L / GGT: x                                                                                               Mode: CPAP with PS  FiO2: 40  PEEP: 8  PS: 5                                      ABG - ( 15 Aug 2023 15:21 )  pH, Arterial: 7.45  pH, Blood: x     /  pCO2: 40    /  pO2: 113   / HCO3: 28    / Base Excess: 3.5   /  SaO2: 98.8                MEDICATIONS  (STANDING):    aMIOdarone    Tablet 200 milliGRAM(s) Oral daily  cefTRIAXone   IVPB      chlorhexidine 0.12% Liquid 15 milliLiter(s) Oral Mucosa every 12 hours  chlorhexidine 2% Cloths 1 Application(s) Topical <User Schedule>  FIRST- Mouthwash  BLM 20 milliLiter(s) Swish and Spit three times a day  furosemide   Injectable 40 milliGRAM(s) IV Push daily  iron sucrose IVPB 200 milliGRAM(s) IV Intermittent every 24 hours  melatonin 10 milliGRAM(s) Oral at bedtime  metoprolol tartrate 50 milliGRAM(s) Oral every 8 hours  norepinephrine Infusion 0.05 MICROgram(s)/kG/Min (7.48 mL/Hr) IV Continuous <Continuous>  pantoprazole  Injectable 40 milliGRAM(s) IV Push daily    MEDICATIONS  (PRN):  ALPRAZolam 0.25 milliGRAM(s) Oral daily PRN anxiety  benzonatate 100 milliGRAM(s) Oral every 8 hours PRN Cough      Xrays: increased markings and right effusion                                                                                   ECHO

## 2023-08-16 NOTE — DIETITIAN INITIAL EVALUATION ADULT - NAME AND PHONE
Debby [phone] or TEAMS    Nutrition Interventions: Meals, snacks, medical nutrition supplements; Nutrition Monitoring: Diet order, PO intake, weights, labs, NFPF, body composition, BM and tolerance to medical food supplements

## 2023-08-16 NOTE — CHART NOTE - NSCHARTNOTEFT_GEN_A_CORE
patient was given fluid challenge, 500cc IVF bolus  urine output increased for about 1 hour, 150cc/hr  subsequent urine output decreased to 30cc/hr #Dyspnea/Volume Overload  - patient was given fluid challenge, 500cc IVF bolus  - urine output increased for about 1 hour, 150cc/hr  - subsequent urine output decreased to 30cc/hr  - on physical exam, +2 b/l pitting edema, patient exhibits orthopnea, mild respiratory distress  - CXR shows worsening b/l pleural effusions  - Lasix 40mg IV x1 given  - continue to monitor    #Cystitis  - admits to burning/dysuria  - Tmax 100.3 one day prior  - WBC uptrending  - urine is cloudy  - UA +ve  - UCx +ve for ecoli  - started patient in IV ceftriaxone 1g for 3 days

## 2023-08-16 NOTE — DIETITIAN INITIAL EVALUATION ADULT - HEIGHT FOR BMI (CENTIMETERS)
Dr. Ramos - Contacted patient and he would like to make an appointment for scraping of the BCC on his forehead. Offered appointment 9/7/17, but patient does not know his schedule yet and would have appointment when he has a few days off afterwards, so he will call back to schedule, but he is wondering if it would be okay to wait until about the 3rd week of October to have the scraping done. Thanks.   154.9

## 2023-08-16 NOTE — DIETITIAN INITIAL EVALUATION ADULT - OTHER INFO
Pertinent Medical Information: 43 y/o female, patient of Dr. 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 Pt admitted with the primary diagnosis of  Heart failure, underwent AF ablation 8/15 for persAF, hypotensive requiring levophed, upgraded to CCU.     Pertinent Subjective Information: Pt reports fair appetite; consuming 50% of meals provided in-house. No chewing or swallowing difficulties noted. No nausea or vomiting reported. Discussed medical nutrition supplements to optimize kcal/pro intake; pt agreed to receive supplements in-house. Pt prefers chocolate flavored supplements.     Weight hx: 190#/86.4 KG -- checked 6 months ago per pt. Current dosing weight is 79.8 KG. Pt states if there is any weight loss, it is intentional. 7.6% intentional weight loss in 6 months compared to current dosing weight. Pt does not meet weight criteria for malnutrition at this time.

## 2023-08-16 NOTE — DIETITIAN INITIAL EVALUATION ADULT - OTHER CALCULATIONS
Using ABW: ENERGY: 1347-4086 kcal/day (MSJ 1392.76* 1-1.1 SF); PROTEIN: 72-95 g/day (1.5-2 g/kg IBW 47.7 KG); FLUID: 2394 mL/day (30 mL/kg) -- with consideration for age, BMI, critical care setting

## 2023-08-16 NOTE — DIETITIAN INITIAL EVALUATION ADULT - EDUCATION DIETARY MODIFICATIONS
Discussed importance of PO intake, current diet order and medical nutrition supplements./(1) partially meets; needs review/practice/verbalization

## 2023-08-16 NOTE — PROGRESS NOTE ADULT - ASSESSMENT
Impression:     Acute hypoxemic respiratory failure   Pulmonary edema   Right pleural effusion  Afib S/P ablation   AVR on AC       PLAN:    CNS: No sedation.     HEENT: Oral care    PULMONARY:  HOB @ 45 degrees. Keep spo2 more than 92. On NC.     CARDIOVASCULAR: Keep MAP mroe than 65mmhg. Diuretics as tolerated. If needs thoracentesis the INR has to be closer to 2 if possible.     GI: GI prophylaxis. Oral diet.     RENAL:  Follow up lytes.  Correct as needed DHAVAL valladares.     INFECTIOUS DISEASE: Follow up cultures. Procalcitonin.     HEMATOLOGICAL:  DVT prophylaxis: INR is therapeutic.     ENDOCRINE:  Follow up FS.  Insulin protocol if needed.    MUSCULOSKELETAL: bedrest for now.     Dispo per cardiology. Will follow as needed.

## 2023-08-16 NOTE — DIETITIAN INITIAL EVALUATION ADULT - ADD RECOMMEND
1. ADD Ensure Plus HP 2X/DAILY to optimize kcal/pro intake -- provides 700 kcal/day total, 40g pro/day total  2. Continue with current diet order modifier  3. Encourage PO intake and assist during meals prn    Pt is at moderate nutrition risk; will f/u in 5-7 days or prn.

## 2023-08-16 NOTE — PROGRESS NOTE ADULT - ASSESSMENT
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 She underwent aortic valve mass resection and left atrial appendage clipping and aortic valve replacement, post op c/b AF, persistent, despite MINDI/DCCV 8/2/23 patient remains in AF, with worsening dyspnea, now at rest presents for admission for AF w RVR i/s/o acute on chronic diastolic heart failure exacerbation. 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 She underwent aortic valve mass resection and left atrial appendage clipping and aortic valve replacement, post op c/b AF, persistent, despite MINDI/DCCV 8/2/23 patient remains in AF, with worsening dyspnea, now at rest presents for admission for AF w RVR i/s/o acute on chronic diastolic heart failure exacerbation. Patient sp AF Ablation POD#1, still intubated on pressors    Telemetry and EKG reviewed with Dr. Tyler and discussed with primary team    Impression:  AF sp Ablation 8/14/23  Acute hypoxemic respiratory failure   Pulmonary edema - effusion  AVR (mechanical) on Coumaidn  S/P AV Mass Resection and JASON Clip  HTN  HLD    Plan:  - Continue Coumadin as pt has mechanical valve and INR goal 2.5-3.5  - Cont Amio and Metoprolol  - groin stitch removed  - Monitor electrolytes, maintain WNL  - Cont tele monitoring  - Will follow

## 2023-08-16 NOTE — DIETITIAN INITIAL EVALUATION ADULT - ORAL INTAKE PTA/DIET HISTORY
Pt reports fair appetite prior to admit; consumed 2 meals daily d/t coughing -- duration unknown. Denies taking vitamin or mineral supplements. Denies drinking protein shake supplements. Pt states she dislikes protein shake supplements. NKFA; denies any restrictive cultural or [employer] food preferences.

## 2023-08-16 NOTE — PROGRESS NOTE ADULT - SUBJECTIVE AND OBJECTIVE BOX
INTERVAL HPI/OVERNIGHT EVENTS:  pt is doing well.  Pt is extubated.  She is in NSR.  Complains of SOB    MEDICATIONS  (STANDING):  aMIOdarone    Tablet 200 milliGRAM(s) Oral daily  cefTRIAXone   IVPB      chlorhexidine 0.12% Liquid 15 milliLiter(s) Oral Mucosa every 12 hours  chlorhexidine 2% Cloths 1 Application(s) Topical <User Schedule>  FIRST- Mouthwash  BLM 20 milliLiter(s) Swish and Spit three times a day  furosemide   Injectable 40 milliGRAM(s) IV Push daily  iron sucrose IVPB 200 milliGRAM(s) IV Intermittent every 24 hours  melatonin 10 milliGRAM(s) Oral at bedtime  metoprolol tartrate 50 milliGRAM(s) Oral every 8 hours  norepinephrine Infusion 0.05 MICROgram(s)/kG/Min (7.48 mL/Hr) IV Continuous <Continuous>  pantoprazole  Injectable 40 milliGRAM(s) IV Push daily  warfarin 1.5 milliGRAM(s) Oral once    MEDICATIONS  (PRN):  ALPRAZolam 0.25 milliGRAM(s) Oral daily PRN anxiety  benzonatate 100 milliGRAM(s) Oral every 8 hours PRN Cough      Allergies    No Known Allergies    Intolerances      REVIEW OF SYSTEMS  as above    Vital Signs Last 24 Hrs  T(C): 35.8 (16 Aug 2023 08:00), Max: 37.2 (15 Aug 2023 16:00)  T(F): 96.4 (16 Aug 2023 08:00), Max: 98.9 (15 Aug 2023 16:00)  HR: 66 (16 Aug 2023 08:00) (61 - 116)  BP: 86/53 (16 Aug 2023 08:00) (72/48 - 129/55)  BP(mean): 63 (16 Aug 2023 08:00) (54 - 88)  RR: 34 (16 Aug 2023 08:00) (14 - 54)  SpO2: 95% (16 Aug 2023 08:00) (94% - 100%)    Parameters below as of 16 Aug 2023 08:00  Patient On (Oxygen Delivery Method): nasal cannula  O2 Flow (L/min): 2      08-15-23 @ 07:01  -  08-16-23 @ 07:00  --------------------------------------------------------  IN: 1497.8 mL / OUT: 3195 mL / NET: -1697.2 mL    08-16-23 @ 07:01  -  08-16-23 @ 09:57  --------------------------------------------------------  IN: 120 mL / OUT: 0 mL / NET: 120 mL        Physical Exam    GENERAL: In no apparent distress, well nourished, and hydrated.  HEART: Regular rate and rhythm; No murmurs, rubs, or gallops.  PULMONARY: Clear to auscultation and perfusion.  No rales, wheezing, or rhonchi bilaterally.  ABDOMEN: Soft, Nontender, Nondistended; Bowel sounds present  EXTREMITIES:  2+ Peripheral Pulses, No clubbing, cyanosis, or edema  NEUROLOGICAL: Grossly nonfocal  GROIN: Left groin stitch removed    LABS:                        9.6    13.43 )-----------( 265      ( 16 Aug 2023 04:44 )             32.2     08-16    137  |  100  |  15  ----------------------------<  105<H>  3.9   |  24  |  0.8    Ca    8.6      16 Aug 2023 04:44  Mg     2.0     08-16    TPro  6.3  /  Alb  3.5  /  TBili  1.3<H>  /  DBili  x   /  AST  72<H>  /  ALT  21  /  AlkPhos  100  08-16    PT/INR - ( 16 Aug 2023 04:44 )   PT: 36.50 sec;   INR: 3.09 ratio         PTT - ( 16 Aug 2023 04:44 )  PTT:27.4 sec  Urinalysis Basic - ( 16 Aug 2023 04:44 )    Color: x / Appearance: x / SG: x / pH: x  Gluc: 105 mg/dL / Ketone: x  / Bili: x / Urobili: x   Blood: x / Protein: x / Nitrite: x   Leuk Esterase: x / RBC: x / WBC x   Sq Epi: x / Non Sq Epi: x / Bacteria: x        RADIOLOGY & ADDITIONAL TESTS:   INTERVAL HPI/OVERNIGHT EVENTS:  pt is doing well.  Pt is extubated.  She is in NSR.  Complains of SOB    MEDICATIONS  (STANDING):  aMIOdarone    Tablet 200 milliGRAM(s) Oral daily  cefTRIAXone   IVPB      chlorhexidine 0.12% Liquid 15 milliLiter(s) Oral Mucosa every 12 hours  chlorhexidine 2% Cloths 1 Application(s) Topical <User Schedule>  FIRST- Mouthwash  BLM 20 milliLiter(s) Swish and Spit three times a day  furosemide   Injectable 40 milliGRAM(s) IV Push daily  iron sucrose IVPB 200 milliGRAM(s) IV Intermittent every 24 hours  melatonin 10 milliGRAM(s) Oral at bedtime  metoprolol tartrate 50 milliGRAM(s) Oral every 8 hours  norepinephrine Infusion 0.05 MICROgram(s)/kG/Min (7.48 mL/Hr) IV Continuous <Continuous>  pantoprazole  Injectable 40 milliGRAM(s) IV Push daily  warfarin 1.5 milliGRAM(s) Oral once    MEDICATIONS  (PRN):  ALPRAZolam 0.25 milliGRAM(s) Oral daily PRN anxiety  benzonatate 100 milliGRAM(s) Oral every 8 hours PRN Cough      Allergies    No Known Allergies    Intolerances      REVIEW OF SYSTEMS  as above    Vital Signs Last 24 Hrs  T(C): 35.8 (16 Aug 2023 08:00), Max: 37.2 (15 Aug 2023 16:00)  T(F): 96.4 (16 Aug 2023 08:00), Max: 98.9 (15 Aug 2023 16:00)  HR: 66 (16 Aug 2023 08:00) (61 - 116)  BP: 86/53 (16 Aug 2023 08:00) (72/48 - 129/55)  BP(mean): 63 (16 Aug 2023 08:00) (54 - 88)  RR: 34 (16 Aug 2023 08:00) (14 - 54)  SpO2: 95% (16 Aug 2023 08:00) (94% - 100%)    Parameters below as of 16 Aug 2023 08:00  Patient On (Oxygen Delivery Method): nasal cannula  O2 Flow (L/min): 2      08-15-23 @ 07:01  -  08-16-23 @ 07:00  --------------------------------------------------------  IN: 1497.8 mL / OUT: 3195 mL / NET: -1697.2 mL    08-16-23 @ 07:01  -  08-16-23 @ 09:57  --------------------------------------------------------  IN: 120 mL / OUT: 0 mL / NET: 120 mL        Physical Exam    GENERAL: In no apparent distress, well nourished, and hydrated.  HEART: Regular rate and rhythm; No murmurs, rubs, or gallops.  PULMONARY: Clear to auscultation and perfusion.  No rales, wheezing, or rhonchi bilaterally.  ABDOMEN: Soft, Nontender, Nondistended; Bowel sounds present  EXTREMITIES:  2+ Peripheral Pulses, No clubbing, cyanosis, or edema  NEUROLOGICAL: Grossly nonfocal  GROIN: Left groin stitch removed    LABS:                        9.6    13.43 )-----------( 265      ( 16 Aug 2023 04:44 )             32.2     08-16    137  |  100  |  15  ----------------------------<  105<H>  3.9   |  24  |  0.8    Ca    8.6      16 Aug 2023 04:44  Mg     2.0     08-16    TPro  6.3  /  Alb  3.5  /  TBili  1.3<H>  /  DBili  x   /  AST  72<H>  /  ALT  21  /  AlkPhos  100  08-16    PT/INR - ( 16 Aug 2023 04:44 )   PT: 36.50 sec;   INR: 3.09 ratio         PTT - ( 16 Aug 2023 04:44 )  PTT:27.4 sec  Urinalysis Basic - ( 16 Aug 2023 04:44 )    Color: x / Appearance: x / SG: x / pH: x  Gluc: 105 mg/dL / Ketone: x  / Bili: x / Urobili: x   Blood: x / Protein: x / Nitrite: x   Leuk Esterase: x / RBC: x / WBC x   Sq Epi: x / Non Sq Epi: x / Bacteria: x      TEle:   NSR    EKG  < from: 12 Lead ECG (08.16.23 @ 06:03) >  Ventricular Rate 69 BPM    Atrial Rate 69 BPM    P-R Interval 186 ms    QRS Duration 144 ms    Q-T Interval 522 ms    QTC Calculation(Bazett) 559 ms    P Axis 83 degrees    R Axis -56 degrees    T Axis 102 degrees    Diagnosis Line Normal sinus rhythm  Left axis deviation  Right bundle branch block  T wave abnormality, consider lateral ischemia  Abnormal ECG    < end of copied text >

## 2023-08-16 NOTE — PROGRESS NOTE ADULT - ASSESSMENT
Impression:   Intubated for AF ablation, acute hypoxic resp failure/pulmonary edema   PersAfib s/p ablation and JASON clipping 6/29/2023  HOCM with DEN   Acute on chronic Diastolic HF (EF is 73%)  Aortic valve fibroelastoma s/p AVR 6/29/2023 on coumadin       PLAN:    CNS: Spontaneous awakening trial. wean off propofol, start precedex.     HEENT: Oral care    PULMONARY:  HOB @ 45 degrees. Failed SBT this AM, likely 2/2 fluid overload. Given IV lasix 40 mg.   will monitor UOP, trial of SBT this afternoon    CARDIOVASCULAR:   Bedside IVC is dilated; no variation; right moderate effusion on US. Overall fluid overload. Lasix 40mg IV once given  prior to extubation attempt  but ABGs showed resp acidosis ,so back on sedation with SBT trail agin today . Weaning off Levophed.     -cautious diuresis given preload dependent states in HOCM pts   -arterial lactate 3 this AM however pt off levophed and tolerating well. Will repeat this afternoon  -cont metoprolol 50 mg q8h, target HR < 70bpm  -in sinus rhythm, will restart amiodarone 200 mg daily for maintenance of SR. TSH elevation and fT3 noted. Cannot use sotalol due to QT prolongation, flecainide contraindicated given structural heart disease.   -INR 3.4, stop heparin and warfarin home dosed.   -repeat lytes, correct K and Mag if low.     GI: GI prophylaxis. Hold feeds given possible extubation today.     RENAL:  Follow up lytes.     INFECTIOUS DISEASE: Tmx 100.3 last night. Afebrile since. cont to monitor.     HEMATOLOGICAL:  DVT prophylaxis: On Coumadin ,held heparin .f/u  INR(goal is 2.5 to 3.5)    ENDOCRINE:  Follow up FS.  Insulin protocol if needed.    MUSCULOSKELETAL: bedrest for now.     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 off sedatives     HEENT: Oral care    PULMONARY:  HOB @ 45 degrees. extubated successfully on 8/15 .   On incentive spirometry .    CARDIOVASCULAR:    -cautious diuresis given preload dependent states in HOCM pts   -arterial lactate 3 this AM however pt off levophed and tolerating well.   -cont metoprolol 50 mg q8h, target HR < 70bpm  -in sinus rhythm,on amiodarone 200 mg daily for maintenance of SR. TSH elevation and fT3 noted. Cannot use sotalol due to QT prolongation, flecainide contraindicated given structural heart disease.   -INR 3.09,On Coumadin home dose .   -will repeat IVC at 1 pm and f/u repeat trop     GI: GI prophylaxis. started DASH diet    RENAL:  Follow up lytes.     INFECTIOUS DISEASE: Tmx 100.3 last night. UCX positive for Ecoli started on  Ceftriaxone 1g for 3 days     HEMATOLOGICAL:  DVT prophylaxis: On Coumadin ,held heparin .f/u  INR(goal is 2.5 to 3.5)    ENDOCRINE:  Follow up FS.  Insulin protocol if needed.    MUSCULOSKELETAL: bedrest for now.     DISPOSITION : CCU

## 2023-08-16 NOTE — DIETITIAN INITIAL EVALUATION ADULT - NS FNS DIET ORDER
Diet, DASH/TLC:   Sodium & Cholesterol Restricted (08-14-23 @ 11:18) [Active]  Diet, NPO after Midnight:      NPO Start Date: 13-Aug-2023,   NPO Start Time: 23:59  Except Medications (08-13-23 @ 11:49) [Active]

## 2023-08-16 NOTE — DIETITIAN INITIAL EVALUATION ADULT - PERTINENT MEDS FT
MEDICATIONS  (STANDING):  aMIOdarone    Tablet 200 milliGRAM(s) Oral daily  cefTRIAXone   IVPB      chlorhexidine 0.12% Liquid 15 milliLiter(s) Oral Mucosa every 12 hours  chlorhexidine 2% Cloths 1 Application(s) Topical <User Schedule>  FIRST- Mouthwash  BLM 20 milliLiter(s) Swish and Spit three times a day  furosemide   Injectable 40 milliGRAM(s) IV Push daily  iron sucrose IVPB 200 milliGRAM(s) IV Intermittent every 24 hours  melatonin 10 milliGRAM(s) Oral at bedtime  metoprolol tartrate 50 milliGRAM(s) Oral every 8 hours  norepinephrine Infusion 0.05 MICROgram(s)/kG/Min (7.48 mL/Hr) IV Continuous <Continuous>  pantoprazole  Injectable 40 milliGRAM(s) IV Push daily  warfarin 1.5 milliGRAM(s) Oral once    MEDICATIONS  (PRN):  ALPRAZolam 0.25 milliGRAM(s) Oral daily PRN anxiety  benzonatate 100 milliGRAM(s) Oral every 8 hours PRN Cough

## 2023-08-16 NOTE — DIETITIAN INITIAL EVALUATION ADULT - PERTINENT LABORATORY DATA
08-16    137  |  100  |  15  ----------------------------<  105<H>  3.9   |  24  |  0.8    Ca    8.6      16 Aug 2023 04:44  Mg     2.0     08-16    TPro  6.3  /  Alb  3.5  /  TBili  1.3<H>  /  DBili  x   /  AST  72<H>  /  ALT  21  /  AlkPhos  100  08-16  A1C with Estimated Average Glucose Result: 6.0 % (06-14-23 @ 16:19)

## 2023-08-17 ENCOUNTER — APPOINTMENT (OUTPATIENT)
Dept: ELECTROPHYSIOLOGY | Facility: CLINIC | Age: 43
End: 2023-08-17

## 2023-08-17 LAB
ALBUMIN SERPL ELPH-MCNC: 3.5 G/DL — SIGNIFICANT CHANGE UP (ref 3.5–5.2)
ALP SERPL-CCNC: 95 U/L — SIGNIFICANT CHANGE UP (ref 30–115)
ALT FLD-CCNC: 20 U/L — SIGNIFICANT CHANGE UP (ref 0–41)
ANION GAP SERPL CALC-SCNC: 11 MMOL/L — SIGNIFICANT CHANGE UP (ref 7–14)
ANION GAP SERPL CALC-SCNC: 12 MMOL/L — SIGNIFICANT CHANGE UP (ref 7–14)
AST SERPL-CCNC: 39 U/L — SIGNIFICANT CHANGE UP (ref 0–41)
BILIRUB SERPL-MCNC: 0.9 MG/DL — SIGNIFICANT CHANGE UP (ref 0.2–1.2)
BUN SERPL-MCNC: 11 MG/DL — SIGNIFICANT CHANGE UP (ref 10–20)
BUN SERPL-MCNC: 14 MG/DL — SIGNIFICANT CHANGE UP (ref 10–20)
CALCIUM SERPL-MCNC: 8.6 MG/DL — SIGNIFICANT CHANGE UP (ref 8.4–10.5)
CALCIUM SERPL-MCNC: 8.8 MG/DL — SIGNIFICANT CHANGE UP (ref 8.4–10.5)
CHLORIDE SERPL-SCNC: 97 MMOL/L — LOW (ref 98–110)
CHLORIDE SERPL-SCNC: 99 MMOL/L — SIGNIFICANT CHANGE UP (ref 98–110)
CO2 SERPL-SCNC: 30 MMOL/L — SIGNIFICANT CHANGE UP (ref 17–32)
CO2 SERPL-SCNC: 32 MMOL/L — SIGNIFICANT CHANGE UP (ref 17–32)
CREAT SERPL-MCNC: 0.7 MG/DL — SIGNIFICANT CHANGE UP (ref 0.7–1.5)
CREAT SERPL-MCNC: 0.8 MG/DL — SIGNIFICANT CHANGE UP (ref 0.7–1.5)
EGFR: 110 ML/MIN/1.73M2 — SIGNIFICANT CHANGE UP
EGFR: 94 ML/MIN/1.73M2 — SIGNIFICANT CHANGE UP
GLUCOSE SERPL-MCNC: 103 MG/DL — HIGH (ref 70–99)
GLUCOSE SERPL-MCNC: 110 MG/DL — HIGH (ref 70–99)
HCT VFR BLD CALC: 31.6 % — LOW (ref 37–47)
HGB BLD-MCNC: 9.5 G/DL — LOW (ref 12–16)
INR BLD: 3.35 RATIO — HIGH (ref 0.65–1.3)
LACTATE SERPL-SCNC: 1.4 MMOL/L — SIGNIFICANT CHANGE UP (ref 0.7–2)
MAGNESIUM SERPL-MCNC: 1.8 MG/DL — SIGNIFICANT CHANGE UP (ref 1.8–2.4)
MAGNESIUM SERPL-MCNC: 1.9 MG/DL — SIGNIFICANT CHANGE UP (ref 1.8–2.4)
MCHC RBC-ENTMCNC: 24.4 PG — LOW (ref 27–31)
MCHC RBC-ENTMCNC: 30.1 G/DL — LOW (ref 32–37)
MCV RBC AUTO: 81 FL — SIGNIFICANT CHANGE UP (ref 81–99)
NRBC # BLD: 0 /100 WBCS — SIGNIFICANT CHANGE UP (ref 0–0)
PLATELET # BLD AUTO: 270 K/UL — SIGNIFICANT CHANGE UP (ref 130–400)
PMV BLD: 10.5 FL — HIGH (ref 7.4–10.4)
POTASSIUM SERPL-MCNC: 3.3 MMOL/L — LOW (ref 3.5–5)
POTASSIUM SERPL-MCNC: 3.7 MMOL/L — SIGNIFICANT CHANGE UP (ref 3.5–5)
POTASSIUM SERPL-SCNC: 3.3 MMOL/L — LOW (ref 3.5–5)
POTASSIUM SERPL-SCNC: 3.7 MMOL/L — SIGNIFICANT CHANGE UP (ref 3.5–5)
PROT SERPL-MCNC: 6.2 G/DL — SIGNIFICANT CHANGE UP (ref 6–8)
PROTHROM AB SERPL-ACNC: 39.7 SEC — HIGH (ref 9.95–12.87)
RBC # BLD: 3.9 M/UL — LOW (ref 4.2–5.4)
RBC # FLD: 18.6 % — HIGH (ref 11.5–14.5)
SODIUM SERPL-SCNC: 140 MMOL/L — SIGNIFICANT CHANGE UP (ref 135–146)
SODIUM SERPL-SCNC: 141 MMOL/L — SIGNIFICANT CHANGE UP (ref 135–146)
WBC # BLD: 10.48 K/UL — SIGNIFICANT CHANGE UP (ref 4.8–10.8)
WBC # FLD AUTO: 10.48 K/UL — SIGNIFICANT CHANGE UP (ref 4.8–10.8)

## 2023-08-17 PROCEDURE — 71045 X-RAY EXAM CHEST 1 VIEW: CPT | Mod: 26

## 2023-08-17 PROCEDURE — 99233 SBSQ HOSP IP/OBS HIGH 50: CPT

## 2023-08-17 PROCEDURE — 93010 ELECTROCARDIOGRAM REPORT: CPT

## 2023-08-17 RX ORDER — BUMETANIDE 0.25 MG/ML
2 INJECTION INTRAMUSCULAR; INTRAVENOUS ONCE
Refills: 0 | Status: COMPLETED | OUTPATIENT
Start: 2023-08-17 | End: 2023-08-17

## 2023-08-17 RX ORDER — FUROSEMIDE 40 MG
40 TABLET ORAL ONCE
Refills: 0 | Status: COMPLETED | OUTPATIENT
Start: 2023-08-17 | End: 2023-08-17

## 2023-08-17 RX ORDER — WARFARIN SODIUM 2.5 MG/1
1 TABLET ORAL ONCE
Refills: 0 | Status: COMPLETED | OUTPATIENT
Start: 2023-08-17 | End: 2023-08-17

## 2023-08-17 RX ORDER — POTASSIUM CHLORIDE 20 MEQ
20 PACKET (EA) ORAL ONCE
Refills: 0 | Status: COMPLETED | OUTPATIENT
Start: 2023-08-17 | End: 2023-08-17

## 2023-08-17 RX ORDER — MAGNESIUM SULFATE 500 MG/ML
2 VIAL (ML) INJECTION ONCE
Refills: 0 | Status: COMPLETED | OUTPATIENT
Start: 2023-08-17 | End: 2023-08-17

## 2023-08-17 RX ORDER — POTASSIUM CHLORIDE 20 MEQ
20 PACKET (EA) ORAL ONCE
Refills: 0 | Status: DISCONTINUED | OUTPATIENT
Start: 2023-08-17 | End: 2023-08-17

## 2023-08-17 RX ORDER — POTASSIUM CHLORIDE 20 MEQ
40 PACKET (EA) ORAL EVERY 4 HOURS
Refills: 0 | Status: COMPLETED | OUTPATIENT
Start: 2023-08-17 | End: 2023-08-17

## 2023-08-17 RX ORDER — ACETAMINOPHEN 500 MG
650 TABLET ORAL ONCE
Refills: 0 | Status: COMPLETED | OUTPATIENT
Start: 2023-08-17 | End: 2023-08-17

## 2023-08-17 RX ADMIN — CEFTRIAXONE 100 MILLIGRAM(S): 500 INJECTION, POWDER, FOR SOLUTION INTRAMUSCULAR; INTRAVENOUS at 01:16

## 2023-08-17 RX ADMIN — AMIODARONE HYDROCHLORIDE 200 MILLIGRAM(S): 400 TABLET ORAL at 06:15

## 2023-08-17 RX ADMIN — IRON SUCROSE 110 MILLIGRAM(S): 20 INJECTION, SOLUTION INTRAVENOUS at 13:06

## 2023-08-17 RX ADMIN — Medication 50 MILLIGRAM(S): at 06:15

## 2023-08-17 RX ADMIN — Medication 40 MILLIEQUIVALENT(S): at 10:33

## 2023-08-17 RX ADMIN — SENNA PLUS 2 TABLET(S): 8.6 TABLET ORAL at 21:44

## 2023-08-17 RX ADMIN — Medication 50 MILLIGRAM(S): at 13:05

## 2023-08-17 RX ADMIN — Medication 40 MILLIGRAM(S): at 03:46

## 2023-08-17 RX ADMIN — Medication 10 MILLIGRAM(S): at 21:44

## 2023-08-17 RX ADMIN — DIPHENHYDRAMINE HYDROCHLORIDE AND LIDOCAINE HYDROCHLORIDE AND ALUMINUM HYDROXIDE AND MAGNESIUM HYDRO 20 MILLILITER(S): KIT at 21:44

## 2023-08-17 RX ADMIN — WARFARIN SODIUM 1 MILLIGRAM(S): 2.5 TABLET ORAL at 21:45

## 2023-08-17 RX ADMIN — CHLORHEXIDINE GLUCONATE 1 APPLICATION(S): 213 SOLUTION TOPICAL at 06:16

## 2023-08-17 RX ADMIN — DIPHENHYDRAMINE HYDROCHLORIDE AND LIDOCAINE HYDROCHLORIDE AND ALUMINUM HYDROXIDE AND MAGNESIUM HYDRO 20 MILLILITER(S): KIT at 06:54

## 2023-08-17 RX ADMIN — Medication 50 MILLIEQUIVALENT(S): at 08:28

## 2023-08-17 RX ADMIN — BUMETANIDE 2 MILLIGRAM(S): 0.25 INJECTION INTRAMUSCULAR; INTRAVENOUS at 15:28

## 2023-08-17 RX ADMIN — DIPHENHYDRAMINE HYDROCHLORIDE AND LIDOCAINE HYDROCHLORIDE AND ALUMINUM HYDROXIDE AND MAGNESIUM HYDRO 20 MILLILITER(S): KIT at 13:05

## 2023-08-17 RX ADMIN — Medication 25 MILLIGRAM(S): at 00:06

## 2023-08-17 RX ADMIN — Medication 650 MILLIGRAM(S): at 15:49

## 2023-08-17 RX ADMIN — PANTOPRAZOLE SODIUM 40 MILLIGRAM(S): 20 TABLET, DELAYED RELEASE ORAL at 12:42

## 2023-08-17 RX ADMIN — Medication 40 MILLIEQUIVALENT(S): at 08:27

## 2023-08-17 RX ADMIN — Medication 25 GRAM(S): at 08:28

## 2023-08-17 RX ADMIN — BUMETANIDE 2 MILLIGRAM(S): 0.25 INJECTION INTRAMUSCULAR; INTRAVENOUS at 10:34

## 2023-08-17 RX ADMIN — Medication 50 MILLIGRAM(S): at 21:44

## 2023-08-17 RX ADMIN — CHLORHEXIDINE GLUCONATE 15 MILLILITER(S): 213 SOLUTION TOPICAL at 06:16

## 2023-08-17 NOTE — PHYSICAL THERAPY INITIAL EVALUATION ADULT - PERTINENT HX OF CURRENT PROBLEM, REHAB EVAL
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 She underwent aortic valve mass resection and left atrial appendage clipping and aortic valve replacement, post op c/b AF, persistent, despite MINDI/DCCV 8/2/23 patient remains in AF, with worsening dyspnea, now at rest presents for admission for AF w RVR i/s/o acute on chronic diastolic heart failure exacerbation. Patient sp AF Ablation POD#1, still intubated on pressors

## 2023-08-17 NOTE — PROGRESS NOTE ADULT - SUBJECTIVE AND OBJECTIVE BOX
MALIHA THOMPSON 42y Female  MRN#: [id number]     Hospital Day: 6d    Pt is currently admitted with the primary diagnosis of  Heart failure        SUBJECTIVE     Overnight events  held metoprolol ,pt had multiple episodes of VT all night .Metoprolol was given in am   Also Lasix was given owing to fluid overload state .    Subjective complaints  Pt was evaluated this am. Patient denied any active complaints and per patient his symptoms are improving                                            ----------------------------------------------------------  OBJECTIVE  PAST MEDICAL & SURGICAL HISTORY  Hypertrophic cardiomyopathy    Atrial fibrillation    Aortic valve disease    Obese    S/P transesophageal echocardiogram ([facility]I)                                              -----------------------------------------------------------  ALLERGIES:  No Known Allergies                                            ------------------------------------------------------------    HOME MEDICATIONS  Home Medications:  Coumadin 1 mg oral tablet: 1 milligram(s) orally 4 times a week (12 Aug 2023 10:55)  Coumadin 1 mg oral tablet: 1.5 milligram(s) orally 3 times a week keep INR 2.5-3.5 (12 Aug 2023 10:55)                           MEDICATIONS:  STANDING MEDICATIONS  aMIOdarone    Tablet 200 milliGRAM(s) Oral daily  cefTRIAXone   IVPB      cefTRIAXone   IVPB 1000 milliGRAM(s) IV Intermittent every 24 hours  chlorhexidine 2% Cloths 1 Application(s) Topical <User Schedule>  FIRST- Mouthwash  BLM 20 milliLiter(s) Swish and Spit three times a day  iron sucrose IVPB 200 milliGRAM(s) IV Intermittent every 24 hours  melatonin 10 milliGRAM(s) Oral at bedtime  metoprolol tartrate 50 milliGRAM(s) Oral every 8 hours  norepinephrine Infusion 0.05 MICROgram(s)/kG/Min IV Continuous <Continuous>  pantoprazole  Injectable 40 milliGRAM(s) IV Push daily  polyethylene glycol 3350 17 Gram(s) Oral daily  senna 2 Tablet(s) Oral at bedtime  warfarin 1 milliGRAM(s) Oral once    PRN MEDICATIONS  ALPRAZolam 0.25 milliGRAM(s) Oral daily PRN  benzonatate 100 milliGRAM(s) Oral every 8 hours PRN                                            ------------------------------------------------------------  VITAL SIGNS: Last 24 Hours  T(C): 35.9 (17 Aug 2023 05:00), Max: 36.1 (16 Aug 2023 16:00)  T(F): 96.7 (17 Aug 2023 05:00), Max: 96.9 (16 Aug 2023 16:00)  HR: 67 (17 Aug 2023 08:00) (61 - 72)  BP: 108/53 (17 Aug 2023 08:00) (83/51 - 144/84)  BP(mean): 76 (17 Aug 2023 08:00) (63 - 106)  RR: 18 (17 Aug 2023 08:00) (18 - 34)  SpO2: 95% (17 Aug 2023 08:00) (94% - 99%)      08-16-23 @ 07:01  -  08-17-23 @ 07:00  --------------------------------------------------------  IN: 510 mL / OUT: 2550 mL / NET: -2040 mL    08-17-23 @ 07:01 - 08-17-23 @ 10:41  --------------------------------------------------------  IN: 390 mL / OUT: 70 mL / NET: 320 mL                                             --------------------------------------------------------------  LABS:                        9.5    10.48 )-----------( 270      ( 17 Aug 2023 04:50 )             31.6     08-17    140  |  99  |  14  ----------------------------<  110<H>  3.3<L>   |  30  |  0.8    Ca    8.8      17 Aug 2023 04:50  Mg     1.8     08-17    TPro  6.2  /  Alb  3.5  /  TBili  0.9  /  DBili  x   /  AST  39  /  ALT  20  /  AlkPhos  95  08-17    PT/INR - ( 17 Aug 2023 04:50 )   PT: 39.70 sec;   INR: 3.35 ratio         PTT - ( 16 Aug 2023 04:44 )  PTT:27.4 sec  Urinalysis Basic - ( 17 Aug 2023 04:50 )    Color: x / Appearance: x / SG: x / pH: x  Gluc: 110 mg/dL / Ketone: x  / Bili: x / Urobili: x   Blood: x / Protein: x / Nitrite: x   Leuk Esterase: x / RBC: x / WBC x   Sq Epi: x / Non Sq Epi: x / Bacteria: x      ABG - ( 15 Aug 2023 15:21 )  pH, Arterial: 7.45  pH, Blood: x     /  pCO2: 40    /  pO2: 113   / HCO3: 28    / Base Excess: 3.5   /  SaO2: 98.8              Lactate, Blood: 1.4 mmol/L (08-17-23 @ 04:50)  Troponin T, Serum: 2.05 ng/mL *HH* (08-16-23 @ 16:17)  Lactate, Blood: 3.1 mmol/L *H* (08-16-23 @ 16:17)  Troponin T, Serum: 2.74 ng/mL *HH* (08-16-23 @ 11:25)          CARDIAC MARKERS ( 16 Aug 2023 16:17 )  x     / 2.05 ng/mL / x     / x     / x      CARDIAC MARKERS ( 16 Aug 2023 11:25 )  x     / 2.74 ng/mL / x     / x     / x      CARDIAC MARKERS ( 16 Aug 2023 04:44 )  x     / 2.85 ng/mL / x     / x     / x                                                  -------------------------------------------------------------  RADIOLOGY:                                            --------------------------------------------------------------    PHYSICAL EXAM:  GENERAL: in sitting position on bed ,comfortable   HEAD:  Atraumatic  NECK: Supple, No JVD  CHEST/LUNG:clear in upper lobes b/l.  HEART: regular rate and rhythm  ABDOMEN: soft   EXTREMITIES: pitting edema b/l LE   NERVOUS SYSTEM:  Alert & Oriented X3. No focal deficits                                            --------------------------------------------------------------                 MALIHA THOMPSON 42y Female  MRN#: [id number]     Hospital Day: 6d    Pt is currently admitted with the primary diagnosis of  Heart failure        SUBJECTIVE     Overnight events  pt had multiple episodes of NSVT all night .Metoprolol was given in am   Also Lasix was given owing to fluid overload state .    Subjective complaints  Pt was evaluated this am. Patient denied any active complaints and per patient his symptoms are improving                                            ----------------------------------------------------------  OBJECTIVE  PAST MEDICAL & SURGICAL HISTORY  Hypertrophic cardiomyopathy    Atrial fibrillation    Aortic valve disease    Obese    S/P transesophageal echocardiogram ([facility])                                              -----------------------------------------------------------  ALLERGIES:  No Known Allergies                                            ------------------------------------------------------------    HOME MEDICATIONS  Home Medications:  Coumadin 1 mg oral tablet: 1 milligram(s) orally 4 times a week (12 Aug 2023 10:55)  Coumadin 1 mg oral tablet: 1.5 milligram(s) orally 3 times a week keep INR 2.5-3.5 (12 Aug 2023 10:55)                           MEDICATIONS:  STANDING MEDICATIONS  aMIOdarone    Tablet 200 milliGRAM(s) Oral daily  cefTRIAXone   IVPB      cefTRIAXone   IVPB 1000 milliGRAM(s) IV Intermittent every 24 hours  chlorhexidine 2% Cloths 1 Application(s) Topical <User Schedule>  FIRST- Mouthwash  BLM 20 milliLiter(s) Swish and Spit three times a day  iron sucrose IVPB 200 milliGRAM(s) IV Intermittent every 24 hours  melatonin 10 milliGRAM(s) Oral at bedtime  metoprolol tartrate 50 milliGRAM(s) Oral every 8 hours  norepinephrine Infusion 0.05 MICROgram(s)/kG/Min IV Continuous <Continuous>  pantoprazole  Injectable 40 milliGRAM(s) IV Push daily  polyethylene glycol 3350 17 Gram(s) Oral daily  senna 2 Tablet(s) Oral at bedtime  warfarin 1 milliGRAM(s) Oral once    PRN MEDICATIONS  ALPRAZolam 0.25 milliGRAM(s) Oral daily PRN  benzonatate 100 milliGRAM(s) Oral every 8 hours PRN                                            ------------------------------------------------------------  VITAL SIGNS: Last 24 Hours  T(C): 35.9 (17 Aug 2023 05:00), Max: 36.1 (16 Aug 2023 16:00)  T(F): 96.7 (17 Aug 2023 05:00), Max: 96.9 (16 Aug 2023 16:00)  HR: 67 (17 Aug 2023 08:00) (61 - 72)  BP: 108/53 (17 Aug 2023 08:00) (83/51 - 144/84)  BP(mean): 76 (17 Aug 2023 08:00) (63 - 106)  RR: 18 (17 Aug 2023 08:00) (18 - 34)  SpO2: 95% (17 Aug 2023 08:00) (94% - 99%)      08-16-23 @ 07:01  -  08-17-23 @ 07:00  --------------------------------------------------------  IN: 510 mL / OUT: 2550 mL / NET: -2040 mL    08-17-23 @ 07:01  - 08-17-23 @ 10:41  --------------------------------------------------------  IN: 390 mL / OUT: 70 mL / NET: 320 mL                                             --------------------------------------------------------------  LABS:                        9.5    10.48 )-----------( 270      ( 17 Aug 2023 04:50 )             31.6     08-17    140  |  99  |  14  ----------------------------<  110<H>  3.3<L>   |  30  |  0.8    Ca    8.8      17 Aug 2023 04:50  Mg     1.8     08-17    TPro  6.2  /  Alb  3.5  /  TBili  0.9  /  DBili  x   /  AST  39  /  ALT  20  /  AlkPhos  95  08-17    PT/INR - ( 17 Aug 2023 04:50 )   PT: 39.70 sec;   INR: 3.35 ratio         PTT - ( 16 Aug 2023 04:44 )  PTT:27.4 sec  Urinalysis Basic - ( 17 Aug 2023 04:50 )    Color: x / Appearance: x / SG: x / pH: x  Gluc: 110 mg/dL / Ketone: x  / Bili: x / Urobili: x   Blood: x / Protein: x / Nitrite: x   Leuk Esterase: x / RBC: x / WBC x   Sq Epi: x / Non Sq Epi: x / Bacteria: x      ABG - ( 15 Aug 2023 15:21 )  pH, Arterial: 7.45  pH, Blood: x     /  pCO2: 40    /  pO2: 113   / HCO3: 28    / Base Excess: 3.5   /  SaO2: 98.8              Lactate, Blood: 1.4 mmol/L (08-17-23 @ 04:50)  Troponin T, Serum: 2.05 ng/mL *HH* (08-16-23 @ 16:17)  Lactate, Blood: 3.1 mmol/L *H* (08-16-23 @ 16:17)  Troponin T, Serum: 2.74 ng/mL *HH* (08-16-23 @ 11:25)          CARDIAC MARKERS ( 16 Aug 2023 16:17 )  x     / 2.05 ng/mL / x     / x     / x      CARDIAC MARKERS ( 16 Aug 2023 11:25 )  x     / 2.74 ng/mL / x     / x     / x      CARDIAC MARKERS ( 16 Aug 2023 04:44 )  x     / 2.85 ng/mL / x     / x     / x                                                  -------------------------------------------------------------  RADIOLOGY:                                            --------------------------------------------------------------    PHYSICAL EXAM:  GENERAL: in sitting position on bed ,comfortable   HEAD:  Atraumatic  NECK: Supple, No JVD  CHEST/LUNG:clear in upper lobes b/l.  HEART: regular rate and rhythm  ABDOMEN: soft   EXTREMITIES: pitting edema b/l LE   NERVOUS SYSTEM:  Alert & Oriented X3. No focal deficits                                            --------------------------------------------------------------

## 2023-08-17 NOTE — PHYSICAL THERAPY INITIAL EVALUATION ADULT - GENERAL OBSERVATIONS, REHAB EVAL
15:45-16:20. chart reviewed. Pt received semi-allison at B/S, alert, oriented, able to follow multi-step instructions and agreeable to PT evaluation. + IV, + monitoring, + foly, + O2 2 L via NC, SPO2 t/o session 99-93% pre/post ambulation, -ve orthostatic, VSS, CC generalized discomfort  NAD.

## 2023-08-17 NOTE — PHYSICAL THERAPY INITIAL EVALUATION ADULT - ADDITIONAL COMMENTS
Pt resides with , kids in a private house using 2 steps to enter, and 12 steps to access bedroom. Pt used to be independent with overall functional mobility, able to ambulate using RW at baseline 2/2 recent  AVR 6/29/23

## 2023-08-17 NOTE — PROGRESS NOTE ADULT - ASSESSMENT
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 off sedatives     HEENT: Oral care    PULMONARY:  HOB @ 45 degrees. extubated successfully on 8/15 .   On incentive spirometry .    CARDIOVASCULAR:    -owing to  preload dependent states in HOCM pts was given Lasix and we started Bumex 2 BID  -arterial lactate 1.4 this AM however pt off levophed and tolerating well.   -cont metoprolol 50 mg q8h, target HR < 70bpm  -in sinus rhythm,on amiodarone 200 mg daily for maintenance of SR. TSH elevation and fT3 noted. Cannot use sotalol due to QT prolongation, flecainide contraindicated given structural heart disease.   -INR 3.35,On Coumadin home dose .   -will repeat IVC at 1 pm   f/u BMP at 4 pm     GI: GI prophylaxis. started DASH diet    RENAL:  Follow up lytes.     INFECTIOUS DISEASE: Tmx 100.3 last night. UCX positive for Ecoli started on  Ceftriaxone 1g for 3 days     HEMATOLOGICAL:  DVT prophylaxis: On Coumadin ,held heparin .f/u  INR(goal is 2.5 to 3.5)    ENDOCRINE:  Follow up FS.  Insulin protocol if needed.    MUSCULOSKELETAL: activity as tolerated .     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 off sedatives     HEENT: Oral care    PULMONARY:  HOB @ 45 degrees. extubated successfully on 8/15 .   On incentive spirometry .    CARDIOVASCULAR:  Acute on chronic diastolic heart failure, HOCM with DEN     Net negative 2 L yesterday on IV lasix 40 mg x 3. CXR still with sig volume overload, IVC this AM 2.34 cm with less than 50% collapsibility.     -will switch to IV Bumex 2 mg, STAT now and monitor response, recheck IVC before evening dose  -pt has been off levophed for more than 24 hrs, remove A line   -cont metoprolol 50 mg q8h, do NOT hold metoprolol. target HR < 70bpm  -in sinus rhythm,on amiodarone 200 mg daily for maintenance of SR. TSH elevation and fT3 noted. Cannot use sotalol due to QT prolongation, flecainide contraindicated given structural heart disease.   -INR 3.35,On Coumadin home dose .   -will repeat IVC at 1 pm   f/u BMP at 4 pm     GI: GI prophylaxis. started DASH diet    RENAL:  Follow up lytes.     INFECTIOUS DISEASE: UTI, Ucx +ve for E coli.  Ceftriaxone 1g for 3 days D3/3    HEMATOLOGICAL:  DVT prophylaxis: On Coumadin  INR(goal is 2.5 to 3.5)    ENDOCRINE:  Follow up FS.  Insulin protocol if needed.    MUSCULOSKELETAL: activity as tolerated .     DISPOSITION : CCU

## 2023-08-17 NOTE — PHYSICAL THERAPY INITIAL EVALUATION ADULT - GAIT TRAINING, PT EVAL
Pt will ambulate using RW or least restrictive AD for 300 ft with S/U by discharge to facilitate return to PLOF. Pt will negotiate 12 steps using 1 HR under supervision.

## 2023-08-17 NOTE — CHART NOTE - NSCHARTNOTEFT_GEN_A_CORE
#Volume Overload  UO 20cc/hr, 2+ pitting edema  IVC dilated at 2.1cm, collapsibility 30-35%  Lasix 40 stat dose given  Monitor urine output    #SVT  SVT beats seen 8/17 00:30, then >10 SVT beats at 03:17  C/w metoprolol tartrate 50mg q8, if SVT persists consider increasing dose #Volume Overload  UO 20cc/hr, 2+ pitting edema  IVC dilated at 2.1cm, collapsibility 30-35%  Lasix 40 stat dose given  Monitor urine output    #SVT  SVT beats seen beginning 8/17 00:30, then >10 SVT beats at 03:17  C/w metoprolol tartrate 50mg q8, if SVT persists consider increasing dose #Volume Overload  UO 20cc/hr, 2+ pitting edema  IVC dilated at 2.1cm, collapsibility 30-35%  Lasix 40 stat dose given  Monitor urine output    #VTach  Metoprolol held 8/16 PM   NSVT beats seen beginning 8/17 00:30, then >10 SVT beats at 03:17  C/w metoprolol tartrate 50mg q8, if SVT persists consider increasing dose #Volume Overload  UO 20cc/hr, 2+ pitting edema  IVC dilated at 2.1cm, collapsibility 30-35%  Lasix 40 stat dose given  Monitor urine output    #VTach  Metoprolol held 8/16 PM   NSVT beats seen beginning 8/17 00:30, then >10 SVT beats at 03:17, 9 SVT beats at 6:30  C/w metoprolol tartrate 50mg q8

## 2023-08-18 LAB
ALBUMIN SERPL ELPH-MCNC: 3.5 G/DL — SIGNIFICANT CHANGE UP (ref 3.5–5.2)
ALP SERPL-CCNC: 101 U/L — SIGNIFICANT CHANGE UP (ref 30–115)
ALT FLD-CCNC: 20 U/L — SIGNIFICANT CHANGE UP (ref 0–41)
ANION GAP SERPL CALC-SCNC: 11 MMOL/L — SIGNIFICANT CHANGE UP (ref 7–14)
AST SERPL-CCNC: 34 U/L — SIGNIFICANT CHANGE UP (ref 0–41)
BILIRUB SERPL-MCNC: 0.9 MG/DL — SIGNIFICANT CHANGE UP (ref 0.2–1.2)
BUN SERPL-MCNC: 10 MG/DL — SIGNIFICANT CHANGE UP (ref 10–20)
CALCIUM SERPL-MCNC: 8.8 MG/DL — SIGNIFICANT CHANGE UP (ref 8.4–10.5)
CHLORIDE SERPL-SCNC: 98 MMOL/L — SIGNIFICANT CHANGE UP (ref 98–110)
CO2 SERPL-SCNC: 32 MMOL/L — SIGNIFICANT CHANGE UP (ref 17–32)
CREAT SERPL-MCNC: 0.6 MG/DL — LOW (ref 0.7–1.5)
EGFR: 114 ML/MIN/1.73M2 — SIGNIFICANT CHANGE UP
GLUCOSE SERPL-MCNC: 91 MG/DL — SIGNIFICANT CHANGE UP (ref 70–99)
HCT VFR BLD CALC: 33.3 % — LOW (ref 37–47)
HGB BLD-MCNC: 9.7 G/DL — LOW (ref 12–16)
INR BLD: 2.8 RATIO — HIGH (ref 0.65–1.3)
MAGNESIUM SERPL-MCNC: 1.7 MG/DL — LOW (ref 1.8–2.4)
MCHC RBC-ENTMCNC: 24.3 PG — LOW (ref 27–31)
MCHC RBC-ENTMCNC: 29.1 G/DL — LOW (ref 32–37)
MCV RBC AUTO: 83.3 FL — SIGNIFICANT CHANGE UP (ref 81–99)
NRBC # BLD: 0 /100 WBCS — SIGNIFICANT CHANGE UP (ref 0–0)
PLATELET # BLD AUTO: 266 K/UL — SIGNIFICANT CHANGE UP (ref 130–400)
PMV BLD: 10.1 FL — SIGNIFICANT CHANGE UP (ref 7.4–10.4)
POTASSIUM SERPL-MCNC: 3.8 MMOL/L — SIGNIFICANT CHANGE UP (ref 3.5–5)
POTASSIUM SERPL-SCNC: 3.8 MMOL/L — SIGNIFICANT CHANGE UP (ref 3.5–5)
PROT SERPL-MCNC: 6.3 G/DL — SIGNIFICANT CHANGE UP (ref 6–8)
PROTHROM AB SERPL-ACNC: 32.9 SEC — HIGH (ref 9.95–12.87)
RAPID RVP RESULT: DETECTED
RBC # BLD: 4 M/UL — LOW (ref 4.2–5.4)
RBC # FLD: 19.6 % — HIGH (ref 11.5–14.5)
SARS-COV-2 RNA SPEC QL NAA+PROBE: DETECTED
SODIUM SERPL-SCNC: 141 MMOL/L — SIGNIFICANT CHANGE UP (ref 135–146)
WBC # BLD: 9.62 K/UL — SIGNIFICANT CHANGE UP (ref 4.8–10.8)
WBC # FLD AUTO: 9.62 K/UL — SIGNIFICANT CHANGE UP (ref 4.8–10.8)

## 2023-08-18 PROCEDURE — 99233 SBSQ HOSP IP/OBS HIGH 50: CPT

## 2023-08-18 PROCEDURE — 71045 X-RAY EXAM CHEST 1 VIEW: CPT | Mod: 26

## 2023-08-18 PROCEDURE — 93010 ELECTROCARDIOGRAM REPORT: CPT

## 2023-08-18 RX ORDER — POTASSIUM CHLORIDE 20 MEQ
20 PACKET (EA) ORAL
Refills: 0 | Status: COMPLETED | OUTPATIENT
Start: 2023-08-18 | End: 2023-08-18

## 2023-08-18 RX ORDER — MAGNESIUM SULFATE 500 MG/ML
2 VIAL (ML) INJECTION EVERY 4 HOURS
Refills: 0 | Status: COMPLETED | OUTPATIENT
Start: 2023-08-18 | End: 2023-08-18

## 2023-08-18 RX ORDER — BUDESONIDE AND FORMOTEROL FUMARATE DIHYDRATE 160; 4.5 UG/1; UG/1
2 AEROSOL RESPIRATORY (INHALATION)
Refills: 0 | Status: DISCONTINUED | OUTPATIENT
Start: 2023-08-18 | End: 2023-08-22

## 2023-08-18 RX ORDER — BUMETANIDE 0.25 MG/ML
2 INJECTION INTRAMUSCULAR; INTRAVENOUS ONCE
Refills: 0 | Status: COMPLETED | OUTPATIENT
Start: 2023-08-18 | End: 2023-08-18

## 2023-08-18 RX ORDER — WARFARIN SODIUM 2.5 MG/1
1 TABLET ORAL ONCE
Refills: 0 | Status: COMPLETED | OUTPATIENT
Start: 2023-08-18 | End: 2023-08-18

## 2023-08-18 RX ADMIN — Medication 10 MILLIGRAM(S): at 21:06

## 2023-08-18 RX ADMIN — Medication 25 GRAM(S): at 15:11

## 2023-08-18 RX ADMIN — Medication 50 MILLIGRAM(S): at 21:06

## 2023-08-18 RX ADMIN — WARFARIN SODIUM 1 MILLIGRAM(S): 2.5 TABLET ORAL at 21:06

## 2023-08-18 RX ADMIN — BUMETANIDE 2 MILLIGRAM(S): 0.25 INJECTION INTRAMUSCULAR; INTRAVENOUS at 09:49

## 2023-08-18 RX ADMIN — Medication 50 MILLIGRAM(S): at 05:33

## 2023-08-18 RX ADMIN — Medication 100 MILLIGRAM(S): at 22:09

## 2023-08-18 RX ADMIN — SENNA PLUS 2 TABLET(S): 8.6 TABLET ORAL at 21:06

## 2023-08-18 RX ADMIN — DIPHENHYDRAMINE HYDROCHLORIDE AND LIDOCAINE HYDROCHLORIDE AND ALUMINUM HYDROXIDE AND MAGNESIUM HYDRO 20 MILLILITER(S): KIT at 15:37

## 2023-08-18 RX ADMIN — Medication 25 GRAM(S): at 09:48

## 2023-08-18 RX ADMIN — Medication 20 MILLIEQUIVALENT(S): at 09:22

## 2023-08-18 RX ADMIN — Medication 100 MILLIGRAM(S): at 11:56

## 2023-08-18 RX ADMIN — Medication 20 MILLIEQUIVALENT(S): at 06:58

## 2023-08-18 RX ADMIN — DIPHENHYDRAMINE HYDROCHLORIDE AND LIDOCAINE HYDROCHLORIDE AND ALUMINUM HYDROXIDE AND MAGNESIUM HYDRO 20 MILLILITER(S): KIT at 21:34

## 2023-08-18 RX ADMIN — CEFTRIAXONE 100 MILLIGRAM(S): 500 INJECTION, POWDER, FOR SOLUTION INTRAMUSCULAR; INTRAVENOUS at 00:35

## 2023-08-18 RX ADMIN — BUMETANIDE 2 MILLIGRAM(S): 0.25 INJECTION INTRAMUSCULAR; INTRAVENOUS at 17:04

## 2023-08-18 RX ADMIN — CHLORHEXIDINE GLUCONATE 1 APPLICATION(S): 213 SOLUTION TOPICAL at 05:34

## 2023-08-18 RX ADMIN — Medication 50 MILLIGRAM(S): at 15:11

## 2023-08-18 RX ADMIN — DIPHENHYDRAMINE HYDROCHLORIDE AND LIDOCAINE HYDROCHLORIDE AND ALUMINUM HYDROXIDE AND MAGNESIUM HYDRO 20 MILLILITER(S): KIT at 05:34

## 2023-08-18 RX ADMIN — AMIODARONE HYDROCHLORIDE 200 MILLIGRAM(S): 400 TABLET ORAL at 05:33

## 2023-08-18 RX ADMIN — PANTOPRAZOLE SODIUM 40 MILLIGRAM(S): 20 TABLET, DELAYED RELEASE ORAL at 11:52

## 2023-08-18 RX ADMIN — POLYETHYLENE GLYCOL 3350 17 GRAM(S): 17 POWDER, FOR SOLUTION ORAL at 11:52

## 2023-08-18 NOTE — PROGRESS NOTE ADULT - ASSESSMENT
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 off sedatives     HEENT: Oral care    PULMONARY:  HOB @ 45 degrees. extubated successfully on 8/15 .   On incentive spirometry .    CARDIOVASCULAR:  Acute on chronic diastolic heart failure, HOCM with DEN     Net negative 2 L on IV lasix 40 mg x 3 was given on day 1 here. CXR still with sig volume overload, IVC this AM 2.3 cm ,repeated Bumex 2 IV    -on IV Bumex 2 mg, STAT and monitor response, recheck IVC before evening dose  -pt has been off levophed for more than 24 hrs, remove A line   -cont metoprolol 50 mg q8h, do NOT hold metoprolol. target HR < 70bpm  -in sinus rhythm,on amiodarone 200 mg daily for maintenance of SR. TSH elevation and fT3 noted. Cannot use sotalol due to QT prolongation, flecainide contraindicated given structural heart disease.   -INR 3.35,On Coumadin home dose .   -will repeat IVC at 1 pm   -f/u RVP .    GI: GI prophylaxis. started DASH diet    RENAL:  Follow up lytes.     INFECTIOUS DISEASE: UTI, Ucx +ve for E coli. completed 3 days course for ceftriaxone .    HEMATOLOGICAL:  DVT prophylaxis: On Coumadin  INR(goal is 2.5 to 3.5)    ENDOCRINE:  Follow up FS.  Insulin protocol if needed.    MUSCULOSKELETAL: activity as tolerated .     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 off sedatives     HEENT: Oral care    PULMONARY: Acute hypoxic resp failure requiring NC 02 -4 L. extubated successfully on 8/15 .   likely 2/2 pulm edema,  -r/o new viral PNA. -send RPP    CARDIOVASCULAR:  Acute on chronic diastolic heart failure, HOCM with DEN     Net negative 2 L on IV lasix 40 mg x 3 was given on day 1 here. CXR still with sig volume overload, IVC this AM 2.3 cm ,repeated Bumex 2 IV    -on IV Bumex 2 mg, STAT and monitor response, recheck IVC before evening dose  -pt has been off levophed for more than 24 hrs, remove A line   -cont metoprolol 50 mg q8h, do NOT hold metoprolol. target HR < 70bpm  -in sinus rhythm,on amiodarone 200 mg daily for maintenance of SR. TSH elevation and fT3 noted. Cannot use sotalol due to QT prolongation, flecainide contraindicated given structural heart disease.   -INR 3.35,On Coumadin home dose .   -will repeat IVC at 1 pm   -f/u RVP .    GI: GI prophylaxis. started DASH diet    RENAL:  Follow up lytes.     INFECTIOUS DISEASE: UTI, Ucx +ve for E coli. completed 3 days course for ceftriaxone .    HEMATOLOGICAL:  DVT prophylaxis: On Coumadin  INR(goal is 2.5 to 3.5)    ENDOCRINE:  Follow up FS.  Insulin protocol if needed.    MUSCULOSKELETAL: activity as tolerated .     DISPOSITION : CCU

## 2023-08-18 NOTE — PROGRESS NOTE ADULT - ASSESSMENT
Cards Dr Tea Tyler    42y Female with h/o afib/ aflutter , cardiomyopathy, DLD, HTN and 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 She underwent aortic valve mass resection and left atrial appendage clipping and mechanical aortic valve replacement (on Coumadin), post op c/b AF, persistent, despite successful MINDI/DCCV 8/2/23, patient reverted to AF, associated with worsening dyspnea, now at rest, was admitted through ER, optimized, diuresed, underwent AF ablation 8/14    AF sp Ablation 8/14/23  Acute hypoxemic respiratory failure   Pulmonary edema - effusion  AVR (mechanical) on Coumaidn  S/P AV Mass Resection and JASON Clip  HOCM  HTN  HLD    Plan:  - Cont tele monitoring  - Continue Coumadin as pt has mechanical valve and INR goal 2.5-3.5  - Cont Amio and Metoprolol, consider increasing Metoprolol as BP tolerates  - Maintain electrolytes K>4.0 Mg >2.0, correct hypomagnesemia   - if no improvement with ectopy on aggressive medical therapy, might require ICD placement considering patient's diagnosis of HOCM  - Will follow

## 2023-08-18 NOTE — PROGRESS NOTE ADULT - SUBJECTIVE AND OBJECTIVE BOX
INTERVAL HPI/OVERNIGHT EVENTS:  reports recurrent cough, worse then yesterday, not as severe as on admission  multiple NSVT on the monitor  denies palpitations    MEDICATIONS  (STANDING):  aMIOdarone    Tablet 200 milliGRAM(s) Oral daily  cefTRIAXone   IVPB      cefTRIAXone   IVPB 1000 milliGRAM(s) IV Intermittent every 24 hours  chlorhexidine 2% Cloths 1 Application(s) Topical <User Schedule>  FIRST- Mouthwash  BLM 20 milliLiter(s) Swish and Spit three times a day  iron sucrose IVPB 200 milliGRAM(s) IV Intermittent every 24 hours  magnesium sulfate  IVPB 2 Gram(s) IV Intermittent every 4 hours  melatonin 10 milliGRAM(s) Oral at bedtime  metoprolol tartrate 50 milliGRAM(s) Oral every 8 hours  norepinephrine Infusion 0.05 MICROgram(s)/kG/Min (7.48 mL/Hr) IV Continuous <Continuous>  pantoprazole  Injectable 40 milliGRAM(s) IV Push daily  polyethylene glycol 3350 17 Gram(s) Oral daily  senna 2 Tablet(s) Oral at bedtime  warfarin 1 milliGRAM(s) Oral once    MEDICATIONS  (PRN):  ALPRAZolam 0.25 milliGRAM(s) Oral daily PRN anxiety  benzonatate 100 milliGRAM(s) Oral every 8 hours PRN Cough      Allergies    No Known Allergies    Intolerances        REVIEW OF SYSTEMS    [ x] A ten-point review of systems was otherwise negative except as noted.  [ ] Due to altered mental status/intubation, subjective information were not able to be obtained from the patient. History was obtained, to the extent possible, from review of the chart and collateral sources of information.      Vital Signs Last 24 Hrs  T(C): 36.8 (18 Aug 2023 08:00), Max: 36.8 (18 Aug 2023 08:00)  T(F): 98.2 (18 Aug 2023 08:00), Max: 98.2 (18 Aug 2023 08:00)  HR: 70 (18 Aug 2023 10:00) (65 - 70)  BP: 121/56 (18 Aug 2023 10:00) (93/52 - 121/56)  BP(mean): 78 (18 Aug 2023 10:00) (68 - 89)  RR: 33 (18 Aug 2023 10:00) (19 - 33)  SpO2: 94% (18 Aug 2023 10:00) (94% - 99%)    Parameters below as of 18 Aug 2023 10:00  Patient On (Oxygen Delivery Method): nasal cannula  O2 Flow (L/min): 3      08-17-23 @ 07:01  -  08-18-23 @ 07:00  --------------------------------------------------------  IN: 850 mL / OUT: 2465 mL / NET: -1615 mL    08-18-23 @ 07:01  -  08-18-23 @ 12:21  --------------------------------------------------------  IN: 170 mL / OUT: 1050 mL / NET: -880 mL        PHYSICAL EXAM:    GENERAL: In no apparent distress, well nourished, and hydrated.  HEART: Regular rate and rhythm; No murmur; NO rubs, or gallops.  PULMONARY: Clear to auscultation and percussion.  Normal expansion/effort. No rales, wheezing, or rhonchi bilaterally.  ABDOMEN: Soft, Nontender, Nondistended; Bowel sounds present  EXTREMITIES:  Extremities warm, pink, well-perfused, 2+ Peripheral Pulses, No clubbing, cyanosis, or edema  NEUROLOGICAL: alert & oriented x 3, no focal deficits, PERRLA, EOMI    LABS:                        9.7    9.62  )-----------( 266      ( 18 Aug 2023 04:26 )             33.3     08-18    141  |  98  |  10  ----------------------------<  91  3.8   |  32  |  0.6<L>    Ca    8.8      18 Aug 2023 04:26  Mg     1.7     08-18    TPro  6.3  /  Alb  3.5  /  TBili  0.9  /  DBili  x   /  AST  34  /  ALT  20  /  AlkPhos  101  08-18    PT/INR - ( 18 Aug 2023 04:26 )   PT: 32.90 sec;   INR: 2.80 ratio           Urinalysis Basic - ( 18 Aug 2023 04:26 )    Color: x / Appearance: x / SG: x / pH: x  Gluc: 91 mg/dL / Ketone: x  / Bili: x / Urobili: x   Blood: x / Protein: x / Nitrite: x   Leuk Esterase: x / RBC: x / WBC x   Sq Epi: x / Non Sq Epi: x / Bacteria: x          12 Lead ECG:   Ventricular Rate 68 BPM    Atrial Rate 68 BPM    P-R Interval 216 ms    QRS Duration 110 ms    Q-T Interval 446 ms    QTC Calculation(Bazett) 474 ms    P Axis 39 degrees    R Axis -56 degrees    T Axis 140 degrees    Diagnosis Line Sinus rhythm with 1st degree A-V block  Left axis deviation  RSR' or QR pattern in V1 suggests right ventricular conduction delay  Left ventricular hypertrophy with repolarization abnormality    Anteroseptal infarct , age undetermined  Abnormal ECG    Confirmed by NUSRAT QUINONEZ MD ([id number]) on 8/18/2023 6:46:30 AM (08-18-23 @ 06:10)  12 Lead ECG:   Ventricular Rate 71 BPM    Atrial Rate 71 BPM    P-R Interval 202 ms    QRS Duration 132 ms    Q-T Interval 472 ms    QTC Calculation(Bazett) 512 ms    P Axis 73 degrees    R Axis -52 degrees    T Axis 111 degrees    Diagnosis Line Normal sinus rhythm  Possible Left atrial enlargement  Left axis deviation  Non-specific intra-ventricular conduction block  Cannot rule out Anteroseptal infarct , age undetermined  T wave abnormality, consider lateral ischemia  Abnormal ECG    Confirmed by Gabe Hawley ([id number]) on 8/17/2023 7:07:24 AM (08-17-23 @ 06:10)  12 Lead ECG:   Ventricular Rate 69 BPM    Atrial Rate 69 BPM    P-R Interval 186 ms    QRS Duration 144 ms    Q-T Interval 522 ms    QTC Calculation(Bazett) 559 ms    P Axis 83 degrees    R Axis -56 degrees    T Axis 102 degrees    Diagnosis Line Normal sinus rhythm  Left axis deviation  Right bundle branch block  T wave abnormality, consider lateral ischemia  Abnormal ECG    Confirmed by NUSRAT QUINONEZ MD ([id number]) on 8/16/2023 6:52:59 AM (08-16-23 @ 06:03)      RADIOLOGY & ADDITIONAL TESTS:       INTERVAL HPI/OVERNIGHT EVENTS:  reports recurrent cough, worse then yesterday, not as severe as on admission  multiple NSVT on the monitor  denies palpitations    MEDICATIONS  (STANDING):  aMIOdarone    Tablet 200 milliGRAM(s) Oral daily  cefTRIAXone   IVPB      cefTRIAXone   IVPB 1000 milliGRAM(s) IV Intermittent every 24 hours  chlorhexidine 2% Cloths 1 Application(s) Topical <User Schedule>  FIRST- Mouthwash  BLM 20 milliLiter(s) Swish and Spit three times a day  iron sucrose IVPB 200 milliGRAM(s) IV Intermittent every 24 hours  magnesium sulfate  IVPB 2 Gram(s) IV Intermittent every 4 hours  melatonin 10 milliGRAM(s) Oral at bedtime  metoprolol tartrate 50 milliGRAM(s) Oral every 8 hours  norepinephrine Infusion 0.05 MICROgram(s)/kG/Min (7.48 mL/Hr) IV Continuous <Continuous>  pantoprazole  Injectable 40 milliGRAM(s) IV Push daily  polyethylene glycol 3350 17 Gram(s) Oral daily  senna 2 Tablet(s) Oral at bedtime  warfarin 1 milliGRAM(s) Oral once    MEDICATIONS  (PRN):  ALPRAZolam 0.25 milliGRAM(s) Oral daily PRN anxiety  benzonatate 100 milliGRAM(s) Oral every 8 hours PRN Cough      Allergies    No Known Allergies    Intolerances        REVIEW OF SYSTEMS    [ x] A ten-point review of systems was otherwise negative except as noted.  [ ] Due to altered mental status/intubation, subjective information were not able to be obtained from the patient. History was obtained, to the extent possible, from review of the chart and collateral sources of information.      Vital Signs Last 24 Hrs  T(C): 36.8 (18 Aug 2023 08:00), Max: 36.8 (18 Aug 2023 08:00)  T(F): 98.2 (18 Aug 2023 08:00), Max: 98.2 (18 Aug 2023 08:00)  HR: 70 (18 Aug 2023 10:00) (65 - 70)  BP: 121/56 (18 Aug 2023 10:00) (93/52 - 121/56)  BP(mean): 78 (18 Aug 2023 10:00) (68 - 89)  RR: 33 (18 Aug 2023 10:00) (19 - 33)  SpO2: 94% (18 Aug 2023 10:00) (94% - 99%)    Parameters below as of 18 Aug 2023 10:00  Patient On (Oxygen Delivery Method): nasal cannula  O2 Flow (L/min): 3      08-17-23 @ 07:01  -  08-18-23 @ 07:00  --------------------------------------------------------  IN: 850 mL / OUT: 2465 mL / NET: -1615 mL    08-18-23 @ 07:01  -  08-18-23 @ 12:21  --------------------------------------------------------  IN: 170 mL / OUT: 1050 mL / NET: -880 mL        PHYSICAL EXAM  GENERAL: In no apparent distress, well nourished, and hydrated.  HEART: Regular rate and rhythm; No murmur; NO rubs, or gallops.  PULMONARY: Clear to auscultation and percussion.  Normal expansion/effort. No rales, wheezing, or rhonchi bilaterally.  ABDOMEN: Soft, Nontender, Nondistended; Bowel sounds present  EXTREMITIES:  Extremities warm, pink, well-perfused, 2+ Peripheral Pulses, No clubbing, cyanosis, or edema  NEUROLOGICAL: alert & oriented x 3, no focal deficits, PERRLA, EOMI    LABS:                        9.7    9.62  )-----------( 266      ( 18 Aug 2023 04:26 )             33.3     08-18    141  |  98  |  10  ----------------------------<  91  3.8   |  32  |  0.6<L>    Ca    8.8      18 Aug 2023 04:26  Mg     1.7     08-18    TPro  6.3  /  Alb  3.5  /  TBili  0.9  /  DBili  x   /  AST  34  /  ALT  20  /  AlkPhos  101  08-18    PT/INR - ( 18 Aug 2023 04:26 )   PT: 32.90 sec;   INR: 2.80 ratio           Urinalysis Basic - ( 18 Aug 2023 04:26 )    Color: x / Appearance: x / SG: x / pH: x  Gluc: 91 mg/dL / Ketone: x  / Bili: x / Urobili: x   Blood: x / Protein: x / Nitrite: x   Leuk Esterase: x / RBC: x / WBC x   Sq Epi: x / Non Sq Epi: x / Bacteria: x          12 Lead ECG:   Ventricular Rate 68 BPM    Atrial Rate 68 BPM    P-R Interval 216 ms    QRS Duration 110 ms    Q-T Interval 446 ms    QTC Calculation(Bazett) 474 ms    P Axis 39 degrees    R Axis -56 degrees    T Axis 140 degrees    Diagnosis Line Sinus rhythm with 1st degree A-V block  Left axis deviation  RSR' or QR pattern in V1 suggests right ventricular conduction delay  Left ventricular hypertrophy with repolarization abnormality    Anteroseptal infarct , age undetermined  Abnormal ECG    Confirmed by NUSRAT QUINONEZ MD ([id number]) on 8/18/2023 6:46:30 AM (08-18-23 @ 06:10)  12 Lead ECG:   Ventricular Rate 71 BPM    Atrial Rate 71 BPM    P-R Interval 202 ms    QRS Duration 132 ms    Q-T Interval 472 ms    QTC Calculation(Bazett) 512 ms    P Axis 73 degrees    R Axis -52 degrees    T Axis 111 degrees    Diagnosis Line Normal sinus rhythm  Possible Left atrial enlargement  Left axis deviation  Non-specific intra-ventricular conduction block  Cannot rule out Anteroseptal infarct , age undetermined  T wave abnormality, consider lateral ischemia  Abnormal ECG    Confirmed by Gabe Hawley ([id number]) on 8/17/2023 7:07:24 AM (08-17-23 @ 06:10)  12 Lead ECG:   Ventricular Rate 69 BPM    Atrial Rate 69 BPM    P-R Interval 186 ms    QRS Duration 144 ms    Q-T Interval 522 ms    QTC Calculation(Bazett) 559 ms    P Axis 83 degrees    R Axis -56 degrees    T Axis 102 degrees    Diagnosis Line Normal sinus rhythm  Left axis deviation  Right bundle branch block  T wave abnormality, consider lateral ischemia  Abnormal ECG    Confirmed by NUSRAT QUINONEZ MD ([id number]) on 8/16/2023 6:52:59 AM (08-16-23 @ 06:03)      RADIOLOGY & ADDITIONAL TESTS:

## 2023-08-18 NOTE — PROGRESS NOTE ADULT - SUBJECTIVE AND OBJECTIVE BOX
MALIHA THOMPSON 42y Female  MRN#: [id number]     Hospital Day: 7d    Pt is currently admitted with the primary diagnosis of  Heart failure        SUBJECTIVE     Overnight events  IVC was 1.9 overnight with low UO   IVC is 2.3 this morning       Subjective complaints  Pt was evaluated this am. Patient complaints of inability to sleep .                                          ----------------------------------------------------------  OBJECTIVE  PAST MEDICAL & SURGICAL HISTORY  Hypertrophic cardiomyopathy    Atrial fibrillation    Aortic valve disease    Obese    S/P transesophageal echocardiogram ([facility])                                              -----------------------------------------------------------  ALLERGIES:  No Known Allergies                                            ------------------------------------------------------------    HOME MEDICATIONS  Home Medications:  Coumadin 1 mg oral tablet: 1 milligram(s) orally 4 times a week (12 Aug 2023 10:55)  Coumadin 1 mg oral tablet: 1.5 milligram(s) orally 3 times a week keep INR 2.5-3.5 (12 Aug 2023 10:55)                           MEDICATIONS:  STANDING MEDICATIONS  aMIOdarone    Tablet 200 milliGRAM(s) Oral daily  cefTRIAXone   IVPB      cefTRIAXone   IVPB 1000 milliGRAM(s) IV Intermittent every 24 hours  chlorhexidine 2% Cloths 1 Application(s) Topical <User Schedule>  FIRST- Mouthwash  BLM 20 milliLiter(s) Swish and Spit three times a day  iron sucrose IVPB 200 milliGRAM(s) IV Intermittent every 24 hours  magnesium sulfate  IVPB 2 Gram(s) IV Intermittent every 4 hours  melatonin 10 milliGRAM(s) Oral at bedtime  metoprolol tartrate 50 milliGRAM(s) Oral every 8 hours  norepinephrine Infusion 0.05 MICROgram(s)/kG/Min IV Continuous <Continuous>  pantoprazole  Injectable 40 milliGRAM(s) IV Push daily  polyethylene glycol 3350 17 Gram(s) Oral daily  potassium chloride    Tablet ER 20 milliEquivalent(s) Oral every 2 hours  senna 2 Tablet(s) Oral at bedtime  warfarin 1 milliGRAM(s) Oral once    PRN MEDICATIONS  ALPRAZolam 0.25 milliGRAM(s) Oral daily PRN  benzonatate 100 milliGRAM(s) Oral every 8 hours PRN                                            ------------------------------------------------------------  VITAL SIGNS: Last 24 Hours  T(C): 36.8 (18 Aug 2023 08:00), Max: 36.8 (18 Aug 2023 08:00)  T(F): 98.2 (18 Aug 2023 08:00), Max: 98.2 (18 Aug 2023 08:00)  HR: 69 (18 Aug 2023 08:00) (64 - 70)  BP: 107/65 (18 Aug 2023 08:00) (93/52 - 120/53)  BP(mean): 75 (18 Aug 2023 08:00) (68 - 89)  RR: 26 (18 Aug 2023 08:00) (19 - 30)  SpO2: 96% (18 Aug 2023 08:00) (91% - 99%)      08-17-23 @ 07:01  -  08-18-23 @ 07:00  --------------------------------------------------------  IN: 850 mL / OUT: 2465 mL / NET: -1615 mL                                             --------------------------------------------------------------  LABS:                        9.7    9.62  )-----------( 266      ( 18 Aug 2023 04:26 )             33.3     08-18    141  |  98  |  10  ----------------------------<  91  3.8   |  32  |  0.6<L>    Ca    8.8      18 Aug 2023 04:26  Mg     1.7     08-18    TPro  6.3  /  Alb  3.5  /  TBili  0.9  /  DBili  x   /  AST  34  /  ALT  20  /  AlkPhos  101  08-18    PT/INR - ( 18 Aug 2023 04:26 )   PT: 32.90 sec;   INR: 2.80 ratio           Urinalysis Basic - ( 18 Aug 2023 04:26 )    Color: x / Appearance: x / SG: x / pH: x  Gluc: 91 mg/dL / Ketone: x  / Bili: x / Urobili: x   Blood: x / Protein: x / Nitrite: x   Leuk Esterase: x / RBC: x / WBC x   Sq Epi: x / Non Sq Epi: x / Bacteria: x                CARDIAC MARKERS ( 16 Aug 2023 16:17 )  x     / 2.05 ng/mL / x     / x     / x      CARDIAC MARKERS ( 16 Aug 2023 11:25 )  x     / 2.74 ng/mL / x     / x     / x                                                  -------------------------------------------------------------  RADIOLOGY:                                            --------------------------------------------------------------    PHYSICAL EXAM:  GENERAL: sitting comfortably having breakfast   HEAD:  Atraumatic  EYES: EOMI, conjunctiva and sclera clear  NECK: Supple, No JVD  CHEST/LUNG: Clear to auscultation bilaterally  HEART: regular rate and rhythm  ABDOMEN:soft .    NERVOUS SYSTEM:  Alert & Oriented X3. No focal deficits                                            --------------------------------------------------------------                 MALIHA THOMPSON 42y Female  MRN#: [id number]     Hospital Day: 7d    Pt is currently admitted with the primary diagnosis of  Heart failure        SUBJECTIVE     Overnight events  IVC was 1.9 overnight with low UO   IVC is 2.3 this morning       Subjective complaints  Pt was evaluated this am. Patient complaints of inability to sleep and Cough .                                          ----------------------------------------------------------  OBJECTIVE  PAST MEDICAL & SURGICAL HISTORY  Hypertrophic cardiomyopathy    Atrial fibrillation    Aortic valve disease    Obese    S/P transesophageal echocardiogram ([facility])                                              -----------------------------------------------------------  ALLERGIES:  No Known Allergies                                            ------------------------------------------------------------    HOME MEDICATIONS  Home Medications:  Coumadin 1 mg oral tablet: 1 milligram(s) orally 4 times a week (12 Aug 2023 10:55)  Coumadin 1 mg oral tablet: 1.5 milligram(s) orally 3 times a week keep INR 2.5-3.5 (12 Aug 2023 10:55)                           MEDICATIONS:  STANDING MEDICATIONS  aMIOdarone    Tablet 200 milliGRAM(s) Oral daily  cefTRIAXone   IVPB      cefTRIAXone   IVPB 1000 milliGRAM(s) IV Intermittent every 24 hours  chlorhexidine 2% Cloths 1 Application(s) Topical <User Schedule>  FIRST- Mouthwash  BLM 20 milliLiter(s) Swish and Spit three times a day  iron sucrose IVPB 200 milliGRAM(s) IV Intermittent every 24 hours  magnesium sulfate  IVPB 2 Gram(s) IV Intermittent every 4 hours  melatonin 10 milliGRAM(s) Oral at bedtime  metoprolol tartrate 50 milliGRAM(s) Oral every 8 hours  norepinephrine Infusion 0.05 MICROgram(s)/kG/Min IV Continuous <Continuous>  pantoprazole  Injectable 40 milliGRAM(s) IV Push daily  polyethylene glycol 3350 17 Gram(s) Oral daily  potassium chloride    Tablet ER 20 milliEquivalent(s) Oral every 2 hours  senna 2 Tablet(s) Oral at bedtime  warfarin 1 milliGRAM(s) Oral once    PRN MEDICATIONS  ALPRAZolam 0.25 milliGRAM(s) Oral daily PRN  benzonatate 100 milliGRAM(s) Oral every 8 hours PRN                                            ------------------------------------------------------------  VITAL SIGNS: Last 24 Hours  T(C): 36.8 (18 Aug 2023 08:00), Max: 36.8 (18 Aug 2023 08:00)  T(F): 98.2 (18 Aug 2023 08:00), Max: 98.2 (18 Aug 2023 08:00)  HR: 69 (18 Aug 2023 08:00) (64 - 70)  BP: 107/65 (18 Aug 2023 08:00) (93/52 - 120/53)  BP(mean): 75 (18 Aug 2023 08:00) (68 - 89)  RR: 26 (18 Aug 2023 08:00) (19 - 30)  SpO2: 96% (18 Aug 2023 08:00) (91% - 99%)      08-17-23 @ 07:01  -  08-18-23 @ 07:00  --------------------------------------------------------  IN: 850 mL / OUT: 2465 mL / NET: -1615 mL                                             --------------------------------------------------------------  LABS:                        9.7    9.62  )-----------( 266      ( 18 Aug 2023 04:26 )             33.3     08-18    141  |  98  |  10  ----------------------------<  91  3.8   |  32  |  0.6<L>    Ca    8.8      18 Aug 2023 04:26  Mg     1.7     08-18    TPro  6.3  /  Alb  3.5  /  TBili  0.9  /  DBili  x   /  AST  34  /  ALT  20  /  AlkPhos  101  08-18    PT/INR - ( 18 Aug 2023 04:26 )   PT: 32.90 sec;   INR: 2.80 ratio           Urinalysis Basic - ( 18 Aug 2023 04:26 )    Color: x / Appearance: x / SG: x / pH: x  Gluc: 91 mg/dL / Ketone: x  / Bili: x / Urobili: x   Blood: x / Protein: x / Nitrite: x   Leuk Esterase: x / RBC: x / WBC x   Sq Epi: x / Non Sq Epi: x / Bacteria: x                CARDIAC MARKERS ( 16 Aug 2023 16:17 )  x     / 2.05 ng/mL / x     / x     / x      CARDIAC MARKERS ( 16 Aug 2023 11:25 )  x     / 2.74 ng/mL / x     / x     / x                                                  -------------------------------------------------------------  RADIOLOGY:                                            --------------------------------------------------------------    PHYSICAL EXAM:  GENERAL: sitting comfortably having breakfast   HEAD:  Atraumatic  EYES: EOMI, conjunctiva and sclera clear  NECK: Supple, No JVD  CHEST/LUNG: Clear to auscultation bilaterally  HEART: regular rate and rhythm  ABDOMEN:soft .    NERVOUS SYSTEM:  Alert & Oriented X3. No focal deficits                                            --------------------------------------------------------------

## 2023-08-19 LAB
ALBUMIN SERPL ELPH-MCNC: 3.5 G/DL — SIGNIFICANT CHANGE UP (ref 3.5–5.2)
ALP SERPL-CCNC: 99 U/L — SIGNIFICANT CHANGE UP (ref 30–115)
ALT FLD-CCNC: 20 U/L — SIGNIFICANT CHANGE UP (ref 0–41)
ANION GAP SERPL CALC-SCNC: 11 MMOL/L — SIGNIFICANT CHANGE UP (ref 7–14)
AST SERPL-CCNC: 29 U/L — SIGNIFICANT CHANGE UP (ref 0–41)
BILIRUB SERPL-MCNC: 1.2 MG/DL — SIGNIFICANT CHANGE UP (ref 0.2–1.2)
BUN SERPL-MCNC: 10 MG/DL — SIGNIFICANT CHANGE UP (ref 10–20)
CALCIUM SERPL-MCNC: 8.8 MG/DL — SIGNIFICANT CHANGE UP (ref 8.4–10.5)
CHLORIDE SERPL-SCNC: 93 MMOL/L — LOW (ref 98–110)
CO2 SERPL-SCNC: 34 MMOL/L — HIGH (ref 17–32)
CREAT SERPL-MCNC: 0.6 MG/DL — LOW (ref 0.7–1.5)
EGFR: 114 ML/MIN/1.73M2 — SIGNIFICANT CHANGE UP
GLUCOSE SERPL-MCNC: 84 MG/DL — SIGNIFICANT CHANGE UP (ref 70–99)
HCT VFR BLD CALC: 35.3 % — LOW (ref 37–47)
HGB BLD-MCNC: 10.5 G/DL — LOW (ref 12–16)
INR BLD: 2.33 RATIO — HIGH (ref 0.65–1.3)
MAGNESIUM SERPL-MCNC: 2 MG/DL — SIGNIFICANT CHANGE UP (ref 1.8–2.4)
MCHC RBC-ENTMCNC: 24.4 PG — LOW (ref 27–31)
MCHC RBC-ENTMCNC: 29.7 G/DL — LOW (ref 32–37)
MCV RBC AUTO: 81.9 FL — SIGNIFICANT CHANGE UP (ref 81–99)
NRBC # BLD: 0 /100 WBCS — SIGNIFICANT CHANGE UP (ref 0–0)
PLATELET # BLD AUTO: 238 K/UL — SIGNIFICANT CHANGE UP (ref 130–400)
PMV BLD: 9.9 FL — SIGNIFICANT CHANGE UP (ref 7.4–10.4)
POTASSIUM SERPL-MCNC: 3.9 MMOL/L — SIGNIFICANT CHANGE UP (ref 3.5–5)
POTASSIUM SERPL-SCNC: 3.9 MMOL/L — SIGNIFICANT CHANGE UP (ref 3.5–5)
PROCALCITONIN SERPL-MCNC: 0.08 NG/ML — SIGNIFICANT CHANGE UP (ref 0.02–0.1)
PROT SERPL-MCNC: 6.3 G/DL — SIGNIFICANT CHANGE UP (ref 6–8)
PROTHROM AB SERPL-ACNC: 27.3 SEC — HIGH (ref 9.95–12.87)
RBC # BLD: 4.31 M/UL — SIGNIFICANT CHANGE UP (ref 4.2–5.4)
RBC # FLD: 20.5 % — HIGH (ref 11.5–14.5)
SODIUM SERPL-SCNC: 138 MMOL/L — SIGNIFICANT CHANGE UP (ref 135–146)
WBC # BLD: 8.46 K/UL — SIGNIFICANT CHANGE UP (ref 4.8–10.8)
WBC # FLD AUTO: 8.46 K/UL — SIGNIFICANT CHANGE UP (ref 4.8–10.8)

## 2023-08-19 PROCEDURE — 93010 ELECTROCARDIOGRAM REPORT: CPT

## 2023-08-19 PROCEDURE — 99291 CRITICAL CARE FIRST HOUR: CPT

## 2023-08-19 PROCEDURE — 99233 SBSQ HOSP IP/OBS HIGH 50: CPT

## 2023-08-19 PROCEDURE — 71045 X-RAY EXAM CHEST 1 VIEW: CPT | Mod: 26

## 2023-08-19 RX ORDER — FUROSEMIDE 40 MG
40 TABLET ORAL DAILY
Refills: 0 | Status: DISCONTINUED | OUTPATIENT
Start: 2023-08-19 | End: 2023-08-22

## 2023-08-19 RX ORDER — REMDESIVIR 5 MG/ML
INJECTION INTRAVENOUS
Refills: 0 | Status: DISCONTINUED | OUTPATIENT
Start: 2023-08-19 | End: 2023-08-22

## 2023-08-19 RX ORDER — WARFARIN SODIUM 2.5 MG/1
1.5 TABLET ORAL ONCE
Refills: 0 | Status: COMPLETED | OUTPATIENT
Start: 2023-08-19 | End: 2023-08-19

## 2023-08-19 RX ORDER — ACETAMINOPHEN 500 MG
650 TABLET ORAL EVERY 6 HOURS
Refills: 0 | Status: DISCONTINUED | OUTPATIENT
Start: 2023-08-19 | End: 2023-08-22

## 2023-08-19 RX ORDER — REMDESIVIR 5 MG/ML
100 INJECTION INTRAVENOUS EVERY 24 HOURS
Refills: 0 | Status: DISCONTINUED | OUTPATIENT
Start: 2023-08-20 | End: 2023-08-22

## 2023-08-19 RX ORDER — FUROSEMIDE 40 MG
40 TABLET ORAL DAILY
Refills: 0 | Status: DISCONTINUED | OUTPATIENT
Start: 2023-08-19 | End: 2023-08-19

## 2023-08-19 RX ORDER — REMDESIVIR 5 MG/ML
200 INJECTION INTRAVENOUS EVERY 24 HOURS
Refills: 0 | Status: COMPLETED | OUTPATIENT
Start: 2023-08-19 | End: 2023-08-19

## 2023-08-19 RX ADMIN — Medication 100 MILLIGRAM(S): at 21:52

## 2023-08-19 RX ADMIN — DIPHENHYDRAMINE HYDROCHLORIDE AND LIDOCAINE HYDROCHLORIDE AND ALUMINUM HYDROXIDE AND MAGNESIUM HYDRO 20 MILLILITER(S): KIT at 21:51

## 2023-08-19 RX ADMIN — Medication 650 MILLIGRAM(S): at 01:15

## 2023-08-19 RX ADMIN — Medication 50 MILLIGRAM(S): at 13:26

## 2023-08-19 RX ADMIN — WARFARIN SODIUM 1.5 MILLIGRAM(S): 2.5 TABLET ORAL at 21:52

## 2023-08-19 RX ADMIN — Medication 40 MILLIGRAM(S): at 11:01

## 2023-08-19 RX ADMIN — DIPHENHYDRAMINE HYDROCHLORIDE AND LIDOCAINE HYDROCHLORIDE AND ALUMINUM HYDROXIDE AND MAGNESIUM HYDRO 20 MILLILITER(S): KIT at 05:03

## 2023-08-19 RX ADMIN — Medication 650 MILLIGRAM(S): at 00:50

## 2023-08-19 RX ADMIN — REMDESIVIR 200 MILLIGRAM(S): 5 INJECTION INTRAVENOUS at 04:59

## 2023-08-19 RX ADMIN — AMIODARONE HYDROCHLORIDE 200 MILLIGRAM(S): 400 TABLET ORAL at 05:03

## 2023-08-19 RX ADMIN — DIPHENHYDRAMINE HYDROCHLORIDE AND LIDOCAINE HYDROCHLORIDE AND ALUMINUM HYDROXIDE AND MAGNESIUM HYDRO 20 MILLILITER(S): KIT at 13:26

## 2023-08-19 RX ADMIN — CEFTRIAXONE 100 MILLIGRAM(S): 500 INJECTION, POWDER, FOR SOLUTION INTRAMUSCULAR; INTRAVENOUS at 00:25

## 2023-08-19 RX ADMIN — PANTOPRAZOLE SODIUM 40 MILLIGRAM(S): 20 TABLET, DELAYED RELEASE ORAL at 11:00

## 2023-08-19 RX ADMIN — BUDESONIDE AND FORMOTEROL FUMARATE DIHYDRATE 2 PUFF(S): 160; 4.5 AEROSOL RESPIRATORY (INHALATION) at 11:00

## 2023-08-19 RX ADMIN — Medication 600 MILLIGRAM(S): at 01:07

## 2023-08-19 RX ADMIN — Medication 50 MILLIGRAM(S): at 05:03

## 2023-08-19 RX ADMIN — CHLORHEXIDINE GLUCONATE 1 APPLICATION(S): 213 SOLUTION TOPICAL at 05:04

## 2023-08-19 RX ADMIN — Medication 100 MILLIGRAM(S): at 11:00

## 2023-08-19 RX ADMIN — Medication 600 MILLIGRAM(S): at 17:06

## 2023-08-19 RX ADMIN — POLYETHYLENE GLYCOL 3350 17 GRAM(S): 17 POWDER, FOR SOLUTION ORAL at 11:00

## 2023-08-19 RX ADMIN — Medication 50 MILLIGRAM(S): at 21:52

## 2023-08-19 RX ADMIN — Medication 10 MILLIGRAM(S): at 21:52

## 2023-08-19 RX ADMIN — BUDESONIDE AND FORMOTEROL FUMARATE DIHYDRATE 2 PUFF(S): 160; 4.5 AEROSOL RESPIRATORY (INHALATION) at 21:00

## 2023-08-19 NOTE — CONSULT NOTE ADULT - ASSESSMENT
ASSESSMENT  42y old  Female, patient of Dr. 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  Spectra [phone]

## 2023-08-19 NOTE — PROGRESS NOTE ADULT - ASSESSMENT
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 off sedatives     HEENT: Oral care    PULMONARY: Acute hypoxic resp failure requiring NC 02 -4 L. extubated successfully on 8/15 .   likely 2/2 pulm edema,  -r/o new viral PNA. -send RPP COVID positive    CARDIOVASCULAR:  Acute on chronic diastolic heart failure, HOCM with DEN     Net negative 2 L on IV lasix 40 mg x 3 was given on day 1 here. CXR still with sig volume overload, IVC this AM 2.3 cm ,repeated Bumex 2 IV    -on IV Bumex 2 mg, STAT and monitor response, recheck IVC before evening dose  currently patient euvolemic will resume home po lasix   -pt has been off levophed for more than 24 hrs, removed A line   -cont metoprolol 50 mg q8h, do NOT hold metoprolol. target HR < 70bpm  -in sinus rhythm,on amiodarone 200 mg daily for maintenance of SR. TSH elevation and fT3 noted. Cannot use sotalol due to QT prolongation, flecainide contraindicated given structural heart disease.   -On Coumadin home dose .         GI: GI prophylaxis. started DASH diet    RENAL:  Follow up lytes.     INFECTIOUS DISEASE: UTI, Ucx +ve for E coli. completed 3 days course for ceftriaxone .    HEMATOLOGICAL:  DVT prophylaxis: On Coumadin  INR(goal is 2.5 to 3.5)    ENDOCRINE:  Follow up FS.  Insulin protocol if needed.    MUSCULOSKELETAL: activity as tolerated .     DISPOSITION : CCU

## 2023-08-19 NOTE — PROGRESS NOTE ADULT - NS ATTEND AMEND GEN_ALL_CORE FT
AF/RVR    Better  Warfarin  Amio on hold
Patient seen and evaluated by me on 8/13/23.     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
Patient seen and evaluated by me on 8/14/23.     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
Cardio Dr Metcalf  EP Dr Tyler    41 yo F with h/o AFib/AFlutter, cardiomyopathy, HTN, DLD, and Aortic valve fibroelastoma s/p aortic valve replacement, left atrial appendage clipping and mechanical AVR (on Coumadin). Post op c/b persistent AF despite successful MINDI/DCCV 8/2/23. Patietn is s/p ablation 8/14. Currently being diuresed.     Rec  - Cont tele monitoring  - Continue Coumadin as pt has mechanical valve and INR goal 2.5-3.5  - Cont Amio and Metoprolol; increase Metoprolol as BP and HR allow.   - Monitor lytes. Keep  >4.0 Mg >2.0, correct hypomagnesemia   - Pt with HCM. If no improvement with ectopy after medical optimization, may need ICD prior to discharge.   - Will follow
Impression:   Afib with RVR, s/p JASON clipping (6/29/23): First diagnosed in 2018  HOCM with DEN: Previously followed at 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
In sinus  Continue Amiodarone  Diuresis  DC valladares  [id number]. INR 2.5-3.5
s/p AF ablation  HOCM    - In sinus  - Continue with IV amiodarone drip. Can switch to  mg q12h after extubation  - Baseline LFT  - Extubation today  - Gentle diuresis  - Can DC a-line once extubated  - Warfarin with INR 2.5-3.5
Cards Dr Metcalf  EP Dr Tyler    42y F with h/o afib/ aflutter, HCM, HTN and 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 She underwent aortic valve mass resection and left atrial appendage clipping and mechanical aortic valve replacement (on Coumadin). Post op c/b AF, persistent, despite successful MINDI/DCCV 8/2/23, patient reverted to AF, associated with worsening dyspnea, now at rest, was admitted through ER, optimized, diuresed, underwent AF ablation 8/14. Postop with frequent NSVT on tele. Pt with persistent cough, now COVID +.     Rec  - Cont tele monitoring  - Continue Coumadin as pt has mechanical valve and INR goal 2.5-3.5  - Cont Amio and Metoprolol  - Maintain electrolytes K>4.0 Mg >2.0  - May need ICD for primary prevention of SCD in setting of HCM/NSVT and high risk for sudden cardiac death. Patient denies syncope. Will need to re-evaluate as outpatient. MRI with sig LGE in pattern of HCM. Rec wearable defibrillator and MCOT on discharge.   - Will follow

## 2023-08-19 NOTE — PROGRESS NOTE ADULT - SUBJECTIVE AND OBJECTIVE BOX
INTERVAL HPI/OVERNIGHT EVENTS:  Pt now on airborne isolation for COVID. In NSR 60s, no tele events noted.   Patient denies fever, chills, dizziness, syncope, chest pain, palpitations, SOB, cough, abd pain, n/v/d/c, dysuria, hematuria or unusual rash.     MEDICATIONS  (STANDING):  aMIOdarone    Tablet 200 milliGRAM(s) Oral daily  budesonide  80 MICROgram(s)/formoterol 4.5 MICROgram(s) Inhaler 2 Puff(s) Inhalation two times a day  chlorhexidine 2% Cloths 1 Application(s) Topical <User Schedule>  FIRST- Mouthwash  BLM 20 milliLiter(s) Swish and Spit three times a day  furosemide    Tablet 40 milliGRAM(s) Oral daily  guaiFENesin  milliGRAM(s) Oral every 12 hours  iron sucrose IVPB 200 milliGRAM(s) IV Intermittent every 24 hours  melatonin 10 milliGRAM(s) Oral at bedtime  metoprolol tartrate 50 milliGRAM(s) Oral every 8 hours  pantoprazole  Injectable 40 milliGRAM(s) IV Push daily  polyethylene glycol 3350 17 Gram(s) Oral daily  remdesivir  IVPB   IV Intermittent   senna 2 Tablet(s) Oral at bedtime  warfarin 1.5 milliGRAM(s) Oral once    MEDICATIONS  (PRN):  acetaminophen     Tablet .. 650 milliGRAM(s) Oral every 6 hours PRN Temp greater or equal to 38C (100.4F), Mild Pain (1 - 3)  ALPRAZolam 0.25 milliGRAM(s) Oral daily PRN anxiety  benzonatate 100 milliGRAM(s) Oral every 8 hours PRN Cough      Allergies  No Known Allergies      REVIEW OF SYSTEMS    [x] A ten-point review of systems was otherwise negative except as noted.  [ ] Due to altered mental status/intubation, subjective information were not able to be obtained from the patient. History was obtained, to the extent possible, from review of the chart and collateral sources of information.      Vital Signs Last 24 Hrs  T(C): 37.2 (19 Aug 2023 08:00), Max: 37.5 (19 Aug 2023 00:00)  T(F): 99 (19 Aug 2023 08:00), Max: 99.5 (19 Aug 2023 00:00)  HR: 74 (19 Aug 2023 10:00) (68 - 86)  BP: 93/61 (19 Aug 2023 10:00) (90/60 - 147/95)  BP(mean): 72 (19 Aug 2023 10:00) (70 - 114)  RR: 24 (19 Aug 2023 10:00) (17 - 27)  SpO2: 99% (19 Aug 2023 10:00) (90% - 99%)    Parameters below as of 19 Aug 2023 10:00  Patient On (Oxygen Delivery Method): nasal cannula  O2 Flow (L/min): 3      08-18-23 @ 07:01  -  08-19-23 @ 07:00  --------------------------------------------------------  IN: 1170 mL / OUT: 3360 mL / NET: -2190 mL    08-19-23 @ 07:01  -  08-19-23 @ 10:28  --------------------------------------------------------  IN: 0 mL / OUT: 160 mL / NET: -160 mL        Physical Exam  GENERAL: In no apparent distress, well nourished, and hydrated.  HEART: Regular rate and rhythm; No murmurs, rubs, or gallops.  PULMONARY: Diminished at bases  ABDOMEN: Soft, Nontender, Nondistended; Bowel sounds present  EXTREMITIES:  2+ Peripheral Pulses, No clubbing, cyanosis, or edema  NEUROLOGICAL: AO x4 ,JACKSON, speech clear    LABS:                        10.5   8.46  )-----------( 238      ( 19 Aug 2023 04:52 )             35.3     08-19    138  |  93<L>  |  10  ----------------------------<  84  3.9   |  34<H>  |  0.6<L>    Ca    8.8      19 Aug 2023 04:52  Mg     2.0     08-19    TPro  6.3  /  Alb  3.5  /  TBili  1.2  /  DBili  x   /  AST  29  /  ALT  20  /  AlkPhos  99  08-19    PT/INR - ( 19 Aug 2023 04:52 )   PT: 27.30 sec;   INR: 2.33 ratio           Urinalysis Basic - ( 19 Aug 2023 04:52 )    Color: x / Appearance: x / SG: x / pH: x  Gluc: 84 mg/dL / Ketone: x  / Bili: x / Urobili: x   Blood: x / Protein: x / Nitrite: x   Leuk Esterase: x / RBC: x / WBC x   Sq Epi: x / Non Sq Epi: x / Bacteria: x        08-18-23 @ 07:01  -  08-19-23 @ 07:00  --------------------------------------------------------  IN: 1170 mL / OUT: 3360 mL / NET: -2190 mL    08-19-23 @ 07:01  -  08-19-23 @ 10:28  --------------------------------------------------------  IN: 0 mL / OUT: 160 mL / NET: -160 mL        08-18-23 @ 07:01  -  08-19-23 @ 07:00  --------------------------------------------------------  IN: 1170 mL / OUT: 3360 mL / NET: -2190 mL    08-19-23 @ 07:01  -  08-19-23 @ 10:28  --------------------------------------------------------  IN: 0 mL / OUT: 160 mL / NET: -160 mL        RADIOLOGY:  < from: TTE Echo Complete w/o Contrast w/ Doppler (08.13.23 @ 09:06) >  Summary:   1. Left ventricular ejection fraction, by visualestimation, is 50 to   55%.   2. Low normal global left ventricular systolic function.   3. Severe asymmetric left ventricular hypertrophy involving the septal   wall, c/w HOCM.   4. Spectral Doppler shows pseudonormal pattern of left ventricular   myocardial filling (Grade II diastolic dysfunction).   5. Elevated mean left atrial pressure.   6. Severely enlarged left atrium.   7. Mildly reduced RV systolic function.   8. Moderately enlarged right atrium.   9. Moderate pleural effusion in the right lateral region.  10. Mild anterior leaflet systolic anterior motion of the mitral valve.  11. Mild to moderate mitral valve regurgitation.  12. Mild tricuspid regurgitation.  13. Bileaflet mechanical prosthesis in the aortic valve position.  14. Mild aortic regurgitation.    PHYSICIAN INTERPRETATION:  Left Ventricle: The left ventricular internal cavity size is normal.   There is severe asymmetric left ventricular hypertrophy involving the   septal wall. Global LV systolic function was normal. Left ventricular   ejection fraction, by visual estimation, is 50 to 55%. Normal segmental   left ventricular systolic function. Abnormal (paradoxical) septal motion   consistent with post-operative status. Spectral Doppler shows   pseudonormal pattern of left ventricular myocardial filling (Grade II   diastolic dysfunction). Elevated mean left atrial pressure.  Right Ventricle: The right ventricular size is normal. RV systolic   function is mildly reduced.  Left Atrium: Severely enlarged left atrium.  Right Atrium: Moderately enlarged right atrium.  Pericardium: There is no evidence of pericardial effusion. There is a   moderate pleural effusion in the right lateral region.  Mitral Valve: The mitral valve is normal in structure. Mild systolic  anterior motion of the anterior leaflet of the mitral valve. Mild to   moderate mitral valve regurgitation is seen.  Tricuspid Valve: Mild tricuspid regurgitation is visualized.  Aortic Valve: Mild aortic valve regurgitation is seen.  Pulmonic Valve: Structurally normal pulmonic valve, with normal leaflet   excursion. No indication of pulmonic valve regurgitation.  Aorta: The aortic root is normal in size and structure.  Pulmonary Artery: The pulmonary artery is of normal size and origin.  Venous: The inferior vena cava was normal sized, with respiratory size   variation less than 50%.    < end of copied text >

## 2023-08-19 NOTE — PROGRESS NOTE ADULT - TIME BILLING
AF, HCM, NSVT
A-fib, AVR, CHF, HOCM.
HOCM, A-fib, hypotension, CHF.
NSVT, COVID, HCM
HOCM, CHF, AVR.

## 2023-08-19 NOTE — CONSULT NOTE ADULT - SUBJECTIVE AND OBJECTIVE BOX
MALIHA THOMPSON  42y, Female  Allergy: No Known Allergies      CHIEF COMPLAINT: Shortness of Breath (18 Aug 2023 12:20)      LOS  8d    HPI:  Patient is a 42y old  Female, patient of Dr. 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 She underwent aortic valve mass resection and left atrial appendage clipping and aortic valve replacement, post op c/b AF, persistent, despite MINDI/DCCV 8/2/23 patient remains in AF presenting with worsening dyspnea that was previously only when walking long distances is now occurring at rest. Patient states she was told to go to the by Dr. Tyler yesterday but she was experiencing a perisstent cough, however she noticed that when she became more short of breath she new she was in afib w RVR. Patient also reported that last week she called the CCU because she was experiencing  chest discomfort while eating her morning breakfast and she decided not to come to the ED because it resolved by the time her visiting nurse arrived. Admits to cough over the past few days and was given tessalon pearles as needed to help. denies changes in bowel movement, recent chest pain, or difficulty urinating    In the ED, EKG afib w rvr 110-140, given cardizem 20 IVP x1, /67. CXR showing increased bilateral opacities.         (11 Aug 2023 16:10)      INFECTIOUS DISEASE HISTORY:  History as above.  ID consulted for RDV for COVID  RVP 8/18+  For the past 3-4 days, has been feeling a bit more fatigued with coughing.   She has been on 2-3 L NC for the past 3 days.   Denies nausea, vomiting, abdominal pain       PAST MEDICAL & SURGICAL HISTORY:  Hypertrophic cardiomyopathy      Atrial fibrillation      Aortic valve disease      Obese      S/P transesophageal echocardiogram (MINDI)          FAMILY HISTORY  No pertinent family history in first degree relatives    No pertinent family history in first degree relatives    Known health problems: none (Father, Mother)        SOCIAL HISTORY  Social History:  denies drinking smoking or illicit drugs   (01 Aug 2023 15:52)        ROS  General: Denies rigors, nightsweats  HEENT: Denies headache, rhinorrhea, sore throat, eye pain  CV: Denies CP, palpitations  PULM: Denies wheezing, hemoptysis  GI: Denies hematemesis, hematochezia, melena  : Denies discharge, hematuria  MSK: Denies arthralgias, myalgias  SKIN: Denies rash, lesions  NEURO: Denies paresthesias, weakness  PSYCH: Denies depression, anxiety    VITALS:  T(F): 98.6, Max: 99.5 (08-19-23 @ 00:00)  HR: 68  BP: 147/95  RR: 24Vital Signs Last 24 Hrs  T(C): 37 (19 Aug 2023 04:00), Max: 37.5 (19 Aug 2023 00:00)  T(F): 98.6 (19 Aug 2023 04:00), Max: 99.5 (19 Aug 2023 00:00)  HR: 68 (19 Aug 2023 06:00) (68 - 86)  BP: 147/95 (19 Aug 2023 06:00) (97/71 - 147/95)  BP(mean): 114 (19 Aug 2023 06:00) (70 - 114)  RR: 24 (19 Aug 2023 06:00) (17 - 33)  SpO2: 97% (19 Aug 2023 06:00) (90% - 98%)    Parameters below as of 19 Aug 2023 06:00  Patient On (Oxygen Delivery Method): nasal cannula  O2 Flow (L/min): 3      PHYSICAL EXAM:  Gen: NAD, resting in bed  HEENT: Normocephalic, atraumatic  Neck: supple, no lymphadenopathy  CV: Regular rate & regular rhythm  Lungs: decreased BS at bases, no fremitus  Abdomen: Soft, BS present  Ext: Warm, well perfused  Neuro: non focal, awake  Skin: no rash, no erythema  Lines: no phlebitis    TESTS & MEASUREMENTS:                        10.5  8.46  )-----------( 238      ( 19 Aug 2023 04:52 )             35.3    08-19    138  |  93<L>  |  10  ----------------------------<  84  3.9   |  34<H>  |  0.6<L>    Ca    8.8      19 Aug 2023 04:52  Mg     2.0     08-19    TPro  6.3  /  Alb  3.5  /  TBili  1.2  /  DBili  x   /  AST  29  /  ALT  20  /  AlkPhos  99  08-19      LIVER FUNCTIONS - ( 19 Aug 2023 04:52 )  Alb: 3.5 g/dL / Pro: 6.3 g/dL / ALK PHOS: 99 U/L / ALT: 20 U/L / AST: 29 U/L / GGT: x          Urinalysis Basic - ( 19 Aug 2023 04:52 )    Color: x / Appearance: x / SG: x / pH: x  Gluc: 84 mg/dL / Ketone: x  / Bili: x / Urobili: x  Blood: x / Protein: x / Nitrite: x  Leuk Esterase: x / RBC: x / WBC x  Sq Epi: x / Non Sq Epi: x / Bacteria: x        Culture - Urine (collected 08-12-23 @ 17:40)  Source: Clean Catch Clean Catch (Midstream)  Final Report (08-15-23 @ 15:21):    >100,000 CFU/ml Escherichia coli  Organism: Escherichia coli (08-15-23 @ 15:21)  Organism: Escherichia coli (08-15-23 @ 15:21)      Method Type: CELI      -  Amikacin: S <=16      -  Amoxicillin/Clavulanic Acid: S <=8/4      -  Ampicillin: S <=8 These ampicillin results predict results for amoxicillin      -  Ampicillin/Sulbactam: S <=4/2 Enterobacter, Klebsiella aerogenes, Citrobacter, and Serratia may develop resistance during prolonged therapy (3-4 days)      -  Aztreonam: S <=4      -  Cefazolin: S <=2 For uncomplicated UTI with K. pneumoniae, E. coli, or P. mirablis: CELI <=16 is sensitive and CELI >=32 is resistant. This also predicts results for oral agents cefaclor, cefdinir, cefpodoxime, cefprozil, cefuroxime axetil, cephalexin and locarbef for uncomplicated UTI. Note that some isolates may be susceptible to these agents while testing resistant to cefazolin.      -  Cefepime: S <=2      -  Cefoxitin: S <=8      -  Ceftriaxone: S <=1 Enterobacter, Klebsiella aerogenes, Citrobacter, and Serratia may develop resistance during prolonged therapy      -  Cefuroxime: S <=4      -  Ciprofloxacin: S <=0.25      -  Ertapenem: S <=0.5      -  Gentamicin: S <=2      -  Imipenem: S <=1      -  Levofloxacin: S <=0.5      -  Meropenem: S <=1      -  Nitrofurantoin: S <=32 Should not be used to treat pyelonephritis      -  Piperacillin/Tazobactam: S <=8      -  Tobramycin: S <=2      -  Trimethoprim/Sulfamethoxazole: S <=0.5/9.5    Culture - Blood (collected 07-06-23 @ 10:32)  Source: .Blood Blood-Peripheral  Final Report (07-11-23 @ 18:01):    No growth at 5 days    Culture - Blood (collected 07-06-23 @ 10:32)  Source: .Blood Blood-Peripheral  Final Report (07-11-23 @ 18:01):    No growth at 5 days        Lactate, Blood: 1.4 mmol/L (08-17-23 @ 04:50)  Lactate, Blood: 3.1 mmol/L (08-16-23 @ 16:17)      INFECTIOUS DISEASES TESTING  MRSA PCR Result.: Negative (08-15-23 @ 16:40)  Procalcitonin, Serum: 0.20 ng/mL (08-15-23 @ 11:20)  MRSA PCR Result.: Negative (06-14-23 @ 18:19)      RADIOLOGY & ADDITIONAL TESTS:  I have personally reviewed the last Chest xray  CXR      CT      CARDIOLOGY TESTING  12 Lead ECG:  Ventricular Rate 68 BPM    Atrial Rate 68 BPM    P-R Interval 216 ms    QRS Duration 110 ms    Q-T Interval 446 ms    QTC Calculation(Bazett) 474 ms    P Axis 39 degrees    R Axis -56 degrees    T Axis 140 degrees    Diagnosis Line Sinus rhythm with 1st degree A-V block  Left axis deviation  RSR' or QR pattern in V1 suggests right ventricular conduction delay  Left ventricular hypertrophy with repolarization abnormality    Anteroseptal infarct , age undetermined  Abnormal ECG    Confirmed by NUSRAT QUINONEZ MD ([id number]) on 8/18/2023 6:46:30 AM (08-18-23 @ 06:10)  12 Lead ECG:  Ventricular Rate 71 BPM    Atrial Rate 71 BPM    P-R Interval 202 ms    QRS Duration 132 ms    Q-T Interval 472 ms    QTC Calculation(Bazett) 512 ms    P Axis 73 degrees    R Axis -52 degrees    T Axis 111 degrees    Diagnosis Line Normal sinus rhythm  Possible Left atrial enlargement  Left axis deviation  Non-specific intra-ventricular conduction block  Cannot rule out Anteroseptal infarct , age undetermined  T wave abnormality, consider lateral ischemia  Abnormal ECG    Confirmed by Gabe Hawley ([id number]) on 8/17/2023 7:07:24 AM (08-17-23 @ 06:10)      MEDICATIONS  aMIOdarone    Tablet 200 Oral daily  budesonide  80 MICROgram(s)/formoterol 4.5 MICROgram(s) Inhaler 2 Inhalation two times a day  chlorhexidine 2% Cloths 1 Topical <User Schedule>  FIRST- Mouthwash  BLM 20 Swish and Spit three times a day  guaiFENesin  Oral every 12 hours  iron sucrose IVPB 200 IV Intermittent every 24 hours  melatonin 10 Oral at bedtime  metoprolol tartrate 50 Oral every 8 hours  norepinephrine Infusion 0.05 IV Continuous <Continuous>  pantoprazole  Injectable 40 IV Push daily  polyethylene glycol 3350 17 Oral daily  remdesivir  IVPB  IV Intermittent  senna 2 Oral at bedtime      Weight  Weight (kg): 79.8 (08-14-23 @ 11:09)    ANTIBIOTICS:  remdesivir  IVPB   IV Intermittent      ALLERGIES:  No Known Allergies

## 2023-08-19 NOTE — PROGRESS NOTE ADULT - NS ATTEND OPT1 GEN_ALL_CORE

## 2023-08-19 NOTE — PROGRESS NOTE ADULT - SUBJECTIVE AND OBJECTIVE BOX
MALIHA THOMPSON 42y Female  MRN#: [id number]     Hospital Day: 7d    Pt is currently admitted with the primary diagnosis of  Heart failure        SUBJECTIVE     Overnight events  IVC was 1.9 overnight with low UO   IVC is 2.3 this morning       Subjective complaints  Pt was evaluated this am. Patient complaints of inability to sleep and Cough .                                          ----------------------------------------------------------  OBJECTIVE  PAST MEDICAL & SURGICAL HISTORY  Hypertrophic cardiomyopathy    Atrial fibrillation    Aortic valve disease    Obese    S/P transesophageal echocardiogram (MINDI)                                              -----------------------------------------------------------  ALLERGIES:  No Known Allergies                                            ------------------------------------------------------------    HOME MEDICATIONS  Home Medications:  Coumadin 1 mg oral tablet: 1 milligram(s) orally 4 times a week (12 Aug 2023 10:55)  Coumadin 1 mg oral tablet: 1.5 milligram(s) orally 3 times a week keep INR 2.5-3.5 (12 Aug 2023 10:55)                           MEDICATIONS:  STANDING MEDICATIONS  aMIOdarone    Tablet 200 milliGRAM(s) Oral daily  cefTRIAXone   IVPB      cefTRIAXone   IVPB 1000 milliGRAM(s) IV Intermittent every 24 hours  chlorhexidine 2% Cloths 1 Application(s) Topical <User Schedule>  FIRST- Mouthwash  BLM 20 milliLiter(s) Swish and Spit three times a day  iron sucrose IVPB 200 milliGRAM(s) IV Intermittent every 24 hours  magnesium sulfate  IVPB 2 Gram(s) IV Intermittent every 4 hours  melatonin 10 milliGRAM(s) Oral at bedtime  metoprolol tartrate 50 milliGRAM(s) Oral every 8 hours  norepinephrine Infusion 0.05 MICROgram(s)/kG/Min IV Continuous <Continuous>  pantoprazole  Injectable 40 milliGRAM(s) IV Push daily  polyethylene glycol 3350 17 Gram(s) Oral daily  potassium chloride    Tablet ER 20 milliEquivalent(s) Oral every 2 hours  senna 2 Tablet(s) Oral at bedtime  warfarin 1 milliGRAM(s) Oral once    PRN MEDICATIONS  ALPRAZolam 0.25 milliGRAM(s) Oral daily PRN  benzonatate 100 milliGRAM(s) Oral every 8 hours PRN                                            ------------------------------------------------------------  VITAL SIGNS: Last 24 Hours  T(C): 36.8 (18 Aug 2023 08:00), Max: 36.8 (18 Aug 2023 08:00)  T(F): 98.2 (18 Aug 2023 08:00), Max: 98.2 (18 Aug 2023 08:00)  HR: 69 (18 Aug 2023 08:00) (64 - 70)  BP: 107/65 (18 Aug 2023 08:00) (93/52 - 120/53)  BP(mean): 75 (18 Aug 2023 08:00) (68 - 89)  RR: 26 (18 Aug 2023 08:00) (19 - 30)  SpO2: 96% (18 Aug 2023 08:00) (91% - 99%)      08-17-23 @ 07:01  -  08-18-23 @ 07:00  --------------------------------------------------------  IN: 850 mL / OUT: 2465 mL / NET: -1615 mL                                             --------------------------------------------------------------  LABS:                        9.7    9.62  )-----------( 266      ( 18 Aug 2023 04:26 )             33.3     08-18    141  |  98  |  10  ----------------------------<  91  3.8   |  32  |  0.6<L>    Ca    8.8      18 Aug 2023 04:26  Mg     1.7     08-18    TPro  6.3  /  Alb  3.5  /  TBili  0.9  /  DBili  x   /  AST  34  /  ALT  20  /  AlkPhos  101  08-18    PT/INR - ( 18 Aug 2023 04:26 )   PT: 32.90 sec;   INR: 2.80 ratio           Urinalysis Basic - ( 18 Aug 2023 04:26 )    Color: x / Appearance: x / SG: x / pH: x  Gluc: 91 mg/dL / Ketone: x  / Bili: x / Urobili: x   Blood: x / Protein: x / Nitrite: x   Leuk Esterase: x / RBC: x / WBC x   Sq Epi: x / Non Sq Epi: x / Bacteria: x                CARDIAC MARKERS ( 16 Aug 2023 16:17 )  x     / 2.05 ng/mL / x     / x     / x      CARDIAC MARKERS ( 16 Aug 2023 11:25 )  x     / 2.74 ng/mL / x     / x     / x                                                  -------------------------------------------------------------  RADIOLOGY:                                            --------------------------------------------------------------    PHYSICAL EXAM:  GENERAL: confortable  HEAD:  Atraumatic  EYES: EOMI, conjunctiva and sclera clear  NECK: Supple, No JVD  CHEST/LUNG: Clear to auscultation bilaterally  HEART: regular rate and rhythm  ABDOMEN:soft .    NERVOUS SYSTEM:  Alert & Oriented X3. No focal deficits                                            --------------------------------------------------------------

## 2023-08-19 NOTE — PROGRESS NOTE ADULT - ASSESSMENT
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 She underwent aortic valve mass resection and left atrial appendage clipping and mechanical aortic valve replacement (on Coumadin), post op c/b AF, persistent, despite successful MINDI/DCCV 8/2/23, patient reverted to AF, associated with worsening dyspnea, now at rest, was admitted through ER, optimized, diuresed, underwent AF ablation 8/14. Postop with frequent NSVT on tele. Pt with persistent cough, now COVID +.     EF 50-55%        Impression:  AF sp Ablation 8/14/23  NSVT, frequent, now improving  Acute hypoxemic respiratory failure   COVID + (8/18/2023)  Pulmonary edema - effusion  AVR (mechanical) on Coumadin  S/P AV Mass Resection and JASON Clip 6/29/2023  Hx HOCM  Hx HTN, HLD    Plan:  - Cont tele monitoring  - Continue Coumadin as pt has mechanical valve and INR goal 2.5-3.5  - Cont Amio and Metoprolol  - Maintain electrolytes K>4.0 Mg >2.0  - Recommend ICD for primary prevention in setting of HCM/NSVT  - Due to respiratory status/COVID, will allow pt time to recover and place ICD as outpatient since NSVT has improved. 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 She underwent aortic valve mass resection and left atrial appendage clipping and mechanical aortic valve replacement (on Coumadin), post op c/b AF, persistent, despite successful MINDI/DCCV 8/2/23, patient reverted to AF, associated with worsening dyspnea, now at rest, was admitted through ER, optimized, diuresed, underwent AF ablation 8/14. Postop with frequent NSVT on tele. Pt with persistent cough, now COVID +.     EF 50-55%        Impression:  AF sp Ablation 8/14/23  NSVT, frequent, now improving  Acute hypoxemic respiratory failure   COVID + (8/18/2023)  Pulmonary edema - effusion  AVR (mechanical) on Coumadin  S/P AV Mass Resection and JASON Clip 6/29/2023  Hx HOCM  Hx HTN, HLD    Plan:  - Cont tele monitoring  - Continue Coumadin as pt has mechanical valve and INR goal 2.5-3.5  - Cont Amio and Metoprolol  - Maintain electrolytes K>4.0 Mg >2.0  - Recommend ICD for primary prevention in setting of HCM/NSVT and is high risk for sudden cardiac death  - Due to respiratory status/COVID, will allow pt time to recover and place ICD as outpatient. 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 She underwent aortic valve mass resection and left atrial appendage clipping and mechanical aortic valve replacement (on Coumadin), post op c/b AF, persistent, despite successful MINDI/DCCV 8/2/23, patient reverted to AF, associated with worsening dyspnea, now at rest, was admitted through ER, optimized, diuresed, underwent AF ablation 8/14. Postop with frequent NSVT on tele. Pt with persistent cough, now COVID +.     EF 50-55%        Impression:  AF sp Ablation 8/14/23  NSVT, frequent, now improving  Acute hypoxemic respiratory failure   COVID + (8/18/2023)  Pulmonary edema - effusion  AVR (mechanical) on Coumadin  S/P AV Mass Resection and JASON Clip 6/29/2023  Hx HOCM  Hx HTN, HLD    Plan:  - Cont tele monitoring  - Continue Coumadin as pt has mechanical valve and INR goal 2.5-3.5  - Cont Amio and Metoprolol  - Maintain electrolytes K>4.0 Mg >2.0  - May need ICD for primary prevention of SCD in setting of HCM/NSVT and high risk for sudden cardiac death  - Due to respiratory status/COVID, will allow pt time to recover and place ICD as outpatient. Will request wearable cardiac defibrillator prior to discharge  - MCOT on discharge  - Will follow

## 2023-08-19 NOTE — CONSULT NOTE ADULT - CONSULT REASON
to evaluate laryngeal injury due to persistent cough since open heart AVR in July 2023
AF RVR
HFREF, Acute hypoxemic respiratory failure
COVID-19

## 2023-08-20 LAB
ALBUMIN SERPL ELPH-MCNC: 3 G/DL — LOW (ref 3.5–5.2)
ALP SERPL-CCNC: 90 U/L — SIGNIFICANT CHANGE UP (ref 30–115)
ALT FLD-CCNC: 19 U/L — SIGNIFICANT CHANGE UP (ref 0–41)
ANION GAP SERPL CALC-SCNC: 8 MMOL/L — SIGNIFICANT CHANGE UP (ref 7–14)
APTT BLD: 32.3 SEC — SIGNIFICANT CHANGE UP (ref 27–39.2)
AST SERPL-CCNC: 30 U/L — SIGNIFICANT CHANGE UP (ref 0–41)
BILIRUB SERPL-MCNC: 1.1 MG/DL — SIGNIFICANT CHANGE UP (ref 0.2–1.2)
BUN SERPL-MCNC: 12 MG/DL — SIGNIFICANT CHANGE UP (ref 10–20)
CALCIUM SERPL-MCNC: 8.6 MG/DL — SIGNIFICANT CHANGE UP (ref 8.4–10.4)
CHLORIDE SERPL-SCNC: 93 MMOL/L — LOW (ref 98–110)
CO2 SERPL-SCNC: 35 MMOL/L — HIGH (ref 17–32)
CREAT SERPL-MCNC: 0.6 MG/DL — LOW (ref 0.7–1.5)
EGFR: 114 ML/MIN/1.73M2 — SIGNIFICANT CHANGE UP
GLUCOSE SERPL-MCNC: 104 MG/DL — HIGH (ref 70–99)
HCT VFR BLD CALC: 32.4 % — LOW (ref 37–47)
HGB BLD-MCNC: 9.8 G/DL — LOW (ref 12–16)
INR BLD: 2 RATIO — HIGH (ref 0.65–1.3)
MAGNESIUM SERPL-MCNC: 1.9 MG/DL — SIGNIFICANT CHANGE UP (ref 1.8–2.4)
MCHC RBC-ENTMCNC: 24.7 PG — LOW (ref 27–31)
MCHC RBC-ENTMCNC: 30.2 G/DL — LOW (ref 32–37)
MCV RBC AUTO: 81.6 FL — SIGNIFICANT CHANGE UP (ref 81–99)
NRBC # BLD: 0 /100 WBCS — SIGNIFICANT CHANGE UP (ref 0–0)
PLATELET # BLD AUTO: 232 K/UL — SIGNIFICANT CHANGE UP (ref 130–400)
PMV BLD: 9.6 FL — SIGNIFICANT CHANGE UP (ref 7.4–10.4)
POTASSIUM SERPL-MCNC: 3.8 MMOL/L — SIGNIFICANT CHANGE UP (ref 3.5–5)
POTASSIUM SERPL-SCNC: 3.8 MMOL/L — SIGNIFICANT CHANGE UP (ref 3.5–5)
PROT SERPL-MCNC: 5.7 G/DL — LOW (ref 6–8)
PROTHROM AB SERPL-ACNC: 23.3 SEC — HIGH (ref 9.95–12.87)
RBC # BLD: 3.97 M/UL — LOW (ref 4.2–5.4)
RBC # FLD: 20.6 % — HIGH (ref 11.5–14.5)
SODIUM SERPL-SCNC: 136 MMOL/L — SIGNIFICANT CHANGE UP (ref 135–146)
WBC # BLD: 7.13 K/UL — SIGNIFICANT CHANGE UP (ref 4.8–10.8)
WBC # FLD AUTO: 7.13 K/UL — SIGNIFICANT CHANGE UP (ref 4.8–10.8)

## 2023-08-20 PROCEDURE — 93010 ELECTROCARDIOGRAM REPORT: CPT

## 2023-08-20 PROCEDURE — 99233 SBSQ HOSP IP/OBS HIGH 50: CPT

## 2023-08-20 PROCEDURE — 71045 X-RAY EXAM CHEST 1 VIEW: CPT | Mod: 26

## 2023-08-20 RX ORDER — ENOXAPARIN SODIUM 100 MG/ML
80 INJECTION SUBCUTANEOUS ONCE
Refills: 0 | Status: COMPLETED | OUTPATIENT
Start: 2023-08-20 | End: 2023-08-20

## 2023-08-20 RX ORDER — WARFARIN SODIUM 2.5 MG/1
2 TABLET ORAL ONCE
Refills: 0 | Status: COMPLETED | OUTPATIENT
Start: 2023-08-20 | End: 2023-08-20

## 2023-08-20 RX ADMIN — Medication 50 MILLIGRAM(S): at 14:14

## 2023-08-20 RX ADMIN — Medication 600 MILLIGRAM(S): at 17:42

## 2023-08-20 RX ADMIN — Medication 50 MILLIGRAM(S): at 05:28

## 2023-08-20 RX ADMIN — Medication 600 MILLIGRAM(S): at 05:27

## 2023-08-20 RX ADMIN — Medication 100 MILLIGRAM(S): at 06:07

## 2023-08-20 RX ADMIN — Medication 10 MILLIGRAM(S): at 21:16

## 2023-08-20 RX ADMIN — DIPHENHYDRAMINE HYDROCHLORIDE AND LIDOCAINE HYDROCHLORIDE AND ALUMINUM HYDROXIDE AND MAGNESIUM HYDRO 20 MILLILITER(S): KIT at 14:13

## 2023-08-20 RX ADMIN — Medication 650 MILLIGRAM(S): at 01:00

## 2023-08-20 RX ADMIN — SENNA PLUS 2 TABLET(S): 8.6 TABLET ORAL at 21:16

## 2023-08-20 RX ADMIN — REMDESIVIR 200 MILLIGRAM(S): 5 INJECTION INTRAVENOUS at 04:03

## 2023-08-20 RX ADMIN — Medication 50 MILLIGRAM(S): at 21:16

## 2023-08-20 RX ADMIN — BUDESONIDE AND FORMOTEROL FUMARATE DIHYDRATE 2 PUFF(S): 160; 4.5 AEROSOL RESPIRATORY (INHALATION) at 09:14

## 2023-08-20 RX ADMIN — BUDESONIDE AND FORMOTEROL FUMARATE DIHYDRATE 2 PUFF(S): 160; 4.5 AEROSOL RESPIRATORY (INHALATION) at 20:17

## 2023-08-20 RX ADMIN — AMIODARONE HYDROCHLORIDE 200 MILLIGRAM(S): 400 TABLET ORAL at 05:27

## 2023-08-20 RX ADMIN — CHLORHEXIDINE GLUCONATE 1 APPLICATION(S): 213 SOLUTION TOPICAL at 06:08

## 2023-08-20 RX ADMIN — Medication 40 MILLIGRAM(S): at 05:28

## 2023-08-20 RX ADMIN — WARFARIN SODIUM 2 MILLIGRAM(S): 2.5 TABLET ORAL at 21:16

## 2023-08-20 RX ADMIN — POLYETHYLENE GLYCOL 3350 17 GRAM(S): 17 POWDER, FOR SOLUTION ORAL at 11:22

## 2023-08-20 RX ADMIN — Medication 650 MILLIGRAM(S): at 00:27

## 2023-08-20 RX ADMIN — DIPHENHYDRAMINE HYDROCHLORIDE AND LIDOCAINE HYDROCHLORIDE AND ALUMINUM HYDROXIDE AND MAGNESIUM HYDRO 20 MILLILITER(S): KIT at 21:15

## 2023-08-20 RX ADMIN — DIPHENHYDRAMINE HYDROCHLORIDE AND LIDOCAINE HYDROCHLORIDE AND ALUMINUM HYDROXIDE AND MAGNESIUM HYDRO 20 MILLILITER(S): KIT at 05:27

## 2023-08-20 RX ADMIN — ENOXAPARIN SODIUM 80 MILLIGRAM(S): 100 INJECTION SUBCUTANEOUS at 14:15

## 2023-08-20 RX ADMIN — PANTOPRAZOLE SODIUM 40 MILLIGRAM(S): 20 TABLET, DELAYED RELEASE ORAL at 11:22

## 2023-08-20 NOTE — PROGRESS NOTE ADULT - ASSESSMENT
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 off sedatives     HEENT: Oral care    PULMONARY: Acute hypoxic resp failure requiring NC 02 -4 L. extubated successfully on 8/15 .   likely 2/2 pulm edema,  COVID positive    CARDIOVASCULAR:  Acute on chronic diastolic heart failure    Net negative 2 L on IV lasix 40 mg x 3 was given on day 1 here.     -on IV Bumex 2 mg, STAT and monitor response, recheck IVC before evening dose  currently patient euvolemic will resume home po lasix   -off levo, removed A line   -cont metoprolol 50 mg q8h, do NOT hold metoprolol. target HR < 70bpm  -in sinus rhythm,on amiodarone 200 mg daily for maintenance of SR. TSH elevation and fT3 noted. Cannot use sotalol due to QT prolongation, flecainide contraindicated given structural heart disease.   -On Coumadin home dose .         GI: GI prophylaxis. started DASH diet    RENAL:  Follow up lytes.       MUSCULOSKELETAL: activity as tolerated .     DISPOSITION : CCU              CXR - left lower lung opacity (possibly loculated effusion), small right effusion - my reading

## 2023-08-20 NOTE — PROGRESS NOTE ADULT - ATTENDING COMMENTS
Patient seen, case d/w CCU team on rounds.  Agree with above  Cough , COVID positive started on antiviral medication , appreciate ID evaluation  Will resume home dose diuretics  Continue Metoprolol and Amiodarone.  INR therapeutic. continue coumadin   appreciate EP follow up, likely plan for AICD as outpatient and will need lifevest prior to discharge   Continue CCU monitoring.
Patient seen, case d/w CCU team on rounds.  Agree with above  Cough , COVID positive started on antiviral medication , appreciate ID evaluation and follow up  Will resume home dose diuretics  today able to maintain saturation with no O2 support  Continue Metoprolol and Amiodarone.  Follow INR therapeutic . continue coumadin   appreciate EP follow up, likely plan for AICD as outpatient and will need lifevest prior to discharge   Continue CCU monitoring.
Patient seen, case d/w CCU team on rounds and EP attending.  Patient with HOCM, s/p AVR presented with hypotension during and after A-fib ablation.  Off Levophed today, still fluid overloaded.  Continue Metoprolol and Amiodarone.  On Coumadin, INR therapeutic.  Furosemide 40 mg IVP second dose was given in the afternoon, will reassess after that.  Keep in CCU overnight.
Patient seen and reevaluated repeatedly throughout the day, case d/w CCU team on rounds.  Patient with HOCM, AVR ([facility]), persistent A-fib with RVR, diastolic CHF.  S/p A-fib ablation yesterday, complicated by hypotension and fluid overload.  Extubated today after additional diuresis. Appears mildly overloaded now.  Still in SR.  BP  on low-dose Levophed.   Continue BB (team instructed not to hold it).  Continue Levophed for now.  Will reassess fluid status in AM before additional doses of diuretics.  INR > 3, Heparin stopped.
Patient seen, case d/w CCU team on rounds.  Agree with assessment and plan.  BP stable now.  Still appears overloaded, agree with Bumex for 24h.  Continue Metoprolol, do not hold for BP.  D/c A-line.  OOB to chair.  BW and CXR in AM.
Patient seen, case d/w CCU team on rounds.  Agree with assessment and plan.  CXR slightly worse today in spite of effective diuresis. Will check Covid PCR and RVP.  Will continue to check IVC and adjust diuresis accordingly.  Continue Metoprolol and Amiodarone.  INR therapeutic.  Continue CCU monitoring.

## 2023-08-20 NOTE — PROGRESS NOTE ADULT - SUBJECTIVE AND OBJECTIVE BOX
MALIHA THOMPSON 42y Female  MRN#: [id number]     Hospital Day: 9d    Pt is currently admitted with the primary diagnosis of  Heart failure        SUBJECTIVE     Overnight events  None                                              ----------------------------------------------------------  OBJECTIVE  PAST MEDICAL & SURGICAL HISTORY  Hypertrophic cardiomyopathy    Atrial fibrillation    Aortic valve disease    Obese    S/P transesophageal echocardiogram (MINDI)                                              -----------------------------------------------------------  ALLERGIES:  No Known Allergies                                            ------------------------------------------------------------    HOME MEDICATIONS  Home Medications:  Coumadin 1 mg oral tablet: 1 milligram(s) orally 4 times a week (12 Aug 2023 10:55)  Coumadin 1 mg oral tablet: 1.5 milligram(s) orally 3 times a week keep INR 2.5-3.5 (12 Aug 2023 10:55)                           MEDICATIONS:  STANDING MEDICATIONS  aMIOdarone    Tablet 200 milliGRAM(s) Oral daily  budesonide  80 MICROgram(s)/formoterol 4.5 MICROgram(s) Inhaler 2 Puff(s) Inhalation two times a day  chlorhexidine 2% Cloths 1 Application(s) Topical <User Schedule>  FIRST- Mouthwash  BLM 20 milliLiter(s) Swish and Spit three times a day  furosemide    Tablet 40 milliGRAM(s) Oral daily  guaiFENesin  milliGRAM(s) Oral every 12 hours  iron sucrose IVPB 200 milliGRAM(s) IV Intermittent every 24 hours  melatonin 10 milliGRAM(s) Oral at bedtime  metoprolol tartrate 50 milliGRAM(s) Oral every 8 hours  pantoprazole  Injectable 40 milliGRAM(s) IV Push daily  polyethylene glycol 3350 17 Gram(s) Oral daily  remdesivir  IVPB   IV Intermittent   remdesivir  IVPB 100 milliGRAM(s) IV Intermittent every 24 hours  senna 2 Tablet(s) Oral at bedtime    PRN MEDICATIONS  acetaminophen     Tablet .. 650 milliGRAM(s) Oral every 6 hours PRN  ALPRAZolam 0.25 milliGRAM(s) Oral daily PRN  benzonatate 100 milliGRAM(s) Oral every 8 hours PRN                                            ------------------------------------------------------------  VITAL SIGNS: Last 24 Hours  T(C): 36.2 (20 Aug 2023 00:00), Max: 37.3 (19 Aug 2023 12:00)  T(F): 97.2 (20 Aug 2023 00:00), Max: 99.2 (19 Aug 2023 12:00)  HR: 72 (20 Aug 2023 00:00) (68 - 86)  BP: 109/71 (20 Aug 2023 00:00) (90/60 - 147/95)  BP(mean): 86 (20 Aug 2023 00:00) (66 - 114)  RR: 21 (20 Aug 2023 00:00) (18 - 28)  SpO2: 98% (20 Aug 2023 00:00) (94% - 99%)      08-18-23 @ 07:01  -  08-19-23 @ 07:00  --------------------------------------------------------  IN: 1170 mL / OUT: 3360 mL / NET: -2190 mL    08-19-23 @ 07:01  -  08-20-23 @ 00:26  --------------------------------------------------------  IN: 0 mL / OUT: 360 mL / NET: -360 mL                                             --------------------------------------------------------------  LABS:                        10.5   8.46  )-----------( 238      ( 19 Aug 2023 04:52 )             35.3     08-19    138  |  93<L>  |  10  ----------------------------<  84  3.9   |  34<H>  |  0.6<L>    Ca    8.8      19 Aug 2023 04:52  Mg     2.0     08-19    TPro  6.3  /  Alb  3.5  /  TBili  1.2  /  DBili  x   /  AST  29  /  ALT  20  /  AlkPhos  99  08-19    PT/INR - ( 19 Aug 2023 04:52 )   PT: 27.30 sec;   INR: 2.33 ratio           Urinalysis Basic - ( 19 Aug 2023 04:52 )    Color: x / Appearance: x / SG: x / pH: x  Gluc: 84 mg/dL / Ketone: x  / Bili: x / Urobili: x   Blood: x / Protein: x / Nitrite: x   Leuk Esterase: x / RBC: x / WBC x   Sq Epi: x / Non Sq Epi: x / Bacteria: x                                                            -------------------------------------------------------------  RADIOLOGY:                                            --------------------------------------------------------------                                             --------------------------------------------------------------

## 2023-08-20 NOTE — PROGRESS NOTE ADULT - PROVIDER SPECIALTY LIST ADULT
CCU
CCU
Cardiology
Electrophysiology
CCU
Cardiology
Critical Care
Electrophysiology
Electrophysiology
CCU
CCU
Cardiology
ENT
Electrophysiology
Cardiology

## 2023-08-20 NOTE — PROGRESS NOTE ADULT - REASON FOR ADMISSION
Shortness of Breath

## 2023-08-21 LAB
HCT VFR BLD CALC: 32.7 % — LOW (ref 37–47)
HGB BLD-MCNC: 10 G/DL — LOW (ref 12–16)
INR BLD: 1.76 RATIO — HIGH (ref 0.65–1.3)
MCHC RBC-ENTMCNC: 25 PG — LOW (ref 27–31)
MCHC RBC-ENTMCNC: 30.6 G/DL — LOW (ref 32–37)
MCV RBC AUTO: 81.8 FL — SIGNIFICANT CHANGE UP (ref 81–99)
NRBC # BLD: 0 /100 WBCS — SIGNIFICANT CHANGE UP (ref 0–0)
PLATELET # BLD AUTO: 240 K/UL — SIGNIFICANT CHANGE UP (ref 130–400)
PMV BLD: 9.8 FL — SIGNIFICANT CHANGE UP (ref 7.4–10.4)
PROTHROM AB SERPL-ACNC: 20.4 SEC — HIGH (ref 9.95–12.87)
RBC # BLD: 4 M/UL — LOW (ref 4.2–5.4)
RBC # FLD: 21 % — HIGH (ref 11.5–14.5)
WBC # BLD: 8.48 K/UL — SIGNIFICANT CHANGE UP (ref 4.8–10.8)
WBC # FLD AUTO: 8.48 K/UL — SIGNIFICANT CHANGE UP (ref 4.8–10.8)

## 2023-08-21 PROCEDURE — 99233 SBSQ HOSP IP/OBS HIGH 50: CPT

## 2023-08-21 PROCEDURE — 93010 ELECTROCARDIOGRAM REPORT: CPT | Mod: 59

## 2023-08-21 RX ORDER — WARFARIN SODIUM 2.5 MG/1
2.5 TABLET ORAL ONCE
Refills: 0 | Status: DISCONTINUED | OUTPATIENT
Start: 2023-08-21 | End: 2023-08-21

## 2023-08-21 RX ORDER — WARFARIN SODIUM 2.5 MG/1
5 TABLET ORAL ONCE
Refills: 0 | Status: DISCONTINUED | OUTPATIENT
Start: 2023-08-21 | End: 2023-08-21

## 2023-08-21 RX ORDER — ENOXAPARIN SODIUM 100 MG/ML
80 INJECTION SUBCUTANEOUS ONCE
Refills: 0 | Status: COMPLETED | OUTPATIENT
Start: 2023-08-21 | End: 2023-08-21

## 2023-08-21 RX ORDER — WARFARIN SODIUM 2.5 MG/1
2.5 TABLET ORAL ONCE
Refills: 0 | Status: COMPLETED | OUTPATIENT
Start: 2023-08-21 | End: 2023-08-21

## 2023-08-21 RX ADMIN — BUDESONIDE AND FORMOTEROL FUMARATE DIHYDRATE 2 PUFF(S): 160; 4.5 AEROSOL RESPIRATORY (INHALATION) at 21:08

## 2023-08-21 RX ADMIN — Medication 650 MILLIGRAM(S): at 21:00

## 2023-08-21 RX ADMIN — WARFARIN SODIUM 2.5 MILLIGRAM(S): 2.5 TABLET ORAL at 21:09

## 2023-08-21 RX ADMIN — ENOXAPARIN SODIUM 80 MILLIGRAM(S): 100 INJECTION SUBCUTANEOUS at 10:22

## 2023-08-21 RX ADMIN — DIPHENHYDRAMINE HYDROCHLORIDE AND LIDOCAINE HYDROCHLORIDE AND ALUMINUM HYDROXIDE AND MAGNESIUM HYDRO 20 MILLILITER(S): KIT at 14:12

## 2023-08-21 RX ADMIN — BUDESONIDE AND FORMOTEROL FUMARATE DIHYDRATE 2 PUFF(S): 160; 4.5 AEROSOL RESPIRATORY (INHALATION) at 08:30

## 2023-08-21 RX ADMIN — REMDESIVIR 200 MILLIGRAM(S): 5 INJECTION INTRAVENOUS at 04:21

## 2023-08-21 RX ADMIN — Medication 650 MILLIGRAM(S): at 05:05

## 2023-08-21 RX ADMIN — Medication 650 MILLIGRAM(S): at 22:00

## 2023-08-21 RX ADMIN — Medication 50 MILLIGRAM(S): at 05:06

## 2023-08-21 RX ADMIN — PANTOPRAZOLE SODIUM 40 MILLIGRAM(S): 20 TABLET, DELAYED RELEASE ORAL at 12:03

## 2023-08-21 RX ADMIN — Medication 10 MILLIGRAM(S): at 21:09

## 2023-08-21 RX ADMIN — Medication 40 MILLIGRAM(S): at 05:06

## 2023-08-21 RX ADMIN — CHLORHEXIDINE GLUCONATE 1 APPLICATION(S): 213 SOLUTION TOPICAL at 05:36

## 2023-08-21 RX ADMIN — Medication 600 MILLIGRAM(S): at 05:06

## 2023-08-21 RX ADMIN — Medication 650 MILLIGRAM(S): at 05:30

## 2023-08-21 RX ADMIN — AMIODARONE HYDROCHLORIDE 200 MILLIGRAM(S): 400 TABLET ORAL at 05:06

## 2023-08-21 RX ADMIN — DIPHENHYDRAMINE HYDROCHLORIDE AND LIDOCAINE HYDROCHLORIDE AND ALUMINUM HYDROXIDE AND MAGNESIUM HYDRO 20 MILLILITER(S): KIT at 05:36

## 2023-08-21 RX ADMIN — Medication 50 MILLIGRAM(S): at 21:09

## 2023-08-21 RX ADMIN — SENNA PLUS 2 TABLET(S): 8.6 TABLET ORAL at 21:08

## 2023-08-21 RX ADMIN — DIPHENHYDRAMINE HYDROCHLORIDE AND LIDOCAINE HYDROCHLORIDE AND ALUMINUM HYDROXIDE AND MAGNESIUM HYDRO 20 MILLILITER(S): KIT at 21:33

## 2023-08-21 RX ADMIN — Medication 50 MILLIGRAM(S): at 14:12

## 2023-08-21 RX ADMIN — Medication 100 MILLIGRAM(S): at 05:06

## 2023-08-21 RX ADMIN — Medication 600 MILLIGRAM(S): at 17:49

## 2023-08-21 RX ADMIN — POLYETHYLENE GLYCOL 3350 17 GRAM(S): 17 POWDER, FOR SOLUTION ORAL at 12:03

## 2023-08-21 NOTE — CHART NOTE - NSCHARTNOTEFT_GEN_A_CORE
MICU DOWN GRADE NOTE      Patient is a 42y old  Female who presents with a chief complaint of Shortness of Breath (20 Aug 2023 00:22)      HPI: Patient is a 42y old  Female, patient of Dr. 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 She underwent aortic valve mass resection and left atrial appendage clipping and aortic valve replacement, post op c/b AF, persistent, despite MINDI/DCCV 8/2/23 patient remains in AF presenting with worsening dyspnea that was previously only when walking long distances is now occurring at rest. Patient states she was told to go to the by Dr. Tyler yesterday but she was experiencing a perisstent cough, however she noticed that when she became more short of breath she new she was in afib w RVR. Patient also reported that last week she called the CCU because she was experiencing  chest discomfort while eating her morning breakfast and she decided not to come to the ED because it resolved by the time her visiting nurse arrived. Admits to cough over the past few days and was given tessalon pearles as needed to help. denies changes in bowel movement, recent chest pain, or difficulty urinating     In the ED, EKG afib w rvr 110-140, given cardizem 20 IVP x1, /67. CXR showing increased bilateral opacities.         INTERVAL HPI/OVERNIGHT EVENTS:        Impression:   Acute hypoxic resp failure/pulmonary edema   PersAfib s/p ablation and JASON clipping 6/29/2023  HOCM with DEN   Acute on chronic Diastolic HF (EF is 73%)  Aortic valve fibroelastoma s/p AVR 6/29/2023 on coumadin   COVID positive      PLAN:    CNS: . off sedatives     HEENT: Oral care    PULMONARY: Acute hypoxic resp failure requiring NC 02 -4 L. extubated successfully on 8/15 .   likely 2/2 pulm edema,  COVID positive    CARDIOVASCULAR:  Acute on chronic diastolic heart failure    Net negative 2 L on IV lasix 40 mg x 3 was given on day 1 here.     -on IV Bumex 2 mg, STAT and monitor response, recheck IVC before evening dose  currently patient euvolemic will resume home po lasix   -off levo, removed A line   -cont metoprolol 50 mg q8h, do NOT hold metoprolol. target HR < 70bpm  -in sinus rhythm,on amiodarone 200 mg daily for maintenance of SR. TSH elevation and fT3 noted. Cannot use sotalol due to QT prolongation, flecainide contraindicated given structural heart disease.   -On Coumadin home dose .         GI: GI prophylaxis. started DASH diet    RENAL:  Follow up lytes.       MUSCULOSKELETAL: activity as tolerated .     DISPOSITION : CCU       Patient seen, case d/w CCU team on rounds.  Agree with above  Cough , COVID positive started on antiviral medication , appreciate ID evaluation and follow up  Will resume home dose diuretics  today able to maintain saturation with no O2 support  Continue Metoprolol and Amiodarone.  Follow INR therapeutic . continue coumadin   appreciate EP follow up, likely plan for AICD as outpatient and will need lifevest prior to discharge   Continue CCU monitoring.           REVIEW OF SYSTEMS:  CONSTITUTIONAL: No fever, chills  HEENT:  No blurry vision No sinus or throat pain  NECK: No pain or stiffness  RESPIRATORY: No cough, wheezing, chills or hemoptysis; No shortness of breath  CARDIOVASCULAR: No chest pain, palpitations  GASTROINTESTINAL: No abdominal pain. No nausea, vomiting, or diarrhea  GENITOURINARY: No dysuria  NEUROLOGICAL: No HA, No focal weakness  SKIN: No itching, burning, rashes, or lesions   MUSCULOSKELETAL: No joint pain or swelling; No muscle, back, or extremity pain    MEDICATIONS:  acetaminophen     Tablet .. 650 milliGRAM(s) Oral every 6 hours PRN  aMIOdarone    Tablet 200 milliGRAM(s) Oral daily  benzonatate 100 milliGRAM(s) Oral every 8 hours PRN  budesonide  80 MICROgram(s)/formoterol 4.5 MICROgram(s) Inhaler 2 Puff(s) Inhalation two times a day  chlorhexidine 2% Cloths 1 Application(s) Topical <User Schedule>  enoxaparin Injectable 80 milliGRAM(s) SubCutaneous once  FIRST- Mouthwash  BLM 20 milliLiter(s) Swish and Spit three times a day  furosemide    Tablet 40 milliGRAM(s) Oral daily  guaiFENesin  milliGRAM(s) Oral every 12 hours  melatonin 10 milliGRAM(s) Oral at bedtime  metoprolol tartrate 50 milliGRAM(s) Oral every 8 hours  pantoprazole  Injectable 40 milliGRAM(s) IV Push daily  polyethylene glycol 3350 17 Gram(s) Oral daily  remdesivir  IVPB   IV Intermittent   remdesivir  IVPB 100 milliGRAM(s) IV Intermittent every 24 hours  senna 2 Tablet(s) Oral at bedtime  warfarin 2.5 milliGRAM(s) Oral once      T(C): 36.3 (08-21-23 @ 08:00), Max: 36.4 (08-20-23 @ 16:00)  HR: 70 (08-21-23 @ 08:00) (70 - 80)  BP: 107/56 (08-21-23 @ 08:00) (89/58 - 107/56)  RR: 15 (08-21-23 @ 08:00) (15 - 26)  SpO2: 95% (08-21-23 @ 08:00) (93% - 95%)  Wt(kg): --Vital Signs Last 24 Hrs  T(C): 36.3 (21 Aug 2023 08:00), Max: 36.4 (20 Aug 2023 16:00)  T(F): 97.4 (21 Aug 2023 08:00), Max: 97.5 (20 Aug 2023 16:00)  HR: 70 (21 Aug 2023 08:00) (70 - 80)  BP: 107/56 (21 Aug 2023 08:00) (89/58 - 107/56)  BP(mean): 77 (21 Aug 2023 08:00) (65 - 80)  RR: 15 (21 Aug 2023 08:00) (15 - 26)  SpO2: 95% (21 Aug 2023 08:00) (93% - 95%)    Parameters below as of 21 Aug 2023 08:00  Patient On (Oxygen Delivery Method): room air        PHYSICAL EXAM:  GENERAL: NAD, well-groomed, well-developed  HEAD:  Atraumatic, Normocephalic  EYES: EOMI, PERRLA, conjunctiva and sclera clear  ENMT:  Moist mucous membranes  NECK: Supple, No JVD,  CHEST/LUNG: Clear to auscultation bilaterally; No rales, rhonchi, wheezing, or rubs  HEART: Regular rate and rhythm; No murmurs, rubs, or gallops  ABDOMEN: Soft, Nontender, Nondistended; Bowel sounds present  NEURO: Alert & Oriented X3  EXTREMITIES: No LE edema, no calf tenderness  LYMPH: No lymphadenopathy noted  SKIN: No rashes or lesions    Consultant(s) Notes Reviewed:  [x ] YES  [ ] NO  Care Discussed with Consultants/Other Providers [ x] YES  [ ] NO    LABS:                        10.0   8.48  )-----------( 240      ( 21 Aug 2023 04:45 )             32.7     08-20    136  |  93<L>  |  12  ----------------------------<  104<H>  3.8   |  35<H>  |  0.6<L>    Ca    8.6      20 Aug 2023 04:21  Mg     1.9     08-20    TPro  5.7<L>  /  Alb  3.0<L>  /  TBili  1.1  /  DBili  x   /  AST  30  /  ALT  19  /  AlkPhos  90  08-20    PT/INR - ( 21 Aug 2023 04:45 )   PT: 20.40 sec;   INR: 1.76 ratio         PTT - ( 20 Aug 2023 04:21 )  PTT:32.3 sec  Urinalysis Basic - ( 20 Aug 2023 04:21 )    Color: x / Appearance: x / SG: x / pH: x  Gluc: 104 mg/dL / Ketone: x  / Bili: x / Urobili: x   Blood: x / Protein: x / Nitrite: x   Leuk Esterase: x / RBC: x / WBC x   Sq Epi: x / Non Sq Epi: x / Bacteria: x      CAPILLARY BLOOD GLUCOSE            Urinalysis Basic - ( 20 Aug 2023 04:21 )    Color: x / Appearance: x / SG: x / pH: x  Gluc: 104 mg/dL / Ketone: x  / Bili: x / Urobili: x   Blood: x / Protein: x / Nitrite: x   Leuk Esterase: x / RBC: x / WBC x   Sq Epi: x / Non Sq Epi: x / Bacteria: x        RADIOLOGY & ADDITIONAL TESTS:    Imaging Personally Reviewed:  [x ] YES  [ ] NO MICU DOWN GRADE NOTE      Patient is a 42y old  Female who presents with a chief complaint of Shortness of Breath (20 Aug 2023 00:22)    CC: Dyspnea at rest, Afib RVR,   HPI: Patient is a 42y old  Female, patient of Dr. 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 She underwent aortic valve mass resection and left atrial appendage clipping and aortic valve replacement, post op c/b AF, persistent, despite MINDI/DCCV 8/2/23 patient remains in AF presenting with worsening dyspnea that was previously only when walking long distances is now occurring at rest. Patient states she was told to go to the by Dr. Tyler yesterday but she was experiencing a perisstent cough, however she noticed that when she became more short of breath she new she was in afib w RVR. Patient also reported that last week she called the CCU because she was experiencing  chest discomfort while eating her morning breakfast and she decided not to come to the ED because it resolved by the time her visiting nurse arrived. Admits to cough over the past few days and was given tessalon pearles as needed to help. denies changes in bowel movement, recent chest pain, or difficulty urinating     In the ED, EKG afib w rvr 110-140, given cardizem 20 IVP x1, /67. CXR showing increased bilateral opacities.         INTERVAL HPI/OVERNIGHT EVENTS:    4T Course:   - Pt rate controlled on Cardizem and amiodarone  - Pt treated for suspected CHF exacerbation w/ lasix  - On 8/14 pt intubated for ablation, then converted to sinus rhythm       CCU Course:  - On 8/15 pt was extubated, continued with diuresis as pt was still overloaded  - Pt completed ceftriaxone for UTI  - On 8/18 pt COVID +; s/p RDV 3 day course  - Pt stable, remains sinus with no events telemetry, plan is to downgrade to Stepdown and have EP follow up for possible life vest.    In summary, this is a pt admitted for Afib RVR who is now sinus s/p ablation with a hospital course complicated by UTI and mild COVID s/p RDV    To Follow:  [ ] EP recommendations (Outpt AICD/Lifevest)  [ ] Monitor INR while on Warfarin        Impression:   Acute hypoxic resp failure/pulmonary edema   PersAfib s/p ablation and JASON clipping 6/29/2023  HOCM with DEN   Acute on chronic Diastolic HF (EF is 73%)  Aortic valve fibroelastoma s/p AVR 6/29/2023 on coumadin   COVID positive      PLAN:    CNS: . off sedatives     HEENT: Oral care    PULMONARY: Acute hypoxic resp failure requiring NC 02 -4 L. extubated successfully on 8/15 .   likely 2/2 pulm edema,  COVID positive    CARDIOVASCULAR:  Acute on chronic diastolic heart failure    Net negative 2 L on IV lasix 40 mg x 3 was given on day 1 here.     -on furosemide 40mg PO daily  -off levo, removed A line   -cont metoprolol 50 mg q8h, do NOT hold metoprolol. target HR < 70bpm  -in sinus rhythm,on amiodarone 200 mg daily for maintenance of SR. TSH elevation and fT3 noted. Cannot use sotalol due to QT prolongation, flecainide contraindicated given structural heart disease.   -On Coumadin home dose .         GI: GI prophylaxis. started DASH diet    RENAL:  Follow up lytes.       MUSCULOSKELETAL: activity as tolerated .     DISPOSITION : 4T telemetry      appreciate EP follow up, likely plan for AICD as outpatient and will need lifevest prior to discharge   Continue CCU monitoring.           REVIEW OF SYSTEMS:  CONSTITUTIONAL: No fever, chills  HEENT:  No blurry vision No sinus or throat pain  NECK: No pain or stiffness  RESPIRATORY: reports cough, no SOB  CARDIOVASCULAR: No chest pain, palpitations; b/l LE pitting edema  GASTROINTESTINAL: No abdominal pain. No nausea, vomiting, or diarrhea  GENITOURINARY: No dysuria  NEUROLOGICAL: No HA, No focal weakness  SKIN: No itching, burning, rashes, or lesions   MUSCULOSKELETAL: No joint pain or swelling; No muscle, back, or extremity pain    MEDICATIONS:  acetaminophen     Tablet .. 650 milliGRAM(s) Oral every 6 hours PRN  aMIOdarone    Tablet 200 milliGRAM(s) Oral daily  benzonatate 100 milliGRAM(s) Oral every 8 hours PRN  budesonide  80 MICROgram(s)/formoterol 4.5 MICROgram(s) Inhaler 2 Puff(s) Inhalation two times a day  chlorhexidine 2% Cloths 1 Application(s) Topical <User Schedule>  enoxaparin Injectable 80 milliGRAM(s) SubCutaneous once  FIRST- Mouthwash  BLM 20 milliLiter(s) Swish and Spit three times a day  furosemide    Tablet 40 milliGRAM(s) Oral daily  guaiFENesin  milliGRAM(s) Oral every 12 hours  melatonin 10 milliGRAM(s) Oral at bedtime  metoprolol tartrate 50 milliGRAM(s) Oral every 8 hours  pantoprazole  Injectable 40 milliGRAM(s) IV Push daily  polyethylene glycol 3350 17 Gram(s) Oral daily  remdesivir  IVPB   IV Intermittent   remdesivir  IVPB 100 milliGRAM(s) IV Intermittent every 24 hours  senna 2 Tablet(s) Oral at bedtime  warfarin 2.5 milliGRAM(s) Oral once      T(C): 36.3 (08-21-23 @ 08:00), Max: 36.4 (08-20-23 @ 16:00)  HR: 70 (08-21-23 @ 08:00) (70 - 80)  BP: 107/56 (08-21-23 @ 08:00) (89/58 - 107/56)  RR: 15 (08-21-23 @ 08:00) (15 - 26)  SpO2: 95% (08-21-23 @ 08:00) (93% - 95%)  Wt(kg): --Vital Signs Last 24 Hrs  T(C): 36.3 (21 Aug 2023 08:00), Max: 36.4 (20 Aug 2023 16:00)  T(F): 97.4 (21 Aug 2023 08:00), Max: 97.5 (20 Aug 2023 16:00)  HR: 70 (21 Aug 2023 08:00) (70 - 80)  BP: 107/56 (21 Aug 2023 08:00) (89/58 - 107/56)  BP(mean): 77 (21 Aug 2023 08:00) (65 - 80)  RR: 15 (21 Aug 2023 08:00) (15 - 26)  SpO2: 95% (21 Aug 2023 08:00) (93% - 95%)    Parameters below as of 21 Aug 2023 08:00  Patient On (Oxygen Delivery Method): room air        PHYSICAL EXAM:  GENERAL: NAD, well-developed  HEAD:  Atraumatic, Normocephalic  EYES: EOMI, PERRLA, conjunctiva and sclera clear  ENMT:  Moist mucous membranes  NECK: Supple, No JVD,  CHEST/LUNG: b/l basilar coarse breath sounds  HEART: Regular rate and rhythm; No murmurs, rubs, or gallops, b/l LE pitting edema  ABDOMEN: Soft, Nontender, Nondistended; Bowel sounds present  NEURO: Alert & Oriented X3  EXTREMITIES: No LE edema, no calf tenderness  LYMPH: No lymphadenopathy noted  SKIN: No rashes or lesions      LABS:                        10.0   8.48  )-----------( 240      ( 21 Aug 2023 04:45 )             32.7     08-20    136  |  93<L>  |  12  ----------------------------<  104<H>  3.8   |  35<H>  |  0.6<L>    Ca    8.6      20 Aug 2023 04:21  Mg     1.9     08-20    TPro  5.7<L>  /  Alb  3.0<L>  /  TBili  1.1  /  DBili  x   /  AST  30  /  ALT  19  /  AlkPhos  90  08-20    PT/INR - ( 21 Aug 2023 04:45 )   PT: 20.40 sec;   INR: 1.76 ratio         PTT - ( 20 Aug 2023 04:21 )  PTT:32.3 sec  Urinalysis Basic - ( 20 Aug 2023 04:21 )    Color: x / Appearance: x / SG: x / pH: x  Gluc: 104 mg/dL / Ketone: x  / Bili: x / Urobili: x   Blood: x / Protein: x / Nitrite: x   Leuk Esterase: x / RBC: x / WBC x   Sq Epi: x / Non Sq Epi: x / Bacteria: x      CAPILLARY BLOOD GLUCOSE            Urinalysis Basic - ( 20 Aug 2023 04:21 )    Color: x / Appearance: x / SG: x / pH: x  Gluc: 104 mg/dL / Ketone: x  / Bili: x / Urobili: x   Blood: x / Protein: x / Nitrite: x   Leuk Esterase: x / RBC: x / WBC x   Sq Epi: x / Non Sq Epi: x / Bacteria: x        RADIOLOGY & ADDITIONAL TESTS:    Imaging Personally Reviewed:  [x ] YES  [ ] NO MICU DOWN GRADE NOTE      Patient is a 42y old  Female who presents with a chief complaint of Shortness of Breath (20 Aug 2023 00:22)    CC: Dyspnea at rest, Afib RVR,   HPI: Patient is a 42y old  Female, patient of Dr. 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 She underwent aortic valve mass resection and left atrial appendage clipping and aortic valve replacement, post op c/b AF, persistent, despite MINDI/DCCV 8/2/23 patient remains in AF presenting with worsening dyspnea that was previously only when walking long distances is now occurring at rest. Patient states she was told to go to the by Dr. Tyler yesterday but she was experiencing a perisstent cough, however she noticed that when she became more short of breath she new she was in afib w RVR. Patient also reported that last week she called the CCU because she was experiencing  chest discomfort while eating her morning breakfast and she decided not to come to the ED because it resolved by the time her visiting nurse arrived. Admits to cough over the past few days and was given tessalon pearles as needed to help. denies changes in bowel movement, recent chest pain, or difficulty urinating     In the ED, EKG afib w rvr 110-140, given cardizem 20 IVP x1, /67. CXR showing increased bilateral opacities.         INTERVAL HPI/OVERNIGHT EVENTS:    4T Course:   - Pt rate controlled on Cardizem and amiodarone  - Pt treated for suspected CHF exacerbation w/ lasix  - On 8/14 pt intubated for ablation, then converted to sinus rhythm       CCU Course:  - On 8/15 pt was extubated, continued with diuresis as pt was still overloaded  - Pt completed ceftriaxone for UTI  - On 8/18 pt COVID +; s/p RDV 3 day course  - Pt stable on home dose of lasix, remains sinus with no events telemetry, plan is to downgrade to Stepdown and have EP follow up for possible life vest.    In summary, this is a pt admitted for Afib RVR who is now sinus s/p ablation with a hospital course complicated by UTI and mild COVID s/p RDV    To Follow:  [ ] EP recommendations (Outpt AICD/Lifevest)  [ ] Monitor INR while on Warfarin        Impression:   Acute hypoxic resp failure/pulmonary edema   PersAfib s/p ablation and JASON clipping 6/29/2023  HOCM with DEN   Acute on chronic Diastolic HF (EF is 73%)  Aortic valve fibroelastoma s/p AVR 6/29/2023 on coumadin   COVID positive      PLAN:    CNS: . off sedatives     HEENT: Oral care    PULMONARY: Acute hypoxic resp failure requiring NC 02 -4 L. extubated successfully on 8/15 .   likely 2/2 pulm edema,  COVID positive    CARDIOVASCULAR:  Acute on chronic diastolic heart failure    Net negative 2 L on IV lasix 40 mg x 3 was given on day 1 here.     -on furosemide 40mg PO daily  -off levo, removed A line   -cont metoprolol 50 mg q8h, do NOT hold metoprolol. target HR < 70bpm  -in sinus rhythm,on amiodarone 200 mg daily for maintenance of SR. TSH elevation and fT3 noted. Cannot use sotalol due to QT prolongation, flecainide contraindicated given structural heart disease.   -On Coumadin home dose .         GI: GI prophylaxis. started DASH diet    RENAL:  Follow up lytes.       MUSCULOSKELETAL: activity as tolerated .     DISPOSITION : 4T telemetry      appreciate EP follow up, likely plan for AICD as outpatient and will need lifevest prior to discharge   Continue CCU monitoring.           REVIEW OF SYSTEMS:  CONSTITUTIONAL: No fever, chills  HEENT:  No blurry vision No sinus or throat pain  NECK: No pain or stiffness  RESPIRATORY: reports cough, no SOB  CARDIOVASCULAR: No chest pain, palpitations; b/l LE pitting edema  GASTROINTESTINAL: No abdominal pain. No nausea, vomiting, or diarrhea  GENITOURINARY: No dysuria  NEUROLOGICAL: No HA, No focal weakness  SKIN: No itching, burning, rashes, or lesions   MUSCULOSKELETAL: No joint pain or swelling; No muscle, back, or extremity pain    MEDICATIONS:  acetaminophen     Tablet .. 650 milliGRAM(s) Oral every 6 hours PRN  aMIOdarone    Tablet 200 milliGRAM(s) Oral daily  benzonatate 100 milliGRAM(s) Oral every 8 hours PRN  budesonide  80 MICROgram(s)/formoterol 4.5 MICROgram(s) Inhaler 2 Puff(s) Inhalation two times a day  chlorhexidine 2% Cloths 1 Application(s) Topical <User Schedule>  enoxaparin Injectable 80 milliGRAM(s) SubCutaneous once  FIRST- Mouthwash  BLM 20 milliLiter(s) Swish and Spit three times a day  furosemide    Tablet 40 milliGRAM(s) Oral daily  guaiFENesin  milliGRAM(s) Oral every 12 hours  melatonin 10 milliGRAM(s) Oral at bedtime  metoprolol tartrate 50 milliGRAM(s) Oral every 8 hours  pantoprazole  Injectable 40 milliGRAM(s) IV Push daily  polyethylene glycol 3350 17 Gram(s) Oral daily  remdesivir  IVPB   IV Intermittent   remdesivir  IVPB 100 milliGRAM(s) IV Intermittent every 24 hours  senna 2 Tablet(s) Oral at bedtime  warfarin 2.5 milliGRAM(s) Oral once      T(C): 36.3 (08-21-23 @ 08:00), Max: 36.4 (08-20-23 @ 16:00)  HR: 70 (08-21-23 @ 08:00) (70 - 80)  BP: 107/56 (08-21-23 @ 08:00) (89/58 - 107/56)  RR: 15 (08-21-23 @ 08:00) (15 - 26)  SpO2: 95% (08-21-23 @ 08:00) (93% - 95%)  Wt(kg): --Vital Signs Last 24 Hrs  T(C): 36.3 (21 Aug 2023 08:00), Max: 36.4 (20 Aug 2023 16:00)  T(F): 97.4 (21 Aug 2023 08:00), Max: 97.5 (20 Aug 2023 16:00)  HR: 70 (21 Aug 2023 08:00) (70 - 80)  BP: 107/56 (21 Aug 2023 08:00) (89/58 - 107/56)  BP(mean): 77 (21 Aug 2023 08:00) (65 - 80)  RR: 15 (21 Aug 2023 08:00) (15 - 26)  SpO2: 95% (21 Aug 2023 08:00) (93% - 95%)    Parameters below as of 21 Aug 2023 08:00  Patient On (Oxygen Delivery Method): room air        PHYSICAL EXAM:  GENERAL: NAD, well-developed  HEAD:  Atraumatic, Normocephalic  EYES: EOMI, PERRLA, conjunctiva and sclera clear  ENMT:  Moist mucous membranes  NECK: Supple, No JVD,  CHEST/LUNG: b/l basilar coarse breath sounds  HEART: Regular rate and rhythm; No murmurs, rubs, or gallops, b/l LE pitting edema  ABDOMEN: Soft, Nontender, Nondistended; Bowel sounds present  NEURO: Alert & Oriented X3  EXTREMITIES: No LE edema, no calf tenderness  LYMPH: No lymphadenopathy noted  SKIN: No rashes or lesions      LABS:                        10.0   8.48  )-----------( 240      ( 21 Aug 2023 04:45 )             32.7     08-20    136  |  93<L>  |  12  ----------------------------<  104<H>  3.8   |  35<H>  |  0.6<L>    Ca    8.6      20 Aug 2023 04:21  Mg     1.9     08-20    TPro  5.7<L>  /  Alb  3.0<L>  /  TBili  1.1  /  DBili  x   /  AST  30  /  ALT  19  /  AlkPhos  90  08-20    PT/INR - ( 21 Aug 2023 04:45 )   PT: 20.40 sec;   INR: 1.76 ratio         PTT - ( 20 Aug 2023 04:21 )  PTT:32.3 sec  Urinalysis Basic - ( 20 Aug 2023 04:21 )    Color: x / Appearance: x / SG: x / pH: x  Gluc: 104 mg/dL / Ketone: x  / Bili: x / Urobili: x   Blood: x / Protein: x / Nitrite: x   Leuk Esterase: x / RBC: x / WBC x   Sq Epi: x / Non Sq Epi: x / Bacteria: x      CAPILLARY BLOOD GLUCOSE            Urinalysis Basic - ( 20 Aug 2023 04:21 )    Color: x / Appearance: x / SG: x / pH: x  Gluc: 104 mg/dL / Ketone: x  / Bili: x / Urobili: x   Blood: x / Protein: x / Nitrite: x   Leuk Esterase: x / RBC: x / WBC x   Sq Epi: x / Non Sq Epi: x / Bacteria: x        RADIOLOGY & ADDITIONAL TESTS:    Imaging Personally Reviewed:  [x ] YES  [ ] NO MICU DOWN GRADE NOTE      Patient is a 42y old  Female who presents with a chief complaint of Shortness of Breath (20 Aug 2023 00:22)    CC: Dyspnea at rest, Afib RVR,   HPI: Patient is a 42y old  Female, patient of Dr. 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 She underwent aortic valve mass resection and left atrial appendage clipping and aortic valve replacement, post op c/b AF, persistent, despite MINDI/DCCV 8/2/23 patient remains in AF presenting with worsening dyspnea that was previously only when walking long distances is now occurring at rest. Patient states she was told to go to the by Dr. Tyler yesterday but she was experiencing a perisstent cough, however she noticed that when she became more short of breath she new she was in afib w RVR. Patient also reported that last week she called the CCU because she was experiencing  chest discomfort while eating her morning breakfast and she decided not to come to the ED because it resolved by the time her visiting nurse arrived. Admits to cough over the past few days and was given tessalon pearles as needed to help. denies changes in bowel movement, recent chest pain, or difficulty urinating     In the ED, EKG afib w rvr 110-140, given cardizem 20 IVP x1, /67. CXR showing increased bilateral opacities.         INTERVAL HPI/OVERNIGHT EVENTS:    4T Course:   - Pt rate controlled on Cardizem and amiodarone  - Pt treated for suspected CHF exacerbation w/ lasix  - On 8/14 pt intubated for ablation, then converted to sinus rhythm       CCU Course:  - On 8/15 pt was extubated, continued with diuresis as pt was still overloaded  - Pt completed ceftriaxone for UTI  - On 8/18 pt COVID +; s/p RDV 3 day course  - INR noted to be subtherapeutic (1.7) despite increasing Warfarin dose up to 2. Spoke with Pharmacy, recommends 5mg dose tonight, then lower doses afterwards  - Pt stable on home dose of lasix, remains sinus with no events telemetry, plan is to downgrade to Stepdown and have EP follow up for possible life vest.    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 off sedatives     HEENT: Oral care    PULMONARY: Acute hypoxic resp failure requiring NC 02 -4 L. extubated successfully on 8/15 .   likely 2/2 pulm edema,  COVID positive    CARDIOVASCULAR:  Acute on chronic diastolic heart failure    Net negative 2 L on IV lasix 40 mg x 3 was given on day 1 here.     -on furosemide 40mg PO daily  -off levo, removed A line   -cont metoprolol 50 mg q8h, do NOT hold metoprolol. target HR < 70bpm  -in sinus rhythm,on amiodarone 200 mg daily for maintenance of SR. TSH elevation and fT3 noted. Cannot use sotalol due to QT prolongation, flecainide contraindicated given structural heart disease.   -On Coumadin home dose .         GI: GI prophylaxis. started DASH diet    RENAL:  Follow up lytes.       MUSCULOSKELETAL: activity as tolerated .     DISPOSITION : 4T telemetry      appreciate EP follow up, likely plan for AICD as outpatient and will need lifevest prior to discharge   Continue CCU monitoring.           REVIEW OF SYSTEMS:  CONSTITUTIONAL: No fever, chills  HEENT:  No blurry vision No sinus or throat pain  NECK: No pain or stiffness  RESPIRATORY: reports cough, no SOB  CARDIOVASCULAR: No chest pain, palpitations; b/l LE pitting edema  GASTROINTESTINAL: No abdominal pain. No nausea, vomiting, or diarrhea  GENITOURINARY: No dysuria  NEUROLOGICAL: No HA, No focal weakness  SKIN: No itching, burning, rashes, or lesions   MUSCULOSKELETAL: No joint pain or swelling; No muscle, back, or extremity pain    MEDICATIONS:  acetaminophen     Tablet .. 650 milliGRAM(s) Oral every 6 hours PRN  aMIOdarone    Tablet 200 milliGRAM(s) Oral daily  benzonatate 100 milliGRAM(s) Oral every 8 hours PRN  budesonide  80 MICROgram(s)/formoterol 4.5 MICROgram(s) Inhaler 2 Puff(s) Inhalation two times a day  chlorhexidine 2% Cloths 1 Application(s) Topical <User Schedule>  enoxaparin Injectable 80 milliGRAM(s) SubCutaneous once  FIRST- Mouthwash  BLM 20 milliLiter(s) Swish and Spit three times a day  furosemide    Tablet 40 milliGRAM(s) Oral daily  guaiFENesin  milliGRAM(s) Oral every 12 hours  melatonin 10 milliGRAM(s) Oral at bedtime  metoprolol tartrate 50 milliGRAM(s) Oral every 8 hours  pantoprazole  Injectable 40 milliGRAM(s) IV Push daily  polyethylene glycol 3350 17 Gram(s) Oral daily  remdesivir  IVPB   IV Intermittent   remdesivir  IVPB 100 milliGRAM(s) IV Intermittent every 24 hours  senna 2 Tablet(s) Oral at bedtime  warfarin 2.5 milliGRAM(s) Oral once      T(C): 36.3 (08-21-23 @ 08:00), Max: 36.4 (08-20-23 @ 16:00)  HR: 70 (08-21-23 @ 08:00) (70 - 80)  BP: 107/56 (08-21-23 @ 08:00) (89/58 - 107/56)  RR: 15 (08-21-23 @ 08:00) (15 - 26)  SpO2: 95% (08-21-23 @ 08:00) (93% - 95%)  Wt(kg): --Vital Signs Last 24 Hrs  T(C): 36.3 (21 Aug 2023 08:00), Max: 36.4 (20 Aug 2023 16:00)  T(F): 97.4 (21 Aug 2023 08:00), Max: 97.5 (20 Aug 2023 16:00)  HR: 70 (21 Aug 2023 08:00) (70 - 80)  BP: 107/56 (21 Aug 2023 08:00) (89/58 - 107/56)  BP(mean): 77 (21 Aug 2023 08:00) (65 - 80)  RR: 15 (21 Aug 2023 08:00) (15 - 26)  SpO2: 95% (21 Aug 2023 08:00) (93% - 95%)    Parameters below as of 21 Aug 2023 08:00  Patient On (Oxygen Delivery Method): room air        PHYSICAL EXAM:  GENERAL: NAD, well-developed  HEAD:  Atraumatic, Normocephalic  EYES: EOMI, PERRLA, conjunctiva and sclera clear  ENMT:  Moist mucous membranes  NECK: Supple, No JVD,  CHEST/LUNG: b/l basilar coarse breath sounds  HEART: Regular rate and rhythm; No murmurs, rubs, or gallops, b/l LE pitting edema  ABDOMEN: Soft, Nontender, Nondistended; Bowel sounds present  NEURO: Alert & Oriented X3  EXTREMITIES: No LE edema, no calf tenderness  LYMPH: No lymphadenopathy noted  SKIN: No rashes or lesions      LABS:                        10.0   8.48  )-----------( 240      ( 21 Aug 2023 04:45 )             32.7     08-20    136  |  93<L>  |  12  ----------------------------<  104<H>  3.8   |  35<H>  |  0.6<L>    Ca    8.6      20 Aug 2023 04:21  Mg     1.9     08-20    TPro  5.7<L>  /  Alb  3.0<L>  /  TBili  1.1  /  DBili  x   /  AST  30  /  ALT  19  /  AlkPhos  90  08-20    PT/INR - ( 21 Aug 2023 04:45 )   PT: 20.40 sec;   INR: 1.76 ratio         PTT - ( 20 Aug 2023 04:21 )  PTT:32.3 sec  Urinalysis Basic - ( 20 Aug 2023 04:21 )    Color: x / Appearance: x / SG: x / pH: x  Gluc: 104 mg/dL / Ketone: x  / Bili: x / Urobili: x   Blood: x / Protein: x / Nitrite: x   Leuk Esterase: x / RBC: x / WBC x   Sq Epi: x / Non Sq Epi: x / Bacteria: x      CAPILLARY BLOOD GLUCOSE            Urinalysis Basic - ( 20 Aug 2023 04:21 )    Color: x / Appearance: x / SG: x / pH: x  Gluc: 104 mg/dL / Ketone: x  / Bili: x / Urobili: x   Blood: x / Protein: x / Nitrite: x   Leuk Esterase: x / RBC: x / WBC x   Sq Epi: x / Non Sq Epi: x / Bacteria: x        RADIOLOGY & ADDITIONAL TESTS:    Imaging Personally Reviewed:  [x ] YES  [ ] NO MICU DOWN GRADE NOTE      Patient is a 42y old  Female who presents with a chief complaint of Shortness of Breath (20 Aug 2023 00:22)    CC: Dyspnea at rest, Afib RVR,   HPI: Patient is a 42y old  Female, patient of Dr. 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 She underwent aortic valve mass resection and left atrial appendage clipping and aortic valve replacement, post op c/b AF, persistent, despite MINDI/DCCV 8/2/23 patient remains in AF presenting with worsening dyspnea that was previously only when walking long distances is now occurring at rest. Patient states she was told to go to the by Dr. Tyler yesterday but she was experiencing a perisstent cough, however she noticed that when she became more short of breath she new she was in afib w RVR. Patient also reported that last week she called the CCU because she was experiencing  chest discomfort while eating her morning breakfast and she decided not to come to the ED because it resolved by the time her visiting nurse arrived. Admits to cough over the past few days and was given tessalon pearles as needed to help. denies changes in bowel movement, recent chest pain, or difficulty urinating     In the ED, EKG afib w rvr 110-140, given cardizem 20 IVP x1, /67. CXR showing increased bilateral opacities.         INTERVAL HPI/OVERNIGHT EVENTS:    4T Course:   - Pt rate controlled on Cardizem and amiodarone  - Pt treated for suspected CHF exacerbation w/ lasix  - On 8/14 pt intubated for ablation, then converted to sinus rhythm       CCU Course:  - On 8/15 pt was extubated, continued with diuresis as pt was still overloaded  - Pt completed ceftriaxone for UTI  - On 8/18 pt COVID +; s/p RDV 3 day course  - INR noted to be subtherapeutic (1.7) despite increasing Warfarin dose up to 2. Spoke with Pharmacy, recommends 5mg dose tonight, then lower doses afterwards  - Pt stable on home dose of lasix, remains sinus with no events telemetry, plan is to downgrade to Stepdown and have EP follow up for possible life vest.    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 off sedatives     HEENT: Oral care    PULMONARY: Acute hypoxic resp failure requiring NC 02 -4 L. extubated successfully on 8/15 .   likely 2/2 pulm edema,  COVID positive    CARDIOVASCULAR:  Acute on chronic diastolic heart failure    Net negative 2 L on IV lasix 40 mg x 3 was given on day 1 here.     -on furosemide 40mg PO daily  -off levo, removed A line   -cont metoprolol 50 mg q8h, do NOT hold metoprolol. target HR < 70bpm  -in sinus rhythm,on amiodarone 200 mg daily for maintenance of SR. TSH elevation and fT3 noted. Cannot use sotalol due to QT prolongation, flecainide contraindicated given structural heart disease.         GI: GI prophylaxis. started DASH diet    RENAL:  Follow up lytes.       MUSCULOSKELETAL: activity as tolerated .     DISPOSITION : 4T telemetry      appreciate EP follow up, likely plan for AICD as outpatient and will need lifevest prior to discharge   Continue CCU monitoring.           REVIEW OF SYSTEMS:  CONSTITUTIONAL: No fever, chills  HEENT:  No blurry vision No sinus or throat pain  NECK: No pain or stiffness  RESPIRATORY: reports cough, no SOB  CARDIOVASCULAR: No chest pain, palpitations; b/l LE pitting edema  GASTROINTESTINAL: No abdominal pain. No nausea, vomiting, or diarrhea  GENITOURINARY: No dysuria  NEUROLOGICAL: No HA, No focal weakness  SKIN: No itching, burning, rashes, or lesions   MUSCULOSKELETAL: No joint pain or swelling; No muscle, back, or extremity pain    MEDICATIONS:  acetaminophen     Tablet .. 650 milliGRAM(s) Oral every 6 hours PRN  aMIOdarone    Tablet 200 milliGRAM(s) Oral daily  benzonatate 100 milliGRAM(s) Oral every 8 hours PRN  budesonide  80 MICROgram(s)/formoterol 4.5 MICROgram(s) Inhaler 2 Puff(s) Inhalation two times a day  chlorhexidine 2% Cloths 1 Application(s) Topical <User Schedule>  enoxaparin Injectable 80 milliGRAM(s) SubCutaneous once  FIRST- Mouthwash  BLM 20 milliLiter(s) Swish and Spit three times a day  furosemide    Tablet 40 milliGRAM(s) Oral daily  guaiFENesin  milliGRAM(s) Oral every 12 hours  melatonin 10 milliGRAM(s) Oral at bedtime  metoprolol tartrate 50 milliGRAM(s) Oral every 8 hours  pantoprazole  Injectable 40 milliGRAM(s) IV Push daily  polyethylene glycol 3350 17 Gram(s) Oral daily  remdesivir  IVPB   IV Intermittent   remdesivir  IVPB 100 milliGRAM(s) IV Intermittent every 24 hours  senna 2 Tablet(s) Oral at bedtime  warfarin 2.5 milliGRAM(s) Oral once      T(C): 36.3 (08-21-23 @ 08:00), Max: 36.4 (08-20-23 @ 16:00)  HR: 70 (08-21-23 @ 08:00) (70 - 80)  BP: 107/56 (08-21-23 @ 08:00) (89/58 - 107/56)  RR: 15 (08-21-23 @ 08:00) (15 - 26)  SpO2: 95% (08-21-23 @ 08:00) (93% - 95%)  Wt(kg): --Vital Signs Last 24 Hrs  T(C): 36.3 (21 Aug 2023 08:00), Max: 36.4 (20 Aug 2023 16:00)  T(F): 97.4 (21 Aug 2023 08:00), Max: 97.5 (20 Aug 2023 16:00)  HR: 70 (21 Aug 2023 08:00) (70 - 80)  BP: 107/56 (21 Aug 2023 08:00) (89/58 - 107/56)  BP(mean): 77 (21 Aug 2023 08:00) (65 - 80)  RR: 15 (21 Aug 2023 08:00) (15 - 26)  SpO2: 95% (21 Aug 2023 08:00) (93% - 95%)    Parameters below as of 21 Aug 2023 08:00  Patient On (Oxygen Delivery Method): room air        PHYSICAL EXAM:  GENERAL: NAD, well-developed  HEAD:  Atraumatic, Normocephalic  EYES: EOMI, PERRLA, conjunctiva and sclera clear  ENMT:  Moist mucous membranes  NECK: Supple, No JVD,  CHEST/LUNG: b/l basilar coarse breath sounds  HEART: Regular rate and rhythm; No murmurs, rubs, or gallops, b/l LE pitting edema  ABDOMEN: Soft, Nontender, Nondistended; Bowel sounds present  NEURO: Alert & Oriented X3  EXTREMITIES: No LE edema, no calf tenderness  LYMPH: No lymphadenopathy noted  SKIN: No rashes or lesions      LABS:                        10.0   8.48  )-----------( 240      ( 21 Aug 2023 04:45 )             32.7     08-20    136  |  93<L>  |  12  ----------------------------<  104<H>  3.8   |  35<H>  |  0.6<L>    Ca    8.6      20 Aug 2023 04:21  Mg     1.9     08-20    TPro  5.7<L>  /  Alb  3.0<L>  /  TBili  1.1  /  DBili  x   /  AST  30  /  ALT  19  /  AlkPhos  90  08-20    PT/INR - ( 21 Aug 2023 04:45 )   PT: 20.40 sec;   INR: 1.76 ratio         PTT - ( 20 Aug 2023 04:21 )  PTT:32.3 sec  Urinalysis Basic - ( 20 Aug 2023 04:21 )    Color: x / Appearance: x / SG: x / pH: x  Gluc: 104 mg/dL / Ketone: x  / Bili: x / Urobili: x   Blood: x / Protein: x / Nitrite: x   Leuk Esterase: x / RBC: x / WBC x   Sq Epi: x / Non Sq Epi: x / Bacteria: x      CAPILLARY BLOOD GLUCOSE            Urinalysis Basic - ( 20 Aug 2023 04:21 )    Color: x / Appearance: x / SG: x / pH: x  Gluc: 104 mg/dL / Ketone: x  / Bili: x / Urobili: x   Blood: x / Protein: x / Nitrite: x   Leuk Esterase: x / RBC: x / WBC x   Sq Epi: x / Non Sq Epi: x / Bacteria: x        RADIOLOGY & ADDITIONAL TESTS:    Imaging Personally Reviewed:  [x ] YES  [ ] NO MICU DOWN GRADE NOTE      Patient is a 42y old  Female who presents with a chief complaint of Shortness of Breath (20 Aug 2023 00:22)    CC: Dyspnea at rest, Afib RVR,   HPI: Patient is a 42y old  Female, patient of Dr. 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 She underwent aortic valve mass resection and left atrial appendage clipping and aortic valve replacement, post op c/b AF, persistent, despite MINDI/DCCV 8/2/23 patient remains in AF presenting with worsening dyspnea that was previously only when walking long distances is now occurring at rest. Patient states she was told to go to the by Dr. Tyler yesterday but she was experiencing a perisstent cough, however she noticed that when she became more short of breath she new she was in afib w RVR. Patient also reported that last week she called the CCU because she was experiencing  chest discomfort while eating her morning breakfast and she decided not to come to the ED because it resolved by the time her visiting nurse arrived. Admits to cough over the past few days and was given tessalon pearles as needed to help. denies changes in bowel movement, recent chest pain, or difficulty urinating     In the ED, EKG afib w rvr 110-140, given cardizem 20 IVP x1, /67. CXR showing increased bilateral opacities.         INTERVAL HPI/OVERNIGHT EVENTS:    4T Course:   - Pt rate controlled on Cardizem and amiodarone  - Pt treated for suspected CHF exacerbation w/ lasix  - On 8/14 pt intubated for ablation, then converted to sinus rhythm       CCU Course:  - On 8/15 pt was extubated, continued with diuresis as pt was still overloaded  - Pt completed ceftriaxone for UTI  - On 8/18 pt COVID +; s/p RDV 3 day course  - INR noted to be subtherapeutic (1.7) gave 80 Lovenox, plan is to administer 2.5mg Warfarin tonight and continue to monitor for future adjustments  - Pt stable on home dose of lasix, remains sinus with no events telemetry, plan is to downgrade to Stepdown and have EP follow up for possible life vest.    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 off sedatives     HEENT: Oral care    PULMONARY: Acute hypoxic resp failure requiring NC 02 -4 L. extubated successfully on 8/15 .   likely 2/2 pulm edema,  COVID positive    CARDIOVASCULAR:  Acute on chronic diastolic heart failure    Net negative 2 L on IV lasix 40 mg x 3 was given on day 1 here.     -on furosemide 40mg PO daily  -off levo, removed A line   -cont metoprolol 50 mg q8h, do NOT hold metoprolol. target HR < 70bpm  -in sinus rhythm,on amiodarone 200 mg daily for maintenance of SR. TSH elevation and fT3 noted. Cannot use sotalol due to QT prolongation, flecainide contraindicated given structural heart disease.         GI: GI prophylaxis. started DASH diet    RENAL:  Follow up lytes.       MUSCULOSKELETAL: activity as tolerated .     DISPOSITION : 4T telemetry      appreciate EP follow up, likely plan for AICD as outpatient and will need lifevest prior to discharge   Continue CCU monitoring.           REVIEW OF SYSTEMS:  CONSTITUTIONAL: No fever, chills  HEENT:  No blurry vision No sinus or throat pain  NECK: No pain or stiffness  RESPIRATORY: reports cough, no SOB  CARDIOVASCULAR: No chest pain, palpitations; b/l LE pitting edema  GASTROINTESTINAL: No abdominal pain. No nausea, vomiting, or diarrhea  GENITOURINARY: No dysuria  NEUROLOGICAL: No HA, No focal weakness  SKIN: No itching, burning, rashes, or lesions   MUSCULOSKELETAL: No joint pain or swelling; No muscle, back, or extremity pain    MEDICATIONS:  acetaminophen     Tablet .. 650 milliGRAM(s) Oral every 6 hours PRN  aMIOdarone    Tablet 200 milliGRAM(s) Oral daily  benzonatate 100 milliGRAM(s) Oral every 8 hours PRN  budesonide  80 MICROgram(s)/formoterol 4.5 MICROgram(s) Inhaler 2 Puff(s) Inhalation two times a day  chlorhexidine 2% Cloths 1 Application(s) Topical <User Schedule>  enoxaparin Injectable 80 milliGRAM(s) SubCutaneous once  FIRST- Mouthwash  BLM 20 milliLiter(s) Swish and Spit three times a day  furosemide    Tablet 40 milliGRAM(s) Oral daily  guaiFENesin  milliGRAM(s) Oral every 12 hours  melatonin 10 milliGRAM(s) Oral at bedtime  metoprolol tartrate 50 milliGRAM(s) Oral every 8 hours  pantoprazole  Injectable 40 milliGRAM(s) IV Push daily  polyethylene glycol 3350 17 Gram(s) Oral daily  remdesivir  IVPB   IV Intermittent   remdesivir  IVPB 100 milliGRAM(s) IV Intermittent every 24 hours  senna 2 Tablet(s) Oral at bedtime  warfarin 2.5 milliGRAM(s) Oral once      T(C): 36.3 (08-21-23 @ 08:00), Max: 36.4 (08-20-23 @ 16:00)  HR: 70 (08-21-23 @ 08:00) (70 - 80)  BP: 107/56 (08-21-23 @ 08:00) (89/58 - 107/56)  RR: 15 (08-21-23 @ 08:00) (15 - 26)  SpO2: 95% (08-21-23 @ 08:00) (93% - 95%)  Wt(kg): --Vital Signs Last 24 Hrs  T(C): 36.3 (21 Aug 2023 08:00), Max: 36.4 (20 Aug 2023 16:00)  T(F): 97.4 (21 Aug 2023 08:00), Max: 97.5 (20 Aug 2023 16:00)  HR: 70 (21 Aug 2023 08:00) (70 - 80)  BP: 107/56 (21 Aug 2023 08:00) (89/58 - 107/56)  BP(mean): 77 (21 Aug 2023 08:00) (65 - 80)  RR: 15 (21 Aug 2023 08:00) (15 - 26)  SpO2: 95% (21 Aug 2023 08:00) (93% - 95%)    Parameters below as of 21 Aug 2023 08:00  Patient On (Oxygen Delivery Method): room air        PHYSICAL EXAM:  GENERAL: NAD, well-developed  HEAD:  Atraumatic, Normocephalic  EYES: EOMI, PERRLA, conjunctiva and sclera clear  ENMT:  Moist mucous membranes  NECK: Supple, No JVD,  CHEST/LUNG: b/l basilar coarse breath sounds  HEART: Regular rate and rhythm; No murmurs, rubs, or gallops, b/l LE pitting edema  ABDOMEN: Soft, Nontender, Nondistended; Bowel sounds present  NEURO: Alert & Oriented X3  EXTREMITIES: No LE edema, no calf tenderness  LYMPH: No lymphadenopathy noted  SKIN: No rashes or lesions      LABS:                        10.0   8.48  )-----------( 240      ( 21 Aug 2023 04:45 )             32.7     08-20    136  |  93<L>  |  12  ----------------------------<  104<H>  3.8   |  35<H>  |  0.6<L>    Ca    8.6      20 Aug 2023 04:21  Mg     1.9     08-20    TPro  5.7<L>  /  Alb  3.0<L>  /  TBili  1.1  /  DBili  x   /  AST  30  /  ALT  19  /  AlkPhos  90  08-20    PT/INR - ( 21 Aug 2023 04:45 )   PT: 20.40 sec;   INR: 1.76 ratio         PTT - ( 20 Aug 2023 04:21 )  PTT:32.3 sec  Urinalysis Basic - ( 20 Aug 2023 04:21 )    Color: x / Appearance: x / SG: x / pH: x  Gluc: 104 mg/dL / Ketone: x  / Bili: x / Urobili: x   Blood: x / Protein: x / Nitrite: x   Leuk Esterase: x / RBC: x / WBC x   Sq Epi: x / Non Sq Epi: x / Bacteria: x      CAPILLARY BLOOD GLUCOSE            Urinalysis Basic - ( 20 Aug 2023 04:21 )    Color: x / Appearance: x / SG: x / pH: x  Gluc: 104 mg/dL / Ketone: x  / Bili: x / Urobili: x   Blood: x / Protein: x / Nitrite: x   Leuk Esterase: x / RBC: x / WBC x   Sq Epi: x / Non Sq Epi: x / Bacteria: x        RADIOLOGY & ADDITIONAL TESTS:    Imaging Personally Reviewed:  [x ] YES  [ ] NO MICU DOWN GRADE NOTE        Patient is a 42y old  Female who presents with a chief complaint of Shortness of Breath (20 Aug 2023 00:22)    CC: Dyspnea at rest, Afib RVR,   HPI: Patient is a 42y old  Female, patient of Dr. 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 She underwent aortic valve mass resection and left atrial appendage clipping and aortic valve replacement, post op c/b AF, persistent, despite MINDI/DCCV 8/2/23 patient remains in AF presenting with worsening dyspnea that was previously only when walking long distances is now occurring at rest. Patient states she was told to go to the by Dr. Tyler yesterday but she was experiencing a perisstent cough, however she noticed that when she became more short of breath she new she was in afib w RVR. Patient also reported that last week she called the CCU because she was experiencing  chest discomfort while eating her morning breakfast and she decided not to come to the ED because it resolved by the time her visiting nurse arrived. Admits to cough over the past few days and was given tessalon pearles as needed to help. denies changes in bowel movement, recent chest pain, or difficulty urinating     In the ED, EKG afib w rvr 110-140, given cardizem 20 IVP x1, /67. CXR showing increased bilateral opacities.         INTERVAL HPI/OVERNIGHT EVENTS:    4T Course:   - Pt rate controlled on Cardizem and amiodarone  - Pt treated for suspected CHF exacerbation w/ lasix  - On 8/14 pt intubated for ablation, then converted to sinus rhythm       CCU Course:  - On 8/15 pt was extubated, continued with diuresis as pt was still overloaded  - Pt completed ceftriaxone for UTI  - On 8/18 pt COVID +; s/p RDV 3 day course  - INR noted to be subtherapeutic (1.7) gave 80 Lovenox, plan is to administer 2.5mg Warfarin tonight and continue to monitor for future adjustments  - Pt stable on home dose of lasix, remains sinus with no events telemetry, plan is to downgrade to Stepdown and have EP follow up for possible life vest.    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 off sedatives     HEENT: Oral care    PULMONARY: Acute hypoxic resp failure requiring NC 02 -4 L. extubated successfully on 8/15 .   likely 2/2 pulm edema,  COVID positive    CARDIOVASCULAR:  Acute on chronic diastolic heart failure    Net negative 2 L on IV lasix 40 mg x 3 was given on day 1 here.     -on furosemide 40mg PO daily  -off levo, removed A line   -cont metoprolol 50 mg q8h, do NOT hold metoprolol. target HR < 70bpm  -in sinus rhythm,on amiodarone 200 mg daily for maintenance of SR. TSH elevation and fT3 noted. Cannot use sotalol due to QT prolongation, flecainide contraindicated given structural heart disease.         GI: GI prophylaxis. started DASH diet    RENAL:  Follow up lytes.       MUSCULOSKELETAL: activity as tolerated .     DISPOSITION : 4T telemetry      appreciate EP follow up, likely plan for AICD as outpatient and will need lifevest prior to discharge   Continue CCU monitoring.           REVIEW OF SYSTEMS:  CONSTITUTIONAL: No fever, chills  HEENT:  No blurry vision No sinus or throat pain  NECK: No pain or stiffness  RESPIRATORY: reports cough, no SOB  CARDIOVASCULAR: No chest pain, palpitations; b/l LE pitting edema  GASTROINTESTINAL: No abdominal pain. No nausea, vomiting, or diarrhea  GENITOURINARY: No dysuria  NEUROLOGICAL: No HA, No focal weakness  SKIN: No itching, burning, rashes, or lesions   MUSCULOSKELETAL: No joint pain or swelling; No muscle, back, or extremity pain    MEDICATIONS:  acetaminophen     Tablet .. 650 milliGRAM(s) Oral every 6 hours PRN  aMIOdarone    Tablet 200 milliGRAM(s) Oral daily  benzonatate 100 milliGRAM(s) Oral every 8 hours PRN  budesonide  80 MICROgram(s)/formoterol 4.5 MICROgram(s) Inhaler 2 Puff(s) Inhalation two times a day  chlorhexidine 2% Cloths 1 Application(s) Topical <User Schedule>  enoxaparin Injectable 80 milliGRAM(s) SubCutaneous once  FIRST- Mouthwash  BLM 20 milliLiter(s) Swish and Spit three times a day  furosemide    Tablet 40 milliGRAM(s) Oral daily  guaiFENesin  milliGRAM(s) Oral every 12 hours  melatonin 10 milliGRAM(s) Oral at bedtime  metoprolol tartrate 50 milliGRAM(s) Oral every 8 hours  pantoprazole  Injectable 40 milliGRAM(s) IV Push daily  polyethylene glycol 3350 17 Gram(s) Oral daily  remdesivir  IVPB   IV Intermittent   remdesivir  IVPB 100 milliGRAM(s) IV Intermittent every 24 hours  senna 2 Tablet(s) Oral at bedtime  warfarin 2.5 milliGRAM(s) Oral once      T(C): 36.3 (08-21-23 @ 08:00), Max: 36.4 (08-20-23 @ 16:00)  HR: 70 (08-21-23 @ 08:00) (70 - 80)  BP: 107/56 (08-21-23 @ 08:00) (89/58 - 107/56)  RR: 15 (08-21-23 @ 08:00) (15 - 26)  SpO2: 95% (08-21-23 @ 08:00) (93% - 95%)  Wt(kg): --Vital Signs Last 24 Hrs  T(C): 36.3 (21 Aug 2023 08:00), Max: 36.4 (20 Aug 2023 16:00)  T(F): 97.4 (21 Aug 2023 08:00), Max: 97.5 (20 Aug 2023 16:00)  HR: 70 (21 Aug 2023 08:00) (70 - 80)  BP: 107/56 (21 Aug 2023 08:00) (89/58 - 107/56)  BP(mean): 77 (21 Aug 2023 08:00) (65 - 80)  RR: 15 (21 Aug 2023 08:00) (15 - 26)  SpO2: 95% (21 Aug 2023 08:00) (93% - 95%)    Parameters below as of 21 Aug 2023 08:00  Patient On (Oxygen Delivery Method): room air        PHYSICAL EXAM:  GENERAL: NAD, well-developed  HEAD:  Atraumatic, Normocephalic  EYES: EOMI, PERRLA, conjunctiva and sclera clear  ENMT:  Moist mucous membranes  NECK: Supple, No JVD,  CHEST/LUNG: b/l basilar coarse breath sounds  HEART: Regular rate and rhythm; No murmurs, rubs, or gallops, b/l LE pitting edema  ABDOMEN: Soft, Nontender, Nondistended; Bowel sounds present  NEURO: Alert & Oriented X3  EXTREMITIES: No LE edema, no calf tenderness  LYMPH: No lymphadenopathy noted  SKIN: No rashes or lesions      LABS:                        10.0   8.48  )-----------( 240      ( 21 Aug 2023 04:45 )             32.7     08-20    136  |  93<L>  |  12  ----------------------------<  104<H>  3.8   |  35<H>  |  0.6<L>    Ca    8.6      20 Aug 2023 04:21  Mg     1.9     08-20    TPro  5.7<L>  /  Alb  3.0<L>  /  TBili  1.1  /  DBili  x   /  AST  30  /  ALT  19  /  AlkPhos  90  08-20    PT/INR - ( 21 Aug 2023 04:45 )   PT: 20.40 sec;   INR: 1.76 ratio         PTT - ( 20 Aug 2023 04:21 )  PTT:32.3 sec  Urinalysis Basic - ( 20 Aug 2023 04:21 )    Color: x / Appearance: x / SG: x / pH: x  Gluc: 104 mg/dL / Ketone: x  / Bili: x / Urobili: x   Blood: x / Protein: x / Nitrite: x   Leuk Esterase: x / RBC: x / WBC x   Sq Epi: x / Non Sq Epi: x / Bacteria: x      CAPILLARY BLOOD GLUCOSE            Urinalysis Basic - ( 20 Aug 2023 04:21 )    Color: x / Appearance: x / SG: x / pH: x  Gluc: 104 mg/dL / Ketone: x  / Bili: x / Urobili: x   Blood: x / Protein: x / Nitrite: x   Leuk Esterase: x / RBC: x / WBC x   Sq Epi: x / Non Sq Epi: x / Bacteria: x        RADIOLOGY & ADDITIONAL TESTS:    Imaging Personally Reviewed:  [x ] YES  [ ] NO        Attending:    Patient seen and examined, discussed with the team.   C/w diuretics.  Dose Coumadin for INR 2.5 to 3.5  Lovenox today  EP follow-up for LifeVest  COVID - improving clinically  Transfer to 4T if bed available  35 minutes in patient care coordination.

## 2023-08-21 NOTE — H&P ADULT - NSHPPOADEEPVENOUSTHROMB_GEN_A_CORE
Patient identified using two patient identifiers.  Ear exam showing wax occlusion completed by provider.  Solution: warm water was placed in the bilateral ear(s) via irrigation tool: elephant ear.    
no

## 2023-08-22 ENCOUNTER — TRANSCRIPTION ENCOUNTER (OUTPATIENT)
Age: 43
End: 2023-08-22

## 2023-08-22 VITALS
DIASTOLIC BLOOD PRESSURE: 56 MMHG | OXYGEN SATURATION: 95 % | SYSTOLIC BLOOD PRESSURE: 97 MMHG | HEART RATE: 73 BPM | RESPIRATION RATE: 20 BRPM

## 2023-08-22 LAB
ALBUMIN SERPL ELPH-MCNC: 3 G/DL — LOW (ref 3.5–5.2)
ALP SERPL-CCNC: 87 U/L — SIGNIFICANT CHANGE UP (ref 30–115)
ALT FLD-CCNC: 18 U/L — SIGNIFICANT CHANGE UP (ref 0–41)
ANION GAP SERPL CALC-SCNC: 9 MMOL/L — SIGNIFICANT CHANGE UP (ref 7–14)
APTT BLD: 33.1 SEC — SIGNIFICANT CHANGE UP (ref 27–39.2)
AST SERPL-CCNC: 25 U/L — SIGNIFICANT CHANGE UP (ref 0–41)
BASOPHILS # BLD AUTO: 0.02 K/UL — SIGNIFICANT CHANGE UP (ref 0–0.2)
BASOPHILS NFR BLD AUTO: 0.3 % — SIGNIFICANT CHANGE UP (ref 0–1)
BILIRUB SERPL-MCNC: 0.8 MG/DL — SIGNIFICANT CHANGE UP (ref 0.2–1.2)
BUN SERPL-MCNC: 14 MG/DL — SIGNIFICANT CHANGE UP (ref 10–20)
CALCIUM SERPL-MCNC: 8.5 MG/DL — SIGNIFICANT CHANGE UP (ref 8.4–10.5)
CHLORIDE SERPL-SCNC: 97 MMOL/L — LOW (ref 98–110)
CO2 SERPL-SCNC: 33 MMOL/L — HIGH (ref 17–32)
CREAT SERPL-MCNC: 0.6 MG/DL — LOW (ref 0.7–1.5)
EGFR: 114 ML/MIN/1.73M2 — SIGNIFICANT CHANGE UP
EOSINOPHIL # BLD AUTO: 0.21 K/UL — SIGNIFICANT CHANGE UP (ref 0–0.7)
EOSINOPHIL NFR BLD AUTO: 2.9 % — SIGNIFICANT CHANGE UP (ref 0–8)
GLUCOSE SERPL-MCNC: 88 MG/DL — SIGNIFICANT CHANGE UP (ref 70–99)
HCT VFR BLD CALC: 33.2 % — LOW (ref 37–47)
HGB BLD-MCNC: 9.9 G/DL — LOW (ref 12–16)
IMM GRANULOCYTES NFR BLD AUTO: 1 % — HIGH (ref 0.1–0.3)
INR BLD: 1.89 RATIO — HIGH (ref 0.65–1.3)
LYMPHOCYTES # BLD AUTO: 0.88 K/UL — LOW (ref 1.2–3.4)
LYMPHOCYTES # BLD AUTO: 12 % — LOW (ref 20.5–51.1)
MAGNESIUM SERPL-MCNC: 1.9 MG/DL — SIGNIFICANT CHANGE UP (ref 1.8–2.4)
MCHC RBC-ENTMCNC: 24.6 PG — LOW (ref 27–31)
MCHC RBC-ENTMCNC: 29.8 G/DL — LOW (ref 32–37)
MCV RBC AUTO: 82.4 FL — SIGNIFICANT CHANGE UP (ref 81–99)
MONOCYTES # BLD AUTO: 0.67 K/UL — HIGH (ref 0.1–0.6)
MONOCYTES NFR BLD AUTO: 9.1 % — SIGNIFICANT CHANGE UP (ref 1.7–9.3)
NEUTROPHILS # BLD AUTO: 5.49 K/UL — SIGNIFICANT CHANGE UP (ref 1.4–6.5)
NEUTROPHILS NFR BLD AUTO: 74.7 % — SIGNIFICANT CHANGE UP (ref 42.2–75.2)
NRBC # BLD: 0 /100 WBCS — SIGNIFICANT CHANGE UP (ref 0–0)
PLATELET # BLD AUTO: 256 K/UL — SIGNIFICANT CHANGE UP (ref 130–400)
PMV BLD: 10 FL — SIGNIFICANT CHANGE UP (ref 7.4–10.4)
POTASSIUM SERPL-MCNC: 3.8 MMOL/L — SIGNIFICANT CHANGE UP (ref 3.5–5)
POTASSIUM SERPL-SCNC: 3.8 MMOL/L — SIGNIFICANT CHANGE UP (ref 3.5–5)
PROT SERPL-MCNC: 5.7 G/DL — LOW (ref 6–8)
PROTHROM AB SERPL-ACNC: 22 SEC — HIGH (ref 9.95–12.87)
RBC # BLD: 4.03 M/UL — LOW (ref 4.2–5.4)
RBC # FLD: 21.1 % — HIGH (ref 11.5–14.5)
SODIUM SERPL-SCNC: 139 MMOL/L — SIGNIFICANT CHANGE UP (ref 135–146)
WBC # BLD: 7.34 K/UL — SIGNIFICANT CHANGE UP (ref 4.8–10.8)
WBC # FLD AUTO: 7.34 K/UL — SIGNIFICANT CHANGE UP (ref 4.8–10.8)

## 2023-08-22 PROCEDURE — 99239 HOSP IP/OBS DSCHRG MGMT >30: CPT

## 2023-08-22 RX ORDER — METOPROLOL TARTRATE 50 MG
1 TABLET ORAL
Qty: 0 | Refills: 0 | DISCHARGE
Start: 2023-08-22

## 2023-08-22 RX ORDER — ENOXAPARIN SODIUM 100 MG/ML
80 INJECTION SUBCUTANEOUS ONCE
Refills: 0 | Status: COMPLETED | OUTPATIENT
Start: 2023-08-22 | End: 2023-08-22

## 2023-08-22 RX ORDER — WARFARIN SODIUM 2.5 MG/1
5 TABLET ORAL ONCE
Refills: 0 | Status: DISCONTINUED | OUTPATIENT
Start: 2023-08-22 | End: 2023-08-22

## 2023-08-22 RX ORDER — WARFARIN SODIUM 2.5 MG/1
1 TABLET ORAL
Qty: 0 | Refills: 0 | DISCHARGE
Start: 2023-08-22

## 2023-08-22 RX ORDER — WARFARIN SODIUM 2.5 MG/1
1 TABLET ORAL
Qty: 1 | Refills: 0
Start: 2023-08-22 | End: 2023-08-22

## 2023-08-22 RX ORDER — MAGNESIUM SULFATE 500 MG/ML
2 VIAL (ML) INJECTION ONCE
Refills: 0 | Status: COMPLETED | OUTPATIENT
Start: 2023-08-22 | End: 2023-08-22

## 2023-08-22 RX ORDER — WARFARIN SODIUM 2.5 MG/1
1.5 TABLET ORAL
Qty: 45 | Refills: 2
Start: 2023-08-22 | End: 2023-11-19

## 2023-08-22 RX ORDER — BUDESONIDE AND FORMOTEROL FUMARATE DIHYDRATE 160; 4.5 UG/1; UG/1
2 AEROSOL RESPIRATORY (INHALATION)
Qty: 0 | Refills: 0 | DISCHARGE
Start: 2023-08-22

## 2023-08-22 RX ORDER — POTASSIUM CHLORIDE 20 MEQ
40 PACKET (EA) ORAL ONCE
Refills: 0 | Status: COMPLETED | OUTPATIENT
Start: 2023-08-22 | End: 2023-08-22

## 2023-08-22 RX ORDER — FUROSEMIDE 40 MG
1 TABLET ORAL
Qty: 0 | Refills: 0 | DISCHARGE
Start: 2023-08-22

## 2023-08-22 RX ORDER — WARFARIN SODIUM 2.5 MG/1
1 TABLET ORAL
Refills: 0 | DISCHARGE

## 2023-08-22 RX ORDER — AMIODARONE HYDROCHLORIDE 400 MG/1
1 TABLET ORAL
Qty: 0 | Refills: 0 | DISCHARGE
Start: 2023-08-22

## 2023-08-22 RX ORDER — WARFARIN SODIUM 2.5 MG/1
1.5 TABLET ORAL
Refills: 0 | DISCHARGE

## 2023-08-22 RX ORDER — METOPROLOL TARTRATE 50 MG
1.5 TABLET ORAL
Qty: 45 | Refills: 2
Start: 2023-08-22 | End: 2023-11-19

## 2023-08-22 RX ADMIN — Medication 600 MILLIGRAM(S): at 05:36

## 2023-08-22 RX ADMIN — Medication 25 GRAM(S): at 11:57

## 2023-08-22 RX ADMIN — Medication 40 MILLIEQUIVALENT(S): at 12:32

## 2023-08-22 RX ADMIN — ENOXAPARIN SODIUM 80 MILLIGRAM(S): 100 INJECTION SUBCUTANEOUS at 08:45

## 2023-08-22 RX ADMIN — AMIODARONE HYDROCHLORIDE 200 MILLIGRAM(S): 400 TABLET ORAL at 05:36

## 2023-08-22 RX ADMIN — BUDESONIDE AND FORMOTEROL FUMARATE DIHYDRATE 2 PUFF(S): 160; 4.5 AEROSOL RESPIRATORY (INHALATION) at 08:44

## 2023-08-22 RX ADMIN — DIPHENHYDRAMINE HYDROCHLORIDE AND LIDOCAINE HYDROCHLORIDE AND ALUMINUM HYDROXIDE AND MAGNESIUM HYDRO 20 MILLILITER(S): KIT at 06:01

## 2023-08-22 RX ADMIN — Medication 50 MILLIGRAM(S): at 13:15

## 2023-08-22 RX ADMIN — CHLORHEXIDINE GLUCONATE 1 APPLICATION(S): 213 SOLUTION TOPICAL at 05:44

## 2023-08-22 RX ADMIN — Medication 40 MILLIGRAM(S): at 05:36

## 2023-08-22 RX ADMIN — REMDESIVIR 200 MILLIGRAM(S): 5 INJECTION INTRAVENOUS at 06:27

## 2023-08-22 RX ADMIN — PANTOPRAZOLE SODIUM 40 MILLIGRAM(S): 20 TABLET, DELAYED RELEASE ORAL at 11:57

## 2023-08-22 RX ADMIN — DIPHENHYDRAMINE HYDROCHLORIDE AND LIDOCAINE HYDROCHLORIDE AND ALUMINUM HYDROXIDE AND MAGNESIUM HYDRO 20 MILLILITER(S): KIT at 13:13

## 2023-08-22 RX ADMIN — Medication 50 MILLIGRAM(S): at 05:35

## 2023-08-22 NOTE — DISCHARGE NOTE PROVIDER - CARE PROVIDER_API CALL
Padmini Vaughn  Internal Medicine  [address] [place] [id number]  Phone: [phone]  Fax: [phone]  Established Patient  Follow Up Time: 1 week

## 2023-08-22 NOTE — DISCHARGE NOTE PROVIDER - HOSPITAL COURSE
Patient is a 42y old  Female, patient of Dr. 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 She underwent aortic valve mass resection and left atrial appendage clipping and aortic valve replacement, post op c/b AF, persistent, despite MINDI/DCCV 8/2/23 patient remains in AF presenting with worsening dyspnea that was previously only when walking long distances is now occurring at rest. Patient states she was told to go to the by Dr. Tyler yesterday but she was experiencing a perisstent cough, however she noticed that when she became more short of breath she new she was in afib w RVR. Patient also reported that last week she called the CCU because she was experiencing  chest discomfort while eating her morning breakfast and she decided not to come to the ED because it resolved by the time her visiting nurse arrived. Admits to cough over the past few days and was given tessalon pearles as needed to help. denies changes in bowel movement, recent chest pain, or difficulty urinating     In the ED, EKG afib w rvr 110-140, given cardizem 20 IVP x1, /67. CXR showing increased bilateral opacities.     4T Course:   - Pt rate controlled on Cardizem and amiodarone  - Pt treated for suspected CHF exacerbation w/ lasix  - On 8/14 pt intubated for ablation, then converted to sinus rhythm       CCU Course:  - On 8/15 pt was extubated, continued with diuresis as pt was still overloaded  - Pt completed ceftriaxone for UTI  - On 8/18 pt COVID +; s/p RDV 3 day course  - INR noted to be subtherapeutic (1.7) gave 80 Lovenox, plan is to administer 2.5mg Warfarin tonight and continue to monitor for future adjustments  - Pt stable on home dose of lasix, remains sinus with no events telemetry, plan is to downgrade to Stepdown and have EP follow up for possible life vest.    In summary, this is a pt admitted for Afib RVR who is now sinus s/p ablation with a hospital course complicated by UTI and mild COVID s/p RDV   Patient is a 42y old  Female, patient of Dr. 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 She underwent aortic valve mass resection and left atrial appendage clipping and aortic valve replacement, post op c/b AF, persistent, despite MINDI/DCCV 8/2/23 patient remains in AF presenting with worsening dyspnea that was previously only when walking long distances is now occurring at rest. Patient states she was told to go to the by Dr. Tyler yesterday but she was experiencing a perisstent cough, however she noticed that when she became more short of breath she new she was in afib w RVR. Patient also reported that last week she called the CCU because she was experiencing  chest discomfort while eating her morning breakfast and she decided not to come to the ED because it resolved by the time her visiting nurse arrived. Admits to cough over the past few days and was given tessalon pearles as needed to help. denies changes in bowel movement, recent chest pain, or difficulty urinating     In the ED, EKG afib w rvr 110-140, given cardizem 20 IVP x1, /67. CXR showing increased bilateral opacities.     4T Course:     - Pt rate controlled on Cardizem and amiodarone  - Pt treated for suspected CHF exacerbation w/ lasix  - On 8/14 pt intubated for ablation, then converted to sinus rhythm       CCU Course:  - On 8/15 pt was extubated, continued with diuresis as pt was still overloaded  - Pt completed ceftriaxone for UTI  - On 8/18 pt COVID +; s/p RDV 3 day course  - INR noted to be subtherapeutic (1.7) gave 80 Lovenox, plan is to administer 2.5mg Warfarin tonight and continue to monitor for future adjustments  - Pt stable on home dose of lasix, remains sinus with no events telemetry, plan is to downgrade to Stepdown and have EP follow up for possible life vest.    In summary, this is a pt admitted for Afib RVR who is now sinus s/p ablation with a hospital course complicated by UTI and mild COVID s/p RDV   Patient is a 42y old  Female, patient of Dr. 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 She underwent aortic valve mass resection and left atrial appendage clipping and aortic valve replacement, post op c/b AF, persistent, despite MINDI/DCCV 8/2/23 patient remains in AF presenting with worsening dyspnea that was previously only when walking long distances is now occurring at rest. Patient states she was told to go to the by Dr. Tyler yesterday but she was experiencing a perisstent cough, however she noticed that when she became more short of breath she new she was in afib w RVR. Patient also reported that last week she called the CCU because she was experiencing  chest discomfort while eating her morning breakfast and she decided not to come to the ED because it resolved by the time her visiting nurse arrived. Admits to cough over the past few days and was given tessalon pearles as needed to help. denies changes in bowel movement, recent chest pain, or difficulty urinating     In the ED, EKG afib w rvr 110-140, given cardizem 20 IVP x1, /67. CXR showing increased bilateral opacities.     4T Course:     - Pt rate controlled on Cardizem and amiodarone  - Pt treated for suspected CHF exacerbation w/ lasix  - On 8/14 pt intubated for ablation, then converted to sinus rhythm       CCU Course:  - On 8/15 pt was extubated, continued with diuresis as pt was still overloaded  - Pt completed ceftriaxone for UTI  - On 8/18 pt COVID +; s/p RDV 3 day course      In summary, this is a pt admitted for Afib RVR who is now sinus s/p ablation with a hospital course complicated by UTI and mild COVID s/p RDV and fluid overload. She is now euvolumic and ready to be discharged on home dose of Lasix 40 mg PO daily. For post op NSVT and high risk of SCD in HOCM pts, she was recommended Lifevest and she has it on dc. Will get ICD outpatient. INR on discharge 1.98 uptrending form 1.7 on 8/21. Given lovenox x 1 and warfarin 5 mg for tonight. Pt has made appointment with INR clinic tomorrow.

## 2023-08-22 NOTE — DISCHARGE NOTE PROVIDER - NSDCMRMEDTOKEN_GEN_ALL_CORE_FT
amiodarone 200 mg oral tablet: 1 tab(s) orally once a day  benzonatate 100 mg oral capsule: 1 cap(s) orally every 8 hours As needed Cough  furosemide 40 mg oral tablet: 1 tab(s) orally once a day  guaiFENesin 600 mg oral tablet, extended release: 1 tab(s) orally every 12 hours  metoprolol tartrate 50 mg oral tablet: 1 tab(s) orally every 8 hours  warfarin 5 mg oral tablet: 1 tab(s) orally once a day   amiodarone 200 mg oral tablet: 1 tab(s) orally once a day  benzonatate 100 mg oral capsule: 1 cap(s) orally every 8 hours As needed Cough  budesonide-formoterol 80 mcg-4.5 mcg/inh inhalation aerosol: 2 puff(s) inhaled 2 times a day as needed for  bronchospasm  furosemide 40 mg oral tablet: 1 tab(s) orally once a day  guaiFENesin 600 mg oral tablet, extended release: 1 tab(s) orally every 12 hours  metoprolol tartrate 50 mg oral tablet: 1 tab(s) orally every 8 hours  warfarin 1 mg oral tablet: 1.5 tab(s) orally once a day Start taking one and a half tab a day starting 8/23/2023 and follow up with INR clinic  warfarin 5 mg oral tablet: 1 tab(s) orally once a day take one 5 mg tab once then 1.5 mg every day thereafter and follow up with INR clinic   amiodarone 200 mg oral tablet: 1 tab(s) orally once a day  benzonatate 100 mg oral capsule: 1 cap(s) orally every 8 hours As needed Cough  budesonide-formoterol 80 mcg-4.5 mcg/inh inhalation aerosol: 2 puff(s) inhaled 2 times a day as needed for  bronchospasm  furosemide 40 mg oral tablet: 1 tab(s) orally once a day  guaiFENesin 600 mg oral tablet, extended release: 1 tab(s) orally every 12 hours  metoprolol succinate 100 mg oral tablet, extended release: 1.5 tab(s) orally once a day  warfarin 1 mg oral tablet: 1.5 tab(s) orally once a day Start taking one and a half tab a day starting 8/23/2023 and follow up with INR clinic  warfarin 5 mg oral tablet: 1 tab(s) orally once a day take one 5 mg tab once then 1.5 mg every day thereafter and follow up with INR clinic

## 2023-08-22 NOTE — DISCHARGE NOTE PROVIDER - NSDCFUSCHEDAPPT_GEN_ALL_CORE_FT
Jarett Zepeda  [facility] Physician [facility]  CTSURG [address]  Scheduled Appointment: 08/30/2023    Elliot Metcalf  [facility] Physician [facility]  CARDIOLOGY [address]  Scheduled Appointment: 08/31/2023    Brennan Tyler  [facility] Physician [facility]  ELECTROPH [address]  Scheduled Appointment: 10/05/2023     Francisca Martinez  [facility] PreAdmits  Scheduled Appointment: 08/23/2023    Francisca Martinez  [facility] Physician [facility]  MEDMGMT SI [address]  Scheduled Appointment: 08/23/2023    Jarett Zepeda  [facility] Physician [facility]  CTSURG [address]  Scheduled Appointment: 08/30/2023    Elliot Metcalf  [facility] Physician [facility]  CARDIOLOGY [address]  Scheduled Appointment: 08/31/2023    Brennan Tyler  [facility] Physician [facility]  ELECTROPH [address]  Scheduled Appointment: 10/05/2023

## 2023-08-22 NOTE — DISCHARGE NOTE PROVIDER - NSDCCPCAREPLAN_GEN_ALL_CORE_FT
PRINCIPAL DISCHARGE DIAGNOSIS  Diagnosis: Atrial fibrillation  Assessment and Plan of Treatment: Atrial fibrillation is a type of irregular heartbeat (arrhythmia) where the heart quivers continuously in a chaotic pattern that makes the heart unable to pump blood normally. This can increase the risk for stroke, heart failure, and other heart-related conditions. Atrial fibrillation can be caused by a variety of conditions and may be temporary, intermittent, or permanent. Symptoms include feeling that your heart is beating rapidly or irregularly, chest discomfort, shortness of breath, or dizziness/lightheadedness that may be worse with exertion. Treatment is varied but may involve medication or electrical shock (cardioversion).  SEEK IMMEDIATE MEDICAL CARE IF YOU HAVE ANY OF THE FOLLOWING SYMPTOMS: chest pain, shortness of breath, abdominal pain, sweating, vomiting, blood in vomit/bowel movements/urine, dizziness/lightheadedness, weakness or numbness to face/arm/leg, trouble speaking or understanding, facial droop.

## 2023-08-23 ENCOUNTER — APPOINTMENT (OUTPATIENT)
Dept: MEDICATION MANAGEMENT | Facility: CLINIC | Age: 43
End: 2023-08-23

## 2023-08-23 ENCOUNTER — OUTPATIENT (OUTPATIENT)
Dept: OUTPATIENT SERVICES | Facility: HOSPITAL | Age: 43
LOS: 1 days | End: 2023-08-23
Payer: COMMERCIAL

## 2023-08-23 DIAGNOSIS — Z79.01 LONG TERM (CURRENT) USE OF ANTICOAGULANTS: ICD-10-CM

## 2023-08-23 DIAGNOSIS — Z98.890 OTHER SPECIFIED POSTPROCEDURAL STATES: Chronic | ICD-10-CM

## 2023-08-23 DIAGNOSIS — I48.91 UNSPECIFIED ATRIAL FIBRILLATION: ICD-10-CM

## 2023-08-23 LAB — INR PPP: 1.89

## 2023-08-23 PROCEDURE — 99211 OFF/OP EST MAY X REQ PHY/QHP: CPT | Mod: 95

## 2023-08-24 DIAGNOSIS — I48.91 UNSPECIFIED ATRIAL FIBRILLATION: ICD-10-CM

## 2023-08-24 DIAGNOSIS — Z79.01 LONG TERM (CURRENT) USE OF ANTICOAGULANTS: ICD-10-CM

## 2023-08-27 ENCOUNTER — OUTPATIENT (OUTPATIENT)
Dept: OUTPATIENT SERVICES | Facility: HOSPITAL | Age: 43
LOS: 1 days | End: 2023-08-27
Payer: COMMERCIAL

## 2023-08-27 ENCOUNTER — APPOINTMENT (OUTPATIENT)
Dept: MEDICATION MANAGEMENT | Facility: CLINIC | Age: 43
End: 2023-08-27

## 2023-08-27 DIAGNOSIS — Z98.890 OTHER SPECIFIED POSTPROCEDURAL STATES: Chronic | ICD-10-CM

## 2023-08-27 LAB
INR PPP: 2.52
PT BLD: 28

## 2023-08-27 PROCEDURE — G0463: CPT

## 2023-08-28 DIAGNOSIS — I42.1 OBSTRUCTIVE HYPERTROPHIC CARDIOMYOPATHY: ICD-10-CM

## 2023-08-28 DIAGNOSIS — J96.01 ACUTE RESPIRATORY FAILURE WITH HYPOXIA: ICD-10-CM

## 2023-08-28 DIAGNOSIS — U07.1 COVID-19: ICD-10-CM

## 2023-08-28 DIAGNOSIS — J90 PLEURAL EFFUSION, NOT ELSEWHERE CLASSIFIED: ICD-10-CM

## 2023-08-28 DIAGNOSIS — I47.20 VENTRICULAR TACHYCARDIA, UNSPECIFIED: ICD-10-CM

## 2023-08-28 DIAGNOSIS — B96.20 UNSPECIFIED ESCHERICHIA COLI [E. COLI] AS THE CAUSE OF DISEASES CLASSIFIED ELSEWHERE: ICD-10-CM

## 2023-08-28 DIAGNOSIS — N39.0 URINARY TRACT INFECTION, SITE NOT SPECIFIED: ICD-10-CM

## 2023-08-28 DIAGNOSIS — E78.5 HYPERLIPIDEMIA, UNSPECIFIED: ICD-10-CM

## 2023-08-28 DIAGNOSIS — Z95.2 PRESENCE OF PROSTHETIC HEART VALVE: ICD-10-CM

## 2023-08-28 DIAGNOSIS — I47.1 SUPRAVENTRICULAR TACHYCARDIA: ICD-10-CM

## 2023-08-28 DIAGNOSIS — I95.81 POSTPROCEDURAL HYPOTENSION: ICD-10-CM

## 2023-08-28 DIAGNOSIS — Z79.01 LONG TERM (CURRENT) USE OF ANTICOAGULANTS: ICD-10-CM

## 2023-08-28 DIAGNOSIS — I11.0 HYPERTENSIVE HEART DISEASE WITH HEART FAILURE: ICD-10-CM

## 2023-08-28 DIAGNOSIS — E66.9 OBESITY, UNSPECIFIED: ICD-10-CM

## 2023-08-28 DIAGNOSIS — I48.19 OTHER PERSISTENT ATRIAL FIBRILLATION: ICD-10-CM

## 2023-08-28 DIAGNOSIS — I50.33 ACUTE ON CHRONIC DIASTOLIC (CONGESTIVE) HEART FAILURE: ICD-10-CM

## 2023-08-28 DIAGNOSIS — I48.92 UNSPECIFIED ATRIAL FLUTTER: ICD-10-CM

## 2023-08-29 DIAGNOSIS — I48.91 UNSPECIFIED ATRIAL FIBRILLATION: ICD-10-CM

## 2023-08-29 DIAGNOSIS — Z79.01 LONG TERM (CURRENT) USE OF ANTICOAGULANTS: ICD-10-CM

## 2023-08-30 ENCOUNTER — APPOINTMENT (OUTPATIENT)
Dept: CARDIOTHORACIC SURGERY | Facility: CLINIC | Age: 43
End: 2023-08-30
Payer: COMMERCIAL

## 2023-08-30 ENCOUNTER — APPOINTMENT (OUTPATIENT)
Dept: MEDICATION MANAGEMENT | Facility: CLINIC | Age: 43
End: 2023-08-30

## 2023-08-30 VITALS
BODY MASS INDEX: 34.55 KG/M2 | DIASTOLIC BLOOD PRESSURE: 76 MMHG | RESPIRATION RATE: 12 BRPM | OXYGEN SATURATION: 94 % | HEIGHT: 61 IN | SYSTOLIC BLOOD PRESSURE: 113 MMHG | WEIGHT: 183 LBS | TEMPERATURE: 98 F | HEART RATE: 83 BPM

## 2023-08-30 DIAGNOSIS — Z79.01 LONG TERM (CURRENT) USE OF ANTICOAGULANTS: ICD-10-CM

## 2023-08-30 DIAGNOSIS — I48.91 UNSPECIFIED ATRIAL FIBRILLATION: ICD-10-CM

## 2023-08-30 PROCEDURE — 99024 POSTOP FOLLOW-UP VISIT: CPT

## 2023-08-30 RX ORDER — BENZONATATE 200 MG/1
200 CAPSULE ORAL 3 TIMES DAILY
Qty: 42 | Refills: 0 | Status: DISCONTINUED | COMMUNITY
Start: 2023-07-26 | End: 2023-08-30

## 2023-08-30 NOTE — ASSESSMENT
[FreeTextEntry1] : 42 year old  Female, patient of Dr. Metcalf, with a past medical history of afib/ aflutter , cardiomyopathy, DLD, HTN and Aortic valvefibroelastoma s/p aortic valve replacement. She underwent aortic valve mass resection and left atrial appendage clipping and aortic valve replacement, post op c/b AF, persistent, despite MINDI/DCCV 8/2/23.Post op course complicated by Afib RVR. Had sinus s/p ablation with a hospital course complicated by UTI is now and mild COVID s/p RDV and fluid overload. She is now euvolemic and ready to be discharged on home dose of Lasix 40 mg PO daily. For post op NSVT and high risk of SCD in HOCM pts, she was recommended Life vest and she has it on dc. Will get ICD outpatient.   F/U EPS for possible ICD F/U cardio for continued care F/U coumadin clinic for INR management  Pantoprazole for Reflux Santos for HOCM management  Chronic Cough X 8 weeks Will see HR first If not contraindicated then will start steroids

## 2023-08-30 NOTE — PHYSICAL EXAM
[] : no respiratory distress [Auscultation Breath Sounds / Voice Sounds] : lungs were clear to auscultation bilaterally [Heart Rate And Rhythm] : heart rate was normal and rhythm regular [Heart Sounds] : normal S1 and S2 [Heart Sounds Gallop] : no gallops [Murmurs] : no murmurs [Heart Sounds Pericardial Friction Rub] : no pericardial rub [Clean] : clean [Dry] : dry [Healing Well] : healing well [Pitting Edema] : pitting edema present [FreeTextEntry3] : 1+

## 2023-08-30 NOTE — REASON FOR VISIT
[de-identified] : Ms. Rucker arrived today for a post op visit. [de-identified] : s/p AVR [Spouse] : spouse

## 2023-08-31 ENCOUNTER — APPOINTMENT (OUTPATIENT)
Dept: CARDIOLOGY | Facility: CLINIC | Age: 43
End: 2023-08-31
Payer: COMMERCIAL

## 2023-08-31 VITALS
HEART RATE: 71 BPM | SYSTOLIC BLOOD PRESSURE: 120 MMHG | WEIGHT: 178 LBS | OXYGEN SATURATION: 97 % | HEIGHT: 61 IN | BODY MASS INDEX: 33.61 KG/M2 | DIASTOLIC BLOOD PRESSURE: 72 MMHG

## 2023-08-31 PROCEDURE — 99214 OFFICE O/P EST MOD 30 MIN: CPT

## 2023-08-31 RX ORDER — PANTOPRAZOLE 40 MG/1
40 TABLET, DELAYED RELEASE ORAL DAILY
Qty: 60 | Refills: 1 | Status: DISCONTINUED | COMMUNITY
Start: 2023-08-30 | End: 2023-08-31

## 2023-08-31 RX ORDER — DILTIAZEM HYDROCHLORIDE 240 MG/1
240 CAPSULE, EXTENDED RELEASE ORAL DAILY
Refills: 0 | Status: DISCONTINUED | COMMUNITY
Start: 2023-07-13 | End: 2023-08-31

## 2023-08-31 NOTE — HISTORY OF PRESENT ILLNESS
[FreeTextEntry1] : 43 yo female paf hypertrophic cardiomyopathy. She had fibroelastoma. She 6/29/23 mechanical avr La appendage clipping. She had ablation afib 8/23. Legs swollen

## 2023-08-31 NOTE — DISCUSSION/SUMMARY
[FreeTextEntry1] : 43 yo female paf hypertrophic cardiomyopathy. In 2/22 in hosp rapid afib. She had stopped beta. Went back into nsr. . She denies chest pain.  . She had covid 12/21.8/23.   . No sudden death in family. Aware no heavy exertion. . She was on more beta in past.  Explained hypertrophic cardiomyopathy. Echo 9/22 severe MR .LVOT Valsalva about 70 mm HG.She was followed in [facility].  She had fibroelastoma. She 6/29/23 mechanical avr La appendage clipping.  Reviewed post op course. Questioned answered. When able. She needs cardiac rehab. Discussed with patient. She had ablation afib 8/23. Seeing eps . She has life vest. Told start cardiac rehab. Not able work now. She had recent covid 8/23 Echo about 6/24. Aware low Na diet. Form cardiac rehab completed

## 2023-09-02 ENCOUNTER — APPOINTMENT (OUTPATIENT)
Dept: MEDICATION MANAGEMENT | Facility: CLINIC | Age: 43
End: 2023-09-02

## 2023-09-02 ENCOUNTER — OUTPATIENT (OUTPATIENT)
Dept: OUTPATIENT SERVICES | Facility: HOSPITAL | Age: 43
LOS: 1 days | End: 2023-09-02
Payer: COMMERCIAL

## 2023-09-02 DIAGNOSIS — Z98.890 OTHER SPECIFIED POSTPROCEDURAL STATES: Chronic | ICD-10-CM

## 2023-09-02 DIAGNOSIS — Z79.01 LONG TERM (CURRENT) USE OF ANTICOAGULANTS: ICD-10-CM

## 2023-09-02 DIAGNOSIS — I48.91 UNSPECIFIED ATRIAL FIBRILLATION: ICD-10-CM

## 2023-09-02 LAB
INR PPP: 1.84
PT BLD: 20.6

## 2023-09-02 PROCEDURE — G0463: CPT

## 2023-09-05 DIAGNOSIS — I48.91 UNSPECIFIED ATRIAL FIBRILLATION: ICD-10-CM

## 2023-09-06 ENCOUNTER — APPOINTMENT (OUTPATIENT)
Dept: CARDIOTHORACIC SURGERY | Facility: CLINIC | Age: 43
End: 2023-09-06
Payer: COMMERCIAL

## 2023-09-06 ENCOUNTER — APPOINTMENT (OUTPATIENT)
Dept: CARDIOLOGY | Facility: CLINIC | Age: 43
End: 2023-09-06
Payer: COMMERCIAL

## 2023-09-06 VITALS
DIASTOLIC BLOOD PRESSURE: 68 MMHG | HEART RATE: 74 BPM | SYSTOLIC BLOOD PRESSURE: 112 MMHG | WEIGHT: 173 LBS | HEIGHT: 61 IN | BODY MASS INDEX: 32.66 KG/M2 | OXYGEN SATURATION: 97 %

## 2023-09-06 VITALS
RESPIRATION RATE: 14 BRPM | HEART RATE: 80 BPM | DIASTOLIC BLOOD PRESSURE: 68 MMHG | WEIGHT: 173 LBS | BODY MASS INDEX: 32.66 KG/M2 | TEMPERATURE: 98.4 F | HEIGHT: 61 IN | SYSTOLIC BLOOD PRESSURE: 112 MMHG

## 2023-09-06 PROCEDURE — 93000 ELECTROCARDIOGRAM COMPLETE: CPT

## 2023-09-06 PROCEDURE — 99024 POSTOP FOLLOW-UP VISIT: CPT

## 2023-09-06 PROCEDURE — 99215 OFFICE O/P EST HI 40 MIN: CPT | Mod: 25

## 2023-09-06 RX ORDER — ACETAMINOPHEN 325 MG/1
325 TABLET ORAL EVERY 6 HOURS
Refills: 0 | Status: DISCONTINUED | COMMUNITY
Start: 2023-07-13 | End: 2023-09-06

## 2023-09-06 RX ORDER — FUROSEMIDE 40 MG/1
40 TABLET ORAL TWICE DAILY
Qty: 30 | Refills: 0 | Status: DISCONTINUED | COMMUNITY
Start: 2023-08-01 | End: 2023-09-06

## 2023-09-06 RX ORDER — BENZONATATE 200 MG/1
200 CAPSULE ORAL 3 TIMES DAILY
Qty: 42 | Refills: 0 | Status: DISCONTINUED | COMMUNITY
Start: 2023-08-09 | End: 2023-09-06

## 2023-09-06 RX ORDER — ALPRAZOLAM 0.25 MG/1
0.25 TABLET ORAL DAILY
Qty: 30 | Refills: 0 | Status: DISCONTINUED | COMMUNITY
Start: 2023-06-01 | End: 2023-09-06

## 2023-09-06 NOTE — REASON FOR VISIT
[de-identified] : s/p AVR [de-identified] : Ms. Rucker arrived today for a post op visit. [Spouse] : spouse

## 2023-09-06 NOTE — ASSESSMENT
[FreeTextEntry1] : 42 year old  Female, patient of Dr. Metclaf, with a past medical history of afib/ aflutter , cardiomyopathy, DLD, HTN and Aortic valvefibroelastoma s/p aortic valve replacement. She underwent aortic valve mass resection and left atrial appendage clipping and aortic valve replacement, post op c/b AF, persistent, despite MINDI/DCCV 8/2/23.Post op course complicated by Afib RVR. Had sinus s/p ablation with a hospital course complicated by UTI is now and mild COVID s/p RDV and fluid overload. She is now euvolemic and ready to be discharged on home dose of Lasix 40 mg PO daily. For post op NSVT and high risk of SCD in HOCM pts, she was recommended Life vest and she has it on dc. Will get ICD outpatient.   F/U EPS for possible ICD F/U cardio for continued care F/U coumadin clinic for INR management  Pantoprazole for Reflux Santos for HOCM management CTS prn

## 2023-09-08 ENCOUNTER — OUTPATIENT (OUTPATIENT)
Dept: OUTPATIENT SERVICES | Facility: HOSPITAL | Age: 43
LOS: 1 days | End: 2023-09-08
Payer: COMMERCIAL

## 2023-09-08 ENCOUNTER — APPOINTMENT (OUTPATIENT)
Dept: MEDICATION MANAGEMENT | Facility: CLINIC | Age: 43
End: 2023-09-08
Payer: COMMERCIAL

## 2023-09-08 DIAGNOSIS — Z98.890 OTHER SPECIFIED POSTPROCEDURAL STATES: Chronic | ICD-10-CM

## 2023-09-08 DIAGNOSIS — I48.91 UNSPECIFIED ATRIAL FIBRILLATION: ICD-10-CM

## 2023-09-08 DIAGNOSIS — Z79.01 LONG TERM (CURRENT) USE OF ANTICOAGULANTS: ICD-10-CM

## 2023-09-08 LAB
ALBUMIN SERPL ELPH-MCNC: 4.3 G/DL
ALP BLD-CCNC: 120 U/L
ALT SERPL-CCNC: 22 U/L
ANION GAP SERPL CALC-SCNC: 13 MMOL/L
AST SERPL-CCNC: 23 U/L
BILIRUB SERPL-MCNC: 0.7 MG/DL
BUN SERPL-MCNC: 16 MG/DL
CALCIUM SERPL-MCNC: 10 MG/DL
CHLORIDE SERPL-SCNC: 95 MMOL/L
CO2 SERPL-SCNC: 30 MMOL/L
CREAT SERPL-MCNC: 0.8 MG/DL
EGFR: 94 ML/MIN/1.73M2
FERRITIN SERPL-MCNC: 177 NG/ML
GLUCOSE SERPL-MCNC: 94 MG/DL
HCT VFR BLD CALC: 39.8 %
HGB BLD-MCNC: 11.7 G/DL
INR PPP: 2.5
IRON SATN MFR SERPL: 12 %
IRON SERPL-MCNC: 42 UG/DL
MAGNESIUM SERPL-MCNC: 2.1 MG/DL
MCHC RBC-ENTMCNC: 24.7 PG
MCHC RBC-ENTMCNC: 29.4 G/DL
MCV RBC AUTO: 84.1 FL
NT-PROBNP SERPL-MCNC: 2019 PG/ML
PLATELET # BLD AUTO: 382 K/UL
PMV BLD: 10 FL
POTASSIUM SERPL-SCNC: 4.3 MMOL/L
PROT SERPL-MCNC: 8.1 G/DL
PT BLD: 27.5
RBC # BLD: 4.73 M/UL
RBC # FLD: 20.8 %
SODIUM SERPL-SCNC: 138 MMOL/L
TIBC SERPL-MCNC: 349 UG/DL
TRANSFERRIN SERPL-MCNC: 283 MG/DL
UIBC SERPL-MCNC: 307 UG/DL
WBC # FLD AUTO: 8.25 K/UL

## 2023-09-08 PROCEDURE — 99211 OFF/OP EST MAY X REQ PHY/QHP: CPT | Mod: 95

## 2023-09-15 ENCOUNTER — OUTPATIENT (OUTPATIENT)
Dept: OUTPATIENT SERVICES | Facility: HOSPITAL | Age: 43
LOS: 1 days | End: 2023-09-15
Payer: COMMERCIAL

## 2023-09-15 ENCOUNTER — APPOINTMENT (OUTPATIENT)
Dept: MEDICATION MANAGEMENT | Facility: CLINIC | Age: 43
End: 2023-09-15

## 2023-09-15 DIAGNOSIS — Z79.01 LONG TERM (CURRENT) USE OF ANTICOAGULANTS: ICD-10-CM

## 2023-09-15 DIAGNOSIS — I48.91 UNSPECIFIED ATRIAL FIBRILLATION: ICD-10-CM

## 2023-09-15 DIAGNOSIS — Z98.890 OTHER SPECIFIED POSTPROCEDURAL STATES: Chronic | ICD-10-CM

## 2023-09-15 LAB
INR PPP: 3.3 RATIO
POCT-PROTHROMBIN TIME: 39.1 SECS
QUALITY CONTROL: YES

## 2023-09-15 PROCEDURE — 36416 COLLJ CAPILLARY BLOOD SPEC: CPT

## 2023-09-15 PROCEDURE — 99211 OFF/OP EST MAY X REQ PHY/QHP: CPT

## 2023-09-15 PROCEDURE — 85610 PROTHROMBIN TIME: CPT

## 2023-09-19 ENCOUNTER — OUTPATIENT (OUTPATIENT)
Dept: OUTPATIENT SERVICES | Facility: HOSPITAL | Age: 43
LOS: 1 days | End: 2023-09-19
Payer: COMMERCIAL

## 2023-09-19 ENCOUNTER — APPOINTMENT (OUTPATIENT)
Age: 43
End: 2023-09-19

## 2023-09-19 DIAGNOSIS — I35.1 NONRHEUMATIC AORTIC (VALVE) INSUFFICIENCY: ICD-10-CM

## 2023-09-19 PROCEDURE — 93798 PHYS/QHP OP CAR RHAB W/ECG: CPT

## 2023-09-20 ENCOUNTER — APPOINTMENT (OUTPATIENT)
Age: 43
End: 2023-09-20

## 2023-09-20 DIAGNOSIS — I35.1 NONRHEUMATIC AORTIC (VALVE) INSUFFICIENCY: ICD-10-CM

## 2023-09-21 ENCOUNTER — APPOINTMENT (OUTPATIENT)
Dept: MEDICATION MANAGEMENT | Facility: CLINIC | Age: 43
End: 2023-09-21

## 2023-09-21 ENCOUNTER — OUTPATIENT (OUTPATIENT)
Dept: OUTPATIENT SERVICES | Facility: HOSPITAL | Age: 43
LOS: 1 days | End: 2023-09-21
Payer: COMMERCIAL

## 2023-09-21 DIAGNOSIS — I48.91 UNSPECIFIED ATRIAL FIBRILLATION: ICD-10-CM

## 2023-09-21 DIAGNOSIS — Z79.01 LONG TERM (CURRENT) USE OF ANTICOAGULANTS: ICD-10-CM

## 2023-09-21 DIAGNOSIS — Z98.890 OTHER SPECIFIED POSTPROCEDURAL STATES: Chronic | ICD-10-CM

## 2023-09-21 LAB
INR PPP: 3.6 RATIO
POCT-PROTHROMBIN TIME: 42.8 SECS
QUALITY CONTROL: YES

## 2023-09-21 PROCEDURE — 99211 OFF/OP EST MAY X REQ PHY/QHP: CPT

## 2023-09-21 PROCEDURE — 85610 PROTHROMBIN TIME: CPT

## 2023-09-21 PROCEDURE — 36416 COLLJ CAPILLARY BLOOD SPEC: CPT

## 2023-09-22 ENCOUNTER — APPOINTMENT (OUTPATIENT)
Age: 43
End: 2023-09-22

## 2023-09-22 ENCOUNTER — OUTPATIENT (OUTPATIENT)
Dept: OUTPATIENT SERVICES | Facility: HOSPITAL | Age: 43
LOS: 1 days | End: 2023-09-22
Payer: COMMERCIAL

## 2023-09-22 DIAGNOSIS — I35.1 NONRHEUMATIC AORTIC (VALVE) INSUFFICIENCY: ICD-10-CM

## 2023-09-22 DIAGNOSIS — Z98.890 OTHER SPECIFIED POSTPROCEDURAL STATES: Chronic | ICD-10-CM

## 2023-09-22 PROCEDURE — 93798 PHYS/QHP OP CAR RHAB W/ECG: CPT

## 2023-09-23 DIAGNOSIS — I35.1 NONRHEUMATIC AORTIC (VALVE) INSUFFICIENCY: ICD-10-CM

## 2023-09-25 ENCOUNTER — OUTPATIENT (OUTPATIENT)
Dept: OUTPATIENT SERVICES | Facility: HOSPITAL | Age: 43
LOS: 1 days | End: 2023-09-25

## 2023-09-25 ENCOUNTER — APPOINTMENT (OUTPATIENT)
Age: 43
End: 2023-09-25

## 2023-09-25 DIAGNOSIS — Z98.890 OTHER SPECIFIED POSTPROCEDURAL STATES: Chronic | ICD-10-CM

## 2023-09-25 DIAGNOSIS — I35.1 NONRHEUMATIC AORTIC (VALVE) INSUFFICIENCY: ICD-10-CM

## 2023-09-27 ENCOUNTER — APPOINTMENT (OUTPATIENT)
Age: 43
End: 2023-09-27

## 2023-09-27 ENCOUNTER — OUTPATIENT (OUTPATIENT)
Dept: OUTPATIENT SERVICES | Facility: HOSPITAL | Age: 43
LOS: 1 days | End: 2023-09-27

## 2023-09-27 DIAGNOSIS — I35.1 NONRHEUMATIC AORTIC (VALVE) INSUFFICIENCY: ICD-10-CM

## 2023-09-27 DIAGNOSIS — Z98.890 OTHER SPECIFIED POSTPROCEDURAL STATES: Chronic | ICD-10-CM

## 2023-09-28 ENCOUNTER — OUTPATIENT (OUTPATIENT)
Dept: OUTPATIENT SERVICES | Facility: HOSPITAL | Age: 43
LOS: 1 days | End: 2023-09-28
Payer: COMMERCIAL

## 2023-09-28 ENCOUNTER — APPOINTMENT (OUTPATIENT)
Dept: MEDICATION MANAGEMENT | Facility: CLINIC | Age: 43
End: 2023-09-28

## 2023-09-28 DIAGNOSIS — I48.91 UNSPECIFIED ATRIAL FIBRILLATION: ICD-10-CM

## 2023-09-28 DIAGNOSIS — Z98.890 OTHER SPECIFIED POSTPROCEDURAL STATES: Chronic | ICD-10-CM

## 2023-09-28 DIAGNOSIS — Z79.01 LONG TERM (CURRENT) USE OF ANTICOAGULANTS: ICD-10-CM

## 2023-09-28 LAB
INR PPP: 3 RATIO
POCT-PROTHROMBIN TIME: 35.6 SECS
QUALITY CONTROL: YES

## 2023-09-28 PROCEDURE — 85610 PROTHROMBIN TIME: CPT

## 2023-09-28 PROCEDURE — 99211 OFF/OP EST MAY X REQ PHY/QHP: CPT

## 2023-09-28 PROCEDURE — 36416 COLLJ CAPILLARY BLOOD SPEC: CPT

## 2023-09-29 ENCOUNTER — OUTPATIENT (OUTPATIENT)
Dept: OUTPATIENT SERVICES | Facility: HOSPITAL | Age: 43
LOS: 1 days | End: 2023-09-29

## 2023-09-29 ENCOUNTER — APPOINTMENT (OUTPATIENT)
Age: 43
End: 2023-09-29

## 2023-09-29 DIAGNOSIS — I35.1 NONRHEUMATIC AORTIC (VALVE) INSUFFICIENCY: ICD-10-CM

## 2023-09-29 DIAGNOSIS — Z98.890 OTHER SPECIFIED POSTPROCEDURAL STATES: Chronic | ICD-10-CM

## 2023-10-02 ENCOUNTER — APPOINTMENT (OUTPATIENT)
Age: 43
End: 2023-10-02

## 2023-10-02 ENCOUNTER — OUTPATIENT (OUTPATIENT)
Dept: OUTPATIENT SERVICES | Facility: HOSPITAL | Age: 43
LOS: 1 days | End: 2023-10-02
Payer: COMMERCIAL

## 2023-10-02 DIAGNOSIS — I35.1 NONRHEUMATIC AORTIC (VALVE) INSUFFICIENCY: ICD-10-CM

## 2023-10-02 DIAGNOSIS — Z98.890 OTHER SPECIFIED POSTPROCEDURAL STATES: Chronic | ICD-10-CM

## 2023-10-02 PROCEDURE — 93798 PHYS/QHP OP CAR RHAB W/ECG: CPT

## 2023-10-03 DIAGNOSIS — I35.1 NONRHEUMATIC AORTIC (VALVE) INSUFFICIENCY: ICD-10-CM

## 2023-10-04 ENCOUNTER — APPOINTMENT (OUTPATIENT)
Age: 43
End: 2023-10-04

## 2023-10-04 ENCOUNTER — OUTPATIENT (OUTPATIENT)
Dept: OUTPATIENT SERVICES | Facility: HOSPITAL | Age: 43
LOS: 1 days | End: 2023-10-04

## 2023-10-04 DIAGNOSIS — I35.1 NONRHEUMATIC AORTIC (VALVE) INSUFFICIENCY: ICD-10-CM

## 2023-10-04 DIAGNOSIS — Z98.890 OTHER SPECIFIED POSTPROCEDURAL STATES: Chronic | ICD-10-CM

## 2023-10-05 ENCOUNTER — OUTPATIENT (OUTPATIENT)
Dept: OUTPATIENT SERVICES | Facility: HOSPITAL | Age: 43
LOS: 1 days | End: 2023-10-05
Payer: COMMERCIAL

## 2023-10-05 ENCOUNTER — APPOINTMENT (OUTPATIENT)
Dept: MEDICATION MANAGEMENT | Facility: CLINIC | Age: 43
End: 2023-10-05

## 2023-10-05 ENCOUNTER — APPOINTMENT (OUTPATIENT)
Dept: ELECTROPHYSIOLOGY | Facility: CLINIC | Age: 43
End: 2023-10-05
Payer: COMMERCIAL

## 2023-10-05 VITALS
HEART RATE: 65 BPM | SYSTOLIC BLOOD PRESSURE: 122 MMHG | DIASTOLIC BLOOD PRESSURE: 74 MMHG | BODY MASS INDEX: 32.66 KG/M2 | TEMPERATURE: 97.1 F | WEIGHT: 173 LBS | HEIGHT: 61 IN

## 2023-10-05 DIAGNOSIS — Z79.01 LONG TERM (CURRENT) USE OF ANTICOAGULANTS: ICD-10-CM

## 2023-10-05 DIAGNOSIS — I48.91 UNSPECIFIED ATRIAL FIBRILLATION: ICD-10-CM

## 2023-10-05 LAB
INR PPP: 3.2 RATIO
POCT-PROTHROMBIN TIME: 37.8 SECS
QUALITY CONTROL: YES

## 2023-10-05 PROCEDURE — 85610 PROTHROMBIN TIME: CPT

## 2023-10-05 PROCEDURE — 36416 COLLJ CAPILLARY BLOOD SPEC: CPT

## 2023-10-05 PROCEDURE — 99211 OFF/OP EST MAY X REQ PHY/QHP: CPT

## 2023-10-05 PROCEDURE — 99215 OFFICE O/P EST HI 40 MIN: CPT | Mod: 25

## 2023-10-05 PROCEDURE — 93000 ELECTROCARDIOGRAM COMPLETE: CPT

## 2023-10-06 ENCOUNTER — OUTPATIENT (OUTPATIENT)
Dept: OUTPATIENT SERVICES | Facility: HOSPITAL | Age: 43
LOS: 1 days | End: 2023-10-06

## 2023-10-06 ENCOUNTER — APPOINTMENT (OUTPATIENT)
Age: 43
End: 2023-10-06

## 2023-10-06 DIAGNOSIS — I35.1 NONRHEUMATIC AORTIC (VALVE) INSUFFICIENCY: ICD-10-CM

## 2023-10-06 DIAGNOSIS — Z98.890 OTHER SPECIFIED POSTPROCEDURAL STATES: Chronic | ICD-10-CM

## 2023-10-09 ENCOUNTER — OUTPATIENT (OUTPATIENT)
Dept: OUTPATIENT SERVICES | Facility: HOSPITAL | Age: 43
LOS: 1 days | End: 2023-10-09

## 2023-10-09 ENCOUNTER — APPOINTMENT (OUTPATIENT)
Age: 43
End: 2023-10-09

## 2023-10-09 DIAGNOSIS — Z98.890 OTHER SPECIFIED POSTPROCEDURAL STATES: Chronic | ICD-10-CM

## 2023-10-09 DIAGNOSIS — I35.1 NONRHEUMATIC AORTIC (VALVE) INSUFFICIENCY: ICD-10-CM

## 2023-10-11 ENCOUNTER — OUTPATIENT (OUTPATIENT)
Dept: OUTPATIENT SERVICES | Facility: HOSPITAL | Age: 43
LOS: 1 days | End: 2023-10-11

## 2023-10-11 ENCOUNTER — APPOINTMENT (OUTPATIENT)
Age: 43
End: 2023-10-11

## 2023-10-11 DIAGNOSIS — Z98.890 OTHER SPECIFIED POSTPROCEDURAL STATES: Chronic | ICD-10-CM

## 2023-10-11 DIAGNOSIS — I35.1 NONRHEUMATIC AORTIC (VALVE) INSUFFICIENCY: ICD-10-CM

## 2023-10-12 ENCOUNTER — OUTPATIENT (OUTPATIENT)
Dept: OUTPATIENT SERVICES | Facility: HOSPITAL | Age: 43
LOS: 1 days | End: 2023-10-12
Payer: COMMERCIAL

## 2023-10-12 ENCOUNTER — APPOINTMENT (OUTPATIENT)
Dept: MEDICATION MANAGEMENT | Facility: CLINIC | Age: 43
End: 2023-10-12

## 2023-10-12 DIAGNOSIS — Z98.890 OTHER SPECIFIED POSTPROCEDURAL STATES: Chronic | ICD-10-CM

## 2023-10-12 DIAGNOSIS — I48.91 UNSPECIFIED ATRIAL FIBRILLATION: ICD-10-CM

## 2023-10-12 DIAGNOSIS — Z79.01 LONG TERM (CURRENT) USE OF ANTICOAGULANTS: ICD-10-CM

## 2023-10-12 LAB
INR PPP: 2.2 RATIO
POCT-PROTHROMBIN TIME: 26.1 SECS
QUALITY CONTROL: YES

## 2023-10-12 PROCEDURE — 36416 COLLJ CAPILLARY BLOOD SPEC: CPT

## 2023-10-12 PROCEDURE — 99211 OFF/OP EST MAY X REQ PHY/QHP: CPT

## 2023-10-12 PROCEDURE — 85610 PROTHROMBIN TIME: CPT

## 2023-10-13 ENCOUNTER — APPOINTMENT (OUTPATIENT)
Age: 43
End: 2023-10-13

## 2023-10-13 ENCOUNTER — OUTPATIENT (OUTPATIENT)
Dept: OUTPATIENT SERVICES | Facility: HOSPITAL | Age: 43
LOS: 1 days | End: 2023-10-13

## 2023-10-13 DIAGNOSIS — I48.91 UNSPECIFIED ATRIAL FIBRILLATION: ICD-10-CM

## 2023-10-13 DIAGNOSIS — I35.1 NONRHEUMATIC AORTIC (VALVE) INSUFFICIENCY: ICD-10-CM

## 2023-10-13 DIAGNOSIS — Z79.01 LONG TERM (CURRENT) USE OF ANTICOAGULANTS: ICD-10-CM

## 2023-10-13 DIAGNOSIS — Z98.890 OTHER SPECIFIED POSTPROCEDURAL STATES: Chronic | ICD-10-CM

## 2023-10-16 ENCOUNTER — OUTPATIENT (OUTPATIENT)
Dept: OUTPATIENT SERVICES | Facility: HOSPITAL | Age: 43
LOS: 1 days | End: 2023-10-16

## 2023-10-16 ENCOUNTER — APPOINTMENT (OUTPATIENT)
Age: 43
End: 2023-10-16

## 2023-10-16 DIAGNOSIS — I35.1 NONRHEUMATIC AORTIC (VALVE) INSUFFICIENCY: ICD-10-CM

## 2023-10-16 DIAGNOSIS — Z98.890 OTHER SPECIFIED POSTPROCEDURAL STATES: Chronic | ICD-10-CM

## 2023-10-18 ENCOUNTER — APPOINTMENT (OUTPATIENT)
Age: 43
End: 2023-10-18

## 2023-10-18 ENCOUNTER — APPOINTMENT (OUTPATIENT)
Dept: CARDIOLOGY | Facility: CLINIC | Age: 43
End: 2023-10-18
Payer: COMMERCIAL

## 2023-10-18 ENCOUNTER — OUTPATIENT (OUTPATIENT)
Dept: OUTPATIENT SERVICES | Facility: HOSPITAL | Age: 43
LOS: 1 days | End: 2023-10-18

## 2023-10-18 VITALS
HEIGHT: 61 IN | HEART RATE: 74 BPM | SYSTOLIC BLOOD PRESSURE: 100 MMHG | OXYGEN SATURATION: 97 % | BODY MASS INDEX: 31.91 KG/M2 | DIASTOLIC BLOOD PRESSURE: 58 MMHG | WEIGHT: 169 LBS

## 2023-10-18 DIAGNOSIS — I35.1 NONRHEUMATIC AORTIC (VALVE) INSUFFICIENCY: ICD-10-CM

## 2023-10-18 DIAGNOSIS — D50.8 OTHER IRON DEFICIENCY ANEMIAS: ICD-10-CM

## 2023-10-18 PROCEDURE — 93000 ELECTROCARDIOGRAM COMPLETE: CPT

## 2023-10-18 PROCEDURE — 99214 OFFICE O/P EST MOD 30 MIN: CPT | Mod: 25

## 2023-10-18 RX ORDER — METOPROLOL SUCCINATE 100 MG/1
100 TABLET, EXTENDED RELEASE ORAL
Qty: 180 | Refills: 3 | Status: DISCONTINUED | COMMUNITY
Start: 2023-09-06 | End: 2023-10-18

## 2023-10-18 RX ORDER — WARFARIN 1 MG/1
1 TABLET ORAL
Qty: 270 | Refills: 3 | Status: DISCONTINUED | COMMUNITY
Start: 2023-07-13 | End: 2023-10-18

## 2023-10-18 RX ORDER — METOPROLOL TARTRATE 100 MG/1
100 TABLET, FILM COATED ORAL DAILY
Refills: 0 | Status: DISCONTINUED | COMMUNITY
End: 2023-10-18

## 2023-10-19 ENCOUNTER — APPOINTMENT (OUTPATIENT)
Dept: MEDICATION MANAGEMENT | Facility: CLINIC | Age: 43
End: 2023-10-19

## 2023-10-19 ENCOUNTER — OUTPATIENT (OUTPATIENT)
Dept: OUTPATIENT SERVICES | Facility: HOSPITAL | Age: 43
LOS: 1 days | End: 2023-10-19
Payer: COMMERCIAL

## 2023-10-19 DIAGNOSIS — Z79.01 LONG TERM (CURRENT) USE OF ANTICOAGULANTS: ICD-10-CM

## 2023-10-19 DIAGNOSIS — I48.91 UNSPECIFIED ATRIAL FIBRILLATION: ICD-10-CM

## 2023-10-19 LAB
INR PPP: 2.1 RATIO
POCT-PROTHROMBIN TIME: 25.5 SECS
QUALITY CONTROL: YES

## 2023-10-19 PROCEDURE — 36416 COLLJ CAPILLARY BLOOD SPEC: CPT

## 2023-10-19 PROCEDURE — 85610 PROTHROMBIN TIME: CPT

## 2023-10-19 PROCEDURE — 99211 OFF/OP EST MAY X REQ PHY/QHP: CPT

## 2023-10-20 ENCOUNTER — APPOINTMENT (OUTPATIENT)
Age: 43
End: 2023-10-20

## 2023-10-20 ENCOUNTER — OUTPATIENT (OUTPATIENT)
Dept: OUTPATIENT SERVICES | Facility: HOSPITAL | Age: 43
LOS: 1 days | End: 2023-10-20

## 2023-10-20 DIAGNOSIS — I35.1 NONRHEUMATIC AORTIC (VALVE) INSUFFICIENCY: ICD-10-CM

## 2023-10-20 DIAGNOSIS — I48.91 UNSPECIFIED ATRIAL FIBRILLATION: ICD-10-CM

## 2023-10-20 DIAGNOSIS — Z98.890 OTHER SPECIFIED POSTPROCEDURAL STATES: Chronic | ICD-10-CM

## 2023-10-20 DIAGNOSIS — Z79.01 LONG TERM (CURRENT) USE OF ANTICOAGULANTS: ICD-10-CM

## 2023-10-23 ENCOUNTER — APPOINTMENT (OUTPATIENT)
Age: 43
End: 2023-10-23

## 2023-10-23 ENCOUNTER — OUTPATIENT (OUTPATIENT)
Dept: OUTPATIENT SERVICES | Facility: HOSPITAL | Age: 43
LOS: 1 days | End: 2023-10-23

## 2023-10-23 DIAGNOSIS — I35.1 NONRHEUMATIC AORTIC (VALVE) INSUFFICIENCY: ICD-10-CM

## 2023-10-23 DIAGNOSIS — Z98.890 OTHER SPECIFIED POSTPROCEDURAL STATES: Chronic | ICD-10-CM

## 2023-10-24 ENCOUNTER — APPOINTMENT (OUTPATIENT)
Dept: CV DIAGNOSITCS | Facility: HOSPITAL | Age: 43
End: 2023-10-24

## 2023-10-24 ENCOUNTER — APPOINTMENT (OUTPATIENT)
Dept: CV DIAGNOSTICS | Facility: HOSPITAL | Age: 43
End: 2023-10-24
Payer: COMMERCIAL

## 2023-10-24 ENCOUNTER — OUTPATIENT (OUTPATIENT)
Dept: OUTPATIENT SERVICES | Facility: HOSPITAL | Age: 43
LOS: 1 days | End: 2023-10-24
Payer: COMMERCIAL

## 2023-10-24 DIAGNOSIS — Z98.890 OTHER SPECIFIED POSTPROCEDURAL STATES: Chronic | ICD-10-CM

## 2023-10-24 DIAGNOSIS — I42.2 OTHER HYPERTROPHIC CARDIOMYOPATHY: ICD-10-CM

## 2023-10-24 PROCEDURE — 93016 CV STRESS TEST SUPVJ ONLY: CPT

## 2023-10-24 PROCEDURE — 93018 CV STRESS TEST I&R ONLY: CPT

## 2023-10-24 PROCEDURE — 93287 PERI-PX DEVICE EVAL & PRGR: CPT

## 2023-10-24 PROCEDURE — 93017 CV STRESS TEST TRACING ONLY: CPT

## 2023-10-25 ENCOUNTER — APPOINTMENT (OUTPATIENT)
Age: 43
End: 2023-10-25

## 2023-10-25 DIAGNOSIS — I42.2 OTHER HYPERTROPHIC CARDIOMYOPATHY: ICD-10-CM

## 2023-10-27 ENCOUNTER — OUTPATIENT (OUTPATIENT)
Dept: OUTPATIENT SERVICES | Facility: HOSPITAL | Age: 43
LOS: 1 days | End: 2023-10-27

## 2023-10-27 ENCOUNTER — APPOINTMENT (OUTPATIENT)
Age: 43
End: 2023-10-27

## 2023-10-27 ENCOUNTER — APPOINTMENT (OUTPATIENT)
Dept: MEDICATION MANAGEMENT | Facility: CLINIC | Age: 43
End: 2023-10-27

## 2023-10-27 ENCOUNTER — OUTPATIENT (OUTPATIENT)
Dept: OUTPATIENT SERVICES | Facility: HOSPITAL | Age: 43
LOS: 1 days | End: 2023-10-27
Payer: COMMERCIAL

## 2023-10-27 DIAGNOSIS — Z79.01 LONG TERM (CURRENT) USE OF ANTICOAGULANTS: ICD-10-CM

## 2023-10-27 DIAGNOSIS — I48.91 UNSPECIFIED ATRIAL FIBRILLATION: ICD-10-CM

## 2023-10-27 DIAGNOSIS — I35.1 NONRHEUMATIC AORTIC (VALVE) INSUFFICIENCY: ICD-10-CM

## 2023-10-27 DIAGNOSIS — Z98.890 OTHER SPECIFIED POSTPROCEDURAL STATES: Chronic | ICD-10-CM

## 2023-10-27 LAB
INR PPP: 2.3 RATIO
POCT-PROTHROMBIN TIME: 27.5 SECS
QUALITY CONTROL: YES

## 2023-10-27 PROCEDURE — 85610 PROTHROMBIN TIME: CPT

## 2023-10-27 PROCEDURE — 36416 COLLJ CAPILLARY BLOOD SPEC: CPT

## 2023-10-27 PROCEDURE — 99211 OFF/OP EST MAY X REQ PHY/QHP: CPT

## 2023-10-28 DIAGNOSIS — I48.91 UNSPECIFIED ATRIAL FIBRILLATION: ICD-10-CM

## 2023-10-28 DIAGNOSIS — Z79.01 LONG TERM (CURRENT) USE OF ANTICOAGULANTS: ICD-10-CM

## 2023-10-30 ENCOUNTER — APPOINTMENT (OUTPATIENT)
Age: 43
End: 2023-10-30

## 2023-10-30 ENCOUNTER — OUTPATIENT (OUTPATIENT)
Dept: OUTPATIENT SERVICES | Facility: HOSPITAL | Age: 43
LOS: 1 days | End: 2023-10-30
Payer: COMMERCIAL

## 2023-10-30 ENCOUNTER — OUTPATIENT (OUTPATIENT)
Dept: OUTPATIENT SERVICES | Facility: HOSPITAL | Age: 43
LOS: 1 days | End: 2023-10-30

## 2023-10-30 ENCOUNTER — RESULT REVIEW (OUTPATIENT)
Age: 43
End: 2023-10-30

## 2023-10-30 VITALS
DIASTOLIC BLOOD PRESSURE: 80 MMHG | HEART RATE: 69 BPM | WEIGHT: 166.89 LBS | HEIGHT: 62 IN | TEMPERATURE: 97 F | RESPIRATION RATE: 18 BRPM | SYSTOLIC BLOOD PRESSURE: 142 MMHG | OXYGEN SATURATION: 99 %

## 2023-10-30 DIAGNOSIS — I35.1 NONRHEUMATIC AORTIC (VALVE) INSUFFICIENCY: ICD-10-CM

## 2023-10-30 DIAGNOSIS — Z01.818 ENCOUNTER FOR OTHER PREPROCEDURAL EXAMINATION: ICD-10-CM

## 2023-10-30 DIAGNOSIS — I50.22 CHRONIC SYSTOLIC (CONGESTIVE) HEART FAILURE: ICD-10-CM

## 2023-10-30 DIAGNOSIS — Z98.890 OTHER SPECIFIED POSTPROCEDURAL STATES: Chronic | ICD-10-CM

## 2023-10-30 DIAGNOSIS — Z95.2 PRESENCE OF PROSTHETIC HEART VALVE: Chronic | ICD-10-CM

## 2023-10-30 LAB
ALBUMIN SERPL ELPH-MCNC: 4.7 G/DL — SIGNIFICANT CHANGE UP (ref 3.5–5.2)
ALBUMIN SERPL ELPH-MCNC: 4.7 G/DL — SIGNIFICANT CHANGE UP (ref 3.5–5.2)
ALP SERPL-CCNC: 111 U/L — SIGNIFICANT CHANGE UP (ref 30–115)
ALP SERPL-CCNC: 111 U/L — SIGNIFICANT CHANGE UP (ref 30–115)
ALT FLD-CCNC: 25 U/L — SIGNIFICANT CHANGE UP (ref 0–41)
ALT FLD-CCNC: 25 U/L — SIGNIFICANT CHANGE UP (ref 0–41)
ANION GAP SERPL CALC-SCNC: 10 MMOL/L — SIGNIFICANT CHANGE UP (ref 7–14)
ANION GAP SERPL CALC-SCNC: 10 MMOL/L — SIGNIFICANT CHANGE UP (ref 7–14)
APPEARANCE UR: CLEAR — SIGNIFICANT CHANGE UP
APPEARANCE UR: CLEAR — SIGNIFICANT CHANGE UP
APTT BLD: 44.7 SEC — HIGH (ref 27–39.2)
APTT BLD: 44.7 SEC — HIGH (ref 27–39.2)
AST SERPL-CCNC: 28 U/L — SIGNIFICANT CHANGE UP (ref 0–41)
AST SERPL-CCNC: 28 U/L — SIGNIFICANT CHANGE UP (ref 0–41)
BACTERIA # UR AUTO: NEGATIVE /HPF — SIGNIFICANT CHANGE UP
BACTERIA # UR AUTO: NEGATIVE /HPF — SIGNIFICANT CHANGE UP
BASOPHILS # BLD AUTO: 0.04 K/UL — SIGNIFICANT CHANGE UP (ref 0–0.2)
BASOPHILS # BLD AUTO: 0.04 K/UL — SIGNIFICANT CHANGE UP (ref 0–0.2)
BASOPHILS NFR BLD AUTO: 0.6 % — SIGNIFICANT CHANGE UP (ref 0–1)
BASOPHILS NFR BLD AUTO: 0.6 % — SIGNIFICANT CHANGE UP (ref 0–1)
BILIRUB SERPL-MCNC: 0.8 MG/DL — SIGNIFICANT CHANGE UP (ref 0.2–1.2)
BILIRUB SERPL-MCNC: 0.8 MG/DL — SIGNIFICANT CHANGE UP (ref 0.2–1.2)
BILIRUB UR-MCNC: NEGATIVE — SIGNIFICANT CHANGE UP
BILIRUB UR-MCNC: NEGATIVE — SIGNIFICANT CHANGE UP
BLD GP AB SCN SERPL QL: SIGNIFICANT CHANGE UP
BLD GP AB SCN SERPL QL: SIGNIFICANT CHANGE UP
BUN SERPL-MCNC: 14 MG/DL — SIGNIFICANT CHANGE UP (ref 10–20)
BUN SERPL-MCNC: 14 MG/DL — SIGNIFICANT CHANGE UP (ref 10–20)
CALCIUM SERPL-MCNC: 10.2 MG/DL — SIGNIFICANT CHANGE UP (ref 8.4–10.5)
CALCIUM SERPL-MCNC: 10.2 MG/DL — SIGNIFICANT CHANGE UP (ref 8.4–10.5)
CAST: 2 /LPF — SIGNIFICANT CHANGE UP (ref 0–4)
CAST: 2 /LPF — SIGNIFICANT CHANGE UP (ref 0–4)
CHLORIDE SERPL-SCNC: 98 MMOL/L — SIGNIFICANT CHANGE UP (ref 98–110)
CHLORIDE SERPL-SCNC: 98 MMOL/L — SIGNIFICANT CHANGE UP (ref 98–110)
CO2 SERPL-SCNC: 29 MMOL/L — SIGNIFICANT CHANGE UP (ref 17–32)
CO2 SERPL-SCNC: 29 MMOL/L — SIGNIFICANT CHANGE UP (ref 17–32)
COLOR SPEC: YELLOW — SIGNIFICANT CHANGE UP
COLOR SPEC: YELLOW — SIGNIFICANT CHANGE UP
CREAT SERPL-MCNC: 0.9 MG/DL — SIGNIFICANT CHANGE UP (ref 0.7–1.5)
CREAT SERPL-MCNC: 0.9 MG/DL — SIGNIFICANT CHANGE UP (ref 0.7–1.5)
DIFF PNL FLD: ABNORMAL
DIFF PNL FLD: ABNORMAL
EGFR: 81 ML/MIN/1.73M2 — SIGNIFICANT CHANGE UP
EGFR: 81 ML/MIN/1.73M2 — SIGNIFICANT CHANGE UP
EOSINOPHIL # BLD AUTO: 0.26 K/UL — SIGNIFICANT CHANGE UP (ref 0–0.7)
EOSINOPHIL # BLD AUTO: 0.26 K/UL — SIGNIFICANT CHANGE UP (ref 0–0.7)
EOSINOPHIL NFR BLD AUTO: 3.6 % — SIGNIFICANT CHANGE UP (ref 0–8)
EOSINOPHIL NFR BLD AUTO: 3.6 % — SIGNIFICANT CHANGE UP (ref 0–8)
GLUCOSE SERPL-MCNC: 81 MG/DL — SIGNIFICANT CHANGE UP (ref 70–99)
GLUCOSE SERPL-MCNC: 81 MG/DL — SIGNIFICANT CHANGE UP (ref 70–99)
GLUCOSE UR QL: >=1000 MG/DL
GLUCOSE UR QL: >=1000 MG/DL
HCT VFR BLD CALC: 44 % — SIGNIFICANT CHANGE UP (ref 37–47)
HCT VFR BLD CALC: 44 % — SIGNIFICANT CHANGE UP (ref 37–47)
HGB BLD-MCNC: 13.9 G/DL — SIGNIFICANT CHANGE UP (ref 12–16)
HGB BLD-MCNC: 13.9 G/DL — SIGNIFICANT CHANGE UP (ref 12–16)
IMM GRANULOCYTES NFR BLD AUTO: 0.3 % — SIGNIFICANT CHANGE UP (ref 0.1–0.3)
IMM GRANULOCYTES NFR BLD AUTO: 0.3 % — SIGNIFICANT CHANGE UP (ref 0.1–0.3)
INR BLD: 2.61 RATIO — HIGH (ref 0.65–1.3)
INR BLD: 2.61 RATIO — HIGH (ref 0.65–1.3)
KETONES UR-MCNC: NEGATIVE MG/DL — SIGNIFICANT CHANGE UP
KETONES UR-MCNC: NEGATIVE MG/DL — SIGNIFICANT CHANGE UP
LEUKOCYTE ESTERASE UR-ACNC: NEGATIVE — SIGNIFICANT CHANGE UP
LEUKOCYTE ESTERASE UR-ACNC: NEGATIVE — SIGNIFICANT CHANGE UP
LYMPHOCYTES # BLD AUTO: 1 K/UL — LOW (ref 1.2–3.4)
LYMPHOCYTES # BLD AUTO: 1 K/UL — LOW (ref 1.2–3.4)
LYMPHOCYTES # BLD AUTO: 13.9 % — LOW (ref 20.5–51.1)
LYMPHOCYTES # BLD AUTO: 13.9 % — LOW (ref 20.5–51.1)
MCHC RBC-ENTMCNC: 26.1 PG — LOW (ref 27–31)
MCHC RBC-ENTMCNC: 26.1 PG — LOW (ref 27–31)
MCHC RBC-ENTMCNC: 31.6 G/DL — LOW (ref 32–37)
MCHC RBC-ENTMCNC: 31.6 G/DL — LOW (ref 32–37)
MCV RBC AUTO: 82.6 FL — SIGNIFICANT CHANGE UP (ref 81–99)
MCV RBC AUTO: 82.6 FL — SIGNIFICANT CHANGE UP (ref 81–99)
MONOCYTES # BLD AUTO: 0.55 K/UL — SIGNIFICANT CHANGE UP (ref 0.1–0.6)
MONOCYTES # BLD AUTO: 0.55 K/UL — SIGNIFICANT CHANGE UP (ref 0.1–0.6)
MONOCYTES NFR BLD AUTO: 7.7 % — SIGNIFICANT CHANGE UP (ref 1.7–9.3)
MONOCYTES NFR BLD AUTO: 7.7 % — SIGNIFICANT CHANGE UP (ref 1.7–9.3)
MRSA PCR RESULT.: NEGATIVE — SIGNIFICANT CHANGE UP
MRSA PCR RESULT.: NEGATIVE — SIGNIFICANT CHANGE UP
NEUTROPHILS # BLD AUTO: 5.3 K/UL — SIGNIFICANT CHANGE UP (ref 1.4–6.5)
NEUTROPHILS # BLD AUTO: 5.3 K/UL — SIGNIFICANT CHANGE UP (ref 1.4–6.5)
NEUTROPHILS NFR BLD AUTO: 73.9 % — SIGNIFICANT CHANGE UP (ref 42.2–75.2)
NEUTROPHILS NFR BLD AUTO: 73.9 % — SIGNIFICANT CHANGE UP (ref 42.2–75.2)
NITRITE UR-MCNC: NEGATIVE — SIGNIFICANT CHANGE UP
NITRITE UR-MCNC: NEGATIVE — SIGNIFICANT CHANGE UP
NRBC # BLD: 0 /100 WBCS — SIGNIFICANT CHANGE UP (ref 0–0)
NRBC # BLD: 0 /100 WBCS — SIGNIFICANT CHANGE UP (ref 0–0)
PH UR: 5 — SIGNIFICANT CHANGE UP (ref 5–8)
PH UR: 5 — SIGNIFICANT CHANGE UP (ref 5–8)
PLATELET # BLD AUTO: 317 K/UL — SIGNIFICANT CHANGE UP (ref 130–400)
PLATELET # BLD AUTO: 317 K/UL — SIGNIFICANT CHANGE UP (ref 130–400)
PMV BLD: 10.4 FL — SIGNIFICANT CHANGE UP (ref 7.4–10.4)
PMV BLD: 10.4 FL — SIGNIFICANT CHANGE UP (ref 7.4–10.4)
POTASSIUM SERPL-MCNC: 4.3 MMOL/L — SIGNIFICANT CHANGE UP (ref 3.5–5)
POTASSIUM SERPL-MCNC: 4.3 MMOL/L — SIGNIFICANT CHANGE UP (ref 3.5–5)
POTASSIUM SERPL-SCNC: 4.3 MMOL/L — SIGNIFICANT CHANGE UP (ref 3.5–5)
POTASSIUM SERPL-SCNC: 4.3 MMOL/L — SIGNIFICANT CHANGE UP (ref 3.5–5)
PROT SERPL-MCNC: 8 G/DL — SIGNIFICANT CHANGE UP (ref 6–8)
PROT SERPL-MCNC: 8 G/DL — SIGNIFICANT CHANGE UP (ref 6–8)
PROT UR-MCNC: SIGNIFICANT CHANGE UP MG/DL
PROT UR-MCNC: SIGNIFICANT CHANGE UP MG/DL
PROTHROM AB SERPL-ACNC: 30.6 SEC — HIGH (ref 9.95–12.87)
PROTHROM AB SERPL-ACNC: 30.6 SEC — HIGH (ref 9.95–12.87)
RBC # BLD: 5.33 M/UL — SIGNIFICANT CHANGE UP (ref 4.2–5.4)
RBC # BLD: 5.33 M/UL — SIGNIFICANT CHANGE UP (ref 4.2–5.4)
RBC # FLD: 16.8 % — HIGH (ref 11.5–14.5)
RBC # FLD: 16.8 % — HIGH (ref 11.5–14.5)
RBC CASTS # UR COMP ASSIST: 3 /HPF — SIGNIFICANT CHANGE UP (ref 0–4)
RBC CASTS # UR COMP ASSIST: 3 /HPF — SIGNIFICANT CHANGE UP (ref 0–4)
SODIUM SERPL-SCNC: 137 MMOL/L — SIGNIFICANT CHANGE UP (ref 135–146)
SODIUM SERPL-SCNC: 137 MMOL/L — SIGNIFICANT CHANGE UP (ref 135–146)
SP GR SPEC: 1.02 — SIGNIFICANT CHANGE UP (ref 1–1.03)
SP GR SPEC: 1.02 — SIGNIFICANT CHANGE UP (ref 1–1.03)
SQUAMOUS # UR AUTO: 4 /HPF — SIGNIFICANT CHANGE UP (ref 0–5)
SQUAMOUS # UR AUTO: 4 /HPF — SIGNIFICANT CHANGE UP (ref 0–5)
UROBILINOGEN FLD QL: 0.2 MG/DL — SIGNIFICANT CHANGE UP (ref 0.2–1)
UROBILINOGEN FLD QL: 0.2 MG/DL — SIGNIFICANT CHANGE UP (ref 0.2–1)
WBC # BLD: 7.17 K/UL — SIGNIFICANT CHANGE UP (ref 4.8–10.8)
WBC # BLD: 7.17 K/UL — SIGNIFICANT CHANGE UP (ref 4.8–10.8)
WBC # FLD AUTO: 7.17 K/UL — SIGNIFICANT CHANGE UP (ref 4.8–10.8)
WBC # FLD AUTO: 7.17 K/UL — SIGNIFICANT CHANGE UP (ref 4.8–10.8)
WBC UR QL: 3 /HPF — SIGNIFICANT CHANGE UP (ref 0–5)
WBC UR QL: 3 /HPF — SIGNIFICANT CHANGE UP (ref 0–5)

## 2023-10-30 PROCEDURE — 87640 STAPH A DNA AMP PROBE: CPT

## 2023-10-30 PROCEDURE — 71046 X-RAY EXAM CHEST 2 VIEWS: CPT

## 2023-10-30 PROCEDURE — 99214 OFFICE O/P EST MOD 30 MIN: CPT | Mod: 25

## 2023-10-30 PROCEDURE — 86901 BLOOD TYPING SEROLOGIC RH(D): CPT

## 2023-10-30 PROCEDURE — 36415 COLL VENOUS BLD VENIPUNCTURE: CPT

## 2023-10-30 PROCEDURE — 80053 COMPREHEN METABOLIC PANEL: CPT

## 2023-10-30 PROCEDURE — 93005 ELECTROCARDIOGRAM TRACING: CPT

## 2023-10-30 PROCEDURE — 85025 COMPLETE CBC W/AUTO DIFF WBC: CPT

## 2023-10-30 PROCEDURE — 86850 RBC ANTIBODY SCREEN: CPT

## 2023-10-30 PROCEDURE — 71046 X-RAY EXAM CHEST 2 VIEWS: CPT | Mod: 26

## 2023-10-30 PROCEDURE — 81001 URINALYSIS AUTO W/SCOPE: CPT

## 2023-10-30 PROCEDURE — 87086 URINE CULTURE/COLONY COUNT: CPT

## 2023-10-30 PROCEDURE — 86900 BLOOD TYPING SEROLOGIC ABO: CPT

## 2023-10-30 PROCEDURE — 85730 THROMBOPLASTIN TIME PARTIAL: CPT

## 2023-10-30 PROCEDURE — 93010 ELECTROCARDIOGRAM REPORT: CPT

## 2023-10-30 PROCEDURE — 87641 MR-STAPH DNA AMP PROBE: CPT

## 2023-10-30 PROCEDURE — 85610 PROTHROMBIN TIME: CPT

## 2023-10-30 NOTE — H&P PST ADULT - NSICDXPASTMEDICALHX_GEN_ALL_CORE_FT
PAST MEDICAL HISTORY:  Aortic valve disease     Atrial fibrillation     HTN (hypertension)     Hypertrophic cardiomyopathy     Obese

## 2023-10-30 NOTE — H&P PST ADULT - NSICDXPASTSURGICALHX_GEN_ALL_CORE_FT
PAST SURGICAL HISTORY:  S/P AVR (aortic valve replacement)     S/P transesophageal echocardiogram (MINDI)

## 2023-10-30 NOTE — H&P PST ADULT - REASON FOR ADMISSION
41 Y/O FEMALE HERE FOR PRE-ADMISSION SURGICAL TESTING. PATIENT REPORTS H/O HOCM DIAGNOSED 11 YEARS AGO. SHE WAS FOLLOWING UP WITH DR. CORBETT. IN APRIL, SHE WAS GETTING PROGRESSIVE SOB, AS WELL AS HER HR WAS IN "S. SHE WAS IN AFIB/AFLUTTER. SHE HAD A CARDIAC WORKUP, WHICH REVEALED AORTIC VALCE FIBROELASTOMA. S/P AVR 6/29/23. SHE WAS PLACED ON WARFARIN AND HAD AN AFIB ABLATION. SHE STATES SHE NEEDS AN AICD.  NOW FOR SCHEDULED AICD   SHE HAS AN APT WITH COUMDIN CLINIC ON 11/3/23 FOR INR CHECK.

## 2023-10-31 DIAGNOSIS — Z01.818 ENCOUNTER FOR OTHER PREPROCEDURAL EXAMINATION: ICD-10-CM

## 2023-10-31 DIAGNOSIS — I50.22 CHRONIC SYSTOLIC (CONGESTIVE) HEART FAILURE: ICD-10-CM

## 2023-11-01 ENCOUNTER — OUTPATIENT (OUTPATIENT)
Dept: OUTPATIENT SERVICES | Facility: HOSPITAL | Age: 43
LOS: 1 days | End: 2023-11-01
Payer: COMMERCIAL

## 2023-11-01 ENCOUNTER — APPOINTMENT (OUTPATIENT)
Age: 43
End: 2023-11-01

## 2023-11-01 DIAGNOSIS — I35.1 NONRHEUMATIC AORTIC (VALVE) INSUFFICIENCY: ICD-10-CM

## 2023-11-01 DIAGNOSIS — Z95.2 PRESENCE OF PROSTHETIC HEART VALVE: Chronic | ICD-10-CM

## 2023-11-01 DIAGNOSIS — Z98.890 OTHER SPECIFIED POSTPROCEDURAL STATES: Chronic | ICD-10-CM

## 2023-11-01 PROCEDURE — 93798 PHYS/QHP OP CAR RHAB W/ECG: CPT

## 2023-11-02 ENCOUNTER — APPOINTMENT (OUTPATIENT)
Dept: CARDIOLOGY | Facility: CLINIC | Age: 43
End: 2023-11-02
Payer: COMMERCIAL

## 2023-11-02 VITALS
HEIGHT: 61 IN | WEIGHT: 167 LBS | DIASTOLIC BLOOD PRESSURE: 70 MMHG | HEART RATE: 72 BPM | OXYGEN SATURATION: 96 % | SYSTOLIC BLOOD PRESSURE: 112 MMHG | BODY MASS INDEX: 31.53 KG/M2

## 2023-11-02 DIAGNOSIS — I35.1 NONRHEUMATIC AORTIC (VALVE) INSUFFICIENCY: ICD-10-CM

## 2023-11-02 PROCEDURE — 99214 OFFICE O/P EST MOD 30 MIN: CPT

## 2023-11-03 ENCOUNTER — OUTPATIENT (OUTPATIENT)
Dept: OUTPATIENT SERVICES | Facility: HOSPITAL | Age: 43
LOS: 1 days | End: 2023-11-03
Payer: COMMERCIAL

## 2023-11-03 ENCOUNTER — APPOINTMENT (OUTPATIENT)
Age: 43
End: 2023-11-03

## 2023-11-03 ENCOUNTER — APPOINTMENT (OUTPATIENT)
Dept: MEDICATION MANAGEMENT | Facility: CLINIC | Age: 43
End: 2023-11-03

## 2023-11-03 ENCOUNTER — OUTPATIENT (OUTPATIENT)
Dept: OUTPATIENT SERVICES | Facility: HOSPITAL | Age: 43
LOS: 1 days | End: 2023-11-03

## 2023-11-03 DIAGNOSIS — Z95.2 PRESENCE OF PROSTHETIC HEART VALVE: Chronic | ICD-10-CM

## 2023-11-03 DIAGNOSIS — I35.1 NONRHEUMATIC AORTIC (VALVE) INSUFFICIENCY: ICD-10-CM

## 2023-11-03 DIAGNOSIS — Z98.890 OTHER SPECIFIED POSTPROCEDURAL STATES: Chronic | ICD-10-CM

## 2023-11-03 DIAGNOSIS — Z79.01 LONG TERM (CURRENT) USE OF ANTICOAGULANTS: ICD-10-CM

## 2023-11-03 DIAGNOSIS — I48.91 UNSPECIFIED ATRIAL FIBRILLATION: ICD-10-CM

## 2023-11-03 PROBLEM — I10 ESSENTIAL (PRIMARY) HYPERTENSION: Chronic | Status: ACTIVE | Noted: 2023-10-30

## 2023-11-03 LAB
INR PPP: 1.8 RATIO
POCT-PROTHROMBIN TIME: 21.6 SECS
QUALITY CONTROL: YES

## 2023-11-03 PROCEDURE — 85610 PROTHROMBIN TIME: CPT

## 2023-11-03 PROCEDURE — 99211 OFF/OP EST MAY X REQ PHY/QHP: CPT

## 2023-11-03 PROCEDURE — 36416 COLLJ CAPILLARY BLOOD SPEC: CPT

## 2023-11-04 DIAGNOSIS — Z79.01 LONG TERM (CURRENT) USE OF ANTICOAGULANTS: ICD-10-CM

## 2023-11-04 DIAGNOSIS — I48.91 UNSPECIFIED ATRIAL FIBRILLATION: ICD-10-CM

## 2023-11-06 ENCOUNTER — OUTPATIENT (OUTPATIENT)
Dept: OUTPATIENT SERVICES | Facility: HOSPITAL | Age: 43
LOS: 1 days | End: 2023-11-06
Payer: COMMERCIAL

## 2023-11-06 ENCOUNTER — APPOINTMENT (OUTPATIENT)
Dept: MEDICATION MANAGEMENT | Facility: CLINIC | Age: 43
End: 2023-11-06

## 2023-11-06 ENCOUNTER — APPOINTMENT (OUTPATIENT)
Age: 43
End: 2023-11-06

## 2023-11-06 ENCOUNTER — OUTPATIENT (OUTPATIENT)
Dept: OUTPATIENT SERVICES | Facility: HOSPITAL | Age: 43
LOS: 1 days | End: 2023-11-06

## 2023-11-06 DIAGNOSIS — Z98.890 OTHER SPECIFIED POSTPROCEDURAL STATES: Chronic | ICD-10-CM

## 2023-11-06 DIAGNOSIS — Z95.2 PRESENCE OF PROSTHETIC HEART VALVE: Chronic | ICD-10-CM

## 2023-11-06 DIAGNOSIS — Z79.01 LONG TERM (CURRENT) USE OF ANTICOAGULANTS: ICD-10-CM

## 2023-11-06 DIAGNOSIS — I35.1 NONRHEUMATIC AORTIC (VALVE) INSUFFICIENCY: ICD-10-CM

## 2023-11-06 DIAGNOSIS — I48.91 UNSPECIFIED ATRIAL FIBRILLATION: ICD-10-CM

## 2023-11-06 LAB
INR PPP: 2.6 RATIO
POCT-PROTHROMBIN TIME: 30.8 SECS
QUALITY CONTROL: YES

## 2023-11-06 PROCEDURE — 36416 COLLJ CAPILLARY BLOOD SPEC: CPT

## 2023-11-06 PROCEDURE — 85610 PROTHROMBIN TIME: CPT

## 2023-11-06 PROCEDURE — 99211 OFF/OP EST MAY X REQ PHY/QHP: CPT

## 2023-11-07 ENCOUNTER — INPATIENT (INPATIENT)
Facility: HOSPITAL | Age: 43
LOS: 0 days | Discharge: HOME | DRG: 277 | End: 2023-11-08
Attending: STUDENT IN AN ORGANIZED HEALTH CARE EDUCATION/TRAINING PROGRAM | Admitting: STUDENT IN AN ORGANIZED HEALTH CARE EDUCATION/TRAINING PROGRAM
Payer: COMMERCIAL

## 2023-11-07 ENCOUNTER — APPOINTMENT (OUTPATIENT)
Dept: ELECTROPHYSIOLOGY | Facility: HOSPITAL | Age: 43
End: 2023-11-07

## 2023-11-07 ENCOUNTER — TRANSCRIPTION ENCOUNTER (OUTPATIENT)
Age: 43
End: 2023-11-07

## 2023-11-07 VITALS
SYSTOLIC BLOOD PRESSURE: 137 MMHG | WEIGHT: 164.02 LBS | DIASTOLIC BLOOD PRESSURE: 70 MMHG | HEART RATE: 68 BPM | RESPIRATION RATE: 18 BRPM | HEIGHT: 61 IN | OXYGEN SATURATION: 96 % | TEMPERATURE: 98 F

## 2023-11-07 DIAGNOSIS — I50.22 CHRONIC SYSTOLIC (CONGESTIVE) HEART FAILURE: ICD-10-CM

## 2023-11-07 DIAGNOSIS — Z98.890 OTHER SPECIFIED POSTPROCEDURAL STATES: Chronic | ICD-10-CM

## 2023-11-07 DIAGNOSIS — I42.2 OTHER HYPERTROPHIC CARDIOMYOPATHY: ICD-10-CM

## 2023-11-07 DIAGNOSIS — I48.91 UNSPECIFIED ATRIAL FIBRILLATION: ICD-10-CM

## 2023-11-07 DIAGNOSIS — Z79.01 LONG TERM (CURRENT) USE OF ANTICOAGULANTS: ICD-10-CM

## 2023-11-07 DIAGNOSIS — Z95.2 PRESENCE OF PROSTHETIC HEART VALVE: Chronic | ICD-10-CM

## 2023-11-07 LAB
APTT BLD: 42.9 SEC — HIGH (ref 27–39.2)
APTT BLD: 42.9 SEC — HIGH (ref 27–39.2)
INR BLD: 2.52 RATIO — HIGH (ref 0.65–1.3)
INR BLD: 2.52 RATIO — HIGH (ref 0.65–1.3)
PROTHROM AB SERPL-ACNC: 29 SEC — HIGH (ref 9.95–12.87)
PROTHROM AB SERPL-ACNC: 29 SEC — HIGH (ref 9.95–12.87)

## 2023-11-07 PROCEDURE — 71045 X-RAY EXAM CHEST 1 VIEW: CPT | Mod: 26

## 2023-11-07 PROCEDURE — 36415 COLL VENOUS BLD VENIPUNCTURE: CPT

## 2023-11-07 PROCEDURE — 71045 X-RAY EXAM CHEST 1 VIEW: CPT

## 2023-11-07 PROCEDURE — 93005 ELECTROCARDIOGRAM TRACING: CPT

## 2023-11-07 PROCEDURE — 85610 PROTHROMBIN TIME: CPT

## 2023-11-07 PROCEDURE — 33270 INS/REP SUBQ DEFIBRILLATOR: CPT

## 2023-11-07 PROCEDURE — 71046 X-RAY EXAM CHEST 2 VIEWS: CPT

## 2023-11-07 RX ORDER — WARFARIN SODIUM 2.5 MG/1
2 TABLET ORAL ONCE
Refills: 0 | Status: COMPLETED | OUTPATIENT
Start: 2023-11-07 | End: 2023-11-07

## 2023-11-07 RX ORDER — INFLUENZA VIRUS VACCINE 15; 15; 15; 15 UG/.5ML; UG/.5ML; UG/.5ML; UG/.5ML
0.5 SUSPENSION INTRAMUSCULAR ONCE
Refills: 0 | Status: DISCONTINUED | OUTPATIENT
Start: 2023-11-07 | End: 2023-11-08

## 2023-11-07 RX ORDER — FUROSEMIDE 40 MG
20 TABLET ORAL DAILY
Refills: 0 | Status: DISCONTINUED | OUTPATIENT
Start: 2023-11-07 | End: 2023-11-08

## 2023-11-07 RX ORDER — SODIUM CHLORIDE 9 MG/ML
250 INJECTION INTRAMUSCULAR; INTRAVENOUS; SUBCUTANEOUS ONCE
Refills: 0 | Status: COMPLETED | OUTPATIENT
Start: 2023-11-07 | End: 2023-11-07

## 2023-11-07 RX ORDER — METOPROLOL TARTRATE 50 MG
50 TABLET ORAL
Refills: 0 | Status: DISCONTINUED | OUTPATIENT
Start: 2023-11-07 | End: 2023-11-08

## 2023-11-07 RX ORDER — MORPHINE SULFATE 50 MG/1
1 CAPSULE, EXTENDED RELEASE ORAL EVERY 6 HOURS
Refills: 0 | Status: DISCONTINUED | OUTPATIENT
Start: 2023-11-07 | End: 2023-11-08

## 2023-11-07 RX ORDER — ONDANSETRON 8 MG/1
4 TABLET, FILM COATED ORAL ONCE
Refills: 0 | Status: COMPLETED | OUTPATIENT
Start: 2023-11-07 | End: 2023-11-07

## 2023-11-07 RX ORDER — FERROUS SULFATE 325(65) MG
325 TABLET ORAL DAILY
Refills: 0 | Status: DISCONTINUED | OUTPATIENT
Start: 2023-11-07 | End: 2023-11-08

## 2023-11-07 RX ORDER — CEFAZOLIN SODIUM 1 G
1000 VIAL (EA) INJECTION ONCE
Refills: 0 | Status: COMPLETED | OUTPATIENT
Start: 2023-11-07 | End: 2023-11-07

## 2023-11-07 RX ORDER — CEFAZOLIN SODIUM 1 G
2000 VIAL (EA) INJECTION ONCE
Refills: 0 | Status: COMPLETED | OUTPATIENT
Start: 2023-11-07 | End: 2023-11-07

## 2023-11-07 RX ORDER — VANCOMYCIN HCL 1 G
1000 VIAL (EA) INTRAVENOUS EVERY 12 HOURS
Refills: 0 | Status: COMPLETED | OUTPATIENT
Start: 2023-11-07 | End: 2023-11-08

## 2023-11-07 RX ORDER — WARFARIN SODIUM 2.5 MG/1
1 TABLET ORAL DAILY
Refills: 0 | Status: DISCONTINUED | OUTPATIENT
Start: 2023-11-07 | End: 2023-11-07

## 2023-11-07 RX ORDER — ACETAMINOPHEN 500 MG
650 TABLET ORAL EVERY 6 HOURS
Refills: 0 | Status: DISCONTINUED | OUTPATIENT
Start: 2023-11-07 | End: 2023-11-08

## 2023-11-07 RX ORDER — DAPAGLIFLOZIN 10 MG/1
10 TABLET, FILM COATED ORAL DAILY
Refills: 0 | Status: DISCONTINUED | OUTPATIENT
Start: 2023-11-07 | End: 2023-11-08

## 2023-11-07 RX ADMIN — ONDANSETRON 4 MILLIGRAM(S): 8 TABLET, FILM COATED ORAL at 19:05

## 2023-11-07 RX ADMIN — WARFARIN SODIUM 2 MILLIGRAM(S): 2.5 TABLET ORAL at 22:06

## 2023-11-07 RX ADMIN — Medication 100 MILLIGRAM(S): at 15:50

## 2023-11-07 RX ADMIN — Medication 650 MILLIGRAM(S): at 19:32

## 2023-11-07 RX ADMIN — Medication 650 MILLIGRAM(S): at 19:06

## 2023-11-07 RX ADMIN — SODIUM CHLORIDE 250 MILLILITER(S): 9 INJECTION INTRAMUSCULAR; INTRAVENOUS; SUBCUTANEOUS at 19:06

## 2023-11-07 RX ADMIN — Medication 100 MILLIGRAM(S): at 16:58

## 2023-11-07 RX ADMIN — Medication 250 MILLIGRAM(S): at 22:56

## 2023-11-07 NOTE — DISCHARGE NOTE PROVIDER - NSDCCPCAREPLAN_GEN_ALL_CORE_FT
PRINCIPAL DISCHARGE DIAGNOSIS  Diagnosis: HOCM (hypertrophic obstructive cardiomyopathy)  Assessment and Plan of Treatment:

## 2023-11-07 NOTE — DISCHARGE NOTE PROVIDER - NSDCCPTREATMENT_GEN_ALL_CORE_FT
PRINCIPAL PROCEDURE  Procedure: Insertion, ICD, dual chamber, w abd subcutaneous pocket creation for pulse generator, using femoral approach for electrode lead insertion  Findings and Treatment: - Please take Keflex 500 mg (Bactrim / doxycycline 100 mg) twice daily for 5 days.  - You should restart your Warfarin on 11/7/23.  - Do not shower for 1 week.  - Do not drive or operate heavy machinery for 48 hours.  - Do not submerge in water (example: baths, swimming) for 1 month.  - Do not lift your left arm greater than shoulder height for 6 weeks.  - Do not lift anything heavier than 5-10 lbs with your left arm for 6 weeks.  - Any sudden swelling, redness, fever, discharge, or severe pain, call the Electrophysiology Office at [phone].     PRINCIPAL PROCEDURE  Procedure: Insertion, ICD, dual chamber, w abd subcutaneous pocket creation for pulse generator, using femoral approach for electrode lead insertion  Findings and Treatment: - You should restart your Warfarin on 11/7/23.  - Do not shower for 1 week.  - Do not drive or operate heavy machinery for 48 hours.  - Do not submerge in water (example: baths, swimming) for 1 month.  - Do not lift your left arm greater than shoulder height for 6 weeks.  - Do not lift anything heavier than 5-10 lbs with your left arm for 6 weeks.  - Any sudden swelling, redness, fever, discharge, or severe pain, call the Electrophysiology Office at [phone].

## 2023-11-07 NOTE — DISCHARGE NOTE PROVIDER - ATTENDING DISCHARGE PHYSICAL EXAMINATION:
Incision site at the left midaxillary line is clean, dry, and intact with no hematoma or tenderness.

## 2023-11-07 NOTE — PATIENT PROFILE ADULT - FALL HARM RISK - HARM RISK INTERVENTIONS
Assistance with ambulation/Assistance OOB with selected safe patient handling equipment/Communicate Risk of Fall with Harm to all staff/Discuss with provider need for PT consult/Monitor gait and stability/Provide patient with walking aids - walker, cane, crutches/Reinforce activity limits and safety measures with patient and family/Sit up slowly, dangle for a short time, stand at bedside before walking/Tailored Fall Risk Interventions/Use of alarms - bed, chair and/or voice tab/Visual Cue: Yellow wristband and red socks/Bed in lowest position, wheels locked, appropriate side rails in place/Call bell, personal items and telephone in reach/Instruct patient to call for assistance before getting out of bed or chair/Non-slip footwear when patient is out of bed/[place] to call system/Physically safe environment - no spills, clutter or unnecessary equipment/Purposeful Proactive Rounding/Room/bathroom lighting operational, light cord in reach

## 2023-11-07 NOTE — ASU PATIENT PROFILE, ADULT - FALL HARM RISK - UNIVERSAL INTERVENTIONS
Bed in lowest position, wheels locked, appropriate side rails in place/Call bell, personal items and telephone in reach/Instruct patient to call for assistance before getting out of bed or chair/Non-slip footwear when patient is out of bed/[place] to call system/Physically safe environment - no spills, clutter or unnecessary equipment/Purposeful Proactive Rounding/Room/bathroom lighting operational, light cord in reach

## 2023-11-07 NOTE — DISCHARGE NOTE PROVIDER - PROVIDER TOKENS
PROVIDER:[TOKEN:[[id number]],FOLLOWUP:[1 week],ESTABLISHEDPATIENT:[T]] PROVIDER:[TOKEN:[[id number]],SCHEDULEDAPPT:[11/16/2023],SCHEDULEDAPPTTIME:[10:00 AM],ESTABLISHEDPATIENT:[T]]

## 2023-11-07 NOTE — CHART NOTE - NSCHARTNOTEFT_GEN_A_CORE
PACU ANESTHESIA ADMISSION NOTE      Procedure: Subcutaneous AICD placement   Post op diagnosis:      ____  Intubated  TV:______       Rate: ______      FiO2: ______    __X__  Patent Airway    __X__  Full return of protective reflexes    ____  Full recovery from anesthesia / back to baseline status    Vitals:  T: 97.8F  HR: 69  BP: 94/73  RR: 17  SpO2: 95%    Mental Status:  _X___ Awake   _____ Alert   _____ Drowsy   _____ Sedated    Nausea/Vomiting:  ___X_ NO  ______Yes,   See Post - Op Orders          Pain Scale (0-10):  _____    Treatment: ____ None    ___X_ See Post - Op/PCA Orders    Post - Operative Fluids:   ____ Oral   __X__ See Post - Op Orders    Plan: Discharge:   ____Home       _____Floor     ___X__Critical Care    _____  Other:_________________    Comments: pt. tolerated procedure well, transported to PACU, report endorsed to Rn

## 2023-11-07 NOTE — DISCHARGE NOTE PROVIDER - HOSPITAL COURSE
42 year-year old female with history of HOCM with DEN, paroxysmal atrial fibrillation (on warfarin) presented to [facility] for ICD implantation. Patient found to have NSVT on monitor, given hx of HOCM, pt needs ICD for primary prevention for SCD. Also MRI showed multiple LGE. She is a candidate for ICD as a class 1 indication based upon current HRS/ACC guidelines.    On11/7/23 patient underwent successful [employer] subcutaneous ICD implantation. Patient was monitored overnight. On POD 1 patient remains HD stable with no complaints. Examination of  pocket is C/D/I with no hematoma or erythema. Device interrogation reveals normal device function and CXR was negative for pneumothorax. Patient is being DC home in stable condition. 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 Patient found to have NSVT on monitor, cardiac MRI revealing LGE, and pt was utilizing Life Vest at home.   On 11/7/23 patient underwent successful [employer] subcutaneous ICD implantation. Patient was monitored overnight. On POD 1 patient remains HD stable with no complaints. Examination of  pocket is C/D/I with no hematoma or erythema. Device interrogation reveals normal device function and CXR was negative for pneumothorax. Patient is being DC home in stable condition.

## 2023-11-07 NOTE — DISCHARGE NOTE PROVIDER - CARE PROVIDER_API CALL
Brennan Tyler  Cardiac Electrophysiology  [address] [place] [id number]  Phone: [phone]  Fax: [phone]  Established Patient  Follow Up Time: 1 week   Brennan Tyler  Cardiac Electrophysiology  [address] [place] [id number]  Phone: [phone]  Fax: [phone]  Established Patient  Scheduled Appointment: 11/16/2023 10:00 AM

## 2023-11-07 NOTE — DISCHARGE NOTE PROVIDER - NSDCMRMEDTOKEN_GEN_ALL_CORE_FT
Farxiga 10 mg oral tablet: 1 tab(s) orally once a day  ferrous sulfate 325 mg (65 mg elemental iron) oral delayed release tablet: 1 tab(s) orally once a day  furosemide 20 mg oral tablet: 1 tab(s) orally once a day  metoprolol succinate 50 mg oral capsule, extended release: 1 cap(s) orally 2 times a day  warfarin 1 mg oral tablet: 1 tab(s) orally once a day (at bedtime)   Farxiga 10 mg oral tablet: 1 tab(s) orally once a day  ferrous sulfate 325 mg (65 mg elemental iron) oral delayed release tablet: 1 tab(s) orally once a day  furosemide 20 mg oral tablet: 1 tab(s) orally once a day  metoprolol succinate 50 mg oral capsule, extended release: 1 cap(s) orally 2 times a day  Tylenol 325 mg oral tablet: 2 tab(s) orally every 6 hours as needed for Mild Pain (1 - 3), Moderate Pain (4 - 6) stop after 3 days  warfarin 1 mg oral tablet: 1 tab(s) orally once a day (at bedtime)

## 2023-11-07 NOTE — PATIENT PROFILE ADULT - FUNCTIONAL ASSESSMENT - BASIC MOBILITY ASSESSMENT TYPE
Maintain hydration with small amounts of fluid frequently. Avoid citrus, dairy, caffeine, tomato, alcohol and NSAIDs. Advance the diet by including bland foods like toast, oatmeal, eggs, soup etc. As soon as possible. Begin application of 14 mg nicotine patches to be changed every 24 hours as soon as the 30-day course of 21 mg patches has been completed. I advised her to contact the office if her condition worsens, changes, fails to improve as anticipated and with any new problems. She expressed understanding with the diagnosis(es) and plan.
Admission

## 2023-11-07 NOTE — PRE-ANESTHESIA EVALUATION ADULT - NSRADCARDRESULTSFT_GEN_ALL_CORE
TTE 8/13/23  Summary:   1. Left ventricular ejection fraction, by visualestimation, is 50 to   55%.   2. Low normal global left ventricular systolic function.   3. Severe asymmetric left ventricular hypertrophy involving the septal   wall, c/w HOCM.   4. Spectral Doppler shows pseudonormal pattern of left ventricular   myocardial filling (Grade II diastolic dysfunction).   5. Elevated mean left atrial pressure.   6. Severely enlarged left atrium.   7. Mildly reduced RV systolic function.   8. Moderately enlarged right atrium.   9. Moderate pleural effusion in the right lateral region.  10. Mild anterior leaflet systolic anterior motion of the mitral valve.  11. Mild to moderate mitral valve regurgitation.  12. Mild tricuspid regurgitation.  13. Bileaflet mechanical prosthesis in the aortic valve position.  14. Mild aortic regurgitation.

## 2023-11-08 ENCOUNTER — APPOINTMENT (OUTPATIENT)
Age: 43
End: 2023-11-08

## 2023-11-08 ENCOUNTER — TRANSCRIPTION ENCOUNTER (OUTPATIENT)
Age: 43
End: 2023-11-08

## 2023-11-08 VITALS
DIASTOLIC BLOOD PRESSURE: 58 MMHG | RESPIRATION RATE: 18 BRPM | SYSTOLIC BLOOD PRESSURE: 88 MMHG | HEART RATE: 68 BPM | OXYGEN SATURATION: 91 % | TEMPERATURE: 98 F

## 2023-11-08 LAB
INR BLD: 2.92 RATIO — HIGH (ref 0.65–1.3)
INR BLD: 2.92 RATIO — HIGH (ref 0.65–1.3)
PROTHROM AB SERPL-ACNC: 33.6 SEC — HIGH (ref 9.95–12.87)
PROTHROM AB SERPL-ACNC: 33.6 SEC — HIGH (ref 9.95–12.87)

## 2023-11-08 PROCEDURE — 99239 HOSP IP/OBS DSCHRG MGMT >30: CPT

## 2023-11-08 PROCEDURE — 71046 X-RAY EXAM CHEST 2 VIEWS: CPT | Mod: 26

## 2023-11-08 PROCEDURE — 93010 ELECTROCARDIOGRAM REPORT: CPT

## 2023-11-08 RX ORDER — ACETAMINOPHEN 500 MG
2 TABLET ORAL
Qty: 0 | Refills: 0 | DISCHARGE
Start: 2023-11-08

## 2023-11-08 RX ADMIN — Medication 650 MILLIGRAM(S): at 04:22

## 2023-11-08 RX ADMIN — Medication 650 MILLIGRAM(S): at 12:25

## 2023-11-08 RX ADMIN — Medication 650 MILLIGRAM(S): at 04:52

## 2023-11-08 RX ADMIN — Medication 250 MILLIGRAM(S): at 11:35

## 2023-11-08 RX ADMIN — DAPAGLIFLOZIN 10 MILLIGRAM(S): 10 TABLET, FILM COATED ORAL at 11:36

## 2023-11-08 RX ADMIN — Medication 50 MILLIGRAM(S): at 05:45

## 2023-11-08 RX ADMIN — Medication 325 MILLIGRAM(S): at 11:36

## 2023-11-08 RX ADMIN — Medication 650 MILLIGRAM(S): at 11:36

## 2023-11-08 RX ADMIN — Medication 20 MILLIGRAM(S): at 05:45

## 2023-11-08 NOTE — DISCHARGE NOTE NURSING/CASE MANAGEMENT/SOCIAL WORK - NSDCPEFALRISK_GEN_ALL_CORE
For information on Fall & Injury Prevention, visit: [email or web address]-[email or web address]-mobility OR  [email or web address].[email or web address]/[email or web address]-injury OR  https://www.cdc.gov/steadi/patient.html

## 2023-11-08 NOTE — PROGRESS NOTE ADULT - SUBJECTIVE AND OBJECTIVE BOX
INTERVAL HPI/OVERNIGHT EVENTS:    Patient s/p SubQ ICD implant  No event over night. Pt without complains    MEDICATIONS  (STANDING):  dapagliflozin 10 milliGRAM(s) Oral daily  ferrous    sulfate 325 milliGRAM(s) Oral daily  furosemide    Tablet 20 milliGRAM(s) Oral daily  influenza   Vaccine 0.5 milliLiter(s) IntraMuscular once  metoprolol succinate ER 50 milliGRAM(s) Oral two times a day  vancomycin  IVPB 1000 milliGRAM(s) IV Intermittent every 12 hours    MEDICATIONS  (PRN):  acetaminophen     Tablet .. 650 milliGRAM(s) Oral every 6 hours PRN Mild Pain (1 - 3), Moderate Pain (4 - 6)  morphine  - Injectable 1 milliGRAM(s) IV Push every 6 hours PRN Severe Pain (7 - 10)      Allergies    No Known Allergies    Intolerances          Vital Signs Last 24 Hrs  T(C): 36.4 (08 Nov 2023 08:00), Max: 36.9 (07 Nov 2023 11:13)  T(F): 97.6 (08 Nov 2023 08:00), Max: 98.4 (07 Nov 2023 11:13)  HR: 68 (08 Nov 2023 08:00) (68 - 82)  BP: 88/58 (08 Nov 2023 08:00) (84/49 - 137/70)  BP(mean): 68 (08 Nov 2023 08:00) (54 - 69)  RR: 18 (08 Nov 2023 08:00) (15 - 20)  SpO2: 91% (08 Nov 2023 08:00) (91% - 98%)    Parameters below as of 08 Nov 2023 08:00  Patient On (Oxygen Delivery Method): room air        GENERAL: In no apparent distress, well nourished, and hydrated.  HEART: Regular rate and rhythm; No murmurs, rubs, or gallops.  	Wound healing well; No hematoma; no bleeding  PULMONARY: Clear to auscultation and perfusion.  No rales, wheezing, or rhonchi bilaterally.  ABDOMEN: Soft, Nontender, Nondistended; Bowel sounds present  EXTREMITIES:  2+ Peripheral Pulses, No clubbing, cyanosis, or edema  NEUROLOGICAL: Grossly nonfocal    12 Lead ECG:   Ventricular Rate 70 BPM    Atrial Rate 70 BPM    P-R Interval 228 ms    QRS Duration 128 ms    Q-T Interval 444 ms    QTC Calculation(Bazett) 479 ms    P Axis 75 degrees    R Axis -56 degrees    T Axis 99 degrees    Diagnosis Line Sinus rhythm [id number] degree A-V block  Right atrial enlargement  Left axis deviation  Right bundle branch block  Minimal voltage criteria for LVH, may be normal variant  Abnormal ECG    Confirmed by Danielle Alexandra ([id number]) on 11/8/2023 9:13:06 AM (11-08-23 @ 07:19)      RADIOLOGY & ADDITIONAL TESTS:  Xray Chest 2 Views PA/Lat:   ACC: [id number] EXAM:  XR CHEST PA LAT 2V   ORDERED BY: KENISHA ARELLANO     PROCEDURE DATE:  11/08/2023          INTERPRETATION:  Clinical History / Reason for exam: Cardiac dysrhythmia    Comparison : Chest radiograph prior day.    Technique/Positioning: Frontal and lateral.    Findings:    Support devices: Stable in appearance    Cardiac/mediastinum/hilum: Cardiomegaly. Status post median sternotomy.    Lung parenchyma/Pleura: Bilateral effusions    Skeleton/soft tissues: Stable    Impression:    Cardiomegaly with CHF. Support devices as described, stable.        --- End of Report ---            FINA MUNIZ MD; Attending Interventional Radiologist  This document has been electronically signed. Nov 8 2023  8:22AM (11-08-23 @ 07:54)      A/P   Patient s/p SubQ ICD implant [place]     - CXR as above   - FU in the EP office for wound check with ___NP 11/16 @10    No Heavy lifting >5 lbs. Do not raise your arm above shoulder level for 4-6 weeks.   No driving for 2weeks  No shower, bathtub, no wetting device site until follow up visit .  Do NOT remove dressing until that time, leave Steri-strips in place

## 2023-11-08 NOTE — DISCHARGE NOTE NURSING/CASE MANAGEMENT/SOCIAL WORK - PATIENT PORTAL LINK FT
You can access the FollowMyHealth Patient Portal offered by [facility] by registering at the following website: [email or web address]. By joining [facility]’s FollowMyHealth portal, you will also be able to view your health information using other applications (apps) compatible with our system.

## 2023-11-10 ENCOUNTER — APPOINTMENT (OUTPATIENT)
Age: 43
End: 2023-11-10

## 2023-11-10 ENCOUNTER — APPOINTMENT (OUTPATIENT)
Dept: MEDICATION MANAGEMENT | Facility: CLINIC | Age: 43
End: 2023-11-10

## 2023-11-10 DIAGNOSIS — Z79.01 LONG TERM (CURRENT) USE OF ANTICOAGULANTS: ICD-10-CM

## 2023-11-10 DIAGNOSIS — Z95.2 PRESENCE OF PROSTHETIC HEART VALVE: ICD-10-CM

## 2023-11-10 DIAGNOSIS — I50.32 CHRONIC DIASTOLIC (CONGESTIVE) HEART FAILURE: ICD-10-CM

## 2023-11-10 DIAGNOSIS — I47.29 OTHER VENTRICULAR TACHYCARDIA: ICD-10-CM

## 2023-11-10 DIAGNOSIS — E66.9 OBESITY, UNSPECIFIED: ICD-10-CM

## 2023-11-10 DIAGNOSIS — I48.0 PAROXYSMAL ATRIAL FIBRILLATION: ICD-10-CM

## 2023-11-10 DIAGNOSIS — I34.89 OTHER NONRHEUMATIC MITRAL VALVE DISORDERS: ICD-10-CM

## 2023-11-10 DIAGNOSIS — E78.5 HYPERLIPIDEMIA, UNSPECIFIED: ICD-10-CM

## 2023-11-10 DIAGNOSIS — I42.1 OBSTRUCTIVE HYPERTROPHIC CARDIOMYOPATHY: ICD-10-CM

## 2023-11-10 DIAGNOSIS — I11.0 HYPERTENSIVE HEART DISEASE WITH HEART FAILURE: ICD-10-CM

## 2023-11-13 ENCOUNTER — APPOINTMENT (OUTPATIENT)
Age: 43
End: 2023-11-13

## 2023-11-13 ENCOUNTER — OUTPATIENT (OUTPATIENT)
Dept: OUTPATIENT SERVICES | Facility: HOSPITAL | Age: 43
LOS: 1 days | End: 2023-11-13
Payer: COMMERCIAL

## 2023-11-13 ENCOUNTER — APPOINTMENT (OUTPATIENT)
Dept: MEDICATION MANAGEMENT | Facility: CLINIC | Age: 43
End: 2023-11-13

## 2023-11-13 DIAGNOSIS — Z95.2 PRESENCE OF PROSTHETIC HEART VALVE: Chronic | ICD-10-CM

## 2023-11-13 DIAGNOSIS — Z79.01 LONG TERM (CURRENT) USE OF ANTICOAGULANTS: ICD-10-CM

## 2023-11-13 DIAGNOSIS — Z98.890 OTHER SPECIFIED POSTPROCEDURAL STATES: Chronic | ICD-10-CM

## 2023-11-13 DIAGNOSIS — I48.91 UNSPECIFIED ATRIAL FIBRILLATION: ICD-10-CM

## 2023-11-13 LAB
INR PPP: 2.43
PT BLD: 27

## 2023-11-13 PROCEDURE — G0463: CPT

## 2023-11-14 DIAGNOSIS — Z79.01 LONG TERM (CURRENT) USE OF ANTICOAGULANTS: ICD-10-CM

## 2023-11-14 DIAGNOSIS — I48.91 UNSPECIFIED ATRIAL FIBRILLATION: ICD-10-CM

## 2023-11-15 ENCOUNTER — APPOINTMENT (OUTPATIENT)
Age: 43
End: 2023-11-15

## 2023-11-16 ENCOUNTER — APPOINTMENT (OUTPATIENT)
Dept: ELECTROPHYSIOLOGY | Facility: CLINIC | Age: 43
End: 2023-11-16
Payer: COMMERCIAL

## 2023-11-16 VITALS
TEMPERATURE: 97.1 F | BODY MASS INDEX: 30.21 KG/M2 | WEIGHT: 160 LBS | DIASTOLIC BLOOD PRESSURE: 72 MMHG | RESPIRATION RATE: 18 BRPM | SYSTOLIC BLOOD PRESSURE: 100 MMHG | HEART RATE: 72 BPM | HEIGHT: 61 IN

## 2023-11-16 DIAGNOSIS — Z45.02 ENCOUNTER FOR ADJUSTMENT AND MANAGEMENT OF AUTOMATIC IMPLANTABLE CARDIAC DEFIBRILLATOR: ICD-10-CM

## 2023-11-16 DIAGNOSIS — Z48.89 ENCOUNTER FOR OTHER SPECIFIED SURGICAL AFTERCARE: ICD-10-CM

## 2023-11-16 PROCEDURE — 93282 PRGRMG EVAL IMPLANTABLE DFB: CPT

## 2023-11-16 PROCEDURE — 99213 OFFICE O/P EST LOW 20 MIN: CPT

## 2023-11-16 PROCEDURE — 99024 POSTOP FOLLOW-UP VISIT: CPT

## 2023-11-17 ENCOUNTER — APPOINTMENT (OUTPATIENT)
Age: 43
End: 2023-11-17

## 2023-11-20 ENCOUNTER — APPOINTMENT (OUTPATIENT)
Age: 43
End: 2023-11-20

## 2023-11-20 ENCOUNTER — OUTPATIENT (OUTPATIENT)
Dept: OUTPATIENT SERVICES | Facility: HOSPITAL | Age: 43
LOS: 1 days | End: 2023-11-20

## 2023-11-20 DIAGNOSIS — I35.1 NONRHEUMATIC AORTIC (VALVE) INSUFFICIENCY: ICD-10-CM

## 2023-11-20 DIAGNOSIS — Z95.2 PRESENCE OF PROSTHETIC HEART VALVE: Chronic | ICD-10-CM

## 2023-11-20 DIAGNOSIS — Z98.890 OTHER SPECIFIED POSTPROCEDURAL STATES: Chronic | ICD-10-CM

## 2023-11-21 ENCOUNTER — APPOINTMENT (OUTPATIENT)
Dept: MEDICATION MANAGEMENT | Facility: CLINIC | Age: 43
End: 2023-11-21

## 2023-11-21 ENCOUNTER — OUTPATIENT (OUTPATIENT)
Dept: OUTPATIENT SERVICES | Facility: HOSPITAL | Age: 43
LOS: 1 days | End: 2023-11-21
Payer: COMMERCIAL

## 2023-11-21 DIAGNOSIS — Z98.890 OTHER SPECIFIED POSTPROCEDURAL STATES: Chronic | ICD-10-CM

## 2023-11-21 DIAGNOSIS — Z95.2 PRESENCE OF PROSTHETIC HEART VALVE: Chronic | ICD-10-CM

## 2023-11-21 DIAGNOSIS — I48.91 UNSPECIFIED ATRIAL FIBRILLATION: ICD-10-CM

## 2023-11-21 DIAGNOSIS — Z79.01 LONG TERM (CURRENT) USE OF ANTICOAGULANTS: ICD-10-CM

## 2023-11-21 LAB
INR PPP: 2.98
PT BLD: 32.9

## 2023-11-21 PROCEDURE — G0463: CPT

## 2023-11-22 ENCOUNTER — APPOINTMENT (OUTPATIENT)
Age: 43
End: 2023-11-22

## 2023-11-22 ENCOUNTER — OUTPATIENT (OUTPATIENT)
Dept: OUTPATIENT SERVICES | Facility: HOSPITAL | Age: 43
LOS: 1 days | End: 2023-11-22

## 2023-11-22 DIAGNOSIS — Z79.01 LONG TERM (CURRENT) USE OF ANTICOAGULANTS: ICD-10-CM

## 2023-11-22 DIAGNOSIS — Z98.890 OTHER SPECIFIED POSTPROCEDURAL STATES: Chronic | ICD-10-CM

## 2023-11-22 DIAGNOSIS — I35.1 NONRHEUMATIC AORTIC (VALVE) INSUFFICIENCY: ICD-10-CM

## 2023-11-22 DIAGNOSIS — I48.91 UNSPECIFIED ATRIAL FIBRILLATION: ICD-10-CM

## 2023-11-22 DIAGNOSIS — Z95.2 PRESENCE OF PROSTHETIC HEART VALVE: Chronic | ICD-10-CM

## 2023-11-24 ENCOUNTER — APPOINTMENT (OUTPATIENT)
Age: 43
End: 2023-11-24

## 2023-11-24 ENCOUNTER — OUTPATIENT (OUTPATIENT)
Dept: OUTPATIENT SERVICES | Facility: HOSPITAL | Age: 43
LOS: 1 days | End: 2023-11-24

## 2023-11-24 DIAGNOSIS — Z98.890 OTHER SPECIFIED POSTPROCEDURAL STATES: Chronic | ICD-10-CM

## 2023-11-24 DIAGNOSIS — I35.1 NONRHEUMATIC AORTIC (VALVE) INSUFFICIENCY: ICD-10-CM

## 2023-11-24 DIAGNOSIS — Z95.2 PRESENCE OF PROSTHETIC HEART VALVE: Chronic | ICD-10-CM

## 2023-11-27 ENCOUNTER — APPOINTMENT (OUTPATIENT)
Age: 43
End: 2023-11-27

## 2023-11-28 ENCOUNTER — NON-APPOINTMENT (OUTPATIENT)
Age: 43
End: 2023-11-28

## 2023-11-28 ENCOUNTER — APPOINTMENT (OUTPATIENT)
Dept: CV DIAGNOSTICS | Facility: HOSPITAL | Age: 43
End: 2023-11-28

## 2023-11-28 ENCOUNTER — APPOINTMENT (OUTPATIENT)
Dept: MEDICATION MANAGEMENT | Facility: CLINIC | Age: 43
End: 2023-11-28

## 2023-11-28 ENCOUNTER — OUTPATIENT (OUTPATIENT)
Dept: OUTPATIENT SERVICES | Facility: HOSPITAL | Age: 43
LOS: 1 days | End: 2023-11-28
Payer: COMMERCIAL

## 2023-11-28 DIAGNOSIS — Z95.2 PRESENCE OF PROSTHETIC HEART VALVE: Chronic | ICD-10-CM

## 2023-11-28 DIAGNOSIS — I48.91 UNSPECIFIED ATRIAL FIBRILLATION: ICD-10-CM

## 2023-11-28 DIAGNOSIS — Z98.890 OTHER SPECIFIED POSTPROCEDURAL STATES: Chronic | ICD-10-CM

## 2023-11-28 DIAGNOSIS — Z79.01 LONG TERM (CURRENT) USE OF ANTICOAGULANTS: ICD-10-CM

## 2023-11-28 LAB
INR PPP: 2.3 RATIO
POCT-PROTHROMBIN TIME: 27.8 SECS
QUALITY CONTROL: YES

## 2023-11-28 PROCEDURE — 85610 PROTHROMBIN TIME: CPT

## 2023-11-28 PROCEDURE — 36416 COLLJ CAPILLARY BLOOD SPEC: CPT

## 2023-11-28 PROCEDURE — 99211 OFF/OP EST MAY X REQ PHY/QHP: CPT

## 2023-11-29 ENCOUNTER — APPOINTMENT (OUTPATIENT)
Age: 43
End: 2023-11-29

## 2023-11-29 DIAGNOSIS — Z79.01 LONG TERM (CURRENT) USE OF ANTICOAGULANTS: ICD-10-CM

## 2023-11-29 DIAGNOSIS — I48.91 UNSPECIFIED ATRIAL FIBRILLATION: ICD-10-CM

## 2023-11-30 ENCOUNTER — APPOINTMENT (OUTPATIENT)
Dept: ELECTROPHYSIOLOGY | Facility: CLINIC | Age: 43
End: 2023-11-30
Payer: COMMERCIAL

## 2023-11-30 VITALS
BODY MASS INDEX: 30.21 KG/M2 | DIASTOLIC BLOOD PRESSURE: 60 MMHG | TEMPERATURE: 98 F | WEIGHT: 160 LBS | HEIGHT: 61 IN | SYSTOLIC BLOOD PRESSURE: 100 MMHG | HEART RATE: 71 BPM

## 2023-11-30 PROCEDURE — 99024 POSTOP FOLLOW-UP VISIT: CPT

## 2023-11-30 PROCEDURE — 93000 ELECTROCARDIOGRAM COMPLETE: CPT

## 2023-12-01 ENCOUNTER — OUTPATIENT (OUTPATIENT)
Dept: OUTPATIENT SERVICES | Facility: HOSPITAL | Age: 43
LOS: 1 days | Discharge: HOME | End: 2023-12-01
Payer: COMMERCIAL

## 2023-12-01 ENCOUNTER — APPOINTMENT (OUTPATIENT)
Age: 43
End: 2023-12-01
Payer: COMMERCIAL

## 2023-12-01 ENCOUNTER — APPOINTMENT (OUTPATIENT)
Dept: ELECTROPHYSIOLOGY | Facility: HOSPITAL | Age: 43
End: 2023-12-01

## 2023-12-01 ENCOUNTER — TRANSCRIPTION ENCOUNTER (OUTPATIENT)
Age: 43
End: 2023-12-01

## 2023-12-01 VITALS
HEART RATE: 72 BPM | OXYGEN SATURATION: 97 % | HEIGHT: 61 IN | DIASTOLIC BLOOD PRESSURE: 80 MMHG | SYSTOLIC BLOOD PRESSURE: 133 MMHG | WEIGHT: 156.09 LBS | TEMPERATURE: 98 F | RESPIRATION RATE: 18 BRPM

## 2023-12-01 VITALS
DIASTOLIC BLOOD PRESSURE: 56 MMHG | TEMPERATURE: 98 F | OXYGEN SATURATION: 99 % | HEART RATE: 66 BPM | SYSTOLIC BLOOD PRESSURE: 101 MMHG | RESPIRATION RATE: 18 BRPM

## 2023-12-01 DIAGNOSIS — Z95.2 PRESENCE OF PROSTHETIC HEART VALVE: Chronic | ICD-10-CM

## 2023-12-01 DIAGNOSIS — Z98.890 OTHER SPECIFIED POSTPROCEDURAL STATES: Chronic | ICD-10-CM

## 2023-12-01 DIAGNOSIS — I50.22 CHRONIC SYSTOLIC (CONGESTIVE) HEART FAILURE: ICD-10-CM

## 2023-12-01 LAB
ANION GAP SERPL CALC-SCNC: 11 MMOL/L — SIGNIFICANT CHANGE UP (ref 7–14)
ANION GAP SERPL CALC-SCNC: 11 MMOL/L — SIGNIFICANT CHANGE UP (ref 7–14)
APTT BLD: 46.6 SEC — HIGH (ref 27–39.2)
APTT BLD: 46.6 SEC — HIGH (ref 27–39.2)
BUN SERPL-MCNC: 18 MG/DL — SIGNIFICANT CHANGE UP (ref 10–20)
BUN SERPL-MCNC: 18 MG/DL — SIGNIFICANT CHANGE UP (ref 10–20)
CALCIUM SERPL-MCNC: 10 MG/DL — SIGNIFICANT CHANGE UP (ref 8.4–10.5)
CALCIUM SERPL-MCNC: 10 MG/DL — SIGNIFICANT CHANGE UP (ref 8.4–10.5)
CHLORIDE SERPL-SCNC: 100 MMOL/L — SIGNIFICANT CHANGE UP (ref 98–110)
CHLORIDE SERPL-SCNC: 100 MMOL/L — SIGNIFICANT CHANGE UP (ref 98–110)
CO2 SERPL-SCNC: 28 MMOL/L — SIGNIFICANT CHANGE UP (ref 17–32)
CO2 SERPL-SCNC: 28 MMOL/L — SIGNIFICANT CHANGE UP (ref 17–32)
CREAT SERPL-MCNC: 0.8 MG/DL — SIGNIFICANT CHANGE UP (ref 0.7–1.5)
CREAT SERPL-MCNC: 0.8 MG/DL — SIGNIFICANT CHANGE UP (ref 0.7–1.5)
EGFR: 93 ML/MIN/1.73M2 — SIGNIFICANT CHANGE UP
EGFR: 93 ML/MIN/1.73M2 — SIGNIFICANT CHANGE UP
GLUCOSE SERPL-MCNC: 94 MG/DL — SIGNIFICANT CHANGE UP (ref 70–99)
GLUCOSE SERPL-MCNC: 94 MG/DL — SIGNIFICANT CHANGE UP (ref 70–99)
HCT VFR BLD CALC: 42.9 % — SIGNIFICANT CHANGE UP (ref 37–47)
HCT VFR BLD CALC: 42.9 % — SIGNIFICANT CHANGE UP (ref 37–47)
HGB BLD-MCNC: 13.8 G/DL — SIGNIFICANT CHANGE UP (ref 12–16)
HGB BLD-MCNC: 13.8 G/DL — SIGNIFICANT CHANGE UP (ref 12–16)
INR BLD: 2.57 RATIO — HIGH (ref 0.65–1.3)
INR BLD: 2.57 RATIO — HIGH (ref 0.65–1.3)
MCHC RBC-ENTMCNC: 27.1 PG — SIGNIFICANT CHANGE UP (ref 27–31)
MCHC RBC-ENTMCNC: 27.1 PG — SIGNIFICANT CHANGE UP (ref 27–31)
MCHC RBC-ENTMCNC: 32.2 G/DL — SIGNIFICANT CHANGE UP (ref 32–37)
MCHC RBC-ENTMCNC: 32.2 G/DL — SIGNIFICANT CHANGE UP (ref 32–37)
MCV RBC AUTO: 84.1 FL — SIGNIFICANT CHANGE UP (ref 81–99)
MCV RBC AUTO: 84.1 FL — SIGNIFICANT CHANGE UP (ref 81–99)
NRBC # BLD: 0 /100 WBCS — SIGNIFICANT CHANGE UP (ref 0–0)
NRBC # BLD: 0 /100 WBCS — SIGNIFICANT CHANGE UP (ref 0–0)
PLATELET # BLD AUTO: 340 K/UL — SIGNIFICANT CHANGE UP (ref 130–400)
PLATELET # BLD AUTO: 340 K/UL — SIGNIFICANT CHANGE UP (ref 130–400)
PMV BLD: 11.3 FL — HIGH (ref 7.4–10.4)
PMV BLD: 11.3 FL — HIGH (ref 7.4–10.4)
POTASSIUM SERPL-MCNC: 5.7 MMOL/L — HIGH (ref 3.5–5)
POTASSIUM SERPL-MCNC: 5.7 MMOL/L — HIGH (ref 3.5–5)
POTASSIUM SERPL-SCNC: 5.7 MMOL/L — HIGH (ref 3.5–5)
POTASSIUM SERPL-SCNC: 5.7 MMOL/L — HIGH (ref 3.5–5)
PROTHROM AB SERPL-ACNC: 29.6 SEC — HIGH (ref 9.95–12.87)
PROTHROM AB SERPL-ACNC: 29.6 SEC — HIGH (ref 9.95–12.87)
RBC # BLD: 5.1 M/UL — SIGNIFICANT CHANGE UP (ref 4.2–5.4)
RBC # BLD: 5.1 M/UL — SIGNIFICANT CHANGE UP (ref 4.2–5.4)
RBC # FLD: 14.8 % — HIGH (ref 11.5–14.5)
RBC # FLD: 14.8 % — HIGH (ref 11.5–14.5)
SODIUM SERPL-SCNC: 139 MMOL/L — SIGNIFICANT CHANGE UP (ref 135–146)
SODIUM SERPL-SCNC: 139 MMOL/L — SIGNIFICANT CHANGE UP (ref 135–146)
WBC # BLD: 7.8 K/UL — SIGNIFICANT CHANGE UP (ref 4.8–10.8)
WBC # BLD: 7.8 K/UL — SIGNIFICANT CHANGE UP (ref 4.8–10.8)
WBC # FLD AUTO: 7.8 K/UL — SIGNIFICANT CHANGE UP (ref 4.8–10.8)
WBC # FLD AUTO: 7.8 K/UL — SIGNIFICANT CHANGE UP (ref 4.8–10.8)

## 2023-12-01 PROCEDURE — 12031 INTMD RPR S/A/T/EXT 2.5 CM/<: CPT | Mod: 78

## 2023-12-01 PROCEDURE — 36415 COLL VENOUS BLD VENIPUNCTURE: CPT

## 2023-12-01 PROCEDURE — C1889: CPT

## 2023-12-01 PROCEDURE — 80048 BASIC METABOLIC PNL TOTAL CA: CPT

## 2023-12-01 PROCEDURE — 85610 PROTHROMBIN TIME: CPT

## 2023-12-01 PROCEDURE — 99024 POSTOP FOLLOW-UP VISIT: CPT

## 2023-12-01 PROCEDURE — 85027 COMPLETE CBC AUTOMATED: CPT

## 2023-12-01 PROCEDURE — 85730 THROMBOPLASTIN TIME PARTIAL: CPT

## 2023-12-01 RX ORDER — FUROSEMIDE 40 MG
1 TABLET ORAL
Refills: 0 | DISCHARGE

## 2023-12-01 RX ORDER — CEFAZOLIN SODIUM 1 G
1000 VIAL (EA) INJECTION ONCE
Refills: 0 | Status: DISCONTINUED | OUTPATIENT
Start: 2023-12-01 | End: 2023-12-01

## 2023-12-01 RX ORDER — DAPAGLIFLOZIN 10 MG/1
1 TABLET, FILM COATED ORAL
Refills: 0 | DISCHARGE

## 2023-12-01 RX ORDER — CEPHALEXIN 500 MG
1 CAPSULE ORAL
Refills: 0 | DISCHARGE
End: 2023-12-08

## 2023-12-01 RX ORDER — CEFAZOLIN SODIUM 1 G
2000 VIAL (EA) INJECTION ONCE
Refills: 0 | Status: COMPLETED | OUTPATIENT
Start: 2023-12-01 | End: 2023-12-01

## 2023-12-01 RX ORDER — METOPROLOL TARTRATE 50 MG
1 TABLET ORAL
Refills: 0 | DISCHARGE

## 2023-12-01 RX ORDER — ACETAMINOPHEN 500 MG
650 TABLET ORAL ONCE
Refills: 0 | Status: COMPLETED | OUTPATIENT
Start: 2023-12-01 | End: 2023-12-01

## 2023-12-01 RX ORDER — FERROUS SULFATE 325(65) MG
1 TABLET ORAL
Refills: 0 | DISCHARGE

## 2023-12-01 RX ORDER — WARFARIN SODIUM 2.5 MG/1
1 TABLET ORAL
Refills: 0 | DISCHARGE

## 2023-12-01 RX ADMIN — Medication 650 MILLIGRAM(S): at 14:07

## 2023-12-01 RX ADMIN — Medication 100 MILLIGRAM(S): at 10:50

## 2023-12-01 NOTE — ASU DISCHARGE PLAN (ADULT/PEDIATRIC) - NS MD DC FALL RISK RISK
For information on Fall & Injury Prevention, visit: [email or web address].[facility]/[email or web address]-prevention-protects-[email or web address]-[email or web address]-[email or web address]-mobility OR  https://www.[facility]/news/fall-prevention-tips-[email or web address]-injury OR  https://www.cdc.gov/steadi/patient.html For information on Fall & Injury Prevention, visit: https://www.[facility]/news/fall-prevention-protects-[email or web address]-maintains-health-[email or web address]-mobility OR  https://www.[facility]/news/fall-prevention-tips-to-avoid-injury OR  https://www.cdc.gov/steadi/patient.html For information on Fall & Injury Prevention, visit: https://www.[facility]/news/fall-prevention-protects-[email or web address]-maintains-health-[email or web address]-mobility OR  https://www.[facility]/news/fall-prevention-tips-to-avoid-injury OR  https://www.cdc.gov/steadi/patient.html

## 2023-12-01 NOTE — H&P ADULT - HISTORY OF PRESENT ILLNESS
Patient is a 43y old  Female who presents for pocket revision    HPI: 42-year-old female with a history of HOCM with DEN s/p SC-ICD on 11/07/2023, paroxysmal atrial fibrillation, s/p AVR+JASON clip, presents for pocket revision after the patient was noted to have poor wound healing.       PAST MEDICAL & SURGICAL HISTORY:  Hypertrophic cardiomyopathy  Atrial fibrillation  Aortic valve disease  Obese  HTN (hypertension)  S/P transesophageal echocardiogram (MINDI)  S/P AVR (aortic valve replacement)    PREVIOUS DIAGNOSTIC TESTING:    MINDI 04/25/2023: LVEF 50-55%, moderately increased LV wall thickness, accelerated LVOT flow with no definite evidence of DEN or LVOT obstruction. Echo dense structure noted on the Aortic valve 1.3X1.5 cm suggestive of fibroelastoma or vegetation. Mild to moderated mitral annular calcification.      EKG (10/05/2023): SR   EKG (08/1/23):   EKG (5/2/23) Afib at 91   EKG (2/7/23): SR 68, LAE, LVH  Cardio: Dr. Metcalf     Device Type: Implantable cardioverter-defibrillator    Pacemaker/ICD : [employer].    Model: A219 Emblem. Serial Number: [id number]. Date of Implant: 11/16/2023.    Battery Status: The estimated remaining battery life is months. 98%.    Measurement: Threshold testing was not performed.    Program Changes: none.    Comments:. No events.     MEDICATIONS  (STANDING):   · Farxiga 10 MG Oral Tablet; TAKE 1 TABLET BY MOUTH EVERY MORNING   · Ferrous Sulfate 325 (65 Fe) MG Oral Tablet; TAKE 1 TABLET 2 TIMES DAILY WITH FOOD   · Furosemide 20 MG Oral Tablet; Take 1 tablet daily   · Metoprolol Succinate ER 50 MG Oral Tablet Extended Release 24 Hour; Take 1 tablet  twice daily   · Warfarin Sodium 1 MG Oral Tablet; TAKE 1 TABLET DAILY   · Warfarin Sodium 1 MG Oral Tablet; TAKE AS DIRECTED    MEDICATIONS  (PRN):      FAMILY HISTORY:  Known health problems: none (Father, Mother)    SOCIAL HISTORY: lives at home    CIGARETTES: No    ALCOHOL: No    Past Surgical History: As above    Allergies: No Known Allergies      REVIEW OF SYSTEMS:  CONSTITUTIONAL: No fever, weight loss, chills, shakes, or fatigue  EYES: No eye pain, visual disturbances, or discharge  ENMT:  No difficulty hearing, tinnitus, vertigo; No sinus or throat pain  NECK: No pain or stiffness  BREASTS: No pain, masses, or nipple discharge  RESPIRATORY: No cough, wheezing, hemoptysis, or shortness of breath  CARDIOVASCULAR: No chest pain, dyspnea, palpitations, dizziness, syncope, paroxysmal nocturnal dyspnea, orthopnea, or arm or leg swelling  GASTROINTESTINAL: No abdominal  or epigastric pain, nausea, vomiting, hematemesis, diarrhea, constipation, melena or bright red blood.  GENITOURINARY: No dysuria, nocturia, hematuria, or urinary incontinence  NEUROLOGICAL: No headaches, memory loss, slurred speech, limb weakness, loss of strength, numbness, or tremors  SKIN: Poor wound healing  LYMPH NODES: No enlarged glands  ENDOCRINE: No heat or cold intolerance, or hair loss  MUSCULOSKELETAL: No joint pain or swelling, muscle, back, or extremity pain  PSYCHIATRIC: No depression, anxiety, or difficulty sleeping  HEME/LYMPH: No easy bruising or bleeding gums  ALLERY AND IMMUNOLOGIC: No hives or rash.      Vital Signs Last 24 Hrs  T(C): 36.7 (01 Dec 2023 08:35), Max: 36.7 (01 Dec 2023 08:35)  T(F): 98.1 (01 Dec 2023 08:35), Max: 98.1 (01 Dec 2023 08:35)  HR: 72 (01 Dec 2023 08:35) (72 - 72)  BP: 133/80 (01 Dec 2023 08:35) (133/80 - 133/80)  BP(mean): --  RR: 18 (01 Dec 2023 08:35) (18 - 18)  SpO2: 97% (01 Dec 2023 08:35) (97% - 97%)        PHYSICAL EXAM:  GENERAL: In no apparent distress, well nourished, and hydrated.  HEAD:  Atraumatic, Normocephalic  EYES: EOMI, PERRLA, conjunctiva and sclera clear  ENMT: No tonsillar erythema, exudates, or enlargements; ist mucous membranes, Good dentition, No lesions  NECK: Supple and normal thyroid.  No JVD or carotid bruit.  Carotid pulse is 2+ bilaterally.  HEART: Regular rate and rhythm; No murmurs, rubs, or gallops.  PULMONARY: Clear to auscultation and perfusion.  No rales, wheezing, or rhonchi bilaterally.  ABDOMEN: Soft, Nontender, Nondistended; Bowel sounds present - poor wound healing noted at the device incision site  EXTREMITIES:  2+ Peripheral Pulses, No clubbing, cyanosis, or edema  LYMPH: No lymphadenopathy noted  NEUROLOGICAL: Grossly nonfocal      I&O's Detail      LABS:    BNP  I&O's Detail    Daily Height in cm: 154.94 (01 Dec 2023 08:35)    Daily     RADIOLOGY & ADDITIONAL STUDIES:

## 2023-12-01 NOTE — PRE-ANESTHESIA EVALUATION ADULT - HEIGHT IN INCHES
[address]    PROGRESS NOTE             Date:   11/1/2018  Patient name:  Sheldon Vivar  Date of admission:  10/31/2018  3:12 AM  MRN:   [id number]  YOB: 1960    CHIEF COMPLAINT     History Obtained From:  Patient and chart review. HISTORY OF PRESENT ILLNESS      The patient is a 61 y.o.  male who is admitted to the hospital for pneumonia   aspiration     etoh abuse    PAST MEDICAL HISTORY       has a past medical history of Alcohol withdrawal seizure ([address] Alcoholism ([address] Allergic rhinitis; Anxiety; Asthma; COPD (chronic obstructive pulmonary disease) ([address] Depression; H/O: substance abuse; Homeless; Hypertension; MRSA (methicillin resistant staph aureus) culture positive; Osteoarthritis; Pancreatitis; Pancreatitis, recurrent ([address] Pneumonia; Seizures ([address] Substance abuse ([address] and Traumatic compression fracture of T8 thoracic vertebra ([address] PAST SURGICAL HISTORY       has a past surgical history that includes Tonsillectomy; cervical fusion (N/A, 11/11/2017); pr remv cataract extracap,insert lens (Right, 1/4/2018); Cataract removal with implant (Left, 01/23/2018); and pr remv cataract extracap,insert lens (Left, 1/23/2018). HOME MEDICATIONS        Prior to Admission medications    Medication Sig Start Date End Date Taking?  Authorizing Provider   fluticasone (FLONASE) 50 MCG/ACT nasal spray 1 spray by Nasal route daily 8/6/18  Yes Daniela Tolentino MD   albuterol sulfate HFA (PROVENTIL HFA) 108 (90 Base) MCG/ACT inhaler Inhale 2 puffs into the lungs every 6 hours as needed for Wheezing or Shortness of Breath (cough) 6/1/18  Yes Barrie Heller MD   acetaminophen (APAP EXTRA STRENGTH) 500 MG tablet Take 1 tablet by mouth every 6 hours as needed for Pain 6/1/18  Yes Hugo Pineda MD   Multiple Vitamins-Minerals (THERAPEUTIC MULTIVITAMIN-MINERALS) tablet Take 1 tablet by mouth daily 5/2/18  Yes 1

## 2023-12-01 NOTE — H&P ADULT - ASSESSMENT
42-year-old female with a history of HOCM with DEN s/p SC-ICD on 11/07/2023, paroxysmal atrial fibrillation, s/p AVR+JASON clip, presents for pocket revision after the patient was noted to have poor wound healing.     Plan:   - Proceed with device pocket revision

## 2023-12-01 NOTE — ASU PATIENT PROFILE, ADULT - FALL HARM RISK - UNIVERSAL INTERVENTIONS
Bed in lowest position, wheels locked, appropriate side rails in place/Call bell, personal items and telephone in reach/Instruct patient to call for assistance before getting out of bed or chair/Non-slip footwear when patient is out of bed/[place] to call system/Physically safe environment - no spills, clutter or unnecessary equipment/Purposeful Proactive Rounding/Room/bathroom lighting operational, light cord in reach

## 2023-12-04 ENCOUNTER — APPOINTMENT (OUTPATIENT)
Dept: MEDICATION MANAGEMENT | Facility: CLINIC | Age: 43
End: 2023-12-04

## 2023-12-04 ENCOUNTER — APPOINTMENT (OUTPATIENT)
Age: 43
End: 2023-12-04

## 2023-12-04 ENCOUNTER — OUTPATIENT (OUTPATIENT)
Dept: OUTPATIENT SERVICES | Facility: HOSPITAL | Age: 43
LOS: 1 days | End: 2023-12-04
Payer: COMMERCIAL

## 2023-12-04 VITALS — DIASTOLIC BLOOD PRESSURE: 55 MMHG | SYSTOLIC BLOOD PRESSURE: 99 MMHG | HEART RATE: 86 BPM

## 2023-12-04 DIAGNOSIS — T81.89XA OTHER COMPLICATIONS OF PROCEDURES, NOT ELSEWHERE CLASSIFIED, INITIAL ENCOUNTER: ICD-10-CM
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
## 2023-12-04 DIAGNOSIS — I48.91 UNSPECIFIED ATRIAL FIBRILLATION: ICD-10-CM

## 2023-12-04 DIAGNOSIS — Z45.02 ENCOUNTER FOR ADJUSTMENT AND MANAGEMENT OF AUTOMATIC IMPLANTABLE CARDIAC DEFIBRILLATOR: ICD-10-CM

## 2023-12-04 DIAGNOSIS — Z95.2 PRESENCE OF PROSTHETIC HEART VALVE: Chronic | ICD-10-CM

## 2023-12-04 DIAGNOSIS — Z79.01 LONG TERM (CURRENT) USE OF ANTICOAGULANTS: ICD-10-CM

## 2023-12-04 LAB
INR PPP: 3.9 RATIO
POCT-PROTHROMBIN TIME: 47.3 SECS
QUALITY CONTROL: YES

## 2023-12-04 PROCEDURE — 85610 PROTHROMBIN TIME: CPT

## 2023-12-04 PROCEDURE — 99211 OFF/OP EST MAY X REQ PHY/QHP: CPT

## 2023-12-04 PROCEDURE — 36416 COLLJ CAPILLARY BLOOD SPEC: CPT

## 2023-12-05 DIAGNOSIS — Z79.01 LONG TERM (CURRENT) USE OF ANTICOAGULANTS: ICD-10-CM

## 2023-12-05 DIAGNOSIS — I48.91 UNSPECIFIED ATRIAL FIBRILLATION: ICD-10-CM

## 2023-12-06 ENCOUNTER — RX RENEWAL (OUTPATIENT)
Age: 43
End: 2023-12-06

## 2023-12-06 ENCOUNTER — APPOINTMENT (OUTPATIENT)
Age: 43
End: 2023-12-06

## 2023-12-07 ENCOUNTER — APPOINTMENT (OUTPATIENT)
Dept: ELECTROPHYSIOLOGY | Facility: CLINIC | Age: 43
End: 2023-12-07
Payer: COMMERCIAL

## 2023-12-07 VITALS
SYSTOLIC BLOOD PRESSURE: 120 MMHG | TEMPERATURE: 98 F | WEIGHT: 157 LBS | BODY MASS INDEX: 29.64 KG/M2 | DIASTOLIC BLOOD PRESSURE: 72 MMHG | HEART RATE: 72 BPM | HEIGHT: 61 IN

## 2023-12-07 PROCEDURE — 93000 ELECTROCARDIOGRAM COMPLETE: CPT | Mod: 59

## 2023-12-07 PROCEDURE — 93260 PRGRMG DEV EVAL IMPLTBL SYS: CPT

## 2023-12-07 PROCEDURE — 99024 POSTOP FOLLOW-UP VISIT: CPT

## 2023-12-08 ENCOUNTER — APPOINTMENT (OUTPATIENT)
Age: 43
End: 2023-12-08

## 2023-12-11 ENCOUNTER — APPOINTMENT (OUTPATIENT)
Age: 43
End: 2023-12-11

## 2023-12-12 ENCOUNTER — APPOINTMENT (OUTPATIENT)
Dept: MEDICATION MANAGEMENT | Facility: CLINIC | Age: 43
End: 2023-12-12

## 2023-12-12 ENCOUNTER — OUTPATIENT (OUTPATIENT)
Dept: OUTPATIENT SERVICES | Facility: HOSPITAL | Age: 43
LOS: 1 days | End: 2023-12-12
Payer: COMMERCIAL

## 2023-12-12 DIAGNOSIS — Z98.890 OTHER SPECIFIED POSTPROCEDURAL STATES: Chronic | ICD-10-CM

## 2023-12-12 DIAGNOSIS — Z79.01 LONG TERM (CURRENT) USE OF ANTICOAGULANTS: ICD-10-CM

## 2023-12-12 DIAGNOSIS — I48.91 UNSPECIFIED ATRIAL FIBRILLATION: ICD-10-CM

## 2023-12-12 DIAGNOSIS — Z95.2 PRESENCE OF PROSTHETIC HEART VALVE: Chronic | ICD-10-CM

## 2023-12-12 LAB
INR PPP: 2 RATIO
POCT-PROTHROMBIN TIME: 23.7 SECS
QUALITY CONTROL: YES

## 2023-12-12 PROCEDURE — 85610 PROTHROMBIN TIME: CPT

## 2023-12-12 PROCEDURE — 99211 OFF/OP EST MAY X REQ PHY/QHP: CPT

## 2023-12-12 PROCEDURE — 36416 COLLJ CAPILLARY BLOOD SPEC: CPT

## 2023-12-13 ENCOUNTER — APPOINTMENT (OUTPATIENT)
Age: 43
End: 2023-12-13

## 2023-12-13 DIAGNOSIS — I48.91 UNSPECIFIED ATRIAL FIBRILLATION: ICD-10-CM

## 2023-12-13 DIAGNOSIS — Z79.01 LONG TERM (CURRENT) USE OF ANTICOAGULANTS: ICD-10-CM

## 2023-12-15 ENCOUNTER — APPOINTMENT (OUTPATIENT)
Age: 43
End: 2023-12-15

## 2023-12-18 ENCOUNTER — APPOINTMENT (OUTPATIENT)
Dept: MEDICATION MANAGEMENT | Facility: CLINIC | Age: 43
End: 2023-12-18

## 2023-12-18 ENCOUNTER — OUTPATIENT (OUTPATIENT)
Dept: OUTPATIENT SERVICES | Facility: HOSPITAL | Age: 43
LOS: 1 days | End: 2023-12-18
Payer: COMMERCIAL

## 2023-12-18 ENCOUNTER — APPOINTMENT (OUTPATIENT)
Age: 43
End: 2023-12-18

## 2023-12-18 DIAGNOSIS — Z79.01 LONG TERM (CURRENT) USE OF ANTICOAGULANTS: ICD-10-CM

## 2023-12-18 DIAGNOSIS — Z98.890 OTHER SPECIFIED POSTPROCEDURAL STATES: Chronic | ICD-10-CM

## 2023-12-18 DIAGNOSIS — I48.91 UNSPECIFIED ATRIAL FIBRILLATION: ICD-10-CM

## 2023-12-18 LAB
INR PPP: 2.6 RATIO
POCT-PROTHROMBIN TIME: 31.3 SECS
QUALITY CONTROL: YES

## 2023-12-18 PROCEDURE — 85610 PROTHROMBIN TIME: CPT

## 2023-12-18 PROCEDURE — 36416 COLLJ CAPILLARY BLOOD SPEC: CPT

## 2023-12-18 PROCEDURE — 99211 OFF/OP EST MAY X REQ PHY/QHP: CPT

## 2023-12-18 NOTE — PHYSICAL THERAPY INITIAL EVALUATION ADULT - ADDITIONAL COMMENTS
Pt lives in pvt home with 4 FRAN, 12 steps indoors, reports independent with functional mobility without AD prior to admission. Pt lives with  and children.
A positive

## 2023-12-19 DIAGNOSIS — I48.91 UNSPECIFIED ATRIAL FIBRILLATION: ICD-10-CM

## 2023-12-19 DIAGNOSIS — Z79.01 LONG TERM (CURRENT) USE OF ANTICOAGULANTS: ICD-10-CM

## 2023-12-22 LAB
ALBUMIN SERPL ELPH-MCNC: 4.7 G/DL
ALP BLD-CCNC: 107 U/L
ALT SERPL-CCNC: 30 U/L
ANION GAP SERPL CALC-SCNC: 13 MMOL/L
AST SERPL-CCNC: 37 U/L
BILIRUB SERPL-MCNC: 0.6 MG/DL
BUN SERPL-MCNC: 15 MG/DL
CALCIUM SERPL-MCNC: 10.7 MG/DL
CHLORIDE SERPL-SCNC: 101 MMOL/L
CO2 SERPL-SCNC: 28 MMOL/L
CREAT SERPL-MCNC: 0.8 MG/DL
EGFR: 93 ML/MIN/1.73M2
FERRITIN SERPL-MCNC: 205 NG/ML
GLUCOSE SERPL-MCNC: 91 MG/DL
HCT VFR BLD CALC: 43.2 %
HGB BLD-MCNC: 13.6 G/DL
IRON SATN MFR SERPL: 22 %
IRON SERPL-MCNC: 66 UG/DL
MAGNESIUM SERPL-MCNC: 2.1 MG/DL
MCHC RBC-ENTMCNC: 27 PG
MCHC RBC-ENTMCNC: 31.5 G/DL
MCV RBC AUTO: 85.9 FL
NT-PROBNP SERPL-MCNC: 914 PG/ML
PLATELET # BLD AUTO: 325 K/UL
PMV BLD: 10.8 FL
POTASSIUM SERPL-SCNC: 5.1 MMOL/L
PROT SERPL-MCNC: 8.4 G/DL
RBC # BLD: 5.03 M/UL
RBC # FLD: 14.7 %
SODIUM SERPL-SCNC: 142 MMOL/L
TIBC SERPL-MCNC: 297 UG/DL
TRANSFERRIN SERPL-MCNC: 248 MG/DL
UIBC SERPL-MCNC: 231 UG/DL
WBC # FLD AUTO: 7.07 K/UL

## 2023-12-27 ENCOUNTER — EMERGENCY (EMERGENCY)
Facility: HOSPITAL | Age: 43
LOS: 0 days | Discharge: HOME | End: 2023-12-27
Attending: EMERGENCY MEDICINE
Payer: COMMERCIAL

## 2023-12-27 VITALS
TEMPERATURE: 98 F | RESPIRATION RATE: 20 BRPM | OXYGEN SATURATION: 99 % | HEART RATE: 74 BPM | HEIGHT: 61 IN | WEIGHT: 151.9 LBS | SYSTOLIC BLOOD PRESSURE: 110 MMHG | DIASTOLIC BLOOD PRESSURE: 61 MMHG

## 2023-12-27 VITALS — SYSTOLIC BLOOD PRESSURE: 110 MMHG | HEART RATE: 76 BPM | DIASTOLIC BLOOD PRESSURE: 60 MMHG

## 2023-12-27 DIAGNOSIS — I50.9 HEART FAILURE, UNSPECIFIED: ICD-10-CM

## 2023-12-27 DIAGNOSIS — R11.2 NAUSEA WITH VOMITING, UNSPECIFIED: ICD-10-CM

## 2023-12-27 DIAGNOSIS — I42.1 OBSTRUCTIVE HYPERTROPHIC CARDIOMYOPATHY: ICD-10-CM

## 2023-12-27 DIAGNOSIS — Z95.2 PRESENCE OF PROSTHETIC HEART VALVE: Chronic | ICD-10-CM

## 2023-12-27 DIAGNOSIS — Z98.890 OTHER SPECIFIED POSTPROCEDURAL STATES: Chronic | ICD-10-CM

## 2023-12-27 DIAGNOSIS — I48.91 UNSPECIFIED ATRIAL FIBRILLATION: ICD-10-CM

## 2023-12-27 DIAGNOSIS — I11.0 HYPERTENSIVE HEART DISEASE WITH HEART FAILURE: ICD-10-CM

## 2023-12-27 DIAGNOSIS — Z79.01 LONG TERM (CURRENT) USE OF ANTICOAGULANTS: ICD-10-CM

## 2023-12-27 LAB
ALBUMIN SERPL ELPH-MCNC: 4.4 G/DL — SIGNIFICANT CHANGE UP (ref 3.5–5.2)
ALBUMIN SERPL ELPH-MCNC: 4.4 G/DL — SIGNIFICANT CHANGE UP (ref 3.5–5.2)
ALP SERPL-CCNC: 97 U/L — SIGNIFICANT CHANGE UP (ref 30–115)
ALP SERPL-CCNC: 97 U/L — SIGNIFICANT CHANGE UP (ref 30–115)
ALT FLD-CCNC: 41 U/L — SIGNIFICANT CHANGE UP (ref 0–41)
ALT FLD-CCNC: 41 U/L — SIGNIFICANT CHANGE UP (ref 0–41)
ANION GAP SERPL CALC-SCNC: 8 MMOL/L — SIGNIFICANT CHANGE UP (ref 7–14)
ANION GAP SERPL CALC-SCNC: 8 MMOL/L — SIGNIFICANT CHANGE UP (ref 7–14)
AST SERPL-CCNC: 50 U/L — HIGH (ref 0–41)
AST SERPL-CCNC: 50 U/L — HIGH (ref 0–41)
BASOPHILS # BLD AUTO: 0.03 K/UL — SIGNIFICANT CHANGE UP (ref 0–0.2)
BASOPHILS # BLD AUTO: 0.03 K/UL — SIGNIFICANT CHANGE UP (ref 0–0.2)
BASOPHILS NFR BLD AUTO: 0.3 % — SIGNIFICANT CHANGE UP (ref 0–1)
BASOPHILS NFR BLD AUTO: 0.3 % — SIGNIFICANT CHANGE UP (ref 0–1)
BILIRUB SERPL-MCNC: 0.7 MG/DL — SIGNIFICANT CHANGE UP (ref 0.2–1.2)
BILIRUB SERPL-MCNC: 0.7 MG/DL — SIGNIFICANT CHANGE UP (ref 0.2–1.2)
BUN SERPL-MCNC: 11 MG/DL — SIGNIFICANT CHANGE UP (ref 10–20)
BUN SERPL-MCNC: 11 MG/DL — SIGNIFICANT CHANGE UP (ref 10–20)
CALCIUM SERPL-MCNC: 9.9 MG/DL — SIGNIFICANT CHANGE UP (ref 8.4–10.5)
CALCIUM SERPL-MCNC: 9.9 MG/DL — SIGNIFICANT CHANGE UP (ref 8.4–10.5)
CHLORIDE SERPL-SCNC: 101 MMOL/L — SIGNIFICANT CHANGE UP (ref 98–110)
CHLORIDE SERPL-SCNC: 101 MMOL/L — SIGNIFICANT CHANGE UP (ref 98–110)
CO2 SERPL-SCNC: 29 MMOL/L — SIGNIFICANT CHANGE UP (ref 17–32)
CO2 SERPL-SCNC: 29 MMOL/L — SIGNIFICANT CHANGE UP (ref 17–32)
CREAT SERPL-MCNC: 0.7 MG/DL — SIGNIFICANT CHANGE UP (ref 0.7–1.5)
CREAT SERPL-MCNC: 0.7 MG/DL — SIGNIFICANT CHANGE UP (ref 0.7–1.5)
EGFR: 109 ML/MIN/1.73M2 — SIGNIFICANT CHANGE UP
EGFR: 109 ML/MIN/1.73M2 — SIGNIFICANT CHANGE UP
EOSINOPHIL # BLD AUTO: 0.05 K/UL — SIGNIFICANT CHANGE UP (ref 0–0.7)
EOSINOPHIL # BLD AUTO: 0.05 K/UL — SIGNIFICANT CHANGE UP (ref 0–0.7)
EOSINOPHIL NFR BLD AUTO: 0.5 % — SIGNIFICANT CHANGE UP (ref 0–8)
EOSINOPHIL NFR BLD AUTO: 0.5 % — SIGNIFICANT CHANGE UP (ref 0–8)
GLUCOSE SERPL-MCNC: 115 MG/DL — HIGH (ref 70–99)
GLUCOSE SERPL-MCNC: 115 MG/DL — HIGH (ref 70–99)
HCT VFR BLD CALC: 40.9 % — SIGNIFICANT CHANGE UP (ref 37–47)
HCT VFR BLD CALC: 40.9 % — SIGNIFICANT CHANGE UP (ref 37–47)
HGB BLD-MCNC: 13.4 G/DL — SIGNIFICANT CHANGE UP (ref 12–16)
HGB BLD-MCNC: 13.4 G/DL — SIGNIFICANT CHANGE UP (ref 12–16)
IMM GRANULOCYTES NFR BLD AUTO: 0.5 % — HIGH (ref 0.1–0.3)
IMM GRANULOCYTES NFR BLD AUTO: 0.5 % — HIGH (ref 0.1–0.3)
LIDOCAIN IGE QN: 27 U/L — SIGNIFICANT CHANGE UP (ref 7–60)
LIDOCAIN IGE QN: 27 U/L — SIGNIFICANT CHANGE UP (ref 7–60)
LYMPHOCYTES # BLD AUTO: 0.47 K/UL — LOW (ref 1.2–3.4)
LYMPHOCYTES # BLD AUTO: 0.47 K/UL — LOW (ref 1.2–3.4)
LYMPHOCYTES # BLD AUTO: 4.3 % — LOW (ref 20.5–51.1)
LYMPHOCYTES # BLD AUTO: 4.3 % — LOW (ref 20.5–51.1)
MCHC RBC-ENTMCNC: 27.4 PG — SIGNIFICANT CHANGE UP (ref 27–31)
MCHC RBC-ENTMCNC: 27.4 PG — SIGNIFICANT CHANGE UP (ref 27–31)
MCHC RBC-ENTMCNC: 32.8 G/DL — SIGNIFICANT CHANGE UP (ref 32–37)
MCHC RBC-ENTMCNC: 32.8 G/DL — SIGNIFICANT CHANGE UP (ref 32–37)
MCV RBC AUTO: 83.6 FL — SIGNIFICANT CHANGE UP (ref 81–99)
MCV RBC AUTO: 83.6 FL — SIGNIFICANT CHANGE UP (ref 81–99)
MONOCYTES # BLD AUTO: 0.55 K/UL — SIGNIFICANT CHANGE UP (ref 0.1–0.6)
MONOCYTES # BLD AUTO: 0.55 K/UL — SIGNIFICANT CHANGE UP (ref 0.1–0.6)
MONOCYTES NFR BLD AUTO: 5.1 % — SIGNIFICANT CHANGE UP (ref 1.7–9.3)
MONOCYTES NFR BLD AUTO: 5.1 % — SIGNIFICANT CHANGE UP (ref 1.7–9.3)
NEUTROPHILS # BLD AUTO: 9.73 K/UL — HIGH (ref 1.4–6.5)
NEUTROPHILS # BLD AUTO: 9.73 K/UL — HIGH (ref 1.4–6.5)
NEUTROPHILS NFR BLD AUTO: 89.3 % — HIGH (ref 42.2–75.2)
NEUTROPHILS NFR BLD AUTO: 89.3 % — HIGH (ref 42.2–75.2)
NRBC # BLD: 0 /100 WBCS — SIGNIFICANT CHANGE UP (ref 0–0)
NRBC # BLD: 0 /100 WBCS — SIGNIFICANT CHANGE UP (ref 0–0)
PLATELET # BLD AUTO: 280 K/UL — SIGNIFICANT CHANGE UP (ref 130–400)
PLATELET # BLD AUTO: 280 K/UL — SIGNIFICANT CHANGE UP (ref 130–400)
PMV BLD: 11 FL — HIGH (ref 7.4–10.4)
PMV BLD: 11 FL — HIGH (ref 7.4–10.4)
POTASSIUM SERPL-MCNC: 4.9 MMOL/L — SIGNIFICANT CHANGE UP (ref 3.5–5)
POTASSIUM SERPL-MCNC: 4.9 MMOL/L — SIGNIFICANT CHANGE UP (ref 3.5–5)
POTASSIUM SERPL-SCNC: 4.9 MMOL/L — SIGNIFICANT CHANGE UP (ref 3.5–5)
POTASSIUM SERPL-SCNC: 4.9 MMOL/L — SIGNIFICANT CHANGE UP (ref 3.5–5)
PROT SERPL-MCNC: 7.8 G/DL — SIGNIFICANT CHANGE UP (ref 6–8)
PROT SERPL-MCNC: 7.8 G/DL — SIGNIFICANT CHANGE UP (ref 6–8)
RBC # BLD: 4.89 M/UL — SIGNIFICANT CHANGE UP (ref 4.2–5.4)
RBC # BLD: 4.89 M/UL — SIGNIFICANT CHANGE UP (ref 4.2–5.4)
RBC # FLD: 14.7 % — HIGH (ref 11.5–14.5)
RBC # FLD: 14.7 % — HIGH (ref 11.5–14.5)
SODIUM SERPL-SCNC: 138 MMOL/L — SIGNIFICANT CHANGE UP (ref 135–146)
SODIUM SERPL-SCNC: 138 MMOL/L — SIGNIFICANT CHANGE UP (ref 135–146)
WBC # BLD: 10.88 K/UL — HIGH (ref 4.8–10.8)
WBC # BLD: 10.88 K/UL — HIGH (ref 4.8–10.8)
WBC # FLD AUTO: 10.88 K/UL — HIGH (ref 4.8–10.8)
WBC # FLD AUTO: 10.88 K/UL — HIGH (ref 4.8–10.8)

## 2023-12-27 PROCEDURE — 99284 EMERGENCY DEPT VISIT MOD MDM: CPT | Mod: 25

## 2023-12-27 PROCEDURE — 96374 THER/PROPH/DIAG INJ IV PUSH: CPT

## 2023-12-27 PROCEDURE — 85025 COMPLETE CBC W/AUTO DIFF WBC: CPT

## 2023-12-27 PROCEDURE — 83690 ASSAY OF LIPASE: CPT

## 2023-12-27 PROCEDURE — 36415 COLL VENOUS BLD VENIPUNCTURE: CPT

## 2023-12-27 PROCEDURE — 80053 COMPREHEN METABOLIC PANEL: CPT

## 2023-12-27 PROCEDURE — 99284 EMERGENCY DEPT VISIT MOD MDM: CPT

## 2023-12-27 RX ORDER — ONDANSETRON 8 MG/1
4 TABLET, FILM COATED ORAL ONCE
Refills: 0 | Status: COMPLETED | OUTPATIENT
Start: 2023-12-27 | End: 2023-12-27

## 2023-12-27 RX ORDER — SODIUM CHLORIDE 9 MG/ML
1000 INJECTION INTRAMUSCULAR; INTRAVENOUS; SUBCUTANEOUS ONCE
Refills: 0 | Status: COMPLETED | OUTPATIENT
Start: 2023-12-27 | End: 2023-12-27

## 2023-12-27 RX ADMIN — ONDANSETRON 4 MILLIGRAM(S): 8 TABLET, FILM COATED ORAL at 15:30

## 2023-12-27 RX ADMIN — SODIUM CHLORIDE 1000 MILLILITER(S): 9 INJECTION INTRAMUSCULAR; INTRAVENOUS; SUBCUTANEOUS at 15:29

## 2023-12-27 NOTE — ED ADULT NURSE NOTE - NSFALLUNIVINTERV_ED_ALL_ED
Bed/Stretcher in lowest position, wheels locked, appropriate side rails in place/Call bell, personal items and telephone in reach/Instruct patient to call for assistance before getting out of bed/chair/stretcher/Non-slip footwear applied when patient is off stretcher/[place] to call system/Physically safe environment - no spills, clutter or unnecessary equipment/Purposeful proactive rounding/Room/bathroom lighting operational, light cord in reach Bed/Stretcher in lowest position, wheels locked, appropriate side rails in place/Call bell, personal items and telephone in reach/Instruct patient to call for assistance before getting out of bed/chair/stretcher/Non-slip footwear applied when patient is off stretcher/[place] to call system/Physically safe environment - no spills, clutter or unnecessary equipment/Purposeful proactive rounding/Room/bathroom lighting operational, light cord in reach

## 2023-12-27 NOTE — ED PROVIDER NOTE - NSFOLLOWUPINSTRUCTIONS_ED_ALL_ED_FT
FOLLOW UP WITH YOUR PRIMARY DOCTOR  RETURN TO ED FOR NEW OR WORSENING SYMPTOMS    Nausea and Vomiting, Adult  Nausea is the feeling that you have an upset stomach or that you are about to vomit. As nausea gets worse, it can lead to vomiting. Vomiting is when stomach contents forcefully come out of your mouth as a result of nausea. Vomiting can make you feel weak and cause you to become dehydrated.    Dehydration can make you feel tired and thirsty, cause you to have a dry mouth, and decrease how often you urinate. Older adults and people with other diseases or a weak disease-fighting system (immune system) are at higher risk for dehydration. It is important to treat your nausea and vomiting as told by your health care provider.    Follow these instructions at home:  Watch your symptoms for any changes. Tell your health care provider about them.    Eating and drinking    A bottle of clear fruit juice and glass of water.  A sign showing that a person should not drink alcohol.  Take an oral rehydration solution (ORS). This is a drink that is sold at pharmacies and retail stores.  Drink clear fluids slowly and in small amounts as you are able. Clear fluids include water, ice chips, low-calorie sports drinks, and fruit juice that has water added (diluted fruit juice).  Eat bland, easy-to-digest foods in small amounts as you are able. These foods include bananas, applesauce, rice, lean meats, toast, and crackers.  Avoid fluids that contain a lot of sugar or caffeine, such as energy drinks, sports drinks, and soda.  Avoid alcohol.  Avoid spicy or fatty foods.  General instructions    Take over-the-counter and prescription medicines only as told by your health care provider.  Drink enough fluid to keep your urine pale yellow.  Wash your hands often using soap and water for at least 20 seconds. If soap and water are not available, use hand .  Make sure that everyone in your household washes their hands well and often.  Rest at home while you recover.  Watch your condition for any changes.  Take slow and deep breaths when you feel nauseous.  Keep all follow-up visits. This is important.  Contact a health care provider if:  Your symptoms get worse.  You have new symptoms.  You have a fever.  You cannot drink fluids without vomiting.  Your nausea does not go away after 2 days.  You feel light-headed or dizzy.  You have a headache.  You have muscle cramps.  You have a rash.  You have pain while urinating.  Get help right away if:  You have pain in your chest, neck, arm, or jaw.  You feel extremely weak or you faint.  You have persistent vomiting.  You have vomit that is bright red or looks like black coffee grounds.  You have bloody or black stools (feces) or stools that look like tar.  You have a severe headache, a stiff neck, or both.  You have severe pain, cramping, or bloating in your abdomen.  You have difficulty breathing, or you are breathing very quickly.  Your heart is beating very quickly.  Your skin feels cold and clammy.  You feel confused.  You have signs of dehydration, such as:  Dark urine, very little urine, or no urine.  Cracked lips.  Dry mouth.  Sunken eyes.  Sleepiness.  Weakness.  These symptoms may be an emergency. Get help right away. Call 911.  Do not wait to see if the symptoms will go away.  Do not drive yourself to the hospital.  Summary  Nausea is the feeling that you have an upset stomach or that you are about to vomit. As nausea gets worse, it can lead to vomiting. Vomiting can make you feel weak and cause you to become dehydrated.  Follow instructions from your health care provider about eating and drinking to prevent dehydration.  Take over-the-counter and prescription medicines only as told by your health care provider.  Contact your health care provider if your symptoms get worse, or you have new symptoms.  Keep all follow-up visits. This is important.  This information is not intended to replace advice given to you by your health care provider. Make sure you discuss any questions you have with your health care provider.

## 2023-12-27 NOTE — ED PROVIDER NOTE - CLINICAL SUMMARY MEDICAL DECISION MAKING FREE TEXT BOX
Patient with nausea vomiting since this morning.  Patient 3 episodes of nonbloody vomitus, no abdominal pain fever chills.  Here abdomen soft nontender.  After meds patient feels improved abdomen soft stable for discharge.

## 2023-12-27 NOTE — ED PROVIDER NOTE - OBJECTIVE STATEMENT
patient is a 42yo female PMH a fib on amiodarone and coumadin, heart failure, HOCM, htn complaining of nausea and vomiting x11am today. pt had 3 episodes of vomiting after eating breakfast this morning, no blood. of note pt's son has been sick with similar symptoms for the last few days. denies fever, abdominal pain, diarrhea, urinary symptoms. patient is a 44yo female PMH a fib on amiodarone and coumadin, heart failure, HOCM, htn complaining of nausea and vomiting x11am today. pt had 3 episodes of vomiting after eating breakfast this morning, no blood. of note pt's son has been sick with similar symptoms for the last few days. denies fever, abdominal pain, diarrhea, urinary symptoms.

## 2023-12-27 NOTE — ED PROVIDER NOTE - PHYSICAL EXAMINATION
CONST: Well appearing in NAD  CARD: Normal rate and rhythm  RESP: Equal BS B/L, No wheezes, rhonchi or rales. No distress  GI: Soft, non-tender, non-distended.  MS: Normal ROM in all extremities. No midline spinal tenderness.  SKIN: Warm, dry, no acute rashes. Good turgor  NEURO: A&Ox3, No focal deficits. Strength 5/5 with no sensory deficits. Steady gait

## 2023-12-27 NOTE — ED PROVIDER NOTE - PATIENT PORTAL LINK FT
You can access the FollowMyHealth Patient Portal offered by [facility] by registering at the following website: [email or web address]. By joining [employer]’s FollowMyHealth portal, you will also be able to view your health information using other applications (apps) compatible with our system. You can access the FollowMyHealth Patient Portal offered by [facility] by registering at the following website: [email or web address]. By joining [employer]’s FollowMyHealth portal, you will also be able to view your health information using other applications (apps) compatible with our system.

## 2023-12-28 NOTE — PROCEDURE
[de-identified] : HCM [de-identified] : [place] [de-identified] : A219 [de-identified] : [id number] [de-identified] : 11/07/2023 [de-identified] : n/A [de-identified] : Sub Q ICD No events.

## 2023-12-28 NOTE — ASSESSMENT
[FreeTextEntry1] : ## paroxysmal atrial fibrillation ## Hypertrophic Cardiomyopathy s/p SC-ICD ([employer], 23) ## NSVT ## Acute heart failure ## Encounter for Toxic Drug Monitoring   - On Warfarin. Compliant. No bleeding issues. Patient to contact us if there is any bleeding issues, interruption or any issues with OAC. Patient to go to ER/call 911 if any major bleeding. Checked by coumadin clinic. - Lasix PO 20 mg q24h - Continue with Amiodarone and beta blocker for now. Off Diltiazem. - NSVT on monitor. Needs ICD for primary prevention with hx of HCOM. Also MRI showed multiple LGE. She is a candidate for ICD as a class 1 indication based upon current HRS/ACC guidelines. - SC-ICD working fine.  - Incision healing fine. No evidence of local infection. Will continue with current management.  - RTC in 6 months.

## 2023-12-28 NOTE — HISTORY OF PRESENT ILLNESS
[FreeTextEntry1] : Ms. THOMPSON is a 42 year-year old female with history of HOCM with DEN, paroxysmal atrial fibrillation is here for management of AF.  No Palpitations  Was followed at HOCM clinic at [facility]- not seen for past 2 years Had MCOT in past Last known AF >1 year ago.  +GOULD  5/2/23: had it a MINDI. 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 Seeing CTS tomorrow. Plan to have possible AVR, left atrial appendage clip and possibly maze procedure.  8/1/23: s/p mechanical AVR + JASON clip. Now with GOULD, LE edema. Lasix was stopped last week. +Cough 10/05/2023: S/p prolonged hospitalization for AF and heart failure. S/p AF ablation (extensive ablation for AF and AFL). Feels much better. Had NSVT in the hospital. Also had COVID. Wearing Assure. Compliant. Doesn't like it. Wants to come off of it.     11/30/2023: Feels fine. Had SC-ICD placement. Incision was healing fine, but now it appears that the top layer has opened. She denies any complaints. This is just an observation. She is doing fine with rehab.   12/07/2023: Feels fine. Incision healing fine after revision. No complaints.    Denies chest pain, shortness of breath, palpitation, dizziness or LOC except noted above.   EKG (12/07/2023): SR  EKG (11/30/2023): SR @ 71, , , QTc 459, RBBB  EKG (10/05/2023): SR  EKG (08/1/23):  EKG (5/2/23) Afib at 91  EKG (2/7/23): SR 68, LAE, LVH Cardio: Dr. Metcalf

## 2023-12-28 NOTE — PHYSICAL EXAM
[Normal] : well developed, well nourished, no acute distress [Irregularly Irregular] : irregularly irregular [de-identified] : 2+ LE pitting edema upto knee

## 2023-12-28 NOTE — ADDENDUM
[FreeTextEntry1] : Bree MERRILL assisted in documentation on 12/28/2023 acting as a scribe for Dr. Brennan Tyler.

## 2024-01-02 ENCOUNTER — OUTPATIENT (OUTPATIENT)
Dept: OUTPATIENT SERVICES | Facility: HOSPITAL | Age: 44
LOS: 1 days | End: 2024-01-02
Payer: COMMERCIAL

## 2024-01-02 ENCOUNTER — APPOINTMENT (OUTPATIENT)
Dept: MEDICATION MANAGEMENT | Facility: CLINIC | Age: 44
End: 2024-01-02

## 2024-01-02 DIAGNOSIS — I48.91 UNSPECIFIED ATRIAL FIBRILLATION: ICD-10-CM

## 2024-01-02 DIAGNOSIS — Z95.2 PRESENCE OF PROSTHETIC HEART VALVE: Chronic | ICD-10-CM

## 2024-01-02 DIAGNOSIS — Z98.890 OTHER SPECIFIED POSTPROCEDURAL STATES: Chronic | ICD-10-CM

## 2024-01-02 DIAGNOSIS — Z79.01 LONG TERM (CURRENT) USE OF ANTICOAGULANTS: ICD-10-CM

## 2024-01-02 LAB
INR PPP: 3.4 RATIO
POCT-PROTHROMBIN TIME: 40.7 SECS
QUALITY CONTROL: YES

## 2024-01-02 PROCEDURE — 36416 COLLJ CAPILLARY BLOOD SPEC: CPT

## 2024-01-02 PROCEDURE — 85610 PROTHROMBIN TIME: CPT

## 2024-01-02 PROCEDURE — 99211 OFF/OP EST MAY X REQ PHY/QHP: CPT

## 2024-01-03 DIAGNOSIS — Z79.01 LONG TERM (CURRENT) USE OF ANTICOAGULANTS: ICD-10-CM

## 2024-01-03 DIAGNOSIS — I48.91 UNSPECIFIED ATRIAL FIBRILLATION: ICD-10-CM

## 2024-01-15 ENCOUNTER — APPOINTMENT (OUTPATIENT)
Age: 44
End: 2024-01-15

## 2024-01-15 ENCOUNTER — OUTPATIENT (OUTPATIENT)
Dept: OUTPATIENT SERVICES | Facility: HOSPITAL | Age: 44
LOS: 1 days | End: 2024-01-15
Payer: COMMERCIAL

## 2024-01-15 DIAGNOSIS — Z98.890 OTHER SPECIFIED POSTPROCEDURAL STATES: Chronic | ICD-10-CM

## 2024-01-15 DIAGNOSIS — Z95.2 PRESENCE OF PROSTHETIC HEART VALVE: Chronic | ICD-10-CM

## 2024-01-15 DIAGNOSIS — I35.1 NONRHEUMATIC AORTIC (VALVE) INSUFFICIENCY: ICD-10-CM

## 2024-01-15 PROCEDURE — 93798 PHYS/QHP OP CAR RHAB W/ECG: CPT

## 2024-01-16 DIAGNOSIS — I35.1 NONRHEUMATIC AORTIC (VALVE) INSUFFICIENCY: ICD-10-CM

## 2024-01-17 ENCOUNTER — OUTPATIENT (OUTPATIENT)
Dept: OUTPATIENT SERVICES | Facility: HOSPITAL | Age: 44
LOS: 1 days | End: 2024-01-17

## 2024-01-17 ENCOUNTER — APPOINTMENT (OUTPATIENT)
Age: 44
End: 2024-01-17

## 2024-01-17 ENCOUNTER — APPOINTMENT (OUTPATIENT)
Dept: CARDIOLOGY | Facility: CLINIC | Age: 44
End: 2024-01-17

## 2024-01-17 DIAGNOSIS — Z98.890 OTHER SPECIFIED POSTPROCEDURAL STATES: Chronic | ICD-10-CM

## 2024-01-17 DIAGNOSIS — I35.1 NONRHEUMATIC AORTIC (VALVE) INSUFFICIENCY: ICD-10-CM

## 2024-01-17 DIAGNOSIS — Z95.2 PRESENCE OF PROSTHETIC HEART VALVE: Chronic | ICD-10-CM

## 2024-01-18 ENCOUNTER — APPOINTMENT (OUTPATIENT)
Dept: MEDICATION MANAGEMENT | Facility: CLINIC | Age: 44
End: 2024-01-18

## 2024-01-18 ENCOUNTER — OUTPATIENT (OUTPATIENT)
Dept: OUTPATIENT SERVICES | Facility: HOSPITAL | Age: 44
LOS: 1 days | End: 2024-01-18
Payer: COMMERCIAL

## 2024-01-18 DIAGNOSIS — I48.91 UNSPECIFIED ATRIAL FIBRILLATION: ICD-10-CM

## 2024-01-18 DIAGNOSIS — Z79.01 LONG TERM (CURRENT) USE OF ANTICOAGULANTS: ICD-10-CM

## 2024-01-18 LAB
INR PPP: 3.1 RATIO
POCT-PROTHROMBIN TIME: 37.3 SECS
QUALITY CONTROL: YES

## 2024-01-18 PROCEDURE — 85610 PROTHROMBIN TIME: CPT

## 2024-01-18 PROCEDURE — 36416 COLLJ CAPILLARY BLOOD SPEC: CPT

## 2024-01-18 PROCEDURE — 99211 OFF/OP EST MAY X REQ PHY/QHP: CPT

## 2024-01-19 ENCOUNTER — APPOINTMENT (OUTPATIENT)
Age: 44
End: 2024-01-19

## 2024-01-19 DIAGNOSIS — Z79.01 LONG TERM (CURRENT) USE OF ANTICOAGULANTS: ICD-10-CM

## 2024-01-19 DIAGNOSIS — I48.91 UNSPECIFIED ATRIAL FIBRILLATION: ICD-10-CM

## 2024-01-22 ENCOUNTER — OUTPATIENT (OUTPATIENT)
Dept: OUTPATIENT SERVICES | Facility: HOSPITAL | Age: 44
LOS: 1 days | End: 2024-01-22

## 2024-01-22 ENCOUNTER — APPOINTMENT (OUTPATIENT)
Age: 44
End: 2024-01-22

## 2024-01-22 DIAGNOSIS — I35.1 NONRHEUMATIC AORTIC (VALVE) INSUFFICIENCY: ICD-10-CM

## 2024-01-22 DIAGNOSIS — Z98.890 OTHER SPECIFIED POSTPROCEDURAL STATES: Chronic | ICD-10-CM

## 2024-01-22 DIAGNOSIS — Z95.2 PRESENCE OF PROSTHETIC HEART VALVE: Chronic | ICD-10-CM

## 2024-01-23 ENCOUNTER — APPOINTMENT (OUTPATIENT)
Dept: CARDIOLOGY | Facility: CLINIC | Age: 44
End: 2024-01-23
Payer: COMMERCIAL

## 2024-01-23 VITALS
DIASTOLIC BLOOD PRESSURE: 70 MMHG | BODY MASS INDEX: 28.13 KG/M2 | SYSTOLIC BLOOD PRESSURE: 110 MMHG | WEIGHT: 149 LBS | HEART RATE: 87 BPM | HEIGHT: 61 IN | OXYGEN SATURATION: 98 %

## 2024-01-23 DIAGNOSIS — Z98.890 OTHER SPECIFIED POSTPROCEDURAL STATES: ICD-10-CM

## 2024-01-23 DIAGNOSIS — I48.92 UNSPECIFIED ATRIAL FLUTTER: ICD-10-CM

## 2024-01-23 DIAGNOSIS — I48.91 UNSPECIFIED ATRIAL FIBRILLATION: ICD-10-CM

## 2024-01-23 PROCEDURE — 99214 OFFICE O/P EST MOD 30 MIN: CPT

## 2024-01-23 RX ORDER — CHLORHEXIDINE GLUCONATE 4 %
325 (65 FE) LIQUID (ML) TOPICAL
Qty: 120 | Refills: 2 | Status: DISCONTINUED | COMMUNITY
Start: 2023-09-08 | End: 2024-01-23

## 2024-01-23 RX ORDER — METOPROLOL SUCCINATE 50 MG/1
50 TABLET, EXTENDED RELEASE ORAL
Qty: 180 | Refills: 3 | Status: DISCONTINUED | COMMUNITY
Start: 2023-10-18 | End: 2024-01-23

## 2024-01-23 RX ORDER — CEPHALEXIN 500 MG/1
500 TABLET ORAL
Qty: 14 | Refills: 0 | Status: DISCONTINUED | COMMUNITY
Start: 2023-12-02 | End: 2024-01-23

## 2024-01-23 NOTE — DISCUSSION/SUMMARY
[FreeTextEntry1] : 42 yo female paf hypertrophic cardiomyopathy. In 2/22 in hosp rapid afib. She had stopped beta. Went back into nsr. . She denies chest pain.  . She had covid 12/21.8/23.   . No sudden death in family. Aware no heavy exertion. . She was on more beta in past.  Explained hypertrophic cardiomyopathy. Echo 9/22 severe MR .LVOT Valsalva about 70 mm HG.She was followed in [place].  She had fibroelastoma. She 6/29/23 mechanical avr La appendage clipping.   She is doing cardiac rehab. She had ablation afib 8/23. Seeing eps .Off  Amiodarone. She has AICD.  Not able work now. She had recent covid 8/23 Echo about 6/24. Aware low Na diet. She lost 8  llbs. Questioned answered. Patient reassured.again. On metoprolol 25 bid. Meds reviewed. Time spent 35 minutes

## 2024-01-23 NOTE — HISTORY OF PRESENT ILLNESS
[FreeTextEntry1] : 42 yo female paf hypertrophic cardiomyopathy. She had fibroelastoma. She 6/29/23 mechanical avr La appendage clipping. She had ablation afib 8/23. Legs swollen. She has AICD. In cardiac rehab.

## 2024-01-23 NOTE — PHYSICAL EXAM
[Well Developed] : well developed [Well Nourished] : well nourished [No Acute Distress] : no acute distress [Normal Conjunctiva] : normal conjunctiva [Normal Venous Pressure] : normal venous pressure [No Carotid Bruit] : no carotid bruit [Normal Rate] : normal [Rhythm Regular] : regular [Normal S1] : normal S1 [Normal S2] : normal S2 [S3] : no S3 [S4] : no S4 [Prosthetic Aortic Valve] : prosthetic aortic valve heard [II] : a grade 2 [Clear Lung Fields] : clear lung fields [Soft] : abdomen soft [Normal Gait] : normal gait [Non Tender] : non-tender [No Edema] : no edema [No Rash] : no rash [Moves all extremities] : moves all extremities [Alert and Oriented] : alert and oriented

## 2024-01-24 ENCOUNTER — OUTPATIENT (OUTPATIENT)
Dept: OUTPATIENT SERVICES | Facility: HOSPITAL | Age: 44
LOS: 1 days | End: 2024-01-24

## 2024-01-24 ENCOUNTER — APPOINTMENT (OUTPATIENT)
Age: 44
End: 2024-01-24

## 2024-01-24 DIAGNOSIS — Z95.2 PRESENCE OF PROSTHETIC HEART VALVE: Chronic | ICD-10-CM

## 2024-01-24 DIAGNOSIS — I35.1 NONRHEUMATIC AORTIC (VALVE) INSUFFICIENCY: ICD-10-CM

## 2024-01-24 DIAGNOSIS — Z98.890 OTHER SPECIFIED POSTPROCEDURAL STATES: Chronic | ICD-10-CM

## 2024-01-26 ENCOUNTER — APPOINTMENT (OUTPATIENT)
Age: 44
End: 2024-01-26

## 2024-01-26 ENCOUNTER — OUTPATIENT (OUTPATIENT)
Dept: OUTPATIENT SERVICES | Facility: HOSPITAL | Age: 44
LOS: 1 days | End: 2024-01-26

## 2024-01-26 ENCOUNTER — RX RENEWAL (OUTPATIENT)
Age: 44
End: 2024-01-26

## 2024-01-26 DIAGNOSIS — Z98.890 OTHER SPECIFIED POSTPROCEDURAL STATES: Chronic | ICD-10-CM

## 2024-01-26 DIAGNOSIS — I35.1 NONRHEUMATIC AORTIC (VALVE) INSUFFICIENCY: ICD-10-CM

## 2024-01-26 DIAGNOSIS — Z95.2 PRESENCE OF PROSTHETIC HEART VALVE: Chronic | ICD-10-CM

## 2024-01-29 ENCOUNTER — OUTPATIENT (OUTPATIENT)
Dept: OUTPATIENT SERVICES | Facility: HOSPITAL | Age: 44
LOS: 1 days | End: 2024-01-29

## 2024-01-29 ENCOUNTER — APPOINTMENT (OUTPATIENT)
Age: 44
End: 2024-01-29

## 2024-01-29 DIAGNOSIS — Z95.2 PRESENCE OF PROSTHETIC HEART VALVE: Chronic | ICD-10-CM

## 2024-01-29 DIAGNOSIS — I35.1 NONRHEUMATIC AORTIC (VALVE) INSUFFICIENCY: ICD-10-CM

## 2024-01-31 ENCOUNTER — APPOINTMENT (OUTPATIENT)
Age: 44
End: 2024-01-31

## 2024-01-31 ENCOUNTER — OUTPATIENT (OUTPATIENT)
Dept: OUTPATIENT SERVICES | Facility: HOSPITAL | Age: 44
LOS: 1 days | End: 2024-01-31

## 2024-01-31 DIAGNOSIS — I35.1 NONRHEUMATIC AORTIC (VALVE) INSUFFICIENCY: ICD-10-CM

## 2024-01-31 DIAGNOSIS — Z98.890 OTHER SPECIFIED POSTPROCEDURAL STATES: Chronic | ICD-10-CM

## 2024-02-02 ENCOUNTER — APPOINTMENT (OUTPATIENT)
Age: 44
End: 2024-02-02

## 2024-02-02 ENCOUNTER — OUTPATIENT (OUTPATIENT)
Dept: OUTPATIENT SERVICES | Facility: HOSPITAL | Age: 44
LOS: 1 days | End: 2024-02-02
Payer: COMMERCIAL

## 2024-02-02 ENCOUNTER — APPOINTMENT (OUTPATIENT)
Dept: MEDICATION MANAGEMENT | Facility: CLINIC | Age: 44
End: 2024-02-02

## 2024-02-02 DIAGNOSIS — Z79.01 LONG TERM (CURRENT) USE OF ANTICOAGULANTS: ICD-10-CM

## 2024-02-02 DIAGNOSIS — Z95.2 PRESENCE OF PROSTHETIC HEART VALVE: Chronic | ICD-10-CM

## 2024-02-02 DIAGNOSIS — Z98.890 OTHER SPECIFIED POSTPROCEDURAL STATES: Chronic | ICD-10-CM

## 2024-02-02 DIAGNOSIS — I48.91 UNSPECIFIED ATRIAL FIBRILLATION: ICD-10-CM

## 2024-02-02 LAB
INR PPP: 2.2 RATIO
POCT-PROTHROMBIN TIME: 26.8 SECS
QUALITY CONTROL: YES

## 2024-02-02 PROCEDURE — 99211 OFF/OP EST MAY X REQ PHY/QHP: CPT

## 2024-02-02 PROCEDURE — 36416 COLLJ CAPILLARY BLOOD SPEC: CPT

## 2024-02-02 PROCEDURE — 85610 PROTHROMBIN TIME: CPT

## 2024-02-03 DIAGNOSIS — Z79.01 LONG TERM (CURRENT) USE OF ANTICOAGULANTS: ICD-10-CM

## 2024-02-03 DIAGNOSIS — I48.91 UNSPECIFIED ATRIAL FIBRILLATION: ICD-10-CM

## 2024-02-05 ENCOUNTER — OUTPATIENT (OUTPATIENT)
Dept: OUTPATIENT SERVICES | Facility: HOSPITAL | Age: 44
LOS: 1 days | End: 2024-02-05
Payer: COMMERCIAL

## 2024-02-05 ENCOUNTER — APPOINTMENT (OUTPATIENT)
Age: 44
End: 2024-02-05

## 2024-02-05 DIAGNOSIS — Z95.2 PRESENCE OF PROSTHETIC HEART VALVE: Chronic | ICD-10-CM

## 2024-02-05 DIAGNOSIS — I35.1 NONRHEUMATIC AORTIC (VALVE) INSUFFICIENCY: ICD-10-CM

## 2024-02-05 DIAGNOSIS — Z98.890 OTHER SPECIFIED POSTPROCEDURAL STATES: Chronic | ICD-10-CM

## 2024-02-05 PROCEDURE — 93798 PHYS/QHP OP CAR RHAB W/ECG: CPT

## 2024-02-06 ENCOUNTER — APPOINTMENT (OUTPATIENT)
Dept: CARDIOLOGY | Facility: CLINIC | Age: 44
End: 2024-02-06

## 2024-02-06 DIAGNOSIS — I35.1 NONRHEUMATIC AORTIC (VALVE) INSUFFICIENCY: ICD-10-CM

## 2024-02-07 ENCOUNTER — OUTPATIENT (OUTPATIENT)
Dept: OUTPATIENT SERVICES | Facility: HOSPITAL | Age: 44
LOS: 1 days | End: 2024-02-07

## 2024-02-07 ENCOUNTER — APPOINTMENT (OUTPATIENT)
Age: 44
End: 2024-02-07

## 2024-02-07 DIAGNOSIS — Z95.2 PRESENCE OF PROSTHETIC HEART VALVE: Chronic | ICD-10-CM

## 2024-02-07 DIAGNOSIS — I35.1 NONRHEUMATIC AORTIC (VALVE) INSUFFICIENCY: ICD-10-CM

## 2024-02-07 DIAGNOSIS — Z98.890 OTHER SPECIFIED POSTPROCEDURAL STATES: Chronic | ICD-10-CM

## 2024-02-09 ENCOUNTER — APPOINTMENT (OUTPATIENT)
Age: 44
End: 2024-02-09

## 2024-02-09 ENCOUNTER — OUTPATIENT (OUTPATIENT)
Dept: OUTPATIENT SERVICES | Facility: HOSPITAL | Age: 44
LOS: 1 days | End: 2024-02-09
Payer: COMMERCIAL

## 2024-02-09 ENCOUNTER — APPOINTMENT (OUTPATIENT)
Dept: MEDICATION MANAGEMENT | Facility: CLINIC | Age: 44
End: 2024-02-09

## 2024-02-09 ENCOUNTER — OUTPATIENT (OUTPATIENT)
Dept: OUTPATIENT SERVICES | Facility: HOSPITAL | Age: 44
LOS: 1 days | End: 2024-02-09

## 2024-02-09 DIAGNOSIS — Z95.2 PRESENCE OF PROSTHETIC HEART VALVE: Chronic | ICD-10-CM

## 2024-02-09 DIAGNOSIS — I35.1 NONRHEUMATIC AORTIC (VALVE) INSUFFICIENCY: ICD-10-CM

## 2024-02-09 DIAGNOSIS — Z79.01 LONG TERM (CURRENT) USE OF ANTICOAGULANTS: ICD-10-CM

## 2024-02-09 DIAGNOSIS — Z98.890 OTHER SPECIFIED POSTPROCEDURAL STATES: Chronic | ICD-10-CM

## 2024-02-09 DIAGNOSIS — I48.91 UNSPECIFIED ATRIAL FIBRILLATION: ICD-10-CM

## 2024-02-09 LAB
INR PPP: 3.1 RATIO
POCT-PROTHROMBIN TIME: 36.9 SECS
QUALITY CONTROL: YES

## 2024-02-09 PROCEDURE — 36416 COLLJ CAPILLARY BLOOD SPEC: CPT

## 2024-02-09 PROCEDURE — 99211 OFF/OP EST MAY X REQ PHY/QHP: CPT

## 2024-02-09 PROCEDURE — 85610 PROTHROMBIN TIME: CPT

## 2024-02-10 DIAGNOSIS — Z79.01 LONG TERM (CURRENT) USE OF ANTICOAGULANTS: ICD-10-CM

## 2024-02-10 DIAGNOSIS — I48.91 UNSPECIFIED ATRIAL FIBRILLATION: ICD-10-CM

## 2024-02-12 ENCOUNTER — OUTPATIENT (OUTPATIENT)
Dept: OUTPATIENT SERVICES | Facility: HOSPITAL | Age: 44
LOS: 1 days | End: 2024-02-12

## 2024-02-12 ENCOUNTER — APPOINTMENT (OUTPATIENT)
Age: 44
End: 2024-02-12

## 2024-02-12 DIAGNOSIS — I35.1 NONRHEUMATIC AORTIC (VALVE) INSUFFICIENCY: ICD-10-CM

## 2024-02-14 ENCOUNTER — OUTPATIENT (OUTPATIENT)
Dept: OUTPATIENT SERVICES | Facility: HOSPITAL | Age: 44
LOS: 1 days | End: 2024-02-14

## 2024-02-14 ENCOUNTER — APPOINTMENT (OUTPATIENT)
Age: 44
End: 2024-02-14

## 2024-02-14 DIAGNOSIS — Z98.890 OTHER SPECIFIED POSTPROCEDURAL STATES: Chronic | ICD-10-CM

## 2024-02-14 DIAGNOSIS — I35.1 NONRHEUMATIC AORTIC (VALVE) INSUFFICIENCY: ICD-10-CM

## 2024-02-14 DIAGNOSIS — Z95.2 PRESENCE OF PROSTHETIC HEART VALVE: Chronic | ICD-10-CM

## 2024-02-16 ENCOUNTER — APPOINTMENT (OUTPATIENT)
Age: 44
End: 2024-02-16

## 2024-02-16 ENCOUNTER — OUTPATIENT (OUTPATIENT)
Dept: OUTPATIENT SERVICES | Facility: HOSPITAL | Age: 44
LOS: 1 days | End: 2024-02-16

## 2024-02-16 DIAGNOSIS — I35.1 NONRHEUMATIC AORTIC (VALVE) INSUFFICIENCY: ICD-10-CM

## 2024-02-16 DIAGNOSIS — Z98.890 OTHER SPECIFIED POSTPROCEDURAL STATES: Chronic | ICD-10-CM

## 2024-02-16 DIAGNOSIS — Z95.2 PRESENCE OF PROSTHETIC HEART VALVE: Chronic | ICD-10-CM

## 2024-02-21 ENCOUNTER — APPOINTMENT (OUTPATIENT)
Age: 44
End: 2024-02-21

## 2024-02-23 ENCOUNTER — APPOINTMENT (OUTPATIENT)
Age: 44
End: 2024-02-23

## 2024-02-23 ENCOUNTER — OUTPATIENT (OUTPATIENT)
Dept: OUTPATIENT SERVICES | Facility: HOSPITAL | Age: 44
LOS: 1 days | End: 2024-02-23
Payer: COMMERCIAL

## 2024-02-23 ENCOUNTER — APPOINTMENT (OUTPATIENT)
Dept: MEDICATION MANAGEMENT | Facility: CLINIC | Age: 44
End: 2024-02-23

## 2024-02-23 DIAGNOSIS — Z98.890 OTHER SPECIFIED POSTPROCEDURAL STATES: Chronic | ICD-10-CM

## 2024-02-23 DIAGNOSIS — Z79.01 LONG TERM (CURRENT) USE OF ANTICOAGULANTS: ICD-10-CM

## 2024-02-23 DIAGNOSIS — Z95.2 PRESENCE OF PROSTHETIC HEART VALVE: Chronic | ICD-10-CM

## 2024-02-23 DIAGNOSIS — I48.91 UNSPECIFIED ATRIAL FIBRILLATION: ICD-10-CM

## 2024-02-23 LAB
INR PPP: 1.9 RATIO
POCT-PROTHROMBIN TIME: 22.4 SECS
QUALITY CONTROL: YES

## 2024-02-23 PROCEDURE — 36416 COLLJ CAPILLARY BLOOD SPEC: CPT

## 2024-02-23 PROCEDURE — 99211 OFF/OP EST MAY X REQ PHY/QHP: CPT

## 2024-02-23 PROCEDURE — 85610 PROTHROMBIN TIME: CPT

## 2024-02-24 DIAGNOSIS — Z79.01 LONG TERM (CURRENT) USE OF ANTICOAGULANTS: ICD-10-CM

## 2024-02-24 DIAGNOSIS — I48.91 UNSPECIFIED ATRIAL FIBRILLATION: ICD-10-CM

## 2024-02-26 ENCOUNTER — APPOINTMENT (OUTPATIENT)
Age: 44
End: 2024-02-26

## 2024-02-28 ENCOUNTER — APPOINTMENT (OUTPATIENT)
Age: 44
End: 2024-02-28

## 2024-03-01 ENCOUNTER — APPOINTMENT (OUTPATIENT)
Age: 44
End: 2024-03-01

## 2024-03-01 ENCOUNTER — OUTPATIENT (OUTPATIENT)
Dept: OUTPATIENT SERVICES | Facility: HOSPITAL | Age: 44
LOS: 1 days | End: 2024-03-01
Payer: COMMERCIAL

## 2024-03-01 ENCOUNTER — APPOINTMENT (OUTPATIENT)
Dept: MEDICATION MANAGEMENT | Facility: CLINIC | Age: 44
End: 2024-03-01

## 2024-03-01 DIAGNOSIS — Z79.01 LONG TERM (CURRENT) USE OF ANTICOAGULANTS: ICD-10-CM

## 2024-03-01 DIAGNOSIS — Z98.890 OTHER SPECIFIED POSTPROCEDURAL STATES: Chronic | ICD-10-CM

## 2024-03-01 DIAGNOSIS — I48.91 UNSPECIFIED ATRIAL FIBRILLATION: ICD-10-CM

## 2024-03-01 DIAGNOSIS — Z95.2 PRESENCE OF PROSTHETIC HEART VALVE: Chronic | ICD-10-CM

## 2024-03-01 LAB
INR PPP: 2.9 RATIO
POCT-PROTHROMBIN TIME: 35.4 SECS
QUALITY CONTROL: YES

## 2024-03-01 PROCEDURE — 85610 PROTHROMBIN TIME: CPT

## 2024-03-01 PROCEDURE — 36416 COLLJ CAPILLARY BLOOD SPEC: CPT

## 2024-03-01 PROCEDURE — 99211 OFF/OP EST MAY X REQ PHY/QHP: CPT

## 2024-03-02 DIAGNOSIS — Z79.01 LONG TERM (CURRENT) USE OF ANTICOAGULANTS: ICD-10-CM

## 2024-03-02 DIAGNOSIS — I48.91 UNSPECIFIED ATRIAL FIBRILLATION: ICD-10-CM

## 2024-03-08 ENCOUNTER — APPOINTMENT (OUTPATIENT)
Dept: MEDICATION MANAGEMENT | Facility: CLINIC | Age: 44
End: 2024-03-08

## 2024-03-08 ENCOUNTER — OUTPATIENT (OUTPATIENT)
Dept: OUTPATIENT SERVICES | Facility: HOSPITAL | Age: 44
LOS: 1 days | End: 2024-03-08
Payer: COMMERCIAL

## 2024-03-08 ENCOUNTER — NON-APPOINTMENT (OUTPATIENT)
Age: 44
End: 2024-03-08

## 2024-03-08 ENCOUNTER — APPOINTMENT (OUTPATIENT)
Dept: CARDIOLOGY | Facility: CLINIC | Age: 44
End: 2024-03-08
Payer: COMMERCIAL

## 2024-03-08 DIAGNOSIS — I48.91 UNSPECIFIED ATRIAL FIBRILLATION: ICD-10-CM

## 2024-03-08 DIAGNOSIS — Z79.01 LONG TERM (CURRENT) USE OF ANTICOAGULANTS: ICD-10-CM

## 2024-03-08 LAB
INR PPP: 2.6 RATIO
POCT-PROTHROMBIN TIME: 31.7 SECS
QUALITY CONTROL: YES

## 2024-03-08 PROCEDURE — 36416 COLLJ CAPILLARY BLOOD SPEC: CPT

## 2024-03-08 PROCEDURE — 93295 DEV INTERROG REMOTE 1/2/MLT: CPT

## 2024-03-08 PROCEDURE — 99211 OFF/OP EST MAY X REQ PHY/QHP: CPT

## 2024-03-08 PROCEDURE — 93296 REM INTERROG EVL PM/IDS: CPT

## 2024-03-08 PROCEDURE — 85610 PROTHROMBIN TIME: CPT

## 2024-03-09 DIAGNOSIS — Z79.01 LONG TERM (CURRENT) USE OF ANTICOAGULANTS: ICD-10-CM

## 2024-03-09 DIAGNOSIS — I48.91 UNSPECIFIED ATRIAL FIBRILLATION: ICD-10-CM

## 2024-03-15 ENCOUNTER — OUTPATIENT (OUTPATIENT)
Dept: OUTPATIENT SERVICES | Facility: HOSPITAL | Age: 44
LOS: 1 days | End: 2024-03-15
Payer: COMMERCIAL

## 2024-03-15 ENCOUNTER — APPOINTMENT (OUTPATIENT)
Dept: MEDICATION MANAGEMENT | Facility: CLINIC | Age: 44
End: 2024-03-15

## 2024-03-15 DIAGNOSIS — Z98.890 OTHER SPECIFIED POSTPROCEDURAL STATES: Chronic | ICD-10-CM

## 2024-03-15 DIAGNOSIS — Z79.01 LONG TERM (CURRENT) USE OF ANTICOAGULANTS: ICD-10-CM

## 2024-03-15 DIAGNOSIS — I48.91 UNSPECIFIED ATRIAL FIBRILLATION: ICD-10-CM

## 2024-03-15 DIAGNOSIS — Z95.2 PRESENCE OF PROSTHETIC HEART VALVE: Chronic | ICD-10-CM

## 2024-03-15 LAB
INR PPP: 2.1 RATIO
POCT-PROTHROMBIN TIME: 24.6 SECS
QUALITY CONTROL: YES

## 2024-03-15 PROCEDURE — 85610 PROTHROMBIN TIME: CPT

## 2024-03-15 PROCEDURE — 36416 COLLJ CAPILLARY BLOOD SPEC: CPT

## 2024-03-15 PROCEDURE — 99211 OFF/OP EST MAY X REQ PHY/QHP: CPT

## 2024-03-16 DIAGNOSIS — Z79.01 LONG TERM (CURRENT) USE OF ANTICOAGULANTS: ICD-10-CM

## 2024-03-16 DIAGNOSIS — I48.91 UNSPECIFIED ATRIAL FIBRILLATION: ICD-10-CM

## 2024-03-19 NOTE — PATIENT PROFILE ADULT - DO YOU NEED ADDITIONAL SERVICES TO MANAGE ANY OF THESE MEDICAL CONDITIONS AT HOME?
light.   Cardiovascular:      Rate and Rhythm: Normal rate.   Pulmonary:      Effort: Pulmonary effort is normal. No respiratory distress.      Breath sounds: Examination of the right-upper field reveals wheezing and rhonchi. Examination of the right-lower field reveals wheezing and rhonchi. Examination of the left-lower field reveals rhonchi. Wheezing and rhonchi present.   Musculoskeletal:      Cervical back: Normal range of motion and neck supple.   Skin:     General: Skin is warm and dry.   Neurological:      General: No focal deficit present.      Mental Status: He is alert and oriented to person, place, and time.   Psychiatric:         Mood and Affect: Mood normal.         Behavior: Behavior normal. Behavior is cooperative.         Thought Content: Thought content normal.         Judgment: Judgment normal.       ASSESSMENT/PLAN:  1. Bronchitis  -     methylPREDNISolone (MEDROL DOSEPACK) 4 MG tablet; Take by mouth., Disp-1 kit, R-0Normal  -     amoxicillin-clavulanate (AUGMENTIN) 875-125 MG per tablet; Take 1 tablet by mouth 2 times daily for 7 days, Disp-14 tablet, R-0Normal  2. Asthma exacerbation, mild  Rest and fluids.   OTC allergy medication prn.  Antibiotic as directed.  Medrol luz elena as directed. Discussed side effects and usage.   Please use albuterol inhaler that you would normally use for your asthma.   OTC cough and cold meds prn.  F/u PCP in 5-7 days.         Electronically signed by CALIXTO Flanagan CNP on 3/19/24 at 2:57 PM EDT   
no

## 2024-03-22 ENCOUNTER — APPOINTMENT (OUTPATIENT)
Dept: MEDICATION MANAGEMENT | Facility: CLINIC | Age: 44
End: 2024-03-22

## 2024-03-22 ENCOUNTER — OUTPATIENT (OUTPATIENT)
Dept: OUTPATIENT SERVICES | Facility: HOSPITAL | Age: 44
LOS: 1 days | End: 2024-03-22
Payer: COMMERCIAL

## 2024-03-22 DIAGNOSIS — I48.91 UNSPECIFIED ATRIAL FIBRILLATION: ICD-10-CM

## 2024-03-22 DIAGNOSIS — Z98.890 OTHER SPECIFIED POSTPROCEDURAL STATES: Chronic | ICD-10-CM

## 2024-03-22 DIAGNOSIS — Z95.2 PRESENCE OF PROSTHETIC HEART VALVE: Chronic | ICD-10-CM

## 2024-03-22 DIAGNOSIS — Z79.01 LONG TERM (CURRENT) USE OF ANTICOAGULANTS: ICD-10-CM

## 2024-03-22 LAB
INR PPP: 2.6 RATIO
POCT-PROTHROMBIN TIME: 31.7 SECS
QUALITY CONTROL: YES

## 2024-03-22 PROCEDURE — 36416 COLLJ CAPILLARY BLOOD SPEC: CPT

## 2024-03-22 PROCEDURE — 85610 PROTHROMBIN TIME: CPT

## 2024-03-22 PROCEDURE — 99211 OFF/OP EST MAY X REQ PHY/QHP: CPT

## 2024-03-23 DIAGNOSIS — Z79.01 LONG TERM (CURRENT) USE OF ANTICOAGULANTS: ICD-10-CM

## 2024-03-23 DIAGNOSIS — I48.91 UNSPECIFIED ATRIAL FIBRILLATION: ICD-10-CM

## 2024-04-05 ENCOUNTER — OUTPATIENT (OUTPATIENT)
Dept: OUTPATIENT SERVICES | Facility: HOSPITAL | Age: 44
LOS: 1 days | End: 2024-04-05
Payer: COMMERCIAL

## 2024-04-05 ENCOUNTER — APPOINTMENT (OUTPATIENT)
Dept: MEDICATION MANAGEMENT | Facility: CLINIC | Age: 44
End: 2024-04-05

## 2024-04-05 DIAGNOSIS — I48.91 UNSPECIFIED ATRIAL FIBRILLATION: ICD-10-CM

## 2024-04-05 DIAGNOSIS — Z79.01 LONG TERM (CURRENT) USE OF ANTICOAGULANTS: ICD-10-CM

## 2024-04-05 DIAGNOSIS — Z98.890 OTHER SPECIFIED POSTPROCEDURAL STATES: Chronic | ICD-10-CM

## 2024-04-05 LAB
INR PPP: 1.6 RATIO
POCT-PROTHROMBIN TIME: 18.7 SECS
QUALITY CONTROL: YES

## 2024-04-05 PROCEDURE — 99211 OFF/OP EST MAY X REQ PHY/QHP: CPT

## 2024-04-05 PROCEDURE — 85610 PROTHROMBIN TIME: CPT

## 2024-04-05 PROCEDURE — 36416 COLLJ CAPILLARY BLOOD SPEC: CPT

## 2024-04-06 DIAGNOSIS — Z79.01 LONG TERM (CURRENT) USE OF ANTICOAGULANTS: ICD-10-CM

## 2024-04-06 DIAGNOSIS — I48.91 UNSPECIFIED ATRIAL FIBRILLATION: ICD-10-CM

## 2024-04-12 ENCOUNTER — APPOINTMENT (OUTPATIENT)
Dept: MEDICATION MANAGEMENT | Facility: CLINIC | Age: 44
End: 2024-04-12

## 2024-04-12 ENCOUNTER — OUTPATIENT (OUTPATIENT)
Dept: OUTPATIENT SERVICES | Facility: HOSPITAL | Age: 44
LOS: 1 days | End: 2024-04-12
Payer: COMMERCIAL

## 2024-04-12 DIAGNOSIS — Z95.2 PRESENCE OF PROSTHETIC HEART VALVE: Chronic | ICD-10-CM

## 2024-04-12 DIAGNOSIS — Z98.890 OTHER SPECIFIED POSTPROCEDURAL STATES: Chronic | ICD-10-CM

## 2024-04-12 DIAGNOSIS — I48.91 UNSPECIFIED ATRIAL FIBRILLATION: ICD-10-CM

## 2024-04-12 DIAGNOSIS — Z79.01 LONG TERM (CURRENT) USE OF ANTICOAGULANTS: ICD-10-CM

## 2024-04-12 LAB
INR PPP: 1.7 RATIO
POCT-PROTHROMBIN TIME: 20.7 SECS
QUALITY CONTROL: YES

## 2024-04-12 PROCEDURE — 99211 OFF/OP EST MAY X REQ PHY/QHP: CPT

## 2024-04-12 PROCEDURE — 36416 COLLJ CAPILLARY BLOOD SPEC: CPT

## 2024-04-12 PROCEDURE — 85610 PROTHROMBIN TIME: CPT

## 2024-04-12 NOTE — ED PROVIDER NOTE - HIV OFFER
[facility]  DISCHARGE SUMMARY     Danielle Jerome Patient Status:  Inpatient    3/25/1968 MRN [id number]   Location [facility] 5NW-A Attending Bran Ashley MD   Hosp Day # 3 PCP Luca Rai DO     Date of Admission: 2024  Date of Discharge: 2024  Discharge Disposition: Home or Self Care  Chief complaint:   Chief Complaint   Patient presents with    Pneumonia         Discharge Diagnoses and Brief Synopsis:  #acute hypoxic respiratory failure due to pneumonia and acute Asthma exacerbation. improved with steroids/abx during course. Seen by pulm during course as well; no complications.  #hx possible Churgg miranda??   #History of VTE on xarelto  #CAD sp PCI; on plavix  #Essential hypertension  #Dyslipidemia  #Diabetes mellitus, 2  #GERD  #Anxiety  #Depression  #fibromyalgia, polyarthralgias        Lab/Test results pending at Discharge:   none        Admission History of Present Illness (author of HPI not necessarily myself; see H&P author): Danielle Jerome is a 56 year old female with cough.  Has had over a week of symptoms, along with weakness, dyspnea, wheezing and fevers.  Was in clinic 5 days ago and had xray and treated for pneumonia.       Lace+ Score: 61  59-90 High Risk  29-58 Medium Risk  0-28   Low Risk  Patient was referred to the [facility].    TCM Follow-Up Recommendation:  LACE > 58: High Risk of readmission after discharge from the hospital.      Discharge Medication List:     Discharge Medications        START taking these medications        Instructions Prescription details   cefdinir 300 MG Caps  Commonly known as: Omnicef      Take 1 capsule (300 mg total) by mouth 2 (two) times daily for 5 days.   Stop taking on: 2024  Quantity: 10 capsule  Refills: 0            CHANGE how you take these medications        Instructions Prescription details   pantoprazole 40 MG Tbec  Commonly known as: Protonix  What changed: when to take this       Take 1 tablet (40 mg total) by mouth before breakfast.   Quantity: 90 tablet  Refills: 3     predniSONE 10 MG Tabs  Commonly known as: Deltasone  What changed:   medication strength  how much to take  how to take this  when to take this  additional instructions      40mg for 3 days, then 30mg for 3 days, then 20mg for 3 days, then 10mg for 3 days, then stop   Quantity: 30 tablet  Refills: 0            CONTINUE taking these medications        Instructions Prescription details   albuterol 108 (90 Base) MCG/ACT Aers  Commonly known as: Ventolin HFA      Inhale 2 puffs into the lungs every 4 to 6 hours as needed for Wheezing or Shortness of Breath.   Quantity: 1 each  Refills: 0     ALPRAZolam 0.5 MG Tabs  Commonly known as: Xanax      TAKE 1 TO 2 TABLETS(0.5 TO 1 MG) BY MOUTH DAILY AS NEEDED FOR SLEEP OR ANXIETY   Quantity: 42 tablet  Refills: 0     amLODIPine 2.5 MG Tabs  Commonly known as: Norvasc      Take 1 tablet (2.5 mg total) by mouth daily.   Refills: 0     azelastine 0.1 % Soln  Commonly known as: Astelin      1 spray by Nasal route in the morning and 1 spray before bedtime.   Refills: 0     Breo Ellipta 200-25 MCG/ACT Aepb  Generic drug: fluticasone furoate-vilanterol      Inhale 1 puff into the lungs daily.   Quantity: 3 each  Refills: 1     cetirizine 10 MG Tabs  Commonly known as: ZyrTEC      Take 1 tablet (10 mg total) by mouth nightly.   Refills: 0     clopidogrel 75 MG Tabs  Commonly known as: Plavix      Take 1 tablet (75 mg total) by mouth nightly. nightly   Refills: 0     Dexcom G6 Sensor Misc      Apply to skin every 10 days   Quantity: 9 each  Refills: 3     Dexcom G6 Transmitter Misc      1 each by Other route every 3 (three) months.   Quantity: 1 each  Refills: 1     ergocalciferol 1.25 MG (99069 UT) Caps  Commonly known as: Vitamin D2      Take 1 capsule (50,000 Units total) by mouth once a week.   Stop taking on: July 2, 2024  Quantity: 12 capsule  Refills: 0     escitalopram 20 MG  Tabs  Commonly known as: Lexapro      Take 1 tablet (20 mg total) by mouth every morning.   Quantity: 90 tablet  Refills: 1     ezetimibe 10 MG Tabs  Commonly known as: Zetia      Take 1 tablet (10 mg total) by mouth nightly.   Refills: 0     fluticasone propionate 50 MCG/ACT Susp  Commonly known as: Flonase      1 spray by Each Nare route 2 (two) times daily.   Refills: 0     gabapentin 100 MG Caps  Commonly known as: Neurontin      Take 2 capsules (200 mg total) by mouth nightly. Takes Gabapentin 100mg in the morning and 200mg at night   Refills: 0     gabapentin 100 MG Caps  Commonly known as: Neurontin      Take 1 capsule (100 mg total) by mouth every morning. Takes Gabapentin 100mg in the morning and 200mg at night   Refills: 0     Gvoke HypoPen 1-Pack 1 MG/0.2ML SUBQ injection  Generic drug: glucagon      Inject 0.2 mL (1 mg total) into the skin once as needed for Low blood glucose.   Quantity: 0.2 mL  Refills: 1     HumuLIN N KwikPen 100 UNIT/ML Supn  Generic drug: Insulin NPH (Human) (Isophane)      Pair with prednisone only. Up to 30 units a day max.   Quantity: 9 mL  Refills: 0     insulin lispro 100 UNIT/ML Soln  Commonly known as: HumaLOG      Uses up to 100 units daily via insulin pump   Quantity: 90 mL  Refills: 1     losartan 100 MG Tabs  Commonly known as: Cozaar      Take 1 tablet (100 mg total) by mouth daily.   Refills: 0     metoprolol succinate ER 25 MG Tb24  Commonly known as: Toprol XL      Take 1 tablet (25 mg total) by mouth nightly.   Refills: 0     montelukast 10 MG Tabs  Commonly known as: Singulair      Take 1 tablet (10 mg total) by mouth nightly.   Quantity: 90 tablet  Refills: 1     NUCALA SC      Inject into the skin every 30 (thirty) days.   Refills: 0     Omnipod 5 G6 Pods (Gen 5) Misc      1 each every other day.   Quantity: 45 each  Refills: 1     rivaroxaban 20 MG Tabs  Commonly known as: Xarelto      Take 1 tablet (20 mg total) by mouth nightly. TAKE AT BEDTIME   Quantity: 90  tablet  Refills: 3     Spiriva Respimat 2.5 MCG/ACT Aers  Generic drug: Tiotropium Bromide Monohydrate      Inhale 2 puffs into the lungs in the morning and 2 puffs before bedtime.   Refills: 0     SUMAtriptan 50 MG Tabs  Commonly known as: Imitrex      TAKE 1 TABLET BY MOUTH EVERY 2 HOURS AS NEEDED FOR  MIGRAINE.  DO  NOT  EXCEED  200MG  IN  24  HOURS   Quantity: 9 tablet  Refills: 3            STOP taking these medications      doxycycline 100 MG Caps  Commonly known as: Vibramycin        fluconazole 150 MG Tabs  Commonly known as: Diflucan                  Where to Get Your Medications        Please  your prescriptions at the location directed by your doctor or nurse    Bring a paper prescription for each of these medications  cefdinir 300 MG Caps  predniSONE 10 MG Tabs         [place]    Follow-up appointment:   Luca Rai DO  [address]  [facility] [address] [phone]    Follow up in 1 week(s)      Ana Luisa Miller MD  [address]   [facility] [address]  [facility] [address] [phone]    Schedule an appointment as soon as possible for a visit in 1 week(s)  hosptilization follow-up    Appointments for Next 30 Days 4/13/2024 - 5/13/2024      None            Vital signs:       Physical Exam:    General: No acute distress.   Respiratory: wheezing and rhonchi present but vastly improved  Cardiovascular: S1, S2. Regular rate and rhythm. No murmurs, rubs or gallops.   Abdomen: Soft, nontender, nondistended.   Neurologic: No focal neurological deficits.   Musculoskeletal: Moves all extremities.  Extremities: No edema.  -----------------------------------------------------------------------------------------------  PATIENT DISCHARGE INSTRUCTIONS: See electronic chart    Bran Hannah MD    Time spent:   35 minutes     Previously Declined (within the last year)

## 2024-04-13 DIAGNOSIS — I48.91 UNSPECIFIED ATRIAL FIBRILLATION: ICD-10-CM

## 2024-04-13 DIAGNOSIS — Z79.01 LONG TERM (CURRENT) USE OF ANTICOAGULANTS: ICD-10-CM

## 2024-04-17 ENCOUNTER — APPOINTMENT (OUTPATIENT)
Dept: HEART FAILURE | Facility: CLINIC | Age: 44
End: 2024-04-17
Payer: COMMERCIAL

## 2024-04-17 VITALS
HEIGHT: 61 IN | WEIGHT: 153 LBS | HEART RATE: 70 BPM | BODY MASS INDEX: 28.89 KG/M2 | DIASTOLIC BLOOD PRESSURE: 64 MMHG | OXYGEN SATURATION: 98 % | SYSTOLIC BLOOD PRESSURE: 98 MMHG

## 2024-04-17 PROCEDURE — 93000 ELECTROCARDIOGRAM COMPLETE: CPT

## 2024-04-17 PROCEDURE — 99214 OFFICE O/P EST MOD 30 MIN: CPT

## 2024-04-17 PROCEDURE — G2211 COMPLEX E/M VISIT ADD ON: CPT

## 2024-04-17 RX ORDER — WARFARIN 1 MG/1
1 TABLET ORAL
Qty: 75 | Refills: 3 | Status: DISCONTINUED | COMMUNITY
Start: 2023-11-09 | End: 2024-04-17

## 2024-04-18 NOTE — ASSESSMENT
[FreeTextEntry1] : HOCM/HFpEF/SOB/AFIB  Euvolemic on exam  Continue Furosemide 20 mg daily. If weight gain of 2 lbs or more in a day, take an extra tablet for that day Continue Metoprolol ER 25 mg BID (lowered) Continue AC per EP Continue Farxiga 10 mg daily Blood work in 1-2 months  Follow up with EP and general cards RTO in 6 months with TTE   The patient was counseled on lifestyle modifications to eat a heart-healthy diet, including sodium restriction and regular exercise. Also, she was instructed to keep a log of her blood pressure, heart rate, and daily weight. The weight is recommended to take first thing in the morning upon voiding. The blood pressure and heart rate can be taken anytime during the day.   Josie Loya MD, FACC, [facility]  Advanced Heart Failure/ Mechanical Circulatory Support Pulmonary Hypertension and Cardiac Amyloidosis  [facility]

## 2024-04-18 NOTE — HISTORY OF PRESENT ILLNESS
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 She underwent aortic valve mass resection, left atrial appendage clipping, and aortic valve replacement on 6/29/23, post-op c/b AF, persistent, despite MINDI/DCCV on 8/2/23. She was recently admitted to [facility] from 8/11 to 8/22 for worsening dyspnea, HR of 130s with Afib, and hypotension at the outpatient office. She underwent for an ablation on 8/14. For post-op NSVT and high risk of SCD in HOCM pts, she was recommended a Lifevest. The patient is here for a follow-up for HOCM.   4/17/2024   Patient is coming for routine visit. She reports feeling well, no c/o chest pain, LOC, GOULD. She completed cardiac rehab and is now trying to stay active on her own. She is compliant with meds and low sodium diet. Her weight remains stable. She is trying to lose some weight.   TTE done 8/2023 reported LVEF 50-55%, grade II DD, mild to mod MR, mild AI, normal RV size and function, severe MARGARITO

## 2024-04-19 ENCOUNTER — OUTPATIENT (OUTPATIENT)
Dept: OUTPATIENT SERVICES | Facility: HOSPITAL | Age: 44
LOS: 1 days | End: 2024-04-19
Payer: COMMERCIAL

## 2024-04-19 ENCOUNTER — APPOINTMENT (OUTPATIENT)
Dept: MEDICATION MANAGEMENT | Facility: CLINIC | Age: 44
End: 2024-04-19

## 2024-04-19 DIAGNOSIS — Z95.2 PRESENCE OF PROSTHETIC HEART VALVE: Chronic | ICD-10-CM

## 2024-04-19 DIAGNOSIS — Z79.01 LONG TERM (CURRENT) USE OF ANTICOAGULANTS: ICD-10-CM

## 2024-04-19 DIAGNOSIS — Z98.890 OTHER SPECIFIED POSTPROCEDURAL STATES: Chronic | ICD-10-CM

## 2024-04-19 DIAGNOSIS — I48.91 UNSPECIFIED ATRIAL FIBRILLATION: ICD-10-CM

## 2024-04-19 LAB
INR PPP: 2 RATIO
POCT-PROTHROMBIN TIME: 24.6 SECS
QUALITY CONTROL: YES

## 2024-04-19 PROCEDURE — 36416 COLLJ CAPILLARY BLOOD SPEC: CPT

## 2024-04-19 PROCEDURE — 99211 OFF/OP EST MAY X REQ PHY/QHP: CPT

## 2024-04-19 PROCEDURE — 85610 PROTHROMBIN TIME: CPT

## 2024-04-20 DIAGNOSIS — I48.91 UNSPECIFIED ATRIAL FIBRILLATION: ICD-10-CM

## 2024-04-20 DIAGNOSIS — Z79.01 LONG TERM (CURRENT) USE OF ANTICOAGULANTS: ICD-10-CM

## 2024-04-26 ENCOUNTER — APPOINTMENT (OUTPATIENT)
Dept: MEDICATION MANAGEMENT | Facility: CLINIC | Age: 44
End: 2024-04-26

## 2024-04-26 ENCOUNTER — OUTPATIENT (OUTPATIENT)
Dept: OUTPATIENT SERVICES | Facility: HOSPITAL | Age: 44
LOS: 1 days | End: 2024-04-26
Payer: COMMERCIAL

## 2024-04-26 DIAGNOSIS — Z79.01 LONG TERM (CURRENT) USE OF ANTICOAGULANTS: ICD-10-CM

## 2024-04-26 DIAGNOSIS — I48.91 UNSPECIFIED ATRIAL FIBRILLATION: ICD-10-CM

## 2024-04-26 LAB
INR PPP: 2.5 RATIO
POCT-PROTHROMBIN TIME: 30.3 SECS
QUALITY CONTROL: YES

## 2024-04-26 PROCEDURE — 99211 OFF/OP EST MAY X REQ PHY/QHP: CPT

## 2024-04-26 PROCEDURE — 85610 PROTHROMBIN TIME: CPT

## 2024-04-26 PROCEDURE — 36416 COLLJ CAPILLARY BLOOD SPEC: CPT

## 2024-04-27 DIAGNOSIS — I48.91 UNSPECIFIED ATRIAL FIBRILLATION: ICD-10-CM

## 2024-04-27 DIAGNOSIS — Z79.01 LONG TERM (CURRENT) USE OF ANTICOAGULANTS: ICD-10-CM

## 2024-04-29 ENCOUNTER — APPOINTMENT (OUTPATIENT)
Dept: CARDIOLOGY | Facility: CLINIC | Age: 44
End: 2024-04-29
Payer: COMMERCIAL

## 2024-04-29 VITALS
OXYGEN SATURATION: 98 % | WEIGHT: 153 LBS | HEART RATE: 68 BPM | HEIGHT: 61 IN | SYSTOLIC BLOOD PRESSURE: 96 MMHG | DIASTOLIC BLOOD PRESSURE: 60 MMHG | BODY MASS INDEX: 28.89 KG/M2

## 2024-04-29 DIAGNOSIS — I50.30 UNSPECIFIED DIASTOLIC (CONGESTIVE) HEART FAILURE: ICD-10-CM

## 2024-04-29 DIAGNOSIS — D15.1 BENIGN NEOPLASM OF HEART: ICD-10-CM

## 2024-04-29 DIAGNOSIS — Z98.890 OTHER SPECIFIED POSTPROCEDURAL STATES: ICD-10-CM

## 2024-04-29 DIAGNOSIS — Z95.2 PRESENCE OF PROSTHETIC HEART VALVE: ICD-10-CM

## 2024-04-29 DIAGNOSIS — R06.02 SHORTNESS OF BREATH: ICD-10-CM

## 2024-04-29 DIAGNOSIS — Z86.79 OTHER SPECIFIED POSTPROCEDURAL STATES: ICD-10-CM

## 2024-04-29 DIAGNOSIS — I42.2 OTHER HYPERTROPHIC CARDIOMYOPATHY: ICD-10-CM

## 2024-04-29 PROCEDURE — 99214 OFFICE O/P EST MOD 30 MIN: CPT

## 2024-04-29 NOTE — PHYSICAL EXAM
[Well Developed] : well developed [Well Nourished] : well nourished [No Acute Distress] : no acute distress [Normal Conjunctiva] : normal conjunctiva [Normal Venous Pressure] : normal venous pressure [No Carotid Bruit] : no carotid bruit [Normal Rate] : normal [Rhythm Regular] : regular [Normal S1] : normal S1 [Normal S2] : normal S2 [S3] : no S3 [S4] : no S4 [Prosthetic Aortic Valve] : prosthetic aortic valve heard [II] : a grade 2 [Clear Lung Fields] : clear lung fields [Soft] : abdomen soft [Non Tender] : non-tender [Normal Gait] : normal gait [No Edema] : no edema [No Rash] : no rash [Moves all extremities] : moves all extremities [Alert and Oriented] : alert and oriented

## 2024-04-29 NOTE — DISCUSSION/SUMMARY
[FreeTextEntry1] : 42 yo female paf hypertrophic cardiomyopathy. In 2/22 in hosp rapid afib. She had stopped beta. Went back into nsr. . She denies chest pain.  . She had covid 12/21.8/23.   . No sudden death in family. Aware no heavy exertion. . She was on more beta in past.  Explained hypertrophic cardiomyopathy. Echo 9/22 severe MR .LVOT Valsalva about 70 mm HG.She was followed in [place].  She had fibroelastoma. She 6/29/23 mechanical avr La appendage clipping.   She is doing cardiac rehab. She had ablation afib 8/23. Seeing eps .Off  Amiodarone. She has AICD.  Not able work now. She had recent covid 8/23 Echo about 6/24. Aware low Na diet.. Legs not swollen anymore. She completed cardiac rehab. Told exercise. . Told walk.. Told drink water . Aware if dehydrated she may get hypotensive.

## 2024-04-29 NOTE — HISTORY OF PRESENT ILLNESS
[FreeTextEntry1] : 44 yo female paf hypertrophic cardiomyopathy. She had fibroelastoma. She 6/29/23 mechanical avr La appendage clipping. She had ablation afib 8/23. Legs not swollen anymore. She completed cardiac rehab

## 2024-05-03 ENCOUNTER — APPOINTMENT (OUTPATIENT)
Dept: MEDICATION MANAGEMENT | Facility: CLINIC | Age: 44
End: 2024-05-03

## 2024-05-03 ENCOUNTER — OUTPATIENT (OUTPATIENT)
Dept: OUTPATIENT SERVICES | Facility: HOSPITAL | Age: 44
LOS: 1 days | End: 2024-05-03
Payer: COMMERCIAL

## 2024-05-03 DIAGNOSIS — Z95.2 PRESENCE OF PROSTHETIC HEART VALVE: Chronic | ICD-10-CM

## 2024-05-03 DIAGNOSIS — I48.91 UNSPECIFIED ATRIAL FIBRILLATION: ICD-10-CM

## 2024-05-03 DIAGNOSIS — Z79.01 LONG TERM (CURRENT) USE OF ANTICOAGULANTS: ICD-10-CM

## 2024-05-03 LAB
INR PPP: 2.7 RATIO
POCT-PROTHROMBIN TIME: 32.9 SECS
QUALITY CONTROL: YES

## 2024-05-03 PROCEDURE — 85610 PROTHROMBIN TIME: CPT

## 2024-05-03 PROCEDURE — 36416 COLLJ CAPILLARY BLOOD SPEC: CPT

## 2024-05-03 PROCEDURE — 99211 OFF/OP EST MAY X REQ PHY/QHP: CPT

## 2024-05-04 DIAGNOSIS — Z79.01 LONG TERM (CURRENT) USE OF ANTICOAGULANTS: ICD-10-CM

## 2024-05-04 DIAGNOSIS — I48.91 UNSPECIFIED ATRIAL FIBRILLATION: ICD-10-CM

## 2024-05-06 ENCOUNTER — NON-APPOINTMENT (OUTPATIENT)
Age: 44
End: 2024-05-06

## 2024-05-06 NOTE — ED ADULT TRIAGE NOTE - ACCOMPANIED BY
Assessment/plan:  1.  Type 2 diabetes-not at goal.  Last hemoglobin A1c was 8.0 in November 2023.  Recently patient in the emergency room with glucose of 368.  Will continue metformin 1000 mg twice daily and add glimepiride 2 mg in the morning.  Discussed diet and exercise and would recommend assessing complete labs in the next 2 weeks.  Will schedule follow-up in 4 weeks to reevaluate.  2.  Hypertension-stable on lisinopril 5 mg daily, no medication changes.  3.  Atrial fibrillation-stable on Eliquis 5 mg twice daily.  4.  History of CVA-recommend continuing Eliquis.  Patient reports that he stopped atorvastatin sometime ago.  Will reassess labs but likely need to resume therapy.   Spouse/Significant other

## 2024-05-10 ENCOUNTER — OUTPATIENT (OUTPATIENT)
Dept: OUTPATIENT SERVICES | Facility: HOSPITAL | Age: 44
LOS: 1 days | End: 2024-05-10
Payer: COMMERCIAL

## 2024-05-10 ENCOUNTER — APPOINTMENT (OUTPATIENT)
Dept: MEDICATION MANAGEMENT | Facility: CLINIC | Age: 44
End: 2024-05-10

## 2024-05-10 DIAGNOSIS — I48.91 UNSPECIFIED ATRIAL FIBRILLATION: ICD-10-CM

## 2024-05-10 DIAGNOSIS — Z79.01 LONG TERM (CURRENT) USE OF ANTICOAGULANTS: ICD-10-CM

## 2024-05-10 DIAGNOSIS — Z98.890 OTHER SPECIFIED POSTPROCEDURAL STATES: Chronic | ICD-10-CM

## 2024-05-10 DIAGNOSIS — Z95.2 PRESENCE OF PROSTHETIC HEART VALVE: Chronic | ICD-10-CM

## 2024-05-10 LAB
INR PPP: 2.7 RATIO
POCT-PROTHROMBIN TIME: 32.1 SECS
QUALITY CONTROL: YES

## 2024-05-10 PROCEDURE — 85610 PROTHROMBIN TIME: CPT

## 2024-05-10 PROCEDURE — 36416 COLLJ CAPILLARY BLOOD SPEC: CPT

## 2024-05-10 PROCEDURE — 99211 OFF/OP EST MAY X REQ PHY/QHP: CPT

## 2024-05-11 DIAGNOSIS — Z79.01 LONG TERM (CURRENT) USE OF ANTICOAGULANTS: ICD-10-CM

## 2024-05-11 DIAGNOSIS — I48.91 UNSPECIFIED ATRIAL FIBRILLATION: ICD-10-CM

## 2024-05-17 ENCOUNTER — APPOINTMENT (OUTPATIENT)
Dept: MEDICATION MANAGEMENT | Facility: CLINIC | Age: 44
End: 2024-05-17

## 2024-05-17 ENCOUNTER — OUTPATIENT (OUTPATIENT)
Dept: OUTPATIENT SERVICES | Facility: HOSPITAL | Age: 44
LOS: 1 days | End: 2024-05-17
Payer: COMMERCIAL

## 2024-05-17 DIAGNOSIS — Z79.01 LONG TERM (CURRENT) USE OF ANTICOAGULANTS: ICD-10-CM

## 2024-05-17 DIAGNOSIS — I48.91 UNSPECIFIED ATRIAL FIBRILLATION: ICD-10-CM

## 2024-05-17 DIAGNOSIS — Z95.2 PRESENCE OF PROSTHETIC HEART VALVE: Chronic | ICD-10-CM

## 2024-05-17 LAB
INR PPP: 2.4 RATIO
POCT-PROTHROMBIN TIME: 28.4 SECS
QUALITY CONTROL: YES

## 2024-05-17 PROCEDURE — 36416 COLLJ CAPILLARY BLOOD SPEC: CPT

## 2024-05-17 PROCEDURE — 85610 PROTHROMBIN TIME: CPT

## 2024-05-17 PROCEDURE — 99211 OFF/OP EST MAY X REQ PHY/QHP: CPT

## 2024-05-20 LAB
ALBUMIN SERPL ELPH-MCNC: 4.4 G/DL
ALP BLD-CCNC: 146 U/L
ALT SERPL-CCNC: 19 U/L
ANION GAP SERPL CALC-SCNC: 23 MMOL/L
AST SERPL-CCNC: 28 U/L
BILIRUB SERPL-MCNC: 0.8 MG/DL
BUN SERPL-MCNC: 13 MG/DL
CALCIUM SERPL-MCNC: 9.5 MG/DL
CHLORIDE SERPL-SCNC: 101 MMOL/L
CO2 SERPL-SCNC: 17 MMOL/L
CREAT SERPL-MCNC: 0.8 MG/DL
EGFR: 93 ML/MIN/1.73M2
GLUCOSE SERPL-MCNC: 82 MG/DL
MAGNESIUM SERPL-MCNC: 2.2 MG/DL
NT-PROBNP SERPL-MCNC: 849 PG/ML
POTASSIUM SERPL-SCNC: 4.9 MMOL/L
PROT SERPL-MCNC: 7.8 G/DL
SODIUM SERPL-SCNC: 141 MMOL/L

## 2024-05-21 DIAGNOSIS — I48.91 UNSPECIFIED ATRIAL FIBRILLATION: ICD-10-CM

## 2024-05-31 ENCOUNTER — APPOINTMENT (OUTPATIENT)
Dept: MEDICATION MANAGEMENT | Facility: CLINIC | Age: 44
End: 2024-05-31

## 2024-05-31 ENCOUNTER — OUTPATIENT (OUTPATIENT)
Dept: OUTPATIENT SERVICES | Facility: HOSPITAL | Age: 44
LOS: 1 days | End: 2024-05-31
Payer: COMMERCIAL

## 2024-05-31 DIAGNOSIS — Z79.01 LONG TERM (CURRENT) USE OF ANTICOAGULANTS: ICD-10-CM

## 2024-05-31 DIAGNOSIS — I48.91 UNSPECIFIED ATRIAL FIBRILLATION: ICD-10-CM

## 2024-05-31 DIAGNOSIS — Z98.890 OTHER SPECIFIED POSTPROCEDURAL STATES: Chronic | ICD-10-CM

## 2024-05-31 LAB
INR PPP: 2.1 RATIO
POCT-PROTHROMBIN TIME: 25.7 SECS
QUALITY CONTROL: YES

## 2024-05-31 PROCEDURE — 36416 COLLJ CAPILLARY BLOOD SPEC: CPT

## 2024-05-31 PROCEDURE — 85610 PROTHROMBIN TIME: CPT

## 2024-05-31 PROCEDURE — 99211 OFF/OP EST MAY X REQ PHY/QHP: CPT

## 2024-06-06 ENCOUNTER — APPOINTMENT (OUTPATIENT)
Dept: ELECTROPHYSIOLOGY | Facility: CLINIC | Age: 44
End: 2024-06-06
Payer: COMMERCIAL

## 2024-06-06 VITALS
DIASTOLIC BLOOD PRESSURE: 66 MMHG | HEIGHT: 61 IN | HEART RATE: 90 BPM | BODY MASS INDEX: 29.64 KG/M2 | WEIGHT: 157 LBS | RESPIRATION RATE: 17 BRPM | TEMPERATURE: 97.7 F | SYSTOLIC BLOOD PRESSURE: 98 MMHG

## 2024-06-06 PROCEDURE — 99214 OFFICE O/P EST MOD 30 MIN: CPT

## 2024-06-06 PROCEDURE — 93000 ELECTROCARDIOGRAM COMPLETE: CPT | Mod: 59

## 2024-06-06 PROCEDURE — G2211 COMPLEX E/M VISIT ADD ON: CPT | Mod: NC

## 2024-06-06 PROCEDURE — 93260 PRGRMG DEV EVAL IMPLTBL SYS: CPT

## 2024-06-06 RX ORDER — FUROSEMIDE 20 MG/1
20 TABLET ORAL
Qty: 90 | Refills: 3 | Status: ACTIVE | COMMUNITY
Start: 2023-09-06

## 2024-06-06 RX ORDER — METOPROLOL SUCCINATE 25 MG/1
25 TABLET, EXTENDED RELEASE ORAL
Qty: 180 | Refills: 3 | Status: ACTIVE | COMMUNITY

## 2024-06-06 RX ORDER — WARFARIN 1 MG/1
1 TABLET ORAL DAILY
Qty: 270 | Refills: 3 | Status: ACTIVE | COMMUNITY
Start: 2024-01-26

## 2024-06-06 RX ORDER — DAPAGLIFLOZIN 10 MG/1
10 TABLET, FILM COATED ORAL DAILY
Qty: 60 | Refills: 5 | Status: ACTIVE | COMMUNITY
Start: 2023-09-06

## 2024-06-06 NOTE — ASSESSMENT
[FreeTextEntry1] : ## paroxysmal atrial fibrillation ## Hypertrophic Cardiomyopathy s/p SC-ICD ([employer], 23) ## NSVT ## Acute heart failure    - On Warfarin. Compliant. No bleeding issues. Patient to contact us if there is any bleeding issues, interruption or any issues with OAC. Patient to go to ER/call 911 if any major bleeding. Checked by coumadin clinic. - Lasix PO 20 mg q24h - Continue with Metoprolol 50 mg. Off Amiodarone and Diltiazem.  - NSVT on monitor. Needs ICD for primary prevention with Hx of HCOM. Also MRI showed multiple LGE. She is a candidate for ICD as a class 1 indication based upon current HRS/ACC guidelines. - SC-ICD working fine.   - RTC in 1 year.

## 2024-06-06 NOTE — PROCEDURE
[No] : not [ICD] : Implantable cardioverter-defibrillator [Impedance: ___ Ohms] : current cell impedance is [unfilled] Ohms [Longevity: ___ months] : The estimated remaining battery life is [unfilled] months [de-identified] : HCM [de-identified] : [place] [de-identified] : A219 [de-identified] : [id number] [de-identified] : 11/07/2023 [de-identified] : Sub Q ICD No events.

## 2024-06-06 NOTE — ADDENDUM
[FreeTextEntry1] : Bree MERRILL assisted in documentation on 06/06/2024 acting as a scribe for Dr. Brennan Tyler.

## 2024-06-06 NOTE — PHYSICAL EXAM
[Normal] : well developed, well nourished, no acute distress [Irregularly Irregular] : irregularly irregular [de-identified] : 2+ LE pitting edema upto knee

## 2024-06-06 NOTE — HISTORY OF PRESENT ILLNESS
[FreeTextEntry1] : Ms. THOMPSON is a 42 year-year old female with history of HOCM with DEN, paroxysmal atrial fibrillation is here for management of AF.  No Palpitations  Was followed at HOCM clinic at [facility]- not seen for past 2 years Had MCOT in past Last known AF >1 year ago.  +GOULD  5/2/23: had it a MINDI. 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 Seeing CTS tomorrow. Plan to have possible AVR, left atrial appendage clip and possibly maze procedure.  8/1/23: s/p mechanical AVR + JASON clip. Now with GOULD, LE edema. Lasix was stopped last week. +Cough 10/05/2023: S/p prolonged hospitalization for AF and heart failure. S/p AF ablation (extensive ablation for AF and AFL). Feels much better. Had NSVT in the hospital. Also had COVID. Wearing Assure. Compliant. Doesn't like it. Wants to come off of it.     11/30/2023: Feels fine. Had SC-ICD placement. Incision was healing fine, but now it appears that the top layer has opened. She denies any complaints. This is just an observation. She is doing fine with rehab.   12/07/2023: Feels fine. Incision healing fine after revision. No complaints.   06/06/2024: Feels excellent. Denies any complaints.   Denies chest pain, shortness of breath, palpitation, dizziness or LOC except noted above.   EKG (06/06/2024): SR @ 90, , ,   EKG (12/07/2023): SR  EKG (11/30/2023): SR @ 71, , , QTc 459, RBBB  EKG (10/05/2023): SR  EKG (08/1/23):  EKG (5/2/23) Afib at 91  EKG (2/7/23): SR 68, LAE, LVH Cardio: Dr. Metcalf

## 2024-06-14 ENCOUNTER — OUTPATIENT (OUTPATIENT)
Dept: OUTPATIENT SERVICES | Facility: HOSPITAL | Age: 44
LOS: 1 days | End: 2024-06-14
Payer: COMMERCIAL

## 2024-06-14 ENCOUNTER — APPOINTMENT (OUTPATIENT)
Dept: MEDICATION MANAGEMENT | Facility: CLINIC | Age: 44
End: 2024-06-14

## 2024-06-14 DIAGNOSIS — Z79.01 LONG TERM (CURRENT) USE OF ANTICOAGULANTS: ICD-10-CM

## 2024-06-14 DIAGNOSIS — I74.09 OTHER ARTERIAL EMBOLISM AND THROMBOSIS OF ABDOMINAL AORTA: ICD-10-CM

## 2024-06-14 DIAGNOSIS — Z95.2 PRESENCE OF PROSTHETIC HEART VALVE: Chronic | ICD-10-CM

## 2024-06-14 DIAGNOSIS — Z98.890 OTHER SPECIFIED POSTPROCEDURAL STATES: Chronic | ICD-10-CM

## 2024-06-14 DIAGNOSIS — I48.91 UNSPECIFIED ATRIAL FIBRILLATION: ICD-10-CM

## 2024-06-14 LAB
INR PPP: 3.1 RATIO
POCT-PROTHROMBIN TIME: 36.7 SECS
QUALITY CONTROL: YES

## 2024-06-14 PROCEDURE — 85610 PROTHROMBIN TIME: CPT

## 2024-06-14 PROCEDURE — 99211 OFF/OP EST MAY X REQ PHY/QHP: CPT

## 2024-06-14 PROCEDURE — 36416 COLLJ CAPILLARY BLOOD SPEC: CPT

## 2024-06-28 ENCOUNTER — APPOINTMENT (OUTPATIENT)
Dept: MEDICATION MANAGEMENT | Facility: CLINIC | Age: 44
End: 2024-06-28

## 2024-06-28 ENCOUNTER — OUTPATIENT (OUTPATIENT)
Dept: OUTPATIENT SERVICES | Facility: HOSPITAL | Age: 44
LOS: 1 days | End: 2024-06-28
Payer: COMMERCIAL

## 2024-06-28 DIAGNOSIS — I48.91 UNSPECIFIED ATRIAL FIBRILLATION: ICD-10-CM

## 2024-06-28 DIAGNOSIS — Z79.01 LONG TERM (CURRENT) USE OF ANTICOAGULANTS: ICD-10-CM

## 2024-06-28 DIAGNOSIS — Z98.890 OTHER SPECIFIED POSTPROCEDURAL STATES: Chronic | ICD-10-CM

## 2024-06-28 LAB
INR PPP: 3.2 RATIO
POCT-PROTHROMBIN TIME: 38.1 SECS
QUALITY CONTROL: YES

## 2024-06-28 PROCEDURE — 36416 COLLJ CAPILLARY BLOOD SPEC: CPT

## 2024-06-28 PROCEDURE — 99211 OFF/OP EST MAY X REQ PHY/QHP: CPT

## 2024-06-28 PROCEDURE — 85610 PROTHROMBIN TIME: CPT

## 2024-06-29 NOTE — ED ADULT NURSE NOTE - CHPI ED NUR SYMPTOMS POS
Subjective   Fareed Dougherty is a 11 y.o. who is brought in for their annual health maintenance visit.  They are accompanied by father.     Concerns  Check dirty spot on his left arm, now gone. Resolved- follow up PRN.    Social  Lives with mother, father, and sister.    Diet  Adequate.    Dental  Sees dentist.  Brushes teeth regularly.    Elimination  No issues.  No blood.  No pain.  Occasional constipation managed at home without issue.    Menses / Dating  Has a girlfriend.    Sleep  No snoring.  No apneas.  Denies daytime somnolence.    Activity / Work  Active in baseball (P, 1st, righty).  Denies exertional chest pain, syncope, shortness of breath.    School /   Entering the 8th grade.    No concerns.  Accommodations  OT, ST.    Visit screenings  PHQ-A    No hearing concerns.  No vision concerns.  Corrected.     Objective   Growth parameters are noted and are appropriate for age.    Physical Exam  Exam conducted with a chaperone present.   Constitutional:       General: He is not in acute distress.     Appearance: Normal appearance. He is well-developed.   HENT:      Head: Atraumatic.      Right Ear: Tympanic membrane, ear canal and external ear normal.      Left Ear: Tympanic membrane, ear canal and external ear normal.      Nose: Nose normal.      Mouth/Throat:      Mouth: Mucous membranes are moist.      Pharynx: Oropharynx is clear.   Eyes:      Extraocular Movements: Extraocular movements intact.      Pupils: Pupils are equal, round, and reactive to light.   Cardiovascular:      Rate and Rhythm: Regular rhythm.      Heart sounds: Normal heart sounds. No murmur heard.  Pulmonary:      Effort: Pulmonary effort is normal.      Breath sounds: Normal breath sounds.   Abdominal:      General: Abdomen is flat.      Palpations: Abdomen is soft. There is no mass.   Musculoskeletal:         General: Normal range of motion.      Cervical back: Normal range of motion and neck supple.   Skin:     General: Skin is  warm and dry.   Neurological:      General: No focal deficit present.      Mental Status: He is alert and oriented for age.       Assessment/Plan   Healthy 11 y.o..  1. Anticipatory guidance discussed.  Gave handout on well-child issues at this age.  2. Weight management:  The patient was counseled regarding nutrition and physical activity. MyPlate handout given.    3. Development: appropriate for age  4. Follow-up visit in 1 year for next well child visit, or sooner as needed.  5. VIS's offered, as appropriate. Counseling was given, as appropriate.     Diagnoses and all orders for this visit:  Encounter for routine child health examination without abnormal findings  BMI (body mass index), pediatric, 85% to less than 95% for age     SHORTNESS OF BREATH

## 2024-07-15 ENCOUNTER — OUTPATIENT (OUTPATIENT)
Dept: OUTPATIENT SERVICES | Facility: HOSPITAL | Age: 44
LOS: 1 days | End: 2024-07-15
Payer: COMMERCIAL

## 2024-07-15 ENCOUNTER — APPOINTMENT (OUTPATIENT)
Dept: MEDICATION MANAGEMENT | Facility: CLINIC | Age: 44
End: 2024-07-15

## 2024-07-15 DIAGNOSIS — I48.91 UNSPECIFIED ATRIAL FIBRILLATION: ICD-10-CM

## 2024-07-15 DIAGNOSIS — Z98.890 OTHER SPECIFIED POSTPROCEDURAL STATES: Chronic | ICD-10-CM

## 2024-07-15 DIAGNOSIS — Z79.01 LONG TERM (CURRENT) USE OF ANTICOAGULANTS: ICD-10-CM

## 2024-07-15 DIAGNOSIS — Z95.2 PRESENCE OF PROSTHETIC HEART VALVE: Chronic | ICD-10-CM

## 2024-07-15 LAB
INR PPP: 3.2 RATIO
POCT-PROTHROMBIN TIME: 38.8 SECS
QUALITY CONTROL: YES

## 2024-07-15 PROCEDURE — 36416 COLLJ CAPILLARY BLOOD SPEC: CPT

## 2024-07-15 PROCEDURE — 99211 OFF/OP EST MAY X REQ PHY/QHP: CPT

## 2024-07-15 PROCEDURE — 85610 PROTHROMBIN TIME: CPT

## 2024-07-23 ENCOUNTER — RESULT CHARGE (OUTPATIENT)
Age: 44
End: 2024-07-23

## 2024-07-24 ENCOUNTER — APPOINTMENT (OUTPATIENT)
Dept: CARDIOLOGY | Facility: CLINIC | Age: 44
End: 2024-07-24
Payer: COMMERCIAL

## 2024-07-24 ENCOUNTER — RESULT CHARGE (OUTPATIENT)
Age: 44
End: 2024-07-24

## 2024-07-24 ENCOUNTER — NON-APPOINTMENT (OUTPATIENT)
Age: 44
End: 2024-07-24

## 2024-07-24 VITALS
DIASTOLIC BLOOD PRESSURE: 80 MMHG | WEIGHT: 170 LBS | BODY MASS INDEX: 32.1 KG/M2 | HEIGHT: 61 IN | HEART RATE: 77 BPM | SYSTOLIC BLOOD PRESSURE: 118 MMHG

## 2024-07-24 DIAGNOSIS — Q24.8 OTHER SPECIFIED CONGENITAL MALFORMATIONS OF HEART: ICD-10-CM

## 2024-07-24 DIAGNOSIS — Z86.79 OTHER SPECIFIED POSTPROCEDURAL STATES: ICD-10-CM

## 2024-07-24 DIAGNOSIS — I48.91 UNSPECIFIED ATRIAL FIBRILLATION: ICD-10-CM

## 2024-07-24 DIAGNOSIS — Z98.890 OTHER SPECIFIED POSTPROCEDURAL STATES: ICD-10-CM

## 2024-07-24 DIAGNOSIS — Z95.2 PRESENCE OF PROSTHETIC HEART VALVE: ICD-10-CM

## 2024-07-24 DIAGNOSIS — I42.2 OTHER HYPERTROPHIC CARDIOMYOPATHY: ICD-10-CM

## 2024-07-24 DIAGNOSIS — I50.30 UNSPECIFIED DIASTOLIC (CONGESTIVE) HEART FAILURE: ICD-10-CM

## 2024-07-24 PROCEDURE — 93000 ELECTROCARDIOGRAM COMPLETE: CPT

## 2024-07-24 PROCEDURE — 99215 OFFICE O/P EST HI 40 MIN: CPT | Mod: 25

## 2024-07-24 PROCEDURE — G2211 COMPLEX E/M VISIT ADD ON: CPT | Mod: NC

## 2024-07-29 PROBLEM — Q24.8 LEFT VENTRICULAR OUTFLOW TRACT OBSTRUCTION: Status: ACTIVE | Noted: 2024-07-29

## 2024-07-29 NOTE — ASSESSMENT
[FreeTextEntry1] : Ms. 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  -Reviewed history, labs, multiple TTEs (independently). ECG -Had lengthy discussion about HCM, including diagnosis, management and prognosis -Repeat HCM TTE in [address] -Patient appears to be asymptomatic at this time. No strong indication for titration of BB, or addition of CCB or mavacamten  -Continue metoprolol succinate 25 daily -Follows with Dr Tyler (EP) for management of AF and ICD -Will refer to cardiogenetics clinic -Encouraged to increase PO intake and avoid dehydration  Time in encounter, including face to face visit and time reviewing chart:  55 minutes  Kaden Malik MD, FACC Non-Invasive Cardiology [address] Office: [phone]

## 2024-07-29 NOTE — PHYSICAL EXAM
[Well Developed] : well developed [Well Nourished] : well nourished [No Acute Distress] : no acute distress [Normal Conjunctiva] : normal conjunctiva [Normal Venous Pressure] : normal venous pressure [No Carotid Bruit] : no carotid bruit [Normal S1, S2] : normal S1, S2 [Murmur] : murmur [Clear Lung Fields] : clear lung fields [Good Air Entry] : good air entry [No Respiratory Distress] : no respiratory distress  [Soft] : abdomen soft [Normal Gait] : normal gait [No Edema] : no edema [No Rash] : no rash [No Skin Lesions] : no skin lesions [Moves all extremities] : moves all extremities [No Focal Deficits] : no focal deficits [Normal Speech] : normal speech [Alert and Oriented] : alert and oriented [Normal memory] : normal memory [de-identified] : I/VI systolic murmur at LSB

## 2024-07-29 NOTE — PHYSICAL EXAM
[Well Developed] : well developed [Well Nourished] : well nourished [No Acute Distress] : no acute distress [Normal Conjunctiva] : normal conjunctiva [Normal Venous Pressure] : normal venous pressure [No Carotid Bruit] : no carotid bruit [Normal S1, S2] : normal S1, S2 [Murmur] : murmur [Clear Lung Fields] : clear lung fields [Good Air Entry] : good air entry [No Respiratory Distress] : no respiratory distress  [Soft] : abdomen soft [Normal Gait] : normal gait [No Edema] : no edema [No Rash] : no rash [No Skin Lesions] : no skin lesions [Moves all extremities] : moves all extremities [No Focal Deficits] : no focal deficits [Normal Speech] : normal speech [Alert and Oriented] : alert and oriented [Normal memory] : normal memory [de-identified] : I/VI systolic murmur at LSB

## 2024-07-29 NOTE — ASSESSMENT
[FreeTextEntry1] : Ms. MALIHA THOMPSON is a 43 year old woman with PMHx of HCM (reverse curvature, obstructive, s/p ICD), pAF s/p JASON clip 6/29/23, AV fibroelastoma s/p SAVR (6/29/23 25mm [employer] [place] Mechanical valve) who is presenting to establish care in HCM clinic.  -Reviewed history, labs, multiple TTEs (independently). ECG -Had lengthy discussion about HCM, including diagnosis, management and prognosis -Repeat HCM TTE in [address] -Patient appears to be asymptomatic at this time. No strong indication for titration of BB, or addition of CCB or mavacamten  -Continue metoprolol succinate 25 daily -Follows with Dr Tyler (EP) for management of AF and ICD -Will refer to cardiogenetics clinic -Encouraged to increase PO intake and avoid dehydration  Time in encounter, including face to face visit and time reviewing chart:  55 minutes  Kaden Malik MD, FACC Non-Invasive Cardiology [address] Office: [phone]

## 2024-07-30 ENCOUNTER — APPOINTMENT (OUTPATIENT)
Dept: MEDICATION MANAGEMENT | Facility: CLINIC | Age: 44
End: 2024-07-30

## 2024-07-30 ENCOUNTER — OUTPATIENT (OUTPATIENT)
Dept: OUTPATIENT SERVICES | Facility: HOSPITAL | Age: 44
LOS: 1 days | End: 2024-07-30
Payer: COMMERCIAL

## 2024-07-30 DIAGNOSIS — Z79.01 LONG TERM (CURRENT) USE OF ANTICOAGULANTS: ICD-10-CM

## 2024-07-30 DIAGNOSIS — I48.91 UNSPECIFIED ATRIAL FIBRILLATION: ICD-10-CM

## 2024-07-30 DIAGNOSIS — Z95.2 PRESENCE OF PROSTHETIC HEART VALVE: Chronic | ICD-10-CM

## 2024-07-30 LAB — INR PPP: 2.8 RATIO

## 2024-07-30 PROCEDURE — G0463: CPT

## 2024-08-07 NOTE — DISCHARGE NOTE PROVIDER - NSDCHOSPICE_GEN_A_CORE
Name: Surendra Hanson  YOB: 2004  Gender: male  MRN: [id number]    Summary: ankle sprain.     Improvement    Patient will begin transition from CAM boot to ASO brace.  He brought his ASO braces with him today and he was instructed which one I would like him to use.  Ankle HEP given today    F/u 4-6 weeks w/ no Xray-if no improvement consider formal PT/MRI      CC: left ankle pain    HPI: Surendra Hanson is a 20 y.o. male Presents today with left ankle pain that began after they twisted/rolled it on 7/4/24. They point directly over the lateral ankle into the lateral foot when describing the pain. They describe pain as deep ache and sharp stabbing pain around the ankle.  It is aggravated by standing, at the end of the day, twisting, direct pressure and movement of the ankle.  It is alleviated by rest and ice but the pain doesn't completely leave.  They do have a history of chronic ankle rolling and sprains.     8/7/2024-patient states that he is doing well.  He notes significant improvement from his last office visit.  He has been ambulating in the boot with minimal pain.  He does continue to note some soreness in the anterior lateral ankle.  He brought his ankle braces with him today to see which 1 he may start using.  He is moving back to [place] today.    ROS/Meds/PSH/PMH/FH/SH:   Patient Denies fever/chills, headache, visual changes, chest pain, shortness of breath, and nausea/vomiting/diarrhea       Physical Examination:  Gen: AAOx3 NAD    : 2+ DP. Toes wwp x5 w BCR.  EHLFHLEDLFDL intact but strength diminished due to ankle pain.  Achilles intact.  There is some ecchymosis & edema laterally about the ankle.  + SILT ssspdpt nerves. TTP at the distal fibula and Anterior Lateral ankle, extending into the lateral CC joint region.  Minimally TTP at the medial ankle.   No TTP at the proximal fibula or at the forefoot.  Specifically none at the Lis Franc joint. They are having some mild SPN 
No

## 2024-08-20 ENCOUNTER — APPOINTMENT (OUTPATIENT)
Dept: MEDICATION MANAGEMENT | Facility: CLINIC | Age: 44
End: 2024-08-20

## 2024-08-20 ENCOUNTER — OUTPATIENT (OUTPATIENT)
Dept: OUTPATIENT SERVICES | Facility: HOSPITAL | Age: 44
LOS: 1 days | End: 2024-08-20
Payer: COMMERCIAL

## 2024-08-20 DIAGNOSIS — I48.91 UNSPECIFIED ATRIAL FIBRILLATION: ICD-10-CM

## 2024-08-20 DIAGNOSIS — Z95.2 PRESENCE OF PROSTHETIC HEART VALVE: Chronic | ICD-10-CM

## 2024-08-20 DIAGNOSIS — Z79.01 LONG TERM (CURRENT) USE OF ANTICOAGULANTS: ICD-10-CM

## 2024-08-20 LAB — INR PPP: 2.1 RATIO

## 2024-08-20 PROCEDURE — 99211 OFF/OP EST MAY X REQ PHY/QHP: CPT | Mod: 95

## 2024-09-05 ENCOUNTER — APPOINTMENT (OUTPATIENT)
Dept: CARDIOLOGY | Facility: CLINIC | Age: 44
End: 2024-09-05
Payer: COMMERCIAL

## 2024-09-05 ENCOUNTER — NON-APPOINTMENT (OUTPATIENT)
Age: 44
End: 2024-09-05

## 2024-09-05 PROCEDURE — 93296 REM INTERROG EVL PM/IDS: CPT

## 2024-09-05 PROCEDURE — 93295 DEV INTERROG REMOTE 1/2/MLT: CPT

## 2024-09-09 NOTE — ED ADULT TRIAGE NOTE - NS ED TRIAGE AVPU SCALE
Alert-The patient is alert, awake and responds to voice. The patient is oriented to time, place, and person. The triage nurse is able to obtain subjective information.
MD//APBLO/SALAZAR

## 2024-09-10 ENCOUNTER — APPOINTMENT (OUTPATIENT)
Dept: MEDICATION MANAGEMENT | Facility: CLINIC | Age: 44
End: 2024-09-10

## 2024-09-11 ENCOUNTER — OUTPATIENT (OUTPATIENT)
Dept: OUTPATIENT SERVICES | Facility: HOSPITAL | Age: 44
LOS: 1 days | End: 2024-09-11
Payer: COMMERCIAL

## 2024-09-11 ENCOUNTER — APPOINTMENT (OUTPATIENT)
Dept: MEDICATION MANAGEMENT | Facility: CLINIC | Age: 44
End: 2024-09-11

## 2024-09-11 DIAGNOSIS — I48.91 UNSPECIFIED ATRIAL FIBRILLATION: ICD-10-CM

## 2024-09-11 DIAGNOSIS — Z79.01 LONG TERM (CURRENT) USE OF ANTICOAGULANTS: ICD-10-CM

## 2024-09-11 DIAGNOSIS — Z98.890 OTHER SPECIFIED POSTPROCEDURAL STATES: Chronic | ICD-10-CM

## 2024-09-11 DIAGNOSIS — Z95.2 PRESENCE OF PROSTHETIC HEART VALVE: Chronic | ICD-10-CM

## 2024-09-11 LAB — INR PPP: 2.4 RATIO

## 2024-09-11 PROCEDURE — G0463: CPT

## 2024-09-18 ENCOUNTER — OUTPATIENT (OUTPATIENT)
Dept: OUTPATIENT SERVICES | Facility: HOSPITAL | Age: 44
LOS: 1 days | End: 2024-09-18
Payer: COMMERCIAL

## 2024-09-18 ENCOUNTER — APPOINTMENT (OUTPATIENT)
Dept: MEDICATION MANAGEMENT | Facility: CLINIC | Age: 44
End: 2024-09-18

## 2024-09-18 DIAGNOSIS — Z98.890 OTHER SPECIFIED POSTPROCEDURAL STATES: Chronic | ICD-10-CM

## 2024-09-18 DIAGNOSIS — Z79.01 LONG TERM (CURRENT) USE OF ANTICOAGULANTS: ICD-10-CM

## 2024-09-18 DIAGNOSIS — I48.91 UNSPECIFIED ATRIAL FIBRILLATION: ICD-10-CM

## 2024-09-18 LAB — INR PPP: 3.2 RATIO

## 2024-09-18 PROCEDURE — G0463: CPT

## 2024-09-20 ENCOUNTER — APPOINTMENT (OUTPATIENT)
Dept: CARDIOLOGY | Facility: CLINIC | Age: 44
End: 2024-09-20
Payer: COMMERCIAL

## 2024-09-20 VITALS
HEIGHT: 61 IN | DIASTOLIC BLOOD PRESSURE: 70 MMHG | HEART RATE: 73 BPM | SYSTOLIC BLOOD PRESSURE: 120 MMHG | BODY MASS INDEX: 33.04 KG/M2 | OXYGEN SATURATION: 98 % | WEIGHT: 175 LBS

## 2024-09-20 DIAGNOSIS — Z95.2 PRESENCE OF PROSTHETIC HEART VALVE: ICD-10-CM

## 2024-09-20 DIAGNOSIS — I48.91 UNSPECIFIED ATRIAL FIBRILLATION: ICD-10-CM

## 2024-09-20 DIAGNOSIS — I42.2 OTHER HYPERTROPHIC CARDIOMYOPATHY: ICD-10-CM

## 2024-09-20 DIAGNOSIS — Z86.79 OTHER SPECIFIED POSTPROCEDURAL STATES: ICD-10-CM

## 2024-09-20 DIAGNOSIS — D15.1 BENIGN NEOPLASM OF HEART: ICD-10-CM

## 2024-09-20 DIAGNOSIS — I50.30 UNSPECIFIED DIASTOLIC (CONGESTIVE) HEART FAILURE: ICD-10-CM

## 2024-09-20 DIAGNOSIS — R00.2 PALPITATIONS: ICD-10-CM

## 2024-09-20 DIAGNOSIS — Z98.890 OTHER SPECIFIED POSTPROCEDURAL STATES: ICD-10-CM

## 2024-09-20 PROCEDURE — 99213 OFFICE O/P EST LOW 20 MIN: CPT

## 2024-09-20 PROCEDURE — G2211 COMPLEX E/M VISIT ADD ON: CPT | Mod: NC

## 2024-09-20 NOTE — DISCUSSION/SUMMARY
[FreeTextEntry1] : 42 yo female paf hypertrophic cardiomyopathy. In 2/22 in hosp rapid afib. She had stopped beta. Went back into nsr. . She denies chest pain.  . She had covid 12/21.8/23.   . No sudden death in family. Aware no heavy exertion. . She was on more beta in past.  Explained hypertrophic cardiomyopathy. Echo 9/22 severe MR .LVOT Valsalva about 70 mm HG.She was followed in [place].  She had fibroelastoma. She 6/29/23 mechanical avr La appendage clipping.   She completed cardiac rehab. She had ablation afib 8/23. Seeing eps .Off  Amiodarone. She has AICD.  She had recent covid 8/23 9/ 24 Echo about 6/24. Aware low Na diet.. Legs not swollen anymore.  Told exercise. . Told walk.. Told drink water . Aware if dehydrated she may get hypotensive. She needs wt loss. To get echo krzysztof about 10/24. Her children to be genetically tested

## 2024-09-20 NOTE — PHYSICAL EXAM
[Well Developed] : well developed [Well Nourished] : well nourished [No Acute Distress] : no acute distress [Normal Conjunctiva] : normal conjunctiva [Normal Venous Pressure] : normal venous pressure [No Carotid Bruit] : no carotid bruit [Normal Rate] : normal [Rhythm Regular] : regular [Normal S1] : normal S1 [Normal S2] : normal S2 [Prosthetic Aortic Valve] : prosthetic aortic valve heard [II] : a grade 2 [Clear Lung Fields] : clear lung fields [Soft] : abdomen soft [Non Tender] : non-tender [Normal Gait] : normal gait [No Edema] : no edema [No Rash] : no rash [Moves all extremities] : moves all extremities [Alert and Oriented] : alert and oriented [S3] : no S3 [S4] : no S4

## 2024-09-25 ENCOUNTER — OUTPATIENT (OUTPATIENT)
Dept: OUTPATIENT SERVICES | Facility: HOSPITAL | Age: 44
LOS: 1 days | End: 2024-09-25
Payer: COMMERCIAL

## 2024-09-25 ENCOUNTER — APPOINTMENT (OUTPATIENT)
Dept: MEDICATION MANAGEMENT | Facility: CLINIC | Age: 44
End: 2024-09-25

## 2024-09-25 DIAGNOSIS — Z98.890 OTHER SPECIFIED POSTPROCEDURAL STATES: Chronic | ICD-10-CM

## 2024-09-25 DIAGNOSIS — Z79.01 LONG TERM (CURRENT) USE OF ANTICOAGULANTS: ICD-10-CM

## 2024-09-25 DIAGNOSIS — I48.91 UNSPECIFIED ATRIAL FIBRILLATION: ICD-10-CM

## 2024-09-25 DIAGNOSIS — Z95.2 PRESENCE OF PROSTHETIC HEART VALVE: Chronic | ICD-10-CM

## 2024-09-25 LAB — INR PPP: 2.4 RATIO

## 2024-09-25 PROCEDURE — G0463: CPT

## 2024-10-04 ENCOUNTER — APPOINTMENT (OUTPATIENT)
Dept: MEDICATION MANAGEMENT | Facility: CLINIC | Age: 44
End: 2024-10-04

## 2024-10-04 ENCOUNTER — OUTPATIENT (OUTPATIENT)
Dept: OUTPATIENT SERVICES | Facility: HOSPITAL | Age: 44
LOS: 1 days | End: 2024-10-04
Payer: COMMERCIAL

## 2024-10-04 DIAGNOSIS — Z79.01 LONG TERM (CURRENT) USE OF ANTICOAGULANTS: ICD-10-CM

## 2024-10-04 DIAGNOSIS — I48.91 UNSPECIFIED ATRIAL FIBRILLATION: ICD-10-CM

## 2024-10-04 DIAGNOSIS — Z95.2 PRESENCE OF PROSTHETIC HEART VALVE: Chronic | ICD-10-CM

## 2024-10-04 LAB — INR PPP: 3.9 RATIO

## 2024-10-04 PROCEDURE — G0463: CPT

## 2024-10-08 ENCOUNTER — APPOINTMENT (OUTPATIENT)
Dept: PEDIATRIC MEDICAL GENETICS | Facility: CLINIC | Age: 44
End: 2024-10-08
Payer: COMMERCIAL

## 2024-10-08 PROCEDURE — 96040: CPT | Mod: 95

## 2024-10-11 ENCOUNTER — OUTPATIENT (OUTPATIENT)
Dept: OUTPATIENT SERVICES | Facility: HOSPITAL | Age: 44
LOS: 1 days | End: 2024-10-11
Payer: COMMERCIAL

## 2024-10-11 ENCOUNTER — APPOINTMENT (OUTPATIENT)
Dept: MEDICATION MANAGEMENT | Facility: CLINIC | Age: 44
End: 2024-10-11

## 2024-10-11 DIAGNOSIS — I48.91 UNSPECIFIED ATRIAL FIBRILLATION: ICD-10-CM

## 2024-10-11 DIAGNOSIS — Z79.01 LONG TERM (CURRENT) USE OF ANTICOAGULANTS: ICD-10-CM

## 2024-10-11 DIAGNOSIS — Z95.2 PRESENCE OF PROSTHETIC HEART VALVE: Chronic | ICD-10-CM

## 2024-10-11 DIAGNOSIS — Z98.890 OTHER SPECIFIED POSTPROCEDURAL STATES: Chronic | ICD-10-CM

## 2024-10-11 LAB
INR PPP: 4.1 RATIO
POCT-PROTHROMBIN TIME: 48.7 SECS
QUALITY CONTROL: YES

## 2024-10-11 PROCEDURE — 99211 OFF/OP EST MAY X REQ PHY/QHP: CPT

## 2024-10-11 PROCEDURE — 85610 PROTHROMBIN TIME: CPT

## 2024-10-11 PROCEDURE — 36416 COLLJ CAPILLARY BLOOD SPEC: CPT

## 2024-10-18 ENCOUNTER — APPOINTMENT (OUTPATIENT)
Dept: HEART FAILURE | Facility: CLINIC | Age: 44
End: 2024-10-18
Payer: COMMERCIAL

## 2024-10-18 ENCOUNTER — APPOINTMENT (OUTPATIENT)
Dept: CARDIOLOGY | Facility: CLINIC | Age: 44
End: 2024-10-18
Payer: COMMERCIAL

## 2024-10-18 ENCOUNTER — APPOINTMENT (OUTPATIENT)
Dept: MEDICATION MANAGEMENT | Facility: CLINIC | Age: 44
End: 2024-10-18

## 2024-10-18 ENCOUNTER — OUTPATIENT (OUTPATIENT)
Dept: OUTPATIENT SERVICES | Facility: HOSPITAL | Age: 44
LOS: 1 days | End: 2024-10-18
Payer: COMMERCIAL

## 2024-10-18 VITALS
OXYGEN SATURATION: 99 % | HEIGHT: 61 IN | BODY MASS INDEX: 33.23 KG/M2 | DIASTOLIC BLOOD PRESSURE: 70 MMHG | HEART RATE: 67 BPM | WEIGHT: 176 LBS | RESPIRATION RATE: 17 BRPM | SYSTOLIC BLOOD PRESSURE: 110 MMHG

## 2024-10-18 DIAGNOSIS — Z79.01 LONG TERM (CURRENT) USE OF ANTICOAGULANTS: ICD-10-CM

## 2024-10-18 DIAGNOSIS — I48.91 UNSPECIFIED ATRIAL FIBRILLATION: ICD-10-CM

## 2024-10-18 DIAGNOSIS — I48.92 UNSPECIFIED ATRIAL FLUTTER: ICD-10-CM

## 2024-10-18 DIAGNOSIS — I42.2 OTHER HYPERTROPHIC CARDIOMYOPATHY: ICD-10-CM

## 2024-10-18 DIAGNOSIS — Z95.2 PRESENCE OF PROSTHETIC HEART VALVE: Chronic | ICD-10-CM

## 2024-10-18 DIAGNOSIS — I50.30 UNSPECIFIED DIASTOLIC (CONGESTIVE) HEART FAILURE: ICD-10-CM

## 2024-10-18 DIAGNOSIS — Z86.79 OTHER SPECIFIED POSTPROCEDURAL STATES: ICD-10-CM

## 2024-10-18 DIAGNOSIS — Z98.890 OTHER SPECIFIED POSTPROCEDURAL STATES: Chronic | ICD-10-CM

## 2024-10-18 DIAGNOSIS — Z98.890 OTHER SPECIFIED POSTPROCEDURAL STATES: ICD-10-CM

## 2024-10-18 LAB — INR PPP: 3.2 RATIO

## 2024-10-18 PROCEDURE — 99214 OFFICE O/P EST MOD 30 MIN: CPT

## 2024-10-18 PROCEDURE — G2211 COMPLEX E/M VISIT ADD ON: CPT | Mod: NC

## 2024-10-18 PROCEDURE — 93000 ELECTROCARDIOGRAM COMPLETE: CPT

## 2024-10-18 PROCEDURE — G0463: CPT

## 2024-10-18 PROCEDURE — 93306 TTE W/DOPPLER COMPLETE: CPT

## 2024-10-25 ENCOUNTER — APPOINTMENT (OUTPATIENT)
Dept: MEDICATION MANAGEMENT | Facility: CLINIC | Age: 44
End: 2024-10-25

## 2024-10-25 ENCOUNTER — OUTPATIENT (OUTPATIENT)
Dept: OUTPATIENT SERVICES | Facility: HOSPITAL | Age: 44
LOS: 1 days | End: 2024-10-25
Payer: COMMERCIAL

## 2024-10-25 DIAGNOSIS — Z79.01 LONG TERM (CURRENT) USE OF ANTICOAGULANTS: ICD-10-CM

## 2024-10-25 DIAGNOSIS — I48.91 UNSPECIFIED ATRIAL FIBRILLATION: ICD-10-CM

## 2024-10-25 DIAGNOSIS — Z98.890 OTHER SPECIFIED POSTPROCEDURAL STATES: Chronic | ICD-10-CM

## 2024-10-25 LAB — INR PPP: 2.6 RATIO

## 2024-10-25 PROCEDURE — G0463: CPT

## 2024-10-29 ENCOUNTER — OUTPATIENT (OUTPATIENT)
Dept: OUTPATIENT SERVICES | Facility: HOSPITAL | Age: 44
LOS: 1 days | End: 2024-10-29
Payer: COMMERCIAL

## 2024-10-29 ENCOUNTER — APPOINTMENT (OUTPATIENT)
Dept: MEDICATION MANAGEMENT | Facility: CLINIC | Age: 44
End: 2024-10-29

## 2024-10-29 DIAGNOSIS — I48.91 UNSPECIFIED ATRIAL FIBRILLATION: ICD-10-CM

## 2024-10-29 DIAGNOSIS — Z95.2 PRESENCE OF PROSTHETIC HEART VALVE: Chronic | ICD-10-CM

## 2024-10-29 DIAGNOSIS — Z79.01 LONG TERM (CURRENT) USE OF ANTICOAGULANTS: ICD-10-CM

## 2024-10-29 DIAGNOSIS — Z98.890 OTHER SPECIFIED POSTPROCEDURAL STATES: Chronic | ICD-10-CM

## 2024-10-29 LAB — INR PPP: 1.7 RATIO

## 2024-10-29 PROCEDURE — G0463: CPT

## 2024-11-01 ENCOUNTER — APPOINTMENT (OUTPATIENT)
Dept: MEDICATION MANAGEMENT | Facility: CLINIC | Age: 44
End: 2024-11-01

## 2024-11-01 ENCOUNTER — OUTPATIENT (OUTPATIENT)
Dept: OUTPATIENT SERVICES | Facility: HOSPITAL | Age: 44
LOS: 1 days | End: 2024-11-01
Payer: COMMERCIAL

## 2024-11-01 DIAGNOSIS — Z98.890 OTHER SPECIFIED POSTPROCEDURAL STATES: Chronic | ICD-10-CM

## 2024-11-01 DIAGNOSIS — Z95.2 PRESENCE OF PROSTHETIC HEART VALVE: Chronic | ICD-10-CM

## 2024-11-01 DIAGNOSIS — Z79.01 LONG TERM (CURRENT) USE OF ANTICOAGULANTS: ICD-10-CM

## 2024-11-01 DIAGNOSIS — I48.91 UNSPECIFIED ATRIAL FIBRILLATION: ICD-10-CM

## 2024-11-01 LAB — INR PPP: 2.1 RATIO

## 2024-11-01 PROCEDURE — G0463: CPT

## 2024-11-05 ENCOUNTER — APPOINTMENT (OUTPATIENT)
Dept: MEDICATION MANAGEMENT | Facility: CLINIC | Age: 44
End: 2024-11-05

## 2024-11-05 ENCOUNTER — OUTPATIENT (OUTPATIENT)
Dept: OUTPATIENT SERVICES | Facility: HOSPITAL | Age: 44
LOS: 1 days | End: 2024-11-05
Payer: COMMERCIAL

## 2024-11-05 DIAGNOSIS — I48.91 UNSPECIFIED ATRIAL FIBRILLATION: ICD-10-CM

## 2024-11-05 DIAGNOSIS — Z79.01 LONG TERM (CURRENT) USE OF ANTICOAGULANTS: ICD-10-CM

## 2024-11-05 DIAGNOSIS — Z95.2 PRESENCE OF PROSTHETIC HEART VALVE: Chronic | ICD-10-CM

## 2024-11-05 DIAGNOSIS — Z98.890 OTHER SPECIFIED POSTPROCEDURAL STATES: Chronic | ICD-10-CM

## 2024-11-05 LAB — INR PPP: 1.9 RATIO

## 2024-11-05 PROCEDURE — G0463: CPT

## 2024-11-11 ENCOUNTER — NON-APPOINTMENT (OUTPATIENT)
Age: 44
End: 2024-11-11

## 2024-11-11 ENCOUNTER — APPOINTMENT (OUTPATIENT)
Dept: CARDIOLOGY | Facility: CLINIC | Age: 44
End: 2024-11-11
Payer: COMMERCIAL

## 2024-11-11 VITALS
WEIGHT: 181 LBS | DIASTOLIC BLOOD PRESSURE: 80 MMHG | SYSTOLIC BLOOD PRESSURE: 130 MMHG | BODY MASS INDEX: 34.17 KG/M2 | HEART RATE: 86 BPM | HEIGHT: 61 IN

## 2024-11-11 DIAGNOSIS — Z71.3 DIETARY COUNSELING AND SURVEILLANCE: ICD-10-CM

## 2024-11-11 DIAGNOSIS — Z71.89 OTHER SPECIFIED COUNSELING: ICD-10-CM

## 2024-11-11 DIAGNOSIS — Z71.82 EXERCISE COUNSELING: ICD-10-CM

## 2024-11-11 DIAGNOSIS — E66.9 OBESITY, UNSPECIFIED: ICD-10-CM

## 2024-11-11 PROCEDURE — 99214 OFFICE O/P EST MOD 30 MIN: CPT

## 2024-11-11 PROCEDURE — 99204 OFFICE O/P NEW MOD 45 MIN: CPT

## 2024-11-11 RX ORDER — FUROSEMIDE 20 MG/1
20 TABLET ORAL
Refills: 0 | Status: ACTIVE | COMMUNITY

## 2024-11-12 ENCOUNTER — APPOINTMENT (OUTPATIENT)
Dept: MEDICATION MANAGEMENT | Facility: CLINIC | Age: 44
End: 2024-11-12

## 2024-11-12 ENCOUNTER — OUTPATIENT (OUTPATIENT)
Dept: OUTPATIENT SERVICES | Facility: HOSPITAL | Age: 44
LOS: 1 days | End: 2024-11-12
Payer: COMMERCIAL

## 2024-11-12 DIAGNOSIS — Z79.01 LONG TERM (CURRENT) USE OF ANTICOAGULANTS: ICD-10-CM

## 2024-11-12 DIAGNOSIS — I48.91 UNSPECIFIED ATRIAL FIBRILLATION: ICD-10-CM

## 2024-11-12 DIAGNOSIS — Z98.890 OTHER SPECIFIED POSTPROCEDURAL STATES: Chronic | ICD-10-CM

## 2024-11-12 PROCEDURE — G0463: CPT

## 2024-11-16 ENCOUNTER — OUTPATIENT (OUTPATIENT)
Dept: OUTPATIENT SERVICES | Facility: HOSPITAL | Age: 44
LOS: 1 days | Discharge: HOME | End: 2024-11-16
Payer: COMMERCIAL

## 2024-11-16 ENCOUNTER — APPOINTMENT (OUTPATIENT)
Dept: SLEEP CENTER | Facility: HOSPITAL | Age: 44
End: 2024-11-16

## 2024-11-16 DIAGNOSIS — Z95.2 PRESENCE OF PROSTHETIC HEART VALVE: Chronic | ICD-10-CM

## 2024-11-16 DIAGNOSIS — Z98.890 OTHER SPECIFIED POSTPROCEDURAL STATES: Chronic | ICD-10-CM

## 2024-11-16 DIAGNOSIS — G47.33 OBSTRUCTIVE SLEEP APNEA (ADULT) (PEDIATRIC): ICD-10-CM

## 2024-11-16 PROCEDURE — 95800 SLP STDY UNATTENDED: CPT | Mod: 26

## 2024-11-16 PROCEDURE — 95800 SLP STDY UNATTENDED: CPT

## 2024-11-18 PROBLEM — Z71.3 DIETARY COUNSELING: Status: ACTIVE | Noted: 2024-11-18

## 2024-11-18 PROBLEM — Z71.89 COUNSELING ON HEALTH PROMOTION AND DISEASE PREVENTION: Status: ACTIVE | Noted: 2024-11-18

## 2024-11-18 PROBLEM — Z71.82 EXERCISE COUNSELING: Status: ACTIVE | Noted: 2024-11-18

## 2024-11-19 ENCOUNTER — OUTPATIENT (OUTPATIENT)
Dept: OUTPATIENT SERVICES | Facility: HOSPITAL | Age: 44
LOS: 1 days | End: 2024-11-19
Payer: COMMERCIAL

## 2024-11-19 ENCOUNTER — APPOINTMENT (OUTPATIENT)
Dept: MEDICATION MANAGEMENT | Facility: CLINIC | Age: 44
End: 2024-11-19

## 2024-11-19 DIAGNOSIS — I48.91 UNSPECIFIED ATRIAL FIBRILLATION: ICD-10-CM

## 2024-11-19 DIAGNOSIS — Z79.01 LONG TERM (CURRENT) USE OF ANTICOAGULANTS: ICD-10-CM

## 2024-11-19 DIAGNOSIS — Z98.890 OTHER SPECIFIED POSTPROCEDURAL STATES: Chronic | ICD-10-CM

## 2024-11-19 DIAGNOSIS — G47.33 OBSTRUCTIVE SLEEP APNEA (ADULT) (PEDIATRIC): ICD-10-CM

## 2024-11-19 LAB
INR PPP: 2.3 RATIO
INR PPP: 2.4 RATIO

## 2024-11-19 PROCEDURE — G0463: CPT

## 2024-11-26 ENCOUNTER — APPOINTMENT (OUTPATIENT)
Dept: MEDICATION MANAGEMENT | Facility: CLINIC | Age: 44
End: 2024-11-26

## 2024-11-26 ENCOUNTER — OUTPATIENT (OUTPATIENT)
Dept: OUTPATIENT SERVICES | Facility: HOSPITAL | Age: 44
LOS: 1 days | End: 2024-11-26
Payer: COMMERCIAL

## 2024-11-26 DIAGNOSIS — I48.91 UNSPECIFIED ATRIAL FIBRILLATION: ICD-10-CM

## 2024-11-26 DIAGNOSIS — Z98.890 OTHER SPECIFIED POSTPROCEDURAL STATES: Chronic | ICD-10-CM

## 2024-11-26 DIAGNOSIS — Z79.01 LONG TERM (CURRENT) USE OF ANTICOAGULANTS: ICD-10-CM

## 2024-11-26 DIAGNOSIS — Z95.2 PRESENCE OF PROSTHETIC HEART VALVE: Chronic | ICD-10-CM

## 2024-11-26 LAB — INR PPP: 2.4 RATIO

## 2024-11-26 PROCEDURE — G0463: CPT

## 2024-12-05 ENCOUNTER — NON-APPOINTMENT (OUTPATIENT)
Age: 44
End: 2024-12-05

## 2024-12-05 ENCOUNTER — APPOINTMENT (OUTPATIENT)
Dept: CARDIOLOGY | Facility: CLINIC | Age: 44
End: 2024-12-05
Payer: COMMERCIAL

## 2024-12-05 PROCEDURE — 93296 REM INTERROG EVL PM/IDS: CPT

## 2024-12-05 PROCEDURE — 93295 DEV INTERROG REMOTE 1/2/MLT: CPT

## 2024-12-06 ENCOUNTER — APPOINTMENT (OUTPATIENT)
Dept: MEDICATION MANAGEMENT | Facility: CLINIC | Age: 44
End: 2024-12-06

## 2024-12-06 ENCOUNTER — OUTPATIENT (OUTPATIENT)
Dept: OUTPATIENT SERVICES | Facility: HOSPITAL | Age: 44
LOS: 1 days | End: 2024-12-06
Payer: COMMERCIAL

## 2024-12-06 ENCOUNTER — APPOINTMENT (OUTPATIENT)
Dept: PEDIATRIC MEDICAL GENETICS | Facility: CLINIC | Age: 44
End: 2024-12-06

## 2024-12-06 DIAGNOSIS — Z79.01 LONG TERM (CURRENT) USE OF ANTICOAGULANTS: ICD-10-CM

## 2024-12-06 DIAGNOSIS — I48.91 UNSPECIFIED ATRIAL FIBRILLATION: ICD-10-CM

## 2024-12-06 DIAGNOSIS — Z98.890 OTHER SPECIFIED POSTPROCEDURAL STATES: Chronic | ICD-10-CM

## 2024-12-06 LAB — INR PPP: 3.2 RATIO

## 2024-12-06 PROCEDURE — G0463: CPT

## 2024-12-06 PROCEDURE — 96040: CPT | Mod: 95

## 2024-12-13 ENCOUNTER — APPOINTMENT (OUTPATIENT)
Dept: MEDICATION MANAGEMENT | Facility: CLINIC | Age: 44
End: 2024-12-13

## 2024-12-13 ENCOUNTER — OUTPATIENT (OUTPATIENT)
Dept: OUTPATIENT SERVICES | Facility: HOSPITAL | Age: 44
LOS: 1 days | End: 2024-12-13
Payer: COMMERCIAL

## 2024-12-13 ENCOUNTER — APPOINTMENT (OUTPATIENT)
Dept: PULMONOLOGY | Facility: CLINIC | Age: 44
End: 2024-12-13
Payer: COMMERCIAL

## 2024-12-13 DIAGNOSIS — G47.33 OBSTRUCTIVE SLEEP APNEA (ADULT) (PEDIATRIC): ICD-10-CM

## 2024-12-13 DIAGNOSIS — Z79.01 LONG TERM (CURRENT) USE OF ANTICOAGULANTS: ICD-10-CM

## 2024-12-13 DIAGNOSIS — I48.91 UNSPECIFIED ATRIAL FIBRILLATION: ICD-10-CM

## 2024-12-13 DIAGNOSIS — G47.30 HYPERSOMNIA, UNSPECIFIED: ICD-10-CM

## 2024-12-13 DIAGNOSIS — Z98.890 OTHER SPECIFIED POSTPROCEDURAL STATES: Chronic | ICD-10-CM

## 2024-12-13 DIAGNOSIS — G47.10 HYPERSOMNIA, UNSPECIFIED: ICD-10-CM

## 2024-12-13 DIAGNOSIS — Z95.2 PRESENCE OF PROSTHETIC HEART VALVE: Chronic | ICD-10-CM

## 2024-12-13 DIAGNOSIS — E66.9 OBESITY, UNSPECIFIED: ICD-10-CM

## 2024-12-13 LAB — INR PPP: 2.6 RATIO

## 2024-12-13 PROCEDURE — 99213 OFFICE O/P EST LOW 20 MIN: CPT

## 2024-12-13 PROCEDURE — G0463: CPT

## 2024-12-20 ENCOUNTER — APPOINTMENT (OUTPATIENT)
Dept: MEDICATION MANAGEMENT | Facility: CLINIC | Age: 44
End: 2024-12-20

## 2024-12-20 ENCOUNTER — OUTPATIENT (OUTPATIENT)
Dept: OUTPATIENT SERVICES | Facility: HOSPITAL | Age: 44
LOS: 1 days | End: 2024-12-20
Payer: COMMERCIAL

## 2024-12-20 DIAGNOSIS — I48.91 UNSPECIFIED ATRIAL FIBRILLATION: ICD-10-CM

## 2024-12-20 DIAGNOSIS — Z98.890 OTHER SPECIFIED POSTPROCEDURAL STATES: Chronic | ICD-10-CM

## 2024-12-20 DIAGNOSIS — Z79.01 LONG TERM (CURRENT) USE OF ANTICOAGULANTS: ICD-10-CM

## 2024-12-20 LAB — INR PPP: 2.3 RATIO

## 2024-12-20 PROCEDURE — G0463: CPT

## 2024-12-27 ENCOUNTER — APPOINTMENT (OUTPATIENT)
Dept: MEDICATION MANAGEMENT | Facility: CLINIC | Age: 44
End: 2024-12-27

## 2024-12-27 ENCOUNTER — OUTPATIENT (OUTPATIENT)
Dept: OUTPATIENT SERVICES | Facility: HOSPITAL | Age: 44
LOS: 1 days | End: 2024-12-27
Payer: COMMERCIAL

## 2024-12-27 DIAGNOSIS — Z79.01 LONG TERM (CURRENT) USE OF ANTICOAGULANTS: ICD-10-CM

## 2024-12-27 DIAGNOSIS — I48.91 UNSPECIFIED ATRIAL FIBRILLATION: ICD-10-CM

## 2024-12-27 LAB — INR PPP: 4.1 RATIO

## 2024-12-27 PROCEDURE — G0463: CPT

## 2025-01-03 ENCOUNTER — OUTPATIENT (OUTPATIENT)
Dept: OUTPATIENT SERVICES | Facility: HOSPITAL | Age: 45
LOS: 1 days | End: 2025-01-03
Payer: COMMERCIAL

## 2025-01-03 ENCOUNTER — APPOINTMENT (OUTPATIENT)
Dept: MEDICATION MANAGEMENT | Facility: CLINIC | Age: 45
End: 2025-01-03

## 2025-01-03 DIAGNOSIS — Z95.2 PRESENCE OF PROSTHETIC HEART VALVE: Chronic | ICD-10-CM

## 2025-01-03 DIAGNOSIS — I48.91 UNSPECIFIED ATRIAL FIBRILLATION: ICD-10-CM

## 2025-01-03 DIAGNOSIS — Z79.01 LONG TERM (CURRENT) USE OF ANTICOAGULANTS: ICD-10-CM

## 2025-01-03 LAB — INR PPP: 2.7 RATIO

## 2025-01-03 PROCEDURE — 98012 SYNCH AUDIO-ONLY EST SF 10: CPT

## 2025-01-04 DIAGNOSIS — Z79.01 LONG TERM (CURRENT) USE OF ANTICOAGULANTS: ICD-10-CM

## 2025-01-04 DIAGNOSIS — I48.91 UNSPECIFIED ATRIAL FIBRILLATION: ICD-10-CM

## 2025-01-08 ENCOUNTER — APPOINTMENT (OUTPATIENT)
Dept: CARDIOLOGY | Facility: CLINIC | Age: 45
End: 2025-01-08

## 2025-01-10 ENCOUNTER — OUTPATIENT (OUTPATIENT)
Dept: OUTPATIENT SERVICES | Facility: HOSPITAL | Age: 45
LOS: 1 days | End: 2025-01-10
Payer: COMMERCIAL

## 2025-01-10 ENCOUNTER — APPOINTMENT (OUTPATIENT)
Dept: MEDICATION MANAGEMENT | Facility: CLINIC | Age: 45
End: 2025-01-10

## 2025-01-10 DIAGNOSIS — Z98.890 OTHER SPECIFIED POSTPROCEDURAL STATES: Chronic | ICD-10-CM

## 2025-01-10 DIAGNOSIS — I48.91 UNSPECIFIED ATRIAL FIBRILLATION: ICD-10-CM

## 2025-01-10 DIAGNOSIS — Z79.01 LONG TERM (CURRENT) USE OF ANTICOAGULANTS: ICD-10-CM

## 2025-01-10 LAB — INR PPP: 2.4 RATIO

## 2025-01-10 PROCEDURE — 98012 SYNCH AUDIO-ONLY EST SF 10: CPT

## 2025-01-11 DIAGNOSIS — Z79.01 LONG TERM (CURRENT) USE OF ANTICOAGULANTS: ICD-10-CM

## 2025-01-11 DIAGNOSIS — I48.91 UNSPECIFIED ATRIAL FIBRILLATION: ICD-10-CM

## 2025-01-13 ENCOUNTER — APPOINTMENT (OUTPATIENT)
Dept: CARDIOLOGY | Facility: CLINIC | Age: 45
End: 2025-01-13

## 2025-01-13 PROCEDURE — 99212 OFFICE O/P EST SF 10 MIN: CPT | Mod: 93

## 2025-01-17 ENCOUNTER — OUTPATIENT (OUTPATIENT)
Dept: OUTPATIENT SERVICES | Facility: HOSPITAL | Age: 45
LOS: 1 days | End: 2025-01-17
Payer: COMMERCIAL

## 2025-01-17 ENCOUNTER — APPOINTMENT (OUTPATIENT)
Dept: MEDICATION MANAGEMENT | Facility: CLINIC | Age: 45
End: 2025-01-17

## 2025-01-17 DIAGNOSIS — I48.91 UNSPECIFIED ATRIAL FIBRILLATION: ICD-10-CM

## 2025-01-17 DIAGNOSIS — Z79.01 LONG TERM (CURRENT) USE OF ANTICOAGULANTS: ICD-10-CM

## 2025-01-17 DIAGNOSIS — Z98.890 OTHER SPECIFIED POSTPROCEDURAL STATES: Chronic | ICD-10-CM

## 2025-01-17 LAB — INR PPP: 2.4 RATIO

## 2025-01-17 PROCEDURE — 98012 SYNCH AUDIO-ONLY EST SF 10: CPT

## 2025-01-18 DIAGNOSIS — I48.91 UNSPECIFIED ATRIAL FIBRILLATION: ICD-10-CM

## 2025-01-18 DIAGNOSIS — Z79.01 LONG TERM (CURRENT) USE OF ANTICOAGULANTS: ICD-10-CM

## 2025-01-21 ENCOUNTER — APPOINTMENT (OUTPATIENT)
Dept: CARDIOLOGY | Facility: CLINIC | Age: 45
End: 2025-01-21

## 2025-01-24 ENCOUNTER — APPOINTMENT (OUTPATIENT)
Dept: MEDICATION MANAGEMENT | Facility: CLINIC | Age: 45
End: 2025-01-24

## 2025-01-24 ENCOUNTER — OUTPATIENT (OUTPATIENT)
Dept: OUTPATIENT SERVICES | Facility: HOSPITAL | Age: 45
LOS: 1 days | End: 2025-01-24
Payer: COMMERCIAL

## 2025-01-24 DIAGNOSIS — Z98.890 OTHER SPECIFIED POSTPROCEDURAL STATES: Chronic | ICD-10-CM

## 2025-01-24 DIAGNOSIS — Z95.2 PRESENCE OF PROSTHETIC HEART VALVE: Chronic | ICD-10-CM

## 2025-01-24 DIAGNOSIS — I48.91 UNSPECIFIED ATRIAL FIBRILLATION: ICD-10-CM

## 2025-01-24 DIAGNOSIS — Z79.01 LONG TERM (CURRENT) USE OF ANTICOAGULANTS: ICD-10-CM

## 2025-01-24 LAB — INR PPP: 3 RATIO

## 2025-01-24 PROCEDURE — 98012 SYNCH AUDIO-ONLY EST SF 10: CPT

## 2025-01-25 DIAGNOSIS — I48.91 UNSPECIFIED ATRIAL FIBRILLATION: ICD-10-CM

## 2025-01-25 DIAGNOSIS — Z79.01 LONG TERM (CURRENT) USE OF ANTICOAGULANTS: ICD-10-CM

## 2025-01-29 ENCOUNTER — APPOINTMENT (OUTPATIENT)
Dept: CARDIOLOGY | Facility: CLINIC | Age: 45
End: 2025-01-29
Payer: COMMERCIAL

## 2025-01-29 VITALS
HEIGHT: 61 IN | OXYGEN SATURATION: 96 % | DIASTOLIC BLOOD PRESSURE: 74 MMHG | BODY MASS INDEX: 34.55 KG/M2 | HEART RATE: 72 BPM | WEIGHT: 183 LBS | SYSTOLIC BLOOD PRESSURE: 110 MMHG

## 2025-01-29 DIAGNOSIS — I50.30 UNSPECIFIED DIASTOLIC (CONGESTIVE) HEART FAILURE: ICD-10-CM

## 2025-01-29 DIAGNOSIS — Z95.2 PRESENCE OF PROSTHETIC HEART VALVE: ICD-10-CM

## 2025-01-29 DIAGNOSIS — G47.33 OBSTRUCTIVE SLEEP APNEA (ADULT) (PEDIATRIC): ICD-10-CM

## 2025-01-29 DIAGNOSIS — I42.2 OTHER HYPERTROPHIC CARDIOMYOPATHY: ICD-10-CM

## 2025-01-29 DIAGNOSIS — Z71.82 EXERCISE COUNSELING: ICD-10-CM

## 2025-01-29 PROCEDURE — 99213 OFFICE O/P EST LOW 20 MIN: CPT

## 2025-01-31 ENCOUNTER — APPOINTMENT (OUTPATIENT)
Dept: MEDICATION MANAGEMENT | Facility: CLINIC | Age: 45
End: 2025-01-31

## 2025-01-31 ENCOUNTER — OUTPATIENT (OUTPATIENT)
Dept: OUTPATIENT SERVICES | Facility: HOSPITAL | Age: 45
LOS: 1 days | End: 2025-01-31
Payer: COMMERCIAL

## 2025-01-31 DIAGNOSIS — I48.91 UNSPECIFIED ATRIAL FIBRILLATION: ICD-10-CM

## 2025-01-31 DIAGNOSIS — Z98.890 OTHER SPECIFIED POSTPROCEDURAL STATES: Chronic | ICD-10-CM

## 2025-01-31 DIAGNOSIS — Z79.01 LONG TERM (CURRENT) USE OF ANTICOAGULANTS: ICD-10-CM

## 2025-01-31 DIAGNOSIS — Z95.2 PRESENCE OF PROSTHETIC HEART VALVE: Chronic | ICD-10-CM

## 2025-01-31 LAB — INR PPP: 3.4 RATIO

## 2025-01-31 PROCEDURE — 98012 SYNCH AUDIO-ONLY EST SF 10: CPT

## 2025-02-01 DIAGNOSIS — I48.91 UNSPECIFIED ATRIAL FIBRILLATION: ICD-10-CM

## 2025-02-01 DIAGNOSIS — Z79.01 LONG TERM (CURRENT) USE OF ANTICOAGULANTS: ICD-10-CM

## 2025-02-07 ENCOUNTER — OUTPATIENT (OUTPATIENT)
Dept: OUTPATIENT SERVICES | Facility: HOSPITAL | Age: 45
LOS: 1 days | End: 2025-02-07
Payer: COMMERCIAL

## 2025-02-07 ENCOUNTER — APPOINTMENT (OUTPATIENT)
Dept: MEDICATION MANAGEMENT | Facility: CLINIC | Age: 45
End: 2025-02-07

## 2025-02-07 DIAGNOSIS — Z79.01 LONG TERM (CURRENT) USE OF ANTICOAGULANTS: ICD-10-CM

## 2025-02-07 DIAGNOSIS — Z95.2 PRESENCE OF PROSTHETIC HEART VALVE: Chronic | ICD-10-CM

## 2025-02-07 DIAGNOSIS — Z98.890 OTHER SPECIFIED POSTPROCEDURAL STATES: Chronic | ICD-10-CM

## 2025-02-07 DIAGNOSIS — I48.91 UNSPECIFIED ATRIAL FIBRILLATION: ICD-10-CM

## 2025-02-07 LAB — INR PPP: 3.3 RATIO

## 2025-02-07 PROCEDURE — 98012 SYNCH AUDIO-ONLY EST SF 10: CPT

## 2025-02-08 DIAGNOSIS — I48.91 UNSPECIFIED ATRIAL FIBRILLATION: ICD-10-CM

## 2025-02-08 DIAGNOSIS — Z79.01 LONG TERM (CURRENT) USE OF ANTICOAGULANTS: ICD-10-CM

## 2025-02-10 ENCOUNTER — EMERGENCY (EMERGENCY)
Facility: HOSPITAL | Age: 45
LOS: 0 days | Discharge: HOME | End: 2025-02-10
Attending: STUDENT IN AN ORGANIZED HEALTH CARE EDUCATION/TRAINING PROGRAM
Payer: COMMERCIAL

## 2025-02-10 VITALS
HEART RATE: 62 BPM | OXYGEN SATURATION: 99 % | DIASTOLIC BLOOD PRESSURE: 65 MMHG | RESPIRATION RATE: 18 BRPM | SYSTOLIC BLOOD PRESSURE: 120 MMHG

## 2025-02-10 VITALS
TEMPERATURE: 98 F | RESPIRATION RATE: 18 BRPM | HEART RATE: 63 BPM | SYSTOLIC BLOOD PRESSURE: 113 MMHG | DIASTOLIC BLOOD PRESSURE: 64 MMHG | OXYGEN SATURATION: 99 %

## 2025-02-10 DIAGNOSIS — Z79.01 LONG TERM (CURRENT) USE OF ANTICOAGULANTS: ICD-10-CM

## 2025-02-10 DIAGNOSIS — M25.512 PAIN IN LEFT SHOULDER: ICD-10-CM
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
## 2025-02-10 DIAGNOSIS — Z98.890 OTHER SPECIFIED POSTPROCEDURAL STATES: Chronic | ICD-10-CM

## 2025-02-10 DIAGNOSIS — I11.0 HYPERTENSIVE HEART DISEASE WITH HEART FAILURE: ICD-10-CM

## 2025-02-10 DIAGNOSIS — I50.9 HEART FAILURE, UNSPECIFIED: ICD-10-CM

## 2025-02-10 DIAGNOSIS — I42.1 OBSTRUCTIVE HYPERTROPHIC CARDIOMYOPATHY: ICD-10-CM

## 2025-02-10 DIAGNOSIS — I48.91 UNSPECIFIED ATRIAL FIBRILLATION: ICD-10-CM

## 2025-02-10 DIAGNOSIS — Z95.2 PRESENCE OF PROSTHETIC HEART VALVE: Chronic | ICD-10-CM

## 2025-02-10 PROCEDURE — 71046 X-RAY EXAM CHEST 2 VIEWS: CPT | Mod: 26

## 2025-02-10 PROCEDURE — 99284 EMERGENCY DEPT VISIT MOD MDM: CPT | Mod: 25

## 2025-02-10 PROCEDURE — 73070 X-RAY EXAM OF ELBOW: CPT | Mod: 26,LT

## 2025-02-10 PROCEDURE — 73070 X-RAY EXAM OF ELBOW: CPT | Mod: LT

## 2025-02-10 PROCEDURE — 73030 X-RAY EXAM OF SHOULDER: CPT | Mod: 26,LT

## 2025-02-10 PROCEDURE — 71046 X-RAY EXAM CHEST 2 VIEWS: CPT

## 2025-02-10 PROCEDURE — 99284 EMERGENCY DEPT VISIT MOD MDM: CPT

## 2025-02-10 PROCEDURE — 73030 X-RAY EXAM OF SHOULDER: CPT | Mod: LT

## 2025-02-10 RX ORDER — ACETAMINOPHEN 160 MG/5ML
650 SUSPENSION ORAL ONCE
Refills: 0 | Status: COMPLETED | OUTPATIENT
Start: 2025-02-10 | End: 2025-02-10

## 2025-02-10 RX ADMIN — ACETAMINOPHEN 650 MILLIGRAM(S): 160 SUSPENSION ORAL at 11:49

## 2025-02-10 NOTE — ED PROVIDER NOTE - IV ALTEPLASE DOOR HIDDEN
"----- Message from Sho Gonzales MA sent at 7/26/2019  9:48 AM CDT -----  Prior Authorization Needed    Rx: zolpidem (AMBIEN) 5 MG Tab    To submit the PA:    1: Go to " [email or web address] " and click "Enter a Key"    2. Enter the patient's last name and date of birth and the key.      KEY: [id number]    3. Complete the forms and click "send to Plan"    Note chart when prior authorization has been submitted.    Please notify pharmacy when prior authorization has been approved.    Thank You    " show

## 2025-02-10 NOTE — ED PROVIDER NOTE - PHYSICAL EXAMINATION
vss  gen- NAD, aaox3  card-rrr  lungs-ctab, no wheezing or rhonchi  abd-sntnd, no guarding or rebound  neuro- full str/sensation, cn ii-xii grossly intact, normal coordination    pelvis- stable  msk-   LUE- mild tenderness to L shoulder, pain w/ passive rom, no ecchymosis or deformity, FROM to elbow and wrist w/o tenderness, no hand tenderness including snuffbox ttp, sensation and motor intact to hand MUR, radial 2+   RUE- no shoulder/ elbow/wrist/hand/longbone ttp, FROM to joints,  no hand tenderness including snuffbox ttp, sensation and motor intact to hand MUR, radial 2+   LE- no hip/knee/ankle tenderness, no long bone tenderness to b/l LE  Spine- no midline c/t/l spine ttp, neck supple, FROM to neck  all ue and le compartments soft

## 2025-02-10 NOTE — ED PROVIDER NOTE - CLINICAL SUMMARY MEDICAL DECISION MAKING FREE TEXT BOX
Throughout ED observation period, pt remained clinically and hemodynamically stable.  Merlin MERRILL- ED Attending, interpreted the xray: no fx or dislocation  pt slung for comfort  discussed apap  ortho/pt f/u, return precautions

## 2025-02-10 NOTE — ED PROVIDER NOTE - PATIENT PORTAL LINK FT
You can access the FollowMyHealth Patient Portal offered by [facility] by registering at the following website: [email or web address]. By joining [employer]’s FollowMyHealth portal, you will also be able to view your health information using other applications (apps) compatible with our system.

## 2025-02-10 NOTE — ED ADULT TRIAGE NOTE - GLASGOW COMA SCALE: SCORE, MLM
GAGAN ambulatory encounter  URGENT CARE OFFICE VISIT    SUBJECTIVE:  Marleni Wilkinson is a 32 year old female who presented requesting evaluation for right foot pain. Patient states she does a lot of running and occasionally she will have swelling in her feet. She states most recently her right foot became very swollen and slightly tender to touch. She states the swelling has lessened over the past day or two. She denies any direct trauma or injury to the foot. She does admit to wearing less supportive shoes recently. She states she is here primarily to determine whether or not there is a stress fracture in her foot.     Review of systems:    A review of systems was performed and findings relevant to these symptoms are included in the HPI.     PAST HISTORIES:    ALLERGIES:   Allergen Reactions   • Acetaminophen-Codeine SHORTNESS OF BREATH       MEDICATIONS:  Current Outpatient Medications   Medication Sig   • cholecalciferol (VITAMIN D3) 1000 UNITS tablet Take 6,000 Units by mouth daily.   • clomiPHENE (CLOMID) 50 MG tablet Take 100 mg by mouth on day 5-9/menses every month for ovulation induction   • Prenatal Multivit-Min-Fe-FA (PRENATAL VITAMINS PO)    • desogestrel-ethinyl estradiol (APRI) 0.15-30 MG-MCG per tablet Take one pill daily ACTIVE pillls only.   • doxycycline monohydrate (MONODOX) 100 MG capsule Take 1 capsule by mouth 2 times daily. Do not start before April 30, 2019.     No current facility-administered medications for this visit.        I have reviewed the past medical history, family history, social history, medications and allergies listed in the medical record as obtained by my nursing staff and support staff and agree with their documentation    OBJECTIVE:  Physical Exam:    Visit Vitals  BP 93/66 (BP Location: [facility], Patient Position: Sitting, Cuff Size: Regular)   Pulse 68   Temp 97.8 °F (36.6 °C) (Oral)   Wt 56.7 kg   LMP 07/04/2019 (Approximate)   SpO2 100%   BMI 21.46 kg/m²         CONSTITUTIONAL: Well-hydrated, well-nourished female who appears to be in no acute distress. Awake, alert and cooperative.  MUSCULOSKELETAL: RIGHT FOOT: mild swelling evenly throughout foot. No tenderness to palpation. Dorsalis pedis and posterior tibialis pulses 2+. Sensation intact. Full range of motion to ankle and digits.   SKIN: Warm, dry, intact without rash or lesion.  NEUROLOGIC: Alert and oriented x3.  PSYCHIATRIC:  Speech and behavior appropriate. Normal mood and affect.    DIAGNOSTICS:  Right Foot XR:  Unremarkable right foot. No fractures or dislocations noted.       Assessment:  1. Strain of right foot, initial encounter            PLAN:  Orders Placed This Encounter   • XR Foot 3+ View Right           FOLLOW-UP:  With pcp as needed    PATIENT INSTRUCTIONS:    RICE    The patient was advised to follow up with primary physician or to recheck with the urgent care clinic sooner if symptoms get worse or if new symptoms appear.    The patient indicated understanding of the diagnosis and agreed with the plan of care.         15

## 2025-02-10 NOTE — ED PROVIDER NOTE - NSFOLLOWUPINSTRUCTIONS_ED_ALL_ED_FT
Take tylenol 650 mg every 6 hours for pain. Do NOT exceed combined acetaminophen dose of 4000mg.  Follow up with physical therapy and ortho as needed for persistent pain or limitation in range of motion    Our Emergency Department Referral Coordinators will be reaching out to you in the next 24-48 hours from 9:00am to 5:00pm to schedule a follow up appointment. Please expect a phone call from the hospital in that time frame. If you do not receive a call or if you have any questions or concerns, you can reach them at   [phone] [place] [address].      Shoulder Pain    AMBULATORY CARE:    Shoulder pain is a common problem that can affect your daily activities. Pain can be caused by a problem within your shoulder, such as soreness of a tendon or bursa. A tendon is a cord of tough tissue that connects your muscles to your bones. The bursa is a fluid-filled sac that acts as a cushion between a bone and a tendon. Shoulder pain may also be caused by pain that spreads to your shoulder from another part of your body.  Shoulder Anatomy    Seek care immediately if:    You have severe pain.    You cannot move your arm or shoulder.    You have numbness or tingling in your shoulder or arm.  Contact your healthcare provider if:    Your pain gets worse or does not go away with treatment.    You have trouble moving your arm or shoulder.    You have questions or concerns about your condition or care.  Treatment for shoulder pain may include any of the following:    Acetaminophen decreases pain and fever. It is available without a doctor's order. Ask how much to take and how often to take it. Follow directions. Read the labels of all other medicines you are using to see if they also contain acetaminophen, or ask your doctor or pharmacist. Acetaminophen can cause liver damage if not taken correctly.    NSAIDs, such as ibuprofen, help decrease swelling, pain, and fever. This medicine is available with or without a doctor's order. NSAIDs can cause stomach bleeding or kidney problems in certain people. If you take blood thinner medicine, always ask your healthcare provider if NSAIDs are safe for you. Always read the medicine label and follow directions.    A steroid injection may help decrease pain and swelling.    Surgery may be needed for long-term pain and loss of function.  Manage your symptoms:    Apply ice on your shoulder for 20 to 30 minutes every 2 hours or as directed. Use an ice pack, or put crushed ice in a plastic bag. Cover it with a towel before you apply it to your shoulder. Ice helps prevent tissue damage and decreases swelling and pain.    Apply heat if ice does not help your symptoms. Apply heat on your shoulder for 20 to 30 minutes every 2 hours for as many days as directed. Heat helps decrease pain and muscle spasms.    Limit activities as directed. Try to avoid repeated overhead movements.    Go to physical or occupational therapy as directed. A physical therapist teaches you exercises to help improve movement and strength, and to decrease pain. An occupational therapist teaches you skills to help with your daily activities.  Prevent shoulder pain:    Maintain a good range of motion in your shoulder. Ask your healthcare provider which exercises you should do on a regular basis after you have healed.    Stretch and strengthen your shoulder. Use proper technique during exercises and sports.  Follow up with your healthcare provider or orthopedist as directed: Write down your questions so you remember to ask them during your visits.

## 2025-02-14 ENCOUNTER — APPOINTMENT (OUTPATIENT)
Dept: MEDICATION MANAGEMENT | Facility: CLINIC | Age: 45
End: 2025-02-14

## 2025-02-14 ENCOUNTER — OUTPATIENT (OUTPATIENT)
Dept: OUTPATIENT SERVICES | Facility: HOSPITAL | Age: 45
LOS: 1 days | End: 2025-02-14
Payer: COMMERCIAL

## 2025-02-14 DIAGNOSIS — Z95.2 PRESENCE OF PROSTHETIC HEART VALVE: Chronic | ICD-10-CM

## 2025-02-14 DIAGNOSIS — I48.91 UNSPECIFIED ATRIAL FIBRILLATION: ICD-10-CM

## 2025-02-14 DIAGNOSIS — Z79.01 LONG TERM (CURRENT) USE OF ANTICOAGULANTS: ICD-10-CM

## 2025-02-14 LAB — INR PPP: 3.7 RATIO

## 2025-02-14 PROCEDURE — 98012 SYNCH AUDIO-ONLY EST SF 10: CPT

## 2025-02-15 DIAGNOSIS — Z79.01 LONG TERM (CURRENT) USE OF ANTICOAGULANTS: ICD-10-CM

## 2025-02-15 DIAGNOSIS — I48.91 UNSPECIFIED ATRIAL FIBRILLATION: ICD-10-CM

## 2025-02-20 ENCOUNTER — APPOINTMENT (OUTPATIENT)
Dept: CARDIOLOGY | Facility: CLINIC | Age: 45
End: 2025-02-20
Payer: COMMERCIAL

## 2025-02-20 ENCOUNTER — NON-APPOINTMENT (OUTPATIENT)
Age: 45
End: 2025-02-20

## 2025-02-20 VITALS — HEIGHT: 61 IN | BODY MASS INDEX: 34.36 KG/M2 | OXYGEN SATURATION: 98 % | HEART RATE: 70 BPM | WEIGHT: 182 LBS

## 2025-02-20 VITALS — WEIGHT: 173 LBS | BODY MASS INDEX: 32.69 KG/M2

## 2025-02-20 DIAGNOSIS — Z95.2 PRESENCE OF PROSTHETIC HEART VALVE: ICD-10-CM

## 2025-02-20 DIAGNOSIS — I48.91 UNSPECIFIED ATRIAL FIBRILLATION: ICD-10-CM

## 2025-02-20 DIAGNOSIS — I42.2 OTHER HYPERTROPHIC CARDIOMYOPATHY: ICD-10-CM

## 2025-02-20 DIAGNOSIS — Z98.890 OTHER SPECIFIED POSTPROCEDURAL STATES: ICD-10-CM

## 2025-02-20 DIAGNOSIS — I50.30 UNSPECIFIED DIASTOLIC (CONGESTIVE) HEART FAILURE: ICD-10-CM

## 2025-02-20 DIAGNOSIS — Z86.79 OTHER SPECIFIED POSTPROCEDURAL STATES: ICD-10-CM

## 2025-02-20 PROCEDURE — 93000 ELECTROCARDIOGRAM COMPLETE: CPT

## 2025-02-20 PROCEDURE — 99212 OFFICE O/P EST SF 10 MIN: CPT

## 2025-02-20 PROCEDURE — G2211 COMPLEX E/M VISIT ADD ON: CPT | Mod: NC

## 2025-02-20 PROCEDURE — 99214 OFFICE O/P EST MOD 30 MIN: CPT

## 2025-02-21 ENCOUNTER — APPOINTMENT (OUTPATIENT)
Dept: MEDICATION MANAGEMENT | Facility: CLINIC | Age: 45
End: 2025-02-21

## 2025-02-21 ENCOUNTER — OUTPATIENT (OUTPATIENT)
Dept: OUTPATIENT SERVICES | Facility: HOSPITAL | Age: 45
LOS: 1 days | End: 2025-02-21
Payer: COMMERCIAL

## 2025-02-21 DIAGNOSIS — Z79.01 LONG TERM (CURRENT) USE OF ANTICOAGULANTS: ICD-10-CM

## 2025-02-21 DIAGNOSIS — I48.91 UNSPECIFIED ATRIAL FIBRILLATION: ICD-10-CM

## 2025-02-21 DIAGNOSIS — Z95.2 PRESENCE OF PROSTHETIC HEART VALVE: Chronic | ICD-10-CM

## 2025-02-21 DIAGNOSIS — Z98.890 OTHER SPECIFIED POSTPROCEDURAL STATES: Chronic | ICD-10-CM

## 2025-02-21 LAB — INR PPP: 2.9 RATIO

## 2025-02-21 PROCEDURE — 98012 SYNCH AUDIO-ONLY EST SF 10: CPT

## 2025-02-22 DIAGNOSIS — I48.91 UNSPECIFIED ATRIAL FIBRILLATION: ICD-10-CM

## 2025-02-22 DIAGNOSIS — Z79.01 LONG TERM (CURRENT) USE OF ANTICOAGULANTS: ICD-10-CM

## 2025-02-25 ENCOUNTER — APPOINTMENT (OUTPATIENT)
Dept: ORTHOPEDIC SURGERY | Facility: CLINIC | Age: 45
End: 2025-02-25

## 2025-02-25 VITALS — WEIGHT: 180 LBS | BODY MASS INDEX: 34.01 KG/M2

## 2025-02-25 PROCEDURE — 99203 OFFICE O/P NEW LOW 30 MIN: CPT

## 2025-02-28 ENCOUNTER — APPOINTMENT (OUTPATIENT)
Dept: MEDICATION MANAGEMENT | Facility: CLINIC | Age: 45
End: 2025-02-28

## 2025-02-28 ENCOUNTER — OUTPATIENT (OUTPATIENT)
Dept: OUTPATIENT SERVICES | Facility: HOSPITAL | Age: 45
LOS: 1 days | End: 2025-02-28
Payer: COMMERCIAL

## 2025-02-28 DIAGNOSIS — I48.91 UNSPECIFIED ATRIAL FIBRILLATION: ICD-10-CM

## 2025-02-28 DIAGNOSIS — Z79.01 LONG TERM (CURRENT) USE OF ANTICOAGULANTS: ICD-10-CM

## 2025-02-28 DIAGNOSIS — Z98.890 OTHER SPECIFIED POSTPROCEDURAL STATES: Chronic | ICD-10-CM

## 2025-02-28 LAB — INR PPP: 2.8 RATIO

## 2025-02-28 PROCEDURE — 98012 SYNCH AUDIO-ONLY EST SF 10: CPT

## 2025-03-01 DIAGNOSIS — I48.91 UNSPECIFIED ATRIAL FIBRILLATION: ICD-10-CM

## 2025-03-01 DIAGNOSIS — Z79.01 LONG TERM (CURRENT) USE OF ANTICOAGULANTS: ICD-10-CM

## 2025-03-06 ENCOUNTER — NON-APPOINTMENT (OUTPATIENT)
Age: 45
End: 2025-03-06

## 2025-03-06 ENCOUNTER — APPOINTMENT (OUTPATIENT)
Dept: CARDIOLOGY | Facility: CLINIC | Age: 45
End: 2025-03-06
Payer: COMMERCIAL

## 2025-03-06 PROCEDURE — 93295 DEV INTERROG REMOTE 1/2/MLT: CPT

## 2025-03-06 PROCEDURE — 93296 REM INTERROG EVL PM/IDS: CPT

## 2025-03-07 ENCOUNTER — APPOINTMENT (OUTPATIENT)
Dept: MEDICATION MANAGEMENT | Facility: CLINIC | Age: 45
End: 2025-03-07

## 2025-03-07 ENCOUNTER — OUTPATIENT (OUTPATIENT)
Dept: OUTPATIENT SERVICES | Facility: HOSPITAL | Age: 45
LOS: 1 days | End: 2025-03-07
Payer: COMMERCIAL

## 2025-03-07 DIAGNOSIS — Z95.2 PRESENCE OF PROSTHETIC HEART VALVE: Chronic | ICD-10-CM

## 2025-03-07 DIAGNOSIS — Z79.01 LONG TERM (CURRENT) USE OF ANTICOAGULANTS: ICD-10-CM

## 2025-03-07 DIAGNOSIS — I48.91 UNSPECIFIED ATRIAL FIBRILLATION: ICD-10-CM

## 2025-03-07 DIAGNOSIS — Z98.890 OTHER SPECIFIED POSTPROCEDURAL STATES: Chronic | ICD-10-CM

## 2025-03-07 LAB — INR PPP: 3.2 RATIO

## 2025-03-07 PROCEDURE — 98012 SYNCH AUDIO-ONLY EST SF 10: CPT

## 2025-03-08 DIAGNOSIS — Z79.01 LONG TERM (CURRENT) USE OF ANTICOAGULANTS: ICD-10-CM

## 2025-03-08 DIAGNOSIS — I48.91 UNSPECIFIED ATRIAL FIBRILLATION: ICD-10-CM

## 2025-03-11 PROBLEM — S43.492A SPRAIN OF OTHER PART OF LEFT SHOULDER REGION, INITIAL ENCOUNTER: Status: ACTIVE | Noted: 2025-03-11

## 2025-03-11 PROBLEM — I51.9 HEART DISEASE: Status: RESOLVED | Noted: 2025-03-11 | Resolved: 2025-03-11

## 2025-03-11 PROBLEM — S50.02XA CONTUSION OF LEFT ELBOW, INITIAL ENCOUNTER: Status: ACTIVE | Noted: 2025-03-11

## 2025-03-14 ENCOUNTER — APPOINTMENT (OUTPATIENT)
Dept: MEDICATION MANAGEMENT | Facility: CLINIC | Age: 45
End: 2025-03-14

## 2025-03-14 ENCOUNTER — OUTPATIENT (OUTPATIENT)
Dept: OUTPATIENT SERVICES | Facility: HOSPITAL | Age: 45
LOS: 1 days | End: 2025-03-14
Payer: COMMERCIAL

## 2025-03-14 DIAGNOSIS — Z79.01 LONG TERM (CURRENT) USE OF ANTICOAGULANTS: ICD-10-CM

## 2025-03-14 DIAGNOSIS — Z98.890 OTHER SPECIFIED POSTPROCEDURAL STATES: Chronic | ICD-10-CM

## 2025-03-14 DIAGNOSIS — I48.91 UNSPECIFIED ATRIAL FIBRILLATION: ICD-10-CM

## 2025-03-14 DIAGNOSIS — Z95.2 PRESENCE OF PROSTHETIC HEART VALVE: Chronic | ICD-10-CM

## 2025-03-14 LAB — INR PPP: 3.5 RATIO

## 2025-03-14 PROCEDURE — 98012 SYNCH AUDIO-ONLY EST SF 10: CPT

## 2025-03-14 NOTE — PHYSICAL THERAPY INITIAL EVALUATION ADULT - NAME OF CLINICIAN
SHANTEL Rios
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  HSV/LSV and abscess cultures obtained. Patient to be sent doxy and DC with outpatient Derm followup. Patient Info given to derm states will facilitate appointment. Patient updated and is agreeable to plan, questions answered understanding verbalized. Return precautions given.

## 2025-03-15 DIAGNOSIS — Z79.01 LONG TERM (CURRENT) USE OF ANTICOAGULANTS: ICD-10-CM

## 2025-03-15 DIAGNOSIS — I48.91 UNSPECIFIED ATRIAL FIBRILLATION: ICD-10-CM

## 2025-03-20 ENCOUNTER — APPOINTMENT (OUTPATIENT)
Dept: MEDICATION MANAGEMENT | Facility: CLINIC | Age: 45
End: 2025-03-20

## 2025-03-20 ENCOUNTER — OUTPATIENT (OUTPATIENT)
Dept: OUTPATIENT SERVICES | Facility: HOSPITAL | Age: 45
LOS: 1 days | End: 2025-03-20
Payer: COMMERCIAL

## 2025-03-20 DIAGNOSIS — Z79.01 LONG TERM (CURRENT) USE OF ANTICOAGULANTS: ICD-10-CM

## 2025-03-20 DIAGNOSIS — I48.91 UNSPECIFIED ATRIAL FIBRILLATION: ICD-10-CM

## 2025-03-20 DIAGNOSIS — Z98.890 OTHER SPECIFIED POSTPROCEDURAL STATES: Chronic | ICD-10-CM

## 2025-03-20 DIAGNOSIS — Z95.2 PRESENCE OF PROSTHETIC HEART VALVE: Chronic | ICD-10-CM

## 2025-03-20 LAB — INR PPP: 2.6 RATIO

## 2025-03-20 PROCEDURE — 98012 SYNCH AUDIO-ONLY EST SF 10: CPT

## 2025-03-21 DIAGNOSIS — I48.91 UNSPECIFIED ATRIAL FIBRILLATION: ICD-10-CM

## 2025-03-21 DIAGNOSIS — Z79.01 LONG TERM (CURRENT) USE OF ANTICOAGULANTS: ICD-10-CM

## 2025-03-28 ENCOUNTER — APPOINTMENT (OUTPATIENT)
Dept: MEDICATION MANAGEMENT | Facility: CLINIC | Age: 45
End: 2025-03-28

## 2025-03-28 ENCOUNTER — OUTPATIENT (OUTPATIENT)
Dept: OUTPATIENT SERVICES | Facility: HOSPITAL | Age: 45
LOS: 1 days | End: 2025-03-28
Payer: COMMERCIAL

## 2025-03-28 DIAGNOSIS — Z95.2 PRESENCE OF PROSTHETIC HEART VALVE: Chronic | ICD-10-CM

## 2025-03-28 DIAGNOSIS — Z79.01 LONG TERM (CURRENT) USE OF ANTICOAGULANTS: ICD-10-CM

## 2025-03-28 DIAGNOSIS — I48.91 UNSPECIFIED ATRIAL FIBRILLATION: ICD-10-CM

## 2025-03-28 LAB — INR PPP: 3 RATIO

## 2025-03-28 PROCEDURE — 98012 SYNCH AUDIO-ONLY EST SF 10: CPT

## 2025-03-29 DIAGNOSIS — I48.91 UNSPECIFIED ATRIAL FIBRILLATION: ICD-10-CM

## 2025-03-29 DIAGNOSIS — Z79.01 LONG TERM (CURRENT) USE OF ANTICOAGULANTS: ICD-10-CM

## 2025-04-01 ENCOUNTER — APPOINTMENT (OUTPATIENT)
Dept: ORTHOPEDIC SURGERY | Facility: CLINIC | Age: 45
End: 2025-04-01
Payer: COMMERCIAL

## 2025-04-01 DIAGNOSIS — M25.512 PAIN IN LEFT SHOULDER: ICD-10-CM

## 2025-04-01 PROCEDURE — 99203 OFFICE O/P NEW LOW 30 MIN: CPT

## 2025-04-04 ENCOUNTER — APPOINTMENT (OUTPATIENT)
Dept: HEART FAILURE | Facility: CLINIC | Age: 45
End: 2025-04-04

## 2025-04-04 VITALS — BODY MASS INDEX: 34.74 KG/M2 | WEIGHT: 184 LBS | OXYGEN SATURATION: 96 % | HEIGHT: 61 IN | HEART RATE: 78 BPM

## 2025-04-04 DIAGNOSIS — R06.02 SHORTNESS OF BREATH: ICD-10-CM

## 2025-04-04 DIAGNOSIS — I48.92 UNSPECIFIED ATRIAL FLUTTER: ICD-10-CM

## 2025-04-04 DIAGNOSIS — I50.30 UNSPECIFIED DIASTOLIC (CONGESTIVE) HEART FAILURE: ICD-10-CM

## 2025-04-04 PROCEDURE — 93000 ELECTROCARDIOGRAM COMPLETE: CPT

## 2025-04-04 PROCEDURE — 99215 OFFICE O/P EST HI 40 MIN: CPT

## 2025-04-04 PROCEDURE — G2211 COMPLEX E/M VISIT ADD ON: CPT | Mod: NC

## 2025-04-05 LAB
ALBUMIN SERPL ELPH-MCNC: 4.4 G/DL
ALP BLD-CCNC: 123 U/L
ALT SERPL-CCNC: 42 U/L
ANION GAP SERPL CALC-SCNC: 12 MMOL/L
AST SERPL-CCNC: 37 U/L
BASOPHILS # BLD AUTO: 0.05 K/UL
BASOPHILS NFR BLD AUTO: 0.6 %
BILIRUB SERPL-MCNC: 0.7 MG/DL
BUN SERPL-MCNC: 22 MG/DL
CALCIUM SERPL-MCNC: 9.7 MG/DL
CHLORIDE SERPL-SCNC: 102 MMOL/L
CO2 SERPL-SCNC: 26 MMOL/L
CREAT SERPL-MCNC: 0.9 MG/DL
EGFRCR SERPLBLD CKD-EPI 2021: 80 ML/MIN/1.73M2
EOSINOPHIL # BLD AUTO: 0.33 K/UL
EOSINOPHIL NFR BLD AUTO: 3.7 %
GLUCOSE SERPL-MCNC: 85 MG/DL
HCT VFR BLD CALC: 43.2 %
HGB BLD-MCNC: 13.7 G/DL
IMM GRANULOCYTES NFR BLD AUTO: 0.6 %
LYMPHOCYTES # BLD AUTO: 1.97 K/UL
LYMPHOCYTES NFR BLD AUTO: 22 %
MAGNESIUM SERPL-MCNC: 2.1 MG/DL
MAN DIFF?: NORMAL
MCHC RBC-ENTMCNC: 25.7 PG
MCHC RBC-ENTMCNC: 31.7 G/DL
MCV RBC AUTO: 81.1 FL
MONOCYTES # BLD AUTO: 0.7 K/UL
MONOCYTES NFR BLD AUTO: 7.8 %
NEUTROPHILS # BLD AUTO: 5.87 K/UL
NEUTROPHILS NFR BLD AUTO: 65.3 %
NT-PROBNP SERPL-MCNC: 1757 PG/ML
PLATELET # BLD AUTO: 303 K/UL
PMV BLD AUTO: 0 /100 WBCS
PMV BLD: 10.9 FL
POTASSIUM SERPL-SCNC: 4.6 MMOL/L
PROT SERPL-MCNC: 7.8 G/DL
RBC # BLD: 5.33 M/UL
RBC # FLD: 14.6 %
SODIUM SERPL-SCNC: 140 MMOL/L
WBC # FLD AUTO: 8.97 K/UL

## 2025-04-07 ENCOUNTER — OUTPATIENT (OUTPATIENT)
Dept: OUTPATIENT SERVICES | Facility: HOSPITAL | Age: 45
LOS: 1 days | End: 2025-04-07
Payer: COMMERCIAL

## 2025-04-07 ENCOUNTER — APPOINTMENT (OUTPATIENT)
Dept: MEDICATION MANAGEMENT | Facility: CLINIC | Age: 45
End: 2025-04-07

## 2025-04-07 DIAGNOSIS — Z98.890 OTHER SPECIFIED POSTPROCEDURAL STATES: Chronic | ICD-10-CM

## 2025-04-07 DIAGNOSIS — Z95.2 PRESENCE OF PROSTHETIC HEART VALVE: Chronic | ICD-10-CM

## 2025-04-07 DIAGNOSIS — I48.91 UNSPECIFIED ATRIAL FIBRILLATION: ICD-10-CM

## 2025-04-07 DIAGNOSIS — Z79.01 LONG TERM (CURRENT) USE OF ANTICOAGULANTS: ICD-10-CM

## 2025-04-07 LAB — INR PPP: 2.6 RATIO

## 2025-04-07 PROCEDURE — 98012 SYNCH AUDIO-ONLY EST SF 10: CPT

## 2025-04-08 DIAGNOSIS — I48.91 UNSPECIFIED ATRIAL FIBRILLATION: ICD-10-CM

## 2025-04-08 DIAGNOSIS — Z79.01 LONG TERM (CURRENT) USE OF ANTICOAGULANTS: ICD-10-CM

## 2025-04-17 ENCOUNTER — APPOINTMENT (OUTPATIENT)
Dept: MEDICATION MANAGEMENT | Facility: CLINIC | Age: 45
End: 2025-04-17

## 2025-04-17 ENCOUNTER — OUTPATIENT (OUTPATIENT)
Dept: OUTPATIENT SERVICES | Facility: HOSPITAL | Age: 45
LOS: 1 days | End: 2025-04-17
Payer: COMMERCIAL

## 2025-04-17 DIAGNOSIS — Z95.2 PRESENCE OF PROSTHETIC HEART VALVE: Chronic | ICD-10-CM

## 2025-04-17 DIAGNOSIS — Z79.01 LONG TERM (CURRENT) USE OF ANTICOAGULANTS: ICD-10-CM

## 2025-04-17 DIAGNOSIS — Z98.890 OTHER SPECIFIED POSTPROCEDURAL STATES: Chronic | ICD-10-CM

## 2025-04-17 DIAGNOSIS — I48.91 UNSPECIFIED ATRIAL FIBRILLATION: ICD-10-CM

## 2025-04-17 LAB — INR PPP: 3.3 RATIO

## 2025-04-17 PROCEDURE — 98012 SYNCH AUDIO-ONLY EST SF 10: CPT

## 2025-04-18 DIAGNOSIS — Z79.01 LONG TERM (CURRENT) USE OF ANTICOAGULANTS: ICD-10-CM

## 2025-04-18 DIAGNOSIS — I48.91 UNSPECIFIED ATRIAL FIBRILLATION: ICD-10-CM

## 2025-04-30 ENCOUNTER — APPOINTMENT (OUTPATIENT)
Dept: CARDIOLOGY | Facility: CLINIC | Age: 45
End: 2025-04-30
Payer: COMMERCIAL

## 2025-04-30 VITALS
BODY MASS INDEX: 35.12 KG/M2 | HEART RATE: 70 BPM | HEIGHT: 61 IN | SYSTOLIC BLOOD PRESSURE: 112 MMHG | OXYGEN SATURATION: 97 % | DIASTOLIC BLOOD PRESSURE: 78 MMHG | WEIGHT: 186 LBS

## 2025-04-30 DIAGNOSIS — G47.33 OBSTRUCTIVE SLEEP APNEA (ADULT) (PEDIATRIC): ICD-10-CM

## 2025-04-30 DIAGNOSIS — E78.5 HYPERLIPIDEMIA, UNSPECIFIED: ICD-10-CM

## 2025-04-30 DIAGNOSIS — E66.01 OBESITY, CLASS 2: ICD-10-CM

## 2025-04-30 DIAGNOSIS — E66.812 OBESITY, CLASS 2: ICD-10-CM

## 2025-04-30 DIAGNOSIS — I48.91 UNSPECIFIED ATRIAL FIBRILLATION: ICD-10-CM

## 2025-04-30 DIAGNOSIS — I42.2 OTHER HYPERTROPHIC CARDIOMYOPATHY: ICD-10-CM

## 2025-04-30 DIAGNOSIS — I50.30 UNSPECIFIED DIASTOLIC (CONGESTIVE) HEART FAILURE: ICD-10-CM

## 2025-04-30 DIAGNOSIS — Z95.2 PRESENCE OF PROSTHETIC HEART VALVE: ICD-10-CM

## 2025-04-30 PROCEDURE — 99213 OFFICE O/P EST LOW 20 MIN: CPT

## 2025-05-01 ENCOUNTER — APPOINTMENT (OUTPATIENT)
Dept: MEDICATION MANAGEMENT | Facility: CLINIC | Age: 45
End: 2025-05-01

## 2025-05-01 ENCOUNTER — OUTPATIENT (OUTPATIENT)
Dept: OUTPATIENT SERVICES | Facility: HOSPITAL | Age: 45
LOS: 1 days | End: 2025-05-01
Payer: COMMERCIAL

## 2025-05-01 DIAGNOSIS — I48.91 UNSPECIFIED ATRIAL FIBRILLATION: ICD-10-CM

## 2025-05-01 DIAGNOSIS — Z95.2 PRESENCE OF PROSTHETIC HEART VALVE: Chronic | ICD-10-CM

## 2025-05-01 DIAGNOSIS — Z98.890 OTHER SPECIFIED POSTPROCEDURAL STATES: Chronic | ICD-10-CM

## 2025-05-01 DIAGNOSIS — Z79.01 LONG TERM (CURRENT) USE OF ANTICOAGULANTS: ICD-10-CM

## 2025-05-01 LAB — INR PPP: 2.8 RATIO

## 2025-05-01 PROCEDURE — 98012 SYNCH AUDIO-ONLY EST SF 10: CPT

## 2025-05-02 DIAGNOSIS — I48.91 UNSPECIFIED ATRIAL FIBRILLATION: ICD-10-CM

## 2025-05-02 DIAGNOSIS — Z79.01 LONG TERM (CURRENT) USE OF ANTICOAGULANTS: ICD-10-CM

## 2025-05-05 ENCOUNTER — OUTPATIENT (OUTPATIENT)
Dept: OUTPATIENT SERVICES | Facility: HOSPITAL | Age: 45
LOS: 1 days | End: 2025-05-05

## 2025-05-05 ENCOUNTER — OUTPATIENT (OUTPATIENT)
Dept: OUTPATIENT SERVICES | Facility: HOSPITAL | Age: 45
LOS: 1 days | End: 2025-05-05
Payer: COMMERCIAL

## 2025-05-05 ENCOUNTER — APPOINTMENT (OUTPATIENT)
Dept: MEDICATION MANAGEMENT | Facility: CLINIC | Age: 45
End: 2025-05-05

## 2025-05-05 DIAGNOSIS — Z79.01 LONG TERM (CURRENT) USE OF ANTICOAGULANTS: ICD-10-CM

## 2025-05-05 DIAGNOSIS — I48.91 UNSPECIFIED ATRIAL FIBRILLATION: ICD-10-CM

## 2025-05-05 DIAGNOSIS — Z98.890 OTHER SPECIFIED POSTPROCEDURAL STATES: Chronic | ICD-10-CM

## 2025-05-05 DIAGNOSIS — Z95.2 PRESENCE OF PROSTHETIC HEART VALVE: Chronic | ICD-10-CM

## 2025-05-05 DIAGNOSIS — E78.5 HYPERLIPIDEMIA, UNSPECIFIED: ICD-10-CM

## 2025-05-05 LAB
CHOLEST SERPL-MCNC: 213 MG/DL
ESTIMATED AVERAGE GLUCOSE: 114 MG/DL
HBA1C MFR BLD HPLC: 5.6 %
HDLC SERPL-MCNC: 57 MG/DL
INR PPP: 2.3 RATIO
LDLC SERPL-MCNC: 149 MG/DL
NONHDLC SERPL-MCNC: 156 MG/DL
T3 SERPL-MCNC: 86 NG/DL
T4 FREE SERPL-MCNC: 1.4 NG/DL
TRIGL SERPL-MCNC: 41 MG/DL
TSH SERPL-ACNC: 3.09 UIU/ML

## 2025-05-05 PROCEDURE — 98012 SYNCH AUDIO-ONLY EST SF 10: CPT

## 2025-05-06 DIAGNOSIS — Z79.01 LONG TERM (CURRENT) USE OF ANTICOAGULANTS: ICD-10-CM

## 2025-05-06 DIAGNOSIS — I48.91 UNSPECIFIED ATRIAL FIBRILLATION: ICD-10-CM

## 2025-05-06 DIAGNOSIS — E78.5 HYPERLIPIDEMIA, UNSPECIFIED: ICD-10-CM

## 2025-05-06 RX ORDER — ROSUVASTATIN CALCIUM 10 MG/1
10 TABLET, FILM COATED ORAL
Qty: 90 | Refills: 3 | Status: ACTIVE | COMMUNITY
Start: 2025-05-06 | End: 1900-01-01

## 2025-05-12 ENCOUNTER — OUTPATIENT (OUTPATIENT)
Dept: OUTPATIENT SERVICES | Facility: HOSPITAL | Age: 45
LOS: 1 days | End: 2025-05-12
Payer: COMMERCIAL

## 2025-05-12 ENCOUNTER — APPOINTMENT (OUTPATIENT)
Dept: MEDICATION MANAGEMENT | Facility: CLINIC | Age: 45
End: 2025-05-12

## 2025-05-12 DIAGNOSIS — I48.91 UNSPECIFIED ATRIAL FIBRILLATION: ICD-10-CM

## 2025-05-12 DIAGNOSIS — Z95.2 PRESENCE OF PROSTHETIC HEART VALVE: Chronic | ICD-10-CM

## 2025-05-12 DIAGNOSIS — Z98.890 OTHER SPECIFIED POSTPROCEDURAL STATES: Chronic | ICD-10-CM

## 2025-05-12 DIAGNOSIS — Z79.01 LONG TERM (CURRENT) USE OF ANTICOAGULANTS: ICD-10-CM

## 2025-05-12 LAB — INR PPP: 2.7 RATIO

## 2025-05-12 PROCEDURE — 98012 SYNCH AUDIO-ONLY EST SF 10: CPT

## 2025-05-13 ENCOUNTER — APPOINTMENT (OUTPATIENT)
Dept: ORTHOPEDIC SURGERY | Facility: CLINIC | Age: 45
End: 2025-05-13

## 2025-05-13 DIAGNOSIS — I48.91 UNSPECIFIED ATRIAL FIBRILLATION: ICD-10-CM

## 2025-05-13 DIAGNOSIS — Z79.01 LONG TERM (CURRENT) USE OF ANTICOAGULANTS: ICD-10-CM

## 2025-05-30 ENCOUNTER — OUTPATIENT (OUTPATIENT)
Dept: OUTPATIENT SERVICES | Facility: HOSPITAL | Age: 45
LOS: 1 days | End: 2025-05-30
Payer: COMMERCIAL

## 2025-05-30 ENCOUNTER — APPOINTMENT (OUTPATIENT)
Dept: MEDICATION MANAGEMENT | Facility: CLINIC | Age: 45
End: 2025-05-30

## 2025-05-30 DIAGNOSIS — Z79.01 LONG TERM (CURRENT) USE OF ANTICOAGULANTS: ICD-10-CM

## 2025-05-30 DIAGNOSIS — Z98.890 OTHER SPECIFIED POSTPROCEDURAL STATES: Chronic | ICD-10-CM

## 2025-05-30 DIAGNOSIS — I48.91 UNSPECIFIED ATRIAL FIBRILLATION: ICD-10-CM

## 2025-05-30 DIAGNOSIS — Z95.2 PRESENCE OF PROSTHETIC HEART VALVE: Chronic | ICD-10-CM

## 2025-05-30 LAB — INR PPP: 3.9 RATIO

## 2025-05-30 PROCEDURE — 98012 SYNCH AUDIO-ONLY EST SF 10: CPT

## 2025-05-31 DIAGNOSIS — Z79.01 LONG TERM (CURRENT) USE OF ANTICOAGULANTS: ICD-10-CM

## 2025-05-31 DIAGNOSIS — I48.91 UNSPECIFIED ATRIAL FIBRILLATION: ICD-10-CM

## 2025-06-05 ENCOUNTER — APPOINTMENT (OUTPATIENT)
Facility: CLINIC | Age: 45
End: 2025-06-05
Payer: COMMERCIAL

## 2025-06-05 VITALS
BODY MASS INDEX: 34.58 KG/M2 | HEART RATE: 85 BPM | WEIGHT: 183 LBS | SYSTOLIC BLOOD PRESSURE: 110 MMHG | DIASTOLIC BLOOD PRESSURE: 74 MMHG

## 2025-06-05 PROCEDURE — 99214 OFFICE O/P EST MOD 30 MIN: CPT | Mod: 25

## 2025-06-05 PROCEDURE — 93260 PRGRMG DEV EVAL IMPLTBL SYS: CPT

## 2025-06-05 PROCEDURE — 93000 ELECTROCARDIOGRAM COMPLETE: CPT | Mod: 59

## 2025-06-06 ENCOUNTER — APPOINTMENT (OUTPATIENT)
Dept: MEDICATION MANAGEMENT | Facility: CLINIC | Age: 45
End: 2025-06-06

## 2025-06-06 ENCOUNTER — OUTPATIENT (OUTPATIENT)
Dept: OUTPATIENT SERVICES | Facility: HOSPITAL | Age: 45
LOS: 1 days | End: 2025-06-06
Payer: COMMERCIAL

## 2025-06-06 DIAGNOSIS — Z79.01 LONG TERM (CURRENT) USE OF ANTICOAGULANTS: ICD-10-CM

## 2025-06-06 DIAGNOSIS — I48.91 UNSPECIFIED ATRIAL FIBRILLATION: ICD-10-CM

## 2025-06-06 DIAGNOSIS — Z98.890 OTHER SPECIFIED POSTPROCEDURAL STATES: Chronic | ICD-10-CM

## 2025-06-06 DIAGNOSIS — Z95.2 PRESENCE OF PROSTHETIC HEART VALVE: Chronic | ICD-10-CM

## 2025-06-06 LAB — INR PPP: 3.4 RATIO

## 2025-06-06 PROCEDURE — 98012 SYNCH AUDIO-ONLY EST SF 10: CPT

## 2025-06-07 DIAGNOSIS — I48.91 UNSPECIFIED ATRIAL FIBRILLATION: ICD-10-CM

## 2025-06-07 DIAGNOSIS — Z79.01 LONG TERM (CURRENT) USE OF ANTICOAGULANTS: ICD-10-CM

## 2025-06-13 ENCOUNTER — OUTPATIENT (OUTPATIENT)
Dept: OUTPATIENT SERVICES | Facility: HOSPITAL | Age: 45
LOS: 1 days | End: 2025-06-13
Payer: COMMERCIAL

## 2025-06-13 ENCOUNTER — APPOINTMENT (OUTPATIENT)
Dept: MEDICATION MANAGEMENT | Facility: CLINIC | Age: 45
End: 2025-06-13

## 2025-06-13 DIAGNOSIS — Z79.01 LONG TERM (CURRENT) USE OF ANTICOAGULANTS: ICD-10-CM

## 2025-06-13 DIAGNOSIS — Z98.890 OTHER SPECIFIED POSTPROCEDURAL STATES: Chronic | ICD-10-CM

## 2025-06-13 DIAGNOSIS — Z95.2 PRESENCE OF PROSTHETIC HEART VALVE: Chronic | ICD-10-CM

## 2025-06-13 DIAGNOSIS — I48.91 UNSPECIFIED ATRIAL FIBRILLATION: ICD-10-CM

## 2025-06-13 LAB — INR PPP: 3 RATIO

## 2025-06-13 PROCEDURE — 98012 SYNCH AUDIO-ONLY EST SF 10: CPT

## 2025-06-14 DIAGNOSIS — I48.91 UNSPECIFIED ATRIAL FIBRILLATION: ICD-10-CM

## 2025-06-14 DIAGNOSIS — Z79.01 LONG TERM (CURRENT) USE OF ANTICOAGULANTS: ICD-10-CM

## 2025-06-20 ENCOUNTER — OUTPATIENT (OUTPATIENT)
Dept: OUTPATIENT SERVICES | Facility: HOSPITAL | Age: 45
LOS: 1 days | End: 2025-06-20
Payer: COMMERCIAL

## 2025-06-20 ENCOUNTER — APPOINTMENT (OUTPATIENT)
Dept: MEDICATION MANAGEMENT | Facility: CLINIC | Age: 45
End: 2025-06-20

## 2025-06-20 DIAGNOSIS — Z98.890 OTHER SPECIFIED POSTPROCEDURAL STATES: Chronic | ICD-10-CM

## 2025-06-20 DIAGNOSIS — I48.91 UNSPECIFIED ATRIAL FIBRILLATION: ICD-10-CM

## 2025-06-20 DIAGNOSIS — Z95.2 PRESENCE OF PROSTHETIC HEART VALVE: Chronic | ICD-10-CM

## 2025-06-20 DIAGNOSIS — Z79.01 LONG TERM (CURRENT) USE OF ANTICOAGULANTS: ICD-10-CM

## 2025-06-20 LAB — INR PPP: 4 RATIO

## 2025-06-20 PROCEDURE — 98012 SYNCH AUDIO-ONLY EST SF 10: CPT

## 2025-06-21 DIAGNOSIS — I48.91 UNSPECIFIED ATRIAL FIBRILLATION: ICD-10-CM

## 2025-06-21 DIAGNOSIS — Z79.01 LONG TERM (CURRENT) USE OF ANTICOAGULANTS: ICD-10-CM

## 2025-06-26 ENCOUNTER — OUTPATIENT (OUTPATIENT)
Dept: OUTPATIENT SERVICES | Facility: HOSPITAL | Age: 45
LOS: 1 days | End: 2025-06-26
Payer: COMMERCIAL

## 2025-06-26 ENCOUNTER — APPOINTMENT (OUTPATIENT)
Dept: MEDICATION MANAGEMENT | Facility: CLINIC | Age: 45
End: 2025-06-26

## 2025-06-26 DIAGNOSIS — I48.91 UNSPECIFIED ATRIAL FIBRILLATION: ICD-10-CM

## 2025-06-26 DIAGNOSIS — Z79.01 LONG TERM (CURRENT) USE OF ANTICOAGULANTS: ICD-10-CM

## 2025-06-26 DIAGNOSIS — Z98.890 OTHER SPECIFIED POSTPROCEDURAL STATES: Chronic | ICD-10-CM

## 2025-06-26 LAB — INR PPP: 3 RATIO

## 2025-06-26 PROCEDURE — 98004 SYNCH AUDIO-VIDEO EST SF 10: CPT

## 2025-06-27 DIAGNOSIS — Z79.01 LONG TERM (CURRENT) USE OF ANTICOAGULANTS: ICD-10-CM

## 2025-06-27 DIAGNOSIS — I48.91 UNSPECIFIED ATRIAL FIBRILLATION: ICD-10-CM

## 2025-07-03 ENCOUNTER — OUTPATIENT (OUTPATIENT)
Dept: OUTPATIENT SERVICES | Facility: HOSPITAL | Age: 45
LOS: 1 days | End: 2025-07-03
Payer: COMMERCIAL

## 2025-07-03 ENCOUNTER — APPOINTMENT (OUTPATIENT)
Dept: MEDICATION MANAGEMENT | Facility: CLINIC | Age: 45
End: 2025-07-03

## 2025-07-03 DIAGNOSIS — I48.91 UNSPECIFIED ATRIAL FIBRILLATION: ICD-10-CM

## 2025-07-03 DIAGNOSIS — Z79.01 LONG TERM (CURRENT) USE OF ANTICOAGULANTS: ICD-10-CM

## 2025-07-03 DIAGNOSIS — Z98.890 OTHER SPECIFIED POSTPROCEDURAL STATES: Chronic | ICD-10-CM

## 2025-07-03 DIAGNOSIS — Z95.2 PRESENCE OF PROSTHETIC HEART VALVE: Chronic | ICD-10-CM

## 2025-07-03 LAB — INR PPP: 2.5 RATIO

## 2025-07-03 PROCEDURE — 98012 SYNCH AUDIO-ONLY EST SF 10: CPT

## 2025-07-04 DIAGNOSIS — Z79.01 LONG TERM (CURRENT) USE OF ANTICOAGULANTS: ICD-10-CM

## 2025-07-04 DIAGNOSIS — I48.91 UNSPECIFIED ATRIAL FIBRILLATION: ICD-10-CM

## 2025-07-11 ENCOUNTER — OUTPATIENT (OUTPATIENT)
Dept: OUTPATIENT SERVICES | Facility: HOSPITAL | Age: 45
LOS: 1 days | End: 2025-07-11
Payer: COMMERCIAL

## 2025-07-11 ENCOUNTER — APPOINTMENT (OUTPATIENT)
Dept: MEDICATION MANAGEMENT | Facility: CLINIC | Age: 45
End: 2025-07-11

## 2025-07-11 DIAGNOSIS — Z79.01 LONG TERM (CURRENT) USE OF ANTICOAGULANTS: ICD-10-CM

## 2025-07-11 DIAGNOSIS — Z98.890 OTHER SPECIFIED POSTPROCEDURAL STATES: Chronic | ICD-10-CM

## 2025-07-11 DIAGNOSIS — I48.91 UNSPECIFIED ATRIAL FIBRILLATION: ICD-10-CM

## 2025-07-11 DIAGNOSIS — Z95.2 PRESENCE OF PROSTHETIC HEART VALVE: Chronic | ICD-10-CM

## 2025-07-11 LAB — INR PPP: 4.3 RATIO

## 2025-07-11 PROCEDURE — 98012 SYNCH AUDIO-ONLY EST SF 10: CPT

## 2025-07-12 DIAGNOSIS — I48.91 UNSPECIFIED ATRIAL FIBRILLATION: ICD-10-CM

## 2025-07-12 DIAGNOSIS — Z79.01 LONG TERM (CURRENT) USE OF ANTICOAGULANTS: ICD-10-CM

## 2025-07-15 ENCOUNTER — OUTPATIENT (OUTPATIENT)
Dept: OUTPATIENT SERVICES | Facility: HOSPITAL | Age: 45
LOS: 1 days | End: 2025-07-15
Payer: COMMERCIAL

## 2025-07-15 ENCOUNTER — APPOINTMENT (OUTPATIENT)
Dept: MEDICATION MANAGEMENT | Facility: CLINIC | Age: 45
End: 2025-07-15

## 2025-07-15 DIAGNOSIS — Z95.2 PRESENCE OF PROSTHETIC HEART VALVE: Chronic | ICD-10-CM

## 2025-07-15 DIAGNOSIS — Z79.01 LONG TERM (CURRENT) USE OF ANTICOAGULANTS: ICD-10-CM

## 2025-07-15 DIAGNOSIS — Z98.890 OTHER SPECIFIED POSTPROCEDURAL STATES: Chronic | ICD-10-CM

## 2025-07-15 DIAGNOSIS — I48.91 UNSPECIFIED ATRIAL FIBRILLATION: ICD-10-CM

## 2025-07-15 LAB — INR PPP: 1.9 RATIO

## 2025-07-15 PROCEDURE — 98012 SYNCH AUDIO-ONLY EST SF 10: CPT

## 2025-07-16 DIAGNOSIS — Z79.01 LONG TERM (CURRENT) USE OF ANTICOAGULANTS: ICD-10-CM

## 2025-07-16 DIAGNOSIS — I48.91 UNSPECIFIED ATRIAL FIBRILLATION: ICD-10-CM

## 2025-07-21 ENCOUNTER — APPOINTMENT (OUTPATIENT)
Dept: MEDICATION MANAGEMENT | Facility: CLINIC | Age: 45
End: 2025-07-21

## 2025-07-21 ENCOUNTER — OUTPATIENT (OUTPATIENT)
Dept: OUTPATIENT SERVICES | Facility: HOSPITAL | Age: 45
LOS: 1 days | End: 2025-07-21
Payer: COMMERCIAL

## 2025-07-21 DIAGNOSIS — Z79.01 LONG TERM (CURRENT) USE OF ANTICOAGULANTS: ICD-10-CM

## 2025-07-21 DIAGNOSIS — I48.91 UNSPECIFIED ATRIAL FIBRILLATION: ICD-10-CM

## 2025-07-21 DIAGNOSIS — Z95.2 PRESENCE OF PROSTHETIC HEART VALVE: Chronic | ICD-10-CM

## 2025-07-21 DIAGNOSIS — Z98.890 OTHER SPECIFIED POSTPROCEDURAL STATES: Chronic | ICD-10-CM

## 2025-07-21 LAB — INR PPP: 2 RATIO

## 2025-07-21 PROCEDURE — 98012 SYNCH AUDIO-ONLY EST SF 10: CPT

## 2025-07-22 DIAGNOSIS — I48.91 UNSPECIFIED ATRIAL FIBRILLATION: ICD-10-CM

## 2025-07-22 DIAGNOSIS — Z79.01 LONG TERM (CURRENT) USE OF ANTICOAGULANTS: ICD-10-CM

## 2025-07-23 ENCOUNTER — APPOINTMENT (OUTPATIENT)
Dept: CARDIOLOGY | Facility: CLINIC | Age: 45
End: 2025-07-23
Payer: COMMERCIAL

## 2025-07-23 VITALS
DIASTOLIC BLOOD PRESSURE: 59 MMHG | OXYGEN SATURATION: 97 % | WEIGHT: 179 LBS | HEIGHT: 61 IN | HEART RATE: 84 BPM | BODY MASS INDEX: 33.79 KG/M2 | SYSTOLIC BLOOD PRESSURE: 108 MMHG

## 2025-07-23 DIAGNOSIS — E78.5 HYPERLIPIDEMIA, UNSPECIFIED: ICD-10-CM

## 2025-07-23 DIAGNOSIS — R60.0 LOCALIZED EDEMA: ICD-10-CM

## 2025-07-23 DIAGNOSIS — Z95.2 PRESENCE OF PROSTHETIC HEART VALVE: ICD-10-CM

## 2025-07-23 DIAGNOSIS — I48.91 UNSPECIFIED ATRIAL FIBRILLATION: ICD-10-CM

## 2025-07-23 DIAGNOSIS — I50.30 UNSPECIFIED DIASTOLIC (CONGESTIVE) HEART FAILURE: ICD-10-CM

## 2025-07-23 DIAGNOSIS — I42.2 OTHER HYPERTROPHIC CARDIOMYOPATHY: ICD-10-CM

## 2025-07-23 PROCEDURE — 99214 OFFICE O/P EST MOD 30 MIN: CPT | Mod: 25

## 2025-07-23 PROCEDURE — 93000 ELECTROCARDIOGRAM COMPLETE: CPT

## 2025-07-29 ENCOUNTER — OUTPATIENT (OUTPATIENT)
Dept: OUTPATIENT SERVICES | Facility: HOSPITAL | Age: 45
LOS: 1 days | End: 2025-07-29
Payer: COMMERCIAL

## 2025-07-29 ENCOUNTER — APPOINTMENT (OUTPATIENT)
Dept: MEDICATION MANAGEMENT | Facility: CLINIC | Age: 45
End: 2025-07-29

## 2025-07-29 DIAGNOSIS — Z95.2 PRESENCE OF PROSTHETIC HEART VALVE: Chronic | ICD-10-CM

## 2025-07-29 DIAGNOSIS — Z79.01 LONG TERM (CURRENT) USE OF ANTICOAGULANTS: ICD-10-CM

## 2025-07-29 DIAGNOSIS — Z98.890 OTHER SPECIFIED POSTPROCEDURAL STATES: Chronic | ICD-10-CM

## 2025-07-29 DIAGNOSIS — I48.91 UNSPECIFIED ATRIAL FIBRILLATION: ICD-10-CM

## 2025-07-29 LAB — INR PPP: 3.2 RATIO

## 2025-07-29 PROCEDURE — 98012 SYNCH AUDIO-ONLY EST SF 10: CPT

## 2025-07-30 DIAGNOSIS — I48.91 UNSPECIFIED ATRIAL FIBRILLATION: ICD-10-CM

## 2025-07-30 DIAGNOSIS — Z79.01 LONG TERM (CURRENT) USE OF ANTICOAGULANTS: ICD-10-CM

## 2025-08-05 ENCOUNTER — APPOINTMENT (OUTPATIENT)
Dept: MEDICATION MANAGEMENT | Facility: CLINIC | Age: 45
End: 2025-08-05

## 2025-08-05 ENCOUNTER — OUTPATIENT (OUTPATIENT)
Dept: OUTPATIENT SERVICES | Facility: HOSPITAL | Age: 45
LOS: 1 days | End: 2025-08-05
Payer: COMMERCIAL

## 2025-08-05 DIAGNOSIS — I48.91 UNSPECIFIED ATRIAL FIBRILLATION: ICD-10-CM

## 2025-08-05 DIAGNOSIS — Z98.890 OTHER SPECIFIED POSTPROCEDURAL STATES: Chronic | ICD-10-CM

## 2025-08-05 DIAGNOSIS — Z95.2 PRESENCE OF PROSTHETIC HEART VALVE: Chronic | ICD-10-CM

## 2025-08-05 DIAGNOSIS — Z79.01 LONG TERM (CURRENT) USE OF ANTICOAGULANTS: ICD-10-CM

## 2025-08-05 LAB — INR PPP: 2.4 RATIO

## 2025-08-05 PROCEDURE — 98012 SYNCH AUDIO-ONLY EST SF 10: CPT

## 2025-08-06 DIAGNOSIS — I48.91 UNSPECIFIED ATRIAL FIBRILLATION: ICD-10-CM

## 2025-08-06 DIAGNOSIS — Z79.01 LONG TERM (CURRENT) USE OF ANTICOAGULANTS: ICD-10-CM

## 2025-08-12 ENCOUNTER — OUTPATIENT (OUTPATIENT)
Dept: OUTPATIENT SERVICES | Facility: HOSPITAL | Age: 45
LOS: 1 days | End: 2025-08-12
Payer: COMMERCIAL

## 2025-08-12 ENCOUNTER — APPOINTMENT (OUTPATIENT)
Dept: MEDICATION MANAGEMENT | Facility: CLINIC | Age: 45
End: 2025-08-12

## 2025-08-12 DIAGNOSIS — Z79.01 LONG TERM (CURRENT) USE OF ANTICOAGULANTS: ICD-10-CM

## 2025-08-12 DIAGNOSIS — I48.91 UNSPECIFIED ATRIAL FIBRILLATION: ICD-10-CM

## 2025-08-12 DIAGNOSIS — Z95.2 PRESENCE OF PROSTHETIC HEART VALVE: Chronic | ICD-10-CM

## 2025-08-12 DIAGNOSIS — Z98.890 OTHER SPECIFIED POSTPROCEDURAL STATES: Chronic | ICD-10-CM

## 2025-08-12 LAB — INR PPP: 2.7 RATIO

## 2025-08-12 PROCEDURE — 98012 SYNCH AUDIO-ONLY EST SF 10: CPT

## 2025-08-13 DIAGNOSIS — Z79.01 LONG TERM (CURRENT) USE OF ANTICOAGULANTS: ICD-10-CM

## 2025-08-13 DIAGNOSIS — I48.91 UNSPECIFIED ATRIAL FIBRILLATION: ICD-10-CM

## 2025-08-18 ENCOUNTER — RX RENEWAL (OUTPATIENT)
Age: 45
End: 2025-08-18

## 2025-08-19 ENCOUNTER — APPOINTMENT (OUTPATIENT)
Dept: MEDICATION MANAGEMENT | Facility: CLINIC | Age: 45
End: 2025-08-19

## 2025-08-19 ENCOUNTER — OUTPATIENT (OUTPATIENT)
Dept: OUTPATIENT SERVICES | Facility: HOSPITAL | Age: 45
LOS: 1 days | End: 2025-08-19
Payer: COMMERCIAL

## 2025-08-19 DIAGNOSIS — Z98.890 OTHER SPECIFIED POSTPROCEDURAL STATES: Chronic | ICD-10-CM

## 2025-08-19 DIAGNOSIS — Z79.01 LONG TERM (CURRENT) USE OF ANTICOAGULANTS: ICD-10-CM

## 2025-08-19 DIAGNOSIS — I48.91 UNSPECIFIED ATRIAL FIBRILLATION: ICD-10-CM

## 2025-08-19 DIAGNOSIS — Z95.2 PRESENCE OF PROSTHETIC HEART VALVE: Chronic | ICD-10-CM

## 2025-08-19 LAB — INR PPP: 2.9 RATIO

## 2025-08-19 PROCEDURE — 98012 SYNCH AUDIO-ONLY EST SF 10: CPT

## 2025-08-20 DIAGNOSIS — Z79.01 LONG TERM (CURRENT) USE OF ANTICOAGULANTS: ICD-10-CM

## 2025-08-20 DIAGNOSIS — I48.91 UNSPECIFIED ATRIAL FIBRILLATION: ICD-10-CM

## 2025-08-25 ENCOUNTER — OUTPATIENT (OUTPATIENT)
Dept: OUTPATIENT SERVICES | Facility: HOSPITAL | Age: 45
LOS: 1 days | End: 2025-08-25
Payer: COMMERCIAL

## 2025-08-25 ENCOUNTER — APPOINTMENT (OUTPATIENT)
Dept: MEDICATION MANAGEMENT | Facility: CLINIC | Age: 45
End: 2025-08-25

## 2025-08-25 DIAGNOSIS — I48.91 UNSPECIFIED ATRIAL FIBRILLATION: ICD-10-CM

## 2025-08-25 DIAGNOSIS — Z95.2 PRESENCE OF PROSTHETIC HEART VALVE: Chronic | ICD-10-CM

## 2025-08-25 DIAGNOSIS — Z79.01 LONG TERM (CURRENT) USE OF ANTICOAGULANTS: ICD-10-CM

## 2025-08-25 DIAGNOSIS — Z98.890 OTHER SPECIFIED POSTPROCEDURAL STATES: Chronic | ICD-10-CM

## 2025-08-25 LAB — INR PPP: 2.6 RATIO

## 2025-08-25 PROCEDURE — 98012 SYNCH AUDIO-ONLY EST SF 10: CPT

## 2025-08-26 DIAGNOSIS — Z79.01 LONG TERM (CURRENT) USE OF ANTICOAGULANTS: ICD-10-CM

## 2025-08-26 DIAGNOSIS — I48.91 UNSPECIFIED ATRIAL FIBRILLATION: ICD-10-CM

## 2025-09-04 ENCOUNTER — NON-APPOINTMENT (OUTPATIENT)
Age: 45
End: 2025-09-04

## 2025-09-04 ENCOUNTER — APPOINTMENT (OUTPATIENT)
Dept: CARDIOLOGY | Facility: CLINIC | Age: 45
End: 2025-09-04
Payer: COMMERCIAL

## 2025-09-04 PROCEDURE — 93296 REM INTERROG EVL PM/IDS: CPT

## 2025-09-04 PROCEDURE — 93295 DEV INTERROG REMOTE 1/2/MLT: CPT

## 2025-09-08 ENCOUNTER — APPOINTMENT (OUTPATIENT)
Dept: MEDICATION MANAGEMENT | Facility: CLINIC | Age: 45
End: 2025-09-08

## 2025-09-08 LAB — INR PPP: 2.6 RATIO
